# Patient Record
Sex: MALE | Race: BLACK OR AFRICAN AMERICAN | NOT HISPANIC OR LATINO | Employment: OTHER | URBAN - METROPOLITAN AREA
[De-identification: names, ages, dates, MRNs, and addresses within clinical notes are randomized per-mention and may not be internally consistent; named-entity substitution may affect disease eponyms.]

---

## 2017-06-06 DIAGNOSIS — E11.9 TYPE 2 DIABETES MELLITUS WITHOUT COMPLICATION: ICD-10-CM

## 2017-07-03 DIAGNOSIS — I10 ESSENTIAL HYPERTENSION: ICD-10-CM

## 2017-07-03 DIAGNOSIS — E78.2 MIXED HYPERLIPIDEMIA: ICD-10-CM

## 2017-07-03 RX ORDER — LISINOPRIL AND HYDROCHLOROTHIAZIDE 12.5; 2 MG/1; MG/1
TABLET ORAL
Qty: 180 TABLET | Refills: 1 | Status: SHIPPED | OUTPATIENT
Start: 2017-07-03 | End: 2017-07-14 | Stop reason: SDUPTHER

## 2017-07-03 RX ORDER — DAPAGLIFLOZIN 5 MG/1
5 TABLET, FILM COATED ORAL DAILY
Qty: 30 TABLET | Refills: 5 | Status: SHIPPED | OUTPATIENT
Start: 2017-07-03 | End: 2017-07-14 | Stop reason: SDUPTHER

## 2017-07-03 RX ORDER — METFORMIN HYDROCHLORIDE 1000 MG/1
1000 TABLET ORAL 2 TIMES DAILY WITH MEALS
Qty: 180 TABLET | Refills: 1 | Status: SHIPPED | OUTPATIENT
Start: 2017-07-03 | End: 2017-07-14 | Stop reason: SDUPTHER

## 2017-07-03 RX ORDER — ATORVASTATIN CALCIUM 40 MG/1
40 TABLET, FILM COATED ORAL NIGHTLY
Qty: 90 TABLET | Refills: 1 | Status: SHIPPED | OUTPATIENT
Start: 2017-07-03 | End: 2017-10-25 | Stop reason: SDUPTHER

## 2017-07-03 NOTE — TELEPHONE ENCOUNTER
----- Message from Audrey Rodriguez sent at 7/3/2017 11:26 AM CDT -----  Contact: Self  REFILL: metformin (GLUCOPHAGE) 1000 MG tablet  SITagliptan (JANUVIA) 100 MG Tab  lisinopril-hydrochlorothiazide (PRINZIDE,ZESTORETIC) 20-12.5 mg per tablet  dapagliflozin (FARXIGA) 5 mg Tab tablet  atorvastatin (LIPITOR) 40 MG tablet

## 2017-07-13 ENCOUNTER — LAB VISIT (OUTPATIENT)
Dept: LAB | Facility: HOSPITAL | Age: 62
End: 2017-07-13
Attending: INTERNAL MEDICINE
Payer: COMMERCIAL

## 2017-07-13 DIAGNOSIS — E11.9 TYPE 2 DIABETES MELLITUS WITHOUT COMPLICATION: ICD-10-CM

## 2017-07-13 LAB
ESTIMATED AVG GLUCOSE: 209 MG/DL
HBA1C MFR BLD HPLC: 8.9 %

## 2017-07-13 PROCEDURE — 83036 HEMOGLOBIN GLYCOSYLATED A1C: CPT

## 2017-07-13 PROCEDURE — 36415 COLL VENOUS BLD VENIPUNCTURE: CPT

## 2017-07-14 DIAGNOSIS — I10 ESSENTIAL HYPERTENSION: ICD-10-CM

## 2017-07-14 RX ORDER — LISINOPRIL AND HYDROCHLOROTHIAZIDE 12.5; 2 MG/1; MG/1
TABLET ORAL
Qty: 180 TABLET | Refills: 0 | Status: SHIPPED | OUTPATIENT
Start: 2017-07-14 | End: 2017-10-25 | Stop reason: SDUPTHER

## 2017-07-14 RX ORDER — METFORMIN HYDROCHLORIDE 1000 MG/1
1000 TABLET ORAL 2 TIMES DAILY WITH MEALS
Qty: 180 TABLET | Refills: 0 | Status: SHIPPED | OUTPATIENT
Start: 2017-07-14 | End: 2017-10-25 | Stop reason: SDUPTHER

## 2017-07-14 RX ORDER — DAPAGLIFLOZIN 5 MG/1
5 TABLET, FILM COATED ORAL DAILY
Qty: 30 TABLET | Refills: 2 | Status: SHIPPED | OUTPATIENT
Start: 2017-07-14 | End: 2017-10-25 | Stop reason: SDUPTHER

## 2017-09-27 ENCOUNTER — PATIENT OUTREACH (OUTPATIENT)
Dept: ADMINISTRATIVE | Facility: HOSPITAL | Age: 62
End: 2017-09-27

## 2017-09-28 DIAGNOSIS — Z12.11 COLON CANCER SCREENING: ICD-10-CM

## 2017-09-28 DIAGNOSIS — E11.9 TYPE 2 DIABETES MELLITUS WITHOUT COMPLICATION: ICD-10-CM

## 2017-10-25 DIAGNOSIS — E78.2 MIXED HYPERLIPIDEMIA: ICD-10-CM

## 2017-10-25 DIAGNOSIS — I10 ESSENTIAL HYPERTENSION: ICD-10-CM

## 2017-10-25 RX ORDER — DAPAGLIFLOZIN 5 MG/1
5 TABLET, FILM COATED ORAL DAILY
Qty: 30 TABLET | Refills: 0 | Status: SHIPPED | OUTPATIENT
Start: 2017-10-25 | End: 2017-11-13 | Stop reason: SDUPTHER

## 2017-10-25 RX ORDER — METFORMIN HYDROCHLORIDE 1000 MG/1
1000 TABLET ORAL 2 TIMES DAILY WITH MEALS
Qty: 60 TABLET | Refills: 0 | Status: SHIPPED | OUTPATIENT
Start: 2017-10-25 | End: 2017-11-13 | Stop reason: SDUPTHER

## 2017-10-25 RX ORDER — LISINOPRIL AND HYDROCHLOROTHIAZIDE 12.5; 2 MG/1; MG/1
TABLET ORAL
Qty: 60 TABLET | Refills: 0 | Status: SHIPPED | OUTPATIENT
Start: 2017-10-25 | End: 2017-11-13 | Stop reason: SDUPTHER

## 2017-10-25 RX ORDER — ATORVASTATIN CALCIUM 40 MG/1
40 TABLET, FILM COATED ORAL NIGHTLY
Qty: 30 TABLET | Refills: 0 | Status: SHIPPED | OUTPATIENT
Start: 2017-10-25 | End: 2017-11-13 | Stop reason: SDUPTHER

## 2017-10-25 NOTE — TELEPHONE ENCOUNTER
Last office visit 11/23/16 [place] [address]. Lipid panel done on 118/23/17 with cholesterol at 245. Last BMP 11/23/17. 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

## 2017-11-13 ENCOUNTER — OFFICE VISIT (OUTPATIENT)
Dept: FAMILY MEDICINE | Facility: CLINIC | Age: 62
End: 2017-11-13
Payer: COMMERCIAL

## 2017-11-13 VITALS
OXYGEN SATURATION: 98 % | RESPIRATION RATE: 18 BRPM | SYSTOLIC BLOOD PRESSURE: 158 MMHG | BODY MASS INDEX: 40.43 KG/M2 | WEIGHT: 288.81 LBS | HEIGHT: 71 IN | DIASTOLIC BLOOD PRESSURE: 118 MMHG | HEART RATE: 87 BPM | TEMPERATURE: 98 F

## 2017-11-13 DIAGNOSIS — E78.2 MIXED HYPERLIPIDEMIA: ICD-10-CM

## 2017-11-13 DIAGNOSIS — M25.511 RIGHT SHOULDER PAIN, UNSPECIFIED CHRONICITY: Primary | ICD-10-CM

## 2017-11-13 DIAGNOSIS — I10 ESSENTIAL HYPERTENSION: ICD-10-CM

## 2017-11-13 PROCEDURE — 82570 ASSAY OF URINE CREATININE: CPT

## 2017-11-13 PROCEDURE — 99999 PR PBB SHADOW E&M-EST. PATIENT-LVL IV: CPT | Mod: PBBFAC,,, | Performed by: NURSE PRACTITIONER

## 2017-11-13 PROCEDURE — 99396 PREV VISIT EST AGE 40-64: CPT | Mod: S$GLB,,, | Performed by: NURSE PRACTITIONER

## 2017-11-13 RX ORDER — DAPAGLIFLOZIN 5 MG/1
5 TABLET, FILM COATED ORAL DAILY
Qty: 30 TABLET | Refills: 2 | Status: SHIPPED | OUTPATIENT
Start: 2017-11-13 | End: 2018-02-02 | Stop reason: SDUPTHER

## 2017-11-13 RX ORDER — METFORMIN HYDROCHLORIDE 1000 MG/1
1000 TABLET ORAL 2 TIMES DAILY WITH MEALS
Qty: 60 TABLET | Refills: 0 | Status: SHIPPED | OUTPATIENT
Start: 2017-11-13 | End: 2018-01-02 | Stop reason: SDUPTHER

## 2017-11-13 RX ORDER — LISINOPRIL AND HYDROCHLOROTHIAZIDE 12.5; 2 MG/1; MG/1
2 TABLET ORAL DAILY
Qty: 60 TABLET | Refills: 0 | Status: SHIPPED | OUTPATIENT
Start: 2017-11-13 | End: 2018-01-02 | Stop reason: SDUPTHER

## 2017-11-13 RX ORDER — ATORVASTATIN CALCIUM 40 MG/1
40 TABLET, FILM COATED ORAL NIGHTLY
Qty: 30 TABLET | Refills: 2 | Status: SHIPPED | OUTPATIENT
Start: 2017-11-13 | End: 2018-02-06 | Stop reason: SDUPTHER

## 2017-11-13 RX ORDER — LISINOPRIL AND HYDROCHLOROTHIAZIDE 12.5; 2 MG/1; MG/1
TABLET ORAL
Qty: 60 TABLET | Refills: 0 | Status: CANCELLED | OUTPATIENT
Start: 2017-11-13

## 2017-11-13 NOTE — PATIENT INSTRUCTIONS
Follow up in 3-6 months, pending lab results  Go to ER for new, worse or concerning symptoms      Understanding Carbohydrates, Fats, and Protein  Food is a source of fuel and nourishment for your body. Its also a source of pleasure. Having diabetes doesnt mean you have to eat special foods or give up desserts. Instead, your dietitian can show you how to plan meals to suit your body. To start, learn how different foods affect blood sugar.  Carbohydrates  Carbohydrates are the main source of fuel for the body. Carbohydrates raise blood sugar. Many people think carbohydrates are only found in pasta or bread. But carbohydrates are actually in many kinds of foods:  · Sugars occur naturally in foods such as fruit, milk, honey, and molasses. Sugars can also be added to many foods, from cereals and yogurt to candy and desserts. Sugars raise blood sugar.  · Starches are found in bread, cereals, pasta, and dried beans. Theyre also found in corn, peas, potatoes, yam, acorn squash, and butternut squash. Starches also raise blood sugar.   · Fiber is found in foods such as vegetables, fruits, beans, and whole grains. Unlike other carbs, fiber isnt digested or absorbed. So it doesnt raise blood sugar. In fact, fiber can help keep blood sugar from rising too fast. It also helps keep blood cholesterol at a healthy level.  Did you know?  Even though carbohydrates raise blood sugar, its best to have some in every meal. They are an important part of a healthy diet.   Fat  Fat is an energy source that can be stored until needed. Fat does not raise blood sugar. However, it can raise blood cholesterol, increasing the risk of heart disease. Fat is also high in calories, which can cause weight gain. Not all types of fat are the same.  More Healthy:  · Monounsaturated fats are mostly found in vegetable oils, such as olive, canola, and peanut oils. They are also found in avocados and some nuts. Monounsaturated fats are healthy for your  heart. Thats because they lower LDL (unhealthy) cholesterol.  · Polyunsaturated fats are mostly found in vegetable oils, such as corn, safflower, and soybean oils. They are also found in some seeds, nuts, and fish. Polyunsaturated fats lower LDL (unhealthy) cholesterol. So, choosing them instead of saturated fats is healthy for your heart. Certain unsaturated fats can help lower triglycerides.   Less Healthy:  · Saturated fats are found in animal products, such as meat, poultry, whole milk, lard, and butter. Saturated fats raise LDL cholesterol and are not healthy for your heart.  · Hydrogenated oils and trans fats are formed when vegetable oils are processed into solid fats. They are found in many processed foods. Hydrogenated oils and trans fats raise LDL cholesterol and lower HDL (healthy) cholesterol. They are not healthy for your heart.  Protein  Protein helps the body build and repair muscle and other tissue. Protein has little or no effect on blood sugar. However, many foods that contain protein also contain saturated fat. By choosing low-fat protein sources, you can get the benefits of protein without the extra fat:  · Plant protein is found in dry beans and peas, nuts, and soy products, such as tofu and soymilk. These sources tend to be cholesterol-free and low in saturated fat.  · Animal protein is found in fish, poultry, meat, cheese, milk, and eggs. These contain cholesterol and can be high in saturated fat. Aim for lean, lower-fat choices.  Date Last Reviewed: 3/1/2016  © 6302-8059 [employer]. [address] [place] [address], [place] [address]. All rights reserved. This information is not intended as a substitute for professional medical care. Always follow your healthcare professional's instructions.

## 2017-11-13 NOTE — PROGRESS NOTES
"Subjective:       Patient ID: Handy Adams is a 62 y.o. male.    Chief Complaint: Medication Refill (90 day supply )    HPI mr Adams is here for his annual exam.  He frequently gives short answers, and tells me he has to "beg" for his refills.    He feels well today, he reports medication compliance, he does not check his sugars. He denies any polydipsia or polyphagia, he states he always urinates a lot, but this is because he always drinks a lot of water.    He is due for multiple health maintenance exams.  He also reports some chronic R shoulder pain that he has had injections in the past, he would like to be referred to orthopedic MD.  Review of Systems   Constitutional: Negative for fever.   Respiratory: Negative.    Cardiovascular: Negative.    Musculoskeletal: Positive for arthralgias.       Objective:      Physical Exam   Constitutional: He is oriented to person, place, and time. He appears well-developed and well-nourished. He does not appear ill. No distress.   obese   HENT:   Head: Normocephalic and atraumatic.   Cardiovascular: Normal rate and regular rhythm.  Exam reveals no friction rub.    No murmur heard.  Pulses:       Dorsalis pedis pulses are 2+ on the right side, and 2+ on the left side.        Posterior tibial pulses are 2+ on the right side, and 2+ on the left side.   Pulmonary/Chest: Effort normal and breath sounds normal. He has no decreased breath sounds. He has no wheezes. He has no rhonchi. He has no rales.   Abdominal: Soft.   Musculoskeletal: Normal range of motion.   Feet:   Right Foot:   Protective Sensation: 5 sites tested. 5 sites sensed.   Left Foot:   Protective Sensation: 5 sites tested. 5 sites sensed.   Neurological: He is alert and oriented to person, place, and time.   Skin: Skin is warm.   Vitals reviewed.    Protective Sensation (w/ 10 gram monofilament):  Right: Intact  Left: Intact    Visual Inspection:  Normal -  Bilateral    Pedal Pulses:   Right: Present  Left: " Present    Posterior tibialis:   Right:Present  Left: Present      Assessment:       1. Right shoulder pain, unspecified chronicity    2. Uncontrolled type 2 diabetes mellitus without complication, without long-term current use of insulin    3. Mixed hyperlipidemia    4. Essential hypertension        Plan:       Right shoulder pain, unspecified chronicity  -     Ambulatory referral to Orthopedics    Uncontrolled type 2 diabetes mellitus without complication, without long-term current use of insulin  -     SITagliptin (JANUVIA) 100 MG Tab; Take 1 tablet (100 mg total) by mouth once daily.  Dispense: 90 tablet; Refill: 0  -     dapagliflozin (FARXIGA) 5 mg Tab tablet; Take 1 tablet (5 mg total) by mouth once daily.  Dispense: 30 tablet; Refill: 2  -     metFORMIN (GLUCOPHAGE) 1000 MG tablet; Take 1 tablet (1,000 mg total) by mouth 2 (two) times daily with meals.  Dispense: 60 tablet; Refill: 0  -     Basic metabolic panel; Future; Expected date: 11/13/2017  -     Lipid panel; Future; Expected date: 11/13/2017  -     Hemoglobin A1c; Future; Expected date: 11/13/2017  -     Diabetic Eye Screening Photo; Future  -     MICROALBUMIN / CREATININE RATIO URINE    Mixed hyperlipidemia  -     atorvastatin (LIPITOR) 40 MG tablet; Take 1 tablet (40 mg total) by mouth nightly.  Dispense: 30 tablet; Refill: 2    Essential hypertension  -     lisinopril-hydrochlorothiazide (PRINZIDE,ZESTORETIC) 20-12.5 mg per tablet; Take 2 tablets by mouth once daily.  Dispense: 60 tablet; Refill: 0      He declines a flu shot, instructed to take prinzide 2 tablets once daily instead of BID to decrease nocturia.  He will have his labs and eyecam done tomorrow.  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  If his labs are WNL, we can stretch his visits out to 6 months.    Verbalized understanding

## 2017-11-14 ENCOUNTER — TELEPHONE (OUTPATIENT)
Dept: FAMILY MEDICINE | Facility: CLINIC | Age: 62
End: 2017-11-14

## 2017-11-14 LAB
CREAT UR-MCNC: 48 MG/DL
MICROALBUMIN UR DL<=1MG/L-MCNC: 90 UG/ML
MICROALBUMIN/CREATININE RATIO: 187.5 UG/MG

## 2017-11-15 ENCOUNTER — CLINICAL SUPPORT (OUTPATIENT)
Dept: OPHTHALMOLOGY | Facility: CLINIC | Age: 62
End: 2017-11-15
Payer: COMMERCIAL

## 2017-11-15 ENCOUNTER — LAB VISIT (OUTPATIENT)
Dept: LAB | Facility: HOSPITAL | Age: 62
End: 2017-11-15
Attending: NURSE PRACTITIONER
Payer: COMMERCIAL

## 2017-11-15 DIAGNOSIS — E11.3413 SEVERE NONPROLIFERATIVE DIABETIC RETINOPATHY OF BOTH EYES WITH MACULAR EDEMA ASSOCIATED WITH TYPE 2 DIABETES MELLITUS: Primary | ICD-10-CM

## 2017-11-15 LAB
ANION GAP SERPL CALC-SCNC: 11 MMOL/L
BUN SERPL-MCNC: 21 MG/DL
CALCIUM SERPL-MCNC: 9.3 MG/DL
CHLORIDE SERPL-SCNC: 101 MMOL/L
CHOLEST SERPL-MCNC: 270 MG/DL
CHOLEST/HDLC SERPL: 7.5 {RATIO}
CO2 SERPL-SCNC: 24 MMOL/L
CREAT SERPL-MCNC: 1.2 MG/DL
EST. GFR  (AFRICAN AMERICAN): >60 ML/MIN/1.73 M^2
EST. GFR  (NON AFRICAN AMERICAN): >60 ML/MIN/1.73 M^2
ESTIMATED AVG GLUCOSE: 232 MG/DL
GLUCOSE SERPL-MCNC: 185 MG/DL
HBA1C MFR BLD HPLC: 9.7 %
HDLC SERPL-MCNC: 36 MG/DL
HDLC SERPL: 13.3 %
LDLC SERPL CALC-MCNC: 184.4 MG/DL
NONHDLC SERPL-MCNC: 234 MG/DL
POTASSIUM SERPL-SCNC: 4.1 MMOL/L
SODIUM SERPL-SCNC: 136 MMOL/L
TRIGL SERPL-MCNC: 248 MG/DL

## 2017-11-15 PROCEDURE — 92250 FUNDUS PHOTOGRAPHY W/I&R: CPT | Mod: S$GLB,,, | Performed by: OPHTHALMOLOGY

## 2017-11-15 PROCEDURE — 83036 HEMOGLOBIN GLYCOSYLATED A1C: CPT

## 2017-11-15 PROCEDURE — 99999 PR PBB SHADOW E&M-EST. PATIENT-LVL II: CPT | Mod: PBBFAC,,,

## 2017-11-15 PROCEDURE — 36415 COLL VENOUS BLD VENIPUNCTURE: CPT | Mod: PO

## 2017-11-15 PROCEDURE — 80048 BASIC METABOLIC PNL TOTAL CA: CPT

## 2017-11-15 PROCEDURE — 80061 LIPID PANEL: CPT

## 2017-11-15 NOTE — PROGRESS NOTES
HPI     Pt here for diabetic eye exam with non-dilated fundus photos.    Small pupils: yes  Pt cooperative: yes      Last edited by Brian Garcia on 11/15/2017 11:30 AM. (History)            Assessment /Plan     For exam results, see Encounter Report.    Uncontrolled type 2 diabetes mellitus without complication, without long-term current use of insulin  -     Diabetic Eye Screening Photo      Please see Dr. Jarvis's progress notes for interpretation.

## 2017-11-15 NOTE — Clinical Note
HPI    Pt here for diabetic eye exam with non-dilated fundus photos.  Small pupils: yes Pt cooperative: yes    Last edited by Brian Garcia on 11/15/2017 11:30 AM. (History)

## 2017-11-16 ENCOUNTER — TELEPHONE (OUTPATIENT)
Dept: FAMILY MEDICINE | Facility: CLINIC | Age: 62
End: 2017-11-16

## 2017-11-16 NOTE — TELEPHONE ENCOUNTER
LM for pt to call back re:    His A1c is too high, he should take Metformin 1000mg twice a day, every day, farxiga and januvia once a day every day.  The next step is insulin  his cholesterol is also too high, if he is taking his atorvastatin 40mg every day, then we need to increase it to 80mg every day   his microalbumin, which is a protein in his urine is too high, this can be from poorly controlled diabetes as well as not taking his lisinopril like he's supposed to.    He will need these labs repeated in 3 months.    I will mail his results as well as all of the above information to him.

## 2017-11-17 ENCOUNTER — TELEPHONE (OUTPATIENT)
Dept: FAMILY MEDICINE | Facility: CLINIC | Age: 62
End: 2017-11-17

## 2017-11-17 ENCOUNTER — TELEPHONE (OUTPATIENT)
Dept: OPHTHALMOLOGY | Facility: CLINIC | Age: 62
End: 2017-11-17

## 2017-11-17 DIAGNOSIS — E11.319 DIABETIC RETINOPATHY ASSOCIATED WITH TYPE 2 DIABETES MELLITUS, MACULAR EDEMA PRESENCE UNSPECIFIED, UNSPECIFIED LATERALITY, UNSPECIFIED RETINOPATHY SEVERITY: Primary | ICD-10-CM

## 2017-11-17 PROBLEM — E11.3413 SEVERE NONPROLIFERATIVE DIABETIC RETINOPATHY OF BOTH EYES WITH MACULAR EDEMA ASSOCIATED WITH TYPE 2 DIABETES MELLITUS: Status: ACTIVE | Noted: 2017-11-17

## 2017-11-30 ENCOUNTER — TELEPHONE (OUTPATIENT)
Dept: ENDOCRINOLOGY | Facility: CLINIC | Age: 62
End: 2017-11-30

## 2017-11-30 NOTE — TELEPHONE ENCOUNTER
Left message on patient's voicemail to return call to clinic regarding scheduling Diabetes management appointment with Maryse Smith NP for elevated A1c. Waiting to hear back.

## 2017-12-05 ENCOUNTER — INITIAL CONSULT (OUTPATIENT)
Dept: OPHTHALMOLOGY | Facility: CLINIC | Age: 62
End: 2017-12-05
Payer: COMMERCIAL

## 2017-12-05 VITALS — SYSTOLIC BLOOD PRESSURE: 156 MMHG | HEART RATE: 101 BPM | DIASTOLIC BLOOD PRESSURE: 103 MMHG

## 2017-12-05 DIAGNOSIS — H35.033 HYPERTENSIVE RETINOPATHY, BILATERAL: ICD-10-CM

## 2017-12-05 DIAGNOSIS — H25.13 NUCLEAR SCLEROSIS OF BOTH EYES: ICD-10-CM

## 2017-12-05 DIAGNOSIS — E11.3413 SEVERE NONPROLIFERATIVE DIABETIC RETINOPATHY OF BOTH EYES WITH MACULAR EDEMA ASSOCIATED WITH TYPE 2 DIABETES MELLITUS: Primary | ICD-10-CM

## 2017-12-05 PROBLEM — H25.10 NS (NUCLEAR SCLEROSIS): Status: ACTIVE | Noted: 2017-12-05

## 2017-12-05 PROCEDURE — 99999 PR PBB SHADOW E&M-EST. PATIENT-LVL III: CPT | Mod: PBBFAC,,, | Performed by: OPHTHALMOLOGY

## 2017-12-05 PROCEDURE — 92225 PR SPECIAL EYE EXAM, INITIAL: CPT | Mod: 50,S$GLB,, | Performed by: OPHTHALMOLOGY

## 2017-12-05 PROCEDURE — 92134 CPTRZ OPH DX IMG PST SGM RTA: CPT | Mod: S$GLB,,, | Performed by: OPHTHALMOLOGY

## 2017-12-05 PROCEDURE — 92014 COMPRE OPH EXAM EST PT 1/>: CPT | Mod: S$GLB,,, | Performed by: OPHTHALMOLOGY

## 2017-12-05 NOTE — PROGRESS NOTES
HPI     Retinal consult per Dr. Jarvis due to eye cam findings   LAURA 2-3 years ago unsure of Dr.    Since last eye exam has not experienced any decline of vision or problems with eyes in general.   Has rx glasses but does not feel he needs them often. Has noticed OD vision seems to be stronger.  Denies pain.    (-)Flashes (-)Floaters  (-)Photophobia  (-)Glare    Eye gtts :   Visine / clear eyes to get redness out    LBS- 1- 2 yrs ago does not check   Hemoglobin A1C       Date                     Value               Ref Range             Status                11/15/2017               9.7 (H)             4.0 - 5.6 %           Final                 07/13/2017               8.9 (H)             4.0 - 5.6 %           Final                 11/23/2016               8.9 (H)             4.5 - 6.2 %           Final              ----------  Denies ever having any eye sx before. No eye injuries       OCT - non central DME OU    A/P    1. Severe NPDR OU  Uncontrolled T2, NO insulin    2. DME OU  Non central  Monitor    3. HTN Ret OU  BS/BP/chol control    4. NS OU  Monitor - good Va    5. Cyst RLL  ?hidrocystoma - pt would like removed  Consult Rhea      4 months OCT/FA OD

## 2017-12-05 NOTE — LETTER
December 5, 2017      Chavo Jarvis MD  [address]  [facility] [address]           [facility] - Ophthalmology  [address]  [facility] [address]  Phone: [phone]  Fax: [phone]          Patient: Handy Adams   MR Number: [id number]   YOB: 1955   Date of Visit: 12/5/2017       Dear Dr. Chavo Jarvis:    Thank you for referring Handy Adams to me for evaluation. Attached you will find relevant portions of my assessment and plan of care.    If you have questions, please do not hesitate to call me. I look forward to following Handy Adams along with you.    Sincerely,    SHAWNA Mcmanus MD    Enclosure  CC:  No Recipients    If you would like to receive this communication electronically, please contact [email or web address] or [phone] to request more information on [employer] Link access.    For providers and/or their staff who would like to refer a patient to [facility], please contact us through our one-stop-shop provider referral line, [facility], at [phone].    If you feel you have received this communication in error or would no longer like to receive these types of communications, please e-mail [email or web address]

## 2018-01-02 DIAGNOSIS — I10 ESSENTIAL HYPERTENSION: ICD-10-CM

## 2018-01-02 RX ORDER — LISINOPRIL AND HYDROCHLOROTHIAZIDE 12.5; 2 MG/1; MG/1
2 TABLET ORAL DAILY
Qty: 60 TABLET | Refills: 0 | Status: SHIPPED | OUTPATIENT
Start: 2018-01-02 | End: 2018-02-06 | Stop reason: SDUPTHER

## 2018-01-02 RX ORDER — METFORMIN HYDROCHLORIDE 1000 MG/1
1000 TABLET ORAL 2 TIMES DAILY WITH MEALS
Qty: 60 TABLET | Refills: 0 | Status: SHIPPED | OUTPATIENT
Start: 2018-01-02 | End: 2018-02-06 | Stop reason: SDUPTHER

## 2018-01-02 NOTE — TELEPHONE ENCOUNTER
Last office visit 11/13/17 with blood pressure reading of 158/118. Last A1c 11/15/17 with reading of 9.7.

## 2018-02-01 ENCOUNTER — INITIAL CONSULT (OUTPATIENT)
Dept: OPHTHALMOLOGY | Facility: CLINIC | Age: 63
End: 2018-02-01
Payer: COMMERCIAL

## 2018-02-01 DIAGNOSIS — H35.033 HYPERTENSIVE RETINOPATHY, BILATERAL: ICD-10-CM

## 2018-02-01 DIAGNOSIS — D23.9 HYDROCYSTOMA: Primary | ICD-10-CM

## 2018-02-01 DIAGNOSIS — H02.9 LESION OF EYELID: Primary | ICD-10-CM

## 2018-02-01 DIAGNOSIS — H25.13 NUCLEAR SCLEROSIS OF BOTH EYES: ICD-10-CM

## 2018-02-01 PROCEDURE — 99999 PR PBB SHADOW E&M-EST. PATIENT-LVL II: CPT | Mod: PBBFAC,,, | Performed by: OPHTHALMOLOGY

## 2018-02-01 PROCEDURE — 92285 EXTERNAL OCULAR PHOTOGRAPHY: CPT | Mod: S$GLB,,, | Performed by: OPHTHALMOLOGY

## 2018-02-01 PROCEDURE — 92014 COMPRE OPH EXAM EST PT 1/>: CPT | Mod: S$GLB,,, | Performed by: OPHTHALMOLOGY

## 2018-02-01 NOTE — PROGRESS NOTES
HPI     Mr. Murrell is here today for a consult referred by Dr. Mcmanus. He   states he has a cyst in the corner of his right eye that has been there   since 2005. He denies any eye pain or irritation. He is not using any eye   medications.     Last edited by Debi Stubbs, PCT on 2/1/2018  3:23 PM. (History)            Assessment /Plan     For exam results, see Encounter Report.    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     Concern that hidrocystoma will likely involve canalicular system should it continue to grow.     Informed consent obtained after extensive risks/benefits/alternatives were discussed with the patient including but not limited to pain, bleeding, infection, ocular injury, loss of the eye, asymmetry, need for revision in future, scarring.  Alternatives such as waiting were discussed.  All questions were answered.      Return for surgery     NPDR OU- Continue follow-up with Dr. Mcmanus    Strict bs and bp control

## 2018-02-01 NOTE — LETTER
February 1, 2018      SHAWNA Mcmanus MD  [address] [facility] [address]           [facility] - Ophthalmology  [address]  [facility] [address]  Phone: [phone]  Fax: [phone]          Patient: Handy Adams   MR Number: [id number]   YOB: 1955   Date of Visit: 2/1/2018       Dear Dr. SHAWNA Mcmanus:    Thank you for referring Handy Adams to me for evaluation. Attached you will find relevant portions of my assessment and plan of care.    If you have questions, please do not hesitate to call me. I look forward to following Handy Adams along with you.    Sincerely,    Denise Wilkerson MD    Enclosure  CC:  No Recipients    If you would like to receive this communication electronically, please contact [email or web address] or [phone] to request more information on [employer] Link access.    For providers and/or their staff who would like to refer a patient to [facility], please contact us through our one-stop-shop provider referral line, [facility], at [phone].    If you feel you have received this communication in error or would no longer like to receive these types of communications, please e-mail [email or web address]

## 2018-02-02 NOTE — TELEPHONE ENCOUNTER
----- Message from Lubna Hernandez sent at 2/2/2018  3:35 PM CST -----  Contact: self  Refill: metFORMIN (GLUCOPHAGE) 1000 MG tablet  Refill: lisinopril-hydrochlorothiazide (PRINZIDE,ZESTORETIC) 20-12.5 mg per tablet  Refill: SITagliptin (JANUVIA) 100 MG Tab  Refill: dapagliflozin (FARXIGA) 5 mg Tab tablet    [facility] - [place], [place] - [address]    Patient can be reached at [phone]. Thank you!

## 2018-02-05 RX ORDER — DAPAGLIFLOZIN 5 MG/1
5 TABLET, FILM COATED ORAL DAILY
Qty: 30 TABLET | Refills: 1 | Status: SHIPPED | OUTPATIENT
Start: 2018-02-05 | End: 2018-02-06 | Stop reason: SDUPTHER

## 2018-02-06 ENCOUNTER — OFFICE VISIT (OUTPATIENT)
Dept: FAMILY MEDICINE | Facility: CLINIC | Age: 63
End: 2018-02-06
Payer: COMMERCIAL

## 2018-02-06 VITALS
OXYGEN SATURATION: 99 % | SYSTOLIC BLOOD PRESSURE: 158 MMHG | DIASTOLIC BLOOD PRESSURE: 106 MMHG | TEMPERATURE: 98 F | WEIGHT: 280 LBS | BODY MASS INDEX: 39.2 KG/M2 | HEART RATE: 98 BPM | HEIGHT: 71 IN

## 2018-02-06 DIAGNOSIS — E78.2 MIXED HYPERLIPIDEMIA: ICD-10-CM

## 2018-02-06 DIAGNOSIS — I10 ESSENTIAL HYPERTENSION: ICD-10-CM

## 2018-02-06 PROCEDURE — 3008F BODY MASS INDEX DOCD: CPT | Mod: S$GLB,,, | Performed by: FAMILY MEDICINE

## 2018-02-06 PROCEDURE — 99999 PR PBB SHADOW E&M-EST. PATIENT-LVL III: CPT | Mod: PBBFAC,,, | Performed by: FAMILY MEDICINE

## 2018-02-06 PROCEDURE — 99214 OFFICE O/P EST MOD 30 MIN: CPT | Mod: S$GLB,,, | Performed by: FAMILY MEDICINE

## 2018-02-06 RX ORDER — METFORMIN HYDROCHLORIDE 1000 MG/1
1000 TABLET ORAL 2 TIMES DAILY WITH MEALS
Qty: 180 TABLET | Refills: 1 | Status: SHIPPED | OUTPATIENT
Start: 2018-02-06 | End: 2018-09-14 | Stop reason: SDUPTHER

## 2018-02-06 RX ORDER — LISINOPRIL AND HYDROCHLOROTHIAZIDE 12.5; 2 MG/1; MG/1
2 TABLET ORAL DAILY
Qty: 180 TABLET | Refills: 1 | Status: SHIPPED | OUTPATIENT
Start: 2018-02-06 | End: 2018-07-27 | Stop reason: SDUPTHER

## 2018-02-06 RX ORDER — DAPAGLIFLOZIN 5 MG/1
5 TABLET, FILM COATED ORAL DAILY
Qty: 90 TABLET | Refills: 1 | Status: SHIPPED | OUTPATIENT
Start: 2018-02-06 | End: 2018-12-07 | Stop reason: SDUPTHER

## 2018-02-06 RX ORDER — ATORVASTATIN CALCIUM 40 MG/1
40 TABLET, FILM COATED ORAL NIGHTLY
Qty: 90 TABLET | Refills: 1 | Status: SHIPPED | OUTPATIENT
Start: 2018-02-06 | End: 2019-04-24 | Stop reason: SDUPTHER

## 2018-03-26 ENCOUNTER — PROCEDURE VISIT (OUTPATIENT)
Dept: OPHTHALMOLOGY | Facility: CLINIC | Age: 63
End: 2018-03-26
Payer: COMMERCIAL

## 2018-03-26 VITALS — HEART RATE: 79 BPM | DIASTOLIC BLOOD PRESSURE: 119 MMHG | SYSTOLIC BLOOD PRESSURE: 182 MMHG

## 2018-03-26 DIAGNOSIS — H02.9 LESION OF EYELID: Primary | ICD-10-CM

## 2018-03-26 PROCEDURE — 88304 TISSUE EXAM BY PATHOLOGIST: CPT

## 2018-03-26 PROCEDURE — 88304 TISSUE EXAM BY PATHOLOGIST: CPT | Mod: 26,,,

## 2018-03-26 NOTE — PROGRESS NOTES
HPI     Here for excision of lesion RLL    Last edited by Izzy Jacobs on 3/26/2018 10:39 AM. (History)            Assessment /Plan     For exam results, see Encounter Report.    Lesion of eyelid  -     External/Slit Lamp Photography        Presenting for right lower eyelid lesion excision.     Informed consent obtained after extensive risks/benefits/alternatives were discussed with the patient including but not limited to pain, bleeding, infection, ocular injury, loss of the eye, asymmetry, need for revision in future, scarring.  Alternatives such as waiting were discussed.  All questions were answered.      Return to plastics PRN    NPDR OU- Continue follow-up with Dr. Mcmanus           Procedure Note     Attending: Denise Wilkerson  Resident:Celsa Rosario  Pre-op Dx: Right lower eyelid lesion  Post-op Dx: same    Local: 2% lidocaine with epinephrine with 0.5% marcaine and 8.4% NaHCO3 and vitrase  Specimens: Right lower eyelid lesion  Complications: None  Blood Loss: minimal     The patient was prepped and draped in the usual sterile manner for ophthalmic plastic surgery.  A corneal shield was placed in the right palpebral fissure. 1 cc of local anesthesia was given to the right lower eyelid.  A mike scissor was used to excise the lesion from its base. The tissue was sent to pathology. High-temp cautery was used to obtain hemostasis. The corneal shield was removed. Tobrex ointment was applied to the wound. The patient tolerated the procedure well. There were no complications.      Post-operative instructions were given to the patient.

## 2018-04-06 ENCOUNTER — TELEPHONE (OUTPATIENT)
Dept: OPHTHALMOLOGY | Facility: CLINIC | Age: 63
End: 2018-04-06

## 2018-04-10 ENCOUNTER — OFFICE VISIT (OUTPATIENT)
Dept: OPHTHALMOLOGY | Facility: CLINIC | Age: 63
End: 2018-04-10
Payer: COMMERCIAL

## 2018-04-10 VITALS — SYSTOLIC BLOOD PRESSURE: 151 MMHG | DIASTOLIC BLOOD PRESSURE: 111 MMHG | HEART RATE: 104 BPM

## 2018-04-10 DIAGNOSIS — H25.13 NUCLEAR SCLEROSIS OF BOTH EYES: ICD-10-CM

## 2018-04-10 DIAGNOSIS — E11.3413 SEVERE NONPROLIFERATIVE DIABETIC RETINOPATHY OF BOTH EYES WITH MACULAR EDEMA ASSOCIATED WITH TYPE 2 DIABETES MELLITUS: ICD-10-CM

## 2018-04-10 DIAGNOSIS — H35.033 HYPERTENSIVE RETINOPATHY, BILATERAL: ICD-10-CM

## 2018-04-10 PROCEDURE — 92014 COMPRE OPH EXAM EST PT 1/>: CPT | Mod: S$GLB,,, | Performed by: OPHTHALMOLOGY

## 2018-04-10 PROCEDURE — 92226 PR SPECIAL EYE EXAM, SUBSEQUENT: CPT | Mod: RT,S$GLB,, | Performed by: OPHTHALMOLOGY

## 2018-04-10 PROCEDURE — 92134 CPTRZ OPH DX IMG PST SGM RTA: CPT | Mod: S$GLB,,, | Performed by: OPHTHALMOLOGY

## 2018-04-10 PROCEDURE — 92235 FLUORESCEIN ANGRPH MLTIFRAME: CPT | Mod: S$GLB,,, | Performed by: OPHTHALMOLOGY

## 2018-04-10 PROCEDURE — 99999 PR PBB SHADOW E&M-EST. PATIENT-LVL III: CPT | Mod: PBBFAC,,, | Performed by: OPHTHALMOLOGY

## 2018-04-10 NOTE — PROGRESS NOTES
HPI     4 mo / OCT / FA OD  DLS-12/05/2017 Dr. Mcmanus     VA stable    (-)Flashes (-)Floaters  (-)Photophobia  (-)Glare        OCT   OD: Good quality. Normal foveal contour  Superior IRF, exudates in parafovea. - increased  OS: Good quality. Normal foveal contour without IRF, SRF. Some small noncentral exudates    IVFA  OD - normal transit time. Hyperfluorescence early c/w Ma/edema  . NV  OS - Hyperfluorescence early c/w Ma/edema  .NV       A/P    1. PDR OU  Uncontrolled T2, NO insulin  - Last A1C 9.7 12/2017    Plan PRP OU after focal OD      2. DME OU    Plan focal OD    3. HTN Ret OU  BS/BP/chol control    4. NS OU  Monitor - good Va    5. Cyst RLL  Removed by Rhea  Happy with result      1-2 weeks focal OD followed by PRP OU

## 2018-04-10 NOTE — PROGRESS NOTES
HPI     4 mo / OCT / FA OD  DLS-12/05/2017 Dr. Mcmanus     Pt sts noc hange in va denies pain   (-)Flashes (-)Floaters  (-)Photophobia  (-)Glare        1. Severe NPDR OU  Uncontrolled T2, NO insulin     2. DME OU  Non central  Monitor     3. HTN Ret OU  BS/BP/chol control     4. NS OU  Monitor - good Va     5. Cyst RLL  ?hidrocystoma - pt would like removed  Consult Rhea    Last edited by Tanner Jackson MD on 4/10/2018 10:25 AM. (History)        ROS     Positive for: Eyes    Negative for: Constitutional, Gastrointestinal, Neurological, Skin,   Genitourinary, Musculoskeletal, HENT, Endocrine, Cardiovascular,   Respiratory, Psychiatric, Allergic/Imm, Heme/Lymph    Last edited by SHAWNA Mcmanus MD on 4/10/2018 10:36 AM. (History)        Assessment /Plan     For exam results, see Encounter Report.    Uncontrolled type 2 diabetes mellitus with both eyes affected by proliferative retinopathy and macular edema, without long-term current use of insulin    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  -     Posterior Segment OCT Retina-Both eyes  -     Fluorescein Angiography - OU - Both Eyes    Hypertensive retinopathy, bilateral    Nuclear sclerosis of both eyes      0930- Pt has elevated bp. Currently asymptomatic. Pt reported that he took his bp med this morning. Informed pt of health risks associated with uncontrolled high bp. Recommended that pt avoid salt in diet and to follow up with PCP. Expressed understanding---Albert

## 2018-07-27 DIAGNOSIS — I10 ESSENTIAL HYPERTENSION: ICD-10-CM

## 2018-07-27 RX ORDER — LISINOPRIL AND HYDROCHLOROTHIAZIDE 12.5; 2 MG/1; MG/1
2 TABLET ORAL DAILY
Qty: 180 TABLET | Refills: 0 | Status: SHIPPED | OUTPATIENT
Start: 2018-07-27 | End: 2018-11-07 | Stop reason: SDUPTHER

## 2018-07-27 NOTE — TELEPHONE ENCOUNTER
----- Message from Castro Sharif sent at 7/27/2018  1:32 PM CDT -----  Contact: Self/[phone]  Patient would like the prescription filled today.    Refill:lisinopril-hydrochlorothiazide (PRINZIDE,ZESTORETIC) 20-12.5 mg per tablet    [facility] [address] [place] [address] [address]    Thank you

## 2018-09-10 ENCOUNTER — LAB VISIT (OUTPATIENT)
Dept: LAB | Facility: HOSPITAL | Age: 63
End: 2018-09-10
Attending: FAMILY MEDICINE
Payer: COMMERCIAL

## 2018-09-10 ENCOUNTER — TELEPHONE (OUTPATIENT)
Dept: FAMILY MEDICINE | Facility: CLINIC | Age: 63
End: 2018-09-10

## 2018-09-10 PROCEDURE — 36415 COLL VENOUS BLD VENIPUNCTURE: CPT

## 2018-09-10 PROCEDURE — 83036 HEMOGLOBIN GLYCOSYLATED A1C: CPT

## 2018-09-10 NOTE — TELEPHONE ENCOUNTER
----- Message from Cayla Ribeiro sent at 9/10/2018 12:06 PM CDT -----  Contact: Pt  Pt called to speak to the nurse to schedule a diabetes follow up appt and medication refill and would like to be seen on 9/11/2018 due to being out of his medication and would like a call back today asap.    Pt can be reached at [phone].    Thanks

## 2018-09-10 NOTE — TELEPHONE ENCOUNTER
Attempted to reach patient for appointment, mailbox full    Patient can schedule appt with call center if anything is available for 9/11/18 with Dr. Johnson. Nurse does not need to be contacted.

## 2018-09-11 LAB
ESTIMATED AVG GLUCOSE: 174 MG/DL
HBA1C MFR BLD HPLC: 7.7 %

## 2018-09-13 ENCOUNTER — OFFICE VISIT (OUTPATIENT)
Dept: FAMILY MEDICINE | Facility: CLINIC | Age: 63
End: 2018-09-13
Payer: COMMERCIAL

## 2018-09-13 VITALS
WEIGHT: 269.13 LBS | HEIGHT: 71 IN | SYSTOLIC BLOOD PRESSURE: 146 MMHG | DIASTOLIC BLOOD PRESSURE: 98 MMHG | HEART RATE: 77 BPM | OXYGEN SATURATION: 99 % | BODY MASS INDEX: 37.68 KG/M2

## 2018-09-13 DIAGNOSIS — I10 ESSENTIAL HYPERTENSION: Primary | ICD-10-CM

## 2018-09-13 PROCEDURE — 99214 OFFICE O/P EST MOD 30 MIN: CPT | Mod: S$GLB,,, | Performed by: FAMILY MEDICINE

## 2018-09-13 PROCEDURE — 99999 PR PBB SHADOW E&M-EST. PATIENT-LVL III: CPT | Mod: PBBFAC,,, | Performed by: FAMILY MEDICINE

## 2018-09-13 RX ORDER — INSULIN PUMP SYRINGE, 3 ML
EACH MISCELLANEOUS
Qty: 1 EACH | Refills: 0 | Status: SHIPPED | OUTPATIENT
Start: 2018-09-13 | End: 2024-02-09

## 2018-09-13 RX ORDER — LANCETS
1 EACH MISCELLANEOUS 2 TIMES DAILY WITH MEALS
Qty: 100 EACH | Refills: 11 | Status: SHIPPED | OUTPATIENT
Start: 2018-09-13

## 2018-09-13 NOTE — PATIENT INSTRUCTIONS
Discharge Instructions: Eating a Low-Salt Diet  Your health care provider has prescribed a low-salt diet for you. Most people with heart problems need to eat less salt, which is full of sodium. Too much sodium is linked to high blood pressure, which is linked to a greater risk of heart disease, stroke, blindness, and kidney problems.  Home care    Learn ways to cut back on salt (sodium):  · Eat less frozen, canned, dried, packaged, and fast foods. These often contain high amounts of sodium.  · Season foods with herbs instead of salt when you cook.  · Season with flavorings such as pepper, lemon, garlic, and onion.  · Dont add salt to your food at the table.  · Sprinkle salt-free herbal blends on meats and vegetables.  Learn to read food labels carefully:  · Look for the total amount of sodium per serving.  · Look for foods labeled low sodium, reduced sodium, or no added salt.  · Beware. Salt goes by many names. Cut down on foods with these words (all forms of salt) listed as ingredients:  ¨ Salt  ¨ Sodium  ¨ Soy sauce  ¨ Baking soda  ¨ Baking powder  ¨ MSG  ¨ Monosodium  ¨ Na (the chemical symbol for sodium)  Other ideas:  · Use more fresh food. Buy more fruits and vegetables.  · Select lean meats, fish, and poultry.  · Find a cookbook with low-salt recipes. Youll find ideas for tasty meals that are healthy for your heart.  ·   When eating out, ask questions about the menu. Tell the  you're on a low-salt diet.  ¨ If you order fish, chicken, beef, or pork, ask the  to have it broiled, baked, poached, or grilled without salt, butter, or breading.  ¨ Choose plain steamed rice, boiled noodles, and baked or boiled potatoes. Top potatoes with chives and a little sour cream instead of butter.  · Avoid antacids that are high in salt. Check the label before you buy.  Follow-up  Make a follow-up appointment with a nutritionist as directed by our staff.  Date Last Reviewed: 6/20/2015  © 9692-1251 [employer]. [address] [place] [address], [place] [address]. All rights reserved. This information is not intended as a substitute for professional medical care. Always follow your healthcare professional's instructions.

## 2018-09-13 NOTE — PROGRESS NOTES
Chief Complaint   Patient presents with    Follow-up     medication refill     Diabetes       HPI    Handy Adams is 63 y.o. male. The primary encounter diagnosis was Essential hypertension. 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    63 year old male with Diabetes and HTN comes to clinic for follow up and medication refills.  Patient reports the following regarding his medical conditions.    DM - patient reports that he has been taking medications regularly and has improved his diet.  He reports that he does not check his blood glucose levels.  HTN - patient reports that he does not check blood pressures at home. He is compliant with his medications.  He acknowledges increased salt intake with fast food and restaurant meals.  Patient reports that he can reduce his salt intake.    Review of Systems   Constitutional: Negative for activity change.   Respiratory: Negative for shortness of breath.    Cardiovascular: Negative for chest pain.   Musculoskeletal: Negative for gait problem.   Psychiatric/Behavioral: Negative for suicidal ideas.           Current Outpatient Medications:     atorvastatin (LIPITOR) 40 MG tablet, Take 1 tablet (40 mg total) by mouth nightly., Disp: 90 tablet, Rfl: 1    blood sugar diagnostic Strp, To test twice daily, Disp: 100 each, Rfl: 3    dapagliflozin (FARXIGA) 5 mg Tab tablet, Take 1 tablet (5 mg total) by mouth once daily., Disp: 90 tablet, Rfl: 1    lisinopril-hydrochlorothiazide (PRINZIDE,ZESTORETIC) 20-12.5 mg per tablet, Take 2 tablets by mouth once daily., Disp: 180 tablet, Rfl: 0    SITagliptin (JANUVIA) 100 MG Tab, Take 1 tablet (100 mg total) by mouth once daily., Disp: 90 tablet, Rfl: 1    blood-glucose meter kit, Use as instructed, Disp: 1 each, Rfl: 0    lancets Misc, 1 lancet by Misc.(Non-Drug; Combo Route)  "route 2 (two) times daily with meals., Disp: 100 each, Rfl: 11    metFORMIN (GLUCOPHAGE) 1000 MG tablet, TAKE ONE TABLET BY MOUTH TWICE DAILY WITH MEALS, Disp: 180 tablet, Rfl: 1      Blood pressure (!) 146/98, pulse 77, height 5' 11" (1.803 m), weight 122.1 kg (269 lb 2.2 oz), SpO2 99 %.    Physical Exam   Constitutional: Vital signs are normal. He appears well-developed.   Cardiovascular: Normal heart sounds.   No murmur heard.  Pulmonary/Chest: Effort normal and breath sounds normal.   Psychiatric: He has a normal mood and affect. His behavior is normal.       Lab Visit on 09/10/2018   Component Date Value Ref Range Status    Hemoglobin A1C 09/10/2018 7.7* 4.0 - 5.6 % Final    Estimated Avg Glucose 09/10/2018 174* 68 - 131 mg/dL Final   ]    Assessment:    1. Essential hypertension    2. 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    Diagnoses and all orders for this visit:    Essential hypertension   -Unstable. Patient to reduce salt intake by cooking more meals at home.     -No change to medication regimen today. RTC in 2 weeks for blood pressure recheck.    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  -     blood sugar diagnostic Strp; To test twice daily  - Stable.  Obtain labs for disease monitoring. No refills needed at this time.  - Diabetes testing supplies sent to pharmacy.          FOLLOW UP: Follow-up in about " 3 months (around 12/13/2018) for Labs.

## 2018-09-14 RX ORDER — METFORMIN HYDROCHLORIDE 1000 MG/1
TABLET ORAL
Qty: 180 TABLET | Refills: 1 | Status: SHIPPED | OUTPATIENT
Start: 2018-09-14 | End: 2019-04-24 | Stop reason: SDUPTHER

## 2018-10-19 DIAGNOSIS — Z12.11 COLON CANCER SCREENING: ICD-10-CM

## 2018-11-07 DIAGNOSIS — I10 ESSENTIAL HYPERTENSION: ICD-10-CM

## 2018-11-07 RX ORDER — LISINOPRIL AND HYDROCHLOROTHIAZIDE 12.5; 2 MG/1; MG/1
2 TABLET ORAL DAILY
Qty: 180 TABLET | Refills: 1 | Status: SHIPPED | OUTPATIENT
Start: 2018-11-07 | End: 2019-02-26 | Stop reason: SDUPTHER

## 2018-11-26 RX ORDER — SITAGLIPTIN 100 MG/1
TABLET, FILM COATED ORAL
Qty: 90 TABLET | Refills: 0 | OUTPATIENT
Start: 2018-11-26

## 2018-11-26 RX ORDER — DAPAGLIFLOZIN 5 MG/1
TABLET, FILM COATED ORAL
Qty: 30 TABLET | Refills: 2 | OUTPATIENT
Start: 2018-11-26

## 2018-12-07 RX ORDER — DAPAGLIFLOZIN 5 MG/1
TABLET, FILM COATED ORAL
Qty: 30 TABLET | Refills: 2 | OUTPATIENT
Start: 2018-12-07

## 2018-12-07 RX ORDER — DAPAGLIFLOZIN 5 MG/1
5 TABLET, FILM COATED ORAL DAILY
Qty: 90 TABLET | Refills: 1 | Status: SHIPPED | OUTPATIENT
Start: 2018-12-07 | End: 2019-04-24 | Stop reason: SDUPTHER

## 2018-12-07 NOTE — TELEPHONE ENCOUNTER
----- Message from Aisha Askew sent at 12/7/2018 12:30 PM CST -----  Contact: self   Pt is calling for a refill on medication dapagliflozin (FARXIGA) 5 mg Tab tablet. Please call pt at [phone]      [facility] - [place], [place] - [address] [place] [address]  Phone: [phone] Fax: [phone]

## 2019-02-05 RX ORDER — SITAGLIPTIN 100 MG/1
TABLET, FILM COATED ORAL
Qty: 90 TABLET | Refills: 0 | OUTPATIENT
Start: 2019-02-05

## 2019-02-26 DIAGNOSIS — I10 ESSENTIAL HYPERTENSION: ICD-10-CM

## 2019-02-26 RX ORDER — LISINOPRIL AND HYDROCHLOROTHIAZIDE 12.5; 2 MG/1; MG/1
2 TABLET ORAL DAILY
Qty: 180 TABLET | Refills: 0 | Status: SHIPPED | OUTPATIENT
Start: 2019-02-26 | End: 2019-04-24 | Stop reason: SDUPTHER

## 2019-02-26 NOTE — TELEPHONE ENCOUNTER
----- Message from Lambert Duran sent at 2/26/2019  9:11 AM CST -----  Contact: Self: [phone]  Patient stated he is available to be seen today.  Refill request:  lisinopril-hydrochlorothiazide (PRINZIDE,ZESTORETIC) 20-12.5 mg per tablet  SITagliptin (JANUVIA) 100 MG Tab    Patient would like a call regarding if he needs to schedule an appt first before getting a refill or if he can just get the refill sent to pharmacy. Please call to advise, Thank you  ..  [facility] [address] [place] [address] [address] [place] [address]  Phone: [phone] Fax: [phone]

## 2019-04-23 NOTE — TELEPHONE ENCOUNTER
Pt has OV scheduled with you for tomorrow but is completely out of medication and is requesting partial fill

## 2019-04-23 NOTE — TELEPHONE ENCOUNTER
----- Message from Mary Cox sent at 4/23/2019  2:35 PM CDT -----  Contact: Self [phone]  Pt is requesting a call back to see if he can get a refill or partial refill of the following as he is completely out and [facility]    metFORMIN (GLUCOPHAGE) 1000 MG tablet 180 tablet 1 9/14/2018  No     Pharmacy Contact     Telephone Fax  [phone]    Pt had received a six day supply but is out.  He is scheduled for tomorrow, 4.24.19 as a new patient for a medicine check visit.    Pt would like a call back and may be reached at [phone].    Thank you.  LC

## 2019-04-24 ENCOUNTER — OFFICE VISIT (OUTPATIENT)
Dept: FAMILY MEDICINE | Facility: CLINIC | Age: 64
End: 2019-04-24
Payer: COMMERCIAL

## 2019-04-24 ENCOUNTER — HOSPITAL ENCOUNTER (OUTPATIENT)
Dept: RADIOLOGY | Facility: HOSPITAL | Age: 64
Discharge: HOME | End: 2019-04-24
Attending: FAMILY MEDICINE
Payer: COMMERCIAL

## 2019-04-24 VITALS
DIASTOLIC BLOOD PRESSURE: 104 MMHG | HEIGHT: 71 IN | BODY MASS INDEX: 37.97 KG/M2 | HEART RATE: 80 BPM | WEIGHT: 271.19 LBS | SYSTOLIC BLOOD PRESSURE: 166 MMHG | OXYGEN SATURATION: 97 % | RESPIRATION RATE: 20 BRPM | TEMPERATURE: 98 F

## 2019-04-24 DIAGNOSIS — M25.511 CHRONIC RIGHT SHOULDER PAIN: ICD-10-CM

## 2019-04-24 DIAGNOSIS — G89.29 CHRONIC RIGHT SHOULDER PAIN: ICD-10-CM

## 2019-04-24 DIAGNOSIS — E11.65 TYPE 2 DIABETES MELLITUS WITH HYPERGLYCEMIA, WITHOUT LONG-TERM CURRENT USE OF INSULIN: ICD-10-CM

## 2019-04-24 DIAGNOSIS — E78.2 MIXED HYPERLIPIDEMIA: ICD-10-CM

## 2019-04-24 DIAGNOSIS — I10 ESSENTIAL HYPERTENSION: Primary | ICD-10-CM

## 2019-04-24 PROCEDURE — 73030 X-RAY EXAM OF SHOULDER: CPT | Mod: TC,FY,PO,RT

## 2019-04-24 PROCEDURE — 99214 OFFICE O/P EST MOD 30 MIN: CPT | Mod: S$GLB,,, | Performed by: FAMILY MEDICINE

## 2019-04-24 PROCEDURE — 73030 XR SHOULDER TRAUMA 3 VIEW RIGHT: ICD-10-PCS | Mod: 26,RT,, | Performed by: RADIOLOGY

## 2019-04-24 PROCEDURE — 99999 PR PBB SHADOW E&M-EST. PATIENT-LVL III: ICD-10-PCS | Mod: PBBFAC,,, | Performed by: FAMILY MEDICINE

## 2019-04-24 PROCEDURE — 99214 PR OFFICE/OUTPT VISIT, EST, LEVL IV, 30-39 MIN: ICD-10-PCS | Mod: S$GLB,,, | Performed by: FAMILY MEDICINE

## 2019-04-24 PROCEDURE — 99999 PR PBB SHADOW E&M-EST. PATIENT-LVL III: CPT | Mod: PBBFAC,,, | Performed by: FAMILY MEDICINE

## 2019-04-24 PROCEDURE — 73030 X-RAY EXAM OF SHOULDER: CPT | Mod: 26,RT,, | Performed by: RADIOLOGY

## 2019-04-24 RX ORDER — ATORVASTATIN CALCIUM 40 MG/1
40 TABLET, FILM COATED ORAL NIGHTLY
Qty: 90 TABLET | Refills: 1 | Status: SHIPPED | OUTPATIENT
Start: 2019-04-24 | End: 2019-11-13 | Stop reason: SDUPTHER

## 2019-04-24 RX ORDER — METFORMIN HYDROCHLORIDE 1000 MG/1
1000 TABLET ORAL 2 TIMES DAILY WITH MEALS
Qty: 14 TABLET | Refills: 0 | OUTPATIENT
Start: 2019-04-24

## 2019-04-24 RX ORDER — DAPAGLIFLOZIN 5 MG/1
5 TABLET, FILM COATED ORAL DAILY
Qty: 90 TABLET | Refills: 1 | Status: SHIPPED | OUTPATIENT
Start: 2019-04-24 | End: 2019-11-13 | Stop reason: SDUPTHER

## 2019-04-24 RX ORDER — NAPROXEN 500 MG/1
500 TABLET ORAL 2 TIMES DAILY WITH MEALS
Qty: 60 TABLET | Refills: 0 | Status: SHIPPED | OUTPATIENT
Start: 2019-04-24 | End: 2022-04-01

## 2019-04-24 RX ORDER — METFORMIN HYDROCHLORIDE 1000 MG/1
1000 TABLET ORAL 2 TIMES DAILY WITH MEALS
Qty: 180 TABLET | Refills: 1 | Status: SHIPPED | OUTPATIENT
Start: 2019-04-24 | End: 2019-10-30 | Stop reason: SDUPTHER

## 2019-04-24 RX ORDER — LISINOPRIL AND HYDROCHLOROTHIAZIDE 12.5; 2 MG/1; MG/1
2 TABLET ORAL DAILY
Qty: 180 TABLET | Refills: 1 | Status: SHIPPED | OUTPATIENT
Start: 2019-04-24 | End: 2019-11-13 | Stop reason: SDUPTHER

## 2019-04-24 NOTE — TELEPHONE ENCOUNTER
Called pt regarding [facility] refill request for Metformin 1000mg. Patient needs to establish care with new PCP . NA , was unable to leaves a voicemail.

## 2019-04-25 PROBLEM — E11.65 TYPE 2 DIABETES MELLITUS WITH HYPERGLYCEMIA, WITHOUT LONG-TERM CURRENT USE OF INSULIN: Status: ACTIVE | Noted: 2017-12-05

## 2019-04-25 NOTE — PROGRESS NOTES
Subjective:       Patient ID: Handy Adams is a 63 y.o. male.    Chief Complaint: Medication Refill    HPI   62 yo presents for med refill. Pt has a hx of T2DM, HTN, and HLd. Complains of R shoulder pain. States it is chronic and moderate. Has dROM. Ran out of some of his medications. Denies any other issues at this time.    Review of Systems   Constitutional: Negative.    HENT: Negative.    Respiratory: Negative.    Cardiovascular: Negative.    Gastrointestinal: Negative.    Genitourinary: Negative.    Musculoskeletal: Positive for arthralgias (R shoulder pain).   Neurological: Negative.    Psychiatric/Behavioral: Negative.         Past Medical History:   Diagnosis Date    Edema leg     History of rotator cuff tear     History of seasonal allergies     HTN (hypertension)     Hx of colonic polyps     Hyperlipemia     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 11/17/2017    Type 2 diabetes mellitus      Past Surgical History:   Procedure Laterality Date    MOUTH SURGERY       Family History   Problem Relation Age of Onset    No Known Problems Mother     Diabetes Father     Hypertension Father     No Known Problems Sister     No Known Problems Brother     No Known Problems Maternal Aunt     No Known Problems Maternal Uncle     No Known Problems Paternal Aunt     No Known Problems Paternal Uncle     No Known Problems Maternal Grandmother     No Known Problems Maternal Grandfather     No Known Problems Paternal Grandmother     No Known Problems Paternal Grandfather     Amblyopia Neg Hx     Blindness Neg Hx     Cancer Neg Hx     Cataracts Neg Hx     Glaucoma Neg Hx     Macular degeneration Neg Hx     Retinal detachment Neg Hx     Strabismus Neg Hx     Stroke Neg Hx     Thyroid disease Neg Hx      Social History     Socioeconomic History    Marital status:      Spouse name: Not on file    Number of  children: Not on file    Years of education: Not on file    Highest education level: Not on file   Occupational History    Not on file   Social Needs    Financial resource strain: Not on file    Food insecurity:     Worry: Not on file     Inability: Not on file    Transportation needs:     Medical: Not on file     Non-medical: Not on file   Tobacco Use    Smoking status: Current Some Day Smoker   Substance and Sexual Activity    Alcohol use: Yes    Drug use: Not on file    Sexual activity: Not on file   Lifestyle    Physical activity:     Days per week: Not on file     Minutes per session: Not on file    Stress: Not on file   Relationships    Social connections:     Talks on phone: Not on file     Gets together: Not on file     Attends [employer] service: Not on file     Active member of club or organization: Not on file     Attends meetings of clubs or organizations: Not on file     Relationship status: Not on file   Other Topics Concern    Not on file   Social History Narrative    Not on file        Objective:      Vitals:    04/24/19 1452   BP: (!) 166/104   Pulse: 80   Resp: 20   Temp: 98.4 °F (36.9 °C)     Physical Exam   Constitutional: He is oriented to person, place, and time. No distress.   HENT:   Head: Normocephalic and atraumatic.   Eyes: Conjunctivae are normal.   Neck: Neck supple.   Cardiovascular: Normal rate, regular rhythm and normal heart sounds. Exam reveals no gallop and no friction rub.   No murmur heard.  Pulmonary/Chest: Effort normal and breath sounds normal. He has no wheezes. He has no rales.   Neurological: He is alert and oriented to person, place, and time.   Skin: Skin is warm and dry.   Psychiatric: He has a normal mood and affect. His behavior is normal. Judgment and thought content normal.        Assessment:       1. Essential hypertension    2. Mixed hyperlipidemia    3. Type 2 diabetes mellitus with hyperglycemia, without long-term current use of insulin    4.  Chronic right shoulder pain        Plan:       Essential hypertension  -     lisinopril-hydrochlorothiazide (PRINZIDE,ZESTORETIC) 20-12.5 mg per tablet; Take 2 tablets by mouth once daily.  Dispense: 180 tablet; Refill: 1  -     CBC auto differential; Future; Expected date: 04/24/2019  -     Comprehensive metabolic panel; Future; Expected date: 04/24/2019  -     TSH; Future; Expected date: 04/24/2019    Mixed hyperlipidemia  -     atorvastatin (LIPITOR) 40 MG tablet; Take 1 tablet (40 mg total) by mouth nightly.  Dispense: 90 tablet; Refill: 1  -     Lipid panel; Future; Expected date: 04/24/2019    Type 2 diabetes mellitus with hyperglycemia, without long-term current use of insulin  -     dapagliflozin (FARXIGA) 5 mg Tab tablet; Take 1 tablet (5 mg total) by mouth once daily.  Dispense: 90 tablet; Refill: 1  -     metFORMIN (GLUCOPHAGE) 1000 MG tablet; Take 1 tablet (1,000 mg total) by mouth 2 (two) times daily with meals.  Dispense: 180 tablet; Refill: 1  -     SITagliptin (JANUVIA) 100 MG Tab; Take 1 tablet (100 mg total) by mouth once daily.  Dispense: 90 tablet; Refill: 1  -     Hemoglobin A1c; Future; Expected date: 04/24/2019    Chronic right shoulder pain  -     naproxen (NAPROSYN) 500 MG tablet; Take 1 tablet (500 mg total) by mouth 2 (two) times daily with meals.  Dispense: 60 tablet; Refill: 0  -     X-Ray Shoulder Trauma 3 view Right; Future; Expected date: 04/24/2019      Follow up in about 2 weeks (around 5/8/2019).            Toby Sanderson MD  4/25/2019 11:34 AM

## 2019-04-26 ENCOUNTER — TELEPHONE (OUTPATIENT)
Dept: FAMILY MEDICINE | Facility: CLINIC | Age: 64
End: 2019-04-26

## 2019-04-29 ENCOUNTER — TELEPHONE (OUTPATIENT)
Dept: FAMILY MEDICINE | Facility: CLINIC | Age: 64
End: 2019-04-29

## 2019-04-29 NOTE — TELEPHONE ENCOUNTER
Called pt in regards to [facility] send Rx request for Metformin 1000 mg . Dr. Santacruz is no longer here and pt must est care with new PCP immediately in order to get further refills on medications. NA , unable to leave pt a voicemail because one hasn't been set up .

## 2019-04-30 ENCOUNTER — TELEPHONE (OUTPATIENT)
Dept: FAMILY MEDICINE | Facility: CLINIC | Age: 64
End: 2019-04-30

## 2019-04-30 NOTE — TELEPHONE ENCOUNTER
----- Message from Toby Sanderson MD sent at 4/26/2019 12:14 AM CDT -----  Multiple lab abnormalities but pt was not taking his medications. Will discuss in detail when he follows up for BP f/u. Advised pt to hydrate due to his elevated Cr.

## 2019-05-14 ENCOUNTER — OFFICE VISIT (OUTPATIENT)
Dept: FAMILY MEDICINE | Facility: CLINIC | Age: 64
End: 2019-05-14
Payer: COMMERCIAL

## 2019-05-14 VITALS
RESPIRATION RATE: 18 BRPM | SYSTOLIC BLOOD PRESSURE: 148 MMHG | OXYGEN SATURATION: 99 % | WEIGHT: 272.94 LBS | HEIGHT: 71 IN | DIASTOLIC BLOOD PRESSURE: 94 MMHG | HEART RATE: 69 BPM | BODY MASS INDEX: 38.21 KG/M2 | TEMPERATURE: 98 F

## 2019-05-14 DIAGNOSIS — E11.65 TYPE 2 DIABETES MELLITUS WITH HYPERGLYCEMIA, WITHOUT LONG-TERM CURRENT USE OF INSULIN: Primary | ICD-10-CM

## 2019-05-14 PROCEDURE — 99214 PR OFFICE/OUTPT VISIT, EST, LEVL IV, 30-39 MIN: ICD-10-PCS | Mod: S$GLB,,, | Performed by: FAMILY MEDICINE

## 2019-05-14 PROCEDURE — 99999 PR PBB SHADOW E&M-EST. PATIENT-LVL V: CPT | Mod: PBBFAC,,, | Performed by: FAMILY MEDICINE

## 2019-05-14 PROCEDURE — 99999 PR PBB SHADOW E&M-EST. PATIENT-LVL V: ICD-10-PCS | Mod: PBBFAC,,, | Performed by: FAMILY MEDICINE

## 2019-05-14 PROCEDURE — 99214 OFFICE O/P EST MOD 30 MIN: CPT | Mod: S$GLB,,, | Performed by: FAMILY MEDICINE

## 2019-05-17 NOTE — PROGRESS NOTES
Subjective:       Patient ID: Handy Adams is a 63 y.o. male.    Chief Complaint: Follow-up    HPI   84 yo male presents for DM f/u. Pt's A1c worsened to 8.1 from 7.7. Pt states he was not compliant with all of his meds. He states he did not take his dm or htn meds consistently. States he missed his BP meds yesterday.     Review of Systems   Constitutional: Negative.    HENT: Negative.    Respiratory: Negative.    Cardiovascular: Negative.    Gastrointestinal: Negative.    Endocrine: Negative.    Genitourinary: Negative.    Musculoskeletal: Negative.    Neurological: Negative.    Psychiatric/Behavioral: Negative.         Past Medical History:   Diagnosis Date    Edema leg     History of rotator cuff tear     History of seasonal allergies     HTN (hypertension)     Hx of colonic polyps     Hyperlipemia     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 11/17/2017    Type 2 diabetes mellitus      Past Surgical History:   Procedure Laterality Date    MOUTH SURGERY       Family History   Problem Relation Age of Onset    No Known Problems Mother     Diabetes Father     Hypertension Father     No Known Problems Sister     No Known Problems Brother     No Known Problems Maternal Aunt     No Known Problems Maternal Uncle     No Known Problems Paternal Aunt     No Known Problems Paternal Uncle     No Known Problems Maternal Grandmother     No Known Problems Maternal Grandfather     No Known Problems Paternal Grandmother     No Known Problems Paternal Grandfather     Amblyopia Neg Hx     Blindness Neg Hx     Cancer Neg Hx     Cataracts Neg Hx     Glaucoma Neg Hx     Macular degeneration Neg Hx     Retinal detachment Neg Hx     Strabismus Neg Hx     Stroke Neg Hx     Thyroid disease Neg Hx      Social History     Socioeconomic History    Marital status:      Spouse name: Not on file    Number of children: Not on  file    Years of education: Not on file    Highest education level: Not on file   Occupational History    Not on file   Social Needs    Financial resource strain: Not on file    Food insecurity:     Worry: Not on file     Inability: Not on file    Transportation needs:     Medical: Not on file     Non-medical: Not on file   Tobacco Use    Smoking status: Current Some Day Smoker   Substance and Sexual Activity    Alcohol use: Yes    Drug use: Not on file    Sexual activity: Not on file   Lifestyle    Physical activity:     Days per week: Not on file     Minutes per session: Not on file    Stress: Not on file   Relationships    Social connections:     Talks on phone: Not on file     Gets together: Not on file     Attends [employer] service: Not on file     Active member of club or organization: Not on file     Attends meetings of clubs or organizations: Not on file     Relationship status: Not on file   Other Topics Concern    Not on file   Social History Narrative    Not on file        Objective:      Vitals:    05/14/19 0940   BP: (!) 148/94   Pulse: 69   Resp: 18   Temp: 98.3 °F (36.8 °C)     Physical Exam   Constitutional: He is oriented to person, place, and time. No distress.   HENT:   Head: Normocephalic and atraumatic.   Eyes: Conjunctivae are normal.   Neck: Neck supple. No JVD present.   Cardiovascular: Normal rate, regular rhythm and normal heart sounds. Exam reveals no gallop and no friction rub.   No murmur heard.  Pulmonary/Chest: Effort normal and breath sounds normal. He has no wheezes. He has no rales.   Neurological: He is alert and oriented to person, place, and time.   Skin: Skin is warm and dry.   Psychiatric: He has a normal mood and affect. His behavior is normal. Judgment and thought content normal.        Assessment:       1. Type 2 diabetes mellitus with hyperglycemia, without long-term current use of insulin        Plan:       Type 2 diabetes mellitus with hyperglycemia,  without long-term current use of insulin  - Advised pt about importance of compliance.  -     Ambulatory referral to Ophthalmology  -     Ambulatory referral to Podiatry      Follow up in about 3 months (around 8/14/2019).            Toby Sanderson MD  5/16/2019 8:58 PM

## 2019-05-22 ENCOUNTER — TELEPHONE (OUTPATIENT)
Dept: OPTOMETRY | Facility: CLINIC | Age: 64
End: 2019-05-22

## 2019-06-07 ENCOUNTER — TELEPHONE (OUTPATIENT)
Dept: FAMILY MEDICINE | Facility: CLINIC | Age: 64
End: 2019-06-07

## 2019-06-07 NOTE — LETTER
June 7, 2019    Handy Bryan  [address] [facility] [place] [address]             [facility]  [address] [facility] [place] [address]  Phone: [phone]  Fax: [phone] Dear Mr. Adams:    Sorry we were unable to contact you to schedule your Podiatry appointment. Please give the referral department a call at [phone].        If you have any questions or concerns, please don't hesitate to call.    Sincerely,        Williams Petersen MA

## 2019-06-10 ENCOUNTER — OFFICE VISIT (OUTPATIENT)
Dept: FAMILY MEDICINE | Facility: CLINIC | Age: 64
End: 2019-06-10
Payer: COMMERCIAL

## 2019-06-10 VITALS
SYSTOLIC BLOOD PRESSURE: 144 MMHG | WEIGHT: 269.63 LBS | TEMPERATURE: 98 F | HEART RATE: 80 BPM | DIASTOLIC BLOOD PRESSURE: 92 MMHG | OXYGEN SATURATION: 98 % | HEIGHT: 71 IN | BODY MASS INDEX: 37.75 KG/M2 | RESPIRATION RATE: 17 BRPM

## 2019-06-10 DIAGNOSIS — L25.9 CONTACT DERMATITIS, UNSPECIFIED CONTACT DERMATITIS TYPE, UNSPECIFIED TRIGGER: Primary | ICD-10-CM

## 2019-06-10 DIAGNOSIS — E78.5 HYPERLIPIDEMIA, UNSPECIFIED HYPERLIPIDEMIA TYPE: ICD-10-CM

## 2019-06-10 DIAGNOSIS — I10 ESSENTIAL HYPERTENSION: ICD-10-CM

## 2019-06-10 PROCEDURE — 99999 PR PBB SHADOW E&M-EST. PATIENT-LVL IV: ICD-10-PCS | Mod: PBBFAC,,, | Performed by: FAMILY MEDICINE

## 2019-06-10 PROCEDURE — 99999 PR PBB SHADOW E&M-EST. PATIENT-LVL IV: CPT | Mod: PBBFAC,,, | Performed by: FAMILY MEDICINE

## 2019-06-10 PROCEDURE — 99214 PR OFFICE/OUTPT VISIT, EST, LEVL IV, 30-39 MIN: ICD-10-PCS | Mod: S$GLB,,, | Performed by: FAMILY MEDICINE

## 2019-06-10 PROCEDURE — 99214 OFFICE O/P EST MOD 30 MIN: CPT | Mod: S$GLB,,, | Performed by: FAMILY MEDICINE

## 2019-06-10 RX ORDER — TRIAMCINOLONE ACETONIDE 1 MG/G
OINTMENT TOPICAL 2 TIMES DAILY
Qty: 80 G | Refills: 1 | Status: SHIPPED | OUTPATIENT
Start: 2019-06-10 | End: 2024-02-07

## 2019-06-10 RX ORDER — MINERAL OIL
180 ENEMA (ML) RECTAL DAILY
Qty: 30 TABLET | Refills: 0 | Status: SHIPPED | OUTPATIENT
Start: 2019-06-10 | End: 2023-09-08

## 2019-06-10 NOTE — PROGRESS NOTES
Subjective:       Patient ID: Handy Adams is a 63 y.o. male.    Chief Complaint: Rash    HPI   64 yo presents for rash. States it started about 4 weeks. States now it has blistered. Pt endorses pruritis. States he has been working on his roof recently. Denies any previous episodes. bp elevated at visit. Pt states he is compliant w/ his meds. Has not changed his diet. He states he has taken his statin med only once.    Review of Systems   Constitutional: Negative.    HENT: Negative.    Respiratory: Negative.    Cardiovascular: Negative.    Gastrointestinal: Negative.    Endocrine: Negative.    Genitourinary: Negative.    Musculoskeletal: Negative.    Skin: Positive for rash (b/l feet and ankle).   Neurological: Negative.    Psychiatric/Behavioral: Negative.         Past Medical History:   Diagnosis Date    Edema leg     History of rotator cuff tear     History of seasonal allergies     HTN (hypertension)     Hx of colonic polyps     Hyperlipemia     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 11/17/2017    Type 2 diabetes mellitus      Past Surgical History:   Procedure Laterality Date    MOUTH SURGERY       Family History   Problem Relation Age of Onset    No Known Problems Mother     Diabetes Father     Hypertension Father     No Known Problems Sister     No Known Problems Brother     No Known Problems Maternal Aunt     No Known Problems Maternal Uncle     No Known Problems Paternal Aunt     No Known Problems Paternal Uncle     No Known Problems Maternal Grandmother     No Known Problems Maternal Grandfather     No Known Problems Paternal Grandmother     No Known Problems Paternal Grandfather     Amblyopia Neg Hx     Blindness Neg Hx     Cancer Neg Hx     Cataracts Neg Hx     Glaucoma Neg Hx     Macular degeneration Neg Hx     Retinal detachment Neg Hx     Strabismus Neg Hx     Stroke Neg Hx     Thyroid  disease Neg Hx      Social History     Socioeconomic History    Marital status:      Spouse name: Not on file    Number of children: Not on file    Years of education: Not on file    Highest education level: Not on file   Occupational History    Not on file   Social Needs    Financial resource strain: Not on file    Food insecurity:     Worry: Not on file     Inability: Not on file    Transportation needs:     Medical: Not on file     Non-medical: Not on file   Tobacco Use    Smoking status: Current Some Day Smoker   Substance and Sexual Activity    Alcohol use: Yes    Drug use: Not on file    Sexual activity: Not on file   Lifestyle    Physical activity:     Days per week: Not on file     Minutes per session: Not on file    Stress: Not on file   Relationships    Social connections:     Talks on phone: Not on file     Gets together: Not on file     Attends [employer] service: Not on file     Active member of club or organization: Not on file     Attends meetings of clubs or organizations: Not on file     Relationship status: Not on file   Other Topics Concern    Not on file   Social History Narrative    Not on file        Objective:      Vitals:    06/10/19 1158   BP: (!) 144/92   Pulse:    Resp:    Temp:      Physical Exam   Constitutional: He is oriented to person, place, and time. No distress.   HENT:   Head: Normocephalic and atraumatic.   Eyes: Conjunctivae are normal.   Neck: Neck supple.   Cardiovascular: Normal rate, regular rhythm and normal heart sounds. Exam reveals no gallop and no friction rub.   No murmur heard.  Pulmonary/Chest: Effort normal and breath sounds normal. He has no wheezes. He has no rales.   Neurological: He is alert and oriented to person, place, and time.   Skin: Skin is warm and dry. Rash (as seen below) noted.   Psychiatric: He has a normal mood and affect. His behavior is normal. Judgment and thought content normal.                Assessment:       1. Contact  dermatitis, unspecified contact dermatitis type, unspecified trigger    2. Essential hypertension    3. Hyperlipidemia, unspecified hyperlipidemia type        Plan:       Contact dermatitis  - Looks like some type of contact irritant. Pt was advised to use steroid cream and take po allegra. Can use topical benadryl cream as well. Pt was advised to monitor for signs of infection and not to put creams in open wounds. Referred to derm due to severity of the reaction.  -     triamcinolone acetonide 0.1% (KENALOG) 0.1 % ointment; Apply topically 2 (two) times daily.  Dispense: 80 g; Refill: 1  -     Ambulatory referral to Dermatology  -     fexofenadine (ALLEGRA) 180 MG tablet; Take 1 tablet (180 mg total) by mouth once daily.  Dispense: 30 tablet; Refill: 0    HTN  - advised pt about low salt diet. May need additional med since pt still has elevated bp    HLD  - advised pt about compliance w/ meds. Pt stated he will try taking it qd    Follow up in about 2 weeks (around 6/24/2019) for bp check.            Toby Sanderson MD

## 2019-06-25 ENCOUNTER — OFFICE VISIT (OUTPATIENT)
Dept: OPHTHALMOLOGY | Facility: CLINIC | Age: 64
End: 2019-06-25
Payer: COMMERCIAL

## 2019-06-25 VITALS — HEART RATE: 76 BPM | SYSTOLIC BLOOD PRESSURE: 154 MMHG | DIASTOLIC BLOOD PRESSURE: 97 MMHG

## 2019-06-25 DIAGNOSIS — H25.13 NS (NUCLEAR SCLEROSIS), BILATERAL: ICD-10-CM

## 2019-06-25 DIAGNOSIS — H35.033 HYPERTENSIVE RETINOPATHY, BILATERAL: ICD-10-CM

## 2019-06-25 PROCEDURE — 92134 POSTERIOR SEGMENT OCT RETINA (OCULAR COHERENCE TOMOGRAPHY)-BOTH EYES: ICD-10-PCS | Mod: S$GLB,,, | Performed by: OPHTHALMOLOGY

## 2019-06-25 PROCEDURE — 92134 CPTRZ OPH DX IMG PST SGM RTA: CPT | Mod: S$GLB,,, | Performed by: OPHTHALMOLOGY

## 2019-06-25 PROCEDURE — 99999 PR PBB SHADOW E&M-EST. PATIENT-LVL III: CPT | Mod: PBBFAC,,, | Performed by: OPHTHALMOLOGY

## 2019-06-25 PROCEDURE — 99999 PR PBB SHADOW E&M-EST. PATIENT-LVL III: ICD-10-PCS | Mod: PBBFAC,,, | Performed by: OPHTHALMOLOGY

## 2019-06-25 PROCEDURE — 92014 PR EYE EXAM, EST PATIENT,COMPREHESV: ICD-10-PCS | Mod: S$GLB,,, | Performed by: OPHTHALMOLOGY

## 2019-06-25 PROCEDURE — 92014 COMPRE OPH EXAM EST PT 1/>: CPT | Mod: S$GLB,,, | Performed by: OPHTHALMOLOGY

## 2019-06-25 NOTE — PROGRESS NOTES
HPI     2 month follow up  Hx..Uncontrolled type 2 diabetes mellitus ou    Pt presents blurry vision while reading and looking at telephone.    Last edited by Remigio Mcnally on 6/25/2019  4:00 PM. (History)         OCT   OD: Good quality. Normal foveal contour  Superior IRF, exudates in parafovea. - increased  OS: Good quality. Normal foveal contour without IRF, SRF. Some small noncentral exudates    Prior IVFA  OD - normal transit time. Hyperfluorescence early c/w Ma/edema  . NV  OS - Hyperfluorescence early c/w Ma/edema  .NV       A/P    1. PDR OU  Uncontrolled T2, NO insulin  - Last A1C 9.7 12/2017  No FU from 4/18 to 6/19    2. DME OU    No with central edema OU  Will benefit from injections OU, but will start OD due to anxiety    Avastin OD next     3. HTN Ret OU  BS/BP/chol control    4. NS OU  Monitor - good Va    5. Cyst RLL  Removed by Rhea  Happy with result        1 week for Avastin OD only - then 1 month OCT/widefield FA OD

## 2019-07-01 ENCOUNTER — TELEPHONE (OUTPATIENT)
Dept: FAMILY MEDICINE | Facility: CLINIC | Age: 64
End: 2019-07-01

## 2019-07-01 NOTE — LETTER
July 1, 2019    Handy Bryan  [address] [facility] [place] [address]             [facility]  [address]  [place] [address]  Phone: [phone]  Fax: [phone] Dear Devon Bryan:    Sorzoraida we were unable to contact you to schedule your Dermatology appointment. Please give the referral department a call at [phone].        If you have any questions or concerns, please don't hesitate to call.    Sincerely,        Williams Petersen MA

## 2019-07-31 ENCOUNTER — PATIENT OUTREACH (OUTPATIENT)
Dept: ADMINISTRATIVE | Facility: HOSPITAL | Age: 64
End: 2019-07-31

## 2019-07-31 ENCOUNTER — PATIENT MESSAGE (OUTPATIENT)
Dept: ADMINISTRATIVE | Facility: HOSPITAL | Age: 64
End: 2019-07-31

## 2019-07-31 DIAGNOSIS — E11.9 DIABETES MELLITUS WITHOUT COMPLICATION: Primary | ICD-10-CM

## 2019-09-20 ENCOUNTER — TELEPHONE (OUTPATIENT)
Dept: FAMILY MEDICINE | Facility: CLINIC | Age: 64
End: 2019-09-20

## 2019-10-30 DIAGNOSIS — E11.65 TYPE 2 DIABETES MELLITUS WITH HYPERGLYCEMIA, WITHOUT LONG-TERM CURRENT USE OF INSULIN: ICD-10-CM

## 2019-10-30 RX ORDER — METFORMIN HYDROCHLORIDE 1000 MG/1
TABLET ORAL
Qty: 180 TABLET | Refills: 0 | Status: SHIPPED | OUTPATIENT
Start: 2019-10-30 | End: 2019-11-13 | Stop reason: SDUPTHER

## 2019-11-13 ENCOUNTER — OFFICE VISIT (OUTPATIENT)
Dept: FAMILY MEDICINE | Facility: CLINIC | Age: 64
End: 2019-11-13
Payer: COMMERCIAL

## 2019-11-13 ENCOUNTER — LAB VISIT (OUTPATIENT)
Dept: LAB | Facility: HOSPITAL | Age: 64
End: 2019-11-13
Attending: FAMILY MEDICINE
Payer: COMMERCIAL

## 2019-11-13 VITALS
OXYGEN SATURATION: 99 % | RESPIRATION RATE: 18 BRPM | DIASTOLIC BLOOD PRESSURE: 102 MMHG | TEMPERATURE: 98 F | WEIGHT: 269.38 LBS | BODY MASS INDEX: 37.71 KG/M2 | HEART RATE: 72 BPM | HEIGHT: 71 IN | SYSTOLIC BLOOD PRESSURE: 150 MMHG

## 2019-11-13 DIAGNOSIS — D64.9 ANEMIA, UNSPECIFIED TYPE: ICD-10-CM

## 2019-11-13 DIAGNOSIS — N17.9 AKI (ACUTE KIDNEY INJURY): ICD-10-CM

## 2019-11-13 DIAGNOSIS — E78.5 HYPERLIPIDEMIA, UNSPECIFIED HYPERLIPIDEMIA TYPE: ICD-10-CM

## 2019-11-13 DIAGNOSIS — E11.9 DIABETES MELLITUS WITHOUT COMPLICATION: ICD-10-CM

## 2019-11-13 DIAGNOSIS — I10 UNCONTROLLED HYPERTENSION: Primary | ICD-10-CM

## 2019-11-13 DIAGNOSIS — E78.2 MIXED HYPERLIPIDEMIA: ICD-10-CM

## 2019-11-13 DIAGNOSIS — Z12.11 ENCOUNTER FOR FIT (FECAL IMMUNOCHEMICAL TEST) SCREENING: ICD-10-CM

## 2019-11-13 DIAGNOSIS — E11.65 TYPE 2 DIABETES MELLITUS WITH HYPERGLYCEMIA, WITHOUT LONG-TERM CURRENT USE OF INSULIN: ICD-10-CM

## 2019-11-13 LAB
ALBUMIN/CREAT UR: 326.2 UG/MG (ref 0–30)
CREAT UR-MCNC: 61 MG/DL (ref 23–375)
MICROALBUMIN UR DL<=1MG/L-MCNC: 199 UG/ML

## 2019-11-13 PROCEDURE — 99999 PR PBB SHADOW E&M-EST. PATIENT-LVL III: ICD-10-PCS | Mod: PBBFAC,,, | Performed by: FAMILY MEDICINE

## 2019-11-13 PROCEDURE — 82043 UR ALBUMIN QUANTITATIVE: CPT

## 2019-11-13 PROCEDURE — 99999 PR PBB SHADOW E&M-EST. PATIENT-LVL III: CPT | Mod: PBBFAC,,, | Performed by: FAMILY MEDICINE

## 2019-11-13 PROCEDURE — 99215 PR OFFICE/OUTPT VISIT, EST, LEVL V, 40-54 MIN: ICD-10-PCS | Mod: S$GLB,,, | Performed by: FAMILY MEDICINE

## 2019-11-13 PROCEDURE — 99215 OFFICE O/P EST HI 40 MIN: CPT | Mod: S$GLB,,, | Performed by: FAMILY MEDICINE

## 2019-11-13 RX ORDER — METFORMIN HYDROCHLORIDE 1000 MG/1
1000 TABLET ORAL 2 TIMES DAILY WITH MEALS
Qty: 180 TABLET | Refills: 1 | Status: SHIPPED | OUTPATIENT
Start: 2019-11-13 | End: 2020-07-23 | Stop reason: SDUPTHER

## 2019-11-13 RX ORDER — LISINOPRIL AND HYDROCHLOROTHIAZIDE 12.5; 2 MG/1; MG/1
2 TABLET ORAL DAILY
Qty: 180 TABLET | Refills: 1 | Status: SHIPPED | OUTPATIENT
Start: 2019-11-13 | End: 2020-06-29

## 2019-11-13 RX ORDER — DAPAGLIFLOZIN 5 MG/1
5 TABLET, FILM COATED ORAL DAILY
Qty: 90 TABLET | Refills: 1 | Status: SHIPPED | OUTPATIENT
Start: 2019-11-13 | End: 2020-07-20

## 2019-11-13 RX ORDER — ATORVASTATIN CALCIUM 20 MG/1
20 TABLET, FILM COATED ORAL NIGHTLY
Qty: 90 TABLET | Refills: 1 | Status: SHIPPED | OUTPATIENT
Start: 2019-11-13 | End: 2020-06-05

## 2019-11-13 RX ORDER — AMLODIPINE BESYLATE 5 MG/1
5 TABLET ORAL DAILY
Qty: 30 TABLET | Refills: 0 | Status: SHIPPED | OUTPATIENT
Start: 2019-11-13 | End: 2020-01-06

## 2019-11-13 NOTE — PROGRESS NOTES
Subjective:       Patient ID: Handy Adams is a 64 y.o. male.    Chief Complaint: Diabetes; Hypertension; and Hyperlipidemia      HPI  64-year-old male presents for diabetes follow-up.  A1c increased to 8.1 at last visit.  Patient would like refills on his medications.  Patient endorses adherence to medication.    Blood pressure is elevated.  Patient has elevated blood pressure over last year during office visits.  States he takes the medication every day.    Had an Parag at last visit.  Denies any other issues at this time.  Has hyperlipidemia.  Does not take his cholesterol medicine since he states he is afraid of the medication due to side effects that he has heard.      Review of Systems   Constitutional: Negative.    HENT: Negative.    Respiratory: Negative.    Cardiovascular: Negative.    Gastrointestinal: Negative.    Endocrine: Negative.    Genitourinary: Negative.    Musculoskeletal: Negative.    Neurological: Negative.    Psychiatric/Behavioral: Negative.           Past Medical History:   Diagnosis Date    Edema leg     History of rotator cuff tear     History of seasonal allergies     HTN (hypertension)     Hx of colonic polyps     Hyperlipemia     NS (nuclear sclerosis), bilateral 6/25/2019    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 11/17/2017    Type 2 diabetes mellitus      Past Surgical History:   Procedure Laterality Date    MOUTH SURGERY       Family History   Problem Relation Age of Onset    No Known Problems Mother     Diabetes Father     Hypertension Father     No Known Problems Sister     No Known Problems Brother     No Known Problems Maternal Aunt     No Known Problems Maternal Uncle     No Known Problems Paternal Aunt     No Known Problems Paternal Uncle     No Known Problems Maternal Grandmother     No Known Problems Maternal Grandfather     No Known Problems Paternal Grandmother     No Known  Problems Paternal Grandfather     Amblyopia Neg Hx     Blindness Neg Hx     Cancer Neg Hx     Cataracts Neg Hx     Glaucoma Neg Hx     Macular degeneration Neg Hx     Retinal detachment Neg Hx     Strabismus Neg Hx     Stroke Neg Hx     Thyroid disease Neg Hx      Social History     Socioeconomic History    Marital status:      Spouse name: Not on file    Number of children: Not on file    Years of education: Not on file    Highest education level: Not on file   Occupational History    Not on file   Social Needs    Financial resource strain: Not on file    Food insecurity:     Worry: Not on file     Inability: Not on file    Transportation needs:     Medical: Not on file     Non-medical: Not on file   Tobacco Use    Smoking status: Current Some Day Smoker   Substance and Sexual Activity    Alcohol use: Yes    Drug use: Not on file    Sexual activity: Not on file   Lifestyle    Physical activity:     Days per week: Not on file     Minutes per session: Not on file    Stress: Not on file   Relationships    Social connections:     Talks on phone: Not on file     Gets together: Not on file     Attends Rastafari service: Not on file     Active member of club or organization: Not on file     Attends meetings of clubs or organizations: Not on file     Relationship status: Not on file   Other Topics Concern    Not on file   Social History Narrative    Not on file       Current Outpatient Medications:     atorvastatin (LIPITOR) 20 MG tablet, Take 1 tablet (20 mg total) by mouth nightly., Disp: 90 tablet, Rfl: 1    blood sugar diagnostic Strp, To test twice daily, Disp: 100 each, Rfl: 3    dapagliflozin (FARXIGA) 5 mg Tab tablet, Take 1 tablet (5 mg total) by mouth once daily., Disp: 90 tablet, Rfl: 1    lancets Misc, 1 lancet by Misc.(Non-Drug; Combo Route) route 2 (two) times daily with meals., Disp: 100 each, Rfl: 11    lisinopril-hydrochlorothiazide (PRINZIDE,ZESTORETIC) 20-12.5 mg  "per tablet, Take 2 tablets by mouth once daily., Disp: 180 tablet, Rfl: 1    metFORMIN (GLUCOPHAGE) 1000 MG tablet, Take 1 tablet (1,000 mg total) by mouth 2 (two) times daily with meals., Disp: 180 tablet, Rfl: 1    naproxen (NAPROSYN) 500 MG tablet, Take 1 tablet (500 mg total) by mouth 2 (two) times daily with meals., Disp: 60 tablet, Rfl: 0    SITagliptin (JANUVIA) 100 MG Tab, Take 1 tablet (100 mg total) by mouth once daily., Disp: 90 tablet, Rfl: 1    triamcinolone acetonide 0.1% (KENALOG) 0.1 % ointment, Apply topically 2 (two) times daily., Disp: 80 g, Rfl: 1    amLODIPine (NORVASC) 5 MG tablet, Take 1 tablet (5 mg total) by mouth once daily., Disp: 30 tablet, Rfl: 0    blood-glucose meter kit, Use as instructed, Disp: 1 each, Rfl: 0    fexofenadine (ALLEGRA) 180 MG tablet, Take 1 tablet (180 mg total) by mouth once daily., Disp: 30 tablet, Rfl: 0   Objective:      Vitals:    11/13/19 0825   BP: (!) 150/102   BP Location: Left arm   Patient Position: Sitting   BP Method: Large (Manual)   Pulse: 72   Resp: 18   Temp: 98 °F (36.7 °C)   TempSrc: Oral   SpO2: 99%   Weight: 122.2 kg (269 lb 6.4 oz)   Height: 5' 11" (1.803 m)       Physical Exam   Constitutional: He is oriented to person, place, and time. No distress.   HENT:   Head: Normocephalic and atraumatic.   Eyes: Conjunctivae are normal.   Neck: Neck supple.   Cardiovascular: Normal rate, regular rhythm and normal heart sounds. Exam reveals no gallop and no friction rub.   No murmur heard.  Pulmonary/Chest: Effort normal and breath sounds normal. He has no wheezes. He has no rales.   Neurological: He is alert and oriented to person, place, and time.   Skin: Skin is warm and dry.   Psychiatric: He has a normal mood and affect. His behavior is normal. Judgment and thought content normal.          Assessment:       1. Uncontrolled hypertension    2. Encounter for FIT (fecal immunochemical test) screening    3. Type 2 diabetes mellitus with " hyperglycemia, without long-term current use of insulin    4. Hyperlipidemia, unspecified hyperlipidemia type    5. YESENIA (acute kidney injury)    6. Anemia, unspecified type    7. Mixed hyperlipidemia        Plan:       Uncontrolled hypertension  - Keep bp journal. rtc in 2w. Will add amlodipine 5mg for better control  -     Comprehensive metabolic panel; Future; Expected date: 11/13/2019  -     TSH; Future; Expected date: 11/13/2019  -     amLODIPine (NORVASC) 5 MG tablet; Take 1 tablet (5 mg total) by mouth once daily.  Dispense: 30 tablet; Refill: 0  -     lisinopril-hydrochlorothiazide (PRINZIDE,ZESTORETIC) 20-12.5 mg per tablet; Take 2 tablets by mouth once daily.  Dispense: 180 tablet; Refill: 1    Encounter for FIT (fecal immunochemical test) screening  -     Fecal Immunochemical Test (iFOBT); Future; Expected date: 11/13/2019    Type 2 diabetes mellitus with hyperglycemia, without long-term current use of insulin  -     Comprehensive metabolic panel; Future; Expected date: 11/13/2019  -     Hemoglobin A1c; Future; Expected date: 11/13/2019  -     dapagliflozin (FARXIGA) 5 mg Tab tablet; Take 1 tablet (5 mg total) by mouth once daily.  Dispense: 90 tablet; Refill: 1  -     metFORMIN (GLUCOPHAGE) 1000 MG tablet; Take 1 tablet (1,000 mg total) by mouth 2 (two) times daily with meals.  Dispense: 180 tablet; Refill: 1  -     SITagliptin (JANUVIA) 100 MG Tab; Take 1 tablet (100 mg total) by mouth once daily.  Dispense: 90 tablet; Refill: 1    YESENIA (acute kidney injury)  - May be chronic but will recheck. Most likely due to DM and HTN  -     Comprehensive metabolic panel; Future; Expected date: 11/13/2019    Anemia, unspecified type  - F/u lab work. If pt has CKD then it may be 2/2 to that.  -     CBC auto differential; Future; Expected date: 11/13/2019    Mixed hyperlipidemia  - Decreased lipitor to 20mg for better adherence  -     atorvastatin (LIPITOR) 20 MG tablet; Take 1 tablet (20 mg total) by mouth nightly.   Dispense: 90 tablet; Refill: 1    40 minute visit with more than 50% of time spent on counseling.       Follow up in about 2 weeks (around 11/27/2019).            Toby Sanderson MD

## 2019-12-12 ENCOUNTER — PATIENT OUTREACH (OUTPATIENT)
Dept: ADMINISTRATIVE | Facility: HOSPITAL | Age: 64
End: 2019-12-12

## 2019-12-12 ENCOUNTER — TELEPHONE (OUTPATIENT)
Dept: FAMILY MEDICINE | Facility: CLINIC | Age: 64
End: 2019-12-12

## 2019-12-12 ENCOUNTER — CLINICAL SUPPORT (OUTPATIENT)
Dept: FAMILY MEDICINE | Facility: CLINIC | Age: 64
End: 2019-12-12
Payer: COMMERCIAL

## 2019-12-12 VITALS — SYSTOLIC BLOOD PRESSURE: 136 MMHG | OXYGEN SATURATION: 99 % | DIASTOLIC BLOOD PRESSURE: 80 MMHG | HEART RATE: 86 BPM

## 2019-12-12 DIAGNOSIS — I10 UNCONTROLLED HYPERTENSION: Primary | ICD-10-CM

## 2019-12-12 PROCEDURE — 99999 PR PBB SHADOW E&M-EST. PATIENT-LVL II: CPT | Mod: PBBFAC,,,

## 2019-12-12 PROCEDURE — 99999 PR PBB SHADOW E&M-EST. PATIENT-LVL II: ICD-10-PCS | Mod: PBBFAC,,,

## 2019-12-12 PROCEDURE — 99499 UNLISTED E&M SERVICE: CPT | Mod: S$GLB,,, | Performed by: FAMILY MEDICINE

## 2019-12-12 PROCEDURE — 99499 NO LOS: ICD-10-PCS | Mod: S$GLB,,, | Performed by: FAMILY MEDICINE

## 2019-12-12 NOTE — PROGRESS NOTES
.  Handy Adams 64 y.o. male is here today for Blood Pressure check.   History of HTN yes.    Review of patient's allergies indicates:  No Known Allergies  Creatinine   Date Value Ref Range Status   11/13/2019 1.4 0.5 - 1.4 mg/dL Final     Sodium   Date Value Ref Range Status   11/13/2019 136 136 - 145 mmol/L Final     Potassium   Date Value Ref Range Status   11/13/2019 4.0 3.5 - 5.1 mmol/L Final   ]  Patient verifies taking blood pressure medications on a regular basis at the same time of the day.     Current Outpatient Medications:     amLODIPine (NORVASC) 5 MG tablet, Take 1 tablet (5 mg total) by mouth once daily., Disp: 30 tablet, Rfl: 0    atorvastatin (LIPITOR) 20 MG tablet, Take 1 tablet (20 mg total) by mouth nightly., Disp: 90 tablet, Rfl: 1    blood sugar diagnostic Strp, To test twice daily, Disp: 100 each, Rfl: 3    blood-glucose meter kit, Use as instructed, Disp: 1 each, Rfl: 0    dapagliflozin (FARXIGA) 5 mg Tab tablet, Take 1 tablet (5 mg total) by mouth once daily., Disp: 90 tablet, Rfl: 1    fexofenadine (ALLEGRA) 180 MG tablet, Take 1 tablet (180 mg total) by mouth once daily., Disp: 30 tablet, Rfl: 0    lancets Misc, 1 lancet by Misc.(Non-Drug; Combo Route) route 2 (two) times daily with meals., Disp: 100 each, Rfl: 11    lisinopril-hydrochlorothiazide (PRINZIDE,ZESTORETIC) 20-12.5 mg per tablet, Take 2 tablets by mouth once daily., Disp: 180 tablet, Rfl: 1    metFORMIN (GLUCOPHAGE) 1000 MG tablet, Take 1 tablet (1,000 mg total) by mouth 2 (two) times daily with meals., Disp: 180 tablet, Rfl: 1    naproxen (NAPROSYN) 500 MG tablet, Take 1 tablet (500 mg total) by mouth 2 (two) times daily with meals., Disp: 60 tablet, Rfl: 0    SITagliptin (JANUVIA) 100 MG Tab, Take 1 tablet (100 mg total) by mouth once daily., Disp: 90 tablet, Rfl: 1    triamcinolone acetonide 0.1% (KENALOG) 0.1 % ointment, Apply topically 2 (two) times daily., Disp: 80 g, Rfl: 1  Does patient have record of home  blood pressure readings no.   Last dose of blood pressure medication was taken at 9am.  Patient is asymptomatic.   Complains of no complaints.    BP: 136/80 , Pulse: 86 .  .  Dr. Sanderson notified.

## 2019-12-31 DIAGNOSIS — I10 UNCONTROLLED HYPERTENSION: ICD-10-CM

## 2020-01-06 RX ORDER — AMLODIPINE BESYLATE 5 MG/1
TABLET ORAL
Qty: 30 TABLET | Refills: 2 | Status: SHIPPED | OUTPATIENT
Start: 2020-01-06 | End: 2020-04-13

## 2020-04-12 DIAGNOSIS — I10 UNCONTROLLED HYPERTENSION: ICD-10-CM

## 2020-04-13 RX ORDER — AMLODIPINE BESYLATE 5 MG/1
TABLET ORAL
Qty: 30 TABLET | Refills: 0 | Status: SHIPPED | OUTPATIENT
Start: 2020-04-13 | End: 2020-05-20

## 2020-05-20 DIAGNOSIS — I10 UNCONTROLLED HYPERTENSION: ICD-10-CM

## 2020-05-20 RX ORDER — AMLODIPINE BESYLATE 5 MG/1
TABLET ORAL
Qty: 30 TABLET | Refills: 0 | Status: SHIPPED | OUTPATIENT
Start: 2020-05-20 | End: 2020-07-06

## 2020-06-04 DIAGNOSIS — E78.2 MIXED HYPERLIPIDEMIA: ICD-10-CM

## 2020-06-05 RX ORDER — ATORVASTATIN CALCIUM 20 MG/1
TABLET, FILM COATED ORAL
Qty: 90 TABLET | Refills: 1 | Status: SHIPPED | OUTPATIENT
Start: 2020-06-05 | End: 2020-07-23 | Stop reason: SDUPTHER

## 2020-07-23 ENCOUNTER — OFFICE VISIT (OUTPATIENT)
Dept: FAMILY MEDICINE | Facility: CLINIC | Age: 65
End: 2020-07-23
Payer: MEDICARE

## 2020-07-23 ENCOUNTER — LAB VISIT (OUTPATIENT)
Dept: LAB | Facility: HOSPITAL | Age: 65
End: 2020-07-23
Attending: PHYSICIAN ASSISTANT
Payer: MEDICARE

## 2020-07-23 VITALS
HEART RATE: 64 BPM | WEIGHT: 279.13 LBS | DIASTOLIC BLOOD PRESSURE: 97 MMHG | HEIGHT: 71 IN | SYSTOLIC BLOOD PRESSURE: 154 MMHG | BODY MASS INDEX: 39.08 KG/M2 | RESPIRATION RATE: 20 BRPM | TEMPERATURE: 98 F | OXYGEN SATURATION: 99 %

## 2020-07-23 DIAGNOSIS — I10 UNCONTROLLED HYPERTENSION: Primary | ICD-10-CM

## 2020-07-23 DIAGNOSIS — Z12.5 SCREENING PSA (PROSTATE SPECIFIC ANTIGEN): ICD-10-CM

## 2020-07-23 DIAGNOSIS — E11.65 TYPE 2 DIABETES MELLITUS WITH HYPERGLYCEMIA, WITHOUT LONG-TERM CURRENT USE OF INSULIN: ICD-10-CM

## 2020-07-23 DIAGNOSIS — E78.2 MIXED HYPERLIPIDEMIA: ICD-10-CM

## 2020-07-23 DIAGNOSIS — N52.9 ERECTILE DYSFUNCTION, UNSPECIFIED ERECTILE DYSFUNCTION TYPE: ICD-10-CM

## 2020-07-23 DIAGNOSIS — Z11.3 SCREEN FOR STD (SEXUALLY TRANSMITTED DISEASE): ICD-10-CM

## 2020-07-23 LAB
ALBUMIN SERPL BCP-MCNC: 3.9 G/DL (ref 3.5–5.2)
ALP SERPL-CCNC: 74 U/L (ref 55–135)
ALT SERPL W/O P-5'-P-CCNC: 20 U/L (ref 10–44)
ANION GAP SERPL CALC-SCNC: 8 MMOL/L (ref 8–16)
AST SERPL-CCNC: 12 U/L (ref 10–40)
BILIRUB SERPL-MCNC: 0.5 MG/DL (ref 0.1–1)
BUN SERPL-MCNC: 29 MG/DL (ref 8–23)
CALCIUM SERPL-MCNC: 9.8 MG/DL (ref 8.7–10.5)
CHLORIDE SERPL-SCNC: 107 MMOL/L (ref 95–110)
CHOLEST SERPL-MCNC: 183 MG/DL (ref 120–199)
CHOLEST/HDLC SERPL: 5.9 {RATIO} (ref 2–5)
CO2 SERPL-SCNC: 25 MMOL/L (ref 23–29)
CREAT SERPL-MCNC: 1.7 MG/DL (ref 0.5–1.4)
EST. GFR  (AFRICAN AMERICAN): 48.2 ML/MIN/1.73 M^2
EST. GFR  (NON AFRICAN AMERICAN): 41.7 ML/MIN/1.73 M^2
ESTIMATED AVG GLUCOSE: 252 MG/DL (ref 68–131)
GLUCOSE SERPL-MCNC: 282 MG/DL (ref 70–110)
HBA1C MFR BLD HPLC: 10.4 % (ref 4–5.6)
HDLC SERPL-MCNC: 31 MG/DL (ref 40–75)
HDLC SERPL: 16.9 % (ref 20–50)
LDLC SERPL CALC-MCNC: ABNORMAL MG/DL (ref 63–159)
NONHDLC SERPL-MCNC: 152 MG/DL
POTASSIUM SERPL-SCNC: 4.3 MMOL/L (ref 3.5–5.1)
PROT SERPL-MCNC: 8 G/DL (ref 6–8.4)
SODIUM SERPL-SCNC: 140 MMOL/L (ref 136–145)
TRIGL SERPL-MCNC: 443 MG/DL (ref 30–150)

## 2020-07-23 PROCEDURE — 99214 OFFICE O/P EST MOD 30 MIN: CPT | Mod: S$PBB,,, | Performed by: FAMILY MEDICINE

## 2020-07-23 PROCEDURE — 99214 PR OFFICE/OUTPT VISIT, EST, LEVL IV, 30-39 MIN: ICD-10-PCS | Mod: S$PBB,,, | Performed by: FAMILY MEDICINE

## 2020-07-23 PROCEDURE — 80053 COMPREHEN METABOLIC PANEL: CPT

## 2020-07-23 PROCEDURE — 99999 PR PBB SHADOW E&M-EST. PATIENT-LVL III: ICD-10-PCS | Mod: PBBFAC,,, | Performed by: FAMILY MEDICINE

## 2020-07-23 PROCEDURE — 80061 LIPID PANEL: CPT

## 2020-07-23 PROCEDURE — 83036 HEMOGLOBIN GLYCOSYLATED A1C: CPT

## 2020-07-23 PROCEDURE — 36415 COLL VENOUS BLD VENIPUNCTURE: CPT | Mod: PO

## 2020-07-23 PROCEDURE — 99999 PR PBB SHADOW E&M-EST. PATIENT-LVL III: CPT | Mod: PBBFAC,,, | Performed by: FAMILY MEDICINE

## 2020-07-23 PROCEDURE — 99213 OFFICE O/P EST LOW 20 MIN: CPT | Mod: PBBFAC,PO | Performed by: FAMILY MEDICINE

## 2020-07-23 RX ORDER — AMLODIPINE BESYLATE 10 MG/1
10 TABLET ORAL DAILY
Qty: 90 TABLET | Refills: 1 | Status: SHIPPED | OUTPATIENT
Start: 2020-07-23 | End: 2020-12-17 | Stop reason: SDUPTHER

## 2020-07-23 RX ORDER — DAPAGLIFLOZIN 5 MG/1
5 TABLET, FILM COATED ORAL DAILY
Qty: 90 TABLET | Refills: 1 | Status: SHIPPED | OUTPATIENT
Start: 2020-07-23 | End: 2020-12-17 | Stop reason: SDUPTHER

## 2020-07-23 RX ORDER — METFORMIN HYDROCHLORIDE 1000 MG/1
1000 TABLET ORAL 2 TIMES DAILY WITH MEALS
Qty: 180 TABLET | Refills: 1 | Status: SHIPPED | OUTPATIENT
Start: 2020-07-23 | End: 2020-12-17 | Stop reason: SDUPTHER

## 2020-07-23 RX ORDER — ATORVASTATIN CALCIUM 20 MG/1
20 TABLET, FILM COATED ORAL NIGHTLY
Qty: 90 TABLET | Refills: 1 | Status: SHIPPED | OUTPATIENT
Start: 2020-07-23 | End: 2020-12-17 | Stop reason: SDUPTHER

## 2020-07-23 RX ORDER — LISINOPRIL AND HYDROCHLOROTHIAZIDE 12.5; 2 MG/1; MG/1
2 TABLET ORAL DAILY
Qty: 180 TABLET | Refills: 1 | Status: SHIPPED | OUTPATIENT
Start: 2020-07-23 | End: 2020-12-17 | Stop reason: SDUPTHER

## 2020-07-23 RX ORDER — SILDENAFIL 50 MG/1
50 TABLET, FILM COATED ORAL DAILY PRN
Qty: 30 TABLET | Refills: 0 | Status: SHIPPED | OUTPATIENT
Start: 2020-07-23 | End: 2020-12-17 | Stop reason: SDUPTHER

## 2020-07-23 RX ORDER — AMLODIPINE BESYLATE 5 MG/1
5 TABLET ORAL DAILY
Qty: 90 TABLET | Refills: 1 | Status: SHIPPED | OUTPATIENT
Start: 2020-07-23 | End: 2020-07-23 | Stop reason: SDUPTHER

## 2020-07-24 NOTE — PROGRESS NOTES
Subjective:       Patient ID: Handy Adams is a 64 y.o. male.    Chief Complaint: Diabetes, Hyperlipidemia, and Hypertension      HPI  64-year-old male presents for diabetes hypertension follow-up.  Patient feels he has increased urinary frequency.  States his diet has worsened over last few months.  States he has not been taking his medications consistently as well.  BP today is 154/97.  Would like refills on his medications.      Review of Systems   Constitutional: Negative.    HENT: Negative.    Respiratory: Negative.    Cardiovascular: Negative.    Gastrointestinal: Negative.    Endocrine: Negative.    Genitourinary: Negative.    Musculoskeletal: Negative.    Neurological: Negative.    Psychiatric/Behavioral: Negative.           Past Medical History:   Diagnosis Date    Edema leg     History of rotator cuff tear     History of seasonal allergies     HTN (hypertension)     Hx of colonic polyps     Hyperlipemia     NS (nuclear sclerosis), bilateral 6/25/2019    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 11/17/2017    Type 2 diabetes mellitus      Past Surgical History:   Procedure Laterality Date    MOUTH SURGERY       Family History   Problem Relation Age of Onset    No Known Problems Mother     Diabetes Father     Hypertension Father     No Known Problems Sister     No Known Problems Brother     No Known Problems Maternal Aunt     No Known Problems Maternal Uncle     No Known Problems Paternal Aunt     No Known Problems Paternal Uncle     No Known Problems Maternal Grandmother     No Known Problems Maternal Grandfather     No Known Problems Paternal Grandmother     No Known Problems Paternal Grandfather     Amblyopia Neg Hx     Blindness Neg Hx     Cancer Neg Hx     Cataracts Neg Hx     Glaucoma Neg Hx     Macular degeneration Neg Hx     Retinal detachment Neg Hx     Strabismus Neg Hx     Stroke Neg Hx      Thyroid disease Neg Hx      Social History     Socioeconomic History    Marital status:      Spouse name: Not on file    Number of children: Not on file    Years of education: Not on file    Highest education level: Not on file   Occupational History    Not on file   Social Needs    Financial resource strain: Not on file    Food insecurity     Worry: Not on file     Inability: Not on file    Transportation needs     Medical: Not on file     Non-medical: Not on file   Tobacco Use    Smoking status: Current Some Day Smoker   Substance and Sexual Activity    Alcohol use: Yes    Drug use: Not on file    Sexual activity: Not on file   Lifestyle    Physical activity     Days per week: Not on file     Minutes per session: Not on file    Stress: Not on file   Relationships    Social connections     Talks on phone: Not on file     Gets together: Not on file     Attends Druze service: Not on file     Active member of club or organization: Not on file     Attends meetings of clubs or organizations: Not on file     Relationship status: Not on file   Other Topics Concern    Not on file   Social History Narrative    Not on file       Current Outpatient Medications:     amLODIPine (NORVASC) 10 MG tablet, Take 1 tablet (10 mg total) by mouth once daily., Disp: 90 tablet, Rfl: 1    atorvastatin (LIPITOR) 20 MG tablet, Take 1 tablet (20 mg total) by mouth nightly., Disp: 90 tablet, Rfl: 1    blood sugar diagnostic Strp, To test twice daily, Disp: 100 each, Rfl: 3    dapagliflozin (FARXIGA) 5 mg Tab tablet, Take 1 tablet (5 mg total) by mouth once daily., Disp: 90 tablet, Rfl: 1    lancets Misc, 1 lancet by Misc.(Non-Drug; Combo Route) route 2 (two) times daily with meals., Disp: 100 each, Rfl: 11    lisinopriL-hydrochlorothiazide (PRINZIDE,ZESTORETIC) 20-12.5 mg per tablet, Take 2 tablets by mouth once daily., Disp: 180 tablet, Rfl: 1    metFORMIN (GLUCOPHAGE) 1000 MG tablet, Take 1 tablet  "(1,000 mg total) by mouth 2 (two) times daily with meals., Disp: 180 tablet, Rfl: 1    naproxen (NAPROSYN) 500 MG tablet, Take 1 tablet (500 mg total) by mouth 2 (two) times daily with meals., Disp: 60 tablet, Rfl: 0    SITagliptin (JANUVIA) 100 MG Tab, Take 1 tablet (100 mg total) by mouth once daily., Disp: 90 tablet, Rfl: 1    triamcinolone acetonide 0.1% (KENALOG) 0.1 % ointment, Apply topically 2 (two) times daily., Disp: 80 g, Rfl: 1    blood-glucose meter kit, Use as instructed, Disp: 1 each, Rfl: 0    fexofenadine (ALLEGRA) 180 MG tablet, Take 1 tablet (180 mg total) by mouth once daily., Disp: 30 tablet, Rfl: 0    sildenafiL (VIAGRA) 50 MG tablet, Take 1 tablet (50 mg total) by mouth daily as needed for Erectile Dysfunction., Disp: 30 tablet, Rfl: 0   Objective:      Vitals:    07/23/20 1032 07/23/20 1039   BP: (!) 156/101 (!) 154/97   BP Location: Left arm Left arm   Patient Position: Sitting Sitting   BP Method: Large (Automatic) Large (Automatic)   Pulse: 64    Resp: 20    Temp: 98.3 °F (36.8 °C)    TempSrc: Oral    SpO2: 99%    Weight: 126.6 kg (279 lb 1.6 oz)    Height: 5' 11" (1.803 m)        Physical Exam  Constitutional:       General: He is not in acute distress.  HENT:      Head: Normocephalic and atraumatic.   Eyes:      Conjunctiva/sclera: Conjunctivae normal.   Neck:      Musculoskeletal: Neck supple.   Cardiovascular:      Rate and Rhythm: Normal rate and regular rhythm.      Heart sounds: Normal heart sounds. No murmur. No friction rub. No gallop.    Pulmonary:      Effort: Pulmonary effort is normal.      Breath sounds: Normal breath sounds. No wheezing or rales.   Skin:     General: Skin is warm and dry.   Neurological:      Mental Status: He is alert and oriented to person, place, and time.   Psychiatric:         Behavior: Behavior normal.         Thought Content: Thought content normal.         Judgment: Judgment normal.            Assessment:       1. Uncontrolled hypertension  "   2. Mixed hyperlipidemia    3. Type 2 diabetes mellitus with hyperglycemia, without long-term current use of insulin    4. Screening PSA (prostate specific antigen)    5. Erectile dysfunction, unspecified erectile dysfunction type    6. Screen for STD (sexually transmitted disease)        Plan:       Uncontrolled hypertension  -     Refilled meds. Increased amlodipine to 10mg.   -     lisinopriL-hydrochlorothiazide (PRINZIDE,ZESTORETIC) 20-12.5 mg per tablet; Take 2 tablets by mouth once daily.  Dispense: 180 tablet; Refill: 1  -     amLODIPine (NORVASC) 10 MG tablet; Take 1 tablet (10 mg total) by mouth once daily.  Dispense: 90 tablet; Refill: 1    Mixed hyperlipidemia  -     atorvastatin (LIPITOR) 20 MG tablet; Take 1 tablet (20 mg total) by mouth nightly.  Dispense: 90 tablet; Refill: 1    Type 2 diabetes mellitus with hyperglycemia, without long-term current use of insulin  - F/u a1c.  -     dapagliflozin (FARXIGA) 5 mg Tab tablet; Take 1 tablet (5 mg total) by mouth once daily.  Dispense: 90 tablet; Refill: 1  -     metFORMIN (GLUCOPHAGE) 1000 MG tablet; Take 1 tablet (1,000 mg total) by mouth 2 (two) times daily with meals.  Dispense: 180 tablet; Refill: 1  -     SITagliptin (JANUVIA) 100 MG Tab; Take 1 tablet (100 mg total) by mouth once daily.  Dispense: 90 tablet; Refill: 1    Screening PSA (prostate specific antigen)  -     PSA, Screening; Future; Expected date: 07/23/2020    Erectile dysfunction, unspecified erectile dysfunction type  -     sildenafiL (VIAGRA) 50 MG tablet; Take 1 tablet (50 mg total) by mouth daily as needed for Erectile Dysfunction.  Dispense: 30 tablet; Refill: 0    Screen for STD (sexually transmitted disease)  -     HIV 1/2 Ag/Ab (4th Gen); Future; Expected date: 07/23/2020                  Toby Sanderson MD      Patient note was created using [employer].  Any errors in syntax or even information may not have been identified and edited on initial review prior to signing this note.

## 2020-08-03 ENCOUNTER — TELEPHONE (OUTPATIENT)
Dept: FAMILY MEDICINE | Facility: CLINIC | Age: 65
End: 2020-08-03

## 2020-08-03 NOTE — TELEPHONE ENCOUNTER
----- Message from Toby Sanderson MD sent at 8/1/2020 12:20 AM CDT -----  Needs a quicker appt. bring bs journal

## 2020-08-04 ENCOUNTER — PROCEDURE VISIT (OUTPATIENT)
Dept: OPHTHALMOLOGY | Facility: CLINIC | Age: 65
End: 2020-08-04
Payer: MEDICARE

## 2020-08-04 VITALS — HEART RATE: 82 BPM | SYSTOLIC BLOOD PRESSURE: 145 MMHG | DIASTOLIC BLOOD PRESSURE: 91 MMHG

## 2020-08-04 DIAGNOSIS — H35.033 HYPERTENSIVE RETINOPATHY, BILATERAL: ICD-10-CM

## 2020-08-04 PROCEDURE — 67028 INJECTION EYE DRUG: CPT | Mod: S$PBB,RT,, | Performed by: OPHTHALMOLOGY

## 2020-08-04 PROCEDURE — 67028 INJECTION EYE DRUG: CPT | Mod: PBBFAC,RT | Performed by: OPHTHALMOLOGY

## 2020-08-04 PROCEDURE — 92134 POSTERIOR SEGMENT OCT RETINA (OCULAR COHERENCE TOMOGRAPHY)-BOTH EYES: ICD-10-PCS | Mod: 26,S$PBB,, | Performed by: OPHTHALMOLOGY

## 2020-08-04 PROCEDURE — 92134 CPTRZ OPH DX IMG PST SGM RTA: CPT | Mod: PBBFAC | Performed by: OPHTHALMOLOGY

## 2020-08-04 PROCEDURE — 67028 PR INJECT INTRAVITREAL PHARMCOLOGIC: ICD-10-PCS | Mod: S$PBB,RT,, | Performed by: OPHTHALMOLOGY

## 2020-08-04 PROCEDURE — 92014 COMPRE OPH EXAM EST PT 1/>: CPT | Mod: 25,S$PBB,, | Performed by: OPHTHALMOLOGY

## 2020-08-04 PROCEDURE — 92014 PR EYE EXAM, EST PATIENT,COMPREHESV: ICD-10-PCS | Mod: 25,S$PBB,, | Performed by: OPHTHALMOLOGY

## 2020-08-04 PROCEDURE — 92235 FLUORESCEIN ANGRPH MLTIFRAME: CPT | Mod: 50,PBBFAC | Performed by: OPHTHALMOLOGY

## 2020-08-04 PROCEDURE — 92235 FLUORESCEIN ANGIOGRAPHY - OU - BOTH EYES: ICD-10-PCS | Mod: 26,S$PBB,, | Performed by: OPHTHALMOLOGY

## 2020-08-04 RX ORDER — FLUORESCEIN 500 MG/ML
5 INJECTION INTRAVENOUS ONCE
Status: COMPLETED | OUTPATIENT
Start: 2020-08-04 | End: 2020-08-04

## 2020-08-04 RX ADMIN — FLUORESCEIN 500 MG: 500 INJECTION INTRAVENOUS at 10:08

## 2020-08-04 RX ADMIN — BEVACIZUMAB 1.25 MG: 100 INJECTION, SOLUTION INTRAVENOUS at 11:08

## 2020-08-04 NOTE — PATIENT INSTRUCTIONS
Fluorescein Angiography     A fluorescein angiogram of the retina   Fluorescein angiography is an eye test. It is done to look at the back of your eye, including:  · The blood vessels in your eye  · The layer of tissue at the back of your eye (the retina)  · The center of your retina (the macula)  · The optic nerve  This test can diagnose diseases found in these areas. It can also diagnose other conditions that affect these areas. To do this test, a dye called fluorescein is shot (injected) into your arm. The dye goes into your bloodstream and up into the blood vessels in your eyes. A special camera is then used to take images (angiograms) of your eyes.  Getting ready for your test  Tell your healthcare provider if you:  · Are pregnant or think you may be pregnant  · Are breastfeeding  · Have a history of severe allergic reactions, including to X-ray dye or other medicines  · Have kidney problems  Tell your provider about any medicines you are taking. You may need to stop taking all or some of these before the test. This includes:  · All prescription medicines  · Over-the-counter medicines such as aspirin or ibuprofen  · Street drugs  · Herbs, vitamins, and other supplements  You should arrange for an adult family member or friend to drive you home after your test. Your vision will be blurry for up to 12 hours.  Follow any other instructions from your healthcare provider.  During your test  · You are given eye drops to enlarge (dilate) your pupils.  · You then sit in front of a special camera. You place your chin on the chin rest and look into the camera.  · Images are taken of your eyes, one eye at a time.  · Fluorescein dye is then injected into your arm. The lights in the room are turned off. You may have mild nausea. You may have a warm feeling in your arm or upper body. Tell your provider if your skin feels itchy or if you are having trouble breathing. If so, you could be having an allergic reaction to the  dye.  · More pictures of your eyes are taken over 15 to 30 minutes. The camera shines a bright light into your eyes. Try to keep your head still and your eyes open.  · When enough images have been taken, the test is over.  After your test  Your vision will be blurry for up to 4 to 12 hours. This is because of your dilated pupils. Your eye will be more sensitive to light for up to 12 hours. You may want to wear sunglasses during this time. Do not drive if your vision is very blurry. You may also find it uncomfortable to read. Your skin may look yellow for a few hours. This is from the dye. Your urine will be bright yellow or orange for 24 to 48 hours after the test.     Risks and possible complications  All procedures have some risks. Possible risks of fluorescein angiography include:  · Upset stomach (nausea) and vomiting  · Leaking dye around the injection site that causes pain and swelling  · Metallic taste in your mouth  · Infection at injection site  · Allergic reaction to the dye  · Dry mouth or too much saliva  · Faster heart rate  · Sweating  · Lower back pain   Date Last Reviewed: 5/30/2015  © 6814-7840 [employer]. [address] [place] [address]. All rights reserved. This information is not intended as a substitute for professional medical care. Always follow your healthcare professional's instructions.      .POST INTRAVITREAL INJECTION PATIENT INSTRUCTIONS   Below are some guidelines for what to expect after your treatment and additional care instructions.   * you may experience mild discomfort in your eye after the treatment. Your eye usually feels better within 24 to 48 hours after the procedure.   * you have been given drops that numb the surface of your eye.   DO NOT rub or touch your eye, DO NOT wear contacts until numbness goes away.   * you may experience small spots that appear in your field of vision, these are usually caused by an air bubble or from the medication. It  taked a few hours or days for these to go away.   * use of sunglasses will help reduce light sensitivity and glare.   * DO NOT swim   for at least 3 hours after the injection   * If you have a gritty, burning, irritating or stinging feeling in the injected eye. This may be a result of the antiseptic used. Use artifical tears or eye lubricant ( from over- the- counter from your local pharmacy ). If you have some at home already please check the expiration date, so not to use anything contaminated in your eye. A cool pack over your eye brow above the injected eye may also relieve discomfort.   Call us right away or go to the Emergency Department if you have a dramatic decrease in vision or extreme pain in the eye.   [facility] [phone]

## 2020-08-04 NOTE — PROGRESS NOTES
HPI     Overdue f/u / OCT / FA WF OD  DLS-06/25/2019  Dr. Mcmanus     Pt sts vision in OD when reading noticed words are wavy and a lot blurrier   than OS.  He is Terrified about these changes in Right eye.   Denies pain   (-)Flashes (-)Floaters  (-)Photophobia  (+)Glare    Denies gtts       OCT   OD: Good quality. Normal foveal contour  Superior IRF, exudates in parafovea. - increased  OS: Good quality. Normal foveal contour without IRF, SRF. Some small noncentral exudates    WFA  OD - normal transit time. Hyperfluorescence early c/w Ma/edema - sig NP    OS - Hyperfluorescence early c/w Ma/edema  .NV nasal      A/P    1. 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 DME OU   6/19 - pt did not FU until 8/20    Avastin OD today    Get approval for Ozurdex    3. HTN Ret OU  BS/BP/chol control    4. NS OU  Monitor - good Va    5. Cyst RLL  Removed by Rhea  Happy with result        1 month OCT no dilate    Risks, benefits, and alternatives to treatment discussed in detail with the patient.  The patient voiced understanding and wished to proceed with the procedure    Injection Procedure Note:  Diagnosis: DME OD    Patient Identified and Time Out complete  Pt Prefers to be marked with sticker rather than ink marker (OHS.Qual.[email or web address] #5)  Topical Proparacaine and Betadine.  Inject Avastin OD at 6:00 @ 3.5-4mm posterior to limbus  Post Operative Dx: Same  Complications: None  Follow up as above.

## 2020-08-06 ENCOUNTER — CLINICAL SUPPORT (OUTPATIENT)
Dept: FAMILY MEDICINE | Facility: CLINIC | Age: 65
End: 2020-08-06
Payer: COMMERCIAL

## 2020-08-06 VITALS — SYSTOLIC BLOOD PRESSURE: 136 MMHG | DIASTOLIC BLOOD PRESSURE: 94 MMHG

## 2020-08-06 DIAGNOSIS — I10 UNCONTROLLED HYPERTENSION: Primary | ICD-10-CM

## 2020-09-04 ENCOUNTER — PROCEDURE VISIT (OUTPATIENT)
Dept: OPHTHALMOLOGY | Facility: CLINIC | Age: 65
End: 2020-09-04
Payer: MEDICARE

## 2020-09-04 DIAGNOSIS — H35.033 HYPERTENSIVE RETINOPATHY, BILATERAL: ICD-10-CM

## 2020-09-04 PROCEDURE — 92014 PR EYE EXAM, EST PATIENT,COMPREHESV: ICD-10-PCS | Mod: 25,S$PBB,, | Performed by: OPHTHALMOLOGY

## 2020-09-04 PROCEDURE — 92134 CPTRZ OPH DX IMG PST SGM RTA: CPT | Mod: PBBFAC | Performed by: OPHTHALMOLOGY

## 2020-09-04 PROCEDURE — 67028 PR INJECT INTRAVITREAL PHARMCOLOGIC: ICD-10-PCS | Mod: S$PBB,RT,, | Performed by: OPHTHALMOLOGY

## 2020-09-04 PROCEDURE — 67028 INJECTION EYE DRUG: CPT | Mod: PBBFAC,RT | Performed by: OPHTHALMOLOGY

## 2020-09-04 PROCEDURE — 67028 INJECTION EYE DRUG: CPT | Mod: S$PBB,RT,, | Performed by: OPHTHALMOLOGY

## 2020-09-04 PROCEDURE — 92134 POSTERIOR SEGMENT OCT RETINA (OCULAR COHERENCE TOMOGRAPHY)-BOTH EYES: ICD-10-PCS | Mod: 26,S$PBB,, | Performed by: OPHTHALMOLOGY

## 2020-09-04 PROCEDURE — 92014 COMPRE OPH EXAM EST PT 1/>: CPT | Mod: 25,S$PBB,, | Performed by: OPHTHALMOLOGY

## 2020-09-04 RX ADMIN — DEXAMETHASONE 0.7 MG: 0.7 IMPLANT INTRAVITREAL at 11:09

## 2020-09-04 NOTE — PATIENT INSTRUCTIONS
Please rinse the eye/eyes with artificial tears as needed during the first 1-2 for any irritation , burning, itching, or grittiness you may be experiencing. It is normal to see a small dash line or sausage like figure in your upper vision area which you may see for a few days or up to a week. Please call our office immediately before your next scheduled appointment if you are experiencing any pain , pressure or vision decrease . The phone number to call can be found on your appointment slip.

## 2020-09-04 NOTE — PROGRESS NOTES
HPI     Eye Problem      Additional comments: PDR OU              Comments     1 month OCT Avastin/ Appoved for Ozurdex  DLS: 08/04/2020  Dr. Mcmanus     Patient states his vision has been stable since his last visit. Denies   pain   (-)Flashes (-)Floaters  (-)Photophobia  (+)Glare    Denies gtts     HPI     Overdue f/u / OCT / FA WF OD  DLS-06/25/2019  Dr. Mcmanus     Pt sts vision in OD when reading noticed words are wavy and a lot blurrier   than OS.  He is Terrified about these changes in Right eye.   Denies pain   (-)Flashes (-)Floaters  (-)Photophobia  (+)Glare    Denies gtts       OCT   OD: Good quality. Normal foveal contour  Superior IRF, exudates in parafovea. , decreased in central SRF  OS: Good quality. Normal foveal contour without IRF, SRF. Some small noncentral exudates    WFA  OD - normal transit time. Hyperfluorescence early c/w Ma/edema - sig NP    OS - Hyperfluorescence early c/w Ma/edema  .NV nasal      A/P    1. 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 DME OU   6/19 - pt did not FU until 8/20  S/p Avastin OD x 1    ozurdex OD today    3. HTN Ret OU  BS/BP/chol control    4. NS OU  Monitor - good Va    5. Cyst RLL  Removed by Rhea  Happy with result        2 month OCT no dilate    Risks, benefits, and alternatives to treatment discussed in detail with the patient.  The patient voiced understanding and wished to proceed with the procedure    Injection Procedure Note:  Diagnosis: DME OD    Patient Identified and Time Out complete  Pt Prefers to be marked with sticker rather than ink marker ([email or web address] #5)  Topical Proparacaine and Betadine. subconj lido  Inject Ozurdex OD at 6:00 @ 3.5-4mm posterior to limbus  Post Operative Dx: Same  Complications: None  Follow up as above.

## 2020-09-28 ENCOUNTER — TELEPHONE (OUTPATIENT)
Dept: OPHTHALMOLOGY | Facility: CLINIC | Age: 65
End: 2020-09-28

## 2020-09-28 ENCOUNTER — PATIENT OUTREACH (OUTPATIENT)
Dept: ADMINISTRATIVE | Facility: OTHER | Age: 65
End: 2020-09-28

## 2020-09-28 NOTE — TELEPHONE ENCOUNTER
----- Message from Jasmyn Erickson sent at 9/28/2020 10:15 AM CDT -----  Regarding: Eye issue  Contact: Handy Walsh is having some issue with right eye that had injection. Bright light hurts the pt eye.      [phone]

## 2020-09-28 NOTE — PROGRESS NOTES
Care Everywhere:   Immunization: no profile in links   Health Maintenance: updated  Media Review: review for outside colon cancer report   Legacy Review:   Order placed:   Upcoming appts:

## 2020-09-29 ENCOUNTER — OFFICE VISIT (OUTPATIENT)
Dept: OPTOMETRY | Facility: CLINIC | Age: 65
End: 2020-09-29
Payer: MEDICARE

## 2020-09-29 DIAGNOSIS — H10.31 ACUTE CONJUNCTIVITIS OF RIGHT EYE, UNSPECIFIED ACUTE CONJUNCTIVITIS TYPE: Primary | ICD-10-CM

## 2020-09-29 PROCEDURE — 92004 COMPRE OPH EXAM NEW PT 1/>: CPT | Mod: S$PBB,,, | Performed by: OPTOMETRIST

## 2020-09-29 PROCEDURE — 99999 PR PBB SHADOW E&M-EST. PATIENT-LVL III: CPT | Mod: PBBFAC,,, | Performed by: OPTOMETRIST

## 2020-09-29 PROCEDURE — 99999 PR PBB SHADOW E&M-EST. PATIENT-LVL III: ICD-10-PCS | Mod: PBBFAC,,, | Performed by: OPTOMETRIST

## 2020-09-29 PROCEDURE — 92004 PR EYE EXAM, NEW PATIENT,COMPREHESV: ICD-10-PCS | Mod: S$PBB,,, | Performed by: OPTOMETRIST

## 2020-09-29 PROCEDURE — 99213 OFFICE O/P EST LOW 20 MIN: CPT | Mod: PBBFAC,PO | Performed by: OPTOMETRIST

## 2020-09-29 NOTE — PROGRESS NOTES
YVON SANCHEZ 08/20 with Dr. Mcmanus.  Patient had an injection about 3 weeks ago   and has had a floaters since.  Patient had photophobia, pain, tender to   touch and redness Sunday.  Today there is no pain but still redness.     Last edited by Mishel Magallanes on 9/29/2020  9:19 AM. (History)            Assessment /Plan     For exam results, see Encounter Report.    Acute conjunctivitis of right eye, unspecified acute conjunctivitis type      1. Resolving redness with resolved episode of light sensitivity right eye s/p injection with Yonathan, no iritis today, some mild redness, OK for preserv free tears and monitor. RTC or call if symptoms resolve, dfe without rd or vit heme.

## 2020-11-03 ENCOUNTER — PROCEDURE VISIT (OUTPATIENT)
Dept: OPHTHALMOLOGY | Facility: CLINIC | Age: 65
End: 2020-11-03
Payer: MEDICARE

## 2020-11-03 DIAGNOSIS — H35.033 HYPERTENSIVE RETINOPATHY, BILATERAL: ICD-10-CM

## 2020-11-03 PROCEDURE — 92202 OPSCPY EXTND ON/MAC DRAW: CPT | Mod: S$PBB,,, | Performed by: OPHTHALMOLOGY

## 2020-11-03 PROCEDURE — 92014 COMPRE OPH EXAM EST PT 1/>: CPT | Mod: S$PBB,,, | Performed by: OPHTHALMOLOGY

## 2020-11-03 PROCEDURE — 92202 PR OPHTHALMOSCOPY, EXT, W/DRAW OPTIC NERVE/MACULA, I&R, UNI/BI: ICD-10-PCS | Mod: S$PBB,,, | Performed by: OPHTHALMOLOGY

## 2020-11-03 PROCEDURE — 92014 PR EYE EXAM, EST PATIENT,COMPREHESV: ICD-10-PCS | Mod: S$PBB,,, | Performed by: OPHTHALMOLOGY

## 2020-11-03 PROCEDURE — 92134 CPTRZ OPH DX IMG PST SGM RTA: CPT | Mod: PBBFAC | Performed by: OPHTHALMOLOGY

## 2020-11-03 PROCEDURE — 92134 POSTERIOR SEGMENT OCT RETINA (OCULAR COHERENCE TOMOGRAPHY)-BOTH EYES: ICD-10-PCS | Mod: 26,S$PBB,, | Performed by: OPHTHALMOLOGY

## 2020-11-03 PROCEDURE — 92202 OPSCPY EXTND ON/MAC DRAW: CPT | Mod: PBBFAC | Performed by: OPHTHALMOLOGY

## 2020-11-03 NOTE — PROGRESS NOTES
HPI     8 week OCT Ozudex  DLS: 09/04/2020  Dr. Mcmanus     Patient states he is concern about his vision after having his injection. He is having still having a floater in the right eye. He saw Dr. Cardenas   for conjunctivitis OD at the end of September. He was very photophobic and is concerned it was related to the injection.   (-)Flashes (-)Floaters  (-)Photophobia  (+)Glare    Denies gtts         OCT   OD: Good quality. Normal foveal contour  Superior IRF, exudates in parafovea. , decreased in central SRF  OS: Good quality. Normal foveal contour without IRF, SRF. Some small noncentral exudates    Prior WFFA  OD - normal transit time. Hyperfluorescence early c/w Ma/edema - sig NP    OS - Hyperfluorescence early c/w Ma/edema  .NV nasal      A/P    1. 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 DME OU   6/19 - pt did not FU until 8/20  S/p Avastin OD x 1  S/p Ozurdex OD x 1 9/20    ME improving, obs today    3. HTN Ret OU  BS/BP/chol control    4. NS OU  Monitor - good Va    5. Cyst RLL  Removed by Rhea  Happy with result        2 month OCT and dilate OU

## 2020-11-13 DIAGNOSIS — E11.9 TYPE 2 DIABETES MELLITUS WITHOUT COMPLICATION: ICD-10-CM

## 2020-12-17 ENCOUNTER — OFFICE VISIT (OUTPATIENT)
Dept: FAMILY MEDICINE | Facility: CLINIC | Age: 65
End: 2020-12-17
Payer: COMMERCIAL

## 2020-12-17 VITALS
SYSTOLIC BLOOD PRESSURE: 136 MMHG | HEIGHT: 71 IN | BODY MASS INDEX: 38.37 KG/M2 | WEIGHT: 274.06 LBS | HEART RATE: 96 BPM | TEMPERATURE: 98 F | OXYGEN SATURATION: 98 % | RESPIRATION RATE: 20 BRPM | DIASTOLIC BLOOD PRESSURE: 82 MMHG

## 2020-12-17 DIAGNOSIS — N52.9 ERECTILE DYSFUNCTION, UNSPECIFIED ERECTILE DYSFUNCTION TYPE: ICD-10-CM

## 2020-12-17 DIAGNOSIS — E78.2 MIXED HYPERLIPIDEMIA: ICD-10-CM

## 2020-12-17 DIAGNOSIS — E11.65 UNCONTROLLED TYPE 2 DIABETES MELLITUS WITH HYPERGLYCEMIA: Primary | ICD-10-CM

## 2020-12-17 DIAGNOSIS — R35.89 EXCESSIVE URINE PRODUCTION: ICD-10-CM

## 2020-12-17 DIAGNOSIS — I10 ESSENTIAL HYPERTENSION: ICD-10-CM

## 2020-12-17 PROCEDURE — 87086 URINE CULTURE/COLONY COUNT: CPT

## 2020-12-17 PROCEDURE — 99999 PR PBB SHADOW E&M-EST. PATIENT-LVL III: ICD-10-PCS | Mod: PBBFAC,,, | Performed by: FAMILY MEDICINE

## 2020-12-17 PROCEDURE — 99214 PR OFFICE/OUTPT VISIT, EST, LEVL IV, 30-39 MIN: ICD-10-PCS | Mod: S$PBB,,, | Performed by: FAMILY MEDICINE

## 2020-12-17 PROCEDURE — 99999 PR PBB SHADOW E&M-EST. PATIENT-LVL III: CPT | Mod: PBBFAC,,, | Performed by: FAMILY MEDICINE

## 2020-12-17 PROCEDURE — 99214 OFFICE O/P EST MOD 30 MIN: CPT | Mod: S$PBB,,, | Performed by: FAMILY MEDICINE

## 2020-12-17 PROCEDURE — 99213 OFFICE O/P EST LOW 20 MIN: CPT | Mod: PBBFAC,PO | Performed by: FAMILY MEDICINE

## 2020-12-17 RX ORDER — METFORMIN HYDROCHLORIDE 1000 MG/1
1000 TABLET ORAL 2 TIMES DAILY WITH MEALS
Qty: 180 TABLET | Refills: 1 | Status: SHIPPED | OUTPATIENT
Start: 2020-12-17 | End: 2021-07-13

## 2020-12-17 RX ORDER — SILDENAFIL 50 MG/1
50 TABLET, FILM COATED ORAL DAILY PRN
Qty: 30 TABLET | Refills: 0 | Status: SHIPPED | OUTPATIENT
Start: 2020-12-17 | End: 2023-09-08

## 2020-12-17 RX ORDER — AMLODIPINE BESYLATE 10 MG/1
10 TABLET ORAL DAILY
Qty: 90 TABLET | Refills: 1 | Status: SHIPPED | OUTPATIENT
Start: 2020-12-17 | End: 2021-07-13

## 2020-12-17 RX ORDER — ATORVASTATIN CALCIUM 20 MG/1
20 TABLET, FILM COATED ORAL NIGHTLY
Qty: 90 TABLET | Refills: 1 | Status: SHIPPED | OUTPATIENT
Start: 2020-12-17 | End: 2021-09-13

## 2020-12-17 RX ORDER — DAPAGLIFLOZIN 5 MG/1
5 TABLET, FILM COATED ORAL DAILY
Qty: 90 TABLET | Refills: 1 | Status: SHIPPED | OUTPATIENT
Start: 2020-12-17 | End: 2021-07-26

## 2020-12-17 RX ORDER — LISINOPRIL AND HYDROCHLOROTHIAZIDE 12.5; 2 MG/1; MG/1
2 TABLET ORAL DAILY
Qty: 180 TABLET | Refills: 1 | Status: SHIPPED | OUTPATIENT
Start: 2020-12-17 | End: 2021-10-29

## 2020-12-18 LAB — BACTERIA UR CULT: NO GROWTH

## 2020-12-29 ENCOUNTER — PATIENT OUTREACH (OUTPATIENT)
Dept: ADMINISTRATIVE | Facility: OTHER | Age: 65
End: 2020-12-29

## 2021-01-14 DIAGNOSIS — E11.9 TYPE 2 DIABETES MELLITUS WITHOUT COMPLICATION: ICD-10-CM

## 2021-04-05 ENCOUNTER — PATIENT MESSAGE (OUTPATIENT)
Dept: ADMINISTRATIVE | Facility: HOSPITAL | Age: 66
End: 2021-04-05

## 2021-07-06 ENCOUNTER — PATIENT MESSAGE (OUTPATIENT)
Dept: ADMINISTRATIVE | Facility: HOSPITAL | Age: 66
End: 2021-07-06

## 2021-07-28 DIAGNOSIS — E11.9 TYPE 2 DIABETES MELLITUS WITHOUT COMPLICATION: ICD-10-CM

## 2021-08-04 ENCOUNTER — PATIENT MESSAGE (OUTPATIENT)
Dept: ADMINISTRATIVE | Facility: HOSPITAL | Age: 66
End: 2021-08-04

## 2021-09-12 DIAGNOSIS — E11.65 UNCONTROLLED TYPE 2 DIABETES MELLITUS WITH HYPERGLYCEMIA: ICD-10-CM

## 2021-09-13 RX ORDER — DAPAGLIFLOZIN 5 MG/1
TABLET, FILM COATED ORAL
Qty: 30 TABLET | Refills: 0 | Status: SHIPPED | OUTPATIENT
Start: 2021-09-13 | End: 2021-10-25

## 2021-10-04 ENCOUNTER — PATIENT MESSAGE (OUTPATIENT)
Dept: ADMINISTRATIVE | Facility: HOSPITAL | Age: 66
End: 2021-10-04

## 2021-10-25 DIAGNOSIS — E11.65 UNCONTROLLED TYPE 2 DIABETES MELLITUS WITH HYPERGLYCEMIA: ICD-10-CM

## 2021-10-25 DIAGNOSIS — E78.2 MIXED HYPERLIPIDEMIA: ICD-10-CM

## 2021-10-25 RX ORDER — DAPAGLIFLOZIN 5 MG/1
TABLET, FILM COATED ORAL
Qty: 30 TABLET | Refills: 0 | Status: SHIPPED | OUTPATIENT
Start: 2021-10-25 | End: 2021-12-02 | Stop reason: SDUPTHER

## 2021-10-25 RX ORDER — ATORVASTATIN CALCIUM 20 MG/1
TABLET, FILM COATED ORAL
Qty: 30 TABLET | Refills: 0 | Status: SHIPPED | OUTPATIENT
Start: 2021-10-25 | End: 2022-01-25 | Stop reason: SDUPTHER

## 2021-11-03 ENCOUNTER — PATIENT MESSAGE (OUTPATIENT)
Dept: ADMINISTRATIVE | Facility: HOSPITAL | Age: 66
End: 2021-11-03
Payer: MEDICARE

## 2021-12-02 DIAGNOSIS — I10 ESSENTIAL HYPERTENSION: ICD-10-CM

## 2021-12-02 DIAGNOSIS — E11.65 UNCONTROLLED TYPE 2 DIABETES MELLITUS WITH HYPERGLYCEMIA: ICD-10-CM

## 2021-12-02 DIAGNOSIS — E78.2 MIXED HYPERLIPIDEMIA: ICD-10-CM

## 2021-12-03 RX ORDER — DAPAGLIFLOZIN 5 MG/1
5 TABLET, FILM COATED ORAL DAILY
Qty: 30 TABLET | Refills: 0 | Status: SHIPPED | OUTPATIENT
Start: 2021-12-03 | End: 2021-12-30 | Stop reason: SDUPTHER

## 2021-12-03 RX ORDER — METFORMIN HYDROCHLORIDE 1000 MG/1
1000 TABLET ORAL 2 TIMES DAILY WITH MEALS
Qty: 60 TABLET | Refills: 0 | Status: SHIPPED | OUTPATIENT
Start: 2021-12-03 | End: 2022-01-05 | Stop reason: SDUPTHER

## 2021-12-03 RX ORDER — AMLODIPINE BESYLATE 10 MG/1
10 TABLET ORAL DAILY
Qty: 30 TABLET | Refills: 0 | Status: SHIPPED | OUTPATIENT
Start: 2021-12-03 | End: 2022-01-05 | Stop reason: SDUPTHER

## 2021-12-30 DIAGNOSIS — E11.65 UNCONTROLLED TYPE 2 DIABETES MELLITUS WITH HYPERGLYCEMIA: ICD-10-CM

## 2021-12-30 RX ORDER — DAPAGLIFLOZIN 5 MG/1
5 TABLET, FILM COATED ORAL DAILY
Qty: 30 TABLET | Refills: 0 | Status: SHIPPED | OUTPATIENT
Start: 2021-12-30 | End: 2022-01-25 | Stop reason: SDUPTHER

## 2021-12-30 NOTE — TELEPHONE ENCOUNTER
Care Due:                  Date            Visit Type   Department     Provider  --------------------------------------------------------------------------------                                MYCHART                              FOLLOWUP/OF  [facility]  Last Visit: 12-      FICE VISIT   MEDICINE       Toby Sanderson  Next Visit: None Scheduled  None         None Found                                                            Last  Test          Frequency    Reason                     Performed    Due Date  --------------------------------------------------------------------------------    Office Visit  12 months..  LOLITA amLODIPine,       12- 12-                             dapagliflozin,                             lisinopriL-hydrochlorothi                             azide, metFORMIN.........    CMP.........  12 months..  LOLITA dapagliflozin,    Not Found    Overdue                             lisinopriL-hydrochlorothi                             azide, metFORMIN.........    HBA1C.......  6 months...  LOLITA dapagliflozin,    07- 01-                             metFORMIN................    Powered by [email or web address]. Reference number: [id number].   12/30/2021 7:15:38 AM CST

## 2021-12-30 NOTE — TELEPHONE ENCOUNTER
Pt  was schedule for an annual today, pt stated that his insurance gonna change at the end the year and would like to get his meds refill before. Next appointment schedule for 01/21/2022.

## 2022-01-03 ENCOUNTER — LAB VISIT (OUTPATIENT)
Dept: LAB | Facility: HOSPITAL | Age: 67
End: 2022-01-03
Attending: FAMILY MEDICINE
Payer: MEDICARE

## 2022-01-03 DIAGNOSIS — E11.65 UNCONTROLLED TYPE 2 DIABETES MELLITUS WITH HYPERGLYCEMIA: ICD-10-CM

## 2022-01-03 DIAGNOSIS — E11.9 TYPE 2 DIABETES MELLITUS WITHOUT COMPLICATION: ICD-10-CM

## 2022-01-03 LAB
ALBUMIN SERPL BCP-MCNC: 3.7 G/DL (ref 3.5–5.2)
ALP SERPL-CCNC: 59 U/L (ref 55–135)
ALT SERPL W/O P-5'-P-CCNC: 14 U/L (ref 10–44)
ANION GAP SERPL CALC-SCNC: 10 MMOL/L (ref 8–16)
AST SERPL-CCNC: 14 U/L (ref 10–40)
BILIRUB SERPL-MCNC: 0.6 MG/DL (ref 0.1–1)
BUN SERPL-MCNC: 23 MG/DL (ref 8–23)
CALCIUM SERPL-MCNC: 10 MG/DL (ref 8.7–10.5)
CHLORIDE SERPL-SCNC: 103 MMOL/L (ref 95–110)
CHOLEST SERPL-MCNC: 223 MG/DL (ref 120–199)
CHOLEST/HDLC SERPL: 6.6 {RATIO} (ref 2–5)
CO2 SERPL-SCNC: 23 MMOL/L (ref 23–29)
CREAT SERPL-MCNC: 1.6 MG/DL (ref 0.5–1.4)
EST. GFR  (AFRICAN AMERICAN): 51.1 ML/MIN/1.73 M^2
EST. GFR  (NON AFRICAN AMERICAN): 44.2 ML/MIN/1.73 M^2
ESTIMATED AVG GLUCOSE: 223 MG/DL (ref 68–131)
GLUCOSE SERPL-MCNC: 209 MG/DL (ref 70–110)
HBA1C MFR BLD: 9.4 % (ref 4–5.6)
HDLC SERPL-MCNC: 34 MG/DL (ref 40–75)
HDLC SERPL: 15.2 % (ref 20–50)
LDLC SERPL CALC-MCNC: 133.4 MG/DL (ref 63–159)
NONHDLC SERPL-MCNC: 189 MG/DL
POTASSIUM SERPL-SCNC: 3.7 MMOL/L (ref 3.5–5.1)
PROT SERPL-MCNC: 7.9 G/DL (ref 6–8.4)
SODIUM SERPL-SCNC: 136 MMOL/L (ref 136–145)
TRIGL SERPL-MCNC: 278 MG/DL (ref 30–150)

## 2022-01-03 PROCEDURE — 36415 COLL VENOUS BLD VENIPUNCTURE: CPT | Mod: PO | Performed by: FAMILY MEDICINE

## 2022-01-03 PROCEDURE — 80053 COMPREHEN METABOLIC PANEL: CPT | Performed by: FAMILY MEDICINE

## 2022-01-03 PROCEDURE — 83036 HEMOGLOBIN GLYCOSYLATED A1C: CPT | Performed by: FAMILY MEDICINE

## 2022-01-03 PROCEDURE — 80061 LIPID PANEL: CPT | Performed by: FAMILY MEDICINE

## 2022-01-05 ENCOUNTER — CLINICAL SUPPORT (OUTPATIENT)
Dept: FAMILY MEDICINE | Facility: CLINIC | Age: 67
End: 2022-01-05
Attending: INTERNAL MEDICINE
Payer: MEDICARE

## 2022-01-05 ENCOUNTER — OFFICE VISIT (OUTPATIENT)
Dept: FAMILY MEDICINE | Facility: CLINIC | Age: 67
End: 2022-01-05
Payer: MEDICARE

## 2022-01-05 VITALS
RESPIRATION RATE: 18 BRPM | HEART RATE: 87 BPM | HEIGHT: 71 IN | OXYGEN SATURATION: 98 % | BODY MASS INDEX: 38.64 KG/M2 | DIASTOLIC BLOOD PRESSURE: 100 MMHG | WEIGHT: 276 LBS | TEMPERATURE: 98 F | SYSTOLIC BLOOD PRESSURE: 140 MMHG

## 2022-01-05 DIAGNOSIS — E66.01 SEVERE OBESITY (BMI 35.0-39.9) WITH COMORBIDITY: ICD-10-CM

## 2022-01-05 DIAGNOSIS — R41.840 DISTURBED CONCENTRATION: ICD-10-CM

## 2022-01-05 DIAGNOSIS — E11.65 UNCONTROLLED TYPE 2 DIABETES MELLITUS WITH HYPERGLYCEMIA: ICD-10-CM

## 2022-01-05 DIAGNOSIS — I10 ESSENTIAL HYPERTENSION: Primary | ICD-10-CM

## 2022-01-05 DIAGNOSIS — R35.89 EXCESSIVE URINE PRODUCTION: ICD-10-CM

## 2022-01-05 DIAGNOSIS — N18.31 CHRONIC KIDNEY DISEASE, STAGE 3A: ICD-10-CM

## 2022-01-05 DIAGNOSIS — E78.2 MIXED HYPERLIPIDEMIA: ICD-10-CM

## 2022-01-05 DIAGNOSIS — M25.511 CHRONIC RIGHT SHOULDER PAIN: ICD-10-CM

## 2022-01-05 DIAGNOSIS — G89.29 CHRONIC RIGHT SHOULDER PAIN: ICD-10-CM

## 2022-01-05 PROCEDURE — 3080F DIAST BP >= 90 MM HG: CPT | Mod: CPTII,S$GLB,, | Performed by: INTERNAL MEDICINE

## 2022-01-05 PROCEDURE — 99214 OFFICE O/P EST MOD 30 MIN: CPT | Mod: S$GLB,,, | Performed by: INTERNAL MEDICINE

## 2022-01-05 PROCEDURE — 3288F PR FALLS RISK ASSESSMENT DOCUMENTED: ICD-10-PCS | Mod: CPTII,S$GLB,, | Performed by: INTERNAL MEDICINE

## 2022-01-05 PROCEDURE — 3077F PR MOST RECENT SYSTOLIC BLOOD PRESSURE >= 140 MM HG: ICD-10-PCS | Mod: CPTII,S$GLB,, | Performed by: INTERNAL MEDICINE

## 2022-01-05 PROCEDURE — 92228 DIABETIC EYE SCREENING PHOTO: ICD-10-PCS | Mod: 26,S$GLB,, | Performed by: OPTOMETRIST

## 2022-01-05 PROCEDURE — 92228 PR REMOTE IMAGE RETINA, MONITOR/MANAGE ACTIVE DISEASE: ICD-10-PCS | Mod: TC,S$GLB,, | Performed by: INTERNAL MEDICINE

## 2022-01-05 PROCEDURE — 3046F HEMOGLOBIN A1C LEVEL >9.0%: CPT | Mod: CPTII,S$GLB,, | Performed by: INTERNAL MEDICINE

## 2022-01-05 PROCEDURE — 3080F PR MOST RECENT DIASTOLIC BLOOD PRESSURE >= 90 MM HG: ICD-10-PCS | Mod: CPTII,S$GLB,, | Performed by: INTERNAL MEDICINE

## 2022-01-05 PROCEDURE — 1101F PR PT FALLS ASSESS DOC 0-1 FALLS W/OUT INJ PAST YR: ICD-10-PCS | Mod: CPTII,S$GLB,, | Performed by: INTERNAL MEDICINE

## 2022-01-05 PROCEDURE — 3077F SYST BP >= 140 MM HG: CPT | Mod: CPTII,S$GLB,, | Performed by: INTERNAL MEDICINE

## 2022-01-05 PROCEDURE — 99499 RISK ADDL DX/OHS AUDIT: ICD-10-PCS | Mod: S$GLB,,, | Performed by: INTERNAL MEDICINE

## 2022-01-05 PROCEDURE — 92228 IMG RTA DETC/MNTR DS PHY/QHP: CPT | Mod: 26,S$GLB,, | Performed by: OPTOMETRIST

## 2022-01-05 PROCEDURE — 1159F MED LIST DOCD IN RCRD: CPT | Mod: CPTII,S$GLB,, | Performed by: INTERNAL MEDICINE

## 2022-01-05 PROCEDURE — 99999 PR PBB SHADOW E&M-EST. PATIENT-LVL V: ICD-10-PCS | Mod: PBBFAC,,, | Performed by: INTERNAL MEDICINE

## 2022-01-05 PROCEDURE — 1159F PR MEDICATION LIST DOCUMENTED IN MEDICAL RECORD: ICD-10-PCS | Mod: CPTII,S$GLB,, | Performed by: INTERNAL MEDICINE

## 2022-01-05 PROCEDURE — 3046F PR MOST RECENT HEMOGLOBIN A1C LEVEL > 9.0%: ICD-10-PCS | Mod: CPTII,S$GLB,, | Performed by: INTERNAL MEDICINE

## 2022-01-05 PROCEDURE — 3062F POS MACROALBUMINURIA REV: CPT | Mod: CPTII,S$GLB,, | Performed by: INTERNAL MEDICINE

## 2022-01-05 PROCEDURE — 3066F NEPHROPATHY DOC TX: CPT | Mod: CPTII,S$GLB,, | Performed by: INTERNAL MEDICINE

## 2022-01-05 PROCEDURE — 1160F PR REVIEW ALL MEDS BY PRESCRIBER/CLIN PHARMACIST DOCUMENTED: ICD-10-PCS | Mod: CPTII,S$GLB,, | Performed by: INTERNAL MEDICINE

## 2022-01-05 PROCEDURE — 3066F PR DOCUMENTATION OF TREATMENT FOR NEPHROPATHY: ICD-10-PCS | Mod: CPTII,S$GLB,, | Performed by: INTERNAL MEDICINE

## 2022-01-05 PROCEDURE — 92228 IMG RTA DETC/MNTR DS PHY/QHP: CPT | Mod: TC,S$GLB,, | Performed by: INTERNAL MEDICINE

## 2022-01-05 PROCEDURE — 3008F PR BODY MASS INDEX (BMI) DOCUMENTED: ICD-10-PCS | Mod: CPTII,S$GLB,, | Performed by: INTERNAL MEDICINE

## 2022-01-05 PROCEDURE — 99214 PR OFFICE/OUTPT VISIT, EST, LEVL IV, 30-39 MIN: ICD-10-PCS | Mod: S$GLB,,, | Performed by: INTERNAL MEDICINE

## 2022-01-05 PROCEDURE — 3062F PR POS MACROALBUMINURIA RESULT DOCUMENTED/REVIEW: ICD-10-PCS | Mod: CPTII,S$GLB,, | Performed by: INTERNAL MEDICINE

## 2022-01-05 PROCEDURE — 1126F AMNT PAIN NOTED NONE PRSNT: CPT | Mod: CPTII,S$GLB,, | Performed by: INTERNAL MEDICINE

## 2022-01-05 PROCEDURE — 3008F BODY MASS INDEX DOCD: CPT | Mod: CPTII,S$GLB,, | Performed by: INTERNAL MEDICINE

## 2022-01-05 PROCEDURE — 2024F 7 FLD RTA PHOTO EVC RTNOPTHY: CPT | Mod: CPTII,S$GLB,, | Performed by: INTERNAL MEDICINE

## 2022-01-05 PROCEDURE — 1101F PT FALLS ASSESS-DOCD LE1/YR: CPT | Mod: CPTII,S$GLB,, | Performed by: INTERNAL MEDICINE

## 2022-01-05 PROCEDURE — 2024F PR 7 FIELD PHOTOS WITH INTERP/ REVIEW: ICD-10-PCS | Mod: CPTII,S$GLB,, | Performed by: INTERNAL MEDICINE

## 2022-01-05 PROCEDURE — 3288F FALL RISK ASSESSMENT DOCD: CPT | Mod: CPTII,S$GLB,, | Performed by: INTERNAL MEDICINE

## 2022-01-05 PROCEDURE — 1126F PR PAIN SEVERITY QUANTIFIED, NO PAIN PRESENT: ICD-10-PCS | Mod: CPTII,S$GLB,, | Performed by: INTERNAL MEDICINE

## 2022-01-05 PROCEDURE — 99499 UNLISTED E&M SERVICE: CPT | Mod: S$GLB,,, | Performed by: INTERNAL MEDICINE

## 2022-01-05 PROCEDURE — 1160F RVW MEDS BY RX/DR IN RCRD: CPT | Mod: CPTII,S$GLB,, | Performed by: INTERNAL MEDICINE

## 2022-01-05 PROCEDURE — 99999 PR PBB SHADOW E&M-EST. PATIENT-LVL V: CPT | Mod: PBBFAC,,, | Performed by: INTERNAL MEDICINE

## 2022-01-05 RX ORDER — METFORMIN HYDROCHLORIDE 1000 MG/1
1000 TABLET ORAL 2 TIMES DAILY WITH MEALS
Qty: 180 TABLET | Refills: 1 | Status: SHIPPED | OUTPATIENT
Start: 2022-01-05 | End: 2022-07-13

## 2022-01-05 RX ORDER — AMLODIPINE BESYLATE 10 MG/1
10 TABLET ORAL DAILY
Qty: 90 TABLET | Refills: 1 | Status: SHIPPED | OUTPATIENT
Start: 2022-01-05 | End: 2022-07-15

## 2022-01-05 NOTE — PROGRESS NOTES
Handy Adams is a 66 y.o. male here for a diabetic eye screening with non-dilated fundus photos per Dr Odonnell.    Patient cooperative?: Yes  Small pupils?: No  Last eye exam: unknown    For exam results, see Encounter Report.

## 2022-01-05 NOTE — PROGRESS NOTES
Health Maintenance Due   Topic Date Due    COVID-19 Vaccine (1) Pt completed    Colorectal Cancer Screening  Pt will schedule    Shingles Vaccine (1 of 2) Hx chickenpox, notified can get it at the pharmacy      Foot Exam  Pt will schedule    Pneumococcal Vaccines (Age 65+) (2 of 2 - PPSV23) Pt refused    Abdominal Aortic Aneurysm Screening  Consult with pcp    Influenza Vaccine (1) Pt refused     Eye Exam  Pt will schedule

## 2022-01-05 NOTE — PROGRESS NOTES
Subjective:       Patient ID: Handy Adams is a pleasant 66 y.o. Black or  male patient    Chief Complaint: Medication Refill      Patient is a pt new to me but pt from Dr. Sanderson, last visit on 12/17/2020.    HPI     He comes today to obtain refills for his medications. Did blood work showing worsening DM and lipid panel not at goal.  Pt reports poor lifestyle, and is aware that it is the culprit for DM and probably HTN.  He likes Alberto Cheema ice cream and can eat a pint at a time. Also potato chips.  He gained 10 pounds in one year.  He is not very active except moving in his place, has a flight of stairs.  He also reports pain in his R shoulder, lingering, he had injections at some point that helped.  He would like a referral to Orthopedics.  He reports frequent urination, possibly stated when started dapagliflozin.  He also needs to be referred to his usual eye doctor.  Finally, he works presently on a PhD and he may struggle, it is not a new issue, states that he was told once he has ADHD and would like to be assessed for this.   Did received COVID vaccine.       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  Documented    Review of Systems  "  Constitutional: Positive for activity change, appetite change and unexpected weight change.   HENT: Negative.    Respiratory: Negative.    Cardiovascular: Negative.    Gastrointestinal: Negative.    Genitourinary: Positive for frequency.   Musculoskeletal: Positive for arthralgias.   Psychiatric/Behavioral: Positive for decreased concentration.   All other systems reviewed and are negative.      Objective:      Physical Exam  Constitutional:       General: He is not in acute distress.     Appearance: He is obese.   HENT:      Head: Normocephalic and atraumatic.   Eyes:      Conjunctiva/sclera: Conjunctivae normal.   Cardiovascular:      Rate and Rhythm: Normal rate and regular rhythm.      Heart sounds: Normal heart sounds. No murmur heard.  No friction rub. No gallop.    Pulmonary:      Effort: Pulmonary effort is normal.      Breath sounds: Normal breath sounds. No wheezing or rales.   Musculoskeletal:      Cervical back: Neck supple.   Skin:     General: Skin is warm and dry.   Neurological:      Mental Status: He is alert and oriented to person, place, and time.   Psychiatric:         Behavior: Behavior normal.         Thought Content: Thought content normal.         Judgment: Judgment normal.         Vitals:    01/05/22 1409   BP: (!) 140/100   BP Location: Right arm   Patient Position: Sitting   BP Method: Large (Manual)   Pulse: 87   Resp: 18   Temp: 98.4 °F (36.9 °C)   TempSrc: Oral   SpO2: 98%   Weight: 125.2 kg (276 lb 0.3 oz)   Height: 5' 11" (1.803 m)     Body mass index is 38.5 kg/m².    RESULTS: Reviewed labs from last 12 months    Last Lab Results:     Lab Results   Component Value Date    WBC 8.17 11/13/2019    HGB 13.7 (L) 11/13/2019    HCT 42.7 11/13/2019     11/13/2019     01/03/2022    K 3.7 01/03/2022     01/03/2022    CO2 23 01/03/2022    BUN 23 01/03/2022    CREATININE 1.6 (H) 01/03/2022    CALCIUM 10.0 01/03/2022    ALBUMIN 3.7 01/03/2022    AST 14 01/03/2022    ALT 14 " 01/03/2022    CHOL 223 (H) 01/03/2022    TRIG 278 (H) 01/03/2022    HDL 34 (L) 01/03/2022    LDLCALC 133.4 01/03/2022    HGBA1C 9.4 (H) 01/03/2022    TSH 1.866 11/13/2019    PSA 1.2 07/23/2020       Assessment:       1. Essential hypertension    2. Uncontrolled type 2 diabetes mellitus with hyperglycemia    3. 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 Chronic kidney disease, stage 3a    5. Mixed hyperlipidemia    6. Severe obesity (BMI 35.0-39.9) with comorbidity    7. Excessive urine production    8. Chronic right shoulder pain    9. Disturbed concentration        Plan:   Handy was seen today for medication refill.    Diagnoses and all orders for this visit:    Essential hypertension  -     amLODIPine (NORVASC) 10 MG tablet; Take 1 tablet (10 mg total) by mouth once daily.  -     Comprehensive Metabolic Panel; Future    Will do blood work., BP a bit above goal, advised to monitor BP and decrease consumption of salt.    Uncontrolled type 2 diabetes mellitus with hyperglycemia  -     Diabetic Eye Screening Photo; Future  -     metFORMIN (GLUCOPHAGE) 1000 MG tablet; Take 1 tablet (1,000 mg total) by mouth 2 (two) times daily with meals.  -     SITagliptin (JANUVIA) 100 MG Tab; Take 1 tablet (100 mg total) by mouth once daily.  -     Hemoglobin A1C; Future  -     Ambulatory referral/consult to Ophthalmology; Future    Refills provided. Pt is willing to do better re: diet, see HPI. WiIl do blood work before next visit with usual PCP. DM eye screening done today, pt would like anyway to be referred to his usual eye doctor on the [address].    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    Chronic kidney disease, stage 3a    Will monitor, no NSAIDs.    Mixed hyperlipidemia    Same  treatment,    Severe obesity (BMI 35.0-39.9) with comorbidity    Will work on his lifestyle.  We discussed weight issues and safe, effective ways of losing pounds, includin) diet:  low carbohydrate, low fat diet, stay away from fast food, fried and processed food, use whole grain, lot of fruits and vegetables, use healthy fat such as avocado, nuts and olive oil in reasonable quantity, stay away from sodas. Regular meals with lean proteins.  2) physical activity: ideally 150 min a week, with cardiovascular and resistance activity.  Patient was encouraged to set realistic attainable goals for weight loss, and we will follow up periodically.  Pt's WL goal for next visit:5-10 pounds.    Excessive urine production    Possibly due to him being on dapagliflozin? UA negative but glucose ++. Possibly will improve if DM is better controlled.    Chronic right shoulder pain  -     Ambulatory referral/consult to Orthopedics; Future    Referral placed.    Disturbed concentration  -     Ambulatory referral/consult to Psychiatry; Future    See HPI, referral placed.     Follow up in about 3 months (around 2022) for Dr. Sanderson with blood work before.    This note was created by combination of typed  and M-Modal dictation.  Transcription errors may be present.  If there are any questions, please contact me.

## 2022-01-07 ENCOUNTER — TELEPHONE (OUTPATIENT)
Dept: SPORTS MEDICINE | Facility: CLINIC | Age: 67
End: 2022-01-07
Payer: MEDICARE

## 2022-01-07 PROBLEM — E66.01 SEVERE OBESITY (BMI 35.0-39.9) WITH COMORBIDITY: Status: ACTIVE | Noted: 2022-01-07

## 2022-01-07 PROBLEM — N18.31 CHRONIC KIDNEY DISEASE, STAGE 3A: Status: ACTIVE | Noted: 2022-01-07

## 2022-01-07 NOTE — TELEPHONE ENCOUNTER
LVM The patient was called to follow-up on a referral for his shoulder pain. The callback number for sports medicine was provided.

## 2022-01-21 ENCOUNTER — PATIENT OUTREACH (OUTPATIENT)
Dept: ADMINISTRATIVE | Facility: OTHER | Age: 67
End: 2022-01-21
Payer: MEDICARE

## 2022-01-24 DIAGNOSIS — M25.511 RIGHT SHOULDER PAIN, UNSPECIFIED CHRONICITY: Primary | ICD-10-CM

## 2022-01-25 ENCOUNTER — OFFICE VISIT (OUTPATIENT)
Dept: FAMILY MEDICINE | Facility: CLINIC | Age: 67
End: 2022-01-25
Payer: MEDICARE

## 2022-01-25 VITALS
HEART RATE: 88 BPM | TEMPERATURE: 99 F | HEIGHT: 72 IN | WEIGHT: 272.69 LBS | BODY MASS INDEX: 36.93 KG/M2 | DIASTOLIC BLOOD PRESSURE: 92 MMHG | RESPIRATION RATE: 16 BRPM | SYSTOLIC BLOOD PRESSURE: 138 MMHG | OXYGEN SATURATION: 99 %

## 2022-01-25 DIAGNOSIS — E11.65 UNCONTROLLED TYPE 2 DIABETES MELLITUS WITH HYPERGLYCEMIA: Primary | ICD-10-CM

## 2022-01-25 DIAGNOSIS — E78.2 MIXED HYPERLIPIDEMIA: ICD-10-CM

## 2022-01-25 DIAGNOSIS — I10 ESSENTIAL HYPERTENSION: ICD-10-CM

## 2022-01-25 PROCEDURE — 3008F PR BODY MASS INDEX (BMI) DOCUMENTED: ICD-10-PCS | Mod: CPTII,S$GLB,, | Performed by: FAMILY MEDICINE

## 2022-01-25 PROCEDURE — 3075F SYST BP GE 130 - 139MM HG: CPT | Mod: CPTII,S$GLB,, | Performed by: FAMILY MEDICINE

## 2022-01-25 PROCEDURE — 3075F PR MOST RECENT SYSTOLIC BLOOD PRESS GE 130-139MM HG: ICD-10-PCS | Mod: CPTII,S$GLB,, | Performed by: FAMILY MEDICINE

## 2022-01-25 PROCEDURE — 3066F NEPHROPATHY DOC TX: CPT | Mod: CPTII,S$GLB,, | Performed by: FAMILY MEDICINE

## 2022-01-25 PROCEDURE — 3080F DIAST BP >= 90 MM HG: CPT | Mod: CPTII,S$GLB,, | Performed by: FAMILY MEDICINE

## 2022-01-25 PROCEDURE — 3008F BODY MASS INDEX DOCD: CPT | Mod: CPTII,S$GLB,, | Performed by: FAMILY MEDICINE

## 2022-01-25 PROCEDURE — 3046F HEMOGLOBIN A1C LEVEL >9.0%: CPT | Mod: CPTII,S$GLB,, | Performed by: FAMILY MEDICINE

## 2022-01-25 PROCEDURE — 99214 PR OFFICE/OUTPT VISIT, EST, LEVL IV, 30-39 MIN: ICD-10-PCS | Mod: S$GLB,,, | Performed by: FAMILY MEDICINE

## 2022-01-25 PROCEDURE — 99999 PR PBB SHADOW E&M-EST. PATIENT-LVL II: CPT | Mod: PBBFAC,,, | Performed by: FAMILY MEDICINE

## 2022-01-25 PROCEDURE — 3062F POS MACROALBUMINURIA REV: CPT | Mod: CPTII,S$GLB,, | Performed by: FAMILY MEDICINE

## 2022-01-25 PROCEDURE — 3062F PR POS MACROALBUMINURIA RESULT DOCUMENTED/REVIEW: ICD-10-PCS | Mod: CPTII,S$GLB,, | Performed by: FAMILY MEDICINE

## 2022-01-25 PROCEDURE — 3066F PR DOCUMENTATION OF TREATMENT FOR NEPHROPATHY: ICD-10-PCS | Mod: CPTII,S$GLB,, | Performed by: FAMILY MEDICINE

## 2022-01-25 PROCEDURE — 4010F PR ACE/ARB THEARPY RXD/TAKEN: ICD-10-PCS | Mod: CPTII,S$GLB,, | Performed by: FAMILY MEDICINE

## 2022-01-25 PROCEDURE — 3046F PR MOST RECENT HEMOGLOBIN A1C LEVEL > 9.0%: ICD-10-PCS | Mod: CPTII,S$GLB,, | Performed by: FAMILY MEDICINE

## 2022-01-25 PROCEDURE — 4010F ACE/ARB THERAPY RXD/TAKEN: CPT | Mod: CPTII,S$GLB,, | Performed by: FAMILY MEDICINE

## 2022-01-25 PROCEDURE — 99999 PR PBB SHADOW E&M-EST. PATIENT-LVL II: ICD-10-PCS | Mod: PBBFAC,,, | Performed by: FAMILY MEDICINE

## 2022-01-25 PROCEDURE — 99214 OFFICE O/P EST MOD 30 MIN: CPT | Mod: S$GLB,,, | Performed by: FAMILY MEDICINE

## 2022-01-25 PROCEDURE — 3080F PR MOST RECENT DIASTOLIC BLOOD PRESSURE >= 90 MM HG: ICD-10-PCS | Mod: CPTII,S$GLB,, | Performed by: FAMILY MEDICINE

## 2022-01-25 RX ORDER — LISINOPRIL AND HYDROCHLOROTHIAZIDE 12.5; 2 MG/1; MG/1
2 TABLET ORAL DAILY
Qty: 180 TABLET | Refills: 1 | Status: SHIPPED | OUTPATIENT
Start: 2022-01-25 | End: 2022-08-30

## 2022-01-25 RX ORDER — ATORVASTATIN CALCIUM 20 MG/1
20 TABLET, FILM COATED ORAL DAILY
Qty: 90 TABLET | Refills: 1 | Status: SHIPPED | OUTPATIENT
Start: 2022-01-25 | End: 2022-08-30

## 2022-01-25 RX ORDER — DAPAGLIFLOZIN 5 MG/1
5 TABLET, FILM COATED ORAL DAILY
Qty: 90 TABLET | Refills: 1 | Status: SHIPPED | OUTPATIENT
Start: 2022-01-25 | End: 2022-08-31 | Stop reason: SDUPTHER

## 2022-01-25 NOTE — PROGRESS NOTES
Subjective:       Patient ID: Handy Adams is a 66 y.o. male.    Chief Complaint: No chief complaint on file.      HPI  66-year-old male presents for diabetes follow-up.  A1c slightly improved.  States he did not know his kidney function was not normal.  Patient has missed multiple appointments previously and last follow-up was in 2020. Kidney function has slightly improved.      Review of Systems   Constitutional: Negative.    HENT: Negative.    Respiratory: Negative.    Cardiovascular: Negative.    Gastrointestinal: Negative.    Endocrine: Negative.    Genitourinary: Negative.    Musculoskeletal: Negative.    Neurological: Negative.    Psychiatric/Behavioral: Negative.           Past Medical History:   Diagnosis Date    Edema leg     History of rotator cuff tear     History of seasonal allergies     HTN (hypertension)     Hx of colonic polyps     Hyperlipemia     NS (nuclear sclerosis), bilateral 6/25/2019    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 11/17/2017    Type 2 diabetes mellitus      Past Surgical History:   Procedure Laterality Date    MOUTH SURGERY       Family History   Problem Relation Age of Onset    No Known Problems Mother     Diabetes Father     Hypertension Father     No Known Problems Sister     No Known Problems Brother     No Known Problems Maternal Aunt     No Known Problems Maternal Uncle     No Known Problems Paternal Aunt     No Known Problems Paternal Uncle     No Known Problems Maternal Grandmother     No Known Problems Maternal Grandfather     No Known Problems Paternal Grandmother     No Known Problems Paternal Grandfather     Amblyopia Neg Hx     Blindness Neg Hx     Cancer Neg Hx     Cataracts Neg Hx     Glaucoma Neg Hx     Macular degeneration Neg Hx     Retinal detachment Neg Hx     Strabismus Neg Hx     Stroke Neg Hx     Thyroid disease Neg Hx      Social History      Socioeconomic History    Marital status:    Tobacco Use    Smoking status: Former Smoker    Smokeless tobacco: Never Used   Substance and Sexual Activity    Alcohol use: Yes       Current Outpatient Medications:     amLODIPine (NORVASC) 10 MG tablet, Take 1 tablet (10 mg total) by mouth once daily., Disp: 90 tablet, Rfl: 1    atorvastatin (LIPITOR) 20 MG tablet, Take 1 tablet (20 mg total) by mouth once daily., Disp: 90 tablet, Rfl: 1    blood sugar diagnostic Strp, To test twice daily, Disp: 100 each, Rfl: 3    blood-glucose meter kit, Use as instructed, Disp: 1 each, Rfl: 0    dapagliflozin (FARXIGA) 5 mg Tab tablet, Take 1 tablet (5 mg total) by mouth once daily., Disp: 90 tablet, Rfl: 1    fexofenadine (ALLEGRA) 180 MG tablet, Take 1 tablet (180 mg total) by mouth once daily., Disp: 30 tablet, Rfl: 0    lancets Misc, 1 lancet by Misc.(Non-Drug; Combo Route) route 2 (two) times daily with meals., Disp: 100 each, Rfl: 11    lisinopriL-hydrochlorothiazide (PRINZIDE,ZESTORETIC) 20-12.5 mg per tablet, Take 2 tablets by mouth once daily., Disp: 180 tablet, Rfl: 1    metFORMIN (GLUCOPHAGE) 1000 MG tablet, Take 1 tablet (1,000 mg total) by mouth 2 (two) times daily with meals., Disp: 180 tablet, Rfl: 1    naproxen (NAPROSYN) 500 MG tablet, Take 1 tablet (500 mg total) by mouth 2 (two) times daily with meals. (Patient not taking: Reported on 1/5/2022), Disp: 60 tablet, Rfl: 0    sildenafiL (VIAGRA) 50 MG tablet, Take 1 tablet (50 mg total) by mouth daily as needed for Erectile Dysfunction., Disp: 30 tablet, Rfl: 0    SITagliptin (JANUVIA) 100 MG Tab, Take 1 tablet (100 mg total) by mouth once daily., Disp: 90 tablet, Rfl: 1    triamcinolone acetonide 0.1% (KENALOG) 0.1 % ointment, Apply topically 2 (two) times daily. (Patient not taking: Reported on 1/5/2022), Disp: 80 g, Rfl: 1    Current Facility-Administered Medications:     dexamethasone (ozurdex) 0.7 mg io implant, 0.7 mg, Intraocular, 1  time in Clinic/SHAWNA CABEZAS MD   Objective:      Vitals:    01/25/22 0853   BP: (!) 138/92   Pulse: 88   Resp: 16   Temp: 98.8 °F (37.1 °C)   SpO2: 99%   Weight: 123.7 kg (272 lb 11.3 oz)   Height: 6' (1.829 m)       Physical Exam  Constitutional:       General: He is not in acute distress.     Appearance: He is not diaphoretic.   HENT:      Head: Normocephalic and atraumatic.   Eyes:      Conjunctiva/sclera: Conjunctivae normal.   Pulmonary:      Effort: Pulmonary effort is normal.   Musculoskeletal:         General: Normal range of motion.      Cervical back: Neck supple.   Skin:     Findings: No rash.   Neurological:      Mental Status: He is alert and oriented to person, place, and time.   Psychiatric:         Mood and Affect: Mood and affect normal.         Behavior: Behavior normal.         Thought Content: Thought content normal.         Judgment: Judgment normal.            Assessment:       1. Uncontrolled type 2 diabetes mellitus with hyperglycemia    2. Mixed hyperlipidemia    3. Essential hypertension        Plan:       Uncontrolled type 2 diabetes mellitus with hyperglycemia  -     dapagliflozin (FARXIGA) 5 mg Tab tablet; Take 1 tablet (5 mg total) by mouth once daily.  Dispense: 90 tablet; Refill: 1  -     Hemoglobin A1C; Future; Expected date: 01/25/2022  -     Comprehensive Metabolic Panel; Future; Expected date: 01/25/2022    Mixed hyperlipidemia  -     atorvastatin (LIPITOR) 20 MG tablet; Take 1 tablet (20 mg total) by mouth once daily.  Dispense: 90 tablet; Refill: 1    Essential hypertension  -     lisinopriL-hydrochlorothiazide (PRINZIDE,ZESTORETIC) 20-12.5 mg per tablet; Take 2 tablets by mouth once daily.  Dispense: 180 tablet; Refill: 1    cont meds. Work on diet and exercise. Pt was encouraged to start new meds but pt would like to work on diet and exercise. Went over importance of adherence and follows up. Pt understood. F/u in 3 months.            Patient note was created  using MModal.  Any errors in syntax or even information may not have been identified and edited on initial review prior to signing this note.

## 2022-01-26 ENCOUNTER — OFFICE VISIT (OUTPATIENT)
Dept: ORTHOPEDICS | Facility: CLINIC | Age: 67
End: 2022-01-26
Payer: MEDICARE

## 2022-01-26 VITALS
RESPIRATION RATE: 18 BRPM | OXYGEN SATURATION: 99 % | HEART RATE: 92 BPM | DIASTOLIC BLOOD PRESSURE: 96 MMHG | HEIGHT: 72 IN | SYSTOLIC BLOOD PRESSURE: 144 MMHG | WEIGHT: 272.13 LBS | BODY MASS INDEX: 36.86 KG/M2

## 2022-01-26 DIAGNOSIS — G89.29 CHRONIC RIGHT SHOULDER PAIN: ICD-10-CM

## 2022-01-26 DIAGNOSIS — M25.511 CHRONIC RIGHT SHOULDER PAIN: ICD-10-CM

## 2022-01-26 PROCEDURE — 4010F ACE/ARB THERAPY RXD/TAKEN: CPT | Mod: CPTII,S$GLB,, | Performed by: ORTHOPAEDIC SURGERY

## 2022-01-26 PROCEDURE — 1125F PR PAIN SEVERITY QUANTIFIED, PAIN PRESENT: ICD-10-PCS | Mod: CPTII,S$GLB,, | Performed by: ORTHOPAEDIC SURGERY

## 2022-01-26 PROCEDURE — 1160F PR REVIEW ALL MEDS BY PRESCRIBER/CLIN PHARMACIST DOCUMENTED: ICD-10-PCS | Mod: CPTII,S$GLB,, | Performed by: ORTHOPAEDIC SURGERY

## 2022-01-26 PROCEDURE — 1159F MED LIST DOCD IN RCRD: CPT | Mod: CPTII,S$GLB,, | Performed by: ORTHOPAEDIC SURGERY

## 2022-01-26 PROCEDURE — 3008F PR BODY MASS INDEX (BMI) DOCUMENTED: ICD-10-PCS | Mod: CPTII,S$GLB,, | Performed by: ORTHOPAEDIC SURGERY

## 2022-01-26 PROCEDURE — 3077F PR MOST RECENT SYSTOLIC BLOOD PRESSURE >= 140 MM HG: ICD-10-PCS | Mod: CPTII,S$GLB,, | Performed by: ORTHOPAEDIC SURGERY

## 2022-01-26 PROCEDURE — 3046F HEMOGLOBIN A1C LEVEL >9.0%: CPT | Mod: CPTII,S$GLB,, | Performed by: ORTHOPAEDIC SURGERY

## 2022-01-26 PROCEDURE — 3008F BODY MASS INDEX DOCD: CPT | Mod: CPTII,S$GLB,, | Performed by: ORTHOPAEDIC SURGERY

## 2022-01-26 PROCEDURE — 1159F PR MEDICATION LIST DOCUMENTED IN MEDICAL RECORD: ICD-10-PCS | Mod: CPTII,S$GLB,, | Performed by: ORTHOPAEDIC SURGERY

## 2022-01-26 PROCEDURE — 3288F PR FALLS RISK ASSESSMENT DOCUMENTED: ICD-10-PCS | Mod: CPTII,S$GLB,, | Performed by: ORTHOPAEDIC SURGERY

## 2022-01-26 PROCEDURE — 99999 PR PBB SHADOW E&M-EST. PATIENT-LVL IV: ICD-10-PCS | Mod: PBBFAC,,, | Performed by: ORTHOPAEDIC SURGERY

## 2022-01-26 PROCEDURE — 1101F PR PT FALLS ASSESS DOC 0-1 FALLS W/OUT INJ PAST YR: ICD-10-PCS | Mod: CPTII,S$GLB,, | Performed by: ORTHOPAEDIC SURGERY

## 2022-01-26 PROCEDURE — 1125F AMNT PAIN NOTED PAIN PRSNT: CPT | Mod: CPTII,S$GLB,, | Performed by: ORTHOPAEDIC SURGERY

## 2022-01-26 PROCEDURE — 99999 PR PBB SHADOW E&M-EST. PATIENT-LVL IV: CPT | Mod: PBBFAC,,, | Performed by: ORTHOPAEDIC SURGERY

## 2022-01-26 PROCEDURE — 99203 OFFICE O/P NEW LOW 30 MIN: CPT | Mod: S$GLB,,, | Performed by: ORTHOPAEDIC SURGERY

## 2022-01-26 PROCEDURE — 4010F PR ACE/ARB THEARPY RXD/TAKEN: ICD-10-PCS | Mod: CPTII,S$GLB,, | Performed by: ORTHOPAEDIC SURGERY

## 2022-01-26 PROCEDURE — 3288F FALL RISK ASSESSMENT DOCD: CPT | Mod: CPTII,S$GLB,, | Performed by: ORTHOPAEDIC SURGERY

## 2022-01-26 PROCEDURE — 3062F POS MACROALBUMINURIA REV: CPT | Mod: CPTII,S$GLB,, | Performed by: ORTHOPAEDIC SURGERY

## 2022-01-26 PROCEDURE — 3077F SYST BP >= 140 MM HG: CPT | Mod: CPTII,S$GLB,, | Performed by: ORTHOPAEDIC SURGERY

## 2022-01-26 PROCEDURE — 3066F NEPHROPATHY DOC TX: CPT | Mod: CPTII,S$GLB,, | Performed by: ORTHOPAEDIC SURGERY

## 2022-01-26 PROCEDURE — 1101F PT FALLS ASSESS-DOCD LE1/YR: CPT | Mod: CPTII,S$GLB,, | Performed by: ORTHOPAEDIC SURGERY

## 2022-01-26 PROCEDURE — 1160F RVW MEDS BY RX/DR IN RCRD: CPT | Mod: CPTII,S$GLB,, | Performed by: ORTHOPAEDIC SURGERY

## 2022-01-26 PROCEDURE — 3046F PR MOST RECENT HEMOGLOBIN A1C LEVEL > 9.0%: ICD-10-PCS | Mod: CPTII,S$GLB,, | Performed by: ORTHOPAEDIC SURGERY

## 2022-01-26 PROCEDURE — 3066F PR DOCUMENTATION OF TREATMENT FOR NEPHROPATHY: ICD-10-PCS | Mod: CPTII,S$GLB,, | Performed by: ORTHOPAEDIC SURGERY

## 2022-01-26 PROCEDURE — 3080F PR MOST RECENT DIASTOLIC BLOOD PRESSURE >= 90 MM HG: ICD-10-PCS | Mod: CPTII,S$GLB,, | Performed by: ORTHOPAEDIC SURGERY

## 2022-01-26 PROCEDURE — 3062F PR POS MACROALBUMINURIA RESULT DOCUMENTED/REVIEW: ICD-10-PCS | Mod: CPTII,S$GLB,, | Performed by: ORTHOPAEDIC SURGERY

## 2022-01-26 PROCEDURE — 99203 PR OFFICE/OUTPT VISIT, NEW, LEVL III, 30-44 MIN: ICD-10-PCS | Mod: S$GLB,,, | Performed by: ORTHOPAEDIC SURGERY

## 2022-01-26 PROCEDURE — 3080F DIAST BP >= 90 MM HG: CPT | Mod: CPTII,S$GLB,, | Performed by: ORTHOPAEDIC SURGERY

## 2022-01-26 NOTE — PROGRESS NOTES
"Chief Complaint   Patient presents with    Right Shoulder - Pain     This patient was seen in consultation at the request of Dr. Marycarmen Odonnell     [facility] (01/26/2022): Handy Adams is a 66 y.o. male who presents today complaining of right shoulder pain  Duration of symptoms:  10-15 years  Has 2-3 steroid injections - first injection was very helpful, second not as much. Third did not help at all   "its like having a headache all day"   Pain is constant with intermittent worsening  Localizes pain to deep anterior shoulder. Radiates down the arm to the forearm   "feels like an itch I need to scratch"   Not worse with reaching overhead, reaching behind his back   Aggravating factors: no known   Relieving factors: no known   Night pain is absent  Radicular symptoms: no numbness, paresthesias   Associated symptoms: no popping and limited range of motion.    Prior treatment:  PT - has done two courses without improvement. Thinks he may have done [facility]  No relief with naproxen or muscle relaxers   Pain does not interfere with activities of daily living .    This is the extent of the patient's complaints at this time.     Hand dominance: Right        Review of patient's allergies indicates:  No Known Allergies      Physical Exam:   Vitals:    01/26/22 1412   BP: (!) 144/96   Pulse: 92   Resp: 18   SpO2: 99%   Weight: 123.4 kg (272 lb 1.6 oz)   Height: 6' (1.829 m)   PainSc:   5   PainLoc: Shoulder       General: Weight: 123.4 kg (272 lb 1.6 oz) Body mass index is 36.9 kg/m².  Patient is alert, awake and oriented to time, place and person. Mood and affect are appropriate.  Patient does not appear to be in any distress, denies any constitutional symptoms and appears stated age.   HEENT: Pupils are equal and round, sclera are not injected. External examination of ears and nose reveals no abnormalities. Cranial nerves II-X are grossly intact  Neck: examination demonstrates painless active range of motion. " Spurling's sign is negative  Skin: no rashes, abrasions or open wounds on the affected extremity   Resp: No respiratory distress or audible wheezing   CV: 2+  pulses, all extremities warm and well perfused   Right Shoulder    Shoulder Range of Motion    Right     Left   (Active/Passive)       Forward Elevation     165/170            165/170  External rotation (arm at side)  50/50             50/50   Internal rotation behind the back  L3             L3     Range of motion is painful     Scapular winging no  Scapular dyskinesia no    Examination of the back shows no atrophy    Acromioclavicular joint is not tender  Crossbody test: negative    Neer's positive  Hawkin's positive    Michael's positive  Drop arm negative  Belly press negative      Cuff Strength     Right     Left   Supraspinatus        5/5    5/5  Infraspinatus     5/5    5/5  Subscapularis     5/5    5/5    Deltoid testing            5/5    5/5    Speeds negative  Yergasons negative    Elbow examination demonstrates no tenderness to palpation and has normal range of motion.     ltsi C5-T1  + epl, io, fds, fdp   2+ RP      Imaging: 3 views of the right shoulder:  positive for degenerative changes of the AC joint. The humeral head is well centered on the AP and axillary views.  No sclerosis or  calcification at the rotator cuff insertion on the greater tuberosity. There is not significant degenerative change of the glenohumeral joint or posterior subluxation of the humeral head. No acute changes or fracture.      I personally reviewed and interpreted the patient's imaging obtained today in clinic     Assessment: 66 y.o. male with right rotator cuff tendinitis      Plan:   - MRI R shoulder   - Can consider repeat injection - has been 6 years. Will get MRI since he has had pain for so long, minimal relief with past PT   - Return to clinic for MRI results    All questions were answered in detail. The patient is in full agreement with the treatment plan and will  proceed accordingly.    A note notifying Dr. Marycarmen Odonnell of my findings was sent via the electronic medical record     This note was created by combination of typed  and M-Modal dictation. Transcription and phonetic errors may be present.  If there are any questions, please contact me.      Current Outpatient Medications:     amLODIPine (NORVASC) 10 MG tablet, Take 1 tablet (10 mg total) by mouth once daily., Disp: 90 tablet, Rfl: 1    atorvastatin (LIPITOR) 20 MG tablet, Take 1 tablet (20 mg total) by mouth once daily., Disp: 90 tablet, Rfl: 1    blood sugar diagnostic Strp, To test twice daily, Disp: 100 each, Rfl: 3    dapagliflozin (FARXIGA) 5 mg Tab tablet, Take 1 tablet (5 mg total) by mouth once daily., Disp: 90 tablet, Rfl: 1    lancets Misc, 1 lancet by Misc.(Non-Drug; Combo Route) route 2 (two) times daily with meals., Disp: 100 each, Rfl: 11    lisinopriL-hydrochlorothiazide (PRINZIDE,ZESTORETIC) 20-12.5 mg per tablet, Take 2 tablets by mouth once daily., Disp: 180 tablet, Rfl: 1    metFORMIN (GLUCOPHAGE) 1000 MG tablet, Take 1 tablet (1,000 mg total) by mouth 2 (two) times daily with meals., Disp: 180 tablet, Rfl: 1    naproxen (NAPROSYN) 500 MG tablet, Take 1 tablet (500 mg total) by mouth 2 (two) times daily with meals., Disp: 60 tablet, Rfl: 0    SITagliptin (JANUVIA) 100 MG Tab, Take 1 tablet (100 mg total) by mouth once daily., Disp: 90 tablet, Rfl: 1    triamcinolone acetonide 0.1% (KENALOG) 0.1 % ointment, Apply topically 2 (two) times daily., Disp: 80 g, Rfl: 1    blood-glucose meter kit, Use as instructed, Disp: 1 each, Rfl: 0    fexofenadine (ALLEGRA) 180 MG tablet, Take 1 tablet (180 mg total) by mouth once daily., Disp: 30 tablet, Rfl: 0    sildenafiL (VIAGRA) 50 MG tablet, Take 1 tablet (50 mg total) by mouth daily as needed for Erectile Dysfunction., Disp: 30 tablet, Rfl: 0    Current Facility-Administered Medications:     dexamethasone (ozurdex) 0.7 mg io  implant, 0.7 mg, Intraocular, 1 time in Clinic/[facility] MD    Past Medical History:   Diagnosis Date    Edema leg     History of rotator cuff tear     History of seasonal allergies     HTN (hypertension)     Hx of colonic polyps     Hyperlipemia     NS (nuclear sclerosis), bilateral 6/25/2019    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 11/17/2017    Type 2 diabetes mellitus        Active Problem List with Overview Notes    Diagnosis Date Noted    Severe obesity (BMI 35.0-39.9) with comorbidity 01/07/2022    Chronic kidney disease, stage 3a 01/07/2022    Uncontrolled type 2 diabetes mellitus with both eyes affected by proliferative retinopathy and macular edema, with long-term current use of insulin 06/25/2019    NS (nuclear sclerosis), bilateral 06/25/2019    Type 2 diabetes mellitus with hyperglycemia, without long-term current use of insulin 12/05/2017    Hypertensive retinopathy, bilateral 12/05/2017    NS (nuclear sclerosis) 12/05/2017    Type 2 diabetes mellitus, uncontrolled 11/30/2012    Essential hypertension     Hyperlipemia        Past Surgical History:   Procedure Laterality Date    MOUTH SURGERY         Social History     Socioeconomic History    Marital status:    Tobacco Use    Smoking status: Former Smoker    Smokeless tobacco: Never Used   Substance and Sexual Activity    Alcohol use: Yes

## 2022-02-03 ENCOUNTER — HOSPITAL ENCOUNTER (OUTPATIENT)
Dept: RADIOLOGY | Facility: HOSPITAL | Age: 67
Discharge: HOME | End: 2022-02-03
Attending: ORTHOPAEDIC SURGERY
Payer: MEDICARE

## 2022-02-03 DIAGNOSIS — M25.511 CHRONIC RIGHT SHOULDER PAIN: ICD-10-CM

## 2022-02-03 DIAGNOSIS — G89.29 CHRONIC RIGHT SHOULDER PAIN: ICD-10-CM

## 2022-02-03 PROCEDURE — 73221 MRI SHOULDER WITHOUT CONTRAST RIGHT: ICD-10-PCS | Mod: 26,RT,, | Performed by: RADIOLOGY

## 2022-02-03 PROCEDURE — 73221 MRI JOINT UPR EXTREM W/O DYE: CPT | Mod: 26,RT,, | Performed by: RADIOLOGY

## 2022-02-03 PROCEDURE — 73221 MRI JOINT UPR EXTREM W/O DYE: CPT | Mod: TC,RT

## 2022-02-09 ENCOUNTER — OFFICE VISIT (OUTPATIENT)
Dept: ORTHOPEDICS | Facility: CLINIC | Age: 67
End: 2022-02-09
Payer: MEDICARE

## 2022-02-09 VITALS
RESPIRATION RATE: 18 BRPM | SYSTOLIC BLOOD PRESSURE: 132 MMHG | OXYGEN SATURATION: 98 % | BODY MASS INDEX: 37.56 KG/M2 | DIASTOLIC BLOOD PRESSURE: 82 MMHG | HEIGHT: 72 IN | WEIGHT: 277.31 LBS | HEART RATE: 92 BPM

## 2022-02-09 DIAGNOSIS — M25.511 CHRONIC RIGHT SHOULDER PAIN: Primary | ICD-10-CM

## 2022-02-09 DIAGNOSIS — M75.81 ROTATOR CUFF TENDONITIS, RIGHT: ICD-10-CM

## 2022-02-09 DIAGNOSIS — G89.29 CHRONIC RIGHT SHOULDER PAIN: Primary | ICD-10-CM

## 2022-02-09 PROCEDURE — 99214 PR OFFICE/OUTPT VISIT, EST, LEVL IV, 30-39 MIN: ICD-10-PCS | Mod: 25,S$GLB,, | Performed by: PHYSICIAN ASSISTANT

## 2022-02-09 PROCEDURE — 3062F PR POS MACROALBUMINURIA RESULT DOCUMENTED/REVIEW: ICD-10-PCS | Mod: CPTII,S$GLB,, | Performed by: PHYSICIAN ASSISTANT

## 2022-02-09 PROCEDURE — 3008F BODY MASS INDEX DOCD: CPT | Mod: CPTII,S$GLB,, | Performed by: PHYSICIAN ASSISTANT

## 2022-02-09 PROCEDURE — 3046F PR MOST RECENT HEMOGLOBIN A1C LEVEL > 9.0%: ICD-10-PCS | Mod: CPTII,S$GLB,, | Performed by: PHYSICIAN ASSISTANT

## 2022-02-09 PROCEDURE — 1159F MED LIST DOCD IN RCRD: CPT | Mod: CPTII,S$GLB,, | Performed by: PHYSICIAN ASSISTANT

## 2022-02-09 PROCEDURE — 1125F AMNT PAIN NOTED PAIN PRSNT: CPT | Mod: CPTII,S$GLB,, | Performed by: PHYSICIAN ASSISTANT

## 2022-02-09 PROCEDURE — 20610 LARGE JOINT ASPIRATION/INJECTION: R SUBACROMIAL BURSA: ICD-10-PCS | Mod: RT,S$GLB,, | Performed by: PHYSICIAN ASSISTANT

## 2022-02-09 PROCEDURE — 3075F SYST BP GE 130 - 139MM HG: CPT | Mod: CPTII,S$GLB,, | Performed by: PHYSICIAN ASSISTANT

## 2022-02-09 PROCEDURE — 1125F PR PAIN SEVERITY QUANTIFIED, PAIN PRESENT: ICD-10-PCS | Mod: CPTII,S$GLB,, | Performed by: PHYSICIAN ASSISTANT

## 2022-02-09 PROCEDURE — 3075F PR MOST RECENT SYSTOLIC BLOOD PRESS GE 130-139MM HG: ICD-10-PCS | Mod: CPTII,S$GLB,, | Performed by: PHYSICIAN ASSISTANT

## 2022-02-09 PROCEDURE — 3079F PR MOST RECENT DIASTOLIC BLOOD PRESSURE 80-89 MM HG: ICD-10-PCS | Mod: CPTII,S$GLB,, | Performed by: PHYSICIAN ASSISTANT

## 2022-02-09 PROCEDURE — 3062F POS MACROALBUMINURIA REV: CPT | Mod: CPTII,S$GLB,, | Performed by: PHYSICIAN ASSISTANT

## 2022-02-09 PROCEDURE — 1101F PR PT FALLS ASSESS DOC 0-1 FALLS W/OUT INJ PAST YR: ICD-10-PCS | Mod: CPTII,S$GLB,, | Performed by: PHYSICIAN ASSISTANT

## 2022-02-09 PROCEDURE — 3288F FALL RISK ASSESSMENT DOCD: CPT | Mod: CPTII,S$GLB,, | Performed by: PHYSICIAN ASSISTANT

## 2022-02-09 PROCEDURE — 99999 PR PBB SHADOW E&M-EST. PATIENT-LVL IV: ICD-10-PCS | Mod: PBBFAC,,, | Performed by: PHYSICIAN ASSISTANT

## 2022-02-09 PROCEDURE — 1159F PR MEDICATION LIST DOCUMENTED IN MEDICAL RECORD: ICD-10-PCS | Mod: CPTII,S$GLB,, | Performed by: PHYSICIAN ASSISTANT

## 2022-02-09 PROCEDURE — 3046F HEMOGLOBIN A1C LEVEL >9.0%: CPT | Mod: CPTII,S$GLB,, | Performed by: PHYSICIAN ASSISTANT

## 2022-02-09 PROCEDURE — 3066F PR DOCUMENTATION OF TREATMENT FOR NEPHROPATHY: ICD-10-PCS | Mod: CPTII,S$GLB,, | Performed by: PHYSICIAN ASSISTANT

## 2022-02-09 PROCEDURE — 4010F PR ACE/ARB THEARPY RXD/TAKEN: ICD-10-PCS | Mod: CPTII,S$GLB,, | Performed by: PHYSICIAN ASSISTANT

## 2022-02-09 PROCEDURE — 1160F RVW MEDS BY RX/DR IN RCRD: CPT | Mod: CPTII,S$GLB,, | Performed by: PHYSICIAN ASSISTANT

## 2022-02-09 PROCEDURE — 4010F ACE/ARB THERAPY RXD/TAKEN: CPT | Mod: CPTII,S$GLB,, | Performed by: PHYSICIAN ASSISTANT

## 2022-02-09 PROCEDURE — 99999 PR PBB SHADOW E&M-EST. PATIENT-LVL IV: CPT | Mod: PBBFAC,,, | Performed by: PHYSICIAN ASSISTANT

## 2022-02-09 PROCEDURE — 3288F PR FALLS RISK ASSESSMENT DOCUMENTED: ICD-10-PCS | Mod: CPTII,S$GLB,, | Performed by: PHYSICIAN ASSISTANT

## 2022-02-09 PROCEDURE — 3079F DIAST BP 80-89 MM HG: CPT | Mod: CPTII,S$GLB,, | Performed by: PHYSICIAN ASSISTANT

## 2022-02-09 PROCEDURE — 3066F NEPHROPATHY DOC TX: CPT | Mod: CPTII,S$GLB,, | Performed by: PHYSICIAN ASSISTANT

## 2022-02-09 PROCEDURE — 20610 DRAIN/INJ JOINT/BURSA W/O US: CPT | Mod: RT,S$GLB,, | Performed by: PHYSICIAN ASSISTANT

## 2022-02-09 PROCEDURE — 1101F PT FALLS ASSESS-DOCD LE1/YR: CPT | Mod: CPTII,S$GLB,, | Performed by: PHYSICIAN ASSISTANT

## 2022-02-09 PROCEDURE — 1160F PR REVIEW ALL MEDS BY PRESCRIBER/CLIN PHARMACIST DOCUMENTED: ICD-10-PCS | Mod: CPTII,S$GLB,, | Performed by: PHYSICIAN ASSISTANT

## 2022-02-09 PROCEDURE — 99214 OFFICE O/P EST MOD 30 MIN: CPT | Mod: 25,S$GLB,, | Performed by: PHYSICIAN ASSISTANT

## 2022-02-09 PROCEDURE — 3008F PR BODY MASS INDEX (BMI) DOCUMENTED: ICD-10-PCS | Mod: CPTII,S$GLB,, | Performed by: PHYSICIAN ASSISTANT

## 2022-02-09 RX ORDER — TRIAMCINOLONE ACETONIDE 40 MG/ML
40 INJECTION, SUSPENSION INTRA-ARTICULAR; INTRAMUSCULAR
Status: DISCONTINUED | OUTPATIENT
Start: 2022-02-09 | End: 2022-02-09 | Stop reason: HOSPADM

## 2022-02-09 RX ADMIN — TRIAMCINOLONE ACETONIDE 40 MG: 40 INJECTION, SUSPENSION INTRA-ARTICULAR; INTRAMUSCULAR at 01:02

## 2022-02-09 NOTE — PROGRESS NOTES
Patient ID: Handy Adams is a 66 y.o. male.    Chief Complaint: Pain of the Right Shoulder      HISTORY:  Handy Adams is a 66 y.o. male who returns to me today for follow up of the right shoulder and MRI results.  He was last seen by Dr. Winchester on 1/26.  He is doing well but states he continues to have pain in his shoulder.  He feels it is a constant ache.  He has had extensive treatment prior to this- last injection over 6 years ago.        PMH/PSH/FamHx/SocHx:    Unchanged from prior visit.    ROS:  Constitution: Negative for chills, fever and weakness.   Respiratory: Negative for cough and shortness of breath.   Musculoskeletal: Positive for right shoulder  Psychiatric/Behavioral: The patient is not nervous/anxious.       PHYSICAL EXAM:   /82 (BP Location: Left arm, Patient Position: Sitting, BP Method: Large (Manual))   Pulse 92   Resp 18   Ht 6' (1.829 m)   Wt 125.8 kg (277 lb 5.4 oz)   SpO2 98%   BMI 37.61 kg/m²   Right shoulder  Skin intact, no warmth or erythema  Pain localized to anterior shoulder    ER 50  IR T7  + kamara  + Jobes      IMAGING:     MRI right shoulder  IMPRESSION:  Rotator cuff tendinosis, involving the infraspinatus tendon, as above.  No discrete tear/retraction.     Mild subdeltoid fluid/bursitis.     Mild AC joint arthropathy.    ASSESSMENT/PLAN:    Handy was seen today for pain.    Diagnoses and all orders for this visit:    Chronic right shoulder pain    Rotator cuff tendonitis, right    - Right shoulder subacromial injection performed today.  Recommend rest, ice, activity modification  - Discussed MRI results- elected to proceed with injection today.  I will have him follow up with Dr. Winchester to discuss possible surgery when she returns if he does not have improvement  - Also advised he needs to work on lowering A1c if he were to consider surgery

## 2022-02-09 NOTE — PROCEDURES
Large Joint Aspiration/Injection: R subacromial bursa    Date/Time: 2/9/2022 1:30 PM  Performed by: Barbra Henderson PA-C  Authorized by: Barbra Henderson PA-C     Consent Done?:  Yes (Verbal)  Indications:  Pain  Prep: patient was prepped and draped in usual sterile fashion    Local anesthetic:  Topical anesthetic    Details:  Needle Size:  22 G  Approach:  Posterior  Location:  Shoulder  Site:  R subacromial bursa  Medications:  40 mg triamcinolone acetonide 40 mg/mL  Patient tolerance:  Patient tolerated the procedure well with no immediate complications

## 2022-02-23 ENCOUNTER — PATIENT OUTREACH (OUTPATIENT)
Dept: ADMINISTRATIVE | Facility: HOSPITAL | Age: 67
End: 2022-02-23
Payer: MEDICARE

## 2022-03-30 NOTE — PROGRESS NOTES
Patient agrees to being observed by student via secure HIPAA compliant [employer] account.    Outpatient Psychiatry Initial Visit ([facility])    4/1/2022    Handy Adams, a 66 y.o. male, presenting for initial evaluation visit. Met with patient.    Reason for Encounter: Referral from Dr. Odonnell. Patient complains of No chief complaint on file.    66 y.o. male, presenting for initial evaluation visit due to episodes of brain fog and lack of motivation/concentration. Currently not on psychiatric medicines.    Hx of ADHD. Was rx'd medicine for ADHD but stopped taking it a long time ago.     Hx of taking illicit amphetamine (speed) during childhood in [facility] where father was stationed feels like it helped him concentrate.    Pt misplaces keys sometimes but that's normal. Pt doesn't have problems remembering people and locations. Does misplace keys and small things sometimes. Other people have not noted that pt has memory problems.    Pt does think he has depression and anxiety that is reasonably normal considering the pandemic and war. Struggles to complete assignments, but when does complete them gets excellent grades. Plans on finishing PhD in 2 yrs. Sometimes doesn't feel like doing his assignments. Would rather watch tv sometimes. Pt is worried about his age and lack of motivation affecting his work. That his lack of motivation is going to become a habit. Pt states that he has not struggled with motivation in regards to his education in the past. But pt is afraid that he is not as motivated day to day as he used to be.          Review Of Systems:     Pertinent items are noted in HPI.     GENERAL:  No weight gain or loss  SKIN:  No rashes or lacerations  HEAD:  No headaches  CHEST:  No shortness of breath, hyperventilation or cough  CARDIOVASCULAR:  No tachycardia or chest pain  ABDOMEN:  No nausea, vomiting, pain, constipation or diarrhea  MUSCULOSKELETAL:  No pain or stiffness of the joints  NEUROLOGIC:  No weakness,  sensory changes, seizures, confusion, memory loss, tremor or other abnormal movements    PSYCHIATRIC ROS    Prior Medication Trials: Did take some ADHD medication a long time ago, but stopped it    Depression: Denies depressed mood, loss of interest in pleasurable activities, anhedonia, sleep changes, decreased motivation, decreased concentration, feelings of excessive ir irrational guilt, helplessness, hopelessness, decreased energy, increased or decreased appetite, weight changes, increased or decreased motor activity, or suicidal ideations/thoughts of death.    Suicidal Ideations:  Denies present passive thoughts of suicide, active thoughts of suicide, plan, or intent.  Denies past passive thoughts of suicide, active thoughts of suicide, plan, or intent, psychosis during a depressive episode.    Sleep:  Denies increased sleep latency, frequent nighttime awakenings, or early morning awakening with inability to return to sleep.    Anxiety: Denies excessive worry, difficulty controlling worrying, feeling keyed up, easily fatigued, difficulty concentrating or mind going blank, irritability, muscle tension, sleep disturbance, racing thoughts, being unable to relax, specific phobia.    Panic Attacks: Denies palpitations, sweating, trembling, dyspnea, choking sensation, chest pain/discomfort, nausea/abd distress, dizziness, chills or hot flashes, tingling, derealization, fear of losing control, fear of dying.    Ester: Denies episodic elevated or irritable mood, increased goal directed activity, inflated self-esteem, grandiosity, decreased need for sleep, pressured speech or increased talkative, flight of ideas, racing thoughts, distractibility, increased risk-taking behavior, psychosis during a manic/hypo manic episode.    Psychosis: Denies any AVH, paranoia, delusions, ideas of reference, thought insertion or thought broadcasting.    Trauma: Denies history of significant trauma or abuse.    PTSD: Denies  re-experiencing trauma, nightmares, increased awareness of surroundings, hyperexcitability.    Cognition: Denies to problems with cognition or memory.    Substance Use History:Admits to a history of methamphetamine,  Denies cocaine, opioid,  marijuana use.  Denies hx of alcohol abuse or dependence.  Denies tobacco use.     SOCIAL HISTORY    Upbringing: Father was in the  and then a , mom was a stay at home mother. Lived in [place] for a while. Both have passed. Good upbringing.    School Performance: Bachelors to [employer] degrees.  Has certifications and numerous experience in leadership roles. Currently working on PhD in organizational leadership     Career: Retired at 47 y/o from [employer] in 2005. 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 no    Constitutional  Vitals:  Most recent vital signs, dated less than 90 days prior to this appointment, were reviewed.    Vitals:    04/01/22 1102   BP: (!) 140/90   Pulse: 81   SpO2: 99%   Weight: 126.4 kg (278 lb 8.8 oz)   Height: 6' (1.829 m)        General:  unremarkable, age appropriate     Musculoskeletal  Muscle Strength/Tone:  no tremor, no tic   Gait & Station:  non-ataxic     Psychiatric  Speech:  no latency; no press   Mood & Affect:  steady  congruent and appropriate   Thought Process:  normal and logical   Associations:  intact   Thought Content:  normal, no suicidality, no homicidality, delusions, or paranoia   Insight:  intact   Judgement: behavior is adequate to circumstances   Orientation:  grossly intact   Memory: intact for content of interview   Language: grossly intact   Attention Span & Concentration:  able to focus   Fund of Knowledge:  intact and appropriate to age and level of education        Relevant Elements of Neurological Exam: normal gait    Functioning in Relationships:  Spouse/partner: Lives with ex wife  Employers: N/a, retired    Laboratory Data  No visits with results within 1 Month(s) from this visit.   Latest known visit with results is:   Lab Visit on 01/03/2022   Component Date Value Ref Range Status    Specimen UA 01/03/2022 Urine, Clean Catch   Final    Color, UA 01/03/2022 Straw  Yellow, Straw, Luana Final    Appearance, UA 01/03/2022 Clear  Clear Final    pH, UA 01/03/2022 5.0  5.0 - 8.0 Final    Specific Gravity, UA 01/03/2022 1.015  1.005 - 1.030 Final    Protein, UA 01/03/2022 2+ (A) Negative Final    Glucose, UA 01/03/2022 3+ (A) Negative Final    Ketones, UA 01/03/2022 Negative  Negative Final    Bilirubin (UA) 01/03/2022 Negative  Negative Final    Occult Blood UA 01/03/2022 1+ (A) Negative Final    Nitrite, UA 01/03/2022 Negative  Negative Final    Leukocytes, UA 01/03/2022 Negative  Negative Final    Microalbumin, Urine 01/03/2022 1420.0  ug/mL Final    Creatinine, Urine 01/03/2022 43.0  23.0 - 375.0 mg/dL Final    Microalb/Creat Ratio 01/03/2022 3302.3 (A) 0.0 - 30.0 ug/mg Final    RBC, UA 01/03/2022 3  0 - 4 /hpf Final    WBC, UA 01/03/2022 0  0 - 5 /hpf Final    Bacteria 01/03/2022 Occasional  None-Occ /hpf Final    Yeast, UA 01/03/2022 None  None Final    Hyaline Casts, UA 01/03/2022 0  0-1/lpf /lpf Final    Microscopic Comment 01/03/2022 SEE COMMENT   Final         Medications  Outpatient Encounter Medications as of 4/1/2022   Medication Sig Dispense Refill    amLODIPine (NORVASC) 10 MG tablet Take 1 tablet (10 mg total) by mouth once daily. 90 tablet 1    atorvastatin (LIPITOR) 20 MG tablet Take 1 tablet (20 mg total) by mouth once daily. 90 tablet 1    blood sugar diagnostic Strp To test twice daily 100 each 3    blood-glucose meter kit Use as instructed 1 each 0    buPROPion (WELLBUTRIN XL) 150 MG TB24 tablet Take 1 tablet (150 mg total)  by mouth once daily. 90 tablet 0    dapagliflozin (FARXIGA) 5 mg Tab tablet Take 1 tablet (5 mg total) by mouth once daily. 90 tablet 1    fexofenadine (ALLEGRA) 180 MG tablet Take 1 tablet (180 mg total) by mouth once daily. 30 tablet 0    lancets Misc 1 lancet by Misc.(Non-Drug; Combo Route) route 2 (two) times daily with meals. 100 each 11    lisinopriL-hydrochlorothiazide (PRINZIDE,ZESTORETIC) 20-12.5 mg per tablet Take 2 tablets by mouth once daily. 180 tablet 1    metFORMIN (GLUCOPHAGE) 1000 MG tablet Take 1 tablet (1,000 mg total) by mouth 2 (two) times daily with meals. 180 tablet 1    sildenafiL (VIAGRA) 50 MG tablet Take 1 tablet (50 mg total) by mouth daily as needed for Erectile Dysfunction. 30 tablet 0    SITagliptin (JANUVIA) 100 MG Tab Take 1 tablet (100 mg total) by mouth once daily. 90 tablet 1    triamcinolone acetonide 0.1% (KENALOG) 0.1 % ointment Apply topically 2 (two) times daily. 80 g 1    [DISCONTINUED] naproxen (NAPROSYN) 500 MG tablet Take 1 tablet (500 mg total) by mouth 2 (two) times daily with meals. (Patient not taking: Reported on 4/1/2022) 60 tablet 0     Facility-Administered Encounter Medications as of 4/1/2022   Medication Dose Route Frequency Provider Last Rate Last Admin    [DISCONTINUED] dexamethasone (ozurdex) 0.7 mg io implant  0.7 mg Intraocular 1 time in Clinic/HOD D. Mejia Mcmanus MD               Assessment - Diagnosis - Goals:     Impression:     Pt may be undergoing normal age related slowing down and symptoms of uncontrolled ADHD. Pt may have not noticed until now after intermediate because he was always busy and working. Pt has hx of HTN and DM, so will avoid stimulant use. Wellbutrin has multiple benefits that may help DM management.      1. Start Wellbutrin 150 mg PO Daily for a month and revaluate if pt can increase dose to 300 mg in next visit.  Risks/bebefits/side effects discussed.  2. F/u 1 month        ICD-10-CM ICD-9-CM   1. Disturbed  concentration  R41.840 799.51       Strengths and Liabilities: Strength: Patient accepts guidance/feedback      Treatment Plan/Recommendations:   · Medication Management: The risks and benefits of medication were discussed with the patient.      Return to Clinic: 1 month    Counseling time: 40 min  Total time: 47 min  Consulting clinician was informed of the encounter and consult note.

## 2022-04-01 ENCOUNTER — OFFICE VISIT (OUTPATIENT)
Dept: PSYCHIATRY | Facility: CLINIC | Age: 67
End: 2022-04-01
Payer: MEDICARE

## 2022-04-01 VITALS
HEART RATE: 81 BPM | OXYGEN SATURATION: 99 % | BODY MASS INDEX: 37.73 KG/M2 | DIASTOLIC BLOOD PRESSURE: 90 MMHG | WEIGHT: 278.56 LBS | SYSTOLIC BLOOD PRESSURE: 140 MMHG | HEIGHT: 72 IN

## 2022-04-01 DIAGNOSIS — R41.840 DISTURBED CONCENTRATION: ICD-10-CM

## 2022-04-01 PROCEDURE — 3077F PR MOST RECENT SYSTOLIC BLOOD PRESSURE >= 140 MM HG: ICD-10-PCS | Mod: CPTII,S$GLB,, | Performed by: PHYSICIAN ASSISTANT

## 2022-04-01 PROCEDURE — 99999 PR PBB SHADOW E&M-EST. PATIENT-LVL III: CPT | Mod: PBBFAC,,, | Performed by: PHYSICIAN ASSISTANT

## 2022-04-01 PROCEDURE — 3066F PR DOCUMENTATION OF TREATMENT FOR NEPHROPATHY: ICD-10-PCS | Mod: CPTII,S$GLB,, | Performed by: PHYSICIAN ASSISTANT

## 2022-04-01 PROCEDURE — 1101F PR PT FALLS ASSESS DOC 0-1 FALLS W/OUT INJ PAST YR: ICD-10-PCS | Mod: CPTII,S$GLB,, | Performed by: PHYSICIAN ASSISTANT

## 2022-04-01 PROCEDURE — 3080F DIAST BP >= 90 MM HG: CPT | Mod: CPTII,S$GLB,, | Performed by: PHYSICIAN ASSISTANT

## 2022-04-01 PROCEDURE — 3046F PR MOST RECENT HEMOGLOBIN A1C LEVEL > 9.0%: ICD-10-PCS | Mod: CPTII,S$GLB,, | Performed by: PHYSICIAN ASSISTANT

## 2022-04-01 PROCEDURE — 3008F BODY MASS INDEX DOCD: CPT | Mod: CPTII,S$GLB,, | Performed by: PHYSICIAN ASSISTANT

## 2022-04-01 PROCEDURE — 99999 PR PBB SHADOW E&M-EST. PATIENT-LVL III: ICD-10-PCS | Mod: PBBFAC,,, | Performed by: PHYSICIAN ASSISTANT

## 2022-04-01 PROCEDURE — 3080F PR MOST RECENT DIASTOLIC BLOOD PRESSURE >= 90 MM HG: ICD-10-PCS | Mod: CPTII,S$GLB,, | Performed by: PHYSICIAN ASSISTANT

## 2022-04-01 PROCEDURE — 1159F MED LIST DOCD IN RCRD: CPT | Mod: CPTII,S$GLB,, | Performed by: PHYSICIAN ASSISTANT

## 2022-04-01 PROCEDURE — 3062F PR POS MACROALBUMINURIA RESULT DOCUMENTED/REVIEW: ICD-10-PCS | Mod: CPTII,S$GLB,, | Performed by: PHYSICIAN ASSISTANT

## 2022-04-01 PROCEDURE — 3046F HEMOGLOBIN A1C LEVEL >9.0%: CPT | Mod: CPTII,S$GLB,, | Performed by: PHYSICIAN ASSISTANT

## 2022-04-01 PROCEDURE — 1159F PR MEDICATION LIST DOCUMENTED IN MEDICAL RECORD: ICD-10-PCS | Mod: CPTII,S$GLB,, | Performed by: PHYSICIAN ASSISTANT

## 2022-04-01 PROCEDURE — 4010F ACE/ARB THERAPY RXD/TAKEN: CPT | Mod: CPTII,S$GLB,, | Performed by: PHYSICIAN ASSISTANT

## 2022-04-01 PROCEDURE — 1160F PR REVIEW ALL MEDS BY PRESCRIBER/CLIN PHARMACIST DOCUMENTED: ICD-10-PCS | Mod: CPTII,S$GLB,, | Performed by: PHYSICIAN ASSISTANT

## 2022-04-01 PROCEDURE — 3008F PR BODY MASS INDEX (BMI) DOCUMENTED: ICD-10-PCS | Mod: CPTII,S$GLB,, | Performed by: PHYSICIAN ASSISTANT

## 2022-04-01 PROCEDURE — 1101F PT FALLS ASSESS-DOCD LE1/YR: CPT | Mod: CPTII,S$GLB,, | Performed by: PHYSICIAN ASSISTANT

## 2022-04-01 PROCEDURE — 4010F PR ACE/ARB THEARPY RXD/TAKEN: ICD-10-PCS | Mod: CPTII,S$GLB,, | Performed by: PHYSICIAN ASSISTANT

## 2022-04-01 PROCEDURE — 3077F SYST BP >= 140 MM HG: CPT | Mod: CPTII,S$GLB,, | Performed by: PHYSICIAN ASSISTANT

## 2022-04-01 PROCEDURE — 1126F PR PAIN SEVERITY QUANTIFIED, NO PAIN PRESENT: ICD-10-PCS | Mod: CPTII,S$GLB,, | Performed by: PHYSICIAN ASSISTANT

## 2022-04-01 PROCEDURE — 3288F PR FALLS RISK ASSESSMENT DOCUMENTED: ICD-10-PCS | Mod: CPTII,S$GLB,, | Performed by: PHYSICIAN ASSISTANT

## 2022-04-01 PROCEDURE — 1160F RVW MEDS BY RX/DR IN RCRD: CPT | Mod: CPTII,S$GLB,, | Performed by: PHYSICIAN ASSISTANT

## 2022-04-01 PROCEDURE — 3288F FALL RISK ASSESSMENT DOCD: CPT | Mod: CPTII,S$GLB,, | Performed by: PHYSICIAN ASSISTANT

## 2022-04-01 PROCEDURE — 3066F NEPHROPATHY DOC TX: CPT | Mod: CPTII,S$GLB,, | Performed by: PHYSICIAN ASSISTANT

## 2022-04-01 PROCEDURE — 1126F AMNT PAIN NOTED NONE PRSNT: CPT | Mod: CPTII,S$GLB,, | Performed by: PHYSICIAN ASSISTANT

## 2022-04-01 PROCEDURE — 3062F POS MACROALBUMINURIA REV: CPT | Mod: CPTII,S$GLB,, | Performed by: PHYSICIAN ASSISTANT

## 2022-04-01 PROCEDURE — 99205 PR OFFICE/OUTPT VISIT, NEW, LEVL V, 60-74 MIN: ICD-10-PCS | Mod: S$GLB,,, | Performed by: PHYSICIAN ASSISTANT

## 2022-04-01 PROCEDURE — 99205 OFFICE O/P NEW HI 60 MIN: CPT | Mod: S$GLB,,, | Performed by: PHYSICIAN ASSISTANT

## 2022-04-01 RX ORDER — BUPROPION HYDROCHLORIDE 150 MG/1
150 TABLET ORAL DAILY
Qty: 90 TABLET | Refills: 0 | Status: SHIPPED | OUTPATIENT
Start: 2022-04-01 | End: 2022-05-09 | Stop reason: SDUPTHER

## 2022-04-06 ENCOUNTER — TELEPHONE (OUTPATIENT)
Dept: FAMILY MEDICINE | Facility: CLINIC | Age: 67
End: 2022-04-06
Payer: MEDICARE

## 2022-04-06 DIAGNOSIS — I10 ESSENTIAL HYPERTENSION: Primary | ICD-10-CM

## 2022-05-09 ENCOUNTER — OFFICE VISIT (OUTPATIENT)
Dept: PSYCHIATRY | Facility: CLINIC | Age: 67
End: 2022-05-09
Payer: MEDICARE

## 2022-05-09 VITALS
DIASTOLIC BLOOD PRESSURE: 89 MMHG | OXYGEN SATURATION: 98 % | HEIGHT: 72 IN | BODY MASS INDEX: 37.61 KG/M2 | SYSTOLIC BLOOD PRESSURE: 155 MMHG | WEIGHT: 277.69 LBS | HEART RATE: 95 BPM

## 2022-05-09 DIAGNOSIS — R41.840 DISTURBED CONCENTRATION: Primary | ICD-10-CM

## 2022-05-09 PROCEDURE — 3008F PR BODY MASS INDEX (BMI) DOCUMENTED: ICD-10-PCS | Mod: CPTII,S$GLB,, | Performed by: PHYSICIAN ASSISTANT

## 2022-05-09 PROCEDURE — 3077F PR MOST RECENT SYSTOLIC BLOOD PRESSURE >= 140 MM HG: ICD-10-PCS | Mod: CPTII,S$GLB,, | Performed by: PHYSICIAN ASSISTANT

## 2022-05-09 PROCEDURE — 3079F PR MOST RECENT DIASTOLIC BLOOD PRESSURE 80-89 MM HG: ICD-10-PCS | Mod: CPTII,S$GLB,, | Performed by: PHYSICIAN ASSISTANT

## 2022-05-09 PROCEDURE — 99999 PR PBB SHADOW E&M-EST. PATIENT-LVL III: CPT | Mod: PBBFAC,,, | Performed by: PHYSICIAN ASSISTANT

## 2022-05-09 PROCEDURE — 3079F DIAST BP 80-89 MM HG: CPT | Mod: CPTII,S$GLB,, | Performed by: PHYSICIAN ASSISTANT

## 2022-05-09 PROCEDURE — 3046F PR MOST RECENT HEMOGLOBIN A1C LEVEL > 9.0%: ICD-10-PCS | Mod: CPTII,S$GLB,, | Performed by: PHYSICIAN ASSISTANT

## 2022-05-09 PROCEDURE — 99214 OFFICE O/P EST MOD 30 MIN: CPT | Mod: S$GLB,,, | Performed by: PHYSICIAN ASSISTANT

## 2022-05-09 PROCEDURE — 1160F RVW MEDS BY RX/DR IN RCRD: CPT | Mod: CPTII,S$GLB,, | Performed by: PHYSICIAN ASSISTANT

## 2022-05-09 PROCEDURE — 1159F PR MEDICATION LIST DOCUMENTED IN MEDICAL RECORD: ICD-10-PCS | Mod: CPTII,S$GLB,, | Performed by: PHYSICIAN ASSISTANT

## 2022-05-09 PROCEDURE — 1126F AMNT PAIN NOTED NONE PRSNT: CPT | Mod: CPTII,S$GLB,, | Performed by: PHYSICIAN ASSISTANT

## 2022-05-09 PROCEDURE — 3046F HEMOGLOBIN A1C LEVEL >9.0%: CPT | Mod: CPTII,S$GLB,, | Performed by: PHYSICIAN ASSISTANT

## 2022-05-09 PROCEDURE — 1160F PR REVIEW ALL MEDS BY PRESCRIBER/CLIN PHARMACIST DOCUMENTED: ICD-10-PCS | Mod: CPTII,S$GLB,, | Performed by: PHYSICIAN ASSISTANT

## 2022-05-09 PROCEDURE — 3288F PR FALLS RISK ASSESSMENT DOCUMENTED: ICD-10-PCS | Mod: CPTII,S$GLB,, | Performed by: PHYSICIAN ASSISTANT

## 2022-05-09 PROCEDURE — 3066F NEPHROPATHY DOC TX: CPT | Mod: CPTII,S$GLB,, | Performed by: PHYSICIAN ASSISTANT

## 2022-05-09 PROCEDURE — 3066F PR DOCUMENTATION OF TREATMENT FOR NEPHROPATHY: ICD-10-PCS | Mod: CPTII,S$GLB,, | Performed by: PHYSICIAN ASSISTANT

## 2022-05-09 PROCEDURE — 99214 PR OFFICE/OUTPT VISIT, EST, LEVL IV, 30-39 MIN: ICD-10-PCS | Mod: S$GLB,,, | Performed by: PHYSICIAN ASSISTANT

## 2022-05-09 PROCEDURE — 3288F FALL RISK ASSESSMENT DOCD: CPT | Mod: CPTII,S$GLB,, | Performed by: PHYSICIAN ASSISTANT

## 2022-05-09 PROCEDURE — 3062F PR POS MACROALBUMINURIA RESULT DOCUMENTED/REVIEW: ICD-10-PCS | Mod: CPTII,S$GLB,, | Performed by: PHYSICIAN ASSISTANT

## 2022-05-09 PROCEDURE — 1101F PR PT FALLS ASSESS DOC 0-1 FALLS W/OUT INJ PAST YR: ICD-10-PCS | Mod: CPTII,S$GLB,, | Performed by: PHYSICIAN ASSISTANT

## 2022-05-09 PROCEDURE — 1101F PT FALLS ASSESS-DOCD LE1/YR: CPT | Mod: CPTII,S$GLB,, | Performed by: PHYSICIAN ASSISTANT

## 2022-05-09 PROCEDURE — 99999 PR PBB SHADOW E&M-EST. PATIENT-LVL III: ICD-10-PCS | Mod: PBBFAC,,, | Performed by: PHYSICIAN ASSISTANT

## 2022-05-09 PROCEDURE — 1159F MED LIST DOCD IN RCRD: CPT | Mod: CPTII,S$GLB,, | Performed by: PHYSICIAN ASSISTANT

## 2022-05-09 PROCEDURE — 3008F BODY MASS INDEX DOCD: CPT | Mod: CPTII,S$GLB,, | Performed by: PHYSICIAN ASSISTANT

## 2022-05-09 PROCEDURE — 3077F SYST BP >= 140 MM HG: CPT | Mod: CPTII,S$GLB,, | Performed by: PHYSICIAN ASSISTANT

## 2022-05-09 PROCEDURE — 4010F PR ACE/ARB THEARPY RXD/TAKEN: ICD-10-PCS | Mod: CPTII,S$GLB,, | Performed by: PHYSICIAN ASSISTANT

## 2022-05-09 PROCEDURE — 4010F ACE/ARB THERAPY RXD/TAKEN: CPT | Mod: CPTII,S$GLB,, | Performed by: PHYSICIAN ASSISTANT

## 2022-05-09 PROCEDURE — 1126F PR PAIN SEVERITY QUANTIFIED, NO PAIN PRESENT: ICD-10-PCS | Mod: CPTII,S$GLB,, | Performed by: PHYSICIAN ASSISTANT

## 2022-05-09 PROCEDURE — 3062F POS MACROALBUMINURIA REV: CPT | Mod: CPTII,S$GLB,, | Performed by: PHYSICIAN ASSISTANT

## 2022-05-09 RX ORDER — BUPROPION HYDROCHLORIDE 300 MG/1
300 TABLET ORAL DAILY
Qty: 90 TABLET | Refills: 0 | Status: SHIPPED | OUTPATIENT
Start: 2022-05-09 | End: 2022-09-22

## 2022-05-09 NOTE — PROGRESS NOTES
Outpatient Psychiatry Follow-Up Visit ([facility])    5/9/2022    Clinical Status of Patient:  Outpatient (Ambulatory)    Chief Complaint:  Handy Adams is a 66 y.o. male who presents today for follow-up of attention problems.  Met with patient.      Interval History and Content of Current Session:  Interim Events/Subjective Report/Content of Current Session: Pt reports no change since starting Wellbutrin  mg daily.  No side effects and no benefits either.      Review of Systems   · PSYCHIATRIC: Pertinant items are noted in the narrative.  · CONSTITUTIONAL: No weight gain or loss.   · MUSCULOSKELETAL: No pain or stiffness of the joints.  · NEUROLOGIC: No weakness, sensory changes, seizures, confusion, memory loss, tremor or other abnormal movements.    Past Medical, Family and Social History: The patient's past medical, family and social history have been reviewed and updated as appropriate within the electronic medical record - see encounter notes.    Compliance: yes    Side effects: None    Risk Parameters:  Patient reports no suicidal ideation  Patient reports no homicidal ideation  Patient reports no self-injurious behavior  Patient reports no violent behavior    Exam (detailed: at least 9 elements; comprehensive: all 15 elements)   Constitutional  Vitals:  Most recent vital signs, dated less than 90 days prior to this appointment, were reviewed.   Vitals:    05/09/22 0816   BP: (!) 155/89   Pulse: 95   SpO2: 98%   Weight: 126 kg (277 lb 10.7 oz)   Height: 6' (1.829 m)        General:  unremarkable, age appropriate     Musculoskeletal  Muscle Strength/Tone:  no tremor, no tic   Gait & Station:  non-ataxic     Psychiatric  Speech:  no latency; no press   Mood & Affect:  steady, euthymic  congruent and appropriate   Thought Process:  normal and logical   Associations:  intact   Thought Content:  normal, no suicidality, no homicidality, delusions, or paranoia   Insight:  has awareness of illness   Judgement:  behavior is adequate to circumstances   Orientation:  grossly intact   Memory: intact for content of interview   Language: grossly intact   Attention Span & Concentration:  able to focus   Fund of Knowledge:  intact and appropriate to age and level of education     Assessment and Diagnosis   Status/Progress: Based on the examination today, the patient's problem(s) is/are inadequately controlled.  New problems have not been presented today.   Lack of compliance are not complicating management of the primary condition.  There are no active rule-out diagnoses for this patient at this time.     General Impression: Focus and concentration issues persist after trial of Wellbutrin  mg daily.  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  No side effects of Wellbutrin.      ICD-10-CM ICD-9-CM   1. Disturbed concentration  R41.840 799.51     1. Increasing Wellbutrin XL to 300 mg daily, can increase further to 450 mg daily after 1-2 weeks if tolerated and desired.  2. F/u 4-6 weeks    Intervention/Counseling/Treatment Plan   · Medication Management: The risks and benefits of medication were discussed with the patient.      Return to Clinic: 1 month

## 2022-05-30 ENCOUNTER — PATIENT MESSAGE (OUTPATIENT)
Dept: ADMINISTRATIVE | Facility: HOSPITAL | Age: 67
End: 2022-05-30
Payer: MEDICARE

## 2022-06-10 ENCOUNTER — OFFICE VISIT (OUTPATIENT)
Dept: PSYCHIATRY | Facility: CLINIC | Age: 67
End: 2022-06-10
Payer: MEDICARE

## 2022-06-10 VITALS
BODY MASS INDEX: 38.19 KG/M2 | HEART RATE: 91 BPM | WEIGHT: 282 LBS | OXYGEN SATURATION: 98 % | HEIGHT: 72 IN | SYSTOLIC BLOOD PRESSURE: 173 MMHG | DIASTOLIC BLOOD PRESSURE: 91 MMHG

## 2022-06-10 DIAGNOSIS — R41.840 DISTURBED CONCENTRATION: Primary | ICD-10-CM

## 2022-06-10 PROCEDURE — 99999 PR PBB SHADOW E&M-EST. PATIENT-LVL III: ICD-10-PCS | Mod: PBBFAC,,, | Performed by: PHYSICIAN ASSISTANT

## 2022-06-10 PROCEDURE — 3046F HEMOGLOBIN A1C LEVEL >9.0%: CPT | Mod: CPTII,S$GLB,, | Performed by: PHYSICIAN ASSISTANT

## 2022-06-10 PROCEDURE — 90836 PR PSYCHOTHERAPY W/PATIENT W/E&M, 45 MIN (ADD ON): ICD-10-PCS | Mod: S$GLB,,, | Performed by: PHYSICIAN ASSISTANT

## 2022-06-10 PROCEDURE — 3080F DIAST BP >= 90 MM HG: CPT | Mod: CPTII,S$GLB,, | Performed by: PHYSICIAN ASSISTANT

## 2022-06-10 PROCEDURE — 99214 OFFICE O/P EST MOD 30 MIN: CPT | Mod: S$GLB,,, | Performed by: PHYSICIAN ASSISTANT

## 2022-06-10 PROCEDURE — 3077F SYST BP >= 140 MM HG: CPT | Mod: CPTII,S$GLB,, | Performed by: PHYSICIAN ASSISTANT

## 2022-06-10 PROCEDURE — 3046F PR MOST RECENT HEMOGLOBIN A1C LEVEL > 9.0%: ICD-10-PCS | Mod: CPTII,S$GLB,, | Performed by: PHYSICIAN ASSISTANT

## 2022-06-10 PROCEDURE — 1160F RVW MEDS BY RX/DR IN RCRD: CPT | Mod: CPTII,S$GLB,, | Performed by: PHYSICIAN ASSISTANT

## 2022-06-10 PROCEDURE — 3008F PR BODY MASS INDEX (BMI) DOCUMENTED: ICD-10-PCS | Mod: CPTII,S$GLB,, | Performed by: PHYSICIAN ASSISTANT

## 2022-06-10 PROCEDURE — 1159F MED LIST DOCD IN RCRD: CPT | Mod: CPTII,S$GLB,, | Performed by: PHYSICIAN ASSISTANT

## 2022-06-10 PROCEDURE — 3008F BODY MASS INDEX DOCD: CPT | Mod: CPTII,S$GLB,, | Performed by: PHYSICIAN ASSISTANT

## 2022-06-10 PROCEDURE — 3066F PR DOCUMENTATION OF TREATMENT FOR NEPHROPATHY: ICD-10-PCS | Mod: CPTII,S$GLB,, | Performed by: PHYSICIAN ASSISTANT

## 2022-06-10 PROCEDURE — 3062F POS MACROALBUMINURIA REV: CPT | Mod: CPTII,S$GLB,, | Performed by: PHYSICIAN ASSISTANT

## 2022-06-10 PROCEDURE — 1160F PR REVIEW ALL MEDS BY PRESCRIBER/CLIN PHARMACIST DOCUMENTED: ICD-10-PCS | Mod: CPTII,S$GLB,, | Performed by: PHYSICIAN ASSISTANT

## 2022-06-10 PROCEDURE — 1159F PR MEDICATION LIST DOCUMENTED IN MEDICAL RECORD: ICD-10-PCS | Mod: CPTII,S$GLB,, | Performed by: PHYSICIAN ASSISTANT

## 2022-06-10 PROCEDURE — 99999 PR PBB SHADOW E&M-EST. PATIENT-LVL III: CPT | Mod: PBBFAC,,, | Performed by: PHYSICIAN ASSISTANT

## 2022-06-10 PROCEDURE — 3066F NEPHROPATHY DOC TX: CPT | Mod: CPTII,S$GLB,, | Performed by: PHYSICIAN ASSISTANT

## 2022-06-10 PROCEDURE — 3080F PR MOST RECENT DIASTOLIC BLOOD PRESSURE >= 90 MM HG: ICD-10-PCS | Mod: CPTII,S$GLB,, | Performed by: PHYSICIAN ASSISTANT

## 2022-06-10 PROCEDURE — 99214 PR OFFICE/OUTPT VISIT, EST, LEVL IV, 30-39 MIN: ICD-10-PCS | Mod: S$GLB,,, | Performed by: PHYSICIAN ASSISTANT

## 2022-06-10 PROCEDURE — 3077F PR MOST RECENT SYSTOLIC BLOOD PRESSURE >= 140 MM HG: ICD-10-PCS | Mod: CPTII,S$GLB,, | Performed by: PHYSICIAN ASSISTANT

## 2022-06-10 PROCEDURE — 90836 PSYTX W PT W E/M 45 MIN: CPT | Mod: S$GLB,,, | Performed by: PHYSICIAN ASSISTANT

## 2022-06-10 PROCEDURE — 3062F PR POS MACROALBUMINURIA RESULT DOCUMENTED/REVIEW: ICD-10-PCS | Mod: CPTII,S$GLB,, | Performed by: PHYSICIAN ASSISTANT

## 2022-06-10 PROCEDURE — 4010F PR ACE/ARB THEARPY RXD/TAKEN: ICD-10-PCS | Mod: CPTII,S$GLB,, | Performed by: PHYSICIAN ASSISTANT

## 2022-06-10 PROCEDURE — 4010F ACE/ARB THERAPY RXD/TAKEN: CPT | Mod: CPTII,S$GLB,, | Performed by: PHYSICIAN ASSISTANT

## 2022-06-10 NOTE — PROGRESS NOTES
Outpatient Psychiatry Follow-Up Visit (PANFILO)    6/10/2022    Clinical Status of Patient:  Outpatient (Ambulatory)    Chief Complaint:  Handy Adams is a 66 y.o. male who presents today for follow-up of attention problems.  Met with patient.      Interval History and Content of Current Session:  Interim Events/Subjective Report/Content of Current Session: Pt increased Wellbutrin XL to 450 mg without noticing much change.  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  Admits that his expectations may have been too high for this medication.  Believes a lot of his issues are related to psychological factors.  He is very verbose and circumstantial with his thought process.    Psychotherapy:  · Target symptoms: distractability, lack of focus  · Why chosen therapy is appropriate versus another modality: relevant to diagnosis, evidence based practice  · Outcome monitoring methods: self-report, observation  · Therapeutic intervention type: behavior modifying psychotherapy  · Topics discussed/themes: building skills sets for symptom management  · The patient's response to the intervention is accepting. The patient's progress toward treatment goals is limited.   · Duration of intervention: 40 minutes.        Review of Systems   · PSYCHIATRIC: Pertinant items are noted in the narrative.  · CONSTITUTIONAL: No weight gain or loss.   · MUSCULOSKELETAL: No pain or stiffness of the joints.  · NEUROLOGIC: No weakness, sensory changes, seizures, confusion, memory loss, tremor or other abnormal movements.    Past Medical, Family and Social History: The patient's past medical, family and social history have been reviewed and updated as appropriate within the electronic medical record - see encounter notes.    Compliance: yes    Side effects: None    Risk Parameters:  Patient reports no suicidal ideation  Patient reports no homicidal ideation  Patient reports no self-injurious  behavior  Patient reports no violent behavior    Exam (detailed: at least 9 elements; comprehensive: all 15 elements)   Constitutional  Vitals:  Most recent vital signs, dated less than 90 days prior to this appointment, were reviewed.   Vitals:    06/10/22 1039   BP: (!) 173/91   Pulse: 91   SpO2: 98%   Weight: 127.9 kg (282 lb)   Height: 6' (1.829 m)        General:  unremarkable, age appropriate     Musculoskeletal  Muscle Strength/Tone:  no tremor, no tic   Gait & Station:  non-ataxic     Psychiatric  Speech:  no latency; no press   Mood & Affect:  steady, euthymic  congruent and appropriate   Thought Process:  normal and logical   Associations:  circumstantial   Thought Content:  normal, no suicidality, no homicidality, delusions, or paranoia   Insight:  has awareness of illness   Judgement: behavior is adequate to circumstances   Orientation:  grossly intact   Memory: intact for content of interview   Language: grossly intact   Attention Span & Concentration:  able to focus   Fund of Knowledge:  intact and appropriate to age and level of education     Assessment and Diagnosis   Status/Progress: Based on the examination today, the patient's problem(s) is/are inadequately controlled.  New problems have not been presented today.   Lack of compliance are not complicating management of the primary condition.  There are no active rule-out diagnoses for this patient at this time.     General Impression: Focus and concentration issues possibly a little better with Wellbutrin  mg, has been on this dose for 5 days.  Would benefot from psychotherapy to help with behavioral and cognitive aspects of distractibility and motivation.      ICD-10-CM ICD-9-CM   1. Disturbed concentration  R41.840 799.51     1. Continue Wellbutrin  mg daily.  2. Scheduled for psychotherapy  3. F/u 4-6 weeks    Intervention/Counseling/Treatment Plan   · Medication Management: The risks and benefits of medication were discussed with  the patient.      Return to Clinic: 6 weeks

## 2022-06-22 RX ORDER — BLOOD GLUCOSE CONTROL HIGH,LOW
EACH MISCELLANEOUS
Qty: 1 EACH | Refills: 0 | OUTPATIENT
Start: 2022-06-22

## 2022-06-22 RX ORDER — LANCETS
EACH MISCELLANEOUS
Qty: 200 EACH | Refills: 0 | OUTPATIENT
Start: 2022-06-22

## 2022-06-22 RX ORDER — DEXTROSE 4 G
TABLET,CHEWABLE ORAL
Refills: 0 | OUTPATIENT
Start: 2022-06-22

## 2022-06-22 RX ORDER — ISOPROPYL ALCOHOL 70 ML/100ML
SWAB TOPICAL
Refills: 0 | OUTPATIENT
Start: 2022-06-22

## 2022-06-22 NOTE — TELEPHONE ENCOUNTER
No new care gaps identified.  [facility] Embedded Care Gaps. Reference number: [id number]. 6/22/2022   4:12:57 PM CDT

## 2022-06-22 NOTE — TELEPHONE ENCOUNTER
No new care gaps identified.  [facility] Embedded Care Gaps. Reference number: [id number]. 6/22/2022   4:17:14 PM CDT

## 2022-06-22 NOTE — TELEPHONE ENCOUNTER
No new care gaps identified.  [facility] Embedded Care Gaps. Reference number: [id number]. 6/22/2022   4:10:50 PM CDT

## 2022-06-22 NOTE — TELEPHONE ENCOUNTER
No new care gaps identified.  [facility] Embedded Care Gaps. Reference number: [id number]. 6/22/2022   4:09:42 PM CDT

## 2022-06-22 NOTE — TELEPHONE ENCOUNTER
Refill Decision Note   Handy Adams  is requesting a refill authorization.  Brief Assessment and Rationale for Refill:  Quick Discontinue     Medication Therapy Plan:    Pharmacy is requesting new scripts for the following medications without required information, (sig/ frequency/qty/etc)      Medication Reconciliation Completed: No     Comments: Pharmacies have been requesting medications for patients without required information, (sig, frequency, qty, etc.). In addition, requests are sent for medication(s) pt. are currently not taking, and medications patients have never taken.    We have spoken to the pharmacies about these request types and advised their teams previously that we are unable to assess these New Script requests and require all details for these requests. This is a known issue and has been reported.     Note composed:5:55 PM 06/22/2022

## 2022-06-22 NOTE — TELEPHONE ENCOUNTER
No new care gaps identified.  [facility] Embedded Care Gaps. Reference number: [id number]. 6/22/2022   4:08:39 PM CDT

## 2022-06-22 NOTE — TELEPHONE ENCOUNTER
Refill Decision Note   Handy Adams  is requesting a refill authorization.  Brief Assessment and Rationale for Refill:  Quick Discontinue     Medication Therapy Plan:    Pharmacy is requesting new scripts for the following medications without required information, (sig/ frequency/qty/etc)      Medication Reconciliation Completed: No     Comments: Pharmacies have been requesting medications for patients without required information, (sig, frequency, qty, etc.). In addition, requests are sent for medication(s) pt. are currently not taking, and medications patients have never taken.    We have spoken to the pharmacies about these request types and advised their teams previously that we are unable to assess these New Script requests and require all details for these requests. This is a known issue and has been reported.     Note composed:5:54 PM 06/22/2022

## 2022-07-22 ENCOUNTER — OFFICE VISIT (OUTPATIENT)
Dept: PSYCHIATRY | Facility: CLINIC | Age: 67
End: 2022-07-22
Payer: MEDICARE

## 2022-07-22 VITALS
DIASTOLIC BLOOD PRESSURE: 90 MMHG | HEIGHT: 72 IN | WEIGHT: 277.31 LBS | SYSTOLIC BLOOD PRESSURE: 156 MMHG | HEART RATE: 92 BPM | OXYGEN SATURATION: 91 % | BODY MASS INDEX: 37.56 KG/M2

## 2022-07-22 DIAGNOSIS — F03.90 DEMENTIA WITHOUT BEHAVIORAL DISTURBANCE, UNSPECIFIED DEMENTIA TYPE: ICD-10-CM

## 2022-07-22 DIAGNOSIS — R41.840 DISTURBED CONCENTRATION: Primary | ICD-10-CM

## 2022-07-22 PROCEDURE — 3066F PR DOCUMENTATION OF TREATMENT FOR NEPHROPATHY: ICD-10-PCS | Mod: CPTII,S$GLB,, | Performed by: PHYSICIAN ASSISTANT

## 2022-07-22 PROCEDURE — 90833 PR PSYCHOTHERAPY W/PATIENT W/E&M, 30 MIN (ADD ON): ICD-10-PCS | Mod: S$GLB,,, | Performed by: PHYSICIAN ASSISTANT

## 2022-07-22 PROCEDURE — 4010F PR ACE/ARB THEARPY RXD/TAKEN: ICD-10-PCS | Mod: CPTII,S$GLB,, | Performed by: PHYSICIAN ASSISTANT

## 2022-07-22 PROCEDURE — 1160F PR REVIEW ALL MEDS BY PRESCRIBER/CLIN PHARMACIST DOCUMENTED: ICD-10-PCS | Mod: CPTII,S$GLB,, | Performed by: PHYSICIAN ASSISTANT

## 2022-07-22 PROCEDURE — 3062F PR POS MACROALBUMINURIA RESULT DOCUMENTED/REVIEW: ICD-10-PCS | Mod: CPTII,S$GLB,, | Performed by: PHYSICIAN ASSISTANT

## 2022-07-22 PROCEDURE — 1101F PT FALLS ASSESS-DOCD LE1/YR: CPT | Mod: CPTII,S$GLB,, | Performed by: PHYSICIAN ASSISTANT

## 2022-07-22 PROCEDURE — 3062F POS MACROALBUMINURIA REV: CPT | Mod: CPTII,S$GLB,, | Performed by: PHYSICIAN ASSISTANT

## 2022-07-22 PROCEDURE — 99999 PR PBB SHADOW E&M-EST. PATIENT-LVL IV: ICD-10-PCS | Mod: PBBFAC,,, | Performed by: PHYSICIAN ASSISTANT

## 2022-07-22 PROCEDURE — 1159F PR MEDICATION LIST DOCUMENTED IN MEDICAL RECORD: ICD-10-PCS | Mod: CPTII,S$GLB,, | Performed by: PHYSICIAN ASSISTANT

## 2022-07-22 PROCEDURE — 3288F FALL RISK ASSESSMENT DOCD: CPT | Mod: CPTII,S$GLB,, | Performed by: PHYSICIAN ASSISTANT

## 2022-07-22 PROCEDURE — 3008F BODY MASS INDEX DOCD: CPT | Mod: CPTII,S$GLB,, | Performed by: PHYSICIAN ASSISTANT

## 2022-07-22 PROCEDURE — 3066F NEPHROPATHY DOC TX: CPT | Mod: CPTII,S$GLB,, | Performed by: PHYSICIAN ASSISTANT

## 2022-07-22 PROCEDURE — 4010F ACE/ARB THERAPY RXD/TAKEN: CPT | Mod: CPTII,S$GLB,, | Performed by: PHYSICIAN ASSISTANT

## 2022-07-22 PROCEDURE — 3046F PR MOST RECENT HEMOGLOBIN A1C LEVEL > 9.0%: ICD-10-PCS | Mod: CPTII,S$GLB,, | Performed by: PHYSICIAN ASSISTANT

## 2022-07-22 PROCEDURE — 1126F PR PAIN SEVERITY QUANTIFIED, NO PAIN PRESENT: ICD-10-PCS | Mod: CPTII,S$GLB,, | Performed by: PHYSICIAN ASSISTANT

## 2022-07-22 PROCEDURE — 1159F MED LIST DOCD IN RCRD: CPT | Mod: CPTII,S$GLB,, | Performed by: PHYSICIAN ASSISTANT

## 2022-07-22 PROCEDURE — 3008F PR BODY MASS INDEX (BMI) DOCUMENTED: ICD-10-PCS | Mod: CPTII,S$GLB,, | Performed by: PHYSICIAN ASSISTANT

## 2022-07-22 PROCEDURE — 1101F PR PT FALLS ASSESS DOC 0-1 FALLS W/OUT INJ PAST YR: ICD-10-PCS | Mod: CPTII,S$GLB,, | Performed by: PHYSICIAN ASSISTANT

## 2022-07-22 PROCEDURE — 3080F PR MOST RECENT DIASTOLIC BLOOD PRESSURE >= 90 MM HG: ICD-10-PCS | Mod: CPTII,S$GLB,, | Performed by: PHYSICIAN ASSISTANT

## 2022-07-22 PROCEDURE — 3077F PR MOST RECENT SYSTOLIC BLOOD PRESSURE >= 140 MM HG: ICD-10-PCS | Mod: CPTII,S$GLB,, | Performed by: PHYSICIAN ASSISTANT

## 2022-07-22 PROCEDURE — 99214 PR OFFICE/OUTPT VISIT, EST, LEVL IV, 30-39 MIN: ICD-10-PCS | Mod: S$GLB,,, | Performed by: PHYSICIAN ASSISTANT

## 2022-07-22 PROCEDURE — 1126F AMNT PAIN NOTED NONE PRSNT: CPT | Mod: CPTII,S$GLB,, | Performed by: PHYSICIAN ASSISTANT

## 2022-07-22 PROCEDURE — 3046F HEMOGLOBIN A1C LEVEL >9.0%: CPT | Mod: CPTII,S$GLB,, | Performed by: PHYSICIAN ASSISTANT

## 2022-07-22 PROCEDURE — 99999 PR PBB SHADOW E&M-EST. PATIENT-LVL IV: CPT | Mod: PBBFAC,,, | Performed by: PHYSICIAN ASSISTANT

## 2022-07-22 PROCEDURE — 3288F PR FALLS RISK ASSESSMENT DOCUMENTED: ICD-10-PCS | Mod: CPTII,S$GLB,, | Performed by: PHYSICIAN ASSISTANT

## 2022-07-22 PROCEDURE — 99214 OFFICE O/P EST MOD 30 MIN: CPT | Mod: S$GLB,,, | Performed by: PHYSICIAN ASSISTANT

## 2022-07-22 PROCEDURE — 3080F DIAST BP >= 90 MM HG: CPT | Mod: CPTII,S$GLB,, | Performed by: PHYSICIAN ASSISTANT

## 2022-07-22 PROCEDURE — 3077F SYST BP >= 140 MM HG: CPT | Mod: CPTII,S$GLB,, | Performed by: PHYSICIAN ASSISTANT

## 2022-07-22 PROCEDURE — 1160F RVW MEDS BY RX/DR IN RCRD: CPT | Mod: CPTII,S$GLB,, | Performed by: PHYSICIAN ASSISTANT

## 2022-07-22 PROCEDURE — 90833 PSYTX W PT W E/M 30 MIN: CPT | Mod: S$GLB,,, | Performed by: PHYSICIAN ASSISTANT

## 2022-07-22 NOTE — PROGRESS NOTES
Outpatient Psychiatry Follow-Up Visit (PA-JORGITO)  Patient agrees to being observed by student via secure HIPAA compliant [facility] account.    7/22/2022    Clinical Status of Patient:  Outpatient (Ambulatory)    Chief Complaint:  Handy Adams is a 66 y.o. male who presents today for follow-up of attention problems.  Met with patient.      Interval History and Content of Current Session:  Interim Events/Subjective Report/Content of Current Session:   Pt returns for follow up of attention difficulty. He stopped taking his Wellbutrin XL because he was feeling uncomfortable, more apprehensive, leg weakness, uncoordinated and stumbling. He states these symptoms started after he increased it from 350 to 450 mg. He doesn't notice much difference or improvement since stopping this medication. He is relieved about not taking it anymore. He is neither interested or averse to trying a new medication at this point. He would like to know if there is some neurological causes for his symptoms; he is concerned about underlying dementia.     Today he is having some word finding difficulty, delayed responses to questions, and a round about way of speaking.    Psychotherapy:  · Target symptoms: distractability, lack of focus  · Why chosen therapy is appropriate versus another modality: relevant to diagnosis, evidence based practice  · Outcome monitoring methods: self-report, observation  · Therapeutic intervention type: behavior modifying psychotherapy  · Topics discussed/themes: building skills sets for symptom management  · The patient's response to the intervention is accepting. The patient's progress toward treatment goals is limited.   · Duration of intervention: 17 minutes.      Review of Systems   · PSYCHIATRIC: Pertinant items are noted in the narrative.  · CONSTITUTIONAL: No weight gain or loss.   · MUSCULOSKELETAL: No pain or stiffness of the joints.  · NEUROLOGIC: No sensory changes, seizures, confusion, memory loss, tremor  or other abnormal movements. Positive for limb weakness and poor balance.    Past Medical, Family and Social History: The patient's past medical, family and social history have been reviewed and updated as appropriate within the electronic medical record - see encounter notes.    Compliance: no    Side effects: None    Risk Parameters:  Patient reports no suicidal ideation  Patient reports no homicidal ideation  Patient reports no self-injurious behavior  Patient reports no violent behavior    Exam (detailed: at least 9 elements; comprehensive: all 15 elements)   Constitutional  Vitals:  Most recent vital signs, dated less than 90 days prior to this appointment, were reviewed.   Vitals:    07/22/22 1014   BP: (!) 156/90   Pulse: 92   SpO2: (!) 91%   Weight: 125.8 kg (277 lb 5.4 oz)   Height: 6' (1.829 m)        General:  unremarkable, age appropriate     Musculoskeletal  Muscle Strength/Tone:  no tremor, no tic   Gait & Station:  non-ataxic     Psychiatric  Speech:  no latency; no press   Mood & Affect:  steady, euthymic  congruent and appropriate   Thought Process:  normal and logical   Associations:  intact   Thought Content:  normal, no suicidality, no homicidality, delusions, or paranoia   Insight:  has awareness of illness   Judgement: behavior is adequate to circumstances   Orientation:  grossly intact   Memory: intact for content of interview   Language: grossly intact   Attention Span & Concentration:  able to focus   Fund of Knowledge:  intact and appropriate to age and level of education     Assessment and Diagnosis   Status/Progress: Based on the examination today, the patient's problem(s) is/are inadequately controlled.  New problems have been presented today.   Diagnostic uncertainty and Lack of compliance are complicating management of the primary condition.  The working differential for this patient includes lack of compliance vs dementia .     General Impression: Focus and concentration issues  possibly a little better with Wellbutrin  mg, has been on this dose for 5 days.  Will work up for dementia/cognitive issues.  Referring to neuropsychology for testing, as well as brain imaging and some labs.  Will delay scheduling for therapy until after diagnostics are complete.      ICD-10-CM ICD-9-CM   1. Disturbed concentration  R41.840 799.51     1. Discontinue Wellbutrin XL.  2. Ordering labs CBC, RPR, HIV, thyroid studies, Vit B12, vit D, MRI Brain  3.Postpone psychotherapy until after neuro evaluation  3. F/u 2months    Intervention/Counseling/Treatment Plan   · Labs, Diagnostic Studies: order CBC, RPR, HIV, TSH, Vit B12, Vit D, MRI Brain      Return to Clinic: 2 months

## 2022-08-11 ENCOUNTER — HOSPITAL ENCOUNTER (OUTPATIENT)
Dept: RADIOLOGY | Facility: HOSPITAL | Age: 67
Discharge: HOME | End: 2022-08-11
Attending: PHYSICIAN ASSISTANT
Payer: MEDICARE

## 2022-08-11 DIAGNOSIS — F03.90 DEMENTIA WITHOUT BEHAVIORAL DISTURBANCE, UNSPECIFIED DEMENTIA TYPE: ICD-10-CM

## 2022-08-11 PROCEDURE — 70551 MRI BRAIN STEM W/O DYE: CPT | Mod: 26,,, | Performed by: RADIOLOGY

## 2022-08-11 PROCEDURE — 70551 MRI BRAIN WITHOUT CONTRAST: ICD-10-PCS | Mod: 26,,, | Performed by: RADIOLOGY

## 2022-08-11 PROCEDURE — 70551 MRI BRAIN STEM W/O DYE: CPT | Mod: TC

## 2022-08-22 ENCOUNTER — PATIENT OUTREACH (OUTPATIENT)
Dept: ADMINISTRATIVE | Facility: HOSPITAL | Age: 67
End: 2022-08-22
Payer: MEDICARE

## 2022-08-24 ENCOUNTER — PATIENT MESSAGE (OUTPATIENT)
Dept: FAMILY MEDICINE | Facility: CLINIC | Age: 67
End: 2022-08-24
Payer: MEDICARE

## 2022-08-31 DIAGNOSIS — E78.2 MIXED HYPERLIPIDEMIA: ICD-10-CM

## 2022-08-31 DIAGNOSIS — I10 ESSENTIAL HYPERTENSION: ICD-10-CM

## 2022-08-31 DIAGNOSIS — E11.65 UNCONTROLLED TYPE 2 DIABETES MELLITUS WITH HYPERGLYCEMIA: ICD-10-CM

## 2022-08-31 NOTE — TELEPHONE ENCOUNTER
No new care gaps identified.  [facility] Embedded Care Gaps. Reference number: [id number]. 8/31/2022   4:43:39 PM CDT

## 2022-09-01 RX ORDER — LISINOPRIL AND HYDROCHLOROTHIAZIDE 12.5; 2 MG/1; MG/1
2 TABLET ORAL DAILY
Qty: 180 TABLET | Refills: 0 | OUTPATIENT
Start: 2022-09-01

## 2022-09-01 RX ORDER — ATORVASTATIN CALCIUM 20 MG/1
20 TABLET, FILM COATED ORAL DAILY
Qty: 90 TABLET | Refills: 0 | OUTPATIENT
Start: 2022-09-01

## 2022-09-01 NOTE — TELEPHONE ENCOUNTER
Refill Routing Note   Medication(s) are not appropriate for processing by [facility] for the following reason(s):      - Required laboratory values are outdated  - Required laboratory values are abnormal    [facility] action(s):  Defer  Quick Discontinue          --->Care Gap information included in message below if applicable.   Medication reconciliation completed: No   Automatic Epic Generated Protocol Data:        Requested Prescriptions   Pending Prescriptions Disp Refills    dapagliflozin (FARXIGA) 5 mg Tab tablet 90 tablet 0     Sig: Take 1 tablet (5 mg total) by mouth once daily.       Endocrinology:  Diabetes - SGLT2 Inhibitors Failed - 8/31/2022  4:48 PM        Failed - Cr is 1.4 or below and within 360 days     Lab Results   Component Value Date    CREATININE 1.6 (H) 01/03/2022    CREATININE 1.7 (H) 07/23/2020    CREATININE 1.4 11/13/2019              Failed - HBA1C within 180 days     Lab Results   Component Value Date    HGBA1C 9.4 (H) 01/03/2022    HGBA1C 10.4 (H) 07/23/2020    HGBA1C 7.3 (H) 11/13/2019              Failed - eGFR is 45 or above and within 360 days     Lab Results   Component Value Date    EGFRNONAA 44.2 (A) 01/03/2022    EGFRNONAA 41.7 (A) 07/23/2020    EGFRNONAA 52.7 (A) 11/13/2019                  Passed - Patient is at least 18 years old        Passed - BP > 90/50 mmH     BP Readings from Last 3 Encounters:   02/09/22 132/82   01/26/22 (!) 144/96   01/25/22 (!) 138/92               Passed - Valid encounter within last 15 months     Recent Visits  Date Type Provider Dept   01/25/22 Office Visit Toby Sanderson MD [facility]   12/17/20 Office Visit Toby Sanderson MD [facility]   Showing recent visits within past 720 days and meeting all other requirements  Future Appointments  No visits were found meeting these conditions.  Showing future appointments within next 150 days and meeting all other requirements      Future Appointments                In 6 days  MD [place] Youssef [facility] [place]    In 1 week Angeles Patel PA-C [facility] [place]                  Passed - Matches previous order         Previous Authorizing Provider: Toby Sanderson MD (dapagliflozin (FARXIGA) 5 mg Tab tablet)  Previous Authorizing Provider: Toby Sanderson MD (atorvastatin (LIPITOR) 20 MG tablet)  Previous Authorizing Provider: Toby Sanderson MD (lisinopriL-hydrochlorothiazide (PRINZIDE,ZESTORETIC) 20-12.5 mg per tablet)  Previous Pharmacy: [facility] - [place], [place] - [address]  Previous Pharmacy: [facility] [address] [place] [address] [facility]  Previous Pharmacy: [facility] - [place], [place] - [address]            Passed - No ED/Admission Since Last PCP visit                Refused Prescriptions Disp Refills    atorvastatin (LIPITOR) 20 MG tablet 90 tablet 0     Sig: Take 1 tablet (20 mg total) by mouth once daily.       Cardiovascular:  Antilipid - Statins Passed - 8/31/2022  4:48 PM        Passed - Patient is at least 18 years old        Passed - Valid encounter within last 15 months     Recent Visits  Date Type Provider Dept   01/25/22 Office Visit Toby Sanderson MD [facility]   12/17/20 Office Visit Toby Sanderson MD [facility]   Showing recent visits within past 720 days and meeting all other requirements  Future Appointments  No visits were found meeting these conditions.  Showing future appointments within next 150 days and meeting all other requirements      Future Appointments                In 6 days MD [place] Youssef [facility] [place]    In 1 week PAN[facility] [place]                  Passed - Matches previous order         Previous Authorizing Provider: Toby Sanderson MD (dapagliflozin (FARXIGA) 5 mg Tab tablet)  Previous Authorizing Provider: Toby Sanderson MD (atorvastatin (LIPITOR) 20 MG  tablet)  Previous Authorizing Provider: Toby Sanderson MD (lisinopriL-hydrochlorothiazide (PRINZIDE,ZESTORETIC) 20-12.5 mg per tablet)  Previous Pharmacy: [facility] - [place], [place] - [address]  Previous Pharmacy: [facility] [address] [place] [address] [address]  Previous Pharmacy: [facility] - [place], [place] - [address]            Passed - No ED/Admission Since Last PCP visit               Passed - ALT is 131 or below and within 360 days     ALT   Date Value Ref Range Status   01/03/2022 14 10 - 44 U/L Final   07/23/2020 20 10 - 44 U/L Final   11/13/2019 15 10 - 44 U/L Final              Passed - AST is 119 or below and within 360 days     AST   Date Value Ref Range Status   01/03/2022 14 10 - 40 U/L Final   07/23/2020 12 10 - 40 U/L Final   11/13/2019 13 10 - 40 U/L Final              Passed - Total Cholesterol within 360 days     Lab Results   Component Value Date    CHOL 223 (H) 01/03/2022    CHOL 183 07/23/2020    CHOL 252 (H) 11/13/2019              Passed - LDL within 360 days     LDL Cholesterol   Date Value Ref Range Status   01/03/2022 133.4 63.0 - 159.0 mg/dL Final     Comment:     The National Cholesterol Education Program (NCEP) has set the  following guidelines (reference values) for LDL Cholesterol:  Optimal.......................<130 mg/dL  Borderline High...............130-159 mg/dL  High..........................160-189 mg/dL  Very High.....................>190 mg/dL                Passed - HDL within 360 days     HDL   Date Value Ref Range Status   01/03/2022 34 (L) 40 - 75 mg/dL Final     Comment:     The National Cholesterol Education Program (NCEP) has set the  following guidelines (reference values) for HDL Cholesterol:  Low...............<40 mg/dL  Optimal...........>60 mg/dL                Passed - Triglycerides within 360 days     Lab Results   Component Value Date    TRIG 278 (H) 01/03/2022    TRIG 443 (H) 07/23/2020    TRIG 202 (H) 11/13/2019                 lisinopriL-hydrochlorothiazide (PRINZIDE,ZESTORETIC) 20-12.5 mg per tablet 180 tablet 0     Sig: Take 2 tablets by mouth once daily.       Cardiovascular:  ACEI + Diuretic Combos Failed - 8/31/2022  4:48 PM        Failed - Last BP in normal range within 360 days     BP Readings from Last 3 Encounters:   02/09/22 132/82   01/26/22 (!) 144/96   01/25/22 (!) 138/92               Failed - Cr is 1.4 or below and within 360 days     Lab Results   Component Value Date    CREATININE 1.6 (H) 01/03/2022    CREATININE 1.7 (H) 07/23/2020    CREATININE 1.4 11/13/2019              Passed - Patient is at least 18 years old        Passed - Valid encounter within last 15 months     Recent Visits  Date Type Provider Dept   01/25/22 Office Visit Toby Sanderson MD [facility]   12/17/20 Office Visit Toby Sanderson MD [facility]   Showing recent visits within past 720 days and meeting all other requirements  Future Appointments  No visits were found meeting these conditions.  Showing future appointments within next 150 days and meeting all other requirements      Future Appointments                In 6 days Toby Sanderson MD [place] [facility] [place]    In 1 week Angeles Patel PA-C [place] [facility] [place]                  Passed - Matches previous order         Previous Authorizing Provider: Toby Sanderson MD (dapagliflozin (FARXIGA) 5 mg Tab tablet)  Previous Authorizing Provider: Toby Sanderson MD (atorvastatin (LIPITOR) 20 MG tablet)  Previous Authorizing Provider: Toby Sanderson MD (lisinopriL-hydrochlorothiazide (PRINZIDE,ZESTORETIC) 20-12.5 mg per tablet)  Previous Pharmacy: [facility] [address] [place] [address] [address]  Previous Pharmacy: [facility] [address] [place] [address] [address]  Previous Pharmacy: [facility] [address] [place] [address] [address]            Passed - No ED/Admission Since Last PCP visit               Passed - Na is  between 130 and 148 and within 360 days     Sodium   Date Value Ref Range Status   01/03/2022 136 136 - 145 mmol/L Final   07/23/2020 140 136 - 145 mmol/L Final   11/13/2019 136 136 - 145 mmol/L Final              Passed - K in normal range and within 360 days     Potassium   Date Value Ref Range Status   01/03/2022 3.7 3.5 - 5.1 mmol/L Final   07/23/2020 4.3 3.5 - 5.1 mmol/L Final   11/13/2019 4.0 3.5 - 5.1 mmol/L Final              Passed - eGFR within 360 days     Lab Results   Component Value Date    EGFRNONAA 44.2 (A) 01/03/2022    EGFRNONAA 41.7 (A) 07/23/2020    EGFRNONAA 52.7 (A) 11/13/2019                        Appointments  past 12m or future 3m with PCP    Date Provider   Last Visit   1/25/2022 Toby Sanderson MD   Next Visit   9/7/2022 Toby Sanderson MD   ED visits in past 90 days: 0        Note composed:10:23 AM 09/01/2022

## 2022-09-06 ENCOUNTER — TELEPHONE (OUTPATIENT)
Dept: ADMINISTRATIVE | Facility: CLINIC | Age: 67
End: 2022-09-06
Payer: MEDICARE

## 2022-09-06 RX ORDER — DAPAGLIFLOZIN 5 MG/1
5 TABLET, FILM COATED ORAL DAILY
Qty: 30 TABLET | Refills: 0 | Status: SHIPPED | OUTPATIENT
Start: 2022-09-06 | End: 2022-09-07

## 2022-09-06 NOTE — TELEPHONE ENCOUNTER
Called pt, no answer; left message informing pt I was calling to remind pt of his in office EAWV on 9/8/22 and to return my call if he had any questions; left my name and number

## 2022-09-07 ENCOUNTER — LAB VISIT (OUTPATIENT)
Dept: LAB | Facility: HOSPITAL | Age: 67
End: 2022-09-07
Attending: FAMILY MEDICINE
Payer: MEDICARE

## 2022-09-07 ENCOUNTER — OFFICE VISIT (OUTPATIENT)
Dept: FAMILY MEDICINE | Facility: CLINIC | Age: 67
End: 2022-09-07
Payer: MEDICARE

## 2022-09-07 VITALS
OXYGEN SATURATION: 98 % | WEIGHT: 277.31 LBS | DIASTOLIC BLOOD PRESSURE: 82 MMHG | RESPIRATION RATE: 18 BRPM | HEIGHT: 72 IN | TEMPERATURE: 99 F | BODY MASS INDEX: 37.56 KG/M2 | SYSTOLIC BLOOD PRESSURE: 138 MMHG | HEART RATE: 92 BPM

## 2022-09-07 DIAGNOSIS — R35.1 NOCTURIA: ICD-10-CM

## 2022-09-07 DIAGNOSIS — Z12.5 ENCOUNTER FOR SCREENING FOR MALIGNANT NEOPLASM OF PROSTATE: ICD-10-CM

## 2022-09-07 DIAGNOSIS — I10 ESSENTIAL HYPERTENSION: ICD-10-CM

## 2022-09-07 DIAGNOSIS — E11.65 UNCONTROLLED TYPE 2 DIABETES MELLITUS WITH HYPERGLYCEMIA: ICD-10-CM

## 2022-09-07 DIAGNOSIS — N18.31 STAGE 3A CHRONIC KIDNEY DISEASE: ICD-10-CM

## 2022-09-07 DIAGNOSIS — Z12.11 ENCOUNTER FOR COLORECTAL CANCER SCREENING: ICD-10-CM

## 2022-09-07 DIAGNOSIS — Z00.00 ANNUAL PHYSICAL EXAM: Primary | ICD-10-CM

## 2022-09-07 DIAGNOSIS — Z12.12 ENCOUNTER FOR COLORECTAL CANCER SCREENING: ICD-10-CM

## 2022-09-07 LAB
ALBUMIN SERPL BCP-MCNC: 3.3 G/DL (ref 3.5–5.2)
ALP SERPL-CCNC: 58 U/L (ref 55–135)
ALT SERPL W/O P-5'-P-CCNC: 13 U/L (ref 10–44)
ANION GAP SERPL CALC-SCNC: 11 MMOL/L (ref 8–16)
AST SERPL-CCNC: 11 U/L (ref 10–40)
BASOPHILS # BLD AUTO: 0.04 K/UL (ref 0–0.2)
BASOPHILS NFR BLD: 0.4 % (ref 0–1.9)
BILIRUB SERPL-MCNC: 0.7 MG/DL (ref 0.1–1)
BUN SERPL-MCNC: 20 MG/DL (ref 8–23)
CALCIUM SERPL-MCNC: 9.7 MG/DL (ref 8.7–10.5)
CHLORIDE SERPL-SCNC: 104 MMOL/L (ref 95–110)
CHOLEST SERPL-MCNC: 197 MG/DL (ref 120–199)
CHOLEST/HDLC SERPL: 6.6 {RATIO} (ref 2–5)
CO2 SERPL-SCNC: 22 MMOL/L (ref 23–29)
COMPLEXED PSA SERPL-MCNC: 1.7 NG/ML (ref 0–4)
CREAT SERPL-MCNC: 2.5 MG/DL (ref 0.5–1.4)
DIFFERENTIAL METHOD: ABNORMAL
EOSINOPHIL # BLD AUTO: 0.2 K/UL (ref 0–0.5)
EOSINOPHIL NFR BLD: 1.8 % (ref 0–8)
ERYTHROCYTE [DISTWIDTH] IN BLOOD BY AUTOMATED COUNT: 14.8 % (ref 11.5–14.5)
EST. GFR  (NO RACE VARIABLE): 27.5 ML/MIN/1.73 M^2
ESTIMATED AVG GLUCOSE: 252 MG/DL (ref 68–131)
GLUCOSE SERPL-MCNC: 160 MG/DL (ref 70–110)
HBA1C MFR BLD: 10.4 % (ref 4–5.6)
HCT VFR BLD AUTO: 32 % (ref 40–54)
HDLC SERPL-MCNC: 30 MG/DL (ref 40–75)
HDLC SERPL: 15.2 % (ref 20–50)
HGB BLD-MCNC: 10.7 G/DL (ref 14–18)
IMM GRANULOCYTES # BLD AUTO: 0.08 K/UL (ref 0–0.04)
IMM GRANULOCYTES NFR BLD AUTO: 0.9 % (ref 0–0.5)
LDLC SERPL CALC-MCNC: 94.4 MG/DL (ref 63–159)
LYMPHOCYTES # BLD AUTO: 1.6 K/UL (ref 1–4.8)
LYMPHOCYTES NFR BLD: 17.6 % (ref 18–48)
MCH RBC QN AUTO: 30 PG (ref 27–31)
MCHC RBC AUTO-ENTMCNC: 33.4 G/DL (ref 32–36)
MCV RBC AUTO: 90 FL (ref 82–98)
MONOCYTES # BLD AUTO: 0.8 K/UL (ref 0.3–1)
MONOCYTES NFR BLD: 8.5 % (ref 4–15)
NEUTROPHILS # BLD AUTO: 6.5 K/UL (ref 1.8–7.7)
NEUTROPHILS NFR BLD: 70.8 % (ref 38–73)
NONHDLC SERPL-MCNC: 167 MG/DL
NRBC BLD-RTO: 0 /100 WBC
PLATELET # BLD AUTO: 296 K/UL (ref 150–450)
PMV BLD AUTO: 11.2 FL (ref 9.2–12.9)
POTASSIUM SERPL-SCNC: 3.6 MMOL/L (ref 3.5–5.1)
PROT SERPL-MCNC: 7.1 G/DL (ref 6–8.4)
RBC # BLD AUTO: 3.57 M/UL (ref 4.6–6.2)
SODIUM SERPL-SCNC: 137 MMOL/L (ref 136–145)
TRIGL SERPL-MCNC: 363 MG/DL (ref 30–150)
TSH SERPL DL<=0.005 MIU/L-ACNC: 1.86 UIU/ML (ref 0.4–4)
WBC # BLD AUTO: 9.22 K/UL (ref 3.9–12.7)

## 2022-09-07 PROCEDURE — 3075F SYST BP GE 130 - 139MM HG: CPT | Mod: CPTII,S$GLB,, | Performed by: FAMILY MEDICINE

## 2022-09-07 PROCEDURE — 99999 PR PBB SHADOW E&M-EST. PATIENT-LVL V: ICD-10-PCS | Mod: PBBFAC,,, | Performed by: FAMILY MEDICINE

## 2022-09-07 PROCEDURE — 3079F DIAST BP 80-89 MM HG: CPT | Mod: CPTII,S$GLB,, | Performed by: FAMILY MEDICINE

## 2022-09-07 PROCEDURE — 3066F PR DOCUMENTATION OF TREATMENT FOR NEPHROPATHY: ICD-10-PCS | Mod: CPTII,S$GLB,, | Performed by: FAMILY MEDICINE

## 2022-09-07 PROCEDURE — 99397 PER PM REEVAL EST PAT 65+ YR: CPT | Mod: S$GLB,,, | Performed by: FAMILY MEDICINE

## 2022-09-07 PROCEDURE — 3066F NEPHROPATHY DOC TX: CPT | Mod: CPTII,S$GLB,, | Performed by: FAMILY MEDICINE

## 2022-09-07 PROCEDURE — 1101F PT FALLS ASSESS-DOCD LE1/YR: CPT | Mod: CPTII,S$GLB,, | Performed by: FAMILY MEDICINE

## 2022-09-07 PROCEDURE — 4010F ACE/ARB THERAPY RXD/TAKEN: CPT | Mod: CPTII,S$GLB,, | Performed by: FAMILY MEDICINE

## 2022-09-07 PROCEDURE — 3288F PR FALLS RISK ASSESSMENT DOCUMENTED: ICD-10-PCS | Mod: CPTII,S$GLB,, | Performed by: FAMILY MEDICINE

## 2022-09-07 PROCEDURE — 1159F PR MEDICATION LIST DOCUMENTED IN MEDICAL RECORD: ICD-10-PCS | Mod: CPTII,S$GLB,, | Performed by: FAMILY MEDICINE

## 2022-09-07 PROCEDURE — 3008F BODY MASS INDEX DOCD: CPT | Mod: CPTII,S$GLB,, | Performed by: FAMILY MEDICINE

## 2022-09-07 PROCEDURE — 3288F FALL RISK ASSESSMENT DOCD: CPT | Mod: CPTII,S$GLB,, | Performed by: FAMILY MEDICINE

## 2022-09-07 PROCEDURE — 84443 ASSAY THYROID STIM HORMONE: CPT | Performed by: FAMILY MEDICINE

## 2022-09-07 PROCEDURE — 3046F HEMOGLOBIN A1C LEVEL >9.0%: CPT | Mod: CPTII,S$GLB,, | Performed by: FAMILY MEDICINE

## 2022-09-07 PROCEDURE — 3046F PR MOST RECENT HEMOGLOBIN A1C LEVEL > 9.0%: ICD-10-PCS | Mod: CPTII,S$GLB,, | Performed by: FAMILY MEDICINE

## 2022-09-07 PROCEDURE — 99397 PR PREVENTIVE VISIT,EST,65 & OVER: ICD-10-PCS | Mod: S$GLB,,, | Performed by: FAMILY MEDICINE

## 2022-09-07 PROCEDURE — 1159F MED LIST DOCD IN RCRD: CPT | Mod: CPTII,S$GLB,, | Performed by: FAMILY MEDICINE

## 2022-09-07 PROCEDURE — 99999 PR PBB SHADOW E&M-EST. PATIENT-LVL V: CPT | Mod: PBBFAC,,, | Performed by: FAMILY MEDICINE

## 2022-09-07 PROCEDURE — 3062F POS MACROALBUMINURIA REV: CPT | Mod: CPTII,S$GLB,, | Performed by: FAMILY MEDICINE

## 2022-09-07 PROCEDURE — 1101F PR PT FALLS ASSESS DOC 0-1 FALLS W/OUT INJ PAST YR: ICD-10-PCS | Mod: CPTII,S$GLB,, | Performed by: FAMILY MEDICINE

## 2022-09-07 PROCEDURE — 1126F AMNT PAIN NOTED NONE PRSNT: CPT | Mod: CPTII,S$GLB,, | Performed by: FAMILY MEDICINE

## 2022-09-07 PROCEDURE — 3075F PR MOST RECENT SYSTOLIC BLOOD PRESS GE 130-139MM HG: ICD-10-PCS | Mod: CPTII,S$GLB,, | Performed by: FAMILY MEDICINE

## 2022-09-07 PROCEDURE — 80053 COMPREHEN METABOLIC PANEL: CPT | Performed by: FAMILY MEDICINE

## 2022-09-07 PROCEDURE — 3062F PR POS MACROALBUMINURIA RESULT DOCUMENTED/REVIEW: ICD-10-PCS | Mod: CPTII,S$GLB,, | Performed by: FAMILY MEDICINE

## 2022-09-07 PROCEDURE — 84153 ASSAY OF PSA TOTAL: CPT | Performed by: FAMILY MEDICINE

## 2022-09-07 PROCEDURE — 4010F PR ACE/ARB THEARPY RXD/TAKEN: ICD-10-PCS | Mod: CPTII,S$GLB,, | Performed by: FAMILY MEDICINE

## 2022-09-07 PROCEDURE — 3008F PR BODY MASS INDEX (BMI) DOCUMENTED: ICD-10-PCS | Mod: CPTII,S$GLB,, | Performed by: FAMILY MEDICINE

## 2022-09-07 PROCEDURE — 85025 COMPLETE CBC W/AUTO DIFF WBC: CPT | Performed by: FAMILY MEDICINE

## 2022-09-07 PROCEDURE — 80061 LIPID PANEL: CPT | Performed by: FAMILY MEDICINE

## 2022-09-07 PROCEDURE — 36415 COLL VENOUS BLD VENIPUNCTURE: CPT | Mod: PO | Performed by: FAMILY MEDICINE

## 2022-09-07 PROCEDURE — 1126F PR PAIN SEVERITY QUANTIFIED, NO PAIN PRESENT: ICD-10-PCS | Mod: CPTII,S$GLB,, | Performed by: FAMILY MEDICINE

## 2022-09-07 PROCEDURE — 83036 HEMOGLOBIN GLYCOSYLATED A1C: CPT | Performed by: FAMILY MEDICINE

## 2022-09-07 PROCEDURE — 3079F PR MOST RECENT DIASTOLIC BLOOD PRESSURE 80-89 MM HG: ICD-10-PCS | Mod: CPTII,S$GLB,, | Performed by: FAMILY MEDICINE

## 2022-09-07 RX ORDER — PIOGLITAZONEHYDROCHLORIDE 15 MG/1
15 TABLET ORAL DAILY
Qty: 90 TABLET | Refills: 1 | Status: SHIPPED | OUTPATIENT
Start: 2022-09-07 | End: 2022-09-08

## 2022-09-07 RX ORDER — AMLODIPINE BESYLATE 10 MG/1
10 TABLET ORAL DAILY
Qty: 90 TABLET | Refills: 1 | Status: SHIPPED | OUTPATIENT
Start: 2022-09-07 | End: 2023-04-19

## 2022-09-07 RX ORDER — LISINOPRIL AND HYDROCHLOROTHIAZIDE 12.5; 2 MG/1; MG/1
2 TABLET ORAL DAILY
Qty: 180 TABLET | Refills: 1 | Status: SHIPPED | OUTPATIENT
Start: 2022-09-07 | End: 2022-10-24

## 2022-09-07 RX ORDER — METFORMIN HYDROCHLORIDE 1000 MG/1
1000 TABLET ORAL 2 TIMES DAILY WITH MEALS
Qty: 180 TABLET | Refills: 1 | Status: SHIPPED | OUTPATIENT
Start: 2022-09-07 | End: 2022-09-08

## 2022-09-07 NOTE — Clinical Note
All of his numbers have worsened. Pt should f/u w/ endo and placed a referral to nephro due to worsening kidney function. It seems like it stems from dm. Please have pt f/u in 2 weeks w/ bg journal.

## 2022-09-07 NOTE — PROGRESS NOTES
Health Maintenance Due   Topic     TETANUS VACCINE      Colorectal Cancer Screening  Pending order      Shingles Vaccine (1 of 2)  hx chicken pox. Notified pt can get vaccine at pharmacy      Pneumococcal Vaccines (Age 65+) (2 - PCV) Pt decline      Foot Exam  Consult pcp      Abdominal Aortic Aneurysm Screening  Consult pcp      Hemoglobin A1c  Consult pcp      COVID-19 Vaccine (4 - Booster for Moderna series)     Influenza Vaccine (1)

## 2022-09-08 ENCOUNTER — OFFICE VISIT (OUTPATIENT)
Dept: FAMILY MEDICINE | Facility: CLINIC | Age: 67
End: 2022-09-08
Payer: MEDICARE

## 2022-09-08 ENCOUNTER — TELEPHONE (OUTPATIENT)
Dept: FAMILY MEDICINE | Facility: CLINIC | Age: 67
End: 2022-09-08
Payer: MEDICARE

## 2022-09-08 VITALS
HEIGHT: 72 IN | DIASTOLIC BLOOD PRESSURE: 88 MMHG | SYSTOLIC BLOOD PRESSURE: 126 MMHG | HEART RATE: 90 BPM | OXYGEN SATURATION: 98 % | TEMPERATURE: 98 F | RESPIRATION RATE: 17 BRPM | BODY MASS INDEX: 37.65 KG/M2 | WEIGHT: 278 LBS

## 2022-09-08 DIAGNOSIS — N18.4 CKD (CHRONIC KIDNEY DISEASE) STAGE 4, GFR 15-29 ML/MIN: ICD-10-CM

## 2022-09-08 DIAGNOSIS — N18.4 TYPE 2 DIABETES MELLITUS WITH STAGE 4 CHRONIC KIDNEY DISEASE, WITHOUT LONG-TERM CURRENT USE OF INSULIN: ICD-10-CM

## 2022-09-08 DIAGNOSIS — E66.01 SEVERE OBESITY (BMI 35.0-39.9) WITH COMORBIDITY: ICD-10-CM

## 2022-09-08 DIAGNOSIS — Z00.00 ENCOUNTER FOR PREVENTIVE HEALTH EXAMINATION: Primary | ICD-10-CM

## 2022-09-08 DIAGNOSIS — E11.22 TYPE 2 DIABETES MELLITUS WITH STAGE 4 CHRONIC KIDNEY DISEASE, WITHOUT LONG-TERM CURRENT USE OF INSULIN: ICD-10-CM

## 2022-09-08 DIAGNOSIS — H91.90 HEARING LOSS, UNSPECIFIED HEARING LOSS TYPE, UNSPECIFIED LATERALITY: ICD-10-CM

## 2022-09-08 DIAGNOSIS — E11.65 UNCONTROLLED TYPE 2 DIABETES MELLITUS WITH HYPERGLYCEMIA: ICD-10-CM

## 2022-09-08 PROCEDURE — 3046F PR MOST RECENT HEMOGLOBIN A1C LEVEL > 9.0%: ICD-10-PCS | Mod: CPTII,S$GLB,, | Performed by: PHYSICIAN ASSISTANT

## 2022-09-08 PROCEDURE — 3008F BODY MASS INDEX DOCD: CPT | Mod: CPTII,S$GLB,, | Performed by: PHYSICIAN ASSISTANT

## 2022-09-08 PROCEDURE — 3074F PR MOST RECENT SYSTOLIC BLOOD PRESSURE < 130 MM HG: ICD-10-PCS | Mod: CPTII,S$GLB,, | Performed by: PHYSICIAN ASSISTANT

## 2022-09-08 PROCEDURE — 1101F PR PT FALLS ASSESS DOC 0-1 FALLS W/OUT INJ PAST YR: ICD-10-PCS | Mod: CPTII,S$GLB,, | Performed by: PHYSICIAN ASSISTANT

## 2022-09-08 PROCEDURE — 1159F PR MEDICATION LIST DOCUMENTED IN MEDICAL RECORD: ICD-10-PCS | Mod: CPTII,S$GLB,, | Performed by: PHYSICIAN ASSISTANT

## 2022-09-08 PROCEDURE — 1101F PT FALLS ASSESS-DOCD LE1/YR: CPT | Mod: CPTII,S$GLB,, | Performed by: PHYSICIAN ASSISTANT

## 2022-09-08 PROCEDURE — 1159F MED LIST DOCD IN RCRD: CPT | Mod: CPTII,S$GLB,, | Performed by: PHYSICIAN ASSISTANT

## 2022-09-08 PROCEDURE — 1160F PR REVIEW ALL MEDS BY PRESCRIBER/CLIN PHARMACIST DOCUMENTED: ICD-10-PCS | Mod: CPTII,S$GLB,, | Performed by: PHYSICIAN ASSISTANT

## 2022-09-08 PROCEDURE — 3288F PR FALLS RISK ASSESSMENT DOCUMENTED: ICD-10-PCS | Mod: CPTII,S$GLB,, | Performed by: PHYSICIAN ASSISTANT

## 2022-09-08 PROCEDURE — G0439 PPPS, SUBSEQ VISIT: HCPCS | Mod: S$GLB,,, | Performed by: PHYSICIAN ASSISTANT

## 2022-09-08 PROCEDURE — 1170F PR FUNCTIONAL STATUS ASSESSED: ICD-10-PCS | Mod: CPTII,S$GLB,, | Performed by: PHYSICIAN ASSISTANT

## 2022-09-08 PROCEDURE — 3074F SYST BP LT 130 MM HG: CPT | Mod: CPTII,S$GLB,, | Performed by: PHYSICIAN ASSISTANT

## 2022-09-08 PROCEDURE — 3062F POS MACROALBUMINURIA REV: CPT | Mod: CPTII,S$GLB,, | Performed by: PHYSICIAN ASSISTANT

## 2022-09-08 PROCEDURE — 1160F RVW MEDS BY RX/DR IN RCRD: CPT | Mod: CPTII,S$GLB,, | Performed by: PHYSICIAN ASSISTANT

## 2022-09-08 PROCEDURE — 4010F PR ACE/ARB THEARPY RXD/TAKEN: ICD-10-PCS | Mod: CPTII,S$GLB,, | Performed by: PHYSICIAN ASSISTANT

## 2022-09-08 PROCEDURE — 3066F NEPHROPATHY DOC TX: CPT | Mod: CPTII,S$GLB,, | Performed by: PHYSICIAN ASSISTANT

## 2022-09-08 PROCEDURE — 3046F HEMOGLOBIN A1C LEVEL >9.0%: CPT | Mod: CPTII,S$GLB,, | Performed by: PHYSICIAN ASSISTANT

## 2022-09-08 PROCEDURE — 3079F PR MOST RECENT DIASTOLIC BLOOD PRESSURE 80-89 MM HG: ICD-10-PCS | Mod: CPTII,S$GLB,, | Performed by: PHYSICIAN ASSISTANT

## 2022-09-08 PROCEDURE — 3066F PR DOCUMENTATION OF TREATMENT FOR NEPHROPATHY: ICD-10-PCS | Mod: CPTII,S$GLB,, | Performed by: PHYSICIAN ASSISTANT

## 2022-09-08 PROCEDURE — 3008F PR BODY MASS INDEX (BMI) DOCUMENTED: ICD-10-PCS | Mod: CPTII,S$GLB,, | Performed by: PHYSICIAN ASSISTANT

## 2022-09-08 PROCEDURE — 99999 PR PBB SHADOW E&M-EST. PATIENT-LVL V: CPT | Mod: PBBFAC,,, | Performed by: PHYSICIAN ASSISTANT

## 2022-09-08 PROCEDURE — 3288F FALL RISK ASSESSMENT DOCD: CPT | Mod: CPTII,S$GLB,, | Performed by: PHYSICIAN ASSISTANT

## 2022-09-08 PROCEDURE — 3079F DIAST BP 80-89 MM HG: CPT | Mod: CPTII,S$GLB,, | Performed by: PHYSICIAN ASSISTANT

## 2022-09-08 PROCEDURE — 4010F ACE/ARB THERAPY RXD/TAKEN: CPT | Mod: CPTII,S$GLB,, | Performed by: PHYSICIAN ASSISTANT

## 2022-09-08 PROCEDURE — G0439 PR MEDICARE ANNUAL WELLNESS SUBSEQUENT VISIT: ICD-10-PCS | Mod: S$GLB,,, | Performed by: PHYSICIAN ASSISTANT

## 2022-09-08 PROCEDURE — 1170F FXNL STATUS ASSESSED: CPT | Mod: CPTII,S$GLB,, | Performed by: PHYSICIAN ASSISTANT

## 2022-09-08 PROCEDURE — 99999 PR PBB SHADOW E&M-EST. PATIENT-LVL V: ICD-10-PCS | Mod: PBBFAC,,, | Performed by: PHYSICIAN ASSISTANT

## 2022-09-08 PROCEDURE — 99499 UNLISTED E&M SERVICE: CPT | Mod: S$GLB,,, | Performed by: PHYSICIAN ASSISTANT

## 2022-09-08 PROCEDURE — 3062F PR POS MACROALBUMINURIA RESULT DOCUMENTED/REVIEW: ICD-10-PCS | Mod: CPTII,S$GLB,, | Performed by: PHYSICIAN ASSISTANT

## 2022-09-08 RX ORDER — PIOGLITAZONEHYDROCHLORIDE 45 MG/1
45 TABLET ORAL DAILY
Qty: 90 TABLET | Refills: 0 | Status: SHIPPED | OUTPATIENT
Start: 2022-09-08 | End: 2022-11-10 | Stop reason: SDUPTHER

## 2022-09-08 NOTE — PROGRESS NOTES
Subjective:       Patient ID: Handy Adams is a 67 y.o. male.    Chief Complaint: Follow-up      Follow-up    67-year-old male presents for annual exam.  States he needs refills on his medications.  States he can not afford farxiga due to increased price.  Has not been taking over last few months.  Patient endorses nocturia    Review of Systems   Constitutional: Negative.    HENT: Negative.     Respiratory: Negative.     Cardiovascular: Negative.    Gastrointestinal: Negative.    Endocrine: Negative.    Genitourinary: Negative.    Musculoskeletal: Negative.    Neurological: Negative.    Psychiatric/Behavioral: Negative.          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 11/17/2017    Type 2 diabetes mellitus      Past Surgical History:   Procedure Laterality Date    MOUTH SURGERY       Family History   Problem Relation Age of Onset    No Known Problems Mother     Diabetes Father     Hypertension Father     No Known Problems Sister     No Known Problems Brother     No Known Problems Maternal Aunt     No Known Problems Maternal Uncle     No Known Problems Paternal Aunt     No Known Problems Paternal Uncle     No Known Problems Maternal Grandmother     No Known Problems Maternal Grandfather     No Known Problems Paternal Grandmother     No Known Problems Paternal Grandfather     Amblyopia Neg Hx     Blindness Neg Hx     Cancer Neg Hx     Cataracts Neg Hx     Glaucoma Neg Hx     Macular degeneration Neg Hx     Retinal detachment Neg Hx     Strabismus Neg Hx     Stroke Neg Hx     Thyroid disease Neg Hx      Social History     Socioeconomic History    Marital status:    Tobacco Use    Smoking status: Former     Smokeless tobacco: Never   Substance and Sexual Activity    Alcohol use: Yes       Current Outpatient Medications:     atorvastatin (LIPITOR) 20 MG tablet, Take 1 tablet by mouth once daily, Disp: 90 tablet, Rfl: 1    blood sugar diagnostic Strp, To test twice daily, Disp: 100 each, Rfl: 3    blood-glucose meter kit, Use as instructed, Disp: 1 each, Rfl: 0    lancets Misc, 1 lancet by Misc.(Non-Drug; Combo Route) route 2 (two) times daily with meals., Disp: 100 each, Rfl: 11    sildenafiL (VIAGRA) 50 MG tablet, Take 1 tablet (50 mg total) by mouth daily as needed for Erectile Dysfunction., Disp: 30 tablet, Rfl: 0    amLODIPine (NORVASC) 10 MG tablet, Take 1 tablet (10 mg total) by mouth once daily., Disp: 90 tablet, Rfl: 1    buPROPion (WELLBUTRIN XL) 300 MG 24 hr tablet, Take 1 tablet (300 mg total) by mouth once daily., Disp: 90 tablet, Rfl: 0    fexofenadine (ALLEGRA) 180 MG tablet, Take 1 tablet (180 mg total) by mouth once daily., Disp: 30 tablet, Rfl: 0    lisinopriL-hydrochlorothiazide (PRINZIDE,ZESTORETIC) 20-12.5 mg per tablet, Take 2 tablets by mouth once daily., Disp: 180 tablet, Rfl: 1    metFORMIN (GLUCOPHAGE) 1000 MG tablet, Take 1 tablet (1,000 mg total) by mouth 2 (two) times daily with meals., Disp: 180 tablet, Rfl: 1    pioglitazone (ACTOS) 15 MG tablet, Take 1 tablet (15 mg total) by mouth once daily., Disp: 90 tablet, Rfl: 1    SITagliptin (JANUVIA) 100 MG Tab, Take 1 tablet (100 mg total) by mouth once daily., Disp: 90 tablet, Rfl: 1    triamcinolone acetonide 0.1% (KENALOG) 0.1 % ointment, Apply topically 2 (two) times daily., Disp: 80 g, Rfl: 1   Objective:      Vitals:    09/07/22 0957   BP: 138/82   BP Location: Left arm   Patient Position: Sitting   BP Method: Small (Manual)   Pulse: 92   Resp: 18   Temp: 98.7 °F (37.1 °C)   TempSrc: Oral   SpO2: 98%   Weight: 125.8 kg (277 lb 5.4 oz)   Height: 6' (1.829 m)       Physical Exam  Constitutional:       General: He is not in acute  distress.  HENT:      Head: Normocephalic and atraumatic.   Eyes:      Conjunctiva/sclera: Conjunctivae normal.   Cardiovascular:      Rate and Rhythm: Normal rate and regular rhythm.      Heart sounds: Normal heart sounds. No murmur heard.    No friction rub. No gallop.   Pulmonary:      Effort: Pulmonary effort is normal.      Breath sounds: Normal breath sounds. No wheezing or rales.   Musculoskeletal:      Cervical back: Neck supple.   Skin:     General: Skin is warm and dry.   Neurological:      Mental Status: He is alert and oriented to person, place, and time.   Psychiatric:         Behavior: Behavior normal.         Thought Content: Thought content normal.         Judgment: Judgment normal.          Assessment:       1. Annual physical exam    2. Encounter for colorectal cancer screening    3. Essential hypertension    4. Uncontrolled type 2 diabetes mellitus with hyperglycemia    5. Nocturia    6. Encounter for screening for malignant neoplasm of prostate          Plan:       Annual physical exam    Encounter for colorectal cancer screening  -     Ambulatory referral/consult to Endo Procedure ; Future; Expected date: 09/08/2022    Essential hypertension  -     amLODIPine (NORVASC) 10 MG tablet; Take 1 tablet (10 mg total) by mouth once daily.  Dispense: 90 tablet; Refill: 1  -     lisinopriL-hydrochlorothiazide (PRINZIDE,ZESTORETIC) 20-12.5 mg per tablet; Take 2 tablets by mouth once daily.  Dispense: 180 tablet; Refill: 1  -     Hemoglobin A1C; Future; Expected date: 09/07/2022  -     Lipid Panel; Future; Expected date: 09/07/2022  -     TSH; Future; Expected date: 09/07/2022  -     CBC Auto Differential; Future; Expected date: 09/07/2022    Uncontrolled type 2 diabetes mellitus with hyperglycemia  -     metFORMIN (GLUCOPHAGE) 1000 MG tablet; Take 1 tablet (1,000 mg total) by mouth 2 (two) times daily with meals.  Dispense: 180 tablet; Refill: 1  -     SITagliptin (JANUVIA) 100 MG Tab; Take 1  tablet (100 mg total) by mouth once daily.  Dispense: 90 tablet; Refill: 1  -     pioglitazone (ACTOS) 15 MG tablet; Take 1 tablet (15 mg total) by mouth once daily.  Dispense: 90 tablet; Refill: 1  -     Hemoglobin A1C; Future; Expected date: 09/07/2022  -     Lipid Panel; Future; Expected date: 09/07/2022  -     Comprehensive Metabolic Panel; Future; Expected date: 09/07/2022  -     TSH; Future; Expected date: 09/07/2022  -     CBC Auto Differential; Future; Expected date: 09/07/2022  -     Ambulatory referral/consult to Endocrinology; Future; Expected date: 09/14/2022    Nocturia  -     PSA, SCREENING; Future; Expected date: 09/07/2022    Encounter for screening for malignant neoplasm of prostate  -     PSA, SCREENING; Future; Expected date: 09/07/2022      Changed farxiga to actos. F/u labs.  Advise patient better compliance with followups.  For patient to Endo.          Future Appointments   Date Time Provider Department Center   9/8/2022 10:00 AM Angeles Patel PA-C [facility] [place]   12/7/2022 10:00 AM Toby Sanderson MD [facility] [place]       Patient note was created using [employer].  Any errors in syntax or even information may not have been identified and edited on initial review prior to signing this note.

## 2022-09-08 NOTE — PROGRESS NOTES
Health Maintenance Due   Topic Date Due    TETANUS VACCINE  Discuss with pcp      Colorectal Cancer Screening  Never done    Shingles Vaccine (1 of 2)     Pneumococcal Vaccines (Age 65+) (2 - PCV) 07/20/2008    Foot Exam  11/13/2018    Abdominal Aortic Aneurysm Screening  Never done    COVID-19 Vaccine (4 - Booster for Moderna series) 04/27/2022    Influenza Vaccine (1) Not available at these present time

## 2022-09-08 NOTE — TELEPHONE ENCOUNTER
----- Message from Toby Sanderson MD sent at 9/8/2022  6:24 AM CDT -----  All of his numbers have worsened. Pt should f/u w/ endo and placed a referral to nephro due to worsening kidney function. It seems like it stems from dm. Please have pt f/u in 2 weeks w/ bg journal.

## 2022-09-08 NOTE — PATIENT INSTRUCTIONS
Counseling and Referral of Other Preventative  (Italic type indicates deductible and co-insurance are waived)    Patient Name: Handy Adams  Today's Date: 9/8/2022    Health Maintenance       Date Due Completion Date    TETANUS VACCINE Never done ---    Colorectal Cancer Screening Never done ---    Shingles Vaccine (1 of 2) Never done ---    Pneumococcal Vaccines (Age 65+) (2 - PCV) 07/20/2008 7/20/2007    Foot Exam 11/13/2018 11/13/2017    Abdominal Aortic Aneurysm Screening Never done ---    COVID-19 Vaccine (4 - Booster for Moderna series) 04/27/2022 12/27/2021    Influenza Vaccine (1) Never done ---    Hemoglobin A1c 12/07/2022 9/7/2022    Diabetes Urine Screening 01/03/2023 1/3/2022    Eye Exam 07/07/2023 7/7/2022    Override on 11/15/2017: Done    Lipid Panel 09/07/2023 9/7/2022    Low Dose Statin 09/08/2023 9/8/2022        Orders Placed This Encounter   Procedures    Ambulatory referral/consult to Audiology       The following information is provided to all patients.  This information is to help you find resources for any of the problems found today that may be affecting your health:                Living healthy guide: www.[facility].[place].gov      Understanding Diabetes: www.diabetes.org      Eating healthy: www.cdc.gov/healthyweight      [facility] [place][note 2] home safety checklist: www.cdc.gov/steadi/patient.html      Agency on Aging: www.goea.[place].[facility]      Alcoholics anonymous (AA): www.aa.org      Physical Activity: www.crispin.nih.gov/sc5hhgh      Tobacco use: www.quitwithusla.org

## 2022-09-08 NOTE — PROGRESS NOTES
Handy Adams presented for a  Medicare AWV and comprehensive Health Risk Assessment today. The following components were reviewed and updated:    Medical history  Family History  Social history  Allergies and Current Medications  Health Risk Assessment  Health Maintenance  Care Team         ** See Completed Assessments for Annual Wellness Visit within the encounter summary.**         The following assessments were completed:  Living Situation  CAGE  Depression Screening  Timed Get Up and Go  Whisper Test  Cognitive Function Screening  Nutrition Screening  ADL Screening  PAQ Screening        Vitals:    09/08/22 1005   BP: 126/88   BP Location: Right arm   Patient Position: Sitting   BP Method: Large (Manual)   Pulse: 90   Resp: 17   Temp: 98.4 °F (36.9 °C)   TempSrc: Oral   SpO2: 98%   Weight: 126.1 kg (278 lb)   Height: 6' (1.829 m)     Body mass index is 37.7 kg/m².  Physical Exam          Diagnoses and health risks identified today and associated recommendations/orders:    1. Encounter for preventive health examination  Provided Handy with a 5-10 year written screening schedule and personal prevention plan. Recommendations were developed using the USPSTF age appropriate recommendations. Education, counseling, and referrals were provided as needed. After Visit Summary printed and given to patient which includes a list of additional screenings\tests needed.    2. Hearing loss, unspecified hearing loss type, unspecified laterality  - Ambulatory referral/consult to Audiology; Future    3. CKD (chronic kidney disease) stage 4, GFR 15-29 ml/min  Referred to nephrology, no nsaids, stop metformin. hydrate    4. Severe obesity (BMI 35.0-39.9) with comorbidity  Diet and exercise    5. Uncontrolled type 2 diabetes mellitus with hyperglycemia  Stop metformin, max actos. Diabetic diet  - pioglitazone (ACTOS) 45 MG tablet; Take 1 tablet (45 mg total) by mouth once daily.  Dispense: 90 tablet; Refill: 0    6. 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 Type 2 diabetes mellitus with stage 4 chronic kidney disease, without long-term current use of insulin  Refer to neph      Review for Opioid Screening: Patient does not have rx for Opioids.    Review for Substance Use Disorders: Patient does not use substance.      No follow-ups on file.    Angeles Jean PA-C  I offered to discuss advanced care planning, including how to pick a person who would make decisions for you if you were unable to make them for yourself, called a health care power of , and what kind of decisions you might make such as use of life sustaining treatments such as ventilators and tube feeding when faced with a life limiting illness recorded on a living will that they will need to know. (How you want to be cared for as you near the end of your natural life)     X Patient is interested in learning more about how to make advanced directives.  I provided them paperwork and offered to discuss this with them.

## 2022-09-09 ENCOUNTER — TELEPHONE (OUTPATIENT)
Dept: FAMILY MEDICINE | Facility: CLINIC | Age: 67
End: 2022-09-09
Payer: MEDICARE

## 2022-09-14 ENCOUNTER — CLINICAL SUPPORT (OUTPATIENT)
Dept: AUDIOLOGY | Facility: CLINIC | Age: 67
End: 2022-09-14
Payer: MEDICARE

## 2022-09-14 DIAGNOSIS — H90.A22 SENSORINEURAL HEARING LOSS (SNHL) OF LEFT EAR WITH RESTRICTED HEARING OF RIGHT EAR: Primary | ICD-10-CM

## 2022-09-14 PROCEDURE — 92550 PR TYMPANOMETRY AND REFLEX THRESHOLD MEASUREMENTS: ICD-10-PCS | Mod: S$GLB,,, | Performed by: AUDIOLOGIST

## 2022-09-14 PROCEDURE — 92550 TYMPANOMETRY & REFLEX THRESH: CPT | Mod: S$GLB,,, | Performed by: AUDIOLOGIST

## 2022-09-14 PROCEDURE — 92557 COMPREHENSIVE HEARING TEST: CPT | Mod: S$GLB,,, | Performed by: AUDIOLOGIST

## 2022-09-14 PROCEDURE — 92557 PR COMPREHENSIVE HEARING TEST: ICD-10-PCS | Mod: S$GLB,,, | Performed by: AUDIOLOGIST

## 2022-09-14 NOTE — Clinical Note
Hi Ms. Patel, Please see the encounter note for results and recommendations from today's audiological evaluation.  Recommend ENT evaluation due to asymmetric hearing loss with unilateral tinnitus.  Please let me know if I can provide any additional information.  Thank you for the referral, Dorie Don, CCC-A

## 2022-09-14 NOTE — PROGRESS NOTES
Mr. Handy Adams was seen in the clinic today for an audiological evaluation.  Mr. Adams reported bilateral hearing loss (right worse than left) and tinnitus of the right ear.    Audiological testing revealed a moderate to severe mixed hearing loss for the right ear and normal hearing sloping to a mild to severe sensorineural hearing loss for the left ear.  A speech reception threshold was obtained at 45 dBHL for the right ear and at 25 dBHL for the left ear.  Speech discrimination was 96% for the right ear and 92% for the left ear.      Tympanometry testing revealed a Type Ad tympanogram for the right ear and a Type Ad tympanogram for the left ear.  Ipsilateral acoustic reflexes were present at 500 Hz and 1000 Hz for the right ear and were present from 500-4000 Hz for the left ear.    Recommendations:  1. Otologic evaluation due to asymmetrical hearing loss with unilateral tinnitus  2. Annual audiological evaluation  3. Hearing protection when in noise   4. Hearing aid consultation following medical clearance    Please click on link to view Audiogram:  Document on 9/14/2022  2:38 PM by AGUILA DonD: Audiogram

## 2022-09-15 ENCOUNTER — TELEPHONE (OUTPATIENT)
Dept: FAMILY MEDICINE | Facility: CLINIC | Age: 67
End: 2022-09-15
Payer: MEDICARE

## 2022-09-15 ENCOUNTER — TELEPHONE (OUTPATIENT)
Dept: OTOLARYNGOLOGY | Facility: CLINIC | Age: 67
End: 2022-09-15
Payer: MEDICARE

## 2022-09-15 ENCOUNTER — TELEPHONE (OUTPATIENT)
Dept: NEPHROLOGY | Facility: CLINIC | Age: 67
End: 2022-09-15
Payer: MEDICARE

## 2022-09-15 ENCOUNTER — PATIENT MESSAGE (OUTPATIENT)
Dept: NEPHROLOGY | Facility: CLINIC | Age: 67
End: 2022-09-15
Payer: MEDICARE

## 2022-09-15 DIAGNOSIS — H91.8X3 ASYMMETRICAL HEARING LOSS: Primary | ICD-10-CM

## 2022-09-15 NOTE — TELEPHONE ENCOUNTER
----- Message from Armen Sahni sent at 9/15/2022  3:42 PM CDT -----  Type: Patient Call Back    Who called:Self    What is the request in detail: Pt is requesting an appt for hearing loss. Pt had hearing exam performed. No appts available. Please call    Can the clinic reply by [facility]? no    Would the patient rather a call back or a response via [facility]? Call back    Best call back number: [phone]

## 2022-09-15 NOTE — TELEPHONE ENCOUNTER
----- Message from Bette Cole sent at 9/15/2022  4:10 PM CDT -----  Type: Patient Call Back    Who called: self     What is the request in detail: pt is returning call to get scheduled     Can the clinic reply by [facility]?    Would the patient rather a call back or a response via [facility]?     Best call back number: [phone] (home)

## 2022-09-15 NOTE — TELEPHONE ENCOUNTER
----- Message from Bette Cole sent at 9/15/2022  3:23 PM CDT -----  Type: Patient Call Back    Who called: self     What is the request in detail: returning call     Can the clinic reply by [facility]?    Would the patient rather a call back or a response via [facility]?     Best call back number: [phone] (home)

## 2022-09-15 NOTE — TELEPHONE ENCOUNTER
Audiology recommends referral to ENT due to hearing loss. I will place referral.  Please have pt call [phone]

## 2022-09-15 NOTE — TELEPHONE ENCOUNTER
----- Message from LIZETT Don sent at 9/14/2022  2:51 PM CDT -----  Hi Ms. Patel,  Please see the encounter note for results and recommendations from today's audiological evaluation.  Recommend ENT evaluation due to asymmetric hearing loss with unilateral tinnitus.  Please let me know if I can provide any additional information.    Thank you for the referral,  Lizett Don., CCC-A

## 2022-09-19 ENCOUNTER — LAB VISIT (OUTPATIENT)
Dept: LAB | Facility: HOSPITAL | Age: 67
End: 2022-09-19
Attending: NURSE PRACTITIONER
Payer: MEDICARE

## 2022-09-19 ENCOUNTER — OFFICE VISIT (OUTPATIENT)
Dept: NEPHROLOGY | Facility: CLINIC | Age: 67
End: 2022-09-19
Payer: MEDICARE

## 2022-09-19 VITALS
BODY MASS INDEX: 37.65 KG/M2 | HEART RATE: 98 BPM | HEIGHT: 72 IN | DIASTOLIC BLOOD PRESSURE: 88 MMHG | OXYGEN SATURATION: 99 % | WEIGHT: 278 LBS | SYSTOLIC BLOOD PRESSURE: 142 MMHG

## 2022-09-19 DIAGNOSIS — N17.9 AKI (ACUTE KIDNEY INJURY): Primary | ICD-10-CM

## 2022-09-19 DIAGNOSIS — E11.22 TYPE 2 DIABETES MELLITUS WITH STAGE 3 CHRONIC KIDNEY DISEASE, UNSPECIFIED WHETHER LONG TERM INSULIN USE, UNSPECIFIED WHETHER STAGE 3A OR 3B CKD: ICD-10-CM

## 2022-09-19 DIAGNOSIS — R80.9 PROTEINURIA, UNSPECIFIED TYPE: ICD-10-CM

## 2022-09-19 DIAGNOSIS — N17.9 AKI (ACUTE KIDNEY INJURY): ICD-10-CM

## 2022-09-19 DIAGNOSIS — I10 HYPERTENSION, UNSPECIFIED TYPE: ICD-10-CM

## 2022-09-19 DIAGNOSIS — N18.31 STAGE 3A CHRONIC KIDNEY DISEASE: ICD-10-CM

## 2022-09-19 DIAGNOSIS — N18.30 TYPE 2 DIABETES MELLITUS WITH STAGE 3 CHRONIC KIDNEY DISEASE, UNSPECIFIED WHETHER LONG TERM INSULIN USE, UNSPECIFIED WHETHER STAGE 3A OR 3B CKD: ICD-10-CM

## 2022-09-19 DIAGNOSIS — R39.15 URINARY URGENCY: ICD-10-CM

## 2022-09-19 DIAGNOSIS — R35.0 URINARY FREQUENCY: ICD-10-CM

## 2022-09-19 DIAGNOSIS — D64.9 ANEMIA, UNSPECIFIED TYPE: ICD-10-CM

## 2022-09-19 DIAGNOSIS — E66.01 SEVERE OBESITY (BMI 35.0-39.9) WITH COMORBIDITY: ICD-10-CM

## 2022-09-19 LAB
ALBUMIN SERPL BCP-MCNC: 3.4 G/DL (ref 3.5–5.2)
ANION GAP SERPL CALC-SCNC: 9 MMOL/L (ref 8–16)
BASOPHILS # BLD AUTO: 0.04 K/UL (ref 0–0.2)
BASOPHILS NFR BLD: 0.4 % (ref 0–1.9)
BUN SERPL-MCNC: 31 MG/DL (ref 8–23)
CALCIUM SERPL-MCNC: 9.8 MG/DL (ref 8.7–10.5)
CHLORIDE SERPL-SCNC: 105 MMOL/L (ref 95–110)
CO2 SERPL-SCNC: 24 MMOL/L (ref 23–29)
CREAT SERPL-MCNC: 2.8 MG/DL (ref 0.5–1.4)
DIFFERENTIAL METHOD: ABNORMAL
EOSINOPHIL # BLD AUTO: 0.2 K/UL (ref 0–0.5)
EOSINOPHIL NFR BLD: 1.7 % (ref 0–8)
ERYTHROCYTE [DISTWIDTH] IN BLOOD BY AUTOMATED COUNT: 14.7 % (ref 11.5–14.5)
EST. GFR  (NO RACE VARIABLE): 24 ML/MIN/1.73 M^2
GLUCOSE SERPL-MCNC: 200 MG/DL (ref 70–110)
HCT VFR BLD AUTO: 33.7 % (ref 40–54)
HGB BLD-MCNC: 10.7 G/DL (ref 14–18)
IMM GRANULOCYTES # BLD AUTO: 0.09 K/UL (ref 0–0.04)
IMM GRANULOCYTES NFR BLD AUTO: 1 % (ref 0–0.5)
LYMPHOCYTES # BLD AUTO: 1.7 K/UL (ref 1–4.8)
LYMPHOCYTES NFR BLD: 19.1 % (ref 18–48)
MCH RBC QN AUTO: 29.3 PG (ref 27–31)
MCHC RBC AUTO-ENTMCNC: 31.8 G/DL (ref 32–36)
MCV RBC AUTO: 92 FL (ref 82–98)
MONOCYTES # BLD AUTO: 0.7 K/UL (ref 0.3–1)
MONOCYTES NFR BLD: 7.6 % (ref 4–15)
NEUTROPHILS # BLD AUTO: 6.3 K/UL (ref 1.8–7.7)
NEUTROPHILS NFR BLD: 70.2 % (ref 38–73)
NRBC BLD-RTO: 0 /100 WBC
PHOSPHATE SERPL-MCNC: 2.8 MG/DL (ref 2.7–4.5)
PLATELET # BLD AUTO: 323 K/UL (ref 150–450)
PMV BLD AUTO: 11.6 FL (ref 9.2–12.9)
POTASSIUM SERPL-SCNC: 3.6 MMOL/L (ref 3.5–5.1)
PTH-INTACT SERPL-MCNC: 160.1 PG/ML (ref 9–77)
RBC # BLD AUTO: 3.65 M/UL (ref 4.6–6.2)
SODIUM SERPL-SCNC: 138 MMOL/L (ref 136–145)
WBC # BLD AUTO: 9.04 K/UL (ref 3.9–12.7)

## 2022-09-19 PROCEDURE — 1126F AMNT PAIN NOTED NONE PRSNT: CPT | Mod: CPTII,S$GLB,, | Performed by: NURSE PRACTITIONER

## 2022-09-19 PROCEDURE — 3079F DIAST BP 80-89 MM HG: CPT | Mod: CPTII,S$GLB,, | Performed by: NURSE PRACTITIONER

## 2022-09-19 PROCEDURE — 3008F PR BODY MASS INDEX (BMI) DOCUMENTED: ICD-10-PCS | Mod: CPTII,S$GLB,, | Performed by: NURSE PRACTITIONER

## 2022-09-19 PROCEDURE — 85025 COMPLETE CBC W/AUTO DIFF WBC: CPT | Performed by: NURSE PRACTITIONER

## 2022-09-19 PROCEDURE — 3046F PR MOST RECENT HEMOGLOBIN A1C LEVEL > 9.0%: ICD-10-PCS | Mod: CPTII,S$GLB,, | Performed by: NURSE PRACTITIONER

## 2022-09-19 PROCEDURE — 3062F POS MACROALBUMINURIA REV: CPT | Mod: CPTII,S$GLB,, | Performed by: NURSE PRACTITIONER

## 2022-09-19 PROCEDURE — 4010F ACE/ARB THERAPY RXD/TAKEN: CPT | Mod: CPTII,S$GLB,, | Performed by: NURSE PRACTITIONER

## 2022-09-19 PROCEDURE — 1101F PR PT FALLS ASSESS DOC 0-1 FALLS W/OUT INJ PAST YR: ICD-10-PCS | Mod: CPTII,S$GLB,, | Performed by: NURSE PRACTITIONER

## 2022-09-19 PROCEDURE — 99204 OFFICE O/P NEW MOD 45 MIN: CPT | Mod: S$GLB,,, | Performed by: NURSE PRACTITIONER

## 2022-09-19 PROCEDURE — 1101F PT FALLS ASSESS-DOCD LE1/YR: CPT | Mod: CPTII,S$GLB,, | Performed by: NURSE PRACTITIONER

## 2022-09-19 PROCEDURE — 3066F NEPHROPATHY DOC TX: CPT | Mod: CPTII,S$GLB,, | Performed by: NURSE PRACTITIONER

## 2022-09-19 PROCEDURE — 3077F PR MOST RECENT SYSTOLIC BLOOD PRESSURE >= 140 MM HG: ICD-10-PCS | Mod: CPTII,S$GLB,, | Performed by: NURSE PRACTITIONER

## 2022-09-19 PROCEDURE — 3066F PR DOCUMENTATION OF TREATMENT FOR NEPHROPATHY: ICD-10-PCS | Mod: CPTII,S$GLB,, | Performed by: NURSE PRACTITIONER

## 2022-09-19 PROCEDURE — 3077F SYST BP >= 140 MM HG: CPT | Mod: CPTII,S$GLB,, | Performed by: NURSE PRACTITIONER

## 2022-09-19 PROCEDURE — 1126F PR PAIN SEVERITY QUANTIFIED, NO PAIN PRESENT: ICD-10-PCS | Mod: CPTII,S$GLB,, | Performed by: NURSE PRACTITIONER

## 2022-09-19 PROCEDURE — 1160F PR REVIEW ALL MEDS BY PRESCRIBER/CLIN PHARMACIST DOCUMENTED: ICD-10-PCS | Mod: CPTII,S$GLB,, | Performed by: NURSE PRACTITIONER

## 2022-09-19 PROCEDURE — 1159F PR MEDICATION LIST DOCUMENTED IN MEDICAL RECORD: ICD-10-PCS | Mod: CPTII,S$GLB,, | Performed by: NURSE PRACTITIONER

## 2022-09-19 PROCEDURE — 3062F PR POS MACROALBUMINURIA RESULT DOCUMENTED/REVIEW: ICD-10-PCS | Mod: CPTII,S$GLB,, | Performed by: NURSE PRACTITIONER

## 2022-09-19 PROCEDURE — 3288F FALL RISK ASSESSMENT DOCD: CPT | Mod: CPTII,S$GLB,, | Performed by: NURSE PRACTITIONER

## 2022-09-19 PROCEDURE — 1160F RVW MEDS BY RX/DR IN RCRD: CPT | Mod: CPTII,S$GLB,, | Performed by: NURSE PRACTITIONER

## 2022-09-19 PROCEDURE — 99999 PR PBB SHADOW E&M-EST. PATIENT-LVL V: CPT | Mod: PBBFAC,,, | Performed by: NURSE PRACTITIONER

## 2022-09-19 PROCEDURE — 3288F PR FALLS RISK ASSESSMENT DOCUMENTED: ICD-10-PCS | Mod: CPTII,S$GLB,, | Performed by: NURSE PRACTITIONER

## 2022-09-19 PROCEDURE — 99204 PR OFFICE/OUTPT VISIT, NEW, LEVL IV, 45-59 MIN: ICD-10-PCS | Mod: S$GLB,,, | Performed by: NURSE PRACTITIONER

## 2022-09-19 PROCEDURE — 99999 PR PBB SHADOW E&M-EST. PATIENT-LVL V: ICD-10-PCS | Mod: PBBFAC,,, | Performed by: NURSE PRACTITIONER

## 2022-09-19 PROCEDURE — 83970 ASSAY OF PARATHORMONE: CPT | Performed by: NURSE PRACTITIONER

## 2022-09-19 PROCEDURE — 36415 COLL VENOUS BLD VENIPUNCTURE: CPT | Mod: PO | Performed by: NURSE PRACTITIONER

## 2022-09-19 PROCEDURE — 3079F PR MOST RECENT DIASTOLIC BLOOD PRESSURE 80-89 MM HG: ICD-10-PCS | Mod: CPTII,S$GLB,, | Performed by: NURSE PRACTITIONER

## 2022-09-19 PROCEDURE — 3046F HEMOGLOBIN A1C LEVEL >9.0%: CPT | Mod: CPTII,S$GLB,, | Performed by: NURSE PRACTITIONER

## 2022-09-19 PROCEDURE — 3008F BODY MASS INDEX DOCD: CPT | Mod: CPTII,S$GLB,, | Performed by: NURSE PRACTITIONER

## 2022-09-19 PROCEDURE — 4010F PR ACE/ARB THEARPY RXD/TAKEN: ICD-10-PCS | Mod: CPTII,S$GLB,, | Performed by: NURSE PRACTITIONER

## 2022-09-19 PROCEDURE — 1159F MED LIST DOCD IN RCRD: CPT | Mod: CPTII,S$GLB,, | Performed by: NURSE PRACTITIONER

## 2022-09-19 PROCEDURE — 80069 RENAL FUNCTION PANEL: CPT | Performed by: NURSE PRACTITIONER

## 2022-09-19 NOTE — Clinical Note
Pls call pt. Kidney function looks a little worse than before. He should stop the lisinopril-hctz. I am sending labetolol 200 mg to be taken twice a day instead. He should continue to take his blood pressure for us twice a day. I put in a bunch of bloodwork today to be done soon (it would be great if it can be done while he is getting US today). Please warn him it's a lot of blood and he should make sure to eat first so he does not get woozy. I also would like to repeat a CBC, BMP early next week.  Thanks, Raven

## 2022-09-19 NOTE — PROGRESS NOTES
"Subjective:       Patient ID: Handy Adams is a 67 y.o. A male who presents for new evaluation of of renal dysfunction.      HPI     Patient is new to me. New to clinic.  Prior pertinent chart reviewed since this is patient's first appointment with me.    Patient presents for new evaluation of renal dysfunction.  Baseline creatinine of 1.6-1.7 in 2020-early 2022. Recently had sCr of 2.5. Patient said he has "not been taking care of himself" and "eating more sugar" since January.    Per recent note from PCP: He cannot afford farxiga due to increased price and had stopped taking it for a few months prior to appt in September.    Home BPs: does not take    Rare Excedrin use now, though he used to use it frequently.    Significant other medical problems include HTN since at least 2007, T2DM since at least 2007.      The patient denies taking herbal supplements, or new antibiotics, recreational drugs, recent episode of dehydration, diarrhea, nausea or vomiting, acute illness, hospitalization or exposure to IV radiocontrast.     Significant family hx includes: No known kidney issues    Last renal US: none in EMR    Review of Systems   HENT:  Negative for facial swelling and mouth sores.    Respiratory:  Negative for shortness of breath.    Cardiovascular:  Negative for leg swelling.   Gastrointestinal:  Negative for diarrhea, nausea and vomiting.   Genitourinary:  Positive for frequency (nocturia 3-4x) and urgency. Negative for difficulty urinating, dysuria, flank pain and hematuria.        Endorses foamy urine   Musculoskeletal:  Negative for back pain.   Skin:  Negative for rash.   Neurological:  Negative for dizziness and headaches.     Objective:       Blood pressure (!) 142/88, pulse 98, height 6' (1.829 m), weight 126.1 kg (278 lb), SpO2 99 %.  Physical Exam  Vitals reviewed.   Constitutional:       General: He is not in acute distress.     Appearance: He is well-developed. He is obese.   Cardiovascular:      Rate " and Rhythm: Normal rate and regular rhythm.      Heart sounds: Normal heart sounds. No murmur heard.    No friction rub. No gallop.   Pulmonary:      Effort: Pulmonary effort is normal. No respiratory distress.      Breath sounds: Normal breath sounds.   Abdominal:      Tenderness: There is no right CVA tenderness or left CVA tenderness.   Musculoskeletal:      Cervical back: Neck supple.      Right lower leg: No edema.      Left lower leg: No edema.   Skin:     General: Skin is warm and dry.      Findings: No lesion or rash.   Neurological:      Mental Status: He is alert and oriented to person, place, and time.   Psychiatric:         Mood and Affect: Mood normal.         Behavior: Behavior normal.         Thought Content: Thought content normal.         Judgment: Judgment normal.         Lab Results   Component Value Date    CREATININE 2.5 (H) 09/07/2022     No results found for: UTPCR  Lab Results   Component Value Date     09/07/2022    K 3.6 09/07/2022    CO2 22 (L) 09/07/2022     09/07/2022     Lab Results   Component Value Date    CALCIUM 9.7 09/07/2022     Lab Results   Component Value Date    HGB 10.7 (L) 09/07/2022    WBC 9.22 09/07/2022    HCT 32.0 (L) 09/07/2022      Lab Results   Component Value Date    HGBA1C 10.4 (H) 09/07/2022     09/07/2022    BUN 20 09/07/2022     Lab Results   Component Value Date    LDLCALC 94.4 09/07/2022         Assessment:       1. YESENIA (acute kidney injury)    2. Stage 3a chronic kidney disease    3. Urinary urgency    4. Urinary frequency    5. Type 2 diabetes mellitus with stage 3 chronic kidney disease, unspecified whether long term insulin use, unspecified whether stage 3a or 3b CKD    6. Anemia, unspecified type    7. Proteinuria, unspecified type    8. Hypertension, unspecified type    9. Severe obesity (BMI 35.0-39.9) with comorbidity          Plan:   CKD stage 3A c eGFR 48-51 mL/min c superimposed YESENIA vs. progression- Underlying CKD clinically 2/2  diabetes. Recently with YESENIA vs. Progression. Repeat labs today. Educated patient to control BP, BG, remain well-hydrated, and avoid NSAIDs to prevent progression of CKD.    Will obtain renal US.  Also counseled on benefits of weight loss through diet and exercise.    UPCR Significant albuminuria since 2011 with increase to almost nephrotic range proteinuria in January. Needs repeat. Was on farxiga but could not afford it.    Clinically 2/2 T2DM. If still significantly elevated, will perform nephrotic serologies.   Acid-base Mild acidosis in setting of YESENIA.   Renal osteodystrophy Ca okay.   Anemia Hgb at goal for CKD, but had dropped in setting of YESENIA.   DM Poorly-controlled. Has had DM since at least 2007, when he had an A1c of 15+.   Lipid Management On statin.   ESRD planning Anticipatory guidance provided about timing of dialysis. Start discussions and planning when eGFR is about 20 mL/min; most patients start dialysis between 5-10 mL/min.       HTN - High today on lisinopril-Hctz 20-12.5 mg, amlodipine 10 mg  - Plan to take home BPs BID x 2 week and send in results.  - If repeat bloodwork still reveals YESENIA, will hold lisinopril-hctz and replace with labetolol  - Goal is SBP less than 130/80    Urinary urgency and frequency - Refer to urology    All questions patient had were answered.  Asked if further questions. None. F/u in clinic in 1 mos  with labs and urine prior to next visit or sooner if needed.  ER for emergency concerns.    Summary of Plan:  Renal US  Hold lisinopril-hctz; start labetolol depending on today's results  3. UA, UPCR, CBC, RFP, PTH  4. RTC in 1 mos    Addendum 9/20/22: Received labs from after visit. Plan to complete proteinuria serologies as well as hold lisinopril and start labetolol 200 mg BID.

## 2022-09-20 ENCOUNTER — HOSPITAL ENCOUNTER (OUTPATIENT)
Dept: RADIOLOGY | Facility: HOSPITAL | Age: 67
Discharge: HOME | End: 2022-09-20
Attending: NURSE PRACTITIONER
Payer: MEDICARE

## 2022-09-20 ENCOUNTER — TELEPHONE (OUTPATIENT)
Dept: NEPHROLOGY | Facility: CLINIC | Age: 67
End: 2022-09-20
Payer: MEDICARE

## 2022-09-20 DIAGNOSIS — N17.9 AKI (ACUTE KIDNEY INJURY): ICD-10-CM

## 2022-09-20 DIAGNOSIS — N18.31 STAGE 3A CHRONIC KIDNEY DISEASE: ICD-10-CM

## 2022-09-20 PROCEDURE — 76770 US EXAM ABDO BACK WALL COMP: CPT | Mod: 26,,, | Performed by: RADIOLOGY

## 2022-09-20 PROCEDURE — 76770 US RETROPERITONEAL COMPLETE: ICD-10-PCS | Mod: 26,,, | Performed by: RADIOLOGY

## 2022-09-20 PROCEDURE — 76770 US EXAM ABDO BACK WALL COMP: CPT | Mod: TC

## 2022-09-20 RX ORDER — LABETALOL 200 MG/1
200 TABLET, FILM COATED ORAL 2 TIMES DAILY
Qty: 180 TABLET | Refills: 3 | Status: SHIPPED | OUTPATIENT
Start: 2022-09-20 | End: 2023-09-28

## 2022-09-20 NOTE — TELEPHONE ENCOUNTER
Message  Received: Today  Raven Naylor, LISA Espinoza MA  Pls call pt. Kidney function looks a little worse than before. He should stop the lisinopril-hctz. I am sending labetolol 200 mg to be taken twice a day instead. He should continue to take his blood pressure for us twice a day. I put in a bunch of bloodwork today to be done soon (it would be great if it can be done while he is getting US today). Please warn him it's a lot of blood and he should make sure to eat first so he does not get woozy. I also would like to repeat a CBC, BMP early next week.     Thanks

## 2022-09-21 ENCOUNTER — LAB VISIT (OUTPATIENT)
Dept: LAB | Facility: HOSPITAL | Age: 67
End: 2022-09-21
Attending: NURSE PRACTITIONER
Payer: MEDICARE

## 2022-09-21 DIAGNOSIS — R80.9 PROTEINURIA, UNSPECIFIED TYPE: ICD-10-CM

## 2022-09-21 LAB
C3 SERPL-MCNC: 118 MG/DL (ref 50–180)
C4 SERPL-MCNC: 24 MG/DL (ref 11–44)
HBV SURFACE AB SER-ACNC: <3 MIU/ML
HBV SURFACE AB SER-ACNC: NORMAL M[IU]/ML
HBV SURFACE AG SERPL QL IA: NORMAL
HCV AB SERPL QL IA: NORMAL
HIV 1+2 AB+HIV1 P24 AG SERPL QL IA: NORMAL

## 2022-09-21 PROCEDURE — 86334 IMMUNOFIX E-PHORESIS SERUM: CPT | Mod: 26,,, | Performed by: PATHOLOGY

## 2022-09-21 PROCEDURE — 87340 HEPATITIS B SURFACE AG IA: CPT | Performed by: NURSE PRACTITIONER

## 2022-09-21 PROCEDURE — 84165 PROTEIN E-PHORESIS SERUM: CPT | Performed by: NURSE PRACTITIONER

## 2022-09-21 PROCEDURE — 86160 COMPLEMENT ANTIGEN: CPT | Performed by: NURSE PRACTITIONER

## 2022-09-21 PROCEDURE — 30000890 HC MISC. SEND OUT TEST

## 2022-09-21 PROCEDURE — 83520 IMMUNOASSAY QUANT NOS NONAB: CPT | Mod: 59 | Performed by: NURSE PRACTITIONER

## 2022-09-21 PROCEDURE — 87389 HIV-1 AG W/HIV-1&-2 AB AG IA: CPT | Performed by: NURSE PRACTITIONER

## 2022-09-21 PROCEDURE — 86334 PATHOLOGIST INTERPRETATION IFE: ICD-10-PCS | Mod: 26,,, | Performed by: PATHOLOGY

## 2022-09-21 PROCEDURE — 86038 ANTINUCLEAR ANTIBODIES: CPT | Performed by: NURSE PRACTITIONER

## 2022-09-21 PROCEDURE — 84165 PROTEIN E-PHORESIS SERUM: CPT | Mod: 26,,, | Performed by: PATHOLOGY

## 2022-09-21 PROCEDURE — 36415 COLL VENOUS BLD VENIPUNCTURE: CPT | Mod: PO | Performed by: NURSE PRACTITIONER

## 2022-09-21 PROCEDURE — 86160 COMPLEMENT ANTIGEN: CPT | Mod: 59 | Performed by: NURSE PRACTITIONER

## 2022-09-21 PROCEDURE — 86334 IMMUNOFIX E-PHORESIS SERUM: CPT | Performed by: NURSE PRACTITIONER

## 2022-09-21 PROCEDURE — 86255 FLUORESCENT ANTIBODY SCREEN: CPT

## 2022-09-21 PROCEDURE — 83520 IMMUNOASSAY QUANT NOS NONAB: CPT | Mod: 91 | Performed by: NURSE PRACTITIONER

## 2022-09-21 PROCEDURE — 86803 HEPATITIS C AB TEST: CPT | Performed by: NURSE PRACTITIONER

## 2022-09-21 PROCEDURE — 84165 PATHOLOGIST INTERPRETATION SPE: ICD-10-PCS | Mod: 26,,, | Performed by: PATHOLOGY

## 2022-09-21 PROCEDURE — 86706 HEP B SURFACE ANTIBODY: CPT | Performed by: NURSE PRACTITIONER

## 2022-09-22 ENCOUNTER — OFFICE VISIT (OUTPATIENT)
Dept: PSYCHIATRY | Facility: CLINIC | Age: 67
End: 2022-09-22
Payer: MEDICARE

## 2022-09-22 DIAGNOSIS — R41.89 IMPAIRED COGNITION: Primary | ICD-10-CM

## 2022-09-22 LAB
ALBUMIN SERPL ELPH-MCNC: 3.39 G/DL (ref 3.35–5.55)
ALPHA1 GLOB SERPL ELPH-MCNC: 0.25 G/DL (ref 0.17–0.41)
ALPHA2 GLOB SERPL ELPH-MCNC: 0.87 G/DL (ref 0.43–0.99)
ANA SER QL IF: NORMAL
B-GLOBULIN SERPL ELPH-MCNC: 1.36 G/DL (ref 0.5–1.1)
GAMMA GLOB SERPL ELPH-MCNC: 0.52 G/DL (ref 0.67–1.58)
INTERPRETATION SERPL IFE-IMP: NORMAL
KAPPA LC SER QL IA: 6.21 MG/DL (ref 0.33–1.94)
KAPPA LC/LAMBDA SER IA: 1.57 (ref 0.26–1.65)
LAMBDA LC SER QL IA: 3.95 MG/DL (ref 0.57–2.63)
PATHOLOGIST INTERPRETATION IFE: NORMAL
PROT SERPL-MCNC: 6.4 G/DL (ref 6–8.4)

## 2022-09-22 PROCEDURE — 4010F ACE/ARB THERAPY RXD/TAKEN: CPT | Mod: CPTII,95,, | Performed by: PHYSICIAN ASSISTANT

## 2022-09-22 PROCEDURE — 3046F PR MOST RECENT HEMOGLOBIN A1C LEVEL > 9.0%: ICD-10-PCS | Mod: CPTII,95,, | Performed by: PHYSICIAN ASSISTANT

## 2022-09-22 PROCEDURE — 3066F PR DOCUMENTATION OF TREATMENT FOR NEPHROPATHY: ICD-10-PCS | Mod: CPTII,95,, | Performed by: PHYSICIAN ASSISTANT

## 2022-09-22 PROCEDURE — 1159F MED LIST DOCD IN RCRD: CPT | Mod: CPTII,95,, | Performed by: PHYSICIAN ASSISTANT

## 2022-09-22 PROCEDURE — 3046F HEMOGLOBIN A1C LEVEL >9.0%: CPT | Mod: CPTII,95,, | Performed by: PHYSICIAN ASSISTANT

## 2022-09-22 PROCEDURE — 3062F POS MACROALBUMINURIA REV: CPT | Mod: CPTII,95,, | Performed by: PHYSICIAN ASSISTANT

## 2022-09-22 PROCEDURE — 99214 PR OFFICE/OUTPT VISIT, EST, LEVL IV, 30-39 MIN: ICD-10-PCS | Mod: 95,,, | Performed by: PHYSICIAN ASSISTANT

## 2022-09-22 PROCEDURE — 3062F PR POS MACROALBUMINURIA RESULT DOCUMENTED/REVIEW: ICD-10-PCS | Mod: CPTII,95,, | Performed by: PHYSICIAN ASSISTANT

## 2022-09-22 PROCEDURE — 99214 OFFICE O/P EST MOD 30 MIN: CPT | Mod: 95,,, | Performed by: PHYSICIAN ASSISTANT

## 2022-09-22 PROCEDURE — 3066F NEPHROPATHY DOC TX: CPT | Mod: CPTII,95,, | Performed by: PHYSICIAN ASSISTANT

## 2022-09-22 PROCEDURE — 1160F PR REVIEW ALL MEDS BY PRESCRIBER/CLIN PHARMACIST DOCUMENTED: ICD-10-PCS | Mod: CPTII,95,, | Performed by: PHYSICIAN ASSISTANT

## 2022-09-22 PROCEDURE — 1160F RVW MEDS BY RX/DR IN RCRD: CPT | Mod: CPTII,95,, | Performed by: PHYSICIAN ASSISTANT

## 2022-09-22 PROCEDURE — 1159F PR MEDICATION LIST DOCUMENTED IN MEDICAL RECORD: ICD-10-PCS | Mod: CPTII,95,, | Performed by: PHYSICIAN ASSISTANT

## 2022-09-22 PROCEDURE — 4010F PR ACE/ARB THEARPY RXD/TAKEN: ICD-10-PCS | Mod: CPTII,95,, | Performed by: PHYSICIAN ASSISTANT

## 2022-09-22 NOTE — PROGRESS NOTES
"Outpatient Psychiatry Follow-Up Visit ([facility])      9/22/2022    Clinical Status of Patient:  Outpatient (Ambulatory)    Chief Complaint:  Handy Adams is a 67 y.o. male who presents today for follow-up of attention problems.  Met with patient.      The patient location is: Home in [place]  The chief complaint leading to consultation is: F/u memory    Visit type: audiovisual    Face to Face time with patient: 12 min  20 minutes of total time spent on the encounter, which includes face to face time and non-face to face time preparing to see the patient (eg, review of tests), Obtaining and/or reviewing separately obtained history, Documenting clinical information in the electronic or other health record, Independently interpreting results (not separately reported) and communicating results to the patient/family/caregiver, or Care coordination (not separately reported).         Each patient to whom he or she provides medical services by telemedicine is:  (1) informed of the relationship between the physician and patient and the respective role of any other health care provider with respect to management of the patient; and (2) notified that he or she may decline to receive medical services by telemedicine and may withdraw from such care at any time.    Notes:       Interval History and Content of Current Session:  Interim Events/Subjective Report/Content of Current Session:   Pt returns for follow up of attention difficulty. Last visit we discussed possible cognitive impairment.  He is currently not on any medication.  Did not get labs done ordered last minute due to confusion, and never received a call from the neuropsychology department after the referral was placed.  MRI non-concerning.  Pt reports mood is good, when asked how he is today he says "frustrated because I'm having some problems with communication with my contacts."  He is able to express that he is having some sort of trouble with his car and his cell " service is making the phone calls difficult.  He again is speaking in a round about way, but does keep to the subject he is trying to talk about, like he is having trouble coming up with the accurate words, although there si no latency of speech.  He reports his memory and cognition have not changed.        Review of Systems   PSYCHIATRIC: Pertinant items are noted in the narrative.  CONSTITUTIONAL: No weight gain or loss.   MUSCULOSKELETAL: No pain or stiffness of the joints.  NEUROLOGIC: No sensory changes, seizures, confusion, memory loss, tremor or other abnormal movements. Positive for limb weakness and poor balance.    Past Medical, Family and Social History: The patient's past medical, family and social history have been reviewed and updated as appropriate within the electronic medical record - see encounter notes.    Compliance: no    Side effects: None    Risk Parameters:  Patient reports no suicidal ideation  Patient reports no homicidal ideation  Patient reports no self-injurious behavior  Patient reports no violent behavior    Exam (detailed: at least 9 elements; comprehensive: all 15 elements)   Constitutional  Vitals:  Most recent vital signs, dated less than 90 days prior to this appointment, were reviewed.   There were no vitals filed for this visit.       General:  unremarkable, age appropriate     Musculoskeletal  Muscle Strength/Tone:  no tremor, no tic   Gait & Station:  unobserved     Psychiatric  Speech:  no latency; no press   Mood & Affect:  steady, euthymic  congruent and appropriate   Thought Process:  normal and logical   Associations:  intact   Thought Content:  normal, no suicidality, no homicidality, delusions, or paranoia   Insight:  has awareness of illness   Judgement: behavior is adequate to circumstances   Orientation:  grossly intact   Memory: intact for content of interview   Language: grossly intact   Attention Span & Concentration:  able to focus   Fund of Knowledge:  intact  and appropriate to age and level of education     Assessment and Diagnosis   Status/Progress: Based on the examination today, the patient's problem(s) is/are inadequately controlled.  New problems have been presented today.   Diagnostic uncertainty and Lack of compliance are complicating management of the primary condition.  The working differential for this patient includes lack of compliance vs dementia .     General Impression: Focus and concentration issues.  MRI brain without contributing process identified.  Still needs to get labs and neuropsych eval.        ICD-10-CM ICD-9-CM   1. Impaired cognition  R41.89 294.9       Pt to get labs done and gave him the phone number to the neuropsych department  2.   F/u 3 months    Intervention/Counseling/Treatment Plan   Labs, Diagnostic Studies: order CBC, RPR, HIV, TSH, Vit B12, Vit D, MRI Brain      Return to Clinic: 3 months

## 2022-09-23 LAB — PATHOLOGIST INTERPRETATION SPE: NORMAL

## 2022-09-26 ENCOUNTER — OFFICE VISIT (OUTPATIENT)
Dept: ENDOCRINOLOGY | Facility: CLINIC | Age: 67
End: 2022-09-26
Payer: MEDICARE

## 2022-09-26 VITALS
TEMPERATURE: 98 F | HEART RATE: 76 BPM | BODY MASS INDEX: 37.3 KG/M2 | DIASTOLIC BLOOD PRESSURE: 93 MMHG | WEIGHT: 275 LBS | SYSTOLIC BLOOD PRESSURE: 150 MMHG

## 2022-09-26 DIAGNOSIS — N18.31 STAGE 3A CHRONIC KIDNEY DISEASE: ICD-10-CM

## 2022-09-26 DIAGNOSIS — I10 ESSENTIAL HYPERTENSION: ICD-10-CM

## 2022-09-26 DIAGNOSIS — N17.9 AKI (ACUTE KIDNEY INJURY): ICD-10-CM

## 2022-09-26 LAB — PHOSPHOLIPASE A2 RECEPTOR, ELISA: <2 RU/ML

## 2022-09-26 PROCEDURE — 1160F PR REVIEW ALL MEDS BY PRESCRIBER/CLIN PHARMACIST DOCUMENTED: ICD-10-PCS | Mod: CPTII,S$GLB,, | Performed by: NURSE PRACTITIONER

## 2022-09-26 PROCEDURE — 3062F PR POS MACROALBUMINURIA RESULT DOCUMENTED/REVIEW: ICD-10-PCS | Mod: CPTII,S$GLB,, | Performed by: NURSE PRACTITIONER

## 2022-09-26 PROCEDURE — 4010F PR ACE/ARB THEARPY RXD/TAKEN: ICD-10-PCS | Mod: CPTII,S$GLB,, | Performed by: NURSE PRACTITIONER

## 2022-09-26 PROCEDURE — 99499 RISK ADDL DX/OHS AUDIT: ICD-10-PCS | Mod: S$GLB,,, | Performed by: NURSE PRACTITIONER

## 2022-09-26 PROCEDURE — 3008F PR BODY MASS INDEX (BMI) DOCUMENTED: ICD-10-PCS | Mod: CPTII,S$GLB,, | Performed by: NURSE PRACTITIONER

## 2022-09-26 PROCEDURE — 1159F PR MEDICATION LIST DOCUMENTED IN MEDICAL RECORD: ICD-10-PCS | Mod: CPTII,S$GLB,, | Performed by: NURSE PRACTITIONER

## 2022-09-26 PROCEDURE — 1160F RVW MEDS BY RX/DR IN RCRD: CPT | Mod: CPTII,S$GLB,, | Performed by: NURSE PRACTITIONER

## 2022-09-26 PROCEDURE — 3046F PR MOST RECENT HEMOGLOBIN A1C LEVEL > 9.0%: ICD-10-PCS | Mod: CPTII,S$GLB,, | Performed by: NURSE PRACTITIONER

## 2022-09-26 PROCEDURE — 99999 PR PBB SHADOW E&M-EST. PATIENT-LVL III: ICD-10-PCS | Mod: PBBFAC,,, | Performed by: NURSE PRACTITIONER

## 2022-09-26 PROCEDURE — 1126F AMNT PAIN NOTED NONE PRSNT: CPT | Mod: CPTII,S$GLB,, | Performed by: NURSE PRACTITIONER

## 2022-09-26 PROCEDURE — 1159F MED LIST DOCD IN RCRD: CPT | Mod: CPTII,S$GLB,, | Performed by: NURSE PRACTITIONER

## 2022-09-26 PROCEDURE — 3062F POS MACROALBUMINURIA REV: CPT | Mod: CPTII,S$GLB,, | Performed by: NURSE PRACTITIONER

## 2022-09-26 PROCEDURE — 3080F DIAST BP >= 90 MM HG: CPT | Mod: CPTII,S$GLB,, | Performed by: NURSE PRACTITIONER

## 2022-09-26 PROCEDURE — 99204 OFFICE O/P NEW MOD 45 MIN: CPT | Mod: S$GLB,,, | Performed by: NURSE PRACTITIONER

## 2022-09-26 PROCEDURE — 99999 PR PBB SHADOW E&M-EST. PATIENT-LVL III: CPT | Mod: PBBFAC,,, | Performed by: NURSE PRACTITIONER

## 2022-09-26 PROCEDURE — 3066F NEPHROPATHY DOC TX: CPT | Mod: CPTII,S$GLB,, | Performed by: NURSE PRACTITIONER

## 2022-09-26 PROCEDURE — 3066F PR DOCUMENTATION OF TREATMENT FOR NEPHROPATHY: ICD-10-PCS | Mod: CPTII,S$GLB,, | Performed by: NURSE PRACTITIONER

## 2022-09-26 PROCEDURE — 3080F PR MOST RECENT DIASTOLIC BLOOD PRESSURE >= 90 MM HG: ICD-10-PCS | Mod: CPTII,S$GLB,, | Performed by: NURSE PRACTITIONER

## 2022-09-26 PROCEDURE — 1126F PR PAIN SEVERITY QUANTIFIED, NO PAIN PRESENT: ICD-10-PCS | Mod: CPTII,S$GLB,, | Performed by: NURSE PRACTITIONER

## 2022-09-26 PROCEDURE — 3077F PR MOST RECENT SYSTOLIC BLOOD PRESSURE >= 140 MM HG: ICD-10-PCS | Mod: CPTII,S$GLB,, | Performed by: NURSE PRACTITIONER

## 2022-09-26 PROCEDURE — 3077F SYST BP >= 140 MM HG: CPT | Mod: CPTII,S$GLB,, | Performed by: NURSE PRACTITIONER

## 2022-09-26 PROCEDURE — 3046F HEMOGLOBIN A1C LEVEL >9.0%: CPT | Mod: CPTII,S$GLB,, | Performed by: NURSE PRACTITIONER

## 2022-09-26 PROCEDURE — 99204 PR OFFICE/OUTPT VISIT, NEW, LEVL IV, 45-59 MIN: ICD-10-PCS | Mod: S$GLB,,, | Performed by: NURSE PRACTITIONER

## 2022-09-26 PROCEDURE — 99499 UNLISTED E&M SERVICE: CPT | Mod: S$GLB,,, | Performed by: NURSE PRACTITIONER

## 2022-09-26 PROCEDURE — 3008F BODY MASS INDEX DOCD: CPT | Mod: CPTII,S$GLB,, | Performed by: NURSE PRACTITIONER

## 2022-09-26 PROCEDURE — 4010F ACE/ARB THERAPY RXD/TAKEN: CPT | Mod: CPTII,S$GLB,, | Performed by: NURSE PRACTITIONER

## 2022-09-26 RX ORDER — LINAGLIPTIN 5 MG/1
5 TABLET, FILM COATED ORAL DAILY
Qty: 30 TABLET | Refills: 3 | Status: SHIPPED | OUTPATIENT
Start: 2022-09-26 | End: 2022-11-10 | Stop reason: SDUPTHER

## 2022-09-26 NOTE — PROGRESS NOTES
"CC: This 67 y.o. Black or  male  is here for evaluation of  T2DM along with comorbidities indicated in the Visit Diagnosis section of this encounter.    HPI: Handy Adams was diagnosed with T2DM in his early 60s.     DM COMPLICATIONS: nephropathy and retinopathy    New to Endocrine. Referred by Dr. Sanderson.   Pt was taking metformin 1000 mg bid up until a few weeks ago when it was dc'd by Primary d/t concerns re: YESENIA. eGFR decreased from 51 in Jan to 24.   Reports some memory issues lately but he remains adherent with meds.   He feels determined to improve diet, maintain exercise, lose weight, and improve kidney function.       LAST DIABETES EDUCATION: years ago     HOSPITALIZED FOR DIABETES  -  No.    SIGNIFICANT DIABETES MED HISTORY: denies intolerance to prior DM meds      PRESCRIBED DIABETES MEDICATIONS:   Januvia 100 mg once daily   Pioglitazone 45 mg once daily     Misses medication doses - no     SELF MONITORING BLOOD GLUCOSE: Checks blood glucose at home - not much.   Poct glucose fasting - 103.     HYPOGLYCEMIC EPISODES: none      CURRENT DIET: drinks water and diet coke. Recently stopped diet products.   Admits that his diet up until recently was not healthy - included sugary drinks, fast food, sweets.  "I didn't care what I ate."   Breakfast - protein shake.   Eats lunch and light dinner.     Diet recall: turkey sandwich for lunch with whole wheat. No breakfast or dinner yesterday. Pt today fasting x 24 hours.     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 + memory loss.   Other: denies polydipsia     PHYSICAL EXAM:  GENERAL: Well developed, well nourished. No acute distress.   PSYCH: AAOx3, appropriate mood and affect, conversant, well-groomed. Judgement and insight good.   NEURO: Cranial nerves grossly intact. Speech clear, no tremor.   NECK: Trachea midline, no thyromegaly or lymphadenopathy.   CHEST: Respirations even and unlabored. CTA bilaterally.  CARDIOVASCULAR: Regular rate and rhythm. No bruits. No murmur. 1-2 + pitting pretibial edema ble, right > left.   ABDOMEN: Soft, non-tender, non-distended. Bowel sounds present.   MS: Gait steady. No clubbing.   SKIN: Normal skin turgor. Skin warm and dry. No areas of breakdown. No acanthosis nigricans.  FEET:     Protective Sensation (w/ 10 gram monofilament):  Right: Intact  Left: Intact    Visual Inspection:  Skin Breakdown -  Neither    Pedal Pulses:   Right: Present  Left: Present    Posterior tibialis:   Right:Diminished  Left: Diminished    Hemoglobin A1C   Date Value Ref Range Status   09/07/2022 10.4 (H) 4.0 - 5.6 % Final     Comment:     ADA Screening Guidelines:  5.7-6.4%  Consistent with prediabetes  >or=6.5%  Consistent with diabetes    High levels of fetal hemoglobin interfere with the HbA1C  assay. Heterozygous hemoglobin variants (HbS, HgC, etc)do  not significantly interfere with this assay.   However, presence of multiple variants may affect accuracy.     01/03/2022 9.4 (H) 4.0 - 5.6 % Final     Comment:     ADA Screening Guidelines:  5.7-6.4%  Consistent with prediabetes  >or=6.5%  Consistent with diabetes    High levels of fetal hemoglobin interfere with the HbA1C  assay. Heterozygous hemoglobin variants (HbS, HgC, etc)do  not significantly interfere with this assay.   However, presence of multiple variants may affect accuracy.     07/23/2020 10.4 (H) 4.0 - 5.6 % Final     Comment:     ADA Screening  Guidelines:  5.7-6.4%  Consistent with prediabetes  >or=6.5%  Consistent with diabetes  High levels of fetal hemoglobin interfere with the HbA1C  assay. Heterozygous hemoglobin variants (HbS, HgC, etc)do  not significantly interfere with this assay.   However, presence of multiple variants may affect accuracy.         No results found for: CPEPTIDE, GLUTAMICACID, ISLETCELLANT, FRUCTOSAMINE     Lab Results   Component Value Date    CHOL 197 09/07/2022    CHOL 223 (H) 01/03/2022    CHOL 183 07/23/2020     Lab Results   Component Value Date    HDL 30 (L) 09/07/2022    HDL 34 (L) 01/03/2022    HDL 31 (L) 07/23/2020     Lab Results   Component Value Date    LDLCALC 94.4 09/07/2022    LDLCALC 133.4 01/03/2022    LDLCALC Invalid, Trig>400.0 07/23/2020     Lab Results   Component Value Date    TRIG 363 (H) 09/07/2022    TRIG 278 (H) 01/03/2022    TRIG 443 (H) 07/23/2020     Lab Results   Component Value Date    CHOLHDL 15.2 (L) 09/07/2022    CHOLHDL 15.2 (L) 01/03/2022    CHOLHDL 16.9 (L) 07/23/2020         Chemistry        Component Value Date/Time     09/19/2022 1103    K 3.6 09/19/2022 1103     09/19/2022 1103    CO2 24 09/19/2022 1103    BUN 31 (H) 09/19/2022 1103    CREATININE 2.8 (H) 09/19/2022 1103     (H) 09/19/2022 1103        Component Value Date/Time    CALCIUM 9.8 09/19/2022 1103    ALKPHOS 58 09/07/2022 1045    AST 11 09/07/2022 1045    ALT 13 09/07/2022 1045    BILITOT 0.7 09/07/2022 1045    ESTGFRAFRICA 51.1 (A) 01/03/2022 0922    EGFRNONAA 44.2 (A) 01/03/2022 0922           Latest Reference Range & Units 09/19/22 11:03   Creatinine 0.5 - 1.4 mg/dL 2.8 (H)   eGFR >60 mL/min/1.73 m^2 24.0 !       Lab Results   Component Value Date    LABMICR 1420.0 01/03/2022    CREATRANDUR 67.0 09/19/2022    MICALBCREAT 3302.3 (H) 01/03/2022             ASSESSMENT and PLAN:    A1C GOAL: < 7 %     1. Diabetes mellitus type 2, uncontrolled, with complications  Discussed progression of DM disease, long term  complications, and tx options. Reviewed A1c and BG goals.      Recommend following up with Ophthalmology for diabetes retinopathy.   Maintain exerciser regimen.   Pt declines diabetes educator at this time, will reconsider if DM not controlled in the future.   Recommend eating at least 2 times per day, ideally balanced meals.     Hold metformin for now.   Switch from Januvia to Tradjenta 5 mg once daily.   Ok to continue pioglitazone 45 mg once daily for now.     Test glucose 2x/day - fasting and again 2 hours after eating. Keep a log. Declines personal CGM d/t cost.     Bring log to every visit. Rtc in 4 wks.     POCT Glucose, Hand-Held Device    linaGLIPtin (TRADJENTA) 5 mg Tab tablet      2. Stage 3a chronic kidney disease  Followed by Nephrology.       3. YESENIA (acute kidney injury)  Hold metformin.       4. Essential hypertension  Per Nephrology.           Orders Placed This Encounter   Procedures    POCT Glucose, Hand-Held Device        Follow up in about 4 weeks (around 10/24/2022).     Thank you very much for allowing me to participate in Handy Adams's care.

## 2022-09-26 NOTE — PATIENT INSTRUCTIONS
A1C goal: <7%  Fasting/premeal blood glucose goal:   2 hour post-meal blood glucose goal: less than 180      Recommend following up with Ophthalmology for diabetes retinopathy.   Maintain exerciser regimen.   Pt declines diabetes educator at this time, will reconsider if DM not controlled in the future.   Recommend eating at least 2 times per day, ideally balanced meals.     Hold metformin for now.   Switch from Januvia to Tradjenta 5 mg once daily.   Ok to continue pioglitazone 45 mg once daily for now.     Test glucose 2x/day - fasting and again 2 hours after eating. Keep a log. Declines personal CGM d/t cost.     Bring log to every visit.     Return to clinic in 4 weeks.

## 2022-09-28 ENCOUNTER — PATIENT MESSAGE (OUTPATIENT)
Dept: NEPHROLOGY | Facility: CLINIC | Age: 67
End: 2022-09-28
Payer: MEDICARE

## 2022-10-05 ENCOUNTER — PATIENT MESSAGE (OUTPATIENT)
Dept: NEPHROLOGY | Facility: CLINIC | Age: 67
End: 2022-10-05
Payer: MEDICARE

## 2022-10-11 ENCOUNTER — PATIENT OUTREACH (OUTPATIENT)
Dept: ADMINISTRATIVE | Facility: HOSPITAL | Age: 67
End: 2022-10-11
Payer: MEDICARE

## 2022-10-12 ENCOUNTER — CLINICAL SUPPORT (OUTPATIENT)
Dept: FAMILY MEDICINE | Facility: CLINIC | Age: 67
End: 2022-10-12
Payer: MEDICARE

## 2022-10-12 VITALS — SYSTOLIC BLOOD PRESSURE: 148 MMHG | DIASTOLIC BLOOD PRESSURE: 92 MMHG

## 2022-10-12 DIAGNOSIS — I10 ESSENTIAL HYPERTENSION: Primary | ICD-10-CM

## 2022-10-12 PROCEDURE — 99999 PR PBB SHADOW E&M-EST. PATIENT-LVL I: ICD-10-PCS | Mod: PBBFAC,,,

## 2022-10-12 PROCEDURE — 99999 PR PBB SHADOW E&M-EST. PATIENT-LVL I: CPT | Mod: PBBFAC,,,

## 2022-10-12 NOTE — PROGRESS NOTES
Chief Complaint   Patient presents with    Medication Refill       HPI    Handy Adams is 62 y.o. male. The primary encounter diagnosis was Uncontrolled type 2 diabetes mellitus without complication, without long-term current use of insulin. Diagnoses of Essential hypertension and Mixed hyperlipidemia were also pertinent to this visit.     62 year old male with HTN, Diabetes, and HLD [facility].  Patient reports that he has improved his compliance with medications over the last 2 months.  He attributes his lack of follow up to job hunting outside of the state.    HTN - patient notes that he does not check his blood pressure at home. Patient admits that he has recently begun to take his medication but did not take it today.  Diabetes - patient admits to infrequent testing.  He admits that it has been months since he checked. He reports medication compliance.  Obesity- patient notes difficulty with exercise.  He reports a plan to start an exercise regimen soon.  HLD - patient admits difficulty with compliance with Atorvastatin.  Patient reports fear of liver complications and not understanding CVD risk and necessity of this medication.         Review of Systems   Constitutional: Negative for activity change.   Respiratory: Negative for shortness of breath.    Cardiovascular: Negative for chest pain.   Musculoskeletal: Negative for gait problem.   Psychiatric/Behavioral: Negative for suicidal ideas.           Current Outpatient Prescriptions:     atorvastatin (LIPITOR) 40 MG tablet, Take 1 tablet (40 mg total) by mouth nightly., Disp: 90 tablet, Rfl: 1    dapagliflozin (FARXIGA) 5 mg Tab tablet, Take 1 tablet (5 mg total) by mouth once daily., Disp: 90 tablet, Rfl: 1    lisinopril-hydrochlorothiazide (PRINZIDE,ZESTORETIC) 20-12.5 mg per tablet, Take 2 tablets by mouth once daily., Disp: 180 tablet, Rfl: 1    metFORMIN (GLUCOPHAGE) 1000 MG tablet, Take 1 tablet (1,000 mg total) by mouth 2  "(two) times daily with meals., Disp: 180 tablet, Rfl: 1    SITagliptin (JANUVIA) 100 MG Tab, Take 1 tablet (100 mg total) by mouth once daily., Disp: 90 tablet, Rfl: 1    blood sugar diagnostic Strp, To test twice daily, Disp: 100 each, Rfl: 3      Blood pressure (!) 158/106, pulse 98, temperature 97.8 °F (36.6 °C), temperature source Oral, height 5' 11" (1.803 m), weight 127 kg (279 lb 15.8 oz), SpO2 99 %.    Physical Exam   Constitutional: Vital signs are normal. He appears well-developed.   HENT:   Mouth/Throat: Normal dentition.   Neck: Trachea normal. No thyromegaly present.   Cardiovascular: Normal rate, regular rhythm and intact distal pulses.    No murmur heard.  Pulmonary/Chest: Effort normal. He has no decreased breath sounds. He has no wheezes. He exhibits no deformity.   Musculoskeletal:   Normal gait. No decreased range of motion of major joints.   Neurological: He is not disoriented.   Skin: Skin is intact. Capillary refill takes less than 2 seconds.   Psychiatric: His speech is normal and behavior is normal. His mood appears not anxious. He does not exhibit a depressed mood.       Lab Visit on 11/15/2017   Component Date Value Ref Range Status    Sodium 11/15/2017 136  136 - 145 mmol/L Final    Potassium 11/15/2017 4.1  3.5 - 5.1 mmol/L Final    Chloride 11/15/2017 101  95 - 110 mmol/L Final    CO2 11/15/2017 24  23 - 29 mmol/L Final    Glucose 11/15/2017 185* 70 - 110 mg/dL Final    BUN, Bld 11/15/2017 21  8 - 23 mg/dL Final    Creatinine 11/15/2017 1.2  0.5 - 1.4 mg/dL Final    Calcium 11/15/2017 9.3  8.7 - 10.5 mg/dL Final    Anion Gap 11/15/2017 11  8 - 16 mmol/L Final    eGFR if African American 11/15/2017 >60.0  >60 mL/min/1.73 m^2 Final    eGFR if non African American 11/15/2017 >60.0  >60 mL/min/1.73 m^2 Final    Cholesterol 11/15/2017 270* 120 - 199 mg/dL Final    Triglycerides 11/15/2017 248* 30 - 150 mg/dL Final    HDL 11/15/2017 36* 40 - 75 mg/dL Final    LDL Cholesterol " 11/15/2017 184.4* 63.0 - 159.0 mg/dL Final    HDL/Chol Ratio 11/15/2017 13.3* 20.0 - 50.0 % Final    Total Cholesterol/HDL Ratio 11/15/2017 7.5* 2.0 - 5.0 Final    Non-HDL Cholesterol 11/15/2017 234  mg/dL Final    Hemoglobin A1C 11/15/2017 9.7* 4.0 - 5.6 % Final    Estimated Avg Glucose 11/15/2017 232* 68 - 131 mg/dL Final   Office Visit on 11/13/2017   Component Date Value Ref Range Status    Microalbum.,U,Random 11/13/2017 90.0  ug/mL Final    Creatinine, Random Ur 11/13/2017 48.0  23.0 - 375.0 mg/dL Final    Microalb Creat Ratio 11/13/2017 187.5* 0.0 - 30.0 ug/mg Final   ]    Assessment:    1. Uncontrolled type 2 diabetes mellitus without complication, without long-term current use of insulin    2. Essential hypertension    3. Mixed hyperlipidemia          Handy was seen today for medication refill.    Diagnoses and all orders for this visit:    Uncontrolled type 2 diabetes mellitus without complication, without long-term current use of insulin  -     SITagliptin (JANUVIA) 100 MG Tab; Take 1 tablet (100 mg total) by mouth once daily.  -     metFORMIN (GLUCOPHAGE) 1000 MG tablet; Take 1 tablet (1,000 mg total) by mouth 2 (two) times daily with meals.  -     dapagliflozin (FARXIGA) 5 mg Tab tablet; Take 1 tablet (5 mg total) by mouth once daily.  -     Hemoglobin A1c; Future  - Stable. Medications refilled. Obtain repeat A1c and adjust therapy as needed.    Essential hypertension  -     lisinopril-hydrochlorothiazide (PRINZIDE,ZESTORETIC) 20-12.5 mg per tablet; Take 2 tablets by mouth once daily.  - Unstable.  Patient counseled on DASH diet and medication compliance.  Encouraged physical activity to decrease BP.    Mixed hyperlipidemia  -     atorvastatin (LIPITOR) 40 MG tablet; Take 1 tablet (40 mg total) by mouth nightly.  - Unstable. Counseled on CVD risk and statin properties.  Reassured of normal LFTs. Medication refilled.          FOLLOW UP: Follow-up in about 4 weeks (around 3/6/2018) for A1c.     Home

## 2022-10-12 NOTE — PROGRESS NOTES
Handy Adams 67 y.o. male is here today for Blood Pressure check.   History of HTN yes.    Review of patient's allergies indicates:  No Known Allergies  Creatinine   Date Value Ref Range Status   09/19/2022 2.8 (H) 0.5 - 1.4 mg/dL Final     Sodium   Date Value Ref Range Status   09/19/2022 138 136 - 145 mmol/L Final     Potassium   Date Value Ref Range Status   09/19/2022 3.6 3.5 - 5.1 mmol/L Final   ]  Patient verifies taking blood pressure medications on a regular basis at the same time of the day.     Current Outpatient Medications:     amLODIPine (NORVASC) 10 MG tablet, Take 1 tablet (10 mg total) by mouth once daily., Disp: 90 tablet, Rfl: 1    labetaloL (NORMODYNE) 200 MG tablet, Take 1 tablet (200 mg total) by mouth 2 (two) times daily., Disp: 180 tablet, Rfl: 3    atorvastatin (LIPITOR) 20 MG tablet, Take 1 tablet by mouth once daily, Disp: 90 tablet, Rfl: 1    blood sugar diagnostic Strp, To test twice daily, Disp: 100 each, Rfl: 3    blood-glucose meter kit, Use as instructed, Disp: 1 each, Rfl: 0    fexofenadine (ALLEGRA) 180 MG tablet, Take 1 tablet (180 mg total) by mouth once daily., Disp: 30 tablet, Rfl: 0    lancets Misc, 1 lancet by Misc.(Non-Drug; Combo Route) route 2 (two) times daily with meals., Disp: 100 each, Rfl: 11    linaGLIPtin (TRADJENTA) 5 mg Tab tablet, Take 1 tablet (5 mg total) by mouth once daily., Disp: 30 tablet, Rfl: 3    lisinopriL-hydrochlorothiazide (PRINZIDE,ZESTORETIC) 20-12.5 mg per tablet, Take 2 tablets by mouth once daily. (Patient not taking: Reported on 10/12/2022), Disp: 180 tablet, Rfl: 1    pioglitazone (ACTOS) 45 MG tablet, Take 1 tablet (45 mg total) by mouth once daily., Disp: 90 tablet, Rfl: 0    sildenafiL (VIAGRA) 50 MG tablet, Take 1 tablet (50 mg total) by mouth daily as needed for Erectile Dysfunction., Disp: 30 tablet, Rfl: 0    triamcinolone acetonide 0.1% (KENALOG) 0.1 % ointment, Apply topically 2 (two) times daily. (Patient not taking: No sig  reported), Disp: 80 g, Rfl: 1  Does patient have record of home blood pressure readings yes. Readings have been averaging states it has been fluctuating and giving some elevated readings, pts machine was off by 15 points when compared to manual bp today.   Last dose of blood pressure medication was taken at this morning.  Patient is asymptomatic.   Complains of more swelling to lower extremities lately.    Vitals:    10/12/22 0926 10/12/22 0948   BP: (!) 150/94 (!) 148/92   BP Location: Left arm Left arm   Patient Position: Sitting Sitting   BP Method: Medium (Manual) Medium (Manual)         Dr. Sanderson informed of nurse visit. Pts medication was changed by nephrologist, lisinopril-hctz was stopped and replaced with labetalol.  Pt does have appt with nephrologist on 10/24

## 2022-10-13 ENCOUNTER — CLINICAL SUPPORT (OUTPATIENT)
Dept: ENDOSCOPY | Facility: HOSPITAL | Age: 67
End: 2022-10-13
Attending: FAMILY MEDICINE
Payer: MEDICARE

## 2022-10-13 ENCOUNTER — PATIENT MESSAGE (OUTPATIENT)
Dept: ENDOSCOPY | Facility: HOSPITAL | Age: 67
End: 2022-10-13

## 2022-10-13 VITALS — BODY MASS INDEX: 37.93 KG/M2 | HEIGHT: 72 IN | WEIGHT: 280 LBS

## 2022-10-13 DIAGNOSIS — Z12.12 ENCOUNTER FOR COLORECTAL CANCER SCREENING: ICD-10-CM

## 2022-10-13 DIAGNOSIS — Z12.11 ENCOUNTER FOR COLORECTAL CANCER SCREENING: ICD-10-CM

## 2022-10-13 RX ORDER — POLYETHYLENE GLYCOL 3350, SODIUM SULFATE ANHYDROUS, SODIUM BICARBONATE, SODIUM CHLORIDE, POTASSIUM CHLORIDE 236; 22.74; 6.74; 5.86; 2.97 G/4L; G/4L; G/4L; G/4L; G/4L
4 POWDER, FOR SOLUTION ORAL ONCE
Qty: 4000 ML | Refills: 0 | Status: SHIPPED | OUTPATIENT
Start: 2022-10-13 | End: 2022-10-13

## 2022-10-19 ENCOUNTER — LAB VISIT (OUTPATIENT)
Dept: LAB | Facility: HOSPITAL | Age: 67
End: 2022-10-19
Attending: NURSE PRACTITIONER
Payer: MEDICARE

## 2022-10-19 DIAGNOSIS — N18.31 STAGE 3A CHRONIC KIDNEY DISEASE: ICD-10-CM

## 2022-10-19 LAB
ALBUMIN SERPL BCP-MCNC: 3.1 G/DL (ref 3.5–5.2)
ANION GAP SERPL CALC-SCNC: 12 MMOL/L (ref 8–16)
BASOPHILS # BLD AUTO: 0.04 K/UL (ref 0–0.2)
BASOPHILS NFR BLD: 0.5 % (ref 0–1.9)
BUN SERPL-MCNC: 25 MG/DL (ref 8–23)
CALCIUM SERPL-MCNC: 8.8 MG/DL (ref 8.7–10.5)
CHLORIDE SERPL-SCNC: 107 MMOL/L (ref 95–110)
CO2 SERPL-SCNC: 20 MMOL/L (ref 23–29)
CREAT SERPL-MCNC: 2.9 MG/DL (ref 0.5–1.4)
DIFFERENTIAL METHOD: ABNORMAL
EOSINOPHIL # BLD AUTO: 0.2 K/UL (ref 0–0.5)
EOSINOPHIL NFR BLD: 2.6 % (ref 0–8)
ERYTHROCYTE [DISTWIDTH] IN BLOOD BY AUTOMATED COUNT: 15.6 % (ref 11.5–14.5)
EST. GFR  (NO RACE VARIABLE): 23 ML/MIN/1.73 M^2
GLUCOSE SERPL-MCNC: 113 MG/DL (ref 70–110)
HCT VFR BLD AUTO: 30.3 % (ref 40–54)
HGB BLD-MCNC: 9.3 G/DL (ref 14–18)
IMM GRANULOCYTES # BLD AUTO: 0.05 K/UL (ref 0–0.04)
IMM GRANULOCYTES NFR BLD AUTO: 0.6 % (ref 0–0.5)
LYMPHOCYTES # BLD AUTO: 1.6 K/UL (ref 1–4.8)
LYMPHOCYTES NFR BLD: 20.2 % (ref 18–48)
MCH RBC QN AUTO: 29.2 PG (ref 27–31)
MCHC RBC AUTO-ENTMCNC: 30.7 G/DL (ref 32–36)
MCV RBC AUTO: 95 FL (ref 82–98)
MONOCYTES # BLD AUTO: 0.7 K/UL (ref 0.3–1)
MONOCYTES NFR BLD: 8.6 % (ref 4–15)
NEUTROPHILS # BLD AUTO: 5.4 K/UL (ref 1.8–7.7)
NEUTROPHILS NFR BLD: 67.5 % (ref 38–73)
NRBC BLD-RTO: 0 /100 WBC
PHOSPHATE SERPL-MCNC: 3.4 MG/DL (ref 2.7–4.5)
PLATELET # BLD AUTO: 293 K/UL (ref 150–450)
PMV BLD AUTO: 11.1 FL (ref 9.2–12.9)
POTASSIUM SERPL-SCNC: 3.6 MMOL/L (ref 3.5–5.1)
RBC # BLD AUTO: 3.19 M/UL (ref 4.6–6.2)
SODIUM SERPL-SCNC: 139 MMOL/L (ref 136–145)
WBC # BLD AUTO: 7.94 K/UL (ref 3.9–12.7)

## 2022-10-19 PROCEDURE — 85025 COMPLETE CBC W/AUTO DIFF WBC: CPT | Performed by: NURSE PRACTITIONER

## 2022-10-19 PROCEDURE — 36415 COLL VENOUS BLD VENIPUNCTURE: CPT | Mod: PO | Performed by: NURSE PRACTITIONER

## 2022-10-19 PROCEDURE — 80069 RENAL FUNCTION PANEL: CPT | Performed by: NURSE PRACTITIONER

## 2022-10-24 ENCOUNTER — LAB VISIT (OUTPATIENT)
Dept: LAB | Facility: HOSPITAL | Age: 67
End: 2022-10-24
Payer: MEDICARE

## 2022-10-24 ENCOUNTER — PATIENT MESSAGE (OUTPATIENT)
Dept: NEPHROLOGY | Facility: CLINIC | Age: 67
End: 2022-10-24

## 2022-10-24 ENCOUNTER — OFFICE VISIT (OUTPATIENT)
Dept: NEPHROLOGY | Facility: CLINIC | Age: 67
End: 2022-10-24
Payer: MEDICARE

## 2022-10-24 VITALS
WEIGHT: 280.19 LBS | DIASTOLIC BLOOD PRESSURE: 91 MMHG | BODY MASS INDEX: 37.13 KG/M2 | OXYGEN SATURATION: 99 % | HEIGHT: 73 IN | HEART RATE: 68 BPM | SYSTOLIC BLOOD PRESSURE: 170 MMHG

## 2022-10-24 DIAGNOSIS — N18.9 ANEMIA IN CHRONIC KIDNEY DISEASE, UNSPECIFIED CKD STAGE: ICD-10-CM

## 2022-10-24 DIAGNOSIS — R77.8 ABNORMAL SPEP: ICD-10-CM

## 2022-10-24 DIAGNOSIS — I10 HYPERTENSION, UNSPECIFIED TYPE: ICD-10-CM

## 2022-10-24 DIAGNOSIS — N18.4 CHRONIC KIDNEY DISEASE, STAGE 4 (SEVERE): Primary | ICD-10-CM

## 2022-10-24 DIAGNOSIS — E11.22 TYPE 2 DIABETES MELLITUS WITH STAGE 4 CHRONIC KIDNEY DISEASE, UNSPECIFIED WHETHER LONG TERM INSULIN USE: ICD-10-CM

## 2022-10-24 DIAGNOSIS — R80.9 PROTEINURIA, UNSPECIFIED TYPE: ICD-10-CM

## 2022-10-24 DIAGNOSIS — N18.4 TYPE 2 DIABETES MELLITUS WITH STAGE 4 CHRONIC KIDNEY DISEASE, UNSPECIFIED WHETHER LONG TERM INSULIN USE: ICD-10-CM

## 2022-10-24 DIAGNOSIS — D63.1 ANEMIA IN CHRONIC KIDNEY DISEASE, UNSPECIFIED CKD STAGE: ICD-10-CM

## 2022-10-24 LAB
BASOPHILS # BLD AUTO: 0.03 K/UL (ref 0–0.2)
BASOPHILS NFR BLD: 0.4 % (ref 0–1.9)
DIFFERENTIAL METHOD: ABNORMAL
EOSINOPHIL # BLD AUTO: 0.2 K/UL (ref 0–0.5)
EOSINOPHIL NFR BLD: 3.4 % (ref 0–8)
ERYTHROCYTE [DISTWIDTH] IN BLOOD BY AUTOMATED COUNT: 15.4 % (ref 11.5–14.5)
FERRITIN SERPL-MCNC: 334 NG/ML (ref 20–300)
HCT VFR BLD AUTO: 29.1 % (ref 40–54)
HGB BLD-MCNC: 9.1 G/DL (ref 14–18)
IMM GRANULOCYTES # BLD AUTO: 0.05 K/UL (ref 0–0.04)
IMM GRANULOCYTES NFR BLD AUTO: 0.7 % (ref 0–0.5)
IRON SERPL-MCNC: 59 UG/DL (ref 45–160)
LYMPHOCYTES # BLD AUTO: 1.1 K/UL (ref 1–4.8)
LYMPHOCYTES NFR BLD: 16.6 % (ref 18–48)
MCH RBC QN AUTO: 28.6 PG (ref 27–31)
MCHC RBC AUTO-ENTMCNC: 31.3 G/DL (ref 32–36)
MCV RBC AUTO: 92 FL (ref 82–98)
MONOCYTES # BLD AUTO: 0.5 K/UL (ref 0.3–1)
MONOCYTES NFR BLD: 7.1 % (ref 4–15)
NEUTROPHILS # BLD AUTO: 4.9 K/UL (ref 1.8–7.7)
NEUTROPHILS NFR BLD: 71.8 % (ref 38–73)
NRBC BLD-RTO: 0 /100 WBC
PLATELET # BLD AUTO: 307 K/UL (ref 150–450)
PMV BLD AUTO: 11.1 FL (ref 9.2–12.9)
RBC # BLD AUTO: 3.18 M/UL (ref 4.6–6.2)
SATURATED IRON: 17 % (ref 20–50)
TOTAL IRON BINDING CAPACITY: 345 UG/DL (ref 250–450)
TRANSFERRIN SERPL-MCNC: 233 MG/DL (ref 200–375)
WBC # BLD AUTO: 6.8 K/UL (ref 3.9–12.7)

## 2022-10-24 PROCEDURE — 1160F PR REVIEW ALL MEDS BY PRESCRIBER/CLIN PHARMACIST DOCUMENTED: ICD-10-PCS | Mod: CPTII,S$GLB,, | Performed by: NURSE PRACTITIONER

## 2022-10-24 PROCEDURE — 99499 UNLISTED E&M SERVICE: CPT | Mod: S$GLB,,, | Performed by: NURSE PRACTITIONER

## 2022-10-24 PROCEDURE — 4010F ACE/ARB THERAPY RXD/TAKEN: CPT | Mod: CPTII,S$GLB,, | Performed by: NURSE PRACTITIONER

## 2022-10-24 PROCEDURE — 3066F PR DOCUMENTATION OF TREATMENT FOR NEPHROPATHY: ICD-10-PCS | Mod: CPTII,S$GLB,, | Performed by: NURSE PRACTITIONER

## 2022-10-24 PROCEDURE — 99999 PR PBB SHADOW E&M-EST. PATIENT-LVL V: CPT | Mod: PBBFAC,,, | Performed by: NURSE PRACTITIONER

## 2022-10-24 PROCEDURE — 1126F PR PAIN SEVERITY QUANTIFIED, NO PAIN PRESENT: ICD-10-PCS | Mod: CPTII,S$GLB,, | Performed by: NURSE PRACTITIONER

## 2022-10-24 PROCEDURE — 99215 PR OFFICE/OUTPT VISIT, EST, LEVL V, 40-54 MIN: ICD-10-PCS | Mod: S$GLB,,, | Performed by: NURSE PRACTITIONER

## 2022-10-24 PROCEDURE — 99417 PROLNG OP E/M EACH 15 MIN: CPT | Mod: S$GLB,,, | Performed by: NURSE PRACTITIONER

## 2022-10-24 PROCEDURE — 3062F POS MACROALBUMINURIA REV: CPT | Mod: CPTII,S$GLB,, | Performed by: NURSE PRACTITIONER

## 2022-10-24 PROCEDURE — 1126F AMNT PAIN NOTED NONE PRSNT: CPT | Mod: CPTII,S$GLB,, | Performed by: NURSE PRACTITIONER

## 2022-10-24 PROCEDURE — 3288F PR FALLS RISK ASSESSMENT DOCUMENTED: ICD-10-PCS | Mod: CPTII,S$GLB,, | Performed by: NURSE PRACTITIONER

## 2022-10-24 PROCEDURE — 1101F PR PT FALLS ASSESS DOC 0-1 FALLS W/OUT INJ PAST YR: ICD-10-PCS | Mod: CPTII,S$GLB,, | Performed by: NURSE PRACTITIONER

## 2022-10-24 PROCEDURE — 3288F FALL RISK ASSESSMENT DOCD: CPT | Mod: CPTII,S$GLB,, | Performed by: NURSE PRACTITIONER

## 2022-10-24 PROCEDURE — 3046F HEMOGLOBIN A1C LEVEL >9.0%: CPT | Mod: CPTII,S$GLB,, | Performed by: NURSE PRACTITIONER

## 2022-10-24 PROCEDURE — 1160F RVW MEDS BY RX/DR IN RCRD: CPT | Mod: CPTII,S$GLB,, | Performed by: NURSE PRACTITIONER

## 2022-10-24 PROCEDURE — 99215 OFFICE O/P EST HI 40 MIN: CPT | Mod: S$GLB,,, | Performed by: NURSE PRACTITIONER

## 2022-10-24 PROCEDURE — 1101F PT FALLS ASSESS-DOCD LE1/YR: CPT | Mod: CPTII,S$GLB,, | Performed by: NURSE PRACTITIONER

## 2022-10-24 PROCEDURE — 3066F NEPHROPATHY DOC TX: CPT | Mod: CPTII,S$GLB,, | Performed by: NURSE PRACTITIONER

## 2022-10-24 PROCEDURE — 4010F PR ACE/ARB THEARPY RXD/TAKEN: ICD-10-PCS | Mod: CPTII,S$GLB,, | Performed by: NURSE PRACTITIONER

## 2022-10-24 PROCEDURE — 82728 ASSAY OF FERRITIN: CPT | Performed by: NURSE PRACTITIONER

## 2022-10-24 PROCEDURE — 1159F MED LIST DOCD IN RCRD: CPT | Mod: CPTII,S$GLB,, | Performed by: NURSE PRACTITIONER

## 2022-10-24 PROCEDURE — 3077F SYST BP >= 140 MM HG: CPT | Mod: CPTII,S$GLB,, | Performed by: NURSE PRACTITIONER

## 2022-10-24 PROCEDURE — 3077F PR MOST RECENT SYSTOLIC BLOOD PRESSURE >= 140 MM HG: ICD-10-PCS | Mod: CPTII,S$GLB,, | Performed by: NURSE PRACTITIONER

## 2022-10-24 PROCEDURE — 3080F DIAST BP >= 90 MM HG: CPT | Mod: CPTII,S$GLB,, | Performed by: NURSE PRACTITIONER

## 2022-10-24 PROCEDURE — 3046F PR MOST RECENT HEMOGLOBIN A1C LEVEL > 9.0%: ICD-10-PCS | Mod: CPTII,S$GLB,, | Performed by: NURSE PRACTITIONER

## 2022-10-24 PROCEDURE — 3080F PR MOST RECENT DIASTOLIC BLOOD PRESSURE >= 90 MM HG: ICD-10-PCS | Mod: CPTII,S$GLB,, | Performed by: NURSE PRACTITIONER

## 2022-10-24 PROCEDURE — 84466 ASSAY OF TRANSFERRIN: CPT | Performed by: NURSE PRACTITIONER

## 2022-10-24 PROCEDURE — 1159F PR MEDICATION LIST DOCUMENTED IN MEDICAL RECORD: ICD-10-PCS | Mod: CPTII,S$GLB,, | Performed by: NURSE PRACTITIONER

## 2022-10-24 PROCEDURE — 3062F PR POS MACROALBUMINURIA RESULT DOCUMENTED/REVIEW: ICD-10-PCS | Mod: CPTII,S$GLB,, | Performed by: NURSE PRACTITIONER

## 2022-10-24 PROCEDURE — 85025 COMPLETE CBC W/AUTO DIFF WBC: CPT | Performed by: NURSE PRACTITIONER

## 2022-10-24 PROCEDURE — 99999 PR PBB SHADOW E&M-EST. PATIENT-LVL V: ICD-10-PCS | Mod: PBBFAC,,, | Performed by: NURSE PRACTITIONER

## 2022-10-24 PROCEDURE — 36415 COLL VENOUS BLD VENIPUNCTURE: CPT | Performed by: NURSE PRACTITIONER

## 2022-10-24 PROCEDURE — 99417 PR PROLONGED SVC, OUTPT, W/WO DIRECT PT CONTACT,  EA ADDTL 15 MIN: ICD-10-PCS | Mod: S$GLB,,, | Performed by: NURSE PRACTITIONER

## 2022-10-24 RX ORDER — CHLORTHALIDONE 25 MG/1
25 TABLET ORAL DAILY
Qty: 90 TABLET | Refills: 3 | Status: SHIPPED | OUTPATIENT
Start: 2022-10-24 | End: 2023-09-28

## 2022-10-24 RX ORDER — VALSARTAN 80 MG/1
80 TABLET ORAL DAILY
Qty: 90 TABLET | Refills: 3 | Status: SHIPPED | OUTPATIENT
Start: 2022-10-24 | End: 2022-12-28

## 2022-10-24 NOTE — Clinical Note
Emma Soriano, I discussed this pt with Dr. Wolfe. This pt has h/o longstanding DM, but he has had significant progression over the past year. 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 I am working on improving his BP control.  I believe he has diabetic nephropathy, but it is unclear if he also has an MGRS.  His ultrasound had a few cysts. Can you look and see if you think you would be able to biopsy?  Thanks, Raven

## 2022-10-24 NOTE — PROGRESS NOTES
"Subjective:       Patient ID: Handy Adams is a 67 y.o. A male who presents for new evaluation of of renal dysfunction.      HPI     Patient is new to me. New to clinic.  Prior pertinent chart reviewed since this is patient's first appointment with me.    Patient presents for new evaluation of renal dysfunction.  Baseline creatinine of 1.6-1.7 in 2020-early 2022. Recently had sCr of 2.5. Patient said he has "not been taking care of himself" and "eating more sugar" since January.    Per recent note from PCP: He cannot afford farxiga due to increased price and had stopped taking it for a few months prior to appt in September.    Home BPs: does not take    Rare Excedrin use now, though he used to use it frequently.    Significant other medical problems include HTN since at least 2007, T2DM since at least 2007.      The patient denies taking herbal supplements, or new antibiotics, recreational drugs, recent episode of dehydration, diarrhea, nausea or vomiting, acute illness, hospitalization or exposure to IV radiocontrast.     Significant family hx includes: No known kidney issues    Last renal US: none in EMR    Update 10/24/22:  Presents for f/u of CKD, YESENIA.  Last seen a month ago.  Feet are swelling again, especially when drinking ensure. Has improved since stopping Ensure.  Holding lisinopril-Hctz. Taking labetolol 200 mg instead.  Found to have an IgG lambda specific monoclonal band during proteinuria workup. Referred to hematology.    Recent sCr 2.5--> 2.8-2.9.     Home BPs: does not believe cuff is accurate.      Review of Systems   HENT:  Negative for facial swelling.    Eyes:  Negative for visual disturbance.   Respiratory:  Positive for shortness of breath (since "a little before I saw you last time"; not often).    Cardiovascular:  Positive for leg swelling (intermittent to feet).   Gastrointestinal:  Negative for blood in stool, diarrhea, nausea and vomiting.   Genitourinary:  Positive for frequency " "(nocturia 3-4x) and urgency. Negative for difficulty urinating, dysuria, flank pain and hematuria.   Neurological:  Negative for dizziness and headaches.     Objective:       Blood pressure (!) 170/91, pulse 68, height 6' 1" (1.854 m), weight 127.1 kg (280 lb 3.3 oz), SpO2 99 %.  Physical Exam  Vitals reviewed.   Constitutional:       General: He is not in acute distress.     Appearance: He is well-developed. He is obese.   Cardiovascular:      Rate and Rhythm: Normal rate and regular rhythm.      Heart sounds: Normal heart sounds. No murmur heard.    No friction rub. No gallop.   Pulmonary:      Effort: Pulmonary effort is normal. No respiratory distress.      Breath sounds: No wheezing or rales.      Comments: Bases diminished    Abdominal:      Tenderness: There is no right CVA tenderness or left CVA tenderness.   Musculoskeletal:      Cervical back: Neck supple.      Right lower leg: Edema (trace) present.      Left lower leg: Edema (trace) present.   Skin:     General: Skin is warm and dry.      Findings: No lesion or rash.   Neurological:      Mental Status: He is alert and oriented to person, place, and time.   Psychiatric:         Mood and Affect: Mood normal.         Behavior: Behavior normal.         Thought Content: Thought content normal.         Judgment: Judgment normal.         Lab Results   Component Value Date    CREATININE 2.9 (H) 10/19/2022     Prot/Creat Ratio, Urine   Date Value Ref Range Status   09/19/2022 5.55 (H) 0.00 - 0.20 Final     Lab Results   Component Value Date     10/19/2022    K 3.6 10/19/2022    CO2 20 (L) 10/19/2022     10/19/2022     Lab Results   Component Value Date    .1 (H) 09/19/2022    CALCIUM 8.8 10/19/2022    PHOS 3.4 10/19/2022     Lab Results   Component Value Date    HGB 9.3 (L) 10/19/2022    WBC 7.94 10/19/2022    HCT 30.3 (L) 10/19/2022      Lab Results   Component Value Date    HGBA1C 10.4 (H) 09/07/2022     10/19/2022    BUN 25 (H) " 10/19/2022     Lab Results   Component Value Date    LDLCALC 94.4 09/07/2022         Assessment:       1. Abnormal SPEP            Plan:   CKD stage 3A c eGFR 48-51 mL/min c superimposed YESENIA vs. progression- Underlying CKD clinically 2/2 diabetes. Recently with YESENIA vs. Progression. Repeat labs today. Educated patient to control BP, BG, remain well-hydrated, and avoid NSAIDs to prevent progression of CKD. High risk of progression to ESRD.    Also counseled on benefits of weight loss through diet and exercise.    UPCR Significant albuminuria since 2011 with increase to almost nephrotic range proteinuria in January. Needs repeat. Was on farxiga but could not afford it. Holding ACEi in setting of YESENIA.    IgG lambda specific monoclonal band noted on proteinuria workup.   Acid-base Mild acidosis in setting of YESENIA.   Renal osteodystrophy Ca okay.   Anemia Hgb low.    DM Poorly-controlled. Has had DM since at least 2007, when he had an A1c of 15+.   Lipid Management On statin.   ESRD planning Anticipatory guidance provided about timing of dialysis. Start discussions and planning when eGFR is about 20 mL/min; most patients start dialysis between 5-10 mL/min.    Refer to intro and adv. CKD classes.       HTN - high on labetalol 200 mg, amlodipine 10 mg  - Plan to take home BPs BID x 2 week and send in results.  - Goal is SBP less than 130/80  - Add chlorthalidone 25 mg    Urinary urgency and frequency - Referred to urology previously    All questions patient had were answered.  Asked if further questions. None. F/u in clinic in 2 mos  with labs and urine prior to next visit or sooner if needed.  ER for emergency concerns.    Summary of Plan:  Refer to hematologist due to IgG lambda specific monoclonal band  avoid NSAID/ bactrim/ IV contrast/ gadolinium/ aminoglycoside where possible  Refer to intro and adv. CKD education  Repeat CBC, TSAT, ferritin  Add chlorthalidone 25 mg daily  Add low dose ARB (valsartan 80 mg)  BMP in 2  weeks  RTC in 2 mos    70 minutes of total time spent on the encounter, which includes face to face time and non-face to face time preparing to see the patient (eg, review of tests), Obtaining and/or reviewing separately obtained history, documenting clinical information in the electronic or other health record, independently interpreting results (not separately reported) and communicating results to the patient/family/caregiver, or Care coordination (not separately reported).

## 2022-10-25 ENCOUNTER — OFFICE VISIT (OUTPATIENT)
Dept: NEUROLOGY | Facility: CLINIC | Age: 67
End: 2022-10-25
Payer: MEDICARE

## 2022-10-25 DIAGNOSIS — N18.4 CHRONIC KIDNEY DISEASE, STAGE 4 (SEVERE): Primary | ICD-10-CM

## 2022-10-25 DIAGNOSIS — F43.21 ADJUSTMENT DISORDER WITH DEPRESSED MOOD: Primary | ICD-10-CM

## 2022-10-25 DIAGNOSIS — F03.90 DEMENTIA, UNSPECIFIED DEMENTIA SEVERITY, UNSPECIFIED DEMENTIA TYPE, UNSPECIFIED WHETHER BEHAVIORAL, PSYCHOTIC, OR MOOD DISTURBANCE OR ANXIETY: ICD-10-CM

## 2022-10-25 PROCEDURE — 90791 PR PSYCHIATRIC DIAGNOSTIC EVALUATION: ICD-10-PCS | Mod: 95,FQ,, | Performed by: CLINICAL NEUROPSYCHOLOGIST

## 2022-10-25 PROCEDURE — 3062F PR POS MACROALBUMINURIA RESULT DOCUMENTED/REVIEW: ICD-10-PCS | Mod: CPTII,95,, | Performed by: CLINICAL NEUROPSYCHOLOGIST

## 2022-10-25 PROCEDURE — 4010F PR ACE/ARB THEARPY RXD/TAKEN: ICD-10-PCS | Mod: CPTII,95,, | Performed by: CLINICAL NEUROPSYCHOLOGIST

## 2022-10-25 PROCEDURE — 3046F HEMOGLOBIN A1C LEVEL >9.0%: CPT | Mod: CPTII,95,, | Performed by: CLINICAL NEUROPSYCHOLOGIST

## 2022-10-25 PROCEDURE — 99499 UNLISTED E&M SERVICE: CPT | Mod: 95,,, | Performed by: CLINICAL NEUROPSYCHOLOGIST

## 2022-10-25 PROCEDURE — 3062F POS MACROALBUMINURIA REV: CPT | Mod: CPTII,95,, | Performed by: CLINICAL NEUROPSYCHOLOGIST

## 2022-10-25 PROCEDURE — 3066F PR DOCUMENTATION OF TREATMENT FOR NEPHROPATHY: ICD-10-PCS | Mod: CPTII,95,, | Performed by: CLINICAL NEUROPSYCHOLOGIST

## 2022-10-25 PROCEDURE — 3046F PR MOST RECENT HEMOGLOBIN A1C LEVEL > 9.0%: ICD-10-PCS | Mod: CPTII,95,, | Performed by: CLINICAL NEUROPSYCHOLOGIST

## 2022-10-25 PROCEDURE — 3066F NEPHROPATHY DOC TX: CPT | Mod: CPTII,95,, | Performed by: CLINICAL NEUROPSYCHOLOGIST

## 2022-10-25 PROCEDURE — 99499 NO LOS: ICD-10-PCS | Mod: 95,,, | Performed by: CLINICAL NEUROPSYCHOLOGIST

## 2022-10-25 PROCEDURE — 4010F ACE/ARB THERAPY RXD/TAKEN: CPT | Mod: CPTII,95,, | Performed by: CLINICAL NEUROPSYCHOLOGIST

## 2022-10-25 PROCEDURE — 90791 PSYCH DIAGNOSTIC EVALUATION: CPT | Mod: 95,FQ,, | Performed by: CLINICAL NEUROPSYCHOLOGIST

## 2022-10-25 NOTE — PROGRESS NOTES
NEUROPSYCHOLOGICAL EVALUATION - CONFIDENTIAL    Referring Provider: [facility], [facility]  Medical Necessity: Evaluate cognitive and emotional functioning, participate in treatment planning/management, and provide supportive therapy in the setting of cognitive changes  Date Conducted: 10/25/2022  Present At Visit: the patient  Billin = 55 minutes  Referral Diagnoses: F03.90 (ICD-10-CM) - Dementia without behavioral disturbance, unspecified dementia type  Consent: The patient expressed an understanding of the purpose of the evaluation and consented to all procedures. We discussed the limits of confidentiality and discussed an emergency plan.    Telemedicine Details:   Established Patient - Audio Only Telehealth Visit   The patient location is: home  The chief complaint leading to consultation is: cognitive changes  Visit type: Virtual visit with audio only (telephone)  Total time spent with patient: 55 minutes   The reason for the audio only service rather than synchronous audio and video virtual visit was related to technical difficulties or patient preference/necessity.   Each patient to whom I provide medical services by telemedicine is: (1) informed of the relationship between the physician and patient and the respective role of any other health care provider with respect to management of the patient; and (2) notified that they may decline to receive medical services by telemedicine and may withdraw from such care at any time. Patient verbally consented to receive this service via voice-only telephone call.     ASSESSMENT & PLAN:   Mr. Handy Adams is an 67 y.o., male with 18 years of education and pertinent medical history including chronic kidney disease stage 4, diabetes type 2, hyperlipidemia, hypertension, and bilateral hypertensive retinopathy who was referred for a neuropsychological evaluation in the setting of cognitive changes.       Full report to follow completion of testing.    Problem List Items Addressed This Visit          Psychiatric    Adjustment disorder with depressed mood - Primary     Other Visit Diagnoses       Dementia, unspecified dementia severity, unspecified dementia type, unspecified whether behavioral, psychotic, or mood disturbance or anxiety              Thank you for allowing me to assist in Mr. Handy Adams's care. If you have any questions, please contact me at [phone].      Teresita Tompkins, PhD  Licensed Clinical Neuropsychologist  [facility]     CLINICAL INTERVIEW & RECORD REVIEW:     Cognitive Functioning   Cognitive screener: none  Previous evaluation(s): none  Onset & course of difficulty: 2 years of progressive worsening/more concerning. 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 Can't spend a lot of time doing frivolous stuff. Making bad decisions because he is choosing to sit there and watch tv instead. 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 Always involved in some educational attainment throughout his lifetime. Consistent through all of that is also his relationship with his wife.  in 1989. Since that time it's been problematic. So the overlap with both of those things, it's hard to discern what is causing his issues.    Fluctuations: none  Examples:   Attention/Working Memory/Executive Functioning: Saw a doctor years ago and was diagnosed with ADHD. Late 40s diagnosed. Took a stimulant for a short period of time but didn't help so stopped taking. This is why he initially initiated this eval. Worried that his attentional difficulty was getting in the way of him completing his coursework. Doing well with organization. Does okay with planning. 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 Getting older now but still feels like there is part of him  that wants to excel in helping businesses with leadership.   Felt like he needed to be more focused. Easily distracted. Very difficult for him to finish tasks. Educationally, he has always excelled for him. Took a substantial amount of time for him to get assignments completed but did well in school. All his life struggled to get tasks done on time but always completed them. 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 Has procrastinated about getting taxes done.  Processing Speed: thinking is not slower, but feels he is not as focused as he wants to be and spends time working on things that are unnecessary. Tends to, sometimes goes toward B when should have gone to A and that frustrates him.   Language: word finding difficulty, delayed responses to questions, and a round about way of speaking per psychiatrist notes. Patient says no problems.   Visuospatial: no problems identified  Learning & Memory: pt misplaces keys sometimes but believes this is normal for his age.   Exacerbating factors: Thinks part of the problem is living arrangements. Doesn't totally get in the way but doesn't help him.   Ameliorating factors: none  Medication for cognition: none     Daily Functioning   ADLs:    Bathing: Independent and without difficulty  Dressing: Independent and without difficulty  Grooming: Independent and without difficulty   Toileting: Independent and without difficulty  Transferring: Independent and without difficulty.  Eating: Independent and without difficulty.   IADLs:    Finances: Independent and without difficulty  Medication Mgmt: Independent and without difficulty  Driving: Independent and without difficulty  Household Mgmt: Independent and without difficulty Cooking/Meal Preparation: Independent and without difficulty.  Shopping: Independent and without difficulty.  Appointment Mgmt: Independent and without  "difficulty  Employment/School: 6 month leave of absence from school. It takes a while for him to complete a task. Does complete the tasks but procrastinates a lot more than he would like to. Thinks his ability to do the assignment is not questionable but the time it takes to do it is. One certification exam he had to take several times.        Psychiatric/Neuropsychiatric Symptoms   Mood: "it's a challenge to describe myself"  Depression: would never call it the blues, but would call it a part of the procrastination thing. Will choose to watch tv rather than the tasks he needs to. TV is more like an escape thing.  Ester/Hypomania: no  Anxiety: some due to his procrastination   Stress: "normal" - stress due to his procrastination  Neurovegetative Sxs:  Appetite: it has to change due to his recent diagnosis and diabetes. The last 6 months, the beginning of this year he didn't take care of himself. Since then, he has re-acquired the discipline to not eat sweets.   Sleep: "all over the place" > always has gotten up in the middle of the night to do assignments. Generally sleeping a total of 4 to 5 hours a night. No sleep apnea. No acting out dreams.   Energy: feels rested in the mornings. Unsure about his level of energy because he doesn't measure it. Sometimes sleeps in the middle of the day.    Hallucinations: no  Delusional/Paranoid Thinking: no  Impulsivity: no  Obsessive/Compulsive Behaviors: no  Disinhibition: no  Irritability/Agitation: no. Says later he gets very angry sometimes.   Aggression: no  Apathy/Indifference: yes and feels like this is a change. Thinks it's tied to his relationship with his ex. Actual divorce occurred in 2005 but they have continued to live together since that time. Patient has consistently taken care of the house when his ex-wife has come and gone from the home. He questions whether or not getting out of completely is a pathway. Struggles with that.   Other changes in personality: no "     Physical Functioning   Tremor: no  Difficulty walking: no  Imbalance: doesn't fall but is concerned about his balance. Day to day walking is okay but notices that he is not as stable as he used to be.   Falls: no  Weakness: no  Trouble with fine motor movements: no  Other: Just learned about his kidney disease. That is very concerning to him. Has never had real physical limitations before.  Lightheadedness: no  Urinary Urgency: some incontinence, yes, more leakage though  Sensory Sxs: no changes other than with eyes  Pain: no  Physical Exercise Routine: not really         RELEVANT HISTORY  This patient has a past medical history of Disorder of kidney and ureter, Edema leg, History of rotator cuff tear, History of seasonal allergies, HTN (hypertension), colonic polyps, Hyperlipemia, NS (nuclear sclerosis), bilateral (06/25/2019), 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 (11/17/2017), and Type 2 diabetes mellitus.    Past Surgical History:   Procedure Laterality Date    MOUTH SURGERY       Neurological History    Headaches/Migraines: no  TBI: no  Seizures: no  Stroke: no  Tumor: no Previous Episodes of Delirium: no  Movement Disorder: no  CNS Infection: no  Other: no     Neurodiagnostics     Results for orders placed or performed during the hospital encounter of 08/11/22   MRI Brain Without Contrast    Narrative    EXAMINATION:  MRI BRAIN WITHOUT CONTRAST    CLINICAL HISTORY:  Dementia, nonvascular etiology suspected; Unspecified dementia without behavioral disturbance    TECHNIQUE:  Multiplanar multisequence MR imaging of the brain was performed without contrast.    COMPARISON:  MRI of the brain dated 12/07/2004.    FINDINGS:  The craniocervical junction is intact.  The sellar and parasellar structures are within normal limits.  There is stable mild thinning involving the anterior aspect of the body of the corpus callosum.  The  intracranial flow voids are within normal limits.    No diffusion-weighted signal abnormality is present.  The ventricles and sulci are prominent, consistent with cerebral volume loss.  There are T2/FLAIR signal hyperintensity in the periventricular and subcortical white matter.  There is an interim old lacunar type infarction in the left thalamus.  No significant asymmetric volume loss identified within the parietal or temporal lobes.  There is minimal encephalomalacia in the left anterior temporal pole.  There is no evidence of mass effect.    The orbits and intraorbital contents are unremarkable.  The paranasal sinuses unremarkable.  The mastoids are clear.  The calvarium is intact.      Impression    Changes of chronic small vessel ischemic disease and cerebral volume loss.    No significant asymmetric volume loss within the temporal and parietal lobes.      Electronically signed by: Raoul Abel MD  Date:    08/11/2022  Time:    13:22     Pertinent Lab Work   No results found for: [id number]  No results found for: RPR  No results found for: FOLATE  Lab Results   Component Value Date    TSH 1.863 09/07/2022     Lab Results   Component Value Date    HGBA1C 10.4 (H) 09/07/2022     Lab Results   Component Value Date    [id number] Non-reactive 09/21/2022     Medications     Current Outpatient Medications   Medication Instructions    amLODIPine (NORVASC) 10 mg, Oral, Daily    atorvastatin (LIPITOR) 20 MG tablet Take 1 tablet by mouth once daily    blood sugar diagnostic Strp To test twice daily    blood-glucose meter kit Use as instructed    chlorthalidone (HYGROTEN) 25 mg, Oral, Daily    fexofenadine (ALLEGRA) 180 mg, Oral, Daily    labetaloL (NORMODYNE) 200 mg, Oral, 2 times daily    lancets Misc 1 lancet, Misc.(Non-Drug; Combo Route), 2 times daily with meals    pioglitazone (ACTOS) 45 mg, Oral, Daily    sildenafiL (VIAGRA) 50 mg, Oral, Daily PRN    TRADJENTA 5 mg, Oral, Daily    triamcinolone acetonide 0.1%  (KENALOG) 0.1 % ointment Topical (Top), 2 times daily    valsartan (DIOVAN) 80 mg, Oral, Daily     Psychiatric History   Prior Diagnoses: none  History of Trauma: no  History of Abuse: no  History of Suicide Attempts: no  Current Ideation, Intention, or Plan: no  Homicidal Ideation: no   Medication(s): Wellbutrin was recently prescribed but he stopped taking it.   Hospitalization(s): no  Psychotherapy/Counseling: no  Other: no         Substance Use History     Social History     Tobacco Use    Smoking status: Never    Smokeless tobacco: Never   Substance and Sexual Activity    Alcohol use: Yes    Drug use: Not on file    Sexual activity: Yes     Partners: Female     History of abuse/overuse: no   Hx of taking illicit amphetamine (speed) during childhood in [facility] where father was stationed feels like it helped him concentrate    Family Neurological & Psychiatric History       Family History   Problem Relation Age of Onset    No Known Problems Mother     Diabetes Father     Hypertension Father     No Known Problems Sister     No Known Problems Brother     No Known Problems Maternal Aunt     No Known Problems Maternal Uncle     No Known Problems Paternal Aunt     No Known Problems Paternal Uncle     No Known Problems Maternal Grandmother     No Known Problems Maternal Grandfather     No Known Problems Paternal Grandmother     No Known Problems Paternal Grandfather     Amblyopia Neg Hx     Blindness Neg Hx     Cancer Neg Hx     Cataracts Neg Hx     Glaucoma Neg Hx     Macular degeneration Neg Hx     Retinal detachment Neg Hx     Strabismus Neg Hx     Stroke Neg Hx     Thyroid disease Neg Hx      Neurologic: Mother had some dementia (passed away at 83/84 years of age). 4th stage lung cancer as well. Father had some dementia.   Psychiatric: Negative for heritable risk factors.    Development  Education   Born & raised: born in [place] and  family so moved all around   Prenatal and  development:  "wnl  Developmental milestones: wnl  Language Acquisition: English first language  Level Attained: Bachelors + [employer]. 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 Has taken a leave of absence a few times this year. Taking his 4th or 5th class right now (approximately a 3 or 4 year program). Online program and the task assignments are due every week. Takes him a while to complete the assignments.   Learning/Attention/Behavior Difficulties: no  Repeated Grade(s): no  Typical Grades: B or C student        Occupation  Social    Service: no  Occupational Status: Retired at 47 y/o from [employer] in 2005. 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 26 y/o and 31 y/o sons in [facility]  Support System: "I guess its average" - generally doesn't like to rely on other people to take care of things for him  Hobbies/Activities: Doesn't really have any hobbies. Preoccupied with achieving certifications. Thinks he watches too much TV and would like to not watch as much.   Current Living Situation: lives at home with his ex-wife and his son, Pee, just moved back      Legal History   Current: none    OBJECTIVE:     MENTAL STATUS AND OBSERVATIONS:   Appearance: Unable to assess   Alertness: Attentive and alert.   Orientation:   O x 4    Gait:  Unable to assess   Psychomotor:  Unable to assess   Handedness:  Right   Vision & Hearing:  Adequate for session   Speech/language: Normal in rate, rhythm, tone, and volume. No significant word finding difficulty observed. Comprehension was normal.   Mood/Affect:  The patients mood and affect were congruent and slightly dysthymic.    Interpersonal Behavior:  Rapport was quickly and easily established    Suicidality/Homicidality: Denied "   Hallucinations/Delusions:  None evidenced or endorsed   Thought Content: Logical   Though Processes: Goal-directed   Insight & Judgment:  Appropriate   Participation in Interview:  Full     PROCEDURES/TESTS ADMINISTERED: Performed a review of pertinent medical records, reviewed limits to confidentiality, conducted a clinical interview, and explained procedures.                    This service was not originating from a related E/M service provided within the previous 7 days nor will  to an E/M service or procedure within the next 24 hours or my soonest available appointment.  Prevailing standard of care was able to be met in this audio-only visit.

## 2022-10-27 ENCOUNTER — ANESTHESIA EVENT (OUTPATIENT)
Dept: ENDOSCOPY | Facility: HOSPITAL | Age: 67
End: 2022-10-27
Payer: MEDICARE

## 2022-10-27 ENCOUNTER — TELEPHONE (OUTPATIENT)
Dept: ENDOSCOPY | Facility: HOSPITAL | Age: 67
End: 2022-10-27
Payer: MEDICARE

## 2022-10-27 RX ORDER — LIDOCAINE HYDROCHLORIDE 10 MG/ML
1 INJECTION, SOLUTION EPIDURAL; INFILTRATION; INTRACAUDAL; PERINEURAL ONCE
Status: CANCELLED | OUTPATIENT
Start: 2022-10-27 | End: 2022-10-27

## 2022-10-27 NOTE — TELEPHONE ENCOUNTER
Ma spoke with pt   Pt arrival time of 12 noon confirm for 1pm procedure with Dr. Sanchez at the  location

## 2022-10-28 ENCOUNTER — HOSPITAL ENCOUNTER (OUTPATIENT)
Facility: HOSPITAL | Age: 67
Discharge: HOME | End: 2022-10-28
Attending: STUDENT IN AN ORGANIZED HEALTH CARE EDUCATION/TRAINING PROGRAM | Admitting: STUDENT IN AN ORGANIZED HEALTH CARE EDUCATION/TRAINING PROGRAM
Payer: MEDICARE

## 2022-10-28 ENCOUNTER — ANESTHESIA (OUTPATIENT)
Dept: ENDOSCOPY | Facility: HOSPITAL | Age: 67
End: 2022-10-28
Payer: MEDICARE

## 2022-10-28 VITALS
OXYGEN SATURATION: 100 % | RESPIRATION RATE: 15 BRPM | DIASTOLIC BLOOD PRESSURE: 89 MMHG | HEART RATE: 64 BPM | SYSTOLIC BLOOD PRESSURE: 155 MMHG | TEMPERATURE: 98 F

## 2022-10-28 DIAGNOSIS — Z12.11 ENCOUNTER FOR COLORECTAL CANCER SCREENING: ICD-10-CM

## 2022-10-28 DIAGNOSIS — Z12.12 ENCOUNTER FOR COLORECTAL CANCER SCREENING: ICD-10-CM

## 2022-10-28 PROBLEM — F43.21 ADJUSTMENT DISORDER WITH DEPRESSED MOOD: Status: ACTIVE | Noted: 2022-10-28

## 2022-10-28 LAB — POCT GLUCOSE: 124 MG/DL (ref 70–110)

## 2022-10-28 PROCEDURE — 88305 TISSUE EXAM BY PATHOLOGIST: ICD-10-PCS | Mod: 26,,, | Performed by: PATHOLOGY

## 2022-10-28 PROCEDURE — 45385 COLONOSCOPY W/LESION REMOVAL: CPT | Mod: PT,,, | Performed by: STUDENT IN AN ORGANIZED HEALTH CARE EDUCATION/TRAINING PROGRAM

## 2022-10-28 PROCEDURE — 25000003 PHARM REV CODE 250: Performed by: ANESTHESIOLOGY

## 2022-10-28 PROCEDURE — 88305 TISSUE EXAM BY PATHOLOGIST: CPT | Performed by: PATHOLOGY

## 2022-10-28 PROCEDURE — D9220A PRA ANESTHESIA: ICD-10-PCS | Mod: PT,CRNA,, | Performed by: NURSE ANESTHETIST, CERTIFIED REGISTERED

## 2022-10-28 PROCEDURE — 37000008 HC ANESTHESIA 1ST 15 MINUTES: Performed by: STUDENT IN AN ORGANIZED HEALTH CARE EDUCATION/TRAINING PROGRAM

## 2022-10-28 PROCEDURE — D9220A PRA ANESTHESIA: Mod: PT,CRNA,, | Performed by: NURSE ANESTHETIST, CERTIFIED REGISTERED

## 2022-10-28 PROCEDURE — 37000009 HC ANESTHESIA EA ADD 15 MINS: Performed by: STUDENT IN AN ORGANIZED HEALTH CARE EDUCATION/TRAINING PROGRAM

## 2022-10-28 PROCEDURE — 45385 COLONOSCOPY W/LESION REMOVAL: CPT | Mod: PT | Performed by: STUDENT IN AN ORGANIZED HEALTH CARE EDUCATION/TRAINING PROGRAM

## 2022-10-28 PROCEDURE — D9220A PRA ANESTHESIA: Mod: PT,ANES,, | Performed by: ANESTHESIOLOGY

## 2022-10-28 PROCEDURE — 27201089 HC SNARE, DISP (ANY): Performed by: STUDENT IN AN ORGANIZED HEALTH CARE EDUCATION/TRAINING PROGRAM

## 2022-10-28 PROCEDURE — 25000003 PHARM REV CODE 250: Performed by: NURSE ANESTHETIST, CERTIFIED REGISTERED

## 2022-10-28 PROCEDURE — D9220A PRA ANESTHESIA: ICD-10-PCS | Mod: PT,ANES,, | Performed by: ANESTHESIOLOGY

## 2022-10-28 PROCEDURE — 63600175 PHARM REV CODE 636 W HCPCS: Performed by: NURSE ANESTHETIST, CERTIFIED REGISTERED

## 2022-10-28 PROCEDURE — 88305 TISSUE EXAM BY PATHOLOGIST: CPT | Mod: 26,,, | Performed by: PATHOLOGY

## 2022-10-28 PROCEDURE — 45385 PR COLONOSCOPY,REMV LESN,SNARE: ICD-10-PCS | Mod: PT,,, | Performed by: STUDENT IN AN ORGANIZED HEALTH CARE EDUCATION/TRAINING PROGRAM

## 2022-10-28 RX ORDER — SODIUM CHLORIDE 9 MG/ML
INJECTION, SOLUTION INTRAVENOUS CONTINUOUS
Status: DISCONTINUED | OUTPATIENT
Start: 2022-10-28 | End: 2022-10-28 | Stop reason: HOSPADM

## 2022-10-28 RX ORDER — PROPOFOL 10 MG/ML
VIAL (ML) INTRAVENOUS
Status: DISCONTINUED | OUTPATIENT
Start: 2022-10-28 | End: 2022-10-28

## 2022-10-28 RX ORDER — SODIUM CHLORIDE 0.9 % (FLUSH) 0.9 %
3 SYRINGE (ML) INJECTION
Status: CANCELLED | OUTPATIENT
Start: 2022-10-28

## 2022-10-28 RX ORDER — PROPOFOL 10 MG/ML
INJECTION, EMULSION INTRAVENOUS
Status: DISCONTINUED
Start: 2022-10-28 | End: 2022-10-28 | Stop reason: HOSPADM

## 2022-10-28 RX ORDER — LIDOCAINE HYDROCHLORIDE 20 MG/ML
INJECTION, SOLUTION EPIDURAL; INFILTRATION; INTRACAUDAL; PERINEURAL
Status: DISCONTINUED
Start: 2022-10-28 | End: 2022-10-28 | Stop reason: HOSPADM

## 2022-10-28 RX ORDER — LIDOCAINE HYDROCHLORIDE 20 MG/ML
INJECTION INTRAVENOUS
Status: DISCONTINUED | OUTPATIENT
Start: 2022-10-28 | End: 2022-10-28

## 2022-10-28 RX ADMIN — SODIUM CHLORIDE: 0.9 INJECTION, SOLUTION INTRAVENOUS at 12:10

## 2022-10-28 RX ADMIN — PROPOFOL 50 MG: 10 INJECTION, EMULSION INTRAVENOUS at 12:10

## 2022-10-28 RX ADMIN — PROPOFOL 100 MG: 10 INJECTION, EMULSION INTRAVENOUS at 12:10

## 2022-10-28 RX ADMIN — LIDOCAINE HYDROCHLORIDE 100 MG: 20 INJECTION, SOLUTION INTRAVENOUS at 12:10

## 2022-10-28 NOTE — PLAN OF CARE
RECOVERY COMPLETE. DISCHARGE INSTRUCTIONS GIVEN. PATIENT VERBALIZED UNDERSTANDING. GAIT STEADY. PATIENT ABLE TO AMBULATE WITHOUT ASSISTANCE. PATIENT WALKED TO [facility]'S VEHICLE AT SECOND FLOOR GARAGE ENTRANCE, ACCOMPANIED BY MYSELF.

## 2022-10-28 NOTE — PROVATION PATIENT INSTRUCTIONS
Discharge Summary/Instructions after an Endoscopic Procedure  Patient Name: Handy Adams  Patient MRN: [id number]  Patient YOB: 1955 Friday, October 28, 2022  Guanako Sanhcez MD  Dear patient,  As a result of recent federal legislation (The Federal Cures Act), you may   receive lab or pathology results from your procedure in your [facility]   account before your physician is able to contact you. Your physician or   their representative will relay the results to you with their   recommendations at their soonest availability.  Thank you,  RESTRICTIONS:  During your procedure today, you received medications for sedation.  These   medications may affect your judgment, balance and coordination.  Therefore,   for 24 hours, you have the following restrictions:   - DO NOT drive a car, operate machinery, make legal/financial decisions,   sign important papers or drink alcohol.    ACTIVITY:  Today: no heavy lifting, straining or running due to procedural   sedation/anesthesia.  The following day: return to full activity including work.  DIET:  Eat and drink normally unless instructed otherwise.     TREATMENT FOR COMMON SIDE EFFECTS:  - Mild abdominal pain, nausea, belching, bloating or excessive gas:  rest,   eat lightly and use a heating pad.  - Sore Throat: treat with throat lozenges and/or gargle with warm salt   water.  - Because air was used during the procedure, expelling large amounts of air   from your rectum or belching is normal.  - If a bowel prep was taken, you may not have a bowel movement for 1-3 days.    This is normal.  SYMPTOMS TO WATCH FOR AND REPORT TO YOUR PHYSICIAN:  1. Abdominal pain or bloating, other than gas cramps.  2. Chest pain.  3. Back pain.  4. Signs of infection such as: chills or fever occurring within 24 hours   after the procedure.  5. Rectal bleeding, which would show as bright red, maroon, or black stools.   (A tablespoon of blood from the rectum is not serious, especially  if   hemorrhoids are present.)  6. Vomiting.  7. Weakness or dizziness.  GO DIRECTLY TO THE NEAREST EMERGENCY ROOM IF YOU HAVE ANY OF THE FOLLOWING:      Difficulty breathing              Chills and/or fever over 101 F   Persistent vomiting and/or vomiting blood   Severe abdominal pain   Severe chest pain   Black, tarry stools   Bleeding- more than one tablespoon   Any other symptom or condition that you feel may need urgent attention  Your doctor recommends these additional instructions:  If any biopsies were taken, your doctors clinic will contact you in 1 to 2   weeks with any results.  - Discharge patient to home.   - Await pathology results.   - Repeat colonoscopy in 3 years for surveillance.   - Patient has a contact number available for emergencies.  The signs and   symptoms of potential delayed complications were discussed with the   patient.  Return to normal activities tomorrow.  Written discharge   instructions were provided to the patient.   - The findings and recommendations were discussed with the patient.  For questions, problems or results please call your physician - Guanako Sanchez MD at Work:  [phone].  [facility] can be reached at [phone]     IF A COMPLICATION OR EMERGENCY SITUATION ARISES AND YOU ARE UNABLE TO REACH   YOUR PHYSICIAN - GO DIRECTLY TO THE EMERGENCY ROOM.  Guanako Sanchez MD  10/28/2022 12:45:23 PM  This report has been verified and signed electronically.  Dear patient,  As a result of recent federal legislation (The Federal Cures Act), you may   receive lab or pathology results from your procedure in your [facility]   account before your physician is able to contact you. Your physician or   their representative will relay the results to you with their   recommendations at their soonest availability.  Thank you,  PROVATION

## 2022-10-28 NOTE — ANESTHESIA PREPROCEDURE EVALUATION
10/28/2022  Handy Adams is a 67 y.o., male.  To undergo Procedure(s) (LRB):  COLONOSCOPY (N/A)     Denies CP/SOB/GERD/MI/CVA/URI symptoms.  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 11/17/2017    Type 2 diabetes mellitus        Past Surgical History:  Past Surgical History:   Procedure Laterality Date    MOUTH SURGERY         Social History:  Social History     Socioeconomic History    Marital status:     Number of children: 2    Highest education level: Master's degree (e.g., MA, MS, Sandra, MEd, MSW, CORTEZ)   Tobacco Use    Smoking status: Never    Smokeless tobacco: Never   Substance and Sexual Activity    Alcohol use: Yes    Sexual activity: Yes     Partners: Female     Social Determinants of Health     Financial Resource Strain: Low Risk     Difficulty of Paying Living Expenses: Not hard at all   Food Insecurity: No Food Insecurity    Worried About Running Out of Food in the Last Year: Never true    Ran Out of Food in the Last Year: Never true   Transportation Needs: No Transportation Needs    Lack of Transportation (Medical): No    Lack of Transportation (Non-Medical): No   Physical Activity: Inactive    Days of Exercise per Week: 0 days    Minutes of Exercise per Session: 0 min   Stress: No Stress Concern Present    Feeling of Stress : Not at all   Social Connections: Moderately Isolated    Frequency of Communication with Friends and Family: More than three times a week    Frequency of  Social Gatherings with Friends and Family: Twice a week    Attends Jainism Services: More than 4 times per year    Active Member of Clubs or Organizations: No    Marital Status:    Housing Stability: Unknown    Unable to Pay for Housing in the Last Year: No    Unstable Housing in the Last Year: No       Medications:  No current facility-administered medications on file prior to encounter.     Current Outpatient Medications on File Prior to Encounter   Medication Sig Dispense Refill    amLODIPine (NORVASC) 10 MG tablet Take 1 tablet (10 mg total) by mouth once daily. 90 tablet 1    atorvastatin (LIPITOR) 20 MG tablet Take 1 tablet by mouth once daily 90 tablet 1    blood sugar diagnostic Strp To test twice daily 100 each 3    blood-glucose meter kit Use as instructed 1 each 0    fexofenadine (ALLEGRA) 180 MG tablet Take 1 tablet (180 mg total) by mouth once daily. 30 tablet 0    labetaloL (NORMODYNE) 200 MG tablet Take 1 tablet (200 mg total) by mouth 2 (two) times daily. 180 tablet 3    lancets Misc 1 lancet by Misc.(Non-Drug; Combo Route) route 2 (two) times daily with meals. 100 each 11    linaGLIPtin (TRADJENTA) 5 mg Tab tablet Take 1 tablet (5 mg total) by mouth once daily. 30 tablet 3    pioglitazone (ACTOS) 45 MG tablet Take 1 tablet (45 mg total) by mouth once daily. 90 tablet 0    sildenafiL (VIAGRA) 50 MG tablet Take 1 tablet (50 mg total) by mouth daily as needed for Erectile Dysfunction. 30 tablet 0    triamcinolone acetonide 0.1% (KENALOG) 0.1 % ointment Apply topically 2 (two) times daily. (Patient not taking: No sig reported) 80 g 1       Allergies:  Review of patient's allergies indicates:  No Known Allergies    Active Problems:  Patient Active Problem List   Diagnosis    Essential hypertension    Hyperlipemia    Type 2 diabetes mellitus, uncontrolled    Type 2 diabetes mellitus with stage 4 chronic kidney disease, without long-term current use of insulin    Hypertensive  retinopathy, bilateral    NS (nuclear sclerosis)    Uncontrolled type 2 diabetes mellitus with both eyes affected by proliferative retinopathy and macular edema, with long-term current use of insulin    NS (nuclear sclerosis), bilateral    Severe obesity (BMI 35.0-39.9) with comorbidity    CKD (chronic kidney disease) stage 4, GFR 15-29 ml/min    Adjustment disorder with depressed mood       Diagnostic Studies:   Latest Reference Range & Units 10/24/22 10:00   WBC 3.90 - 12.70 K/uL 6.80   RBC 4.60 - 6.20 M/uL 3.18 (L)   Hemoglobin 14.0 - 18.0 g/dL 9.1 (L)   Hematocrit 40.0 - 54.0 % 29.1 (L)   MCV 82 - 98 fL 92   MCH 27.0 - 31.0 pg 28.6   MCHC 32.0 - 36.0 g/dL 31.3 (L)   RDW 11.5 - 14.5 % 15.4 (H)   Platelets 150 - 450 K/uL 307   MPV 9.2 - 12.9 fL 11.1   Gran % 38.0 - 73.0 % 71.8   Lymph % 18.0 - 48.0 % 16.6 (L)   Mono % 4.0 - 15.0 % 7.1   Eosinophil % 0.0 - 8.0 % 3.4   Basophil % 0.0 - 1.9 % 0.4   Immature Granulocytes 0.0 - 0.5 % 0.7 (H)   Gran # (ANC) 1.8 - 7.7 K/uL 4.9   Lymph # 1.0 - 4.8 K/uL 1.1   Mono # 0.3 - 1.0 K/uL 0.5   Eos # 0.0 - 0.5 K/uL 0.2   Baso # 0.00 - 0.20 K/uL 0.03   Immature Grans (Abs) 0.00 - 0.04 K/uL 0.05 (H)   nRBC 0 /100 WBC 0   Differential Method  Automated      Latest Reference Range & Units 10/19/22 09:17   Sodium 136 - 145 mmol/L 139   Potassium 3.5 - 5.1 mmol/L 3.6   Chloride 95 - 110 mmol/L 107   CO2 23 - 29 mmol/L 20 (L)   Anion Gap 8 - 16 mmol/L 12   BUN 8 - 23 mg/dL 25 (H)   Creatinine 0.5 - 1.4 mg/dL 2.9 (H)   eGFR >60 mL/min/1.73 m^2 23.0 !     24 Hour Vitals:  Temp:  [36.7 °C (98.1 °F)] 36.7 °C (98.1 °F)  Pulse:  [74] 74  Resp:  [18] 18  SpO2:  [100 %] 100 %  BP: (157)/(91) 157/91   See Nursing Charting For Additional Vitals    Pre-op Assessment    I have reviewed the Patient Summary Reports.       I have reviewed the Medications.     Review of Systems  Anesthesia Hx:  No problems with previous Anesthesia  History of prior surgery of interest to airway management or  planning: Previous anesthesia: General  Denies Personal Hx of Anesthesia complications.   Social:  Non-Smoker, Social Alcohol Use    Hematology/Oncology:  Hematology Normal   Oncology Normal     EENT/Dental:EENT/Dental Normal   Cardiovascular:   Exercise tolerance: good Hypertension, well controlled    Pulmonary:  Pulmonary Normal    Renal/:   Chronic Renal Disease    Hepatic/GI:  Hepatic/GI Normal    Musculoskeletal:  Musculoskeletal Normal    Neurological:  Neurology Normal    Endocrine:   Diabetes, well controlled, type 2    Dermatological:  Skin Normal    Psych:  Psychiatric Normal           Physical Exam  General: Well nourished, Cooperative, Alert and Oriented    Airway:  Mallampati: II   Mouth Opening: Normal  TM Distance: Normal  Tongue: Normal  Neck ROM: Normal ROM    Dental:  Intact        Anesthesia Plan  Type of Anesthesia, risks & benefits discussed:    Anesthesia Type: Gen Natural Airway, MAC, Gen ETT  Intra-op Monitoring Plan: Standard ASA Monitors  Post Op Pain Control Plan: multimodal analgesia and IV/PO Opioids PRN  Induction:  IV  Informed Consent: Informed consent signed with the Patient and all parties understand the risks and agree with anesthesia plan.  All questions answered.   ASA Score: 3    Ready For Surgery From Anesthesia Perspective.     .

## 2022-10-28 NOTE — TRANSFER OF CARE
Anesthesia Transfer of Care Note    Patient: Handy Adams    Procedure(s) Performed: Procedure(s) (LRB):  COLONOSCOPY (N/A)    Patient location: GI    Anesthesia Type: general    Transport from OR: Transported from OR on room air with adequate spontaneous ventilation    Post pain: adequate analgesia    Post assessment: no apparent anesthetic complications and tolerated procedure well    Post vital signs: stable    Level of consciousness: responds to stimulation and sedated    Nausea/Vomiting: no nausea/vomiting    Complications: none    Transfer of care protocol was followed      Last vitals:   Visit Vitals  BP (!) 143/81 (BP Location: Right arm, Patient Position: Lying)   Pulse 73   Temp 36.4 °C (97.6 °F) (Axillary)   Resp 18   SpO2 97%

## 2022-10-28 NOTE — H&P
Short Stay Endoscopy History and Physical    PCP - Toby Sanderson MD  Referring Physician - Toby Sanderson MD  [address] [place] [address]    Procedure - Colonoscopy  ASA - per anesthesia  Mallampati - per anesthesia  History of Anesthesia problems - no  Family history Anesthesia problems -  no   Plan of anesthesia - General    HPI  67 y.o. 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    Surgical History:  has a past surgical history that includes Mouth surgery.    Family History: family history includes Diabetes in his father; Hypertension in his father; No Known Problems in his brother, maternal aunt, maternal grandfather, maternal grandmother, maternal uncle, mother, paternal aunt, paternal grandfather, paternal grandmother, paternal uncle, and sister..    Social History:  reports that he has never smoked. He has never used smokeless tobacco. He reports current alcohol use.    Review of patient's allergies indicates:  No Known Allergies    Medications:   Medications Prior to Admission   Medication Sig Dispense Refill Last Dose    amLODIPine (NORVASC) 10 MG tablet Take 1 tablet (10 mg total) by mouth once daily. 90 tablet 1     atorvastatin (LIPITOR) 20 MG tablet Take 1 tablet by mouth once daily 90 tablet 1     blood sugar diagnostic Strp To test twice daily 100 each 3     blood-glucose meter kit Use as  instructed 1 each 0     chlorthalidone (HYGROTEN) 25 MG Tab Take 1 tablet (25 mg total) by mouth once daily. 90 tablet 3     fexofenadine (ALLEGRA) 180 MG tablet Take 1 tablet (180 mg total) by mouth once daily. 30 tablet 0     labetaloL (NORMODYNE) 200 MG tablet Take 1 tablet (200 mg total) by mouth 2 (two) times daily. 180 tablet 3     lancets Misc 1 lancet by Misc.(Non-Drug; Combo Route) route 2 (two) times daily with meals. 100 each 11     linaGLIPtin (TRADJENTA) 5 mg Tab tablet Take 1 tablet (5 mg total) by mouth once daily. 30 tablet 3     pioglitazone (ACTOS) 45 MG tablet Take 1 tablet (45 mg total) by mouth once daily. 90 tablet 0     sildenafiL (VIAGRA) 50 MG tablet Take 1 tablet (50 mg total) by mouth daily as needed for Erectile Dysfunction. 30 tablet 0     triamcinolone acetonide 0.1% (KENALOG) 0.1 % ointment Apply topically 2 (two) times daily. (Patient not taking: No sig reported) 80 g 1     valsartan (DIOVAN) 80 MG tablet Take 1 tablet (80 mg total) by mouth once daily. 90 tablet 3        Physical Exam:    Vital Signs:   Vitals:    10/28/22 1149   BP: (!) 157/91   Pulse:    Resp:    Temp:        General Appearance: Well appearing in no acute distress  Abdomen: Soft, non tender, non distended with normal bowel sounds, no masses    Labs:  Lab Results   Component Value Date    WBC 6.80 10/24/2022    HGB 9.1 (L) 10/24/2022    HCT 29.1 (L) 10/24/2022     10/24/2022    CHOL 197 09/07/2022    TRIG 363 (H) 09/07/2022    HDL 30 (L) 09/07/2022    ALT 13 09/07/2022    AST 11 09/07/2022     10/19/2022    K 3.6 10/19/2022     10/19/2022    CREATININE 2.9 (H) 10/19/2022    BUN 25 (H) 10/19/2022    CO2 20 (L) 10/19/2022    TSH 1.863 09/07/2022    PSA 1.7 09/07/2022    INR 0.9 11/15/2005    HGBA1C 10.4 (H) 09/07/2022       I have explained the risks and benefits of this endoscopic procedure to the patient including but not limited to bleeding, inflammation, infection, perforation, and  death.      Guanako Sanchez MD

## 2022-10-31 ENCOUNTER — OFFICE VISIT (OUTPATIENT)
Dept: NEUROLOGY | Facility: CLINIC | Age: 67
End: 2022-10-31
Payer: MEDICARE

## 2022-10-31 DIAGNOSIS — G31.84 MILD COGNITIVE IMPAIRMENT: Primary | ICD-10-CM

## 2022-10-31 DIAGNOSIS — F43.21 ADJUSTMENT DISORDER WITH DEPRESSED MOOD: ICD-10-CM

## 2022-10-31 DIAGNOSIS — F03.90 DEMENTIA WITHOUT BEHAVIORAL DISTURBANCE: ICD-10-CM

## 2022-10-31 PROCEDURE — 96132 PR NEUROPSYCHOLOGIC TEST EVAL SVCS, 1ST HR: ICD-10-PCS | Mod: S$GLB,,, | Performed by: CLINICAL NEUROPSYCHOLOGIST

## 2022-10-31 PROCEDURE — 96139 PSYCL/NRPSYC TST TECH EA: CPT | Mod: S$GLB,,, | Performed by: CLINICAL NEUROPSYCHOLOGIST

## 2022-10-31 PROCEDURE — 4010F PR ACE/ARB THEARPY RXD/TAKEN: ICD-10-PCS | Mod: CPTII,S$GLB,, | Performed by: CLINICAL NEUROPSYCHOLOGIST

## 2022-10-31 PROCEDURE — 3066F NEPHROPATHY DOC TX: CPT | Mod: CPTII,S$GLB,, | Performed by: CLINICAL NEUROPSYCHOLOGIST

## 2022-10-31 PROCEDURE — 96133 NRPSYC TST EVAL PHYS/QHP EA: CPT | Mod: S$GLB,,, | Performed by: CLINICAL NEUROPSYCHOLOGIST

## 2022-10-31 PROCEDURE — 3046F HEMOGLOBIN A1C LEVEL >9.0%: CPT | Mod: CPTII,S$GLB,, | Performed by: CLINICAL NEUROPSYCHOLOGIST

## 2022-10-31 PROCEDURE — 4010F ACE/ARB THERAPY RXD/TAKEN: CPT | Mod: CPTII,S$GLB,, | Performed by: CLINICAL NEUROPSYCHOLOGIST

## 2022-10-31 PROCEDURE — 99499 UNLISTED E&M SERVICE: CPT | Mod: S$GLB,,, | Performed by: CLINICAL NEUROPSYCHOLOGIST

## 2022-10-31 PROCEDURE — 3062F PR POS MACROALBUMINURIA RESULT DOCUMENTED/REVIEW: ICD-10-PCS | Mod: CPTII,S$GLB,, | Performed by: CLINICAL NEUROPSYCHOLOGIST

## 2022-10-31 PROCEDURE — 3062F POS MACROALBUMINURIA REV: CPT | Mod: CPTII,S$GLB,, | Performed by: CLINICAL NEUROPSYCHOLOGIST

## 2022-10-31 PROCEDURE — 99499 NO LOS: ICD-10-PCS | Mod: S$GLB,,, | Performed by: CLINICAL NEUROPSYCHOLOGIST

## 2022-10-31 PROCEDURE — 96132 NRPSYC TST EVAL PHYS/QHP 1ST: CPT | Mod: S$GLB,,, | Performed by: CLINICAL NEUROPSYCHOLOGIST

## 2022-10-31 PROCEDURE — 96133 PR NEUROPSYCHOLOGIC TEST EVAL SVCS, EA ADDTL HR: ICD-10-PCS | Mod: S$GLB,,, | Performed by: CLINICAL NEUROPSYCHOLOGIST

## 2022-10-31 PROCEDURE — 99999 PR PBB SHADOW E&M-EST. PATIENT-LVL I: ICD-10-PCS | Mod: PBBFAC,,, | Performed by: CLINICAL NEUROPSYCHOLOGIST

## 2022-10-31 PROCEDURE — 96138 PR PSYCH/NEUROPSYCH TEST ADMIN/SCORING, BY TECH, 2+ TESTS, 1ST 30 MIN: ICD-10-PCS | Mod: S$GLB,,, | Performed by: CLINICAL NEUROPSYCHOLOGIST

## 2022-10-31 PROCEDURE — 96138 PSYCL/NRPSYC TECH 1ST: CPT | Mod: S$GLB,,, | Performed by: CLINICAL NEUROPSYCHOLOGIST

## 2022-10-31 PROCEDURE — 3046F PR MOST RECENT HEMOGLOBIN A1C LEVEL > 9.0%: ICD-10-PCS | Mod: CPTII,S$GLB,, | Performed by: CLINICAL NEUROPSYCHOLOGIST

## 2022-10-31 PROCEDURE — 96139 PR PSYCH/NEUROPSYCH TEST ADMIN/SCORING, BY TECH, 2+ TESTS, EA ADDTL 30 MIN: ICD-10-PCS | Mod: S$GLB,,, | Performed by: CLINICAL NEUROPSYCHOLOGIST

## 2022-10-31 PROCEDURE — 99999 PR PBB SHADOW E&M-EST. PATIENT-LVL I: CPT | Mod: PBBFAC,,, | Performed by: CLINICAL NEUROPSYCHOLOGIST

## 2022-10-31 PROCEDURE — 3066F PR DOCUMENTATION OF TREATMENT FOR NEPHROPATHY: ICD-10-PCS | Mod: CPTII,S$GLB,, | Performed by: CLINICAL NEUROPSYCHOLOGIST

## 2022-10-31 NOTE — ANESTHESIA POSTPROCEDURE EVALUATION
Anesthesia Post Evaluation    Patient: Handy Adams    Procedure(s) Performed: Procedure(s) (LRB):  COLONOSCOPY (N/A)    Final Anesthesia Type: general      Patient location during evaluation: PACU  Patient participation: Yes- Able to Participate  Level of consciousness: awake and alert  Post-procedure vital signs: reviewed and stable  Pain management: adequate  Airway patency: patent    PONV status at discharge: No PONV  Anesthetic complications: no      Cardiovascular status: blood pressure returned to baseline  Respiratory status: unassisted  Hydration status: euvolemic  Follow-up not needed.          Vitals Value Taken Time   /89 10/28/22 1317   Temp 36.4 °C (97.6 °F) 10/28/22 1247   Pulse 64 10/28/22 1317   Resp 15 10/28/22 1317   SpO2 100 % 10/28/22 1317         Event Time   Out of Recovery 13:31:10         Pain/Alok Score: No data recorded

## 2022-10-31 NOTE — LETTER
November 10, 2022        Etelvina Valencia, [place], PALeviC  [address]  [address]  [place] [address]             [facility] [address]  [address]  [place] [address]  Phone: [phone]  Fax: [phone]   Patient: Handy Adams   MR Number: [id number]   YOB: 1955   Date of Visit: 10/31/2022       Dear Dr. Valencia:    Thank you for referring Handy Adams to me for evaluation. Below are the relevant portions of my assessment and plan of care.            If you have questions, please do not hesitate to call me. I look forward to following Handy along with you.    Sincerely,      Teresita Tompkins, PhD           CC    No Recipients

## 2022-10-31 NOTE — PROGRESS NOTES
NEUROPSYCHOLOGICAL EVALUATION - CONFIDENTIAL    Referring Provider: Etelvina Valencia, [facility], [facility]  Medical Necessity: Evaluate cognitive and emotional functioning, participate in treatment planning/management, and provide supportive therapy in the setting of cognitive changes  Date Conducted: 10/25/2022 & 10/31/2022  Present At Visit: the patient  Referral Diagnoses: F03.90 (ICD-10-CM) - Dementia without behavioral disturbance, unspecified dementia type  Consent: The patient expressed an understanding of the purpose of the evaluation and consented to all procedures. We discussed the limits of confidentiality and discussed an emergency plan.    ASSESSMENT & PLAN:   Mr. Handy Adams is an 67 y.o., male with 18 years of education and pertinent medical history including chronic kidney disease stage 4, diabetes type 2, hyperlipidemia, hypertension, and bilateral hypertensive retinopathy who was referred for a neuropsychological evaluation in the setting of cognitive changes.       Compared to average range premorbid estimates (based on both demographic information and a word reading test), results of the current evaluation reveal intact/at expectation visuospatial constructional skills, aspects of language functioning (exception of impaired speeded verbal fluencies), attention, working memory, and free recall/memory retrieval. While processing speed was intact on basic measures, his speed significantly slowed with more complex demands, such as on measures of sustained attention, speeded verbal fluencies, and divided attention. His encoding/learning followed a similar pattern; he performed normally when information was meaningfully organized for him to learn, but he significantly struggled when he was required to superimpose an organizational structure in order to learn information (thus, when a higher executive burden was present). Importantly, he retained almost all of the information he learned. A novel, nonverbal  problem solving task was also impaired.     Overall, a diagnosis of Mild Cognitive Impairment/MCI is warranted at this time. It's likely that his CKD stage 4 is impacting his cognition. He also has vascular risk factors (DM2, HTN, HLD) and white matter changes seen on neuroimaging in addition to an incidental finding of an old left thalamic infarct that could be contributory. His presentation is not consistent with a neurodegenerative process, however, it is concerning that he reports progressive worsening in thinking. This may be secondary to his kidney functioning. Nevertheless, his cognition deserves monitoring over time. Psychologically, he is reporting a mild degree of clinically significant depressive symptoms which are likely exacerbating cognitive difficulties. With adequate treatment of the above factors, I am hopeful that his cognition could slightly improve or stabilize. That said, a full return to baseline is not thought likely. The following recommendations are offered:      I will place a referral to neurology for workup.   Encouraged to attend all appointments in nephrology and endocrinology.   Close management of vascular risk factors is recommended.   Encouraged to continue working with his psychiatry team for management of depressive symptoms.   I am concerned about Mr. Adams's ability to complete doctoral-level work with his difficulty in executive functioning. We can discuss his options during our feedback appointment (including applying for accommodations).  While he is not reporting any difficulty driving, his score on a divided attention task indicates that he is at increased risk of making errors while driving. As such, it is recommended that he consider limiting his driving to ideal weather conditions and low traffic times during the day. I can refer him for a driving evaluation as well if he wishes to pursue this option.   Encouraged to focus on behaviors that promote brain health (listed  at the end of this report).   Cognitive tips and strategies are included at the end of this report. A list of brain training applications is also included at the end of this report.   Re-evaluation in one year to reassess cognition. This evaluation can serve as a baseline for comparison.     Problem List Items Addressed This Visit          Neuro    Mild cognitive impairment - Primary       Psychiatric    Adjustment disorder with depressed mood     Other Visit Diagnoses       Dementia without behavioral disturbance            Thank you for allowing me to assist in Mr. Handy Adams's care. If you have any questions, please contact me at [phone].      Teresita Tompkins, PhD  Licensed Clinical Neuropsychologist  [facility]     CLINICAL INTERVIEW & RECORD REVIEW:     Cognitive Functioning   Cognitive screener: none  Previous evaluation(s): none  Onset & course of difficulty: 2 years of progressive worsening/more concerning. 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 Can't spend a lot of time doing frivolous stuff. Making bad decisions because he is choosing to sit there and watch tv instead. 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 Always involved in some educational attainment throughout his lifetime. Consistent through all of that is also his relationship with his wife.  in 1989. Since that time it's been problematic. So the overlap with both of those things, it's hard to discern what is causing his issues.    Fluctuations: none  Examples:   Attention/Working Memory/Executive Functioning: Saw a doctor years ago and was diagnosed with ADHD. Late 40s diagnosed. Took a stimulant for a short period of time but didn't help so stopped taking. This is why he initially initiated this eval. Worried that his attentional difficulty was getting in the way of him completing his coursework. Doing well with organization. Does okay with planning. 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 Getting older now but still feels like there is part of him that wants to excel in helping businesses with leadership.   Felt like he needed to be more focused. Easily distracted. Very difficult for him to finish tasks. Educationally, he has always excelled for him. Took a substantial amount of time for him to get assignments completed but did well in school. All his life struggled to get tasks done on time but always completed them. 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 Has procrastinated about getting taxes done.  Processing Speed: thinking is not slower, but feels he is not as focused as he wants to be and spends time working on things that are unnecessary. Tends to, sometimes goes toward B when should have gone to A and that frustrates him.   Language: word finding difficulty, delayed responses to questions, and a round about way of speaking per psychiatrist notes. Patient says no problems.   Visuospatial: no problems identified  Learning & Memory: pt misplaces keys sometimes but believes this is normal for his age.   Exacerbating factors: Thinks part of the problem is living arrangements. Doesn't totally get in the way but doesn't help him.   Ameliorating factors: none  Medication for cognition: none     Daily Functioning   ADLs:    Bathing: Independent and without difficulty  Dressing: Independent and without difficulty  Grooming: Independent and without difficulty   Toileting: Independent and without difficulty  Transferring: Independent and without difficulty.  Eating: Independent and without difficulty.   IADLs:    Finances: Independent and without difficulty  Medication Mgmt: Independent and without difficulty  Driving: Independent and without  "difficulty  Household Mgmt: Independent and without difficulty Cooking/Meal Preparation: Independent and without difficulty.  Shopping: Independent and without difficulty.  Appointment Mgmt: Independent and without difficulty  Employment/School: 6 month leave of absence from school. It takes a while for him to complete a task. Does complete the tasks but procrastinates a lot more than he would like to. Thinks his ability to do the assignment is not questionable but the time it takes to do it is. One certification exam he had to take several times.        Psychiatric/Neuropsychiatric Symptoms   Mood: "it's a challenge to describe myself"  Depression: would never call it the blues, but would call it a part of the procrastination thing. Will choose to watch tv rather than the tasks he needs to. TV is more like an escape thing.  Ester/Hypomania: no  Anxiety: some due to his procrastination   Stress: "normal" - stress due to his procrastination  Neurovegetative Sxs:  Appetite: it has to change due to his recent diagnosis and diabetes. The last 6 months, the beginning of this year he didn't take care of himself. Since then, he has re-acquired the discipline to not eat sweets.   Sleep: "all over the place" > always has gotten up in the middle of the night to do assignments. Generally sleeping a total of 4 to 5 hours a night. No sleep apnea. No acting out dreams.   Energy: feels rested in the mornings. Unsure about his level of energy because he doesn't measure it. Sometimes sleeps in the middle of the day.    Hallucinations: no  Delusional/Paranoid Thinking: no  Impulsivity: no  Obsessive/Compulsive Behaviors: no  Disinhibition: no  Irritability/Agitation: no. Says later he gets very angry sometimes.   Aggression: no  Apathy/Indifference: yes and feels like this is a change. Thinks it's tied to his relationship with his ex. Actual divorce occurred in 2005 but they have continued to live together since that time. Patient " has consistently taken care of the house when his ex-wife has come and gone from the home. He questions whether or not getting out of completely is a pathway. Struggles with that.   Other changes in personality: no     Physical Functioning   Tremor: no  Difficulty walking: no  Imbalance: doesn't fall but is concerned about his balance. Day to day walking is okay but notices that he is not as stable as he used to be.   Falls: no  Weakness: no  Trouble with fine motor movements: no  Other: Just learned about his kidney disease. That is very concerning to him. Has never had real physical limitations before.  Lightheadedness: no  Urinary Urgency: some incontinence, yes, more leakage though  Sensory Sxs: no changes other than with eyes  Pain: no  Physical Exercise Routine: not really         RELEVANT HISTORY  This patient has a past medical history of Disorder of kidney and ureter, Edema leg, History of rotator cuff tear, History of seasonal allergies, HTN (hypertension), colonic polyps, Hyperlipemia, NS (nuclear sclerosis), bilateral (06/25/2019), 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 (11/17/2017), and Type 2 diabetes mellitus.    Past Surgical History:   Procedure Laterality Date    COLONOSCOPY N/A 10/28/2022    Procedure: COLONOSCOPY;  Surgeon: Guanako Sanhcez MD;  Location: [facility];  Service: Endoscopy;  Laterality: N/A;  REFENDO placed per Dr Sanderson. case request entered     vaccinated-  instr portal    MOUTH SURGERY       Neurological History    Headaches/Migraines: no  TBI: no  Seizures: no  Stroke: no  Tumor: no Previous Episodes of Delirium: no  Movement Disorder: no  CNS Infection: no  Other: no     Neurodiagnostics     Results for orders placed or performed during the hospital encounter of 08/11/22   MRI Brain Without Contrast    Narrative    EXAMINATION:  MRI BRAIN WITHOUT CONTRAST    CLINICAL HISTORY:  Dementia,  nonvascular etiology suspected; Unspecified dementia without behavioral disturbance    TECHNIQUE:  Multiplanar multisequence MR imaging of the brain was performed without contrast.    COMPARISON:  MRI of the brain dated 12/07/2004.    FINDINGS:  The craniocervical junction is intact.  The sellar and parasellar structures are within normal limits.  There is stable mild thinning involving the anterior aspect of the body of the corpus callosum.  The intracranial flow voids are within normal limits.    No diffusion-weighted signal abnormality is present.  The ventricles and sulci are prominent, consistent with cerebral volume loss.  There are T2/FLAIR signal hyperintensity in the periventricular and subcortical white matter.  There is an interim old lacunar type infarction in the left thalamus.  No significant asymmetric volume loss identified within the parietal or temporal lobes.  There is minimal encephalomalacia in the left anterior temporal pole.  There is no evidence of mass effect.    The orbits and intraorbital contents are unremarkable.  The paranasal sinuses unremarkable.  The mastoids are clear.  The calvarium is intact.      Impression    Changes of chronic small vessel ischemic disease and cerebral volume loss.    No significant asymmetric volume loss within the temporal and parietal lobes.      Electronically signed by: Raoul Abel MD  Date:    08/11/2022  Time:    13:22     Pertinent Lab Work   No results found for: [id number]  No results found for: RPR  No results found for: FOLATE  Lab Results   Component Value Date    TSH 1.863 09/07/2022     Lab Results   Component Value Date    HGBA1C 10.4 (H) 09/07/2022     Lab Results   Component Value Date    EEZ28VSSH Non-reactive 09/21/2022     Medications     Current Outpatient Medications   Medication Instructions    amLODIPine (NORVASC) 10 mg, Oral, Daily    atorvastatin (LIPITOR) 20 MG tablet Take 1 tablet by mouth once daily    blood sugar diagnostic Strp  To test twice daily    blood-glucose meter kit Use as instructed    chlorthalidone (HYGROTEN) 25 mg, Oral, Daily    fexofenadine (ALLEGRA) 180 mg, Oral, Daily    labetaloL (NORMODYNE) 200 mg, Oral, 2 times daily    lancets Misc 1 lancet, Misc.(Non-Drug; Combo Route), 2 times daily with meals    pioglitazone (ACTOS) 45 mg, Oral, Daily    sildenafiL (VIAGRA) 50 mg, Oral, Daily PRN    TRADJENTA 5 mg, Oral, Daily    triamcinolone acetonide 0.1% (KENALOG) 0.1 % ointment Topical (Top), 2 times daily    valsartan (DIOVAN) 80 mg, Oral, Daily     Psychiatric History   Prior Diagnoses: none  History of Trauma: no  History of Abuse: no  History of Suicide Attempts: no  Current Ideation, Intention, or Plan: no  Homicidal Ideation: no   Medication(s): Wellbutrin was recently prescribed but he stopped taking it.   Hospitalization(s): no  Psychotherapy/Counseling: no  Other: no         Substance Use History     Social History     Tobacco Use    Smoking status: Never    Smokeless tobacco: Never   Substance and Sexual Activity    Alcohol use: Yes    Drug use: Not on file    Sexual activity: Yes     Partners: Female     History of abuse/overuse: no   Hx of taking illicit amphetamine (speed) during childhood in [facility] where father was stationed feels like it helped him concentrate    Family Neurological & Psychiatric History       Family History   Problem Relation Age of Onset    No Known Problems Mother     Diabetes Father     Hypertension Father     No Known Problems Sister     No Known Problems Brother     No Known Problems Maternal Aunt     No Known Problems Maternal Uncle     No Known Problems Paternal Aunt     No Known Problems Paternal Uncle     No Known Problems Maternal Grandmother     No Known Problems Maternal Grandfather     No Known Problems Paternal Grandmother     No Known Problems Paternal Grandfather     Amblyopia Neg Hx     Blindness Neg Hx     Cancer Neg Hx     Cataracts Neg Hx     Glaucoma Neg Hx     Macular  "degeneration Neg Hx     Retinal detachment Neg Hx     Strabismus Neg Hx     Stroke Neg Hx     Thyroid disease Neg Hx      Neurologic: Mother had some dementia (passed away at 83/84 years of age). 4th stage lung cancer as well. Father had some dementia.   Psychiatric: Negative for heritable risk factors.    Development  Education   Born & raised: born in [place] and  family so moved all around   Prenatal and  development: wnl  Developmental milestones: wnl  Language Acquisition: English first language  Level Attained: Bachelors + [employer]. 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 Has taken a leave of absence a few times this year. Taking his 4th or 5th class right now (approximately a 3 or 4 year program). Online program and the task assignments are due every week. Takes him a while to complete the assignments.   Learning/Attention/Behavior Difficulties: no  Repeated Grade(s): no  Typical Grades: B or C student        Occupation  Social   Anzu Service: no  Occupational Status: Retired at 49 y/o from [employer] in . 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 26 y/o and 29 y/o sons in [facility]  Support System: "I guess its average" - generally doesn't like to rely on other people to take care of things for him  Hobbies/Activities: Doesn't really have any hobbies. Preoccupied with achieving certifications. Thinks he watches too much TV and would like to not watch as much.   Current Living Situation: lives at home with his ex-wife and his son, Pee, just moved back      Legal History   Current: none    OBJECTIVE:     MENTAL STATUS AND OBSERVATIONS:   Appearance: Appropriate to setting   Alertness: Attentive and alert.   Orientation:   O x 4 across both evaluation " days   Gait:  Independent   Psychomotor:  Unremarkable   Handedness:  Right   Vision & Hearing:  Hearing problems were evident during testing. The examiner needed to speak in a loud voice and repeat some information in order to ensure he heard the information accurately. He wore glasses on the day of testing.    Speech/language: Normal in rate, rhythm, tone, and volume. No significant word finding difficulty observed. Comprehension was normal during the clinical interview but he required clarification and repetition of complex task instructions in order to ensure his comprehension during testing.    Mood/Affect:  The patients mood and affect were congruent and slightly dysthymic during the clinical interview. He appeared anxious on the day of testing and required reassurance from the examiner.    Interpersonal Behavior:  Rapport was quickly and easily established    Suicidality/Homicidality: Denied   Hallucinations/Delusions:  None evidenced or endorsed   Thought Content: Logical   Though Processes: Goal-directed   Insight & Judgment:  Appropriate   Participation in Interview:  Full     PROCEDURES/TESTS ADMINISTERED: In addition to performing a review of pertinent medical records, reviewing limits to confidentiality, conducting a clinical interview, and explaining procedures, the following measures were administered: MSVT; Test of Premorbid Functioning (TOPF); Wechsler Adult Intelligence Scale, Fourth Edition (WAIS-IV) [Digit Span, Arithmetic, Symbol Search, and Coding subtests]; Ruff 2 & 7 Selective Attention Test; Wechsler Memory Scale, Fourth Edition (WMS-IV) [Logical Memory subtest]; Ch Verbal Learning Test-Revised (HVLT-R; Form 1); Brief Visuospatial Memory Test-Revised (BVMT-R, form 1); Neuropsychological Assessment Battery (NAB) [Naming subtest, form 1]; Verbal fluency tests (FAS & animal naming; Kady et al., 2004 norms); Justice Complex Figure Test (RCFT) [copy only]; Trail Making Test, parts A and B  "(BIJU, 2005 norms); [place] Card Sorting Test -64 card version (WCST-64); Geriatric Depression Scale (GDS-30); Generalized Anxiety Disorder - 7 Item Scale (JAKE-7); and Apathy Scale. Manual norms were used unless otherwise indicated.      TEST TAKING BEHAVIOR AND VALIDITY: During testing, the patient worked at a slow pace. His phone rang while he was completing an auditory attention measure and on an arithmetic measure he commented, "I'm used to writing this down."  He repeated some responses on a speeded letter verbal fluency measure had difficulty finding the responses on written speeded attention tasks. He lost his place easily and had difficulty remembering to switch between two rule sets on a divided attention task. Twice he said, "I'm lost."  Time  before he was able to complete the task. While working on a novel, nonverbal problem solving task he had a confused look on his face and commented, "I don't know what I'm doing." He seemed to be aware of his errors and he attempted to self-correct whenever possible. In general, he seemed frustrated by the testing process but was able to persevere. Scores on stand-alone and embedded performance validity measures were within normal limits. The current results, therefore, are likely an accurate reflection of the patient's current functioning.    TEST RESULTS    Raw Score Type of Standardized Score Standardized Score Percentile/CP Descriptor   MSVT  - - - -   MSVT  - - - -   MSVT Cons 100 - - - -   MSVT PA 90 - - - -   MSVT FR 45 - - - -   ACS LM II Rec 22 - - - -   ACS RDS 7 - - - -   HVLT-R Recognition Discrimination 11 - - - -   PREMORBID FUNCTIONING Raw Score Type of Standardized Score Standardized Score Percentile/CP Descriptor   TOPF simple dem. eFSIQ -  53 Average   TOPF pred. eFSIQ - SS 98 45 Average   TOPF simple + pred. eFSIQ - SS 98 45 Average   LANGUAGE FUNCTIONING Raw Score Type of Standardized Score Standardized Score " Percentile/CP Descriptor   TOPF Word Reading 35 SS 95 37 Average   NAB Naming 29 Tscore 41 18 Low Average   FAS 20 Tscore 29 2 Below Average   Animal Naming 12 Tscore 36 8 Below Average   VISUOSPATIAL FUNCTIONING Raw Score Type of Standardized Score Standardized Score Percentile/CP Descriptor   RCFT Copy 29 - - 6-10 Below Average   RCFT Time to Copy 392 - - 11-16 Low Average   BVMT-R Copy 11 - - - -   LEARNING & MEMORY Raw Score Type of Standardized Score Standardized Score Percentile/CP Descriptor   HVLT-R         Total Immediate (4, 5, 6) 15 Tscore 24 0.5 Exceptionally Low    Delayed Recall 6 Tscore 32 4 Below Average   Retention % 100 Tscore 57 76 High Average   Hits 12 - - - -   False Positives 1 - - - -   Discrimination  11 Tscore 52 58 Average   WMS-IV Subtests         LM I 19 ss 7 16 Low Average   LM II 14 ss 7 16 Low Average   LM Recognition 22 - - 17-25 Low Average   BVMT-R         IR (4, 3, 4) 11 Tscore 31 3 Below Average   DR 3 Tscore 28 1 Exceptionally Low    Discrimination Index 6 - - >16 WNL   ATTENTION/WORKING MEMORY Raw Score Type of Standardized Score Standardized Score Percentile/CP Descriptor   WAIS-IV WMI - SS 80 9 Low Average   WAIS-IV Digit Span 18 ss 6 9 Low Average         DS Forward 7 ss 7 16 Low Average         DS Backward 6 ss 8 25 Average         DS Sequence 5 ss 7 16 Low Average         Longest Digit Forward 5 - - - -         Longest Digit Backward 3 - - - -         Longest Digit Sequence 4 - - - -   WAIS-IV Arithmetic 10 ss 7 16 Low Average   Ruff 2&7 Total Speed 33 Tscore 5 <0.1 Exceptionally Low    Ruff 2&7 Total Accuracy 48 Tscore 42 21 Low Average   Ruff 2&7 Total Difference  -15  ns - -   MENTAL PROCESSING SPEED Raw Score Type of Standardized Score Standardized Score Percentile/CP Descriptor   WAIS-IV PSI - SS 89 23 Low Average   WAIS-IV Symbol Search 21 ss 8 25 Average   WAIS-IV Coding 46 ss 8 25 Average   TMT A  79 ss 7 16 Low Average   TMT A errors 0 - - - -   EXECUTIVE  FUNCTIONING Raw Score Type of Standardized Score Standardized Score Percentile/CP Descriptor   TMT B DC ss - - -   TMT B errors - - - - -   WCST-64         Total Correct 15 - - - -   Total Errors 49 SS <55 <1 Exceptionally Low    Perseverative Resp. 58 SS <55 <1 Exceptionally Low    Perseverative Err. 45 SS <55 <1 Exceptionally Low    Nonperseverative Err. 4  86 High Average   Concept. Level Response 3 SS 55 0.1 Exceptionally Low    Categories Completed 0 - - 2-5 Below Average   FMS 0 - - - -   MOOD & PERSONALITY Raw Score Type of Standardized Score Standardized Score Percentile/CP Descriptor   GDS-30 10 - - - Mild   JAKE-7 3 - - - WNL   Apathy Scale 11 - - - WNL   ss = scaled score (mean = 10, SD = 3); SS = standard score (mean = 100, SD = 15); Tscore mean = 50, SD = 10; zscore (mean = 0.00, SD = 1)  It is important to note that scores/percentiles should only be interpreted by a neuropsychologist. It is common for healthy individuals to have 1-3 isolated low/unusual scores that are not indicative of any significant cognitive dysfunction.       BILLING  Code Description Minutes Units   85701 Psychiatric Interview 0    42995 Nubhvl xm phys/qhp 1st hr 0    88058 Nubhvl xm phy/qhp ea addl hr 0    30842 Psycl tst eval phys/qhp 1st 0    54911 Psycl tst eval phys/qhp ea 0    05735 Nrpsyc tst eval phys/qhp 1st 60 1   95781 Nrpsyc tst eval phys/qhp ea 97 2     Referral review/test selection 30      Tech consult/test review/modifications 10      Patient limitation management 0      Patient behavior management 0      Patient symptom monitoring 0      Record Review/Integration/Report Generation 86      Face-to-Face interpretive Feedback 31    47348 Psycl/nrpsyc tst phy/qhp 1st 0    95090 Psycl/nrpsyc tst phy/qhp ea 0    40307 Psycl/nrpsyc tech 1st 30 1   69156 Psycl/nrpsyc tst tech ea 161 5

## 2022-11-01 LAB
FINAL PATHOLOGIC DIAGNOSIS: NORMAL
GROSS: NORMAL
Lab: NORMAL

## 2022-11-04 ENCOUNTER — TELEPHONE (OUTPATIENT)
Dept: NEPHROLOGY | Facility: CLINIC | Age: 67
End: 2022-11-04
Payer: MEDICARE

## 2022-11-08 ENCOUNTER — LAB VISIT (OUTPATIENT)
Dept: LAB | Facility: HOSPITAL | Age: 67
End: 2022-11-08
Attending: NURSE PRACTITIONER
Payer: MEDICARE

## 2022-11-08 DIAGNOSIS — N18.4 CHRONIC KIDNEY DISEASE, STAGE 4 (SEVERE): ICD-10-CM

## 2022-11-08 LAB
ANION GAP SERPL CALC-SCNC: 9 MMOL/L (ref 8–16)
BUN SERPL-MCNC: 41 MG/DL (ref 8–23)
CALCIUM SERPL-MCNC: 9.3 MG/DL (ref 8.7–10.5)
CHLORIDE SERPL-SCNC: 106 MMOL/L (ref 95–110)
CO2 SERPL-SCNC: 23 MMOL/L (ref 23–29)
CREAT SERPL-MCNC: 3 MG/DL (ref 0.5–1.4)
EST. GFR  (NO RACE VARIABLE): 22.1 ML/MIN/1.73 M^2
GLUCOSE SERPL-MCNC: 137 MG/DL (ref 70–110)
POTASSIUM SERPL-SCNC: 3.8 MMOL/L (ref 3.5–5.1)
SODIUM SERPL-SCNC: 138 MMOL/L (ref 136–145)

## 2022-11-08 PROCEDURE — 36415 COLL VENOUS BLD VENIPUNCTURE: CPT | Performed by: NURSE PRACTITIONER

## 2022-11-08 PROCEDURE — 80048 BASIC METABOLIC PNL TOTAL CA: CPT | Performed by: NURSE PRACTITIONER

## 2022-11-09 ENCOUNTER — TELEPHONE (OUTPATIENT)
Dept: NEPHROLOGY | Facility: CLINIC | Age: 67
End: 2022-11-09
Payer: MEDICARE

## 2022-11-10 ENCOUNTER — TELEPHONE (OUTPATIENT)
Dept: NEPHROLOGY | Facility: CLINIC | Age: 67
End: 2022-11-10
Payer: MEDICARE

## 2022-11-10 ENCOUNTER — CLINICAL SUPPORT (OUTPATIENT)
Dept: NEPHROLOGY | Facility: CLINIC | Age: 67
End: 2022-11-10
Payer: MEDICARE

## 2022-11-10 ENCOUNTER — OFFICE VISIT (OUTPATIENT)
Dept: OTOLARYNGOLOGY | Facility: CLINIC | Age: 67
End: 2022-11-10
Payer: MEDICARE

## 2022-11-10 ENCOUNTER — PATIENT MESSAGE (OUTPATIENT)
Dept: NEUROLOGY | Facility: CLINIC | Age: 67
End: 2022-11-10
Payer: MEDICARE

## 2022-11-10 ENCOUNTER — OFFICE VISIT (OUTPATIENT)
Dept: ENDOCRINOLOGY | Facility: CLINIC | Age: 67
End: 2022-11-10
Payer: MEDICARE

## 2022-11-10 VITALS — BODY MASS INDEX: 35.2 KG/M2 | HEIGHT: 73 IN | WEIGHT: 265.56 LBS

## 2022-11-10 VITALS
WEIGHT: 264 LBS | TEMPERATURE: 98 F | BODY MASS INDEX: 34.83 KG/M2 | DIASTOLIC BLOOD PRESSURE: 83 MMHG | HEART RATE: 78 BPM | SYSTOLIC BLOOD PRESSURE: 148 MMHG

## 2022-11-10 DIAGNOSIS — E11.65 TYPE 2 DIABETES MELLITUS WITH HYPERGLYCEMIA, WITHOUT LONG-TERM CURRENT USE OF INSULIN: Primary | ICD-10-CM

## 2022-11-10 DIAGNOSIS — N18.4 CKD (CHRONIC KIDNEY DISEASE) STAGE 4, GFR 15-29 ML/MIN: ICD-10-CM

## 2022-11-10 DIAGNOSIS — H90.A31 MIXED CONDUCTIVE AND SENSORINEURAL HEARING LOSS OF RIGHT EAR WITH RESTRICTED HEARING OF LEFT EAR: ICD-10-CM

## 2022-11-10 DIAGNOSIS — N18.4 CHRONIC KIDNEY DISEASE, STAGE 4 (SEVERE): ICD-10-CM

## 2022-11-10 DIAGNOSIS — H93.11 TINNITUS, RIGHT: ICD-10-CM

## 2022-11-10 DIAGNOSIS — E78.1 HYPERTRIGLYCERIDEMIA: ICD-10-CM

## 2022-11-10 DIAGNOSIS — H90.A11 CONDUCTIVE HEARING LOSS OF RIGHT EAR WITH RESTRICTED HEARING OF LEFT EAR: Primary | ICD-10-CM

## 2022-11-10 DIAGNOSIS — H80.91 OTOSCLEROSIS, RIGHT: ICD-10-CM

## 2022-11-10 DIAGNOSIS — H91.8X3 ASYMMETRICAL HEARING LOSS: ICD-10-CM

## 2022-11-10 PROBLEM — G31.84 MILD COGNITIVE IMPAIRMENT: Status: ACTIVE | Noted: 2022-11-10

## 2022-11-10 PROCEDURE — 1101F PT FALLS ASSESS-DOCD LE1/YR: CPT | Mod: CPTII,S$GLB,, | Performed by: OTOLARYNGOLOGY

## 2022-11-10 PROCEDURE — 3008F BODY MASS INDEX DOCD: CPT | Mod: CPTII,S$GLB,, | Performed by: NURSE PRACTITIONER

## 2022-11-10 PROCEDURE — 99999 PR PBB SHADOW E&M-EST. PATIENT-LVL I: ICD-10-PCS | Mod: PBBFAC,,,

## 2022-11-10 PROCEDURE — 1160F PR REVIEW ALL MEDS BY PRESCRIBER/CLIN PHARMACIST DOCUMENTED: ICD-10-PCS | Mod: CPTII,S$GLB,, | Performed by: OTOLARYNGOLOGY

## 2022-11-10 PROCEDURE — 3288F FALL RISK ASSESSMENT DOCD: CPT | Mod: CPTII,S$GLB,, | Performed by: OTOLARYNGOLOGY

## 2022-11-10 PROCEDURE — 1126F AMNT PAIN NOTED NONE PRSNT: CPT | Mod: CPTII,S$GLB,, | Performed by: OTOLARYNGOLOGY

## 2022-11-10 PROCEDURE — 3046F HEMOGLOBIN A1C LEVEL >9.0%: CPT | Mod: CPTII,S$GLB,, | Performed by: NURSE PRACTITIONER

## 2022-11-10 PROCEDURE — 3046F HEMOGLOBIN A1C LEVEL >9.0%: CPT | Mod: CPTII,S$GLB,, | Performed by: OTOLARYNGOLOGY

## 2022-11-10 PROCEDURE — 1160F RVW MEDS BY RX/DR IN RCRD: CPT | Mod: CPTII,S$GLB,, | Performed by: OTOLARYNGOLOGY

## 2022-11-10 PROCEDURE — 3046F PR MOST RECENT HEMOGLOBIN A1C LEVEL > 9.0%: ICD-10-PCS | Mod: CPTII,S$GLB,, | Performed by: NURSE PRACTITIONER

## 2022-11-10 PROCEDURE — 3066F NEPHROPATHY DOC TX: CPT | Mod: CPTII,S$GLB,, | Performed by: NURSE PRACTITIONER

## 2022-11-10 PROCEDURE — 1160F PR REVIEW ALL MEDS BY PRESCRIBER/CLIN PHARMACIST DOCUMENTED: ICD-10-PCS | Mod: CPTII,S$GLB,, | Performed by: NURSE PRACTITIONER

## 2022-11-10 PROCEDURE — 3008F BODY MASS INDEX DOCD: CPT | Mod: CPTII,S$GLB,, | Performed by: OTOLARYNGOLOGY

## 2022-11-10 PROCEDURE — 4010F PR ACE/ARB THEARPY RXD/TAKEN: ICD-10-PCS | Mod: CPTII,S$GLB,, | Performed by: OTOLARYNGOLOGY

## 2022-11-10 PROCEDURE — 3008F PR BODY MASS INDEX (BMI) DOCUMENTED: ICD-10-PCS | Mod: CPTII,S$GLB,, | Performed by: OTOLARYNGOLOGY

## 2022-11-10 PROCEDURE — 99499 NO LOS: ICD-10-PCS | Mod: S$GLB,,, | Performed by: INTERNAL MEDICINE

## 2022-11-10 PROCEDURE — 99204 PR OFFICE/OUTPT VISIT, NEW, LEVL IV, 45-59 MIN: ICD-10-PCS | Mod: 57,S$GLB,, | Performed by: OTOLARYNGOLOGY

## 2022-11-10 PROCEDURE — 99204 OFFICE O/P NEW MOD 45 MIN: CPT | Mod: 57,S$GLB,, | Performed by: OTOLARYNGOLOGY

## 2022-11-10 PROCEDURE — 1101F PR PT FALLS ASSESS DOC 0-1 FALLS W/OUT INJ PAST YR: ICD-10-PCS | Mod: CPTII,S$GLB,, | Performed by: OTOLARYNGOLOGY

## 2022-11-10 PROCEDURE — 3062F PR POS MACROALBUMINURIA RESULT DOCUMENTED/REVIEW: ICD-10-PCS | Mod: CPTII,S$GLB,, | Performed by: NURSE PRACTITIONER

## 2022-11-10 PROCEDURE — 3062F POS MACROALBUMINURIA REV: CPT | Mod: CPTII,S$GLB,, | Performed by: OTOLARYNGOLOGY

## 2022-11-10 PROCEDURE — 99999 PR PBB SHADOW E&M-EST. PATIENT-LVL III: CPT | Mod: PBBFAC,,, | Performed by: NURSE PRACTITIONER

## 2022-11-10 PROCEDURE — 3062F POS MACROALBUMINURIA REV: CPT | Mod: CPTII,S$GLB,, | Performed by: NURSE PRACTITIONER

## 2022-11-10 PROCEDURE — 4010F ACE/ARB THERAPY RXD/TAKEN: CPT | Mod: CPTII,S$GLB,, | Performed by: OTOLARYNGOLOGY

## 2022-11-10 PROCEDURE — 3066F PR DOCUMENTATION OF TREATMENT FOR NEPHROPATHY: ICD-10-PCS | Mod: CPTII,S$GLB,, | Performed by: NURSE PRACTITIONER

## 2022-11-10 PROCEDURE — 99999 PR PBB SHADOW E&M-EST. PATIENT-LVL I: CPT | Mod: PBBFAC,,,

## 2022-11-10 PROCEDURE — 3046F PR MOST RECENT HEMOGLOBIN A1C LEVEL > 9.0%: ICD-10-PCS | Mod: CPTII,S$GLB,, | Performed by: OTOLARYNGOLOGY

## 2022-11-10 PROCEDURE — 3008F PR BODY MASS INDEX (BMI) DOCUMENTED: ICD-10-PCS | Mod: CPTII,S$GLB,, | Performed by: NURSE PRACTITIONER

## 2022-11-10 PROCEDURE — 99499 UNLISTED E&M SERVICE: CPT | Mod: S$GLB,,, | Performed by: INTERNAL MEDICINE

## 2022-11-10 PROCEDURE — 3077F SYST BP >= 140 MM HG: CPT | Mod: CPTII,S$GLB,, | Performed by: NURSE PRACTITIONER

## 2022-11-10 PROCEDURE — 3077F PR MOST RECENT SYSTOLIC BLOOD PRESSURE >= 140 MM HG: ICD-10-PCS | Mod: CPTII,S$GLB,, | Performed by: NURSE PRACTITIONER

## 2022-11-10 PROCEDURE — 1159F MED LIST DOCD IN RCRD: CPT | Mod: CPTII,S$GLB,, | Performed by: OTOLARYNGOLOGY

## 2022-11-10 PROCEDURE — 3066F NEPHROPATHY DOC TX: CPT | Mod: CPTII,S$GLB,, | Performed by: OTOLARYNGOLOGY

## 2022-11-10 PROCEDURE — 3079F PR MOST RECENT DIASTOLIC BLOOD PRESSURE 80-89 MM HG: ICD-10-PCS | Mod: CPTII,S$GLB,, | Performed by: NURSE PRACTITIONER

## 2022-11-10 PROCEDURE — 1159F MED LIST DOCD IN RCRD: CPT | Mod: CPTII,S$GLB,, | Performed by: NURSE PRACTITIONER

## 2022-11-10 PROCEDURE — 3079F DIAST BP 80-89 MM HG: CPT | Mod: CPTII,S$GLB,, | Performed by: NURSE PRACTITIONER

## 2022-11-10 PROCEDURE — 99499 UNLISTED E&M SERVICE: CPT | Mod: S$GLB,,, | Performed by: NURSE PRACTITIONER

## 2022-11-10 PROCEDURE — 4010F PR ACE/ARB THEARPY RXD/TAKEN: ICD-10-PCS | Mod: CPTII,S$GLB,, | Performed by: NURSE PRACTITIONER

## 2022-11-10 PROCEDURE — 3062F PR POS MACROALBUMINURIA RESULT DOCUMENTED/REVIEW: ICD-10-PCS | Mod: CPTII,S$GLB,, | Performed by: OTOLARYNGOLOGY

## 2022-11-10 PROCEDURE — 4010F ACE/ARB THERAPY RXD/TAKEN: CPT | Mod: CPTII,S$GLB,, | Performed by: NURSE PRACTITIONER

## 2022-11-10 PROCEDURE — 99999 PR PBB SHADOW E&M-EST. PATIENT-LVL III: ICD-10-PCS | Mod: PBBFAC,,, | Performed by: NURSE PRACTITIONER

## 2022-11-10 PROCEDURE — 1126F PR PAIN SEVERITY QUANTIFIED, NO PAIN PRESENT: ICD-10-PCS | Mod: CPTII,S$GLB,, | Performed by: OTOLARYNGOLOGY

## 2022-11-10 PROCEDURE — 3288F PR FALLS RISK ASSESSMENT DOCUMENTED: ICD-10-PCS | Mod: CPTII,S$GLB,, | Performed by: OTOLARYNGOLOGY

## 2022-11-10 PROCEDURE — 3066F PR DOCUMENTATION OF TREATMENT FOR NEPHROPATHY: ICD-10-PCS | Mod: CPTII,S$GLB,, | Performed by: OTOLARYNGOLOGY

## 2022-11-10 PROCEDURE — 1159F PR MEDICATION LIST DOCUMENTED IN MEDICAL RECORD: ICD-10-PCS | Mod: CPTII,S$GLB,, | Performed by: OTOLARYNGOLOGY

## 2022-11-10 PROCEDURE — G0421 ED SVC CKD GRP PER SESSION: HCPCS | Mod: S$GLB,,, | Performed by: NURSE PRACTITIONER

## 2022-11-10 PROCEDURE — 99214 OFFICE O/P EST MOD 30 MIN: CPT | Mod: S$GLB,,, | Performed by: NURSE PRACTITIONER

## 2022-11-10 PROCEDURE — 1160F RVW MEDS BY RX/DR IN RCRD: CPT | Mod: CPTII,S$GLB,, | Performed by: NURSE PRACTITIONER

## 2022-11-10 PROCEDURE — G0421 PR ED SVC CKD GRP PER SESSION: ICD-10-PCS | Mod: S$GLB,,, | Performed by: NURSE PRACTITIONER

## 2022-11-10 PROCEDURE — 99214 PR OFFICE/OUTPT VISIT, EST, LEVL IV, 30-39 MIN: ICD-10-PCS | Mod: S$GLB,,, | Performed by: NURSE PRACTITIONER

## 2022-11-10 PROCEDURE — 1159F PR MEDICATION LIST DOCUMENTED IN MEDICAL RECORD: ICD-10-PCS | Mod: CPTII,S$GLB,, | Performed by: NURSE PRACTITIONER

## 2022-11-10 RX ORDER — LINAGLIPTIN 5 MG/1
5 TABLET, FILM COATED ORAL DAILY
Qty: 30 TABLET | Refills: 3 | Status: SHIPPED | OUTPATIENT
Start: 2022-11-10 | End: 2023-02-14 | Stop reason: SDUPTHER

## 2022-11-10 RX ORDER — PIOGLITAZONEHYDROCHLORIDE 45 MG/1
45 TABLET ORAL DAILY
Qty: 90 TABLET | Refills: 0 | Status: SHIPPED | OUTPATIENT
Start: 2022-11-10 | End: 2023-02-14

## 2022-11-10 NOTE — PROGRESS NOTES
Subjective:       Patient ID: Handy Adams is a 67 y.o. male.    Chief Complaint: Hearing Loss and Tinnitus    Hearing Loss:    Associated symptoms: Tinnitus.    Ringing in Ears:    Associated symptoms: Tinnitus.       Handy Adams is a 67 y.o. male presents with main complaint of right ear tinntus.  Has been present for > 10yrs.  Gradually worsening.  Does not perceive significant hearing loss. No history of ear infections or ear surgery.     Review of Systems   Constitutional:  Positive for chills.   HENT:  Positive for hearing loss and tinnitus.    Eyes:  Positive for itching.   Respiratory: Negative.     Cardiovascular: Negative.    Gastrointestinal: Negative.    Endocrine: Negative.    Musculoskeletal: Negative.    Integumentary:  Negative.   Allergic/Immunologic: Negative.    Neurological: Negative.    Hematological: Negative.    Psychiatric/Behavioral:  Positive for decreased concentration.        Objective:      Physical Exam  Vitals and nursing note reviewed.   Constitutional:       Appearance: Normal appearance.   HENT:      Head: Normocephalic and atraumatic.      Right Ear: Tympanic membrane, ear canal and external ear normal. There is no impacted cerumen.      Left Ear: Tympanic membrane, ear canal and external ear normal. There is no impacted cerumen.      Ears:      Flanagan exam findings: Lateralizes right.     Right Rinne: BC > AC.     Left Rinne: AC > BC.  Neurological:      Mental Status: He is alert.         Data Reviewed:      Audiogram tracings independently reviewed and discussed with patient  shows right sided mixed loss ABG 25-30db with absent stapedial reflexes    Assessment:       Problem List Items Addressed This Visit    None  Visit Diagnoses       Conductive hearing loss of right ear with restricted hearing of left ear    -  Primary    Asymmetrical hearing loss        Mixed conductive and sensorineural hearing loss of right ear with restricted hearing of left ear        Otosclerosis,  right        Tinnitus, right                  Plan:        Discussed Right small fenestra stapedotomy with patient. Also reviewed all other options including observation and amplification. Risks, complications, benefits and goals reviewed. This includes: failure to improve, total loss of hearing, tinnitus, dizziness, chorda symptoms, infection, bleeding, need for revision and other potential complications. Will proceed at the patients convinience a general outpatient procedure after appropriate clearances.

## 2022-11-10 NOTE — Clinical Note
Saw pt yesterday in the group setting for Basic CKD education. Pt would like to know more about BUN lab results and specifics regarding fluid intake recommendations at his next follow up appointment. Thank you.

## 2022-11-10 NOTE — PROGRESS NOTES
CC: This 67 y.o. Black or  male  is here for evaluation of  T2DM along with comorbidities indicated in the Visit Diagnosis section of this encounter.    HPI: Handy Adams was diagnosed with T2DM in his early 60s.     DM COMPLICATIONS: nephropathy and retinopathy    Initial visit 9/2022  New to Endocrine. Referred by Dr. Sanderson.   Pt was taking metformin 1000 mg bid up until a few weeks ago when it was dc'd by Primary d/t concerns re: YESENIA. eGFR decreased from 51 in Jan to 24.   Reports some memory issues lately but he remains adherent with meds.   He feels determined to improve diet, maintain exercise, lose weight, and improve kidney function.   Plan Recommend following up with Ophthalmology for diabetes retinopathy.   Maintain exercise regimen.   Pt declines diabetes educator at this time, will reconsider if DM not controlled in the future.   Recommend eating at least 2 times per day, ideally balanced meals.   Hold metformin for now.   Switch from Januvia to Tradjenta 5 mg once daily.   Ok to continue pioglitazone 45 mg once daily for now.   Test glucose 2x/day - fasting and again 2 hours after eating. Keep a log. Declines personal CGM d/t cost.   Bring log to every visit. Rtc in 4 wks.     Interval history  Reports he's been doing well.   11 lb weight loss since last visit a month ago.   Avoiding sweets, BGs have improved.     LAST DIABETES EDUCATION: years ago     HOSPITALIZED FOR DIABETES  -  No.    SIGNIFICANT DIABETES MED HISTORY: denies intolerance to prior DM meds      PRESCRIBED DIABETES MEDICATIONS:   Tradjenta 5 mg once daily   Pioglitazone 45 mg once daily     Misses medication doses - no     SELF MONITORING BLOOD GLUCOSE: Checks blood glucose at home - 2x/day. Forgot meter.   FBGs 95-130s  Bedtime 130s    HYPOGLYCEMIC EPISODES: none      CURRENT DIET: drinks water and diet coke. Recently stopped diet products.     Breakfast - none   Eats lunch and light dinner.     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 No acute distress.   PSYCH: AAOx3, appropriate mood and affect, conversant, well-groomed. Judgement and insight good.   NEURO: Cranial nerves grossly intact. Speech clear, no tremor.   CHEST: Respirations even and unlabored.         Hemoglobin A1C   Date Value Ref Range Status   09/07/2022 10.4 (H) 4.0 - 5.6 % Final     Comment:     ADA Screening Guidelines:  5.7-6.4%  Consistent with prediabetes  >or=6.5%  Consistent with diabetes    High levels of fetal hemoglobin interfere with the HbA1C  assay. Heterozygous hemoglobin variants (HbS, HgC, etc)do  not significantly interfere with this assay.   However, presence of multiple variants may affect accuracy.     01/03/2022 9.4 (H) 4.0 - 5.6 % Final     Comment:     ADA Screening Guidelines:  5.7-6.4%  Consistent with prediabetes  >or=6.5%  Consistent with diabetes    High levels of fetal hemoglobin interfere with the HbA1C  assay. Heterozygous hemoglobin variants (HbS, HgC, etc)do  not significantly interfere with this assay.   However, presence of multiple variants may affect accuracy.     07/23/2020 10.4 (H) 4.0 - 5.6 % Final     Comment:     ADA Screening Guidelines:  5.7-6.4%  Consistent with prediabetes  >or=6.5%  Consistent with diabetes  High levels of fetal hemoglobin interfere with the HbA1C  assay. Heterozygous hemoglobin variants (HbS, HgC, etc)do  not significantly interfere with this assay.   However, presence of multiple variants may affect accuracy.         No results found for: CPEPTIDE, GLUTAMICACID, ISLETCELLANT, FRUCTOSAMINE     Lab Results   Component Value Date    CHOL 197 09/07/2022    CHOL 223 (H) 01/03/2022    CHOL 183 07/23/2020     Lab  Results   Component Value Date    HDL 30 (L) 09/07/2022    HDL 34 (L) 01/03/2022    HDL 31 (L) 07/23/2020     Lab Results   Component Value Date    LDLCALC 94.4 09/07/2022    LDLCALC 133.4 01/03/2022    LDLCALC Invalid, Trig>400.0 07/23/2020     Lab Results   Component Value Date    TRIG 363 (H) 09/07/2022    TRIG 278 (H) 01/03/2022    TRIG 443 (H) 07/23/2020     Lab Results   Component Value Date    CHOLHDL 15.2 (L) 09/07/2022    CHOLHDL 15.2 (L) 01/03/2022    CHOLHDL 16.9 (L) 07/23/2020         Chemistry        Component Value Date/Time     11/08/2022 0919    K 3.8 11/08/2022 0919     11/08/2022 0919    CO2 23 11/08/2022 0919    BUN 41 (H) 11/08/2022 0919    CREATININE 3.0 (H) 11/08/2022 0919     (H) 11/08/2022 0919        Component Value Date/Time    CALCIUM 9.3 11/08/2022 0919    ALKPHOS 58 09/07/2022 1045    AST 11 09/07/2022 1045    ALT 13 09/07/2022 1045    BILITOT 0.7 09/07/2022 1045    ESTGFRAFRICA 51.1 (A) 01/03/2022 0922    EGFRNONAA 44.2 (A) 01/03/2022 0922           Latest Reference Range & Units 09/19/22 11:03   Creatinine 0.5 - 1.4 mg/dL 2.8 (H)   eGFR >60 mL/min/1.73 m^2 24.0 !       Lab Results   Component Value Date    LABMICR 1420.0 01/03/2022    CREATRANDUR 67.0 09/19/2022    MICALBCREAT 3302.3 (H) 01/03/2022             ASSESSMENT and PLAN:    A1C GOAL: < 7 %     1. Type 2 diabetes mellitus with hyperglycemia, without long-term current use of insulin  Increase exercise - try at least 1/2 hour per day of walking.   Continue meds.   Test blood sugar 2x/day.   Return to clinic in 2-3 months with labs prior.      Hemoglobin A1C      2. CKD (chronic kidney disease) stage 4, GFR 15-29 ml/min  Avoid hypoglycemia.   Improve glycemic control.   Followed by Nephrology.       3. Hypertriglyceridemia  Lipid Panel        Orders Placed This Encounter   Procedures    Hemoglobin A1C     Standing Status:   Future     Standing Expiration Date:   1/9/2024    Lipid Panel     Standing Status:    Future     Standing Expiration Date:   11/10/2023          Follow up in about 3 months (around 2/10/2023).

## 2022-11-10 NOTE — TELEPHONE ENCOUNTER
Called and spoke to pt about need for biopsy. He said he believes he already has pre-biopsy appt. With Dr. Sellers. Encouraged him to keep hematology appt as well.

## 2022-11-11 NOTE — PROGRESS NOTES
Handy Adams was referred to Introduction to CKD education by JESUS Arita (collaborating MD: Dr. Ingram)    The patient attended class in a group setting. Pt was actively engaged during the group class, asking questions and participating in all activities.    The following material was covered. Outcomes were assessed via the outcome assessment questions and documented at the end of each lesson.    Chronic Kidney Disease Education (Lesson 1, 2, 3)    Lesson 1 Understanding Kidney Disease  Lesson Objectives  By the end of each session, participants will be able to:  Recognize that fear and grief are usual responses to kidney disease  State causes/risk factors for kidney disease  Explain how GFR reflects kidney function  Explain how urine albumin/protein reflects kidney damage  State two ways the kidneys help maintain health  Describe how kidney disease is progressive but can be slowed down  Topics & Points Covered  Emotional impact of diagnosis  You're not alone  Emotional aspects  Depression, grief, and fear are typical  Physical activity may help  Benefits of education: Why are you here?  There are many causes of CKD. Diabetes and hypertension are the leading causes. Family history, cardiovascular disease, recurrent UTIs, immunological disease and genetics also play a role in CKD  CKD is complicated  The more you understand, the better you'll do. (Stated directly)  Basic anatomy  Location in the body  The nephrons have filters (working units)  Normal kidney function  Maintain chemical balance  Produce hormones  Regulate blood pressure  Kidney damage  Major causes of kidney damage  High blood pressure, diabetes  Fewer functioning nephrons  Monitoring kidney function and damage  eGFR (function)  Urine albumin/UACR or Urine protein/creatinine ratio (damage)  eGFR goal: a stable level  Decline means progression  Urine albumin/UACR goal: a stable or lower level  Increase in urine albumin/protein may mean  progression  Kidney disease is often irreversible and progressive  You'll likely need the help of a kidney specialist  Overview of treatment modalities  There are things you can do that may slow progression (more on this in next session)  Take your medications  Control blood pressure  Manage diabetes  Eat less salt  Miscellaneous  Kidney disease runs in families (Encouraged testing)  Early kidney disease has no symptoms  Outcome Assessment Questions  See scanned document below for outcome assessment.     Lesson 2 Managing Your Kidney Disease  Lesson Objectives  By the end of each session, participants will be able to:  Recognize that managing blood pressure is a key part of managing kidney disease  Recognize that managing diabetes is a key part of managing kidney disease  State at least one step to eating right for kidney health  Recognize the importance of being cautious about over-the-counter medicines  Recognize that smoking can worsen kidney disease  Identify which lab values are used to keep track of diabetes, high blood pressure, and other conditions  Topics & Points Covered  There are steps you can take to keep your kidneys working  Weight management  Blood pressure management  Blood pressure goal: < 140/90 mm Hg  Medications - ACEs/ARBs, diuretics  ACEs/ARBS and risk of hyperkalemia (too much potassium in the blood, but help lower urine albumin)  Sodium reduction (<2,300 mg)  Physical activity  Diabetes management  A1C target   Diabetes medications may change because of kidney disease (often takes less medication to control glucose in the later stages of CKD)  How to treat hypoglycemia appropriately (risk of high potassium with ACEi and ARB use) glucose tablets are preferred [May need to avoid juices with high potassium levels if hyperkalemic]  Hyperglycemia  Cardiovascular disease (CVD)  Physical activity  CVD is major cause of mortality  LDL goal  Medications  Nutritional health  Choose and prepare foods  with less salt and sodium  Eat the right amount and kind of protein  Choose foods that are healthy for your heart  Choose foods based on phosphorus and potassium content (if restricted)  Make choices that help with diabetes management  Dietitian referral/nutrition therapy is covered benefit  Medication safety  Prescription  Over-the-counter (pain relief)  Tobacco cessation  Outcome Assessment Questions  See scanned document below for outcome assessment.     Lesson 3 What Happens When Kidney Disease Gets Worse  Lesson Objectives  By the end of each session, participants will be able to:  Recognize that managing blood pressure is a key part of managing kidney disease  Recognize that additional medications may be needed to treat complications  Topics & Points Covered  Kidneys have other jobs besides making urine. At this stage, they can't function as well.  Gradual/adaptation into symptoms; you may feel fine  Different for everyone - important to know your lab test results, including eGFR and urine albumin/protein values  Possible symptoms of decreasing kidney function and why they occur later  There is no change in urine volume or kidney pain (lower back)  Fatigue or weakness  Swelling  Bad taste in the mouth/food doesn't taste good (especially red meat)  Feel cold  Poor concentration  Shortness of breath  Itching skin  Cramping in hands and legs  Nausea and vomiting/Loss of appetite   Complications (*May require additional medications) - basic explanation and treatment  Anemia*  May feel tired or weak  Erythropoietin deficiency  May need iron  May need erythropoiesis stimulating agent (injection)  LABS: hemoglobin  Mineral and bone disorder*  May get itchy skin  Inadequate active Vitamin D  May take special supplement  Too much phosphorus in the blood  May need phosphorus binding med with meals  LABS: Phosphorus, calcium, VitaminD, iPTH  Malnutrition  May have bad taste in mouth  Poor appetite  May add to  swelling  Need adequate calories  LABS: albumin  Changes in functional status  Difficulty walking  Harder to care for self  Fluid overload  May be seen as weight gain, swelling, or shortness of breath  Kidneys may not remove fluid well  Rarely need fluid restriction  Use less salt, take water pills  Metabolic acidosis  Kidneys not removing acid in the blood  May need medication with bicarbonate  LABS: bicarbonate  Depression  Diabetes can contribute  Fear, grieving with loss of body function  It is OK to talk about it  Sexual complications  Discuss concerns with your doctor  Know how your medications work. Know which medications you can continue taking (including OTC medications). Talk with your pharmacist and provider about:  The more medications you take, the more you need to know about: medication interactions, medication-disease interactions, medication-food interactions, medication-herbal supplement interactions.  Timing of medication use (ie., qAC, etc.)  Cultural consideration: Be sensitive to use of traditional remedies and healers.  Symptoms and complications may increase as kidney function declines to kidney failure (stated directly)  Outcome Assessment Questions  See scanned document below for outcome assessment.     Scanned outcome assessment document:         All incorrect or incomplete answers were reviewed. Reeducated patient on maximum amount of salt as 2,000mg/day. Provided pt with more complete answers to questions number 11, 12, and 13 by reviewing answer key.       Areas of information that primary nephrology provider should reinforce during follow-up visit includes: Pt would like to know more about BUN lab results and specifics regarding fluid intake recommendations    The content of these lessons are adapted from the Kidney Disease Education (KDE) Services benefit as defined by the Centers for Medicare & Medicaid Services.    120 minutes spent educating patient on topics detailed above.     I  was present for the duration of the class which included lessons adapted from the Kidney Disease Education (KDE) Services benefit as defined by the Centers for Medicare & Medicaid Services. I attended the entire duration of the class including the break out sessions to discuss questions or concerns with individual patients. I delivered 45 minutes of the education outlined as above.

## 2022-11-15 ENCOUNTER — OFFICE VISIT (OUTPATIENT)
Dept: NEPHROLOGY | Facility: CLINIC | Age: 67
End: 2022-11-15
Payer: MEDICARE

## 2022-11-15 ENCOUNTER — OFFICE VISIT (OUTPATIENT)
Dept: OPHTHALMOLOGY | Facility: CLINIC | Age: 67
End: 2022-11-15
Payer: MEDICARE

## 2022-11-15 ENCOUNTER — CLINICAL SUPPORT (OUTPATIENT)
Dept: NEPHROLOGY | Facility: CLINIC | Age: 67
End: 2022-11-15
Payer: MEDICARE

## 2022-11-15 ENCOUNTER — OFFICE VISIT (OUTPATIENT)
Dept: HEMATOLOGY/ONCOLOGY | Facility: CLINIC | Age: 67
End: 2022-11-15
Payer: MEDICARE

## 2022-11-15 VITALS
HEART RATE: 83 BPM | BODY MASS INDEX: 35.58 KG/M2 | WEIGHT: 262.38 LBS | OXYGEN SATURATION: 100 % | SYSTOLIC BLOOD PRESSURE: 136 MMHG | DIASTOLIC BLOOD PRESSURE: 85 MMHG

## 2022-11-15 VITALS
SYSTOLIC BLOOD PRESSURE: 144 MMHG | RESPIRATION RATE: 16 BRPM | BODY MASS INDEX: 36.13 KG/M2 | WEIGHT: 266.75 LBS | HEART RATE: 77 BPM | HEIGHT: 72 IN | OXYGEN SATURATION: 100 % | DIASTOLIC BLOOD PRESSURE: 74 MMHG

## 2022-11-15 DIAGNOSIS — N40.0 PROSTATE ENLARGEMENT: ICD-10-CM

## 2022-11-15 DIAGNOSIS — N18.4 CKD (CHRONIC KIDNEY DISEASE) STAGE 4, GFR 15-29 ML/MIN: ICD-10-CM

## 2022-11-15 DIAGNOSIS — R77.8 ABNORMAL SPEP: Primary | ICD-10-CM

## 2022-11-15 DIAGNOSIS — E11.3513 TYPE 2 DIABETES MELLITUS WITH BOTH EYES AFFECTED BY PROLIFERATIVE RETINOPATHY AND MACULAR EDEMA, WITH LONG-TERM CURRENT USE OF INSULIN: Primary | ICD-10-CM

## 2022-11-15 DIAGNOSIS — R80.9 PROTEINURIA, UNSPECIFIED TYPE: Primary | ICD-10-CM

## 2022-11-15 DIAGNOSIS — N18.4 CHRONIC KIDNEY DISEASE, STAGE 4 (SEVERE): ICD-10-CM

## 2022-11-15 DIAGNOSIS — H35.033 HYPERTENSIVE RETINOPATHY, BILATERAL: ICD-10-CM

## 2022-11-15 DIAGNOSIS — N18.4 TYPE 2 DIABETES MELLITUS WITH STAGE 4 CHRONIC KIDNEY DISEASE, WITHOUT LONG-TERM CURRENT USE OF INSULIN: ICD-10-CM

## 2022-11-15 DIAGNOSIS — Z79.4 TYPE 2 DIABETES MELLITUS WITH BOTH EYES AFFECTED BY PROLIFERATIVE RETINOPATHY AND MACULAR EDEMA, WITH LONG-TERM CURRENT USE OF INSULIN: Primary | ICD-10-CM

## 2022-11-15 DIAGNOSIS — Z12.5 ENCOUNTER FOR SCREENING FOR MALIGNANT NEOPLASM OF PROSTATE: ICD-10-CM

## 2022-11-15 DIAGNOSIS — D64.9 ANEMIA, UNSPECIFIED TYPE: ICD-10-CM

## 2022-11-15 DIAGNOSIS — I10 ESSENTIAL HYPERTENSION: ICD-10-CM

## 2022-11-15 DIAGNOSIS — E11.22 TYPE 2 DIABETES MELLITUS WITH STAGE 4 CHRONIC KIDNEY DISEASE, WITHOUT LONG-TERM CURRENT USE OF INSULIN: ICD-10-CM

## 2022-11-15 PROCEDURE — 4010F ACE/ARB THERAPY RXD/TAKEN: CPT | Mod: CPTII,S$GLB,, | Performed by: PHYSICIAN ASSISTANT

## 2022-11-15 PROCEDURE — 99999 PR PBB SHADOW E&M-EST. PATIENT-LVL II: ICD-10-PCS | Mod: PBBFAC,,, | Performed by: INTERNAL MEDICINE

## 2022-11-15 PROCEDURE — 3075F PR MOST RECENT SYSTOLIC BLOOD PRESS GE 130-139MM HG: ICD-10-PCS | Mod: CPTII,S$GLB,, | Performed by: INTERNAL MEDICINE

## 2022-11-15 PROCEDURE — 99499 RISK ADDL DX/OHS AUDIT: ICD-10-PCS | Mod: S$GLB,,, | Performed by: INTERNAL MEDICINE

## 2022-11-15 PROCEDURE — 92014 PR EYE EXAM, EST PATIENT,COMPREHESV: ICD-10-PCS | Mod: 25,S$GLB,, | Performed by: OPHTHALMOLOGY

## 2022-11-15 PROCEDURE — 3066F PR DOCUMENTATION OF TREATMENT FOR NEPHROPATHY: ICD-10-PCS | Mod: CPTII,S$GLB,, | Performed by: PHYSICIAN ASSISTANT

## 2022-11-15 PROCEDURE — 3046F PR MOST RECENT HEMOGLOBIN A1C LEVEL > 9.0%: ICD-10-PCS | Mod: CPTII,S$GLB,, | Performed by: INTERNAL MEDICINE

## 2022-11-15 PROCEDURE — 1159F PR MEDICATION LIST DOCUMENTED IN MEDICAL RECORD: ICD-10-PCS | Mod: CPTII,S$GLB,, | Performed by: OPHTHALMOLOGY

## 2022-11-15 PROCEDURE — 4010F ACE/ARB THERAPY RXD/TAKEN: CPT | Mod: CPTII,S$GLB,, | Performed by: OPHTHALMOLOGY

## 2022-11-15 PROCEDURE — 3288F PR FALLS RISK ASSESSMENT DOCUMENTED: ICD-10-PCS | Mod: CPTII,S$GLB,, | Performed by: PHYSICIAN ASSISTANT

## 2022-11-15 PROCEDURE — 92014 COMPRE OPH EXAM EST PT 1/>: CPT | Mod: 25,S$GLB,, | Performed by: OPHTHALMOLOGY

## 2022-11-15 PROCEDURE — 99999 PR PBB SHADOW E&M-EST. PATIENT-LVL IV: ICD-10-PCS | Mod: PBBFAC,,, | Performed by: PHYSICIAN ASSISTANT

## 2022-11-15 PROCEDURE — 1126F AMNT PAIN NOTED NONE PRSNT: CPT | Mod: CPTII,S$GLB,, | Performed by: PHYSICIAN ASSISTANT

## 2022-11-15 PROCEDURE — 1159F MED LIST DOCD IN RCRD: CPT | Mod: CPTII,S$GLB,, | Performed by: OPHTHALMOLOGY

## 2022-11-15 PROCEDURE — 1160F PR REVIEW ALL MEDS BY PRESCRIBER/CLIN PHARMACIST DOCUMENTED: ICD-10-PCS | Mod: CPTII,S$GLB,, | Performed by: OPHTHALMOLOGY

## 2022-11-15 PROCEDURE — 99999 PR PBB SHADOW E&M-EST. PATIENT-LVL II: CPT | Mod: PBBFAC,,, | Performed by: INTERNAL MEDICINE

## 2022-11-15 PROCEDURE — 3062F PR POS MACROALBUMINURIA RESULT DOCUMENTED/REVIEW: ICD-10-PCS | Mod: CPTII,S$GLB,, | Performed by: OPHTHALMOLOGY

## 2022-11-15 PROCEDURE — 3078F PR MOST RECENT DIASTOLIC BLOOD PRESSURE < 80 MM HG: ICD-10-PCS | Mod: CPTII,S$GLB,, | Performed by: PHYSICIAN ASSISTANT

## 2022-11-15 PROCEDURE — 3046F HEMOGLOBIN A1C LEVEL >9.0%: CPT | Mod: CPTII,S$GLB,, | Performed by: INTERNAL MEDICINE

## 2022-11-15 PROCEDURE — 1126F PR PAIN SEVERITY QUANTIFIED, NO PAIN PRESENT: ICD-10-PCS | Mod: CPTII,S$GLB,, | Performed by: OPHTHALMOLOGY

## 2022-11-15 PROCEDURE — 3288F FALL RISK ASSESSMENT DOCD: CPT | Mod: CPTII,S$GLB,, | Performed by: PHYSICIAN ASSISTANT

## 2022-11-15 PROCEDURE — 3008F PR BODY MASS INDEX (BMI) DOCUMENTED: ICD-10-PCS | Mod: CPTII,S$GLB,, | Performed by: INTERNAL MEDICINE

## 2022-11-15 PROCEDURE — 99205 PR OFFICE/OUTPT VISIT, NEW, LEVL V, 60-74 MIN: ICD-10-PCS | Mod: S$GLB,,, | Performed by: PHYSICIAN ASSISTANT

## 2022-11-15 PROCEDURE — 3066F PR DOCUMENTATION OF TREATMENT FOR NEPHROPATHY: ICD-10-PCS | Mod: CPTII,S$GLB,, | Performed by: INTERNAL MEDICINE

## 2022-11-15 PROCEDURE — 3079F PR MOST RECENT DIASTOLIC BLOOD PRESSURE 80-89 MM HG: ICD-10-PCS | Mod: CPTII,S$GLB,, | Performed by: INTERNAL MEDICINE

## 2022-11-15 PROCEDURE — 3046F HEMOGLOBIN A1C LEVEL >9.0%: CPT | Mod: CPTII,S$GLB,, | Performed by: OPHTHALMOLOGY

## 2022-11-15 PROCEDURE — 3062F POS MACROALBUMINURIA REV: CPT | Mod: CPTII,S$GLB,, | Performed by: OPHTHALMOLOGY

## 2022-11-15 PROCEDURE — 3066F NEPHROPATHY DOC TX: CPT | Mod: CPTII,S$GLB,, | Performed by: OPHTHALMOLOGY

## 2022-11-15 PROCEDURE — 1101F PR PT FALLS ASSESS DOC 0-1 FALLS W/OUT INJ PAST YR: ICD-10-PCS | Mod: CPTII,S$GLB,, | Performed by: PHYSICIAN ASSISTANT

## 2022-11-15 PROCEDURE — 3046F PR MOST RECENT HEMOGLOBIN A1C LEVEL > 9.0%: ICD-10-PCS | Mod: CPTII,S$GLB,, | Performed by: OPHTHALMOLOGY

## 2022-11-15 PROCEDURE — 99499 UNLISTED E&M SERVICE: CPT | Mod: S$GLB,,, | Performed by: INTERNAL MEDICINE

## 2022-11-15 PROCEDURE — 1126F AMNT PAIN NOTED NONE PRSNT: CPT | Mod: CPTII,S$GLB,, | Performed by: OPHTHALMOLOGY

## 2022-11-15 PROCEDURE — 99999 PR PBB SHADOW E&M-EST. PATIENT-LVL IV: CPT | Mod: PBBFAC,,, | Performed by: PHYSICIAN ASSISTANT

## 2022-11-15 PROCEDURE — 3008F BODY MASS INDEX DOCD: CPT | Mod: CPTII,S$GLB,, | Performed by: PHYSICIAN ASSISTANT

## 2022-11-15 PROCEDURE — 3066F PR DOCUMENTATION OF TREATMENT FOR NEPHROPATHY: ICD-10-PCS | Mod: CPTII,S$GLB,, | Performed by: OPHTHALMOLOGY

## 2022-11-15 PROCEDURE — 1159F MED LIST DOCD IN RCRD: CPT | Mod: CPTII,S$GLB,, | Performed by: PHYSICIAN ASSISTANT

## 2022-11-15 PROCEDURE — 1160F RVW MEDS BY RX/DR IN RCRD: CPT | Mod: CPTII,S$GLB,, | Performed by: PHYSICIAN ASSISTANT

## 2022-11-15 PROCEDURE — 92134 CPTRZ OPH DX IMG PST SGM RTA: CPT | Mod: S$GLB,,, | Performed by: OPHTHALMOLOGY

## 2022-11-15 PROCEDURE — 3046F HEMOGLOBIN A1C LEVEL >9.0%: CPT | Mod: CPTII,S$GLB,, | Performed by: PHYSICIAN ASSISTANT

## 2022-11-15 PROCEDURE — 92134 POSTERIOR SEGMENT OCT RETINA (OCULAR COHERENCE TOMOGRAPHY)-BOTH EYES: ICD-10-PCS | Mod: S$GLB,,, | Performed by: OPHTHALMOLOGY

## 2022-11-15 PROCEDURE — 3077F PR MOST RECENT SYSTOLIC BLOOD PRESSURE >= 140 MM HG: ICD-10-PCS | Mod: CPTII,S$GLB,, | Performed by: PHYSICIAN ASSISTANT

## 2022-11-15 PROCEDURE — 1101F PT FALLS ASSESS-DOCD LE1/YR: CPT | Mod: CPTII,S$GLB,, | Performed by: PHYSICIAN ASSISTANT

## 2022-11-15 PROCEDURE — 3077F SYST BP >= 140 MM HG: CPT | Mod: CPTII,S$GLB,, | Performed by: PHYSICIAN ASSISTANT

## 2022-11-15 PROCEDURE — 99205 OFFICE O/P NEW HI 60 MIN: CPT | Mod: S$GLB,,, | Performed by: PHYSICIAN ASSISTANT

## 2022-11-15 PROCEDURE — 4010F PR ACE/ARB THEARPY RXD/TAKEN: ICD-10-PCS | Mod: CPTII,S$GLB,, | Performed by: PHYSICIAN ASSISTANT

## 2022-11-15 PROCEDURE — 3008F BODY MASS INDEX DOCD: CPT | Mod: CPTII,S$GLB,, | Performed by: INTERNAL MEDICINE

## 2022-11-15 PROCEDURE — 1101F PR PT FALLS ASSESS DOC 0-1 FALLS W/OUT INJ PAST YR: ICD-10-PCS | Mod: CPTII,S$GLB,, | Performed by: OPHTHALMOLOGY

## 2022-11-15 PROCEDURE — 99999 PR PBB SHADOW E&M-EST. PATIENT-LVL I: CPT | Mod: PBBFAC,,,

## 2022-11-15 PROCEDURE — 3079F DIAST BP 80-89 MM HG: CPT | Mod: CPTII,S$GLB,, | Performed by: INTERNAL MEDICINE

## 2022-11-15 PROCEDURE — 3062F POS MACROALBUMINURIA REV: CPT | Mod: CPTII,S$GLB,, | Performed by: INTERNAL MEDICINE

## 2022-11-15 PROCEDURE — 99999 PR PBB SHADOW E&M-EST. PATIENT-LVL III: ICD-10-PCS | Mod: PBBFAC,,, | Performed by: OPHTHALMOLOGY

## 2022-11-15 PROCEDURE — 3066F NEPHROPATHY DOC TX: CPT | Mod: CPTII,S$GLB,, | Performed by: INTERNAL MEDICINE

## 2022-11-15 PROCEDURE — 3062F POS MACROALBUMINURIA REV: CPT | Mod: CPTII,S$GLB,, | Performed by: PHYSICIAN ASSISTANT

## 2022-11-15 PROCEDURE — 1160F RVW MEDS BY RX/DR IN RCRD: CPT | Mod: CPTII,S$GLB,, | Performed by: OPHTHALMOLOGY

## 2022-11-15 PROCEDURE — 99213 PR OFFICE/OUTPT VISIT, EST, LEVL III, 20-29 MIN: ICD-10-PCS | Mod: S$GLB,,, | Performed by: INTERNAL MEDICINE

## 2022-11-15 PROCEDURE — 3008F PR BODY MASS INDEX (BMI) DOCUMENTED: ICD-10-PCS | Mod: CPTII,S$GLB,, | Performed by: PHYSICIAN ASSISTANT

## 2022-11-15 PROCEDURE — 99999 PR PBB SHADOW E&M-EST. PATIENT-LVL I: ICD-10-PCS | Mod: PBBFAC,,,

## 2022-11-15 PROCEDURE — 1101F PT FALLS ASSESS-DOCD LE1/YR: CPT | Mod: CPTII,S$GLB,, | Performed by: OPHTHALMOLOGY

## 2022-11-15 PROCEDURE — 3288F PR FALLS RISK ASSESSMENT DOCUMENTED: ICD-10-PCS | Mod: CPTII,S$GLB,, | Performed by: OPHTHALMOLOGY

## 2022-11-15 PROCEDURE — 99999 PR PBB SHADOW E&M-EST. PATIENT-LVL III: CPT | Mod: PBBFAC,,, | Performed by: OPHTHALMOLOGY

## 2022-11-15 PROCEDURE — 3078F DIAST BP <80 MM HG: CPT | Mod: CPTII,S$GLB,, | Performed by: PHYSICIAN ASSISTANT

## 2022-11-15 PROCEDURE — 3066F NEPHROPATHY DOC TX: CPT | Mod: CPTII,S$GLB,, | Performed by: PHYSICIAN ASSISTANT

## 2022-11-15 PROCEDURE — 1126F PR PAIN SEVERITY QUANTIFIED, NO PAIN PRESENT: ICD-10-PCS | Mod: CPTII,S$GLB,, | Performed by: PHYSICIAN ASSISTANT

## 2022-11-15 PROCEDURE — 4010F PR ACE/ARB THEARPY RXD/TAKEN: ICD-10-PCS | Mod: CPTII,S$GLB,, | Performed by: INTERNAL MEDICINE

## 2022-11-15 PROCEDURE — 1126F AMNT PAIN NOTED NONE PRSNT: CPT | Mod: CPTII,S$GLB,, | Performed by: INTERNAL MEDICINE

## 2022-11-15 PROCEDURE — 3062F PR POS MACROALBUMINURIA RESULT DOCUMENTED/REVIEW: ICD-10-PCS | Mod: CPTII,S$GLB,, | Performed by: INTERNAL MEDICINE

## 2022-11-15 PROCEDURE — 4010F ACE/ARB THERAPY RXD/TAKEN: CPT | Mod: CPTII,S$GLB,, | Performed by: INTERNAL MEDICINE

## 2022-11-15 PROCEDURE — 3046F PR MOST RECENT HEMOGLOBIN A1C LEVEL > 9.0%: ICD-10-PCS | Mod: CPTII,S$GLB,, | Performed by: PHYSICIAN ASSISTANT

## 2022-11-15 PROCEDURE — 99213 OFFICE O/P EST LOW 20 MIN: CPT | Mod: S$GLB,,, | Performed by: INTERNAL MEDICINE

## 2022-11-15 PROCEDURE — 1159F PR MEDICATION LIST DOCUMENTED IN MEDICAL RECORD: ICD-10-PCS | Mod: CPTII,S$GLB,, | Performed by: PHYSICIAN ASSISTANT

## 2022-11-15 PROCEDURE — 1160F PR REVIEW ALL MEDS BY PRESCRIBER/CLIN PHARMACIST DOCUMENTED: ICD-10-PCS | Mod: CPTII,S$GLB,, | Performed by: PHYSICIAN ASSISTANT

## 2022-11-15 PROCEDURE — 3062F PR POS MACROALBUMINURIA RESULT DOCUMENTED/REVIEW: ICD-10-PCS | Mod: CPTII,S$GLB,, | Performed by: PHYSICIAN ASSISTANT

## 2022-11-15 PROCEDURE — 4010F PR ACE/ARB THEARPY RXD/TAKEN: ICD-10-PCS | Mod: CPTII,S$GLB,, | Performed by: OPHTHALMOLOGY

## 2022-11-15 PROCEDURE — 1126F PR PAIN SEVERITY QUANTIFIED, NO PAIN PRESENT: ICD-10-PCS | Mod: CPTII,S$GLB,, | Performed by: INTERNAL MEDICINE

## 2022-11-15 PROCEDURE — 3288F FALL RISK ASSESSMENT DOCD: CPT | Mod: CPTII,S$GLB,, | Performed by: OPHTHALMOLOGY

## 2022-11-15 PROCEDURE — 3075F SYST BP GE 130 - 139MM HG: CPT | Mod: CPTII,S$GLB,, | Performed by: INTERNAL MEDICINE

## 2022-11-15 RX ORDER — POLYETHYLENE GLYCOL-3350 AND ELECTROLYTES 236; 6.74; 5.86; 2.97; 22.74 G/274.31G; G/274.31G; G/274.31G; G/274.31G; G/274.31G
POWDER, FOR SOLUTION ORAL
COMMUNITY
Start: 2022-10-13 | End: 2023-09-08

## 2022-11-15 RX ORDER — METFORMIN HYDROCHLORIDE 1000 MG/1
TABLET ORAL
COMMUNITY
Start: 2022-10-15 | End: 2022-12-28

## 2022-11-15 RX ADMIN — Medication 1.25 MG: at 02:11

## 2022-11-15 NOTE — PROGRESS NOTES
Handy Adams was referred to Advanced CKD education by JESUS Arita (collaborating MD: Nataly.)    The patient attended class in an individual setting unaccompanied.    Lifestyle/Hobbies/Personal Values: Saw pt. Today for ESRD tx. choices class.Patient reported that he is not working, but that he is pursuing a PhD in organizational leadership. Pt. further stated that he has several appts. scheduled for today and just had a pre-appt for a kidney biopsy and that he is concerned about that. Pt. Stated that he is overwhelmed with all that is going on at this time in his life. Patient stated that he is in denial about his kidney disease.  Psychosocial support: Patient reported that he lives at home with his wife, Daniella and their son.   Preferred learning style: Patient stated that he learns best by visual and written information.   Readiness to learn: Pt. Denied pain and was motivated to learn. Pt was very interested in treatment options, and reported that he had not heard of PD before today.     Advanced Education (Lesson 4, 5, 6)    Lesson 4 Choices for Treating Advanced Kidney Disease (The Basics)  Lesson Objectives  By the end of each session, participants will be able to:  Identify the four treatment choices for kidney failure  State how hemodialysis and peritoneal dialysis differ in frequency of treatment   Topics & Points Covered  You have treatment choices, and you can change your mind.  No renal replacement therapy also known as conservative management  Active medical care without RRT  Palliative care when necessary  Complications can be managed, but can't compensate for uremia  End of life choices - living will - telling family  Can change your mind  Hemodialysis (HD)  Two types  In-center (most common)  Home hemo: treatments are done at home, requires assistance, you have more control, can be harder on the helpers.  How it works  PROs and CONs  The diet (in-center is most restrictive, only 3  times/week, build up between treatments)  Lifestyle: social aspects  Safety/infection  Peritoneal dialysis (PD)  Two types  Continuous ambulatory PD  Continuous cycling PD  How it works  PROs and CONs  Storage space for supplies (delivered monthly)  Diet (blood is cleaned every day, diet is less restrictive)  Safety/infection control  Kidney transplant  Types   or living donor  Eligibility  Transplant evaluation  Waiting list  Still MUST take medications including anti-rejection medications  PROs and CONs  The diet (least restrictive)  Cultural aspects of different choices  Be sensitive to cultural beliefs in regards to all of the available options  Outcome Assessment Questions  See scanned document below for outcome assessment.     Lesson 5 Getting Ready for Treatment (Beyond the Basics)  Lesson Objectives  By the end of each session, participants will be able to:  Express a benefit of having access surgery months before starting dialysis  Report two ways that the non-dominant arm should be protected for dialysis access  Topics & Points Covered  When does dialysis start?  Benefits of early access placement, including choice in type of dialysis (informed decision)  Hemodialysis access  Fistula, graft, catheter  PROS and CONS of access type  How to prepare for surgery and care for the access  Peritoneal dialysis access  Catheter  How to prepare for surgery and care for the exit site  Transplant evaluation  Referral/paperwork for transplant center  Surgical referral  Immunizations, other lab tests  Emotional preparation/acceptance (depression)  Cultural considerations  Be sensitive to your audience's cultural beliefs and norms  Transportation considerations  Outcome Assessment Questions  See scanned document below for outcome assessment.     Lesson 6 Living with Advanced Kidney Disease  Lesson Objectives  By the end of each session, participants will be able to:  Identify that diet recommendations and  "medications may change when dialysis treatment begins  Identify that Medicare has a special program that may pay for the cost of treatment  Distinguish between palliative care and hospice care  Explain active medical management without dialysis  Describe the importance of creating an Advance Directive and Living Will  Topics & Points Covered  What to expect once you start dialysis  Will take time to feel better (won't happen after first treatment)  Medications may change  Basic diet changes for dialysis  More protein is needed due to protein losses during dialysis  Maintain low sodium and phosphorus  May need potassium and fluid restrictions with hemodialysis  Dietitian is part of dialysis team  Financial concerns  Employment and rehabilitation  Cost of treatments  Cost of medications  Transportation   is part of dialysis team  Quality of life  Sexual function  Transplantation  Waiting for transplant  If transplant fails  Advance Care Planning  Active medical management without dialysis  Hospice vs Palliative Care  Advance Directives and Living Will  Outcome Assessment Questions  See scanned document below for outcome assessment.     Outcome Assessment Scanned Document:             At the end of the class, the pt was undecided on preferred ESRD tx choice. Pt reported feeling "in shock" and "overwhelmed" and needed more time to think over his options. Reassurance provided and pt verbalized appreciation for the class.    Areas of information that primary nephrology provider should reinforce during follow-up visit includes: Pt. is interested in seeing if he qualifies for transplant. Pt. wants more information about his prognosis and potential need for dialysis.     The content of these lessons are adapted from the Kidney Disease Education (KDE) Services benefit as defined by the Centers for Medicare & Medicaid Services.    90 minutes spent educating patient on the above content.     "

## 2022-11-15 NOTE — PROGRESS NOTES
CHIEF COMPLAINT/HPI: Handy is a 67 y.o. male for evaluation of Chronic Kidney Disease (Here for kidney bx consult)    HPI:  The patient is known to have HTN and DM II. He was noticed to have proteinuria with increase monoclonal Igg. The Pr/Cr ratio was 5.5. He was referred for a kidney biopsy. I have seen him today. I have explained the procedure with all the complication. I have answered the questions. He stated that in the current time he had multiple things going on with his health. He will think about the kidney biopsy and let us know when he decides. We recommended home BP and blood sugar monitoring before each meal for a week and send us the readings.           Past Medical History:   Diagnosis Date    Disorder of kidney and ureter     Edema leg     History of rotator cuff tear     History of seasonal allergies     HTN (hypertension)     Hx of colonic polyps     Hyperlipemia     NS (nuclear sclerosis), bilateral 06/25/2019    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 11/17/2017    Type 2 diabetes mellitus        Handy reports that he has never smoked. He has never used smokeless tobacco. He reports current alcohol use.    Family History   Problem Relation Age of Onset    Cancer Mother         Lung Cancer    Cancer Father         Colon cancer    Diabetes Father     Hypertension Father     No Known Problems Sister     No Known Problems Brother     No Known Problems Maternal Grandmother     No Known Problems Maternal Grandfather     No Known Problems Paternal Grandmother     No Known Problems Paternal Grandfather     No Known Problems Maternal Aunt     No Known Problems Maternal Uncle     No Known Problems Paternal Aunt     No Known Problems Paternal Uncle     Amblyopia Neg Hx     Blindness Neg Hx     Cataracts Neg Hx     Glaucoma Neg Hx     Macular degeneration Neg Hx     Retinal detachment Neg Hx     Strabismus Neg Hx     Stroke Neg  Hx     Thyroid disease Neg Hx        Vitals:    11/15/22 0952   BP: 136/85   Pulse: 83   SpO2: 100%   Weight: 119 kg (262 lb 5.6 oz)           Impression and plan:  Primary HTN will need monitoring at home.  DM II will need premeal blood sugar monitoring.   IgG monoclonal gammopathy awaiting bone marrow and kidney biopsy.  Obesity. With BMI 35.6. kg/m2.     JACKY WELLS.Harshil. MD. VIPUL. FACP.  , [employer] / [employer]  Nephrology Consultant. [facility].   [facility] [facility]. 5th [place] [address].    email: [email or web address]  Tel: Office: [phone]

## 2022-11-15 NOTE — PROGRESS NOTES
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 He had not been seen by medical providers in many years, until earlier this year when presenting for evaluation and was noted to have uncontrolled DM with A1C >10 and HTN.  Over the year, he has gradually had worsening anemia and kidney function, which prompted SPEP evaluation. He has normal light chain ratio and normal calcium levels, but we did discuss the spectrum of MGUS/SMM/MM and that his anemia and kidney decline in setting of paraprotein warrants bone marrow biopsy. Patient is hesitant to proceed with bone marrow biopsy and would like to repeat labs and discuss this again in coming weeks. He is seeing nephrology after our visit to discuss kidney biopsy.    He has had no fevers, chills, CP/SOB, abdominal pain, bone pain, recurrent infections, unintended weight loss, LAD.     He is UTD on colonoscopy, had one earlier this year with 8 benign polyps, plan to repeat in 3 years.  He has ~3 pack year history, smoking 1/2 ppd over the last 3 years, does not warrant lung eval and has no pulmonary complaints.  Denies urinary complaints, has not had prostate exam.     PAST MEDICAL HISTORY:   Past Medical History:   Diagnosis Date    Disorder of kidney and ureter     Edema leg     History of rotator cuff tear     History of seasonal allergies     HTN (hypertension)     Hx of colonic polyps     Hyperlipemia     NS (nuclear sclerosis), bilateral 06/25/2019    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 11/17/2017     Type 2 diabetes mellitus        PAST SURGICAL HISTORY:   Past Surgical History:   Procedure Laterality Date    COLONOSCOPY N/A 10/28/2022    Procedure: COLONOSCOPY;  Surgeon: Guanako Sanchez MD;  Location: [facility];  Service: Endoscopy;  Laterality: N/A;  REFENDO placed per Dr Sanderson. case request entered     vaccinated-GT  instr portal    MOUTH SURGERY         PAST SOCIAL HISTORY:  Social History     Tobacco Use    Smoking status: Never    Smokeless tobacco: Never   Substance Use Topics    Alcohol use: Yes       FAMILY HISTORY:  Family History   Problem Relation Age of Onset    Cancer Mother         Lung Cancer    Cancer Father         Colon cancer    Diabetes Father     Hypertension Father     No Known Problems Sister     No Known Problems Brother     No Known Problems Maternal Grandmother     No Known Problems Maternal Grandfather     No Known Problems Paternal Grandmother     No Known Problems Paternal Grandfather     No Known Problems Maternal Aunt     No Known Problems Maternal Uncle     No Known Problems Paternal Aunt     No Known Problems Paternal Uncle     Amblyopia Neg Hx     Blindness Neg Hx     Cataracts Neg Hx     Glaucoma Neg Hx     Macular degeneration Neg Hx     Retinal detachment Neg Hx     Strabismus Neg Hx     Stroke Neg Hx     Thyroid disease Neg Hx        CURRENT MEDICATIONS:   Current Outpatient Medications   Medication Sig    amLODIPine (NORVASC) 10 MG tablet Take 1 tablet (10 mg total) by mouth once daily.    atorvastatin (LIPITOR) 20 MG tablet Take 1 tablet by mouth once daily    blood sugar diagnostic Strp To test twice daily    chlorthalidone (HYGROTEN) 25 MG Tab Take 1 tablet (25 mg total) by mouth once daily.    labetaloL (NORMODYNE) 200 MG tablet Take 1 tablet (200 mg total) by mouth 2 (two) times daily.    lancets Misc 1 lancet by Misc.(Non-Drug; Combo Route) route 2 (two) times daily with meals.    linaGLIPtin (TRADJENTA) 5 mg Tab tablet Take 1 tablet (5 mg total) by mouth once  daily.    pioglitazone (ACTOS) 45 MG tablet Take 1 tablet (45 mg total) by mouth once daily.    triamcinolone acetonide 0.1% (KENALOG) 0.1 % ointment Apply topically 2 (two) times daily.    valsartan (DIOVAN) 80 MG tablet Take 1 tablet (80 mg total) by mouth once daily.    blood-glucose meter kit Use as instructed    fexofenadine (ALLEGRA) 180 MG tablet Take 1 tablet (180 mg total) by mouth once daily.    sildenafiL (VIAGRA) 50 MG tablet Take 1 tablet (50 mg total) by mouth daily as needed for Erectile Dysfunction.     No current facility-administered medications for this visit.       ALLERGIES:   Review of patient's allergies indicates:  No Known Allergies    Review of Systems:     Review of Systems   Constitutional:  Negative for chills, fever, malaise/fatigue and weight loss.   HENT: Negative.     Eyes: Negative.    Respiratory: Negative.     Cardiovascular: Negative.    Gastrointestinal: Negative.    Genitourinary: Negative.    Musculoskeletal: Negative.    Skin: Negative.    Neurological: Negative.    Endo/Heme/Allergies: Negative.    Psychiatric/Behavioral:  The patient is nervous/anxious.        Physical Exam:     Vitals:    11/15/22 0845   BP: (!) 144/74   Pulse: 77   Resp: 16       Physical Exam  Vitals reviewed.   Constitutional:       General: He is not in acute distress.     Appearance: He is obese.   HENT:      Head: Normocephalic.      Right Ear: External ear normal.      Left Ear: External ear normal.      Nose: Nose normal.      Mouth/Throat:      Mouth: Mucous membranes are moist.      Pharynx: Oropharynx is clear.   Eyes:      Extraocular Movements: Extraocular movements intact.      Pupils: Pupils are equal, round, and reactive to light.   Cardiovascular:      Rate and Rhythm: Normal rate and regular rhythm.      Pulses: Normal pulses.   Pulmonary:      Effort: Pulmonary effort is normal.      Breath sounds: Normal breath sounds.   Abdominal:      General: Abdomen is flat.      Palpations: Abdomen  is soft.   Musculoskeletal:         General: No swelling. Normal range of motion.      Cervical back: Neck supple.   Lymphadenopathy:      Cervical: No cervical adenopathy.   Skin:     General: Skin is warm.      Findings: No bruising.   Neurological:      General: No focal deficit present.      Mental Status: He is alert.      Motor: No weakness.   Psychiatric:         Mood and Affect: Mood normal.        Labs:   Lab Results   Component Value Date    WBC 6.80 10/24/2022    HGB 9.1 (L) 10/24/2022    HCT 29.1 (L) 10/24/2022    MCV 92 10/24/2022     10/24/2022       Lab Results   Component Value Date     11/08/2022    K 3.8 11/08/2022     11/08/2022    CO2 23 11/08/2022    BUN 41 (H) 11/08/2022    CREATININE 3.0 (H) 11/08/2022    ALBUMIN 3.1 (L) 10/19/2022    BILITOT 0.7 09/07/2022    ALKPHOS 58 09/07/2022    AST 11 09/07/2022    ALT 13 09/07/2022       Lab Results   Component Value Date    IRON 59 10/24/2022    TIBC 345 10/24/2022    FERRITIN 334 (H) 10/24/2022       No components found for: RETICULOCYTES    No results found for: HAPTOGLOBIN, LDH    Imaging:          Assessment and Plan:   Handy Cook) is a pleasant 67 y.o.male referred for evaluation of paraprotein    Paraproteinemia  - Pt with elevated IgG lambda paraprotein on recent SPEP, in setting of declining Hgb and kidney function. He denies B symptoms. Calcium level is normal. Serum free light chains mildly elevated, with normal ratio.  - Given paraprotein with normocytic anemia, kidney dysfunction, he warrants bone marrow biopsy for evaluation  - Will repeat plasma cell biochemical studies and obtain urine studies as patient is hesitant for BMBx and would like to think about this, we will schedule in approximately 3 weeks with provider follow up before   - Has been established with nephrology to consider kidney biopsy    2. Kidney Disease  - Worsening kidney disease over last year in setting of HTN and uncontrolled T2DM, after  previously not being seen by medical provider over past few years.  - Ultrasound sound shows medical renal disease  - Established with nephrology who has recommended kidney biopsy, d/w nephrology. Patient is considering renal biopsy at this time   - Plasma cell evaluation as above        Orders/Follow Up:           BMT Chart Routing  Urgent    Follow up with physician    Follow up with HERVE 2 weeks. f/u with cuong in ~2 weeks (at least 1 wk after labs)   Provider visit type Routine   Infusion scheduling note    Injection scheduling note    Labs CMP, CBC, beta 2 microglobulin, SPEP, reticulocytes, free light chains, immunoglobulins, immunofixation and UPEP   Lab interval:  schedule labs w/in 1 week, call pt w/ appt   Imaging   Schedule BMBx in clinic in 3-4 weeks   Pharmacy appointment    Other referrals         Thank you for allowing me to partake in the care of this patient. If there are any questions, please do not hesitate to reach out.      Cuong Javier PA-C  Hematology, Oncology, and Stem Cell Transplantation  [facility]

## 2022-11-15 NOTE — PROGRESS NOTES
HPI    Well overdue fu OCT/DFE LE: 11/3/2020    Pt states his va worsening OD since last visit. Stable OS.     No flashes  No floaters  No pain  No gtts        OCT   OD: Good quality. Normal foveal contour  Superior IRF, exudates in parafovea. , decreased in central SRF  OS: Good quality. Normal foveal contour without IRF, SRF. Some small noncentral exudates    Prior WFFA  OD - normal transit time. Hyperfluorescence early c/w Ma/edema - sig NP    OS - Hyperfluorescence early c/w Ma/edema  .NV nasal      A/P    1. 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 DME OU   6/19 - pt did not FU until 8/20  S/p Avastin OD x 1  S/p Ozurdex OD x 1 9/20 11/22 - not seen x 2 years    Avastin OD today    Re approve Ozurdex    3. HTN Ret OU  BS/BP/chol control    4. NS OU  Monitor - good Va    5. Cyst RLL  Removed by Rhea  Happy with result        1 month OCT no dilate    Risks, benefits, and alternatives to treatment discussed in detail with the patient.  The patient voiced understanding and wished to proceed with the procedure    Injection Procedure Note:  Diagnosis: DME OD    Patient Identified and Time Out complete  Pt marked  Topical Proparacaine and Betadine.  Inject Avastin OD at 6:00 @ 3.5-4mm posterior to limbus  Post Operative Dx: Same  Complications: None  Follow up as above.

## 2022-11-15 NOTE — Clinical Note
Saw pt. today for ESRD Tx choices class. Pt. Is interested in seeing if he qualifies for transplant. Pt is uncertain about his prognosis, and potential need for dialysis and would like more information.

## 2022-11-16 ENCOUNTER — CLINICAL SUPPORT (OUTPATIENT)
Dept: NEPHROLOGY | Facility: CLINIC | Age: 67
End: 2022-11-16
Payer: MEDICARE

## 2022-11-16 DIAGNOSIS — N18.4 CHRONIC KIDNEY DISEASE, STAGE 4 (SEVERE): ICD-10-CM

## 2022-11-16 PROCEDURE — 97802 MEDICAL NUTRITION INDIV IN: CPT | Mod: 95,,,

## 2022-11-16 PROCEDURE — 97802 PR MED NUTR THER, 1ST, INDIV, EA 15 MIN: ICD-10-PCS | Mod: 95,,,

## 2022-11-21 ENCOUNTER — OFFICE VISIT (OUTPATIENT)
Dept: NEUROLOGY | Facility: CLINIC | Age: 67
End: 2022-11-21
Payer: MEDICARE

## 2022-11-21 DIAGNOSIS — G31.84 MILD COGNITIVE IMPAIRMENT: Primary | ICD-10-CM

## 2022-11-21 DIAGNOSIS — F43.21 ADJUSTMENT DISORDER WITH DEPRESSED MOOD: ICD-10-CM

## 2022-11-21 PROCEDURE — 3062F POS MACROALBUMINURIA REV: CPT | Mod: CPTII,95,, | Performed by: CLINICAL NEUROPSYCHOLOGIST

## 2022-11-21 PROCEDURE — 3062F PR POS MACROALBUMINURIA RESULT DOCUMENTED/REVIEW: ICD-10-PCS | Mod: CPTII,95,, | Performed by: CLINICAL NEUROPSYCHOLOGIST

## 2022-11-21 PROCEDURE — 3046F PR MOST RECENT HEMOGLOBIN A1C LEVEL > 9.0%: ICD-10-PCS | Mod: CPTII,95,, | Performed by: CLINICAL NEUROPSYCHOLOGIST

## 2022-11-21 PROCEDURE — 3046F HEMOGLOBIN A1C LEVEL >9.0%: CPT | Mod: CPTII,95,, | Performed by: CLINICAL NEUROPSYCHOLOGIST

## 2022-11-21 PROCEDURE — 3066F PR DOCUMENTATION OF TREATMENT FOR NEPHROPATHY: ICD-10-PCS | Mod: CPTII,95,, | Performed by: CLINICAL NEUROPSYCHOLOGIST

## 2022-11-21 PROCEDURE — 4010F PR ACE/ARB THEARPY RXD/TAKEN: ICD-10-PCS | Mod: CPTII,95,, | Performed by: CLINICAL NEUROPSYCHOLOGIST

## 2022-11-21 PROCEDURE — 99499 NO LOS: ICD-10-PCS | Mod: 95,,, | Performed by: CLINICAL NEUROPSYCHOLOGIST

## 2022-11-21 PROCEDURE — 99499 UNLISTED E&M SERVICE: CPT | Mod: 95,,, | Performed by: CLINICAL NEUROPSYCHOLOGIST

## 2022-11-21 PROCEDURE — 4010F ACE/ARB THERAPY RXD/TAKEN: CPT | Mod: CPTII,95,, | Performed by: CLINICAL NEUROPSYCHOLOGIST

## 2022-11-21 PROCEDURE — 3066F NEPHROPATHY DOC TX: CPT | Mod: CPTII,95,, | Performed by: CLINICAL NEUROPSYCHOLOGIST

## 2022-11-21 NOTE — PROGRESS NOTES
NEUROPSYCHOLOGICAL EVALUATION FEEDBACK    TELEMEDICINE DETAILS:   Established Patient - Audio Only Telehealth Visit   The patient location is: home  The chief complaint leading to consultation is: feedback regarding neuropsychological test results  Visit type: Virtual visit with audio only (telephone)  Total time spent with patient: 65 minutes   The reason for the audio only service rather than synchronous audio and video virtual visit was related to technical difficulties or patient preference/necessity.   Each patient to whom I provide medical services by telemedicine is: (1) informed of the relationship between the physician and patient and the respective role of any other health care provider with respect to management of the patient; and (2) notified that they may decline to receive medical services by telemedicine and may withdraw from such care at any time. Patient verbally consented to receive this service via voice-only telephone call.     Handy Adams attended a feedback session today.  We discussed the results of the neuropsychological evaluation and I gave time to discuss questions and concerns. For full evaluation details, please see the note from this provider dated 10/31/2022. A copy of the report was provided via [employer].     Problem List Items Addressed This Visit          Neuro    Mild cognitive impairment - Primary       Psychiatric    Adjustment disorder with depressed mood     Teresita Tompkins, PhD  Licensed Clinical Neuropsychologist  [facility] - Department of Neurology    This service was not originating from a related E/M service provided within the previous 7 days nor will  to an E/M service or procedure within the next 24 hours or my soonest available appointment.  Prevailing standard of care was able to be met in this audio-only visit.

## 2022-11-22 ENCOUNTER — LAB VISIT (OUTPATIENT)
Dept: LAB | Facility: HOSPITAL | Age: 67
End: 2022-11-22
Attending: FAMILY MEDICINE
Payer: MEDICARE

## 2022-11-22 DIAGNOSIS — R77.8 ABNORMAL SPEP: ICD-10-CM

## 2022-11-22 PROCEDURE — 80053 COMPREHEN METABOLIC PANEL: CPT | Performed by: PHYSICIAN ASSISTANT

## 2022-11-22 PROCEDURE — 86334 PATHOLOGIST INTERPRETATION IFE: ICD-10-PCS | Mod: 26,,, | Performed by: PATHOLOGY

## 2022-11-22 PROCEDURE — 84165 PATHOLOGIST INTERPRETATION SPE: ICD-10-PCS | Mod: 26,,, | Performed by: PATHOLOGY

## 2022-11-22 PROCEDURE — 83521 IG LIGHT CHAINS FREE EACH: CPT | Performed by: PHYSICIAN ASSISTANT

## 2022-11-22 PROCEDURE — 85025 COMPLETE CBC W/AUTO DIFF WBC: CPT | Performed by: PHYSICIAN ASSISTANT

## 2022-11-22 PROCEDURE — 85045 AUTOMATED RETICULOCYTE COUNT: CPT | Performed by: PHYSICIAN ASSISTANT

## 2022-11-22 PROCEDURE — 86334 IMMUNOFIX E-PHORESIS SERUM: CPT | Performed by: PHYSICIAN ASSISTANT

## 2022-11-22 PROCEDURE — 82784 ASSAY IGA/IGD/IGG/IGM EACH: CPT | Mod: 59 | Performed by: PHYSICIAN ASSISTANT

## 2022-11-22 PROCEDURE — 86334 IMMUNOFIX E-PHORESIS SERUM: CPT | Mod: 26,,, | Performed by: PATHOLOGY

## 2022-11-22 PROCEDURE — 84165 PROTEIN E-PHORESIS SERUM: CPT | Mod: 26,,, | Performed by: PATHOLOGY

## 2022-11-22 PROCEDURE — 84165 PROTEIN E-PHORESIS SERUM: CPT | Performed by: PHYSICIAN ASSISTANT

## 2022-11-22 PROCEDURE — 36415 COLL VENOUS BLD VENIPUNCTURE: CPT | Mod: PO | Performed by: PHYSICIAN ASSISTANT

## 2022-11-22 PROCEDURE — 82232 ASSAY OF BETA-2 PROTEIN: CPT | Performed by: PHYSICIAN ASSISTANT

## 2022-11-23 LAB
ALBUMIN SERPL BCP-MCNC: 3.6 G/DL (ref 3.5–5.2)
ALBUMIN SERPL ELPH-MCNC: 3.76 G/DL (ref 3.35–5.55)
ALP SERPL-CCNC: 50 U/L (ref 55–135)
ALPHA1 GLOB SERPL ELPH-MCNC: 0.26 G/DL (ref 0.17–0.41)
ALPHA2 GLOB SERPL ELPH-MCNC: 0.82 G/DL (ref 0.43–0.99)
ALT SERPL W/O P-5'-P-CCNC: 11 U/L (ref 10–44)
ANION GAP SERPL CALC-SCNC: 8 MMOL/L (ref 8–16)
AST SERPL-CCNC: 14 U/L (ref 10–40)
B-GLOBULIN SERPL ELPH-MCNC: 0.63 G/DL (ref 0.5–1.1)
B2 MICROGLOB SERPL-MCNC: 5.8 UG/ML (ref 0–2.5)
BASOPHILS # BLD AUTO: 0.03 K/UL (ref 0–0.2)
BASOPHILS NFR BLD: 0.5 % (ref 0–1.9)
BILIRUB SERPL-MCNC: 0.5 MG/DL (ref 0.1–1)
BUN SERPL-MCNC: 40 MG/DL (ref 8–23)
CALCIUM SERPL-MCNC: 9.9 MG/DL (ref 8.7–10.5)
CHLORIDE SERPL-SCNC: 107 MMOL/L (ref 95–110)
CO2 SERPL-SCNC: 25 MMOL/L (ref 23–29)
CREAT SERPL-MCNC: 3.2 MG/DL (ref 0.5–1.4)
DIFFERENTIAL METHOD: ABNORMAL
EOSINOPHIL # BLD AUTO: 0.3 K/UL (ref 0–0.5)
EOSINOPHIL NFR BLD: 4.1 % (ref 0–8)
ERYTHROCYTE [DISTWIDTH] IN BLOOD BY AUTOMATED COUNT: 16.5 % (ref 11.5–14.5)
EST. GFR  (NO RACE VARIABLE): 20.4 ML/MIN/1.73 M^2
GAMMA GLOB SERPL ELPH-MCNC: 1.64 G/DL (ref 0.67–1.58)
GLUCOSE SERPL-MCNC: 124 MG/DL (ref 70–110)
HCT VFR BLD AUTO: 32.9 % (ref 40–54)
HGB BLD-MCNC: 9.9 G/DL (ref 14–18)
IGA SERPL-MCNC: 94 MG/DL (ref 40–350)
IGG SERPL-MCNC: 2015 MG/DL (ref 650–1600)
IGM SERPL-MCNC: 39 MG/DL (ref 50–300)
IMM GRANULOCYTES # BLD AUTO: 0.03 K/UL (ref 0–0.04)
IMM GRANULOCYTES NFR BLD AUTO: 0.5 % (ref 0–0.5)
INTERPRETATION SERPL IFE-IMP: NORMAL
KAPPA LC SER QL IA: 7.57 MG/DL (ref 0.33–1.94)
KAPPA LC/LAMBDA SER IA: 1.87 (ref 0.26–1.65)
LAMBDA LC SER QL IA: 4.04 MG/DL (ref 0.57–2.63)
LYMPHOCYTES # BLD AUTO: 1.5 K/UL (ref 1–4.8)
LYMPHOCYTES NFR BLD: 23.4 % (ref 18–48)
MCH RBC QN AUTO: 28.9 PG (ref 27–31)
MCHC RBC AUTO-ENTMCNC: 30.1 G/DL (ref 32–36)
MCV RBC AUTO: 96 FL (ref 82–98)
MONOCYTES # BLD AUTO: 0.5 K/UL (ref 0.3–1)
MONOCYTES NFR BLD: 8.3 % (ref 4–15)
NEUTROPHILS # BLD AUTO: 4.1 K/UL (ref 1.8–7.7)
NEUTROPHILS NFR BLD: 63.2 % (ref 38–73)
NRBC BLD-RTO: 0 /100 WBC
PLATELET # BLD AUTO: 270 K/UL (ref 150–450)
PMV BLD AUTO: 12 FL (ref 9.2–12.9)
POTASSIUM SERPL-SCNC: 4.2 MMOL/L (ref 3.5–5.1)
PROT SERPL-MCNC: 7.1 G/DL (ref 6–8.4)
PROT SERPL-MCNC: 7.6 G/DL (ref 6–8.4)
RBC # BLD AUTO: 3.42 M/UL (ref 4.6–6.2)
RETICS/RBC NFR AUTO: 1.5 % (ref 0.4–2)
SODIUM SERPL-SCNC: 140 MMOL/L (ref 136–145)
WBC # BLD AUTO: 6.41 K/UL (ref 3.9–12.7)

## 2022-11-25 LAB
PATHOLOGIST INTERPRETATION IFE: NORMAL
PATHOLOGIST INTERPRETATION SPE: NORMAL

## 2022-11-29 ENCOUNTER — OFFICE VISIT (OUTPATIENT)
Dept: HEMATOLOGY/ONCOLOGY | Facility: CLINIC | Age: 67
End: 2022-11-29
Payer: MEDICARE

## 2022-11-29 VITALS
OXYGEN SATURATION: 97 % | TEMPERATURE: 99 F | HEART RATE: 74 BPM | SYSTOLIC BLOOD PRESSURE: 138 MMHG | BODY MASS INDEX: 36.62 KG/M2 | HEIGHT: 72 IN | WEIGHT: 270.38 LBS | DIASTOLIC BLOOD PRESSURE: 71 MMHG | RESPIRATION RATE: 17 BRPM

## 2022-11-29 DIAGNOSIS — G31.84 MILD COGNITIVE IMPAIRMENT: ICD-10-CM

## 2022-11-29 DIAGNOSIS — E66.01 SEVERE OBESITY (BMI 35.0-39.9) WITH COMORBIDITY: ICD-10-CM

## 2022-11-29 DIAGNOSIS — N18.4 TYPE 2 DIABETES MELLITUS WITH STAGE 4 CHRONIC KIDNEY DISEASE, WITHOUT LONG-TERM CURRENT USE OF INSULIN: ICD-10-CM

## 2022-11-29 DIAGNOSIS — D89.2 HYPERGAMMAGLOBULINEMIA, UNSPECIFIED: ICD-10-CM

## 2022-11-29 DIAGNOSIS — Z13.79 ENCOUNTER FOR OTHER SCREENING FOR GENETIC AND CHROMOSOMAL ANOMALIES: ICD-10-CM

## 2022-11-29 DIAGNOSIS — N18.4 CKD (CHRONIC KIDNEY DISEASE) STAGE 4, GFR 15-29 ML/MIN: ICD-10-CM

## 2022-11-29 DIAGNOSIS — R77.8 ABNORMAL SPEP: Primary | ICD-10-CM

## 2022-11-29 DIAGNOSIS — I10 ESSENTIAL HYPERTENSION: ICD-10-CM

## 2022-11-29 DIAGNOSIS — E11.22 TYPE 2 DIABETES MELLITUS WITH STAGE 4 CHRONIC KIDNEY DISEASE, WITHOUT LONG-TERM CURRENT USE OF INSULIN: ICD-10-CM

## 2022-11-29 PROCEDURE — 1159F PR MEDICATION LIST DOCUMENTED IN MEDICAL RECORD: ICD-10-PCS | Mod: CPTII,S$GLB,, | Performed by: PHYSICIAN ASSISTANT

## 2022-11-29 PROCEDURE — 3288F PR FALLS RISK ASSESSMENT DOCUMENTED: ICD-10-PCS | Mod: CPTII,S$GLB,, | Performed by: PHYSICIAN ASSISTANT

## 2022-11-29 PROCEDURE — 3066F PR DOCUMENTATION OF TREATMENT FOR NEPHROPATHY: ICD-10-PCS | Mod: CPTII,S$GLB,, | Performed by: PHYSICIAN ASSISTANT

## 2022-11-29 PROCEDURE — 99999 PR PBB SHADOW E&M-EST. PATIENT-LVL IV: CPT | Mod: PBBFAC,,, | Performed by: PHYSICIAN ASSISTANT

## 2022-11-29 PROCEDURE — 3066F NEPHROPATHY DOC TX: CPT | Mod: CPTII,S$GLB,, | Performed by: PHYSICIAN ASSISTANT

## 2022-11-29 PROCEDURE — 3062F PR POS MACROALBUMINURIA RESULT DOCUMENTED/REVIEW: ICD-10-PCS | Mod: CPTII,S$GLB,, | Performed by: PHYSICIAN ASSISTANT

## 2022-11-29 PROCEDURE — 3078F PR MOST RECENT DIASTOLIC BLOOD PRESSURE < 80 MM HG: ICD-10-PCS | Mod: CPTII,S$GLB,, | Performed by: PHYSICIAN ASSISTANT

## 2022-11-29 PROCEDURE — 99499 UNLISTED E&M SERVICE: CPT | Mod: S$GLB,,, | Performed by: PHYSICIAN ASSISTANT

## 2022-11-29 PROCEDURE — 1101F PR PT FALLS ASSESS DOC 0-1 FALLS W/OUT INJ PAST YR: ICD-10-PCS | Mod: CPTII,S$GLB,, | Performed by: PHYSICIAN ASSISTANT

## 2022-11-29 PROCEDURE — 1126F PR PAIN SEVERITY QUANTIFIED, NO PAIN PRESENT: ICD-10-PCS | Mod: CPTII,S$GLB,, | Performed by: PHYSICIAN ASSISTANT

## 2022-11-29 PROCEDURE — 99999 PR PBB SHADOW E&M-EST. PATIENT-LVL IV: ICD-10-PCS | Mod: PBBFAC,,, | Performed by: PHYSICIAN ASSISTANT

## 2022-11-29 PROCEDURE — 99499 RISK ADDL DX/OHS AUDIT: ICD-10-PCS | Mod: S$GLB,,, | Performed by: PHYSICIAN ASSISTANT

## 2022-11-29 PROCEDURE — 1160F RVW MEDS BY RX/DR IN RCRD: CPT | Mod: CPTII,S$GLB,, | Performed by: PHYSICIAN ASSISTANT

## 2022-11-29 PROCEDURE — 3078F DIAST BP <80 MM HG: CPT | Mod: CPTII,S$GLB,, | Performed by: PHYSICIAN ASSISTANT

## 2022-11-29 PROCEDURE — 3046F PR MOST RECENT HEMOGLOBIN A1C LEVEL > 9.0%: ICD-10-PCS | Mod: CPTII,S$GLB,, | Performed by: PHYSICIAN ASSISTANT

## 2022-11-29 PROCEDURE — 3288F FALL RISK ASSESSMENT DOCD: CPT | Mod: CPTII,S$GLB,, | Performed by: PHYSICIAN ASSISTANT

## 2022-11-29 PROCEDURE — 4010F PR ACE/ARB THEARPY RXD/TAKEN: ICD-10-PCS | Mod: CPTII,S$GLB,, | Performed by: PHYSICIAN ASSISTANT

## 2022-11-29 PROCEDURE — 3062F POS MACROALBUMINURIA REV: CPT | Mod: CPTII,S$GLB,, | Performed by: PHYSICIAN ASSISTANT

## 2022-11-29 PROCEDURE — 99214 PR OFFICE/OUTPT VISIT, EST, LEVL IV, 30-39 MIN: ICD-10-PCS | Mod: S$GLB,,, | Performed by: PHYSICIAN ASSISTANT

## 2022-11-29 PROCEDURE — 1101F PT FALLS ASSESS-DOCD LE1/YR: CPT | Mod: CPTII,S$GLB,, | Performed by: PHYSICIAN ASSISTANT

## 2022-11-29 PROCEDURE — 1159F MED LIST DOCD IN RCRD: CPT | Mod: CPTII,S$GLB,, | Performed by: PHYSICIAN ASSISTANT

## 2022-11-29 PROCEDURE — 4010F ACE/ARB THERAPY RXD/TAKEN: CPT | Mod: CPTII,S$GLB,, | Performed by: PHYSICIAN ASSISTANT

## 2022-11-29 PROCEDURE — 3046F HEMOGLOBIN A1C LEVEL >9.0%: CPT | Mod: CPTII,S$GLB,, | Performed by: PHYSICIAN ASSISTANT

## 2022-11-29 PROCEDURE — 1126F AMNT PAIN NOTED NONE PRSNT: CPT | Mod: CPTII,S$GLB,, | Performed by: PHYSICIAN ASSISTANT

## 2022-11-29 PROCEDURE — 3075F PR MOST RECENT SYSTOLIC BLOOD PRESS GE 130-139MM HG: ICD-10-PCS | Mod: CPTII,S$GLB,, | Performed by: PHYSICIAN ASSISTANT

## 2022-11-29 PROCEDURE — 3008F BODY MASS INDEX DOCD: CPT | Mod: CPTII,S$GLB,, | Performed by: PHYSICIAN ASSISTANT

## 2022-11-29 PROCEDURE — 3075F SYST BP GE 130 - 139MM HG: CPT | Mod: CPTII,S$GLB,, | Performed by: PHYSICIAN ASSISTANT

## 2022-11-29 PROCEDURE — 1160F PR REVIEW ALL MEDS BY PRESCRIBER/CLIN PHARMACIST DOCUMENTED: ICD-10-PCS | Mod: CPTII,S$GLB,, | Performed by: PHYSICIAN ASSISTANT

## 2022-11-29 PROCEDURE — 99214 OFFICE O/P EST MOD 30 MIN: CPT | Mod: S$GLB,,, | Performed by: PHYSICIAN ASSISTANT

## 2022-11-29 PROCEDURE — 3008F PR BODY MASS INDEX (BMI) DOCUMENTED: ICD-10-PCS | Mod: CPTII,S$GLB,, | Performed by: PHYSICIAN ASSISTANT

## 2022-11-29 NOTE — PROGRESS NOTES
Referring Physician: Raven Naylor NP     Reason for visit:  Chief Complaint   Patient presents with    Nutrition Counseling    Chronic Kidney Disease       :1955     Allergies Reviewed  Meds Reviewed    Anthropometrics  Weight: 119kg   Height: 6'   BMI:There is no height or weight on file to calculate BMI.   IBW: 171lbs (77.7kg)    Meds: lipitor, amlodipine, valsartan    Labs: HgA1C: 10.4, Na: WNL, K: WNL, B, BUN: 41, creat: 3.0, eGFR: 22.1     Estimated Nutrition Needs: 2331 Kcals/day (30kcal/kg),  62g protein (0.8g/kg)   Wt used for Calories: IBW    Breakfast: tangerine + grapes + toast w/ butter & syrup   Lunch: skipped   Dinner: Pasta w/ beef steak + tomato sauce (made at home by wife)  Snacks: n/a  Drinks: Low fat milk, water, diet coke/coke zero     Assessment: Virtual Visit. Pt admits he is a little bit unsure of how he should be eating for CKD. He notes he has had many appointments lately. Reviewed handouts on low sodium, salt substitutes, moderate protein, and diabetic diet. Spent a lot of time reviewing the plate method for better BG control. Encouraged pt to focus on BG and BP mgmt. Also reviewed reading food labels. Pt notes eating out 2-3x/wk. Reviewed diabetic diet recommendations at length. Answered all questions.     Nutrition Diagnosis: Food and nutrition related knowledge deficit r/t no prior nutrition education as evidenced by intake of some high sodium foods.     Recommendations: 1. Start reading food labels and select foods lower in sodium.   2. Limit sodium to 2000mg/day or less. Do not add salt to meals either when cooking or at the table.   3. Start using the plate method to build healthy/balanced meals consisting of plenty of vegetables, moderate protein, and moderate carbohydrates.   4. Do not skip meals.   5. Stay hydrated with water.     Consultation Time:30 minutes.    Follow Up:6 months/PRN.

## 2022-11-29 NOTE — PROGRESS NOTES
Section of Hematology and Stem Cell Transplantation  Follow Up Note     Visit Date: 11/29/2022    Primary Oncologic Diagnosis: Abnormal SPEP [R77.8]    History of Present Ilness: (from initial consult 11/15/2022)    Handy Cook) is a pleasant 67 y.o.male with a past medical history of T2DM, hyperlipidemia, HTN, and recently diagnosed kidney disease referred by PCP for evaluation of IgG Lambda paraprotein noted on SPEP (0.74g/dl). He had not been seen by medical providers in many years, until earlier this year when presenting for evaluation and was noted to have uncontrolled DM with A1C >10 and HTN.  Over the year, he has gradually had worsening anemia and kidney function, which prompted SPEP evaluation. He has normal light chain ratio and normal calcium levels, but we did discuss the spectrum of MGUS/SMM/MM and that his anemia and kidney decline in setting of paraprotein warrants bone marrow biopsy. Patient is hesitant to proceed with bone marrow biopsy and would like to repeat labs and discuss this again in coming weeks. He is seeing nephrology after our visit to discuss kidney biopsy.     He has had no fevers, chills, CP/SOB, abdominal pain, bone pain, recurrent infections, unintended weight loss, LAD.      He is UTD on colonoscopy, had one earlier this year with 8 benign polyps, plan to repeat in 3 years.  He has ~3 pack year history, smoking 1/2 ppd over the last 3 years, does not warrant lung eval and has no pulmonary complaints.  Denies urinary complaints, has not had prostate exam.     Interval History:   Presents for follow up of recent evaluation. He denies new complaints since initial visit, thorough ROS below. We reviewed his lab work at length, with increasing paraprotein, worsening anemia and kidney function. We reviewed the next step of work up to evaluate for plasma cell process is proceeding with bone marrow biopsy. He remains hesitant of invasive evaluation with biopsies, as recommended  by hematology and nephrology teams. After explaining bone marrow biopsy procedure that would take place in clinic, patient is agreeable to proceeding. Have scheduled for next week, discussed he would follow up a week after with MD and plasma cell team.     Past Medical History, Social History, and Past Family History are unchanged since last evaluation except for HPI.     CURRENT MEDICATIONS:   Current Outpatient Medications   Medication Sig    amLODIPine (NORVASC) 10 MG tablet Take 1 tablet (10 mg total) by mouth once daily.    atorvastatin (LIPITOR) 20 MG tablet Take 1 tablet by mouth once daily    blood sugar diagnostic Strp To test twice daily    blood-glucose meter kit Use as instructed    chlorthalidone (HYGROTEN) 25 MG Tab Take 1 tablet (25 mg total) by mouth once daily.    fexofenadine (ALLEGRA) 180 MG tablet Take 1 tablet (180 mg total) by mouth once daily.    GAVILYTE-G 236-22.74-6.74 -5.86 gram suspension DRINK 4L LIQUID BY MOUTH ONCE FOR 1 DOSE    labetaloL (NORMODYNE) 200 MG tablet Take 1 tablet (200 mg total) by mouth 2 (two) times daily.    lancets Misc 1 lancet by Misc.(Non-Drug; Combo Route) route 2 (two) times daily with meals.    linaGLIPtin (TRADJENTA) 5 mg Tab tablet Take 1 tablet (5 mg total) by mouth once daily.    metFORMIN (GLUCOPHAGE) 1000 MG tablet     pioglitazone (ACTOS) 45 MG tablet Take 1 tablet (45 mg total) by mouth once daily.    sildenafiL (VIAGRA) 50 MG tablet Take 1 tablet (50 mg total) by mouth daily as needed for Erectile Dysfunction.    triamcinolone acetonide 0.1% (KENALOG) 0.1 % ointment Apply topically 2 (two) times daily.    valsartan (DIOVAN) 80 MG tablet Take 1 tablet (80 mg total) by mouth once daily.     No current facility-administered medications for this visit.       ALLERGIES:   Review of patient's allergies indicates:  No Known Allergies      Review of Systems:     Review of Systems   Constitutional:  Negative for chills, fever, malaise/fatigue and weight loss.    HENT: Negative.     Eyes: Negative.    Respiratory: Negative.     Cardiovascular: Negative.    Gastrointestinal: Negative.    Genitourinary: Negative.    Musculoskeletal: Negative.    Skin: Negative.    Neurological: Negative.    Endo/Heme/Allergies: Negative.    Psychiatric/Behavioral:  The patient is nervous/anxious.      Physical Exam:     Vitals:    11/29/22 0908   BP: 138/71   Pulse: 74   Resp: 17   Temp: 98.6 °F (37 °C)       Physical Exam  Vitals reviewed.   Constitutional:       General: He is not in acute distress.     Appearance: He is obese.   HENT:      Head: Normocephalic.      Right Ear: External ear normal.      Left Ear: External ear normal.      Nose: Nose normal.      Mouth/Throat:      Mouth: Mucous membranes are moist.      Pharynx: Oropharynx is clear.   Eyes:      Extraocular Movements: Extraocular movements intact.      Pupils: Pupils are equal, round, and reactive to light.   Cardiovascular:      Rate and Rhythm: Normal rate and regular rhythm.      Pulses: Normal pulses.   Pulmonary:      Effort: Pulmonary effort is normal.      Breath sounds: Normal breath sounds.   Abdominal:      General: Abdomen is flat.      Palpations: Abdomen is soft.   Musculoskeletal:         General: No swelling. Normal range of motion.      Cervical back: Neck supple.   Lymphadenopathy:      Cervical: No cervical adenopathy.   Skin:     General: Skin is warm.      Findings: No bruising.   Neurological:      General: No focal deficit present.      Mental Status: He is alert.      Motor: No weakness.   Psychiatric:         Mood and Affect: Mood normal.         Labs:   Lab Results   Component Value Date    WBC 6.41 11/22/2022    HGB 9.9 (L) 11/22/2022    HCT 32.9 (L) 11/22/2022    MCV 96 11/22/2022     11/22/2022       Lab Results   Component Value Date     11/22/2022    K 4.2 11/22/2022     11/22/2022    CO2 25 11/22/2022    BUN 40 (H) 11/22/2022    CREATININE 3.2 (H) 11/22/2022    ALBUMIN 3.6  11/22/2022    BILITOT 0.5 11/22/2022    ALKPHOS 50 (L) 11/22/2022    AST 14 11/22/2022    ALT 11 11/22/2022            Assessment and Plan:   Handy Adams (Handy) is a pleasant 67 y.o.male referred for evaluation of paraprotein     Paraproteinemia  - Pt with elevated IgG lambda paraprotein on recent SPEP, in setting of declining Hgb and kidney function. We reviewed at length the spectrum of MGUS/SMM/MM.   - Repeat plasma cell evaluation shows increasing m-spike with Paraprotein peak in near-gamma = 0.84 g/dL(previously, 0.74 g/dL). K/L ratio 1.87. Elevated B2MG 5.8.  - Given paraprotein with normocytic anemia and kidney dysfunction, he warrants bone marrow biopsy for evaluation  - Plan for BMBx, tentative plan for imaging - pt would like to proceed with bone marrow biopsy first   - Has been established with nephrology to consider kidney biopsy, he has not agreed to kidney biopsy at this time. Will consider after results of bmbx.   - Pt to follow up with Dr. Remigio Grubbs and plasma cell section 1 week after bmbx to review pathology results      2. Kidney Disease  - Worsening kidney disease over last year in setting of HTN and uncontrolled T2DM, after previously not being seen by medical provider over past few years.  - Ultrasound sound shows medical renal disease  - Established with nephrology who has recommended kidney biopsy, d/w nephrology. Patient is considering renal biopsy at this time   - Plasma cell evaluation as above       Orders Placed:          Orders Placed This Encounter   Procedures    Bone marrow     Standing Status:   Future     Standing Expiration Date:   11/29/2023    Plasma Cell Proliferative Disorder (PCPD), FISH     Standing Status:   Future     Standing Expiration Date:   1/28/2024     Order Specific Question:   Reason for Referral     Answer:   paraproteinemia    Leukemia/Lymphoma Screen - Bone Marrow Left Posterior Iliac Crest     Standing Status:   Future     Standing Expiration Date:    1/28/2024     Order Specific Question:   Specimen Source     Answer:   Left Posterior Iliac Crest     Order Specific Question:   Indication:     Answer:   E88.09 Plasma cell dyscrasia    Chromosome Analysis, Bone Marrow Left Posterior Iliac Crest     Standing Status:   Future     Standing Expiration Date:   1/28/2024     Order Specific Question:   Reason For Referral     Answer:   paraproteinemia     Order Specific Question:   Specimen Source     Answer:   Left Posterior Iliac Crest     Order Specific Question:   Release to patient     Answer:   Immediate    Heme Disorders DNA/RNA Hold, Bone Marrow     Standing Status:   Future     Standing Expiration Date:   1/28/2024         Follow Up:          BMT Chart Routing  Urgent    Follow up with physician 2 weeks. f/u with Remigio Grubbs on 12/12   Follow up with [facility]    Provider visit type    Infusion scheduling note    Injection scheduling note    Labs    Imaging    Pharmacy appointment    Other referrals           Thank you for allowing me to partake in the care of this patient.       Marie Javier PA-C  Hematology, Oncology, and Stem Cell Transplantation  [facility]

## 2022-11-29 NOTE — PATIENT INSTRUCTIONS
1. Start reading food labels and select foods lower in sodium.   2. Limit sodium to 2000mg/day or less. Do not add salt to meals either when cooking or at the table.   3. Start using the plate method to build healthy/balanced meals consisting of plenty of vegetables, moderate protein, and moderate carbohydrates.   4. Do not skip meals.   5. Stay hydrated with water.

## 2022-12-05 ENCOUNTER — PROCEDURE VISIT (OUTPATIENT)
Dept: HEMATOLOGY/ONCOLOGY | Facility: CLINIC | Age: 67
End: 2022-12-05
Payer: MEDICARE

## 2022-12-05 VITALS
OXYGEN SATURATION: 99 % | HEART RATE: 67 BPM | SYSTOLIC BLOOD PRESSURE: 188 MMHG | RESPIRATION RATE: 17 BRPM | DIASTOLIC BLOOD PRESSURE: 87 MMHG

## 2022-12-05 DIAGNOSIS — D89.2 HYPERGAMMAGLOBULINEMIA, UNSPECIFIED: ICD-10-CM

## 2022-12-05 DIAGNOSIS — Z13.79 ENCOUNTER FOR OTHER SCREENING FOR GENETIC AND CHROMOSOMAL ANOMALIES: ICD-10-CM

## 2022-12-05 DIAGNOSIS — R77.8 ABNORMAL SPEP: ICD-10-CM

## 2022-12-05 PROCEDURE — 88313 SPECIAL STAINS GROUP 2: CPT | Mod: 59 | Performed by: PATHOLOGY

## 2022-12-05 PROCEDURE — 88184 FLOWCYTOMETRY/ TC 1 MARKER: CPT | Performed by: PHYSICIAN ASSISTANT

## 2022-12-05 PROCEDURE — 85097 PR  BONE MARROW,SMEAR INTERPRETATION: ICD-10-PCS | Mod: ,,, | Performed by: PATHOLOGY

## 2022-12-05 PROCEDURE — 88237 TISSUE CULTURE BONE MARROW: CPT | Performed by: PHYSICIAN ASSISTANT

## 2022-12-05 PROCEDURE — 88364 INSITU HYBRIDIZATION (FISH): CPT | Performed by: PATHOLOGY

## 2022-12-05 PROCEDURE — 88185 FLOWCYTOMETRY/TC ADD-ON: CPT | Mod: 59 | Performed by: PATHOLOGY

## 2022-12-05 PROCEDURE — 88341 IMHCHEM/IMCYTCHM EA ADD ANTB: CPT | Mod: 59 | Performed by: PATHOLOGY

## 2022-12-05 PROCEDURE — 88313 PR  SPECIAL STAINS,GROUP II: ICD-10-PCS | Mod: 26,,, | Performed by: PATHOLOGY

## 2022-12-05 PROCEDURE — 88342 CHG IMMUNOCYTOCHEMISTRY: ICD-10-PCS | Mod: 26,59,, | Performed by: PATHOLOGY

## 2022-12-05 PROCEDURE — 85097 BONE MARROW INTERPRETATION: CPT | Mod: ,,, | Performed by: PATHOLOGY

## 2022-12-05 PROCEDURE — 88311 DECALCIFY TISSUE: CPT | Mod: 26,,, | Performed by: PATHOLOGY

## 2022-12-05 PROCEDURE — 88264 CHROMOSOME ANALYSIS 20-25: CPT | Performed by: PHYSICIAN ASSISTANT

## 2022-12-05 PROCEDURE — 88311 PR  DECALCIFY TISSUE: ICD-10-PCS | Mod: 26,,, | Performed by: PATHOLOGY

## 2022-12-05 PROCEDURE — 88185 FLOWCYTOMETRY/TC ADD-ON: CPT | Performed by: PHYSICIAN ASSISTANT

## 2022-12-05 PROCEDURE — 88364 INSITU HYBRIDIZATION (FISH): CPT | Mod: 26,,, | Performed by: PATHOLOGY

## 2022-12-05 PROCEDURE — 88342 IMHCHEM/IMCYTCHM 1ST ANTB: CPT | Performed by: PATHOLOGY

## 2022-12-05 PROCEDURE — 88305 TISSUE EXAM BY PATHOLOGIST: ICD-10-PCS | Mod: 26,,, | Performed by: PATHOLOGY

## 2022-12-05 PROCEDURE — 88299 UNLISTED CYTOGENETIC STUDY: CPT | Performed by: PHYSICIAN ASSISTANT

## 2022-12-05 PROCEDURE — 88271 CYTOGENETICS DNA PROBE: CPT | Mod: 59 | Performed by: PHYSICIAN ASSISTANT

## 2022-12-05 PROCEDURE — 38222 DX BONE MARROW BX & ASPIR: CPT | Mod: LT,S$GLB,, | Performed by: NURSE PRACTITIONER

## 2022-12-05 PROCEDURE — 88305 TISSUE EXAM BY PATHOLOGIST: CPT | Mod: 26,,, | Performed by: PATHOLOGY

## 2022-12-05 PROCEDURE — 88311 DECALCIFY TISSUE: CPT | Performed by: PATHOLOGY

## 2022-12-05 PROCEDURE — 88237 TISSUE CULTURE BONE MARROW: CPT | Mod: 59 | Performed by: PHYSICIAN ASSISTANT

## 2022-12-05 PROCEDURE — 88364 CHG INSITU HYBRIDIZATION (FISH: ICD-10-PCS | Mod: 26,,, | Performed by: PATHOLOGY

## 2022-12-05 PROCEDURE — 88274 CYTOGENETICS 25-99: CPT | Mod: 59 | Performed by: PHYSICIAN ASSISTANT

## 2022-12-05 PROCEDURE — 88189 PR  FLOWCYTOMETRY/READ, 16 & > MARKERS: ICD-10-PCS | Mod: ,,, | Performed by: PATHOLOGY

## 2022-12-05 PROCEDURE — 88189 FLOWCYTOMETRY/READ 16 & >: CPT | Mod: ,,, | Performed by: PATHOLOGY

## 2022-12-05 PROCEDURE — 38222 BONE MARROW: ICD-10-PCS | Mod: LT,S$GLB,, | Performed by: NURSE PRACTITIONER

## 2022-12-05 PROCEDURE — 88365 PR  TISSUE HYBRIDIZATION: ICD-10-PCS | Mod: 26,,, | Performed by: PATHOLOGY

## 2022-12-05 PROCEDURE — 36415 COLL VENOUS BLD VENIPUNCTURE: CPT | Performed by: PHYSICIAN ASSISTANT

## 2022-12-05 PROCEDURE — 88341 IMHCHEM/IMCYTCHM EA ADD ANTB: CPT | Mod: 26,,, | Performed by: PATHOLOGY

## 2022-12-05 PROCEDURE — 88305 TISSUE EXAM BY PATHOLOGIST: CPT | Performed by: PATHOLOGY

## 2022-12-05 PROCEDURE — 88365 INSITU HYBRIDIZATION (FISH): CPT | Performed by: PATHOLOGY

## 2022-12-05 PROCEDURE — 88342 IMHCHEM/IMCYTCHM 1ST ANTB: CPT | Mod: 26,59,, | Performed by: PATHOLOGY

## 2022-12-05 PROCEDURE — 88365 INSITU HYBRIDIZATION (FISH): CPT | Mod: 26,,, | Performed by: PATHOLOGY

## 2022-12-05 PROCEDURE — 88341 PR IHC OR ICC EACH ADD'L SINGLE ANTIBODY  STAINPR: ICD-10-PCS | Mod: 26,,, | Performed by: PATHOLOGY

## 2022-12-05 PROCEDURE — 88313 SPECIAL STAINS GROUP 2: CPT | Mod: 26,,, | Performed by: PATHOLOGY

## 2022-12-05 PROCEDURE — 88184 FLOWCYTOMETRY/ TC 1 MARKER: CPT | Mod: 59 | Performed by: PATHOLOGY

## 2022-12-05 RX ORDER — LIDOCAINE HYDROCHLORIDE 20 MG/ML
10 INJECTION, SOLUTION EPIDURAL; INFILTRATION; INTRACAUDAL; PERINEURAL ONCE
Status: COMPLETED | OUTPATIENT
Start: 2022-12-05 | End: 2022-12-05

## 2022-12-05 RX ADMIN — LIDOCAINE HYDROCHLORIDE 7 ML: 20 INJECTION, SOLUTION EPIDURAL; INFILTRATION; INTRACAUDAL; PERINEURAL at 04:12

## 2022-12-05 NOTE — PROCEDURES
Patient with Paraproteinemia presented at BMT clinic for restaging bone marrow biopsy. Tolerated procedure well. Not in any distress.  See Marie's  note for full history. Reviewed medications, allergies and labs.     January Manriquez NP  Hematology/Oncology/BMT

## 2022-12-05 NOTE — PROCEDURES
Bone marrow    Date/Time: 12/5/2022 3:30 PM  Performed by: January Manriquez NP  Authorized by: Marie Javier PA-C     Aspiration?: Yes   Biopsy?: Yes    PROCEDURE NOTE:  Date of Procedure:12/05/2022   Bone Marrow Biopsy and Aspiration  Indication: Paraproteinemia  Consent: Informed consent was obtained from patient.  Timeout: Done and documented.  Position: Prone  Site: Left posterior illiac crest.  Prep: Betadine.  Needle used: 11 gauge Jamshidi needle.  Anesthetic: 2% lidocaine 7 cc.  Biopsy: The biopsy needle was introduced into the marrow cavity and an aspirate was obtained without complications and sent for flow cytometry,PCPD FISH,DNA hold and cytogenetics. Core biopsy obtained without difficulty and sent for routine histologic examination.  Complications: None.  Disposition: Left patient with primary nurse,instructed to lay on back for 20 minutes. Advised not to take shower and keep dressing clean, dry & intact for 24 hours.  Blood loss: Minimal.     January Manriquez NP  Hematology/Oncology/BMT

## 2022-12-06 LAB
BODY SITE - BONE MARROW: NORMAL
CHROM BANDING METHOD: NORMAL
CHROMOSOME ANALYSIS BM ADDITIONAL INFORMATION: NORMAL
CHROMOSOME ANALYSIS BM RELEASED BY: NORMAL
CHROMOSOME ANALYSIS BM RESULT SUMMARY: NORMAL
CLINICAL CYTOGENETICIST REVIEW: NORMAL
CLINICAL DIAGNOSIS - BONE MARROW: NORMAL
FLOW CYTOMETRY ANTIBODIES ANALYZED - BONE MARROW: NORMAL
FLOW CYTOMETRY COMMENT - BONE MARROW: NORMAL
FLOW CYTOMETRY INTERPRETATION - BONE MARROW: NORMAL
KARYOTYP MAR: NORMAL
PCPDS FINAL DIAGNOSIS: NORMAL
PCPDS PRE-ANALYSIS PRE-SORT: NORMAL
REASON FOR REFERRAL (NARRATIVE): NORMAL
REF LAB TEST METHOD: NORMAL
SPECIMEN SOURCE: NORMAL
SPECIMEN: NORMAL

## 2022-12-07 ENCOUNTER — OFFICE VISIT (OUTPATIENT)
Dept: FAMILY MEDICINE | Facility: CLINIC | Age: 67
End: 2022-12-07
Payer: MEDICARE

## 2022-12-07 VITALS
HEIGHT: 72 IN | WEIGHT: 269.19 LBS | SYSTOLIC BLOOD PRESSURE: 130 MMHG | RESPIRATION RATE: 18 BRPM | OXYGEN SATURATION: 98 % | BODY MASS INDEX: 36.46 KG/M2 | DIASTOLIC BLOOD PRESSURE: 84 MMHG | TEMPERATURE: 99 F | HEART RATE: 73 BPM

## 2022-12-07 DIAGNOSIS — Z13.6 ENCOUNTER FOR ABDOMINAL AORTIC ANEURYSM (AAA) SCREENING: ICD-10-CM

## 2022-12-07 DIAGNOSIS — E11.22 TYPE 2 DIABETES MELLITUS WITH STAGE 4 CHRONIC KIDNEY DISEASE, WITHOUT LONG-TERM CURRENT USE OF INSULIN: Primary | ICD-10-CM

## 2022-12-07 DIAGNOSIS — R77.8 ABNORMAL SPEP: ICD-10-CM

## 2022-12-07 DIAGNOSIS — N18.4 TYPE 2 DIABETES MELLITUS WITH STAGE 4 CHRONIC KIDNEY DISEASE, WITHOUT LONG-TERM CURRENT USE OF INSULIN: Primary | ICD-10-CM

## 2022-12-07 DIAGNOSIS — Z23 NEED FOR PNEUMOCOCCAL VACCINATION: ICD-10-CM

## 2022-12-07 DIAGNOSIS — N18.4 CKD (CHRONIC KIDNEY DISEASE) STAGE 4, GFR 15-29 ML/MIN: ICD-10-CM

## 2022-12-07 PROCEDURE — 3008F PR BODY MASS INDEX (BMI) DOCUMENTED: ICD-10-PCS | Mod: CPTII,S$GLB,, | Performed by: FAMILY MEDICINE

## 2022-12-07 PROCEDURE — 99214 OFFICE O/P EST MOD 30 MIN: CPT | Mod: S$GLB,,, | Performed by: FAMILY MEDICINE

## 2022-12-07 PROCEDURE — 99214 PR OFFICE/OUTPT VISIT, EST, LEVL IV, 30-39 MIN: ICD-10-PCS | Mod: S$GLB,,, | Performed by: FAMILY MEDICINE

## 2022-12-07 PROCEDURE — 3079F PR MOST RECENT DIASTOLIC BLOOD PRESSURE 80-89 MM HG: ICD-10-PCS | Mod: CPTII,S$GLB,, | Performed by: FAMILY MEDICINE

## 2022-12-07 PROCEDURE — 3075F SYST BP GE 130 - 139MM HG: CPT | Mod: CPTII,S$GLB,, | Performed by: FAMILY MEDICINE

## 2022-12-07 PROCEDURE — 99999 PR PBB SHADOW E&M-EST. PATIENT-LVL II: ICD-10-PCS | Mod: PBBFAC,,, | Performed by: FAMILY MEDICINE

## 2022-12-07 PROCEDURE — 3075F PR MOST RECENT SYSTOLIC BLOOD PRESS GE 130-139MM HG: ICD-10-PCS | Mod: CPTII,S$GLB,, | Performed by: FAMILY MEDICINE

## 2022-12-07 PROCEDURE — 99999 PR PBB SHADOW E&M-EST. PATIENT-LVL II: CPT | Mod: PBBFAC,,, | Performed by: FAMILY MEDICINE

## 2022-12-07 PROCEDURE — 3062F PR POS MACROALBUMINURIA RESULT DOCUMENTED/REVIEW: ICD-10-PCS | Mod: CPTII,S$GLB,, | Performed by: FAMILY MEDICINE

## 2022-12-07 PROCEDURE — 3008F BODY MASS INDEX DOCD: CPT | Mod: CPTII,S$GLB,, | Performed by: FAMILY MEDICINE

## 2022-12-07 PROCEDURE — 3066F NEPHROPATHY DOC TX: CPT | Mod: CPTII,S$GLB,, | Performed by: FAMILY MEDICINE

## 2022-12-07 PROCEDURE — 3062F POS MACROALBUMINURIA REV: CPT | Mod: CPTII,S$GLB,, | Performed by: FAMILY MEDICINE

## 2022-12-07 PROCEDURE — 3046F PR MOST RECENT HEMOGLOBIN A1C LEVEL > 9.0%: ICD-10-PCS | Mod: CPTII,S$GLB,, | Performed by: FAMILY MEDICINE

## 2022-12-07 PROCEDURE — 4010F ACE/ARB THERAPY RXD/TAKEN: CPT | Mod: CPTII,S$GLB,, | Performed by: FAMILY MEDICINE

## 2022-12-07 PROCEDURE — 3066F PR DOCUMENTATION OF TREATMENT FOR NEPHROPATHY: ICD-10-PCS | Mod: CPTII,S$GLB,, | Performed by: FAMILY MEDICINE

## 2022-12-07 PROCEDURE — 3079F DIAST BP 80-89 MM HG: CPT | Mod: CPTII,S$GLB,, | Performed by: FAMILY MEDICINE

## 2022-12-07 PROCEDURE — 4010F PR ACE/ARB THEARPY RXD/TAKEN: ICD-10-PCS | Mod: CPTII,S$GLB,, | Performed by: FAMILY MEDICINE

## 2022-12-07 PROCEDURE — 3046F HEMOGLOBIN A1C LEVEL >9.0%: CPT | Mod: CPTII,S$GLB,, | Performed by: FAMILY MEDICINE

## 2022-12-07 NOTE — PROGRESS NOTES
Health Maintenance Due   Topic     TETANUS VACCINE  H Notified pt can get vaccine at pharmacy.      Shingles Vaccine (1 of 2) Hx of chickenpox. Notified pt can get vaccine at pharmacy.      Pneumococcal Vaccines (Age 65+) (2 - PCV) Pt decline      Abdominal Aortic Aneurysm Screening  Pt decline        COVID-19 Vaccine (4 - Booster for Moderna series) Not offered at this Facility      Influenza Vaccine (1) Pt decline      Hemoglobin A1c  Consult pcp      Diabetes Urine Screening  Consult pcp

## 2022-12-07 NOTE — PROGRESS NOTES
Subjective:       Patient ID: Handy Adams is a 67 y.o. male.    Chief Complaint: No chief complaint on file.      HPI  67-year-old male presents for diabetes follow-up.  States his numbers have improved he believes.  Had a follow-up with Nephrology was found to have abnormal labs.  Underwent a bone marrow biopsy two days ago.  States he has a follow-up with Hematology next week.  States he is taking his medications.  States he is improved his diet as well.        Review of Systems   Constitutional: Negative.    HENT: Negative.     Respiratory: Negative.     Cardiovascular: Negative.    Gastrointestinal: Negative.    Endocrine: Negative.    Genitourinary: Negative.    Musculoskeletal: Negative.    Neurological: Negative.    Psychiatric/Behavioral: Negative.          Past Medical History:   Diagnosis Date    Disorder of kidney and ureter     Edema leg     History of rotator cuff tear     History of seasonal allergies     HTN (hypertension)     Hx of colonic polyps     Hyperlipemia     NS (nuclear sclerosis), bilateral 06/25/2019    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 11/17/2017    Type 2 diabetes mellitus      Past Surgical History:   Procedure Laterality Date    COLONOSCOPY N/A 10/28/2022    Procedure: COLONOSCOPY;  Surgeon: Guanako Sanchez MD;  Location: [facility];  Service: Endoscopy;  Laterality: N/A;  REFENDO placed per Dr Sanderson. case request entered     vaccinated-GT  instr portal    MOUTH SURGERY       Family History   Problem Relation Age of Onset    Cancer Mother         Lung Cancer    Cancer Father         Colon cancer    Diabetes Father     Hypertension Father     No Known Problems Sister     No Known Problems Brother     No Known Problems Maternal Grandmother     No Known Problems Maternal Grandfather     No Known Problems Paternal Grandmother     No Known Problems Paternal Grandfather     No Known Problems Maternal  Aunt     No Known Problems Maternal Uncle     No Known Problems Paternal Aunt     No Known Problems Paternal Uncle     Amblyopia Neg Hx     Blindness Neg Hx     Cataracts Neg Hx     Glaucoma Neg Hx     Macular degeneration Neg Hx     Retinal detachment Neg Hx     Strabismus Neg Hx     Stroke Neg Hx     Thyroid disease Neg Hx      Social History     Socioeconomic History    Marital status:     Number of children: 2    Highest education level: Master's degree (e.g., MA, MS, Sandra, MEd, MSW, CORTEZ)   Tobacco Use    Smoking status: Never    Smokeless tobacco: Never   Substance and Sexual Activity    Alcohol use: Yes    Sexual activity: Yes     Partners: Female     Social Determinants of Health     Financial Resource Strain: Low Risk     Difficulty of Paying Living Expenses: Not hard at all   Food Insecurity: No Food Insecurity    Worried About Running Out of Food in the Last Year: Never true    Ran Out of Food in the Last Year: Never true   Transportation Needs: No Transportation Needs    Lack of Transportation (Medical): No    Lack of Transportation (Non-Medical): No   Physical Activity: Inactive    Days of Exercise per Week: 0 days    Minutes of Exercise per Session: 0 min   Stress: No Stress Concern Present    Feeling of Stress : Not at all   Social Connections: Moderately Isolated    Frequency of Communication with Friends and Family: More than three times a week    Frequency of Social Gatherings with Friends and Family: Twice a week    Attends Sikhism Services: More than 4 times per year    Active Member of Clubs or Organizations: No    Marital Status:    Housing Stability: Unknown    Unable to Pay for Housing in the Last Year: No    Unstable Housing in the Last Year: No       Current Outpatient Medications:     amLODIPine (NORVASC) 10 MG tablet, Take 1 tablet (10 mg total) by mouth once daily., Disp: 90 tablet, Rfl: 1    atorvastatin (LIPITOR) 20 MG tablet, Take 1 tablet by mouth once daily, Disp:  90 tablet, Rfl: 1    blood sugar diagnostic Strp, To test twice daily, Disp: 100 each, Rfl: 3    blood-glucose meter kit, Use as instructed, Disp: 1 each, Rfl: 0    chlorthalidone (HYGROTEN) 25 MG Tab, Take 1 tablet (25 mg total) by mouth once daily., Disp: 90 tablet, Rfl: 3    fexofenadine (ALLEGRA) 180 MG tablet, Take 1 tablet (180 mg total) by mouth once daily., Disp: 30 tablet, Rfl: 0    GAVILYTE-G 236-22.74-6.74 -5.86 gram suspension, DRINK 4L LIQUID BY MOUTH ONCE FOR 1 DOSE, Disp: , Rfl:     labetaloL (NORMODYNE) 200 MG tablet, Take 1 tablet (200 mg total) by mouth 2 (two) times daily., Disp: 180 tablet, Rfl: 3    lancets Misc, 1 lancet by Misc.(Non-Drug; Combo Route) route 2 (two) times daily with meals., Disp: 100 each, Rfl: 11    linaGLIPtin (TRADJENTA) 5 mg Tab tablet, Take 1 tablet (5 mg total) by mouth once daily., Disp: 30 tablet, Rfl: 3    metFORMIN (GLUCOPHAGE) 1000 MG tablet, , Disp: , Rfl:     pioglitazone (ACTOS) 45 MG tablet, Take 1 tablet (45 mg total) by mouth once daily., Disp: 90 tablet, Rfl: 0    sildenafiL (VIAGRA) 50 MG tablet, Take 1 tablet (50 mg total) by mouth daily as needed for Erectile Dysfunction., Disp: 30 tablet, Rfl: 0    triamcinolone acetonide 0.1% (KENALOG) 0.1 % ointment, Apply topically 2 (two) times daily., Disp: 80 g, Rfl: 1    valsartan (DIOVAN) 80 MG tablet, Take 1 tablet (80 mg total) by mouth once daily., Disp: 90 tablet, Rfl: 3   Objective:      Vitals:    12/07/22 1017   BP: 130/84   BP Location: Right arm   Patient Position: Sitting   BP Method: Large (Manual)   Pulse: 73   Resp: 18   Temp: 98.7 °F (37.1 °C)   TempSrc: Oral   SpO2: 98%   Weight: 122.1 kg (269 lb 2.9 oz)   Height: 6' (1.829 m)       Physical Exam  Constitutional:       General: He is not in acute distress.     Appearance: He is not diaphoretic.   HENT:      Head: Normocephalic and atraumatic.   Eyes:      Conjunctiva/sclera: Conjunctivae normal.   Pulmonary:      Effort: Pulmonary effort is normal.    Musculoskeletal:      Cervical back: Neck supple.   Skin:     Findings: No rash.   Neurological:      Mental Status: He is alert and oriented to person, place, and time.   Psychiatric:         Behavior: Behavior normal.         Thought Content: Thought content normal.         Judgment: Judgment normal.          Assessment:       1. Type 2 diabetes mellitus with stage 4 chronic kidney disease, without long-term current use of insulin    2. Encounter for abdominal aortic aneurysm (AAA) screening    3. Need for pneumococcal vaccination    4. CKD (chronic kidney disease) stage 4, GFR 15-29 ml/min    5. Abnormal SPEP          Plan:       Type 2 diabetes mellitus with stage 4 chronic kidney disease, without long-term current use of insulin    Encounter for abdominal aortic aneurysm (AAA) screening  -     US Abdominal Aorta; Future; Expected date: 12/07/2022    Need for pneumococcal vaccination    CKD (chronic kidney disease) stage 4, GFR 15-29 ml/min    Abnormal SPEP    F/u labs. Has a f/u w/ his specialists next week. F/u in 3 months.          Future Appointments   Date Time Provider Department Center   12/12/2022  2:00 PM Remigio Grubbs DO [facility] HC BMT [place] [facility]   12/14/2022  8:30 AM [facility] NEPHROLOGY, NURSE EDUCATOR [facility]EL [facility]   12/16/2022  9:15 AM LAB, [facility] [place]   12/16/2022  9:30 AM SPECIMEN, [facility] SPECL[facility] [place]   12/27/2022  3:00 PM SHAWNA Mcmanus MD [facility]   12/28/2022 10:00 AM Raven Naylor NP [facility]   2/7/2023  8:00 AM LAB, [facility] [place]   2/14/2023  9:00 AM Maryse Smith NP [facility]       Patient note was created using [employer].  Any errors in syntax or even information may not have been identified and edited on initial review prior to signing this note.

## 2022-12-08 LAB
DNA/RNA EXTRACT AND HOLD RESULT: NORMAL
DNA/RNA EXTRACTION: NORMAL
EXHR SPECIMEN TYPE: NORMAL

## 2022-12-12 ENCOUNTER — OFFICE VISIT (OUTPATIENT)
Dept: HEMATOLOGY/ONCOLOGY | Facility: CLINIC | Age: 67
End: 2022-12-12
Payer: MEDICARE

## 2022-12-12 VITALS
OXYGEN SATURATION: 98 % | BODY MASS INDEX: 36.13 KG/M2 | DIASTOLIC BLOOD PRESSURE: 73 MMHG | SYSTOLIC BLOOD PRESSURE: 145 MMHG | RESPIRATION RATE: 16 BRPM | WEIGHT: 266.75 LBS | HEART RATE: 75 BPM | HEIGHT: 72 IN

## 2022-12-12 DIAGNOSIS — D47.2 MGUS (MONOCLONAL GAMMOPATHY OF UNKNOWN SIGNIFICANCE): Primary | ICD-10-CM

## 2022-12-12 DIAGNOSIS — N18.4 CKD (CHRONIC KIDNEY DISEASE) STAGE 4, GFR 15-29 ML/MIN: ICD-10-CM

## 2022-12-12 PROCEDURE — 3078F PR MOST RECENT DIASTOLIC BLOOD PRESSURE < 80 MM HG: ICD-10-PCS | Mod: CPTII,GC,S$GLB, | Performed by: STUDENT IN AN ORGANIZED HEALTH CARE EDUCATION/TRAINING PROGRAM

## 2022-12-12 PROCEDURE — 99999 PR PBB SHADOW E&M-EST. PATIENT-LVL III: CPT | Mod: PBBFAC,GC,, | Performed by: STUDENT IN AN ORGANIZED HEALTH CARE EDUCATION/TRAINING PROGRAM

## 2022-12-12 PROCEDURE — 1159F MED LIST DOCD IN RCRD: CPT | Mod: CPTII,GC,S$GLB, | Performed by: STUDENT IN AN ORGANIZED HEALTH CARE EDUCATION/TRAINING PROGRAM

## 2022-12-12 PROCEDURE — 3078F DIAST BP <80 MM HG: CPT | Mod: CPTII,GC,S$GLB, | Performed by: STUDENT IN AN ORGANIZED HEALTH CARE EDUCATION/TRAINING PROGRAM

## 2022-12-12 PROCEDURE — 3066F NEPHROPATHY DOC TX: CPT | Mod: CPTII,GC,S$GLB, | Performed by: STUDENT IN AN ORGANIZED HEALTH CARE EDUCATION/TRAINING PROGRAM

## 2022-12-12 PROCEDURE — 1101F PR PT FALLS ASSESS DOC 0-1 FALLS W/OUT INJ PAST YR: ICD-10-PCS | Mod: CPTII,GC,S$GLB, | Performed by: STUDENT IN AN ORGANIZED HEALTH CARE EDUCATION/TRAINING PROGRAM

## 2022-12-12 PROCEDURE — 3288F PR FALLS RISK ASSESSMENT DOCUMENTED: ICD-10-PCS | Mod: CPTII,GC,S$GLB, | Performed by: STUDENT IN AN ORGANIZED HEALTH CARE EDUCATION/TRAINING PROGRAM

## 2022-12-12 PROCEDURE — 1126F PR PAIN SEVERITY QUANTIFIED, NO PAIN PRESENT: ICD-10-PCS | Mod: CPTII,GC,S$GLB, | Performed by: STUDENT IN AN ORGANIZED HEALTH CARE EDUCATION/TRAINING PROGRAM

## 2022-12-12 PROCEDURE — 3288F FALL RISK ASSESSMENT DOCD: CPT | Mod: CPTII,GC,S$GLB, | Performed by: STUDENT IN AN ORGANIZED HEALTH CARE EDUCATION/TRAINING PROGRAM

## 2022-12-12 PROCEDURE — 3046F PR MOST RECENT HEMOGLOBIN A1C LEVEL > 9.0%: ICD-10-PCS | Mod: CPTII,GC,S$GLB, | Performed by: STUDENT IN AN ORGANIZED HEALTH CARE EDUCATION/TRAINING PROGRAM

## 2022-12-12 PROCEDURE — 3008F BODY MASS INDEX DOCD: CPT | Mod: CPTII,GC,S$GLB, | Performed by: STUDENT IN AN ORGANIZED HEALTH CARE EDUCATION/TRAINING PROGRAM

## 2022-12-12 PROCEDURE — 99214 OFFICE O/P EST MOD 30 MIN: CPT | Mod: GC,S$GLB,, | Performed by: STUDENT IN AN ORGANIZED HEALTH CARE EDUCATION/TRAINING PROGRAM

## 2022-12-12 PROCEDURE — 1101F PT FALLS ASSESS-DOCD LE1/YR: CPT | Mod: CPTII,GC,S$GLB, | Performed by: STUDENT IN AN ORGANIZED HEALTH CARE EDUCATION/TRAINING PROGRAM

## 2022-12-12 PROCEDURE — 4010F ACE/ARB THERAPY RXD/TAKEN: CPT | Mod: CPTII,GC,S$GLB, | Performed by: STUDENT IN AN ORGANIZED HEALTH CARE EDUCATION/TRAINING PROGRAM

## 2022-12-12 PROCEDURE — 99999 PR PBB SHADOW E&M-EST. PATIENT-LVL III: ICD-10-PCS | Mod: PBBFAC,GC,, | Performed by: STUDENT IN AN ORGANIZED HEALTH CARE EDUCATION/TRAINING PROGRAM

## 2022-12-12 PROCEDURE — 3008F PR BODY MASS INDEX (BMI) DOCUMENTED: ICD-10-PCS | Mod: CPTII,GC,S$GLB, | Performed by: STUDENT IN AN ORGANIZED HEALTH CARE EDUCATION/TRAINING PROGRAM

## 2022-12-12 PROCEDURE — 3062F POS MACROALBUMINURIA REV: CPT | Mod: CPTII,GC,S$GLB, | Performed by: STUDENT IN AN ORGANIZED HEALTH CARE EDUCATION/TRAINING PROGRAM

## 2022-12-12 PROCEDURE — 99214 PR OFFICE/OUTPT VISIT, EST, LEVL IV, 30-39 MIN: ICD-10-PCS | Mod: GC,S$GLB,, | Performed by: STUDENT IN AN ORGANIZED HEALTH CARE EDUCATION/TRAINING PROGRAM

## 2022-12-12 PROCEDURE — 3077F SYST BP >= 140 MM HG: CPT | Mod: CPTII,GC,S$GLB, | Performed by: STUDENT IN AN ORGANIZED HEALTH CARE EDUCATION/TRAINING PROGRAM

## 2022-12-12 PROCEDURE — 3046F HEMOGLOBIN A1C LEVEL >9.0%: CPT | Mod: CPTII,GC,S$GLB, | Performed by: STUDENT IN AN ORGANIZED HEALTH CARE EDUCATION/TRAINING PROGRAM

## 2022-12-12 PROCEDURE — 1126F AMNT PAIN NOTED NONE PRSNT: CPT | Mod: CPTII,GC,S$GLB, | Performed by: STUDENT IN AN ORGANIZED HEALTH CARE EDUCATION/TRAINING PROGRAM

## 2022-12-12 PROCEDURE — 4010F PR ACE/ARB THEARPY RXD/TAKEN: ICD-10-PCS | Mod: CPTII,GC,S$GLB, | Performed by: STUDENT IN AN ORGANIZED HEALTH CARE EDUCATION/TRAINING PROGRAM

## 2022-12-12 PROCEDURE — 3066F PR DOCUMENTATION OF TREATMENT FOR NEPHROPATHY: ICD-10-PCS | Mod: CPTII,GC,S$GLB, | Performed by: STUDENT IN AN ORGANIZED HEALTH CARE EDUCATION/TRAINING PROGRAM

## 2022-12-12 PROCEDURE — 3077F PR MOST RECENT SYSTOLIC BLOOD PRESSURE >= 140 MM HG: ICD-10-PCS | Mod: CPTII,GC,S$GLB, | Performed by: STUDENT IN AN ORGANIZED HEALTH CARE EDUCATION/TRAINING PROGRAM

## 2022-12-12 PROCEDURE — 1160F RVW MEDS BY RX/DR IN RCRD: CPT | Mod: CPTII,GC,S$GLB, | Performed by: STUDENT IN AN ORGANIZED HEALTH CARE EDUCATION/TRAINING PROGRAM

## 2022-12-12 PROCEDURE — 1159F PR MEDICATION LIST DOCUMENTED IN MEDICAL RECORD: ICD-10-PCS | Mod: CPTII,GC,S$GLB, | Performed by: STUDENT IN AN ORGANIZED HEALTH CARE EDUCATION/TRAINING PROGRAM

## 2022-12-12 PROCEDURE — 1160F PR REVIEW ALL MEDS BY PRESCRIBER/CLIN PHARMACIST DOCUMENTED: ICD-10-PCS | Mod: CPTII,GC,S$GLB, | Performed by: STUDENT IN AN ORGANIZED HEALTH CARE EDUCATION/TRAINING PROGRAM

## 2022-12-12 PROCEDURE — 3062F PR POS MACROALBUMINURIA RESULT DOCUMENTED/REVIEW: ICD-10-PCS | Mod: CPTII,GC,S$GLB, | Performed by: STUDENT IN AN ORGANIZED HEALTH CARE EDUCATION/TRAINING PROGRAM

## 2022-12-13 LAB
GENETICIST REVIEW: NORMAL
PLASMA CELL PROLIF RELEASED BY: NORMAL
PLASMA CELL PROLIF RESULT SUMMARY: NORMAL
PLASMA CELL PROLIF RESULT TABLE: NORMAL
REASON FOR REFERRAL, PLASMA CELL PROLIF (PCPD), FISH: NORMAL
REF LAB TEST METHOD: NORMAL
RESULTS, PLASMA CELL PROLIF (PCPD), FISH: NORMAL
SERVICE CMNT-IMP: NORMAL
SERVICE CMNT-IMP: NORMAL
SPECIMEN SOURCE: NORMAL
SPECIMEN, PLASMA CELL PROLIF (PCPD), FISH: NORMAL

## 2022-12-14 LAB
CHROM BANDING METHOD: NORMAL
CHROMOSOME ANALYSIS BM ADDITIONAL INFORMATION: NORMAL
CHROMOSOME ANALYSIS BM RELEASED BY: NORMAL
CHROMOSOME ANALYSIS BM RESULT SUMMARY: NORMAL
CLINICAL CYTOGENETICIST REVIEW: NORMAL
KARYOTYP MAR: NORMAL
REASON FOR REFERRAL (NARRATIVE): NORMAL
REF LAB TEST METHOD: NORMAL
SPECIMEN SOURCE: NORMAL
SPECIMEN: NORMAL

## 2022-12-15 ENCOUNTER — PATIENT OUTREACH (OUTPATIENT)
Dept: ADMINISTRATIVE | Facility: HOSPITAL | Age: 67
End: 2022-12-15
Payer: MEDICARE

## 2022-12-15 PROBLEM — D47.2 MGUS (MONOCLONAL GAMMOPATHY OF UNKNOWN SIGNIFICANCE): Status: ACTIVE | Noted: 2022-12-15

## 2022-12-15 LAB
COMMENT: NORMAL
FINAL PATHOLOGIC DIAGNOSIS: NORMAL
GROSS: NORMAL
Lab: NORMAL
MICROSCOPIC EXAM: NORMAL
SUPPLEMENTAL DIAGNOSIS: NORMAL

## 2022-12-15 NOTE — PROGRESS NOTES
Section of Hematology and Stem Cell Transplantation  Follow Up Note     Visit Date: 12/15/2022    Primary Oncologic Diagnosis: CKD (chronic kidney disease) stage 4, GFR 15-29 ml/min [N18.4]    History of Present Ilness: (from initial consult 11/15/2022)    Handy Cook) is a pleasant 67 y.o.male with a past medical history of T2DM, hyperlipidemia, HTN, and recently diagnosed kidney disease referred by PCP for evaluation of IgG Lambda paraprotein noted on SPEP (0.74g/dl). He had not been seen by medical providers in many years, until earlier this year when presenting for evaluation and was noted to have uncontrolled DM with A1C >10 and HTN.  Over the year, he has gradually had worsening anemia and kidney function, which prompted SPEP evaluation. He has normal light chain ratio and normal calcium levels, but we did discuss the spectrum of MGUS/SMM/MM and that his anemia and kidney decline in setting of paraprotein warrants bone marrow biopsy. Patient is hesitant to proceed with bone marrow biopsy and would like to repeat labs and discuss this again in coming weeks. He is seeing nephrology after our visit to discuss kidney biopsy.     He has had no fevers, chills, CP/SOB, abdominal pain, bone pain, recurrent infections, unintended weight loss, LAD.      He is UTD on colonoscopy, had one earlier this year with 8 benign polyps, plan to repeat in 3 years.  He has ~3 pack year history, smoking 1/2 ppd over the last 3 years, does not warrant lung eval and has no pulmonary complaints.  Denies urinary complaints, has not had prostate exam.     Interval History:     Patient presents for follow-up for his recent bone marrow biopsy performed 12/5/22 which showed 5% plasma cell neoplasm consistent with MGUS. He was updated regarding these findings and the overall picture not consistent with a Heme malignancy or disorder contributing to his CKD. We discussed the overall plan from an Oncology perspective for follow-up of  his bone marrow genetic studies and routine follow-up in 6 months. The majority of our time was spent discussing the potential benefit of a kidney biopsy for further work-up and expressed hesitance with proceeding with this given the associated risks. Ultimately, he will discuss this further with Nephrology prior to making any definitve decisions.       Past Medical History, Social History, and Past Family History are unchanged since last evaluation except for HPI.     CURRENT MEDICATIONS:   Current Outpatient Medications   Medication Sig    amLODIPine (NORVASC) 10 MG tablet Take 1 tablet (10 mg total) by mouth once daily.    atorvastatin (LIPITOR) 20 MG tablet Take 1 tablet by mouth once daily    blood sugar diagnostic Strp To test twice daily    chlorthalidone (HYGROTEN) 25 MG Tab Take 1 tablet (25 mg total) by mouth once daily.    GAVILYTE-G 236-22.74-6.74 -5.86 gram suspension DRINK 4L LIQUID BY MOUTH ONCE FOR 1 DOSE    labetaloL (NORMODYNE) 200 MG tablet Take 1 tablet (200 mg total) by mouth 2 (two) times daily.    lancets Misc 1 lancet by Misc.(Non-Drug; Combo Route) route 2 (two) times daily with meals.    linaGLIPtin (TRADJENTA) 5 mg Tab tablet Take 1 tablet (5 mg total) by mouth once daily.    metFORMIN (GLUCOPHAGE) 1000 MG tablet     pioglitazone (ACTOS) 45 MG tablet Take 1 tablet (45 mg total) by mouth once daily.    triamcinolone acetonide 0.1% (KENALOG) 0.1 % ointment Apply topically 2 (two) times daily.    valsartan (DIOVAN) 80 MG tablet Take 1 tablet (80 mg total) by mouth once daily.    blood-glucose meter kit Use as instructed    fexofenadine (ALLEGRA) 180 MG tablet Take 1 tablet (180 mg total) by mouth once daily.    sildenafiL (VIAGRA) 50 MG tablet Take 1 tablet (50 mg total) by mouth daily as needed for Erectile Dysfunction.     No current facility-administered medications for this visit.       ALLERGIES:   Review of patient's allergies indicates:  No Known Allergies      Review of  Systems:     Review of Systems   Constitutional:  Negative for chills, fever, malaise/fatigue and weight loss.   HENT: Negative.     Eyes: Negative.    Respiratory: Negative.     Cardiovascular: Negative.    Gastrointestinal: Negative.    Genitourinary: Negative.    Musculoskeletal: Negative.    Skin: Negative.    Neurological: Negative.    Endo/Heme/Allergies: Negative.    Psychiatric/Behavioral:  The patient is nervous/anxious.      Physical Exam:     Vitals:    12/12/22 1432   BP: (!) 145/73   Pulse: 75   Resp: 16       Physical Exam  Vitals reviewed.   Constitutional:       General: He is not in acute distress.     Appearance: He is obese.   HENT:      Head: Normocephalic.      Right Ear: External ear normal.      Left Ear: External ear normal.      Nose: Nose normal.      Mouth/Throat:      Mouth: Mucous membranes are moist.      Pharynx: Oropharynx is clear.   Eyes:      Extraocular Movements: Extraocular movements intact.      Pupils: Pupils are equal, round, and reactive to light.   Cardiovascular:      Rate and Rhythm: Normal rate and regular rhythm.      Pulses: Normal pulses.   Pulmonary:      Effort: Pulmonary effort is normal.      Breath sounds: Normal breath sounds.   Abdominal:      General: Abdomen is flat.      Palpations: Abdomen is soft.   Musculoskeletal:         General: No swelling. Normal range of motion.      Cervical back: Neck supple.   Lymphadenopathy:      Cervical: No cervical adenopathy.   Skin:     General: Skin is warm.      Findings: No bruising.   Neurological:      General: No focal deficit present.      Mental Status: He is alert.      Motor: No weakness.   Psychiatric:         Mood and Affect: Mood normal.         Labs:   Lab Results   Component Value Date    WBC 6.41 11/22/2022    HGB 9.9 (L) 11/22/2022    HCT 32.9 (L) 11/22/2022    MCV 96 11/22/2022     11/22/2022       Lab Results   Component Value Date     11/22/2022    K 4.2 11/22/2022     11/22/2022     CO2 25 11/22/2022    BUN 40 (H) 11/22/2022    CREATININE 3.2 (H) 11/22/2022    ALBUMIN 3.6 11/22/2022    BILITOT 0.5 11/22/2022    ALKPHOS 50 (L) 11/22/2022    AST 14 11/22/2022    ALT 11 11/22/2022            Assessment and Plan:   Handy Cook) is a pleasant 67 y.o.male referred for evaluation of paraprotein     Paraproteinemia  - Pt with elevated IgG lambda paraprotein on recent SPEP, in setting of declining Hgb and kidney function. We reviewed at length the spectrum of MGUS/SMM/MM.   - Repeat plasma cell evaluation shows increasing m-spike with Paraprotein peak in near-gamma = 0.84 g/dL(previously, 0.74 g/dL). K/L ratio 1.87. Elevated B2MG 5.8.  - Given paraprotein with normocytic anemia and kidney dysfunction, he warranted bone marrow biopsy for evaluation  - Bone marrow biopsy significant for 5% plasma cell neoplasm consistent with MDS  - Clinical presentation unlikely to be contributing to his progressively worsening CKD, explained in detail with patient to discuss kidney biopsy further with Nephrology  - 24 hour urine studies added to Nephrology labs for 12/16/22          2. Kidney Disease  - Worsening kidney disease over last year in setting of HTN and uncontrolled T2DM, after previously not being seen by medical provider over past few years.  - Ultrasound sound shows medical renal disease  - Established with nephrology who has recommended kidney biopsy, d/w nephrology.   - Patient will follow-up with Nephrology regarding kidney biopsy indications given bone marrow biopsy results    Orders Placed:      Orders Placed This Encounter    Immunofixation, 24 hour Urine    Protein, urine, timed    Protein electrophoresis, timed urine      Orders Placed This Encounter   Procedures    Immunofixation, 24 hour Urine     Standing Status:   Future     Standing Expiration Date:   12/12/2023     Order Specific Question:   Specimen Source     Answer:   Urine    Protein, urine, timed     Standing Status:    Future     Standing Expiration Date:   12/12/2023     Order Specific Question:   Specimen Source     Answer:   Urine     Order Specific Question:   Collection Duration (Number of Hours)     Answer:   24 Hours [24]    Protein electrophoresis, timed urine     Standing Status:   Future     Standing Expiration Date:   12/12/2023     Order Specific Question:   Specimen Source     Answer:   Urine         Follow Up:          BMT Chart Routing      Follow up with physician 6 months. Dr. Grubbs in 6 months   Follow up with [facility]    Provider visit type    Infusion scheduling note    Injection scheduling note    Labs CBC, CMP, free light chains, immunofixation, beta 2 microglobulin, immunoglobulins and SPEP   Lab interval:     Imaging    Pharmacy appointment    Other referrals             Remigio Grubbs D.O.  Hematology/Oncology Fellow, PGY-IV

## 2022-12-16 ENCOUNTER — LAB VISIT (OUTPATIENT)
Dept: LAB | Facility: HOSPITAL | Age: 67
End: 2022-12-16
Attending: NURSE PRACTITIONER
Payer: MEDICARE

## 2022-12-16 DIAGNOSIS — E11.65 UNCONTROLLED TYPE 2 DIABETES MELLITUS WITH HYPERGLYCEMIA: ICD-10-CM

## 2022-12-16 DIAGNOSIS — N18.4 CHRONIC KIDNEY DISEASE, STAGE 4 (SEVERE): ICD-10-CM

## 2022-12-16 LAB
25(OH)D3+25(OH)D2 SERPL-MCNC: 16 NG/ML (ref 30–96)
ALBUMIN SERPL BCP-MCNC: 3.5 G/DL (ref 3.5–5.2)
ALBUMIN SERPL BCP-MCNC: 3.5 G/DL (ref 3.5–5.2)
ALP SERPL-CCNC: 55 U/L (ref 55–135)
ALT SERPL W/O P-5'-P-CCNC: 13 U/L (ref 10–44)
ANION GAP SERPL CALC-SCNC: 11 MMOL/L (ref 8–16)
ANION GAP SERPL CALC-SCNC: 11 MMOL/L (ref 8–16)
AST SERPL-CCNC: 17 U/L (ref 10–40)
BASOPHILS # BLD AUTO: 0.02 K/UL (ref 0–0.2)
BASOPHILS NFR BLD: 0.3 % (ref 0–1.9)
BILIRUB SERPL-MCNC: 0.5 MG/DL (ref 0.1–1)
BUN SERPL-MCNC: 51 MG/DL (ref 8–23)
BUN SERPL-MCNC: 51 MG/DL (ref 8–23)
CALCIUM SERPL-MCNC: 9.6 MG/DL (ref 8.7–10.5)
CALCIUM SERPL-MCNC: 9.6 MG/DL (ref 8.7–10.5)
CHLORIDE SERPL-SCNC: 106 MMOL/L (ref 95–110)
CHLORIDE SERPL-SCNC: 106 MMOL/L (ref 95–110)
CO2 SERPL-SCNC: 23 MMOL/L (ref 23–29)
CO2 SERPL-SCNC: 23 MMOL/L (ref 23–29)
CREAT SERPL-MCNC: 3 MG/DL (ref 0.5–1.4)
CREAT SERPL-MCNC: 3 MG/DL (ref 0.5–1.4)
DIFFERENTIAL METHOD: ABNORMAL
EOSINOPHIL # BLD AUTO: 0.3 K/UL (ref 0–0.5)
EOSINOPHIL NFR BLD: 4 % (ref 0–8)
ERYTHROCYTE [DISTWIDTH] IN BLOOD BY AUTOMATED COUNT: 16.2 % (ref 11.5–14.5)
EST. GFR  (NO RACE VARIABLE): 22.1 ML/MIN/1.73 M^2
EST. GFR  (NO RACE VARIABLE): 22.1 ML/MIN/1.73 M^2
ESTIMATED AVG GLUCOSE: 140 MG/DL (ref 68–131)
GLUCOSE SERPL-MCNC: 127 MG/DL (ref 70–110)
GLUCOSE SERPL-MCNC: 127 MG/DL (ref 70–110)
HBA1C MFR BLD: 6.5 % (ref 4–5.6)
HCT VFR BLD AUTO: 31 % (ref 40–54)
HGB BLD-MCNC: 9.5 G/DL (ref 14–18)
IMM GRANULOCYTES # BLD AUTO: 0.05 K/UL (ref 0–0.04)
IMM GRANULOCYTES NFR BLD AUTO: 0.7 % (ref 0–0.5)
LYMPHOCYTES # BLD AUTO: 1.4 K/UL (ref 1–4.8)
LYMPHOCYTES NFR BLD: 20.1 % (ref 18–48)
MCH RBC QN AUTO: 28.6 PG (ref 27–31)
MCHC RBC AUTO-ENTMCNC: 30.6 G/DL (ref 32–36)
MCV RBC AUTO: 93 FL (ref 82–98)
MONOCYTES # BLD AUTO: 0.7 K/UL (ref 0.3–1)
MONOCYTES NFR BLD: 9.8 % (ref 4–15)
NEUTROPHILS # BLD AUTO: 4.6 K/UL (ref 1.8–7.7)
NEUTROPHILS NFR BLD: 65.1 % (ref 38–73)
NRBC BLD-RTO: 0 /100 WBC
PHOSPHATE SERPL-MCNC: 3.9 MG/DL (ref 2.7–4.5)
PLATELET # BLD AUTO: 274 K/UL (ref 150–450)
PMV BLD AUTO: 12.1 FL (ref 9.2–12.9)
POTASSIUM SERPL-SCNC: 4.5 MMOL/L (ref 3.5–5.1)
POTASSIUM SERPL-SCNC: 4.5 MMOL/L (ref 3.5–5.1)
PROT SERPL-MCNC: 7.5 G/DL (ref 6–8.4)
PTH-INTACT SERPL-MCNC: 190.5 PG/ML (ref 9–77)
RBC # BLD AUTO: 3.32 M/UL (ref 4.6–6.2)
SODIUM SERPL-SCNC: 140 MMOL/L (ref 136–145)
SODIUM SERPL-SCNC: 140 MMOL/L (ref 136–145)
WBC # BLD AUTO: 7.07 K/UL (ref 3.9–12.7)

## 2022-12-16 PROCEDURE — 85025 COMPLETE CBC W/AUTO DIFF WBC: CPT | Performed by: NURSE PRACTITIONER

## 2022-12-16 PROCEDURE — 80053 COMPREHEN METABOLIC PANEL: CPT | Performed by: FAMILY MEDICINE

## 2022-12-16 PROCEDURE — 36415 COLL VENOUS BLD VENIPUNCTURE: CPT | Mod: PO | Performed by: NURSE PRACTITIONER

## 2022-12-16 PROCEDURE — 83970 ASSAY OF PARATHORMONE: CPT | Performed by: NURSE PRACTITIONER

## 2022-12-16 PROCEDURE — 82306 VITAMIN D 25 HYDROXY: CPT | Performed by: NURSE PRACTITIONER

## 2022-12-16 PROCEDURE — 83036 HEMOGLOBIN GLYCOSYLATED A1C: CPT | Performed by: FAMILY MEDICINE

## 2022-12-16 PROCEDURE — 84100 ASSAY OF PHOSPHORUS: CPT | Performed by: NURSE PRACTITIONER

## 2022-12-20 ENCOUNTER — TELEPHONE (OUTPATIENT)
Dept: NEPHROLOGY | Facility: CLINIC | Age: 67
End: 2022-12-20
Payer: MEDICARE

## 2022-12-20 NOTE — TELEPHONE ENCOUNTER
Called pt to cancel/reschedule follow up call scheduled for tomorrow 12/21/22 @ 9am. RN is out sick. Left voicemail with call back number.

## 2022-12-28 ENCOUNTER — OFFICE VISIT (OUTPATIENT)
Dept: NEPHROLOGY | Facility: CLINIC | Age: 67
End: 2022-12-28
Payer: MEDICARE

## 2022-12-28 VITALS
OXYGEN SATURATION: 99 % | DIASTOLIC BLOOD PRESSURE: 80 MMHG | BODY MASS INDEX: 36.13 KG/M2 | WEIGHT: 266.75 LBS | HEART RATE: 75 BPM | HEIGHT: 72 IN | SYSTOLIC BLOOD PRESSURE: 138 MMHG

## 2022-12-28 DIAGNOSIS — D64.9 ANEMIA, UNSPECIFIED TYPE: ICD-10-CM

## 2022-12-28 DIAGNOSIS — D47.2 MGUS (MONOCLONAL GAMMOPATHY OF UNKNOWN SIGNIFICANCE): ICD-10-CM

## 2022-12-28 DIAGNOSIS — E11.22 TYPE 2 DIABETES MELLITUS WITH STAGE 4 CHRONIC KIDNEY DISEASE, UNSPECIFIED WHETHER LONG TERM INSULIN USE: Primary | ICD-10-CM

## 2022-12-28 DIAGNOSIS — R80.9 PROTEINURIA, UNSPECIFIED TYPE: ICD-10-CM

## 2022-12-28 DIAGNOSIS — N18.4 CHRONIC KIDNEY DISEASE, STAGE 4 (SEVERE): ICD-10-CM

## 2022-12-28 DIAGNOSIS — N18.4 TYPE 2 DIABETES MELLITUS WITH STAGE 4 CHRONIC KIDNEY DISEASE, UNSPECIFIED WHETHER LONG TERM INSULIN USE: Primary | ICD-10-CM

## 2022-12-28 DIAGNOSIS — I10 HYPERTENSION, UNSPECIFIED TYPE: ICD-10-CM

## 2022-12-28 PROCEDURE — 4010F PR ACE/ARB THEARPY RXD/TAKEN: ICD-10-PCS | Mod: CPTII,S$GLB,, | Performed by: NURSE PRACTITIONER

## 2022-12-28 PROCEDURE — 1126F AMNT PAIN NOTED NONE PRSNT: CPT | Mod: CPTII,S$GLB,, | Performed by: NURSE PRACTITIONER

## 2022-12-28 PROCEDURE — 3079F PR MOST RECENT DIASTOLIC BLOOD PRESSURE 80-89 MM HG: ICD-10-PCS | Mod: CPTII,S$GLB,, | Performed by: NURSE PRACTITIONER

## 2022-12-28 PROCEDURE — 1101F PR PT FALLS ASSESS DOC 0-1 FALLS W/OUT INJ PAST YR: ICD-10-PCS | Mod: CPTII,S$GLB,, | Performed by: NURSE PRACTITIONER

## 2022-12-28 PROCEDURE — 1159F PR MEDICATION LIST DOCUMENTED IN MEDICAL RECORD: ICD-10-PCS | Mod: CPTII,S$GLB,, | Performed by: NURSE PRACTITIONER

## 2022-12-28 PROCEDURE — 3066F NEPHROPATHY DOC TX: CPT | Mod: CPTII,S$GLB,, | Performed by: NURSE PRACTITIONER

## 2022-12-28 PROCEDURE — 99215 PR OFFICE/OUTPT VISIT, EST, LEVL V, 40-54 MIN: ICD-10-PCS | Mod: S$GLB,,, | Performed by: NURSE PRACTITIONER

## 2022-12-28 PROCEDURE — 99499 RISK ADDL DX/OHS AUDIT: ICD-10-PCS | Mod: S$GLB,,, | Performed by: NURSE PRACTITIONER

## 2022-12-28 PROCEDURE — 3062F PR POS MACROALBUMINURIA RESULT DOCUMENTED/REVIEW: ICD-10-PCS | Mod: CPTII,S$GLB,, | Performed by: NURSE PRACTITIONER

## 2022-12-28 PROCEDURE — 3075F SYST BP GE 130 - 139MM HG: CPT | Mod: CPTII,S$GLB,, | Performed by: NURSE PRACTITIONER

## 2022-12-28 PROCEDURE — 3288F PR FALLS RISK ASSESSMENT DOCUMENTED: ICD-10-PCS | Mod: CPTII,S$GLB,, | Performed by: NURSE PRACTITIONER

## 2022-12-28 PROCEDURE — 3044F HG A1C LEVEL LT 7.0%: CPT | Mod: CPTII,S$GLB,, | Performed by: NURSE PRACTITIONER

## 2022-12-28 PROCEDURE — 3079F DIAST BP 80-89 MM HG: CPT | Mod: CPTII,S$GLB,, | Performed by: NURSE PRACTITIONER

## 2022-12-28 PROCEDURE — 1126F PR PAIN SEVERITY QUANTIFIED, NO PAIN PRESENT: ICD-10-PCS | Mod: CPTII,S$GLB,, | Performed by: NURSE PRACTITIONER

## 2022-12-28 PROCEDURE — 3044F PR MOST RECENT HEMOGLOBIN A1C LEVEL <7.0%: ICD-10-PCS | Mod: CPTII,S$GLB,, | Performed by: NURSE PRACTITIONER

## 2022-12-28 PROCEDURE — 3008F PR BODY MASS INDEX (BMI) DOCUMENTED: ICD-10-PCS | Mod: CPTII,S$GLB,, | Performed by: NURSE PRACTITIONER

## 2022-12-28 PROCEDURE — 1160F PR REVIEW ALL MEDS BY PRESCRIBER/CLIN PHARMACIST DOCUMENTED: ICD-10-PCS | Mod: CPTII,S$GLB,, | Performed by: NURSE PRACTITIONER

## 2022-12-28 PROCEDURE — 99499 UNLISTED E&M SERVICE: CPT | Mod: S$GLB,,, | Performed by: NURSE PRACTITIONER

## 2022-12-28 PROCEDURE — 3066F PR DOCUMENTATION OF TREATMENT FOR NEPHROPATHY: ICD-10-PCS | Mod: CPTII,S$GLB,, | Performed by: NURSE PRACTITIONER

## 2022-12-28 PROCEDURE — 1159F MED LIST DOCD IN RCRD: CPT | Mod: CPTII,S$GLB,, | Performed by: NURSE PRACTITIONER

## 2022-12-28 PROCEDURE — 3075F PR MOST RECENT SYSTOLIC BLOOD PRESS GE 130-139MM HG: ICD-10-PCS | Mod: CPTII,S$GLB,, | Performed by: NURSE PRACTITIONER

## 2022-12-28 PROCEDURE — 99215 OFFICE O/P EST HI 40 MIN: CPT | Mod: S$GLB,,, | Performed by: NURSE PRACTITIONER

## 2022-12-28 PROCEDURE — 3288F FALL RISK ASSESSMENT DOCD: CPT | Mod: CPTII,S$GLB,, | Performed by: NURSE PRACTITIONER

## 2022-12-28 PROCEDURE — 3062F POS MACROALBUMINURIA REV: CPT | Mod: CPTII,S$GLB,, | Performed by: NURSE PRACTITIONER

## 2022-12-28 PROCEDURE — 99999 PR PBB SHADOW E&M-EST. PATIENT-LVL IV: ICD-10-PCS | Mod: PBBFAC,,, | Performed by: NURSE PRACTITIONER

## 2022-12-28 PROCEDURE — 4010F ACE/ARB THERAPY RXD/TAKEN: CPT | Mod: CPTII,S$GLB,, | Performed by: NURSE PRACTITIONER

## 2022-12-28 PROCEDURE — 1101F PT FALLS ASSESS-DOCD LE1/YR: CPT | Mod: CPTII,S$GLB,, | Performed by: NURSE PRACTITIONER

## 2022-12-28 PROCEDURE — 1160F RVW MEDS BY RX/DR IN RCRD: CPT | Mod: CPTII,S$GLB,, | Performed by: NURSE PRACTITIONER

## 2022-12-28 PROCEDURE — 3008F BODY MASS INDEX DOCD: CPT | Mod: CPTII,S$GLB,, | Performed by: NURSE PRACTITIONER

## 2022-12-28 PROCEDURE — 99999 PR PBB SHADOW E&M-EST. PATIENT-LVL IV: CPT | Mod: PBBFAC,,, | Performed by: NURSE PRACTITIONER

## 2022-12-28 RX ORDER — VALSARTAN 160 MG/1
160 TABLET ORAL DAILY
Qty: 90 TABLET | Refills: 3 | Status: SHIPPED | OUTPATIENT
Start: 2022-12-28 | End: 2023-02-16

## 2022-12-28 RX ORDER — FERROUS SULFATE 325(65) MG
325 TABLET, DELAYED RELEASE (ENTERIC COATED) ORAL 2 TIMES DAILY
Qty: 180 TABLET | Refills: 3 | Status: SHIPPED | OUTPATIENT
Start: 2022-12-28 | End: 2023-12-23

## 2022-12-28 NOTE — PROGRESS NOTES
"Subjective:       Patient ID: Handy Adams is a 67 y.o. A male who presents for new evaluation of of renal dysfunction.      HPI     Patient is new to me. New to clinic.  Prior pertinent chart reviewed since this is patient's first appointment with me.    Patient presents for new evaluation of renal dysfunction.  Baseline creatinine of 1.6-1.7 in 2020-early 2022. Recently had sCr of 2.5. Patient said he has "not been taking care of himself" and "eating more sugar" since January.    Per recent note from PCP: He cannot afford farxiga due to increased price and had stopped taking it for a few months prior to appt in September.    Home BPs: does not take    Rare Excedrin use now, though he used to use it frequently.    Significant other medical problems include HTN since at least 2007, T2DM since at least 2007.      The patient denies taking herbal supplements, or new antibiotics, recreational drugs, recent episode of dehydration, diarrhea, nausea or vomiting, acute illness, hospitalization or exposure to IV radiocontrast.     Significant family hx includes: No known kidney issues    Last renal US: none in EMR    Update 10/24/22:  Presents for f/u of CKD, YESENIA.  Last seen a month ago.  Feet are swelling again, especially when drinking ensure. Has improved since stopping Ensure.  Holding lisinopril-Hctz. Taking labetolol 200 mg instead.  Found to have an IgG lambda specific monoclonal band during proteinuria workup. Referred to hematology.    Recent sCr 2.5--> 2.8-2.9.     Home BPs: does not believe cuff is accurate.    Update 12/28/22:  Returns for f/u of CKD.  sCr has been 2.8-3.0 mg/dL.  Home BPs: not taking because he thinks cuff is inaccurate    Patient had bone marrow biopsy and was diagnosed with IgG-lambda MGUS.  An IgG lambda specific monoclonal protein was identified on 24 hour urine on 12/16/22.  He has not gotten kidney biopsy.    Says he feels great.    Review of Systems    Objective:       Blood pressure " 138/80, pulse 75, height 6' (1.829 m), weight 121 kg (266 lb 12.1 oz), SpO2 99 %.  Physical Exam  Constitutional:       Appearance: Normal appearance. He is obese.   Cardiovascular:      Rate and Rhythm: Normal rate.   Pulmonary:      Effort: No respiratory distress.   Neurological:      Mental Status: He is alert.   Psychiatric:         Mood and Affect: Mood normal.         Behavior: Behavior normal.         Thought Content: Thought content normal.         Judgment: Judgment normal.         Lab Results   Component Value Date    CREATININE 3.0 (H) 12/16/2022    CREATININE 3.0 (H) 12/16/2022     Prot/Creat Ratio, Urine   Date Value Ref Range Status   12/16/2022 3.30 (H) 0.00 - 0.20 Final   09/19/2022 5.55 (H) 0.00 - 0.20 Final     Lab Results   Component Value Date     12/16/2022     12/16/2022    K 4.5 12/16/2022    K 4.5 12/16/2022    CO2 23 12/16/2022    CO2 23 12/16/2022     12/16/2022     12/16/2022     Lab Results   Component Value Date    .5 (H) 12/16/2022    CALCIUM 9.6 12/16/2022    CALCIUM 9.6 12/16/2022    PHOS 3.9 12/16/2022     Lab Results   Component Value Date    HGB 9.5 (L) 12/16/2022    WBC 7.07 12/16/2022    HCT 31.0 (L) 12/16/2022      Lab Results   Component Value Date    HGBA1C 6.5 (H) 12/16/2022     12/16/2022    BUN 51 (H) 12/16/2022    BUN 51 (H) 12/16/2022     Lab Results   Component Value Date    LDLCALC 94.4 09/07/2022         Assessment:       1. Type 2 diabetes mellitus with stage 4 chronic kidney disease, unspecified whether long term insulin use    2. Chronic kidney disease, stage 4 (severe)    3. Proteinuria, unspecified type    4. MGUS (monoclonal gammopathy of unknown significance)    5. Anemia, unspecified type    6. Hypertension, unspecified type              Plan:   CKD stage 4 c eGFR 22-24 mL/min - Underlying CKD clinically 2/2 diabetes; however, it is unclear if there is an MGRS component. Kidney biopsy recommended. He is still deciding if  he wants to have a kidney biopsy or not. Discussed risks and benefits of biopsy, including risk of continued progression if MGRS is untreated.. Had significant progression this year, but he has remained stable since October.  He wants to recheck labs in a month to help decide if he wants biopsy.     Educated patient to control BP, BG, remain well-hydrated, and avoid NSAIDs to prevent progression of CKD. High risk of progression to ESRD.        UPCR Significant albuminuria since 2011 with increase to almost nephrotic range proteinuria in January. Needs repeat. Was on farxiga but could not afford it. Holding ACEi in setting of YESENIA.    IgG lambda specific monoclonal band noted on proteinuria workup and UPEP.   Acid-base Mild acidosis in setting of YESENIA.   Renal osteodystrophy Ca, phos okay.   Anemia Hgb low. TSAT low in Oct. Start PO iron.   DM Poorly-controlled. Has had DM since at least 2007, when he had an A1c of 15+.   Lipid Management On statin.   ESRD planning Anticipatory guidance provided about timing of dialysis. Start discussions and planning when eGFR is about 20 mL/min; most patients start dialysis between 5-10 mL/min.    Refer to intro and adv. CKD classes.       HTN - high on labetalol 200 mg, amlodipine 10 mg, chlorthalidone 25 mg, valsartan 80 mg  - Goal is SBP less than 130/80  - increase valsartan to 160 mg    MGUS - per hem/onc    All questions patient had were answered.  Asked if further questions. None. F/u in clinic in 1 mos  with labs and urine prior to next visit or sooner if needed.  ER for emergency concerns.    Summary of Plan:  Increase valsartan to 160 mg  BMP in 2-4 weeks  Start PO iron  avoid NSAID/ bactrim/ IV contrast/ gadolinium/ aminoglycoside where possible  RTC in 1 mos    58 minutes of total time spent on the encounter, which includes face to face time and non-face to face time preparing to see the patient (eg, review of tests), Obtaining and/or reviewing separately obtained  history, documenting clinical information in the electronic or other health record, independently interpreting results (not separately reported) and communicating results to the patient/family/caregiver, or Care coordination (not separately reported).

## 2022-12-28 NOTE — PATIENT INSTRUCTIONS
[email or web address]    Increase valsartan to 160 mg. Take two pills until you finish your current supply. Then take one 160 mg when you  the new prescription.    Start iron pills. Start with one a day. If you can tolerate it, increase to two per day. Let me know if it causes too much constipation.

## 2023-01-06 ENCOUNTER — PROCEDURE VISIT (OUTPATIENT)
Dept: OPHTHALMOLOGY | Facility: CLINIC | Age: 68
End: 2023-01-06
Attending: OPHTHALMOLOGY
Payer: MEDICARE

## 2023-01-06 DIAGNOSIS — E11.3513 TYPE 2 DIABETES MELLITUS WITH BOTH EYES AFFECTED BY PROLIFERATIVE RETINOPATHY AND MACULAR EDEMA, WITH LONG-TERM CURRENT USE OF INSULIN: Primary | ICD-10-CM

## 2023-01-06 DIAGNOSIS — Z79.4 TYPE 2 DIABETES MELLITUS WITH BOTH EYES AFFECTED BY PROLIFERATIVE RETINOPATHY AND MACULAR EDEMA, WITH LONG-TERM CURRENT USE OF INSULIN: Primary | ICD-10-CM

## 2023-01-06 DIAGNOSIS — H35.033 HYPERTENSIVE RETINOPATHY, BILATERAL: ICD-10-CM

## 2023-01-06 PROCEDURE — 92134 CPTRZ OPH DX IMG PST SGM RTA: CPT | Mod: S$GLB,,, | Performed by: OPHTHALMOLOGY

## 2023-01-06 PROCEDURE — 92012 PR EYE EXAM, EST PATIENT,INTERMED: ICD-10-PCS | Mod: 25,S$GLB,, | Performed by: OPHTHALMOLOGY

## 2023-01-06 PROCEDURE — 99499 UNLISTED E&M SERVICE: CPT | Mod: S$GLB,,, | Performed by: OPHTHALMOLOGY

## 2023-01-06 PROCEDURE — 92012 INTRM OPH EXAM EST PATIENT: CPT | Mod: 25,S$GLB,, | Performed by: OPHTHALMOLOGY

## 2023-01-06 PROCEDURE — 67028 INJECTION EYE DRUG: CPT | Mod: RT,S$GLB,, | Performed by: OPHTHALMOLOGY

## 2023-01-06 PROCEDURE — 67028 PR INJECT INTRAVITREAL PHARMCOLOGIC: ICD-10-PCS | Mod: RT,S$GLB,, | Performed by: OPHTHALMOLOGY

## 2023-01-06 PROCEDURE — 99499 RISK ADDL DX/OHS AUDIT: ICD-10-PCS | Mod: S$GLB,,, | Performed by: OPHTHALMOLOGY

## 2023-01-06 PROCEDURE — 92134 POSTERIOR SEGMENT OCT RETINA (OCULAR COHERENCE TOMOGRAPHY)-BOTH EYES: ICD-10-PCS | Mod: S$GLB,,, | Performed by: OPHTHALMOLOGY

## 2023-01-06 RX ADMIN — Medication 1.25 MG: at 10:01

## 2023-01-06 NOTE — PROGRESS NOTES
HPI    Patient here today for 1 mo Avastin f/u OD. Patient here today with concerns about the Ozurdex injection. Patient reports pain and light sensitivity post Ozurdex. Patient did not have any reaction to Avastin and would like to discuss concerns with treatment options.    Last edited by Lorraine Warren on 1/6/2023  9:12 AM.          OCT   OD: Good quality. Normal foveal contour  Superior IRF, exudates in parafovea. , decreased in central SRF  OS: Good quality. Normal foveal contour without IRF, SRF. Some small noncentral exudates    Prior WFFA  OD - normal transit time. Hyperfluorescence early c/w Ma/edema - sig NP    OS - Hyperfluorescence early c/w Ma/edema  .NV nasal      A/P    1. 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 DME OU   6/19 - pt did not FU until 8/20  S/p Avastin OD x 2  S/p Ozurdex OD x 1 9/20 11/22 - not seen x 2 years    Avastin OD today    Approved for Ozurdex - pt has reservations about Ozurdex injections.  Have had several lengthy discussions about treatment paradigm.  Advised second opinion with Dr. Suh to ease concerns.    3. HTN Ret OU  BS/BP/chol control    4. NS OU  Monitor - good Va    5. Cyst RLL  Removed by Rhea  Happy with result        1 month OCT no dilate    Risks, benefits, and alternatives to treatment discussed in detail with the patient.  The patient voiced understanding and wished to proceed with the procedure    Injection Procedure Note:  Diagnosis: DME OD    Patient Identified and Time Out complete  Pt marked  Topical Proparacaine and Betadine.  Inject Avastin OD at 6:00 @ 3.5-4mm posterior to limbus  Post Operative Dx: Same  Complications: None  Follow up as above.

## 2023-01-27 ENCOUNTER — LAB VISIT (OUTPATIENT)
Dept: LAB | Facility: HOSPITAL | Age: 68
End: 2023-01-27
Attending: NURSE PRACTITIONER
Payer: MEDICARE

## 2023-01-27 DIAGNOSIS — R80.9 PROTEINURIA, UNSPECIFIED TYPE: ICD-10-CM

## 2023-01-27 DIAGNOSIS — N18.4 CHRONIC KIDNEY DISEASE, STAGE 4 (SEVERE): ICD-10-CM

## 2023-01-27 LAB
ANION GAP SERPL CALC-SCNC: 11 MMOL/L (ref 8–16)
BUN SERPL-MCNC: 52 MG/DL (ref 8–23)
CALCIUM SERPL-MCNC: 9.3 MG/DL (ref 8.7–10.5)
CHLORIDE SERPL-SCNC: 107 MMOL/L (ref 95–110)
CO2 SERPL-SCNC: 22 MMOL/L (ref 23–29)
CREAT SERPL-MCNC: 3.3 MG/DL (ref 0.5–1.4)
EST. GFR  (NO RACE VARIABLE): 19.7 ML/MIN/1.73 M^2
GLUCOSE SERPL-MCNC: 117 MG/DL (ref 70–110)
POTASSIUM SERPL-SCNC: 4 MMOL/L (ref 3.5–5.1)
SODIUM SERPL-SCNC: 140 MMOL/L (ref 136–145)

## 2023-01-27 PROCEDURE — 36415 COLL VENOUS BLD VENIPUNCTURE: CPT | Mod: PO | Performed by: NURSE PRACTITIONER

## 2023-01-27 PROCEDURE — 80048 BASIC METABOLIC PNL TOTAL CA: CPT | Performed by: NURSE PRACTITIONER

## 2023-02-03 ENCOUNTER — PATIENT MESSAGE (OUTPATIENT)
Dept: NEPHROLOGY | Facility: CLINIC | Age: 68
End: 2023-02-03

## 2023-02-03 ENCOUNTER — OFFICE VISIT (OUTPATIENT)
Dept: NEPHROLOGY | Facility: CLINIC | Age: 68
End: 2023-02-03
Payer: MEDICARE

## 2023-02-03 ENCOUNTER — TELEPHONE (OUTPATIENT)
Dept: NEPHROLOGY | Facility: CLINIC | Age: 68
End: 2023-02-03

## 2023-02-03 VITALS
OXYGEN SATURATION: 99 % | DIASTOLIC BLOOD PRESSURE: 80 MMHG | SYSTOLIC BLOOD PRESSURE: 122 MMHG | BODY MASS INDEX: 36.08 KG/M2 | WEIGHT: 266 LBS | HEART RATE: 69 BPM

## 2023-02-03 DIAGNOSIS — D64.9 ANEMIA, UNSPECIFIED TYPE: ICD-10-CM

## 2023-02-03 DIAGNOSIS — N25.81 SECONDARY HYPERPARATHYROIDISM: ICD-10-CM

## 2023-02-03 DIAGNOSIS — R79.89 LOW VITAMIN D LEVEL: ICD-10-CM

## 2023-02-03 DIAGNOSIS — R80.9 PROTEINURIA, UNSPECIFIED TYPE: ICD-10-CM

## 2023-02-03 DIAGNOSIS — D47.2 MGUS (MONOCLONAL GAMMOPATHY OF UNKNOWN SIGNIFICANCE): ICD-10-CM

## 2023-02-03 DIAGNOSIS — N18.4 CHRONIC KIDNEY DISEASE, STAGE 4 (SEVERE): Primary | ICD-10-CM

## 2023-02-03 DIAGNOSIS — N18.4 TYPE 2 DIABETES MELLITUS WITH STAGE 4 CHRONIC KIDNEY DISEASE, UNSPECIFIED WHETHER LONG TERM INSULIN USE: ICD-10-CM

## 2023-02-03 DIAGNOSIS — E11.22 TYPE 2 DIABETES MELLITUS WITH STAGE 4 CHRONIC KIDNEY DISEASE, UNSPECIFIED WHETHER LONG TERM INSULIN USE: ICD-10-CM

## 2023-02-03 PROCEDURE — 1159F PR MEDICATION LIST DOCUMENTED IN MEDICAL RECORD: ICD-10-PCS | Mod: CPTII,S$GLB,, | Performed by: NURSE PRACTITIONER

## 2023-02-03 PROCEDURE — 3066F PR DOCUMENTATION OF TREATMENT FOR NEPHROPATHY: ICD-10-PCS | Mod: CPTII,S$GLB,, | Performed by: NURSE PRACTITIONER

## 2023-02-03 PROCEDURE — 3288F FALL RISK ASSESSMENT DOCD: CPT | Mod: CPTII,S$GLB,, | Performed by: NURSE PRACTITIONER

## 2023-02-03 PROCEDURE — 1126F PR PAIN SEVERITY QUANTIFIED, NO PAIN PRESENT: ICD-10-PCS | Mod: CPTII,S$GLB,, | Performed by: NURSE PRACTITIONER

## 2023-02-03 PROCEDURE — 3008F BODY MASS INDEX DOCD: CPT | Mod: CPTII,S$GLB,, | Performed by: NURSE PRACTITIONER

## 2023-02-03 PROCEDURE — 3079F PR MOST RECENT DIASTOLIC BLOOD PRESSURE 80-89 MM HG: ICD-10-PCS | Mod: CPTII,S$GLB,, | Performed by: NURSE PRACTITIONER

## 2023-02-03 PROCEDURE — 1101F PT FALLS ASSESS-DOCD LE1/YR: CPT | Mod: CPTII,S$GLB,, | Performed by: NURSE PRACTITIONER

## 2023-02-03 PROCEDURE — 99999 PR PBB SHADOW E&M-EST. PATIENT-LVL IV: ICD-10-PCS | Mod: PBBFAC,,, | Performed by: NURSE PRACTITIONER

## 2023-02-03 PROCEDURE — 3074F SYST BP LT 130 MM HG: CPT | Mod: CPTII,S$GLB,, | Performed by: NURSE PRACTITIONER

## 2023-02-03 PROCEDURE — 1160F RVW MEDS BY RX/DR IN RCRD: CPT | Mod: CPTII,S$GLB,, | Performed by: NURSE PRACTITIONER

## 2023-02-03 PROCEDURE — 3079F DIAST BP 80-89 MM HG: CPT | Mod: CPTII,S$GLB,, | Performed by: NURSE PRACTITIONER

## 2023-02-03 PROCEDURE — 1159F MED LIST DOCD IN RCRD: CPT | Mod: CPTII,S$GLB,, | Performed by: NURSE PRACTITIONER

## 2023-02-03 PROCEDURE — 99499 RISK ADDL DX/OHS AUDIT: ICD-10-PCS | Mod: S$GLB,,, | Performed by: NURSE PRACTITIONER

## 2023-02-03 PROCEDURE — 1160F PR REVIEW ALL MEDS BY PRESCRIBER/CLIN PHARMACIST DOCUMENTED: ICD-10-PCS | Mod: CPTII,S$GLB,, | Performed by: NURSE PRACTITIONER

## 2023-02-03 PROCEDURE — 99215 OFFICE O/P EST HI 40 MIN: CPT | Mod: S$GLB,,, | Performed by: NURSE PRACTITIONER

## 2023-02-03 PROCEDURE — 99499 UNLISTED E&M SERVICE: CPT | Mod: S$GLB,,, | Performed by: NURSE PRACTITIONER

## 2023-02-03 PROCEDURE — 4010F ACE/ARB THERAPY RXD/TAKEN: CPT | Mod: CPTII,S$GLB,, | Performed by: NURSE PRACTITIONER

## 2023-02-03 PROCEDURE — 3074F PR MOST RECENT SYSTOLIC BLOOD PRESSURE < 130 MM HG: ICD-10-PCS | Mod: CPTII,S$GLB,, | Performed by: NURSE PRACTITIONER

## 2023-02-03 PROCEDURE — 99215 PR OFFICE/OUTPT VISIT, EST, LEVL V, 40-54 MIN: ICD-10-PCS | Mod: S$GLB,,, | Performed by: NURSE PRACTITIONER

## 2023-02-03 PROCEDURE — 4010F PR ACE/ARB THEARPY RXD/TAKEN: ICD-10-PCS | Mod: CPTII,S$GLB,, | Performed by: NURSE PRACTITIONER

## 2023-02-03 PROCEDURE — 3288F PR FALLS RISK ASSESSMENT DOCUMENTED: ICD-10-PCS | Mod: CPTII,S$GLB,, | Performed by: NURSE PRACTITIONER

## 2023-02-03 PROCEDURE — 3008F PR BODY MASS INDEX (BMI) DOCUMENTED: ICD-10-PCS | Mod: CPTII,S$GLB,, | Performed by: NURSE PRACTITIONER

## 2023-02-03 PROCEDURE — 3066F NEPHROPATHY DOC TX: CPT | Mod: CPTII,S$GLB,, | Performed by: NURSE PRACTITIONER

## 2023-02-03 PROCEDURE — 1126F AMNT PAIN NOTED NONE PRSNT: CPT | Mod: CPTII,S$GLB,, | Performed by: NURSE PRACTITIONER

## 2023-02-03 PROCEDURE — 1101F PR PT FALLS ASSESS DOC 0-1 FALLS W/OUT INJ PAST YR: ICD-10-PCS | Mod: CPTII,S$GLB,, | Performed by: NURSE PRACTITIONER

## 2023-02-03 PROCEDURE — 99999 PR PBB SHADOW E&M-EST. PATIENT-LVL IV: CPT | Mod: PBBFAC,,, | Performed by: NURSE PRACTITIONER

## 2023-02-03 RX ORDER — ERGOCALCIFEROL 1.25 MG/1
50000 CAPSULE ORAL
Qty: 12 CAPSULE | Refills: 3 | Status: SHIPPED | OUTPATIENT
Start: 2023-02-03 | End: 2024-01-29

## 2023-02-03 NOTE — TELEPHONE ENCOUNTER
colton biopsy  Received: Today  MD Raven Alegre, LISA; Cait Olivera, RN  Dear Jim Carbajal let the patient send use his BP readings three times daily for 3-4 days.     Will follow on the labs and home BP and will determine to proceed.     Regards,     Nikky Soriano.             Previous Messages

## 2023-02-03 NOTE — PROGRESS NOTES
"Subjective:       Patient ID: Handy Adams is a 67 y.o. A male who presents for new evaluation of of renal dysfunction.      HPI     Patient is new to me. New to clinic.  Prior pertinent chart reviewed since this is patient's first appointment with me.    Patient presents for new evaluation of renal dysfunction.  Baseline creatinine of 1.6-1.7 in 2020-early 2022. Recently had sCr of 2.5. Patient said he has "not been taking care of himself" and "eating more sugar" since January.    Per recent note from PCP: He cannot afford farxiga due to increased price and had stopped taking it for a few months prior to appt in September.    Home BPs: does not take    Rare Excedrin use now, though he used to use it frequently.    Significant other medical problems include HTN since at least 2007, T2DM since at least 2007.      The patient denies taking herbal supplements, or new antibiotics, recreational drugs, recent episode of dehydration, diarrhea, nausea or vomiting, acute illness, hospitalization or exposure to IV radiocontrast.     Significant family hx includes: No known kidney issues    Last renal US: none in EMR    Update 10/24/22:  Presents for f/u of CKD, YESENIA.  Last seen a month ago.  Feet are swelling again, especially when drinking ensure. Has improved since stopping Ensure.  Holding lisinopril-Hctz. Taking labetolol 200 mg instead.  Found to have an IgG lambda specific monoclonal band during proteinuria workup. Referred to hematology.    Recent sCr 2.5--> 2.8-2.9.     Home BPs: does not believe cuff is accurate.    Update 12/28/22:  Returns for f/u of CKD.  sCr has been 2.8-3.0 mg/dL.  Home BPs: not taking because he thinks cuff is inaccurate    Patient had bone marrow biopsy and was diagnosed with IgG-lambda MGUS.  An IgG lambda specific monoclonal protein was identified on 24 hour urine on 12/16/22.  He has not gotten kidney biopsy.    Says he feels great.    Update 2/3/23:  Returns for f/u of CKD and discussion " about biopsy.  sCr now 3.0-3.2 mg/dL. Most recent sCr 3.3.  Increased valsartan to 160 mg at last visit.    Review of Systems   Constitutional:         Says he has been feeling good.   Gastrointestinal:  Negative for constipation.     Objective:       Blood pressure 122/80, pulse 69, weight 120.7 kg (266 lb), SpO2 99 %.  Physical Exam  Constitutional:       Appearance: Normal appearance. He is obese.   Cardiovascular:      Rate and Rhythm: Normal rate.   Pulmonary:      Effort: No respiratory distress.   Neurological:      Mental Status: He is alert.   Psychiatric:         Mood and Affect: Mood normal.         Behavior: Behavior normal.         Thought Content: Thought content normal.         Judgment: Judgment normal.         Lab Results   Component Value Date    CREATININE 3.3 (H) 01/27/2023     Prot/Creat Ratio, Urine   Date Value Ref Range Status   01/27/2023 2.27 (H) 0.00 - 0.20 Final   12/16/2022 3.30 (H) 0.00 - 0.20 Final   09/19/2022 5.55 (H) 0.00 - 0.20 Final     Lab Results   Component Value Date     01/27/2023    K 4.0 01/27/2023    CO2 22 (L) 01/27/2023     01/27/2023     Lab Results   Component Value Date    .5 (H) 12/16/2022    CALCIUM 9.3 01/27/2023    PHOS 3.9 12/16/2022     Lab Results   Component Value Date    HGB 9.5 (L) 12/16/2022    WBC 7.07 12/16/2022    HCT 31.0 (L) 12/16/2022      Lab Results   Component Value Date    HGBA1C 6.5 (H) 12/16/2022     12/16/2022    BUN 52 (H) 01/27/2023     Lab Results   Component Value Date    LDLCALC 94.4 09/07/2022         Assessment:       1. Chronic kidney disease, stage 4 (severe)    2. Proteinuria, unspecified type    3. Type 2 diabetes mellitus with stage 4 chronic kidney disease, unspecified whether long term insulin use    4. MGUS (monoclonal gammopathy of unknown significance)    5. Anemia, unspecified type    6. Secondary hyperparathyroidism    7. Low vitamin D level                Plan:   CKD stage 4 c eGFR 22-24 mL/min -  Underlying CKD clinically 2/2 diabetes; however, it is unclear if there is an MGRS component. Kidney biopsy recommended again. He is still deciding if he wants to have a kidney biopsy or not. Discussed risks and benefits of biopsy, including risk of continued progression if MGRS is untreated. Had significant progression in the last year. He agreed to schedule biopsy.  He is reluctant but aware that finding MGRS may allow additional treatment to be given.  He would like to check BMP next week to ensure there is not significant improvement.      Educated patient to control BP, BG, remain well-hydrated, and avoid NSAIDs to prevent progression of CKD. High risk of progression to ESRD.      UPCR Significant albuminuria since 2011 with increase to almost nephrotic range proteinuria in January. Needs repeat. Was on farxiga but could not afford it; renal function is too low to start on recent labs. On ARB    IgG lambda specific monoclonal band noted on proteinuria workup and UPEP.   Acid-base Mild acidosis x 1.   Renal osteodystrophy Ca, phos okay. Low vit D. Elevated PTH.   Anemia Hgb low last check. TSAT low in Oct. On PO iron.   DM Well-controlled recently. Has had DM since at least 2007, when he had an A1c of 15+.   Lipid Management On statin.   ESRD planning Anticipatory guidance provided about timing of dialysis. Start discussions and planning when eGFR is about 20 mL/min; most patients start dialysis between 5-10 mL/min.    Attended ESRD treatment choices, but is still unsure about what treatment he would want.  Referred to transplant today.       HTN - WNL on labetalol 200 mg, amlodipine 10 mg, chlorthalidone 25 mg, valsartan 160 mg  - Goal is SBP less than 130/80    MGUS - per hem/onc    All questions patient had were answered.  Asked if further questions. None. F/u in clinic in 6-8 weeks with labs and urine prior to next visit or sooner if needed.  ER for emergency concerns.    Summary of Plan:  Start  ergocalciferol 50,000 IU  Schedule kidney biopsy  BMP in 1 week  Refer to transplant  RTC in 6-8 weeks (May need MD depending on biopsy result)    47 minutes of total time spent on the encounter, which includes face to face time and non-face to face time preparing to see the patient (eg, review of tests), Obtaining and/or reviewing separately obtained history, documenting clinical information in the electronic or other health record, independently interpreting results (not separately reported) and communicating results to the patient/family/caregiver, or Care coordination (not separately reported).

## 2023-02-03 NOTE — Clinical Note
Hi Dr. Soriano, You saw this pt for pre-biopsy evaluation in November. He was uncertain at that time if he wanted to proceed. I've since increased his ARB and gotten BP under tight control.  I explained rationale for biopsy again today (concern about MGRS). He agreed. He wants to get BMP one more time next week to make sure there is not drastic improvement in sCr (unlikely). I told him I'd reach out to you to see if you can still biopsy him.  Thanks, Raven

## 2023-02-07 ENCOUNTER — LAB VISIT (OUTPATIENT)
Dept: LAB | Facility: HOSPITAL | Age: 68
End: 2023-02-07
Attending: NURSE PRACTITIONER
Payer: MEDICARE

## 2023-02-07 ENCOUNTER — PATIENT MESSAGE (OUTPATIENT)
Dept: ADMINISTRATIVE | Facility: OTHER | Age: 68
End: 2023-02-07
Payer: MEDICARE

## 2023-02-07 ENCOUNTER — CLINICAL SUPPORT (OUTPATIENT)
Dept: FAMILY MEDICINE | Facility: CLINIC | Age: 68
End: 2023-02-07
Payer: MEDICARE

## 2023-02-07 VITALS — SYSTOLIC BLOOD PRESSURE: 136 MMHG | DIASTOLIC BLOOD PRESSURE: 84 MMHG

## 2023-02-07 DIAGNOSIS — Z00.00 ENCOUNTER FOR MEDICARE ANNUAL WELLNESS EXAM: ICD-10-CM

## 2023-02-07 DIAGNOSIS — E78.1 HYPERTRIGLYCERIDEMIA: ICD-10-CM

## 2023-02-07 DIAGNOSIS — E11.65 TYPE 2 DIABETES MELLITUS WITH HYPERGLYCEMIA, WITHOUT LONG-TERM CURRENT USE OF INSULIN: ICD-10-CM

## 2023-02-07 DIAGNOSIS — I10 ESSENTIAL HYPERTENSION: Primary | ICD-10-CM

## 2023-02-07 LAB
CHOLEST SERPL-MCNC: 136 MG/DL (ref 120–199)
CHOLEST/HDLC SERPL: 4.3 {RATIO} (ref 2–5)
ESTIMATED AVG GLUCOSE: 137 MG/DL (ref 68–131)
HBA1C MFR BLD: 6.4 % (ref 4–5.6)
HDLC SERPL-MCNC: 32 MG/DL (ref 40–75)
HDLC SERPL: 23.5 % (ref 20–50)
LDLC SERPL CALC-MCNC: 81.6 MG/DL (ref 63–159)
NONHDLC SERPL-MCNC: 104 MG/DL
TRIGL SERPL-MCNC: 112 MG/DL (ref 30–150)

## 2023-02-07 PROCEDURE — 80061 LIPID PANEL: CPT | Performed by: NURSE PRACTITIONER

## 2023-02-07 PROCEDURE — 36415 COLL VENOUS BLD VENIPUNCTURE: CPT | Mod: PO | Performed by: NURSE PRACTITIONER

## 2023-02-07 PROCEDURE — 83036 HEMOGLOBIN GLYCOSYLATED A1C: CPT | Performed by: NURSE PRACTITIONER

## 2023-02-07 NOTE — PROGRESS NOTES
Handy Adams 67 y.o. male is here today for Blood Pressure check.   History of HTN yes.    Review of patient's allergies indicates:  No Known Allergies  Creatinine   Date Value Ref Range Status   01/27/2023 3.3 (H) 0.5 - 1.4 mg/dL Final     Sodium   Date Value Ref Range Status   01/27/2023 140 136 - 145 mmol/L Final     Potassium   Date Value Ref Range Status   01/27/2023 4.0 3.5 - 5.1 mmol/L Final   ]  Patient verifies taking blood pressure medications on a regular basis at the same time of the day.     Current Outpatient Medications:     amLODIPine (NORVASC) 10 MG tablet, Take 1 tablet (10 mg total) by mouth once daily., Disp: 90 tablet, Rfl: 1    atorvastatin (LIPITOR) 20 MG tablet, Take 1 tablet by mouth once daily, Disp: 90 tablet, Rfl: 1    blood sugar diagnostic Strp, To test twice daily, Disp: 100 each, Rfl: 3    blood-glucose meter kit, Use as instructed, Disp: 1 each, Rfl: 0    chlorthalidone (HYGROTEN) 25 MG Tab, Take 1 tablet (25 mg total) by mouth once daily., Disp: 90 tablet, Rfl: 3    ergocalciferol (ERGOCALCIFEROL) 50,000 unit Cap, Take 1 capsule (50,000 Units total) by mouth every 7 days., Disp: 12 capsule, Rfl: 3    ferrous sulfate 325 (65 FE) MG EC tablet, Take 1 tablet (325 mg total) by mouth 2 (two) times daily., Disp: 180 tablet, Rfl: 3    fexofenadine (ALLEGRA) 180 MG tablet, Take 1 tablet (180 mg total) by mouth once daily., Disp: 30 tablet, Rfl: 0    GAVILYTE-G 236-22.74-6.74 -5.86 gram suspension, DRINK 4L LIQUID BY MOUTH ONCE FOR 1 DOSE, Disp: , Rfl:     labetaloL (NORMODYNE) 200 MG tablet, Take 1 tablet (200 mg total) by mouth 2 (two) times daily., Disp: 180 tablet, Rfl: 3    lancets Misc, 1 lancet by Misc.(Non-Drug; Combo Route) route 2 (two) times daily with meals., Disp: 100 each, Rfl: 11    linaGLIPtin (TRADJENTA) 5 mg Tab tablet, Take 1 tablet (5 mg total) by mouth once daily., Disp: 30 tablet, Rfl: 3    pioglitazone (ACTOS) 45 MG tablet, Take 1 tablet (45 mg total) by mouth once  daily., Disp: 90 tablet, Rfl: 0    sildenafiL (VIAGRA) 50 MG tablet, Take 1 tablet (50 mg total) by mouth daily as needed for Erectile Dysfunction., Disp: 30 tablet, Rfl: 0    triamcinolone acetonide 0.1% (KENALOG) 0.1 % ointment, Apply topically 2 (two) times daily., Disp: 80 g, Rfl: 1    valsartan (DIOVAN) 160 MG tablet, Take 1 tablet (160 mg total) by mouth once daily., Disp: 90 tablet, Rfl: 3  Does patient have record of home blood pressure readings no. Readings have been averaging home machine not working.   Last dose of blood pressure medication was taken at this morning.  Patient is asymptomatic.   Complains of no complaints.    Vitals:    02/07/23 0843   BP: 136/84   BP Location: Right arm   Patient Position: Sitting   BP Method: Large (Manual)         Dr. Sanderson informed of nurse visit.

## 2023-02-08 ENCOUNTER — TELEPHONE (OUTPATIENT)
Dept: TRANSPLANT | Facility: CLINIC | Age: 68
End: 2023-02-08
Payer: MEDICARE

## 2023-02-08 ENCOUNTER — TELEPHONE (OUTPATIENT)
Dept: NEPHROLOGY | Facility: CLINIC | Age: 68
End: 2023-02-08
Payer: MEDICARE

## 2023-02-08 NOTE — TELEPHONE ENCOUNTER
----- Message from Raven Naylor NP sent at 2/7/2023  2:57 PM CST -----  Pls inform pt that kidney function is a bit better from 1/27, but still in the same range as before. Creatinine is 2.9 and eGFR is 23 mL/min.

## 2023-02-09 DIAGNOSIS — Z00.00 ENCOUNTER FOR MEDICARE ANNUAL WELLNESS EXAM: ICD-10-CM

## 2023-02-10 ENCOUNTER — OFFICE VISIT (OUTPATIENT)
Dept: OPHTHALMOLOGY | Facility: CLINIC | Age: 68
End: 2023-02-10
Payer: MEDICARE

## 2023-02-10 DIAGNOSIS — E11.3513 TYPE 2 DIABETES MELLITUS WITH BOTH EYES AFFECTED BY PROLIFERATIVE RETINOPATHY AND MACULAR EDEMA, WITH LONG-TERM CURRENT USE OF INSULIN: Primary | ICD-10-CM

## 2023-02-10 DIAGNOSIS — Z79.4 TYPE 2 DIABETES MELLITUS WITH BOTH EYES AFFECTED BY PROLIFERATIVE RETINOPATHY AND MACULAR EDEMA, WITH LONG-TERM CURRENT USE OF INSULIN: Primary | ICD-10-CM

## 2023-02-10 DIAGNOSIS — H43.813 VITREOUS DEGENERATION OF BOTH EYES: ICD-10-CM

## 2023-02-10 DIAGNOSIS — H25.13 AGE-RELATED NUCLEAR CATARACT OF BOTH EYES: ICD-10-CM

## 2023-02-10 PROCEDURE — 3066F PR DOCUMENTATION OF TREATMENT FOR NEPHROPATHY: ICD-10-PCS | Mod: CPTII,S$GLB,, | Performed by: OPHTHALMOLOGY

## 2023-02-10 PROCEDURE — 1160F PR REVIEW ALL MEDS BY PRESCRIBER/CLIN PHARMACIST DOCUMENTED: ICD-10-PCS | Mod: CPTII,S$GLB,, | Performed by: OPHTHALMOLOGY

## 2023-02-10 PROCEDURE — 99999 PR PBB SHADOW E&M-EST. PATIENT-LVL III: ICD-10-PCS | Mod: PBBFAC,,, | Performed by: OPHTHALMOLOGY

## 2023-02-10 PROCEDURE — 3044F HG A1C LEVEL LT 7.0%: CPT | Mod: CPTII,S$GLB,, | Performed by: OPHTHALMOLOGY

## 2023-02-10 PROCEDURE — 4010F PR ACE/ARB THEARPY RXD/TAKEN: ICD-10-PCS | Mod: CPTII,S$GLB,, | Performed by: OPHTHALMOLOGY

## 2023-02-10 PROCEDURE — 92134 CPTRZ OPH DX IMG PST SGM RTA: CPT | Mod: S$GLB,,, | Performed by: OPHTHALMOLOGY

## 2023-02-10 PROCEDURE — 99999 PR PBB SHADOW E&M-EST. PATIENT-LVL III: CPT | Mod: PBBFAC,,, | Performed by: OPHTHALMOLOGY

## 2023-02-10 PROCEDURE — 3066F NEPHROPATHY DOC TX: CPT | Mod: CPTII,S$GLB,, | Performed by: OPHTHALMOLOGY

## 2023-02-10 PROCEDURE — 1159F MED LIST DOCD IN RCRD: CPT | Mod: CPTII,S$GLB,, | Performed by: OPHTHALMOLOGY

## 2023-02-10 PROCEDURE — 92134 OCT, RETINA - OU - BOTH EYES: ICD-10-PCS | Mod: S$GLB,,, | Performed by: OPHTHALMOLOGY

## 2023-02-10 PROCEDURE — 4010F ACE/ARB THERAPY RXD/TAKEN: CPT | Mod: CPTII,S$GLB,, | Performed by: OPHTHALMOLOGY

## 2023-02-10 PROCEDURE — 99214 PR OFFICE/OUTPT VISIT, EST, LEVL IV, 30-39 MIN: ICD-10-PCS | Mod: S$GLB,,, | Performed by: OPHTHALMOLOGY

## 2023-02-10 PROCEDURE — 1160F RVW MEDS BY RX/DR IN RCRD: CPT | Mod: CPTII,S$GLB,, | Performed by: OPHTHALMOLOGY

## 2023-02-10 PROCEDURE — 1159F PR MEDICATION LIST DOCUMENTED IN MEDICAL RECORD: ICD-10-PCS | Mod: CPTII,S$GLB,, | Performed by: OPHTHALMOLOGY

## 2023-02-10 PROCEDURE — 99214 OFFICE O/P EST MOD 30 MIN: CPT | Mod: S$GLB,,, | Performed by: OPHTHALMOLOGY

## 2023-02-10 PROCEDURE — 3044F PR MOST RECENT HEMOGLOBIN A1C LEVEL <7.0%: ICD-10-PCS | Mod: CPTII,S$GLB,, | Performed by: OPHTHALMOLOGY

## 2023-02-10 NOTE — PROGRESS NOTES
"HPI    DLS 01/06/2023    Pt states his va is doing about the same, no changes.        1. Severe NPDR OU   Uncontrolled T2, NO insulin   S/p Ozurdex x 1 OD (09/04/2020)       2. DME OU   Non central   Monitor       3. HTN Ret OU   BS/BP/chol control       4. NS OU   Monitor - good Va       5. Cyst RLL     Last edited by Dana Rg MA on 2/10/2023  8:07 AM.         A/P    ICD-10-CM ICD-9-CM   1. Type 2 diabetes mellitus with both eyes affected by proliferative retinopathy and macular edema, with long-term current use of insulin  E11.3513 250.50    Z79.4 362.02     V58.67     362.07   2. Age-related nuclear cataract of both eyes  H25.13 366.16   3. Vitreous degeneration of both eyes  H43.813 379.21       1. Type 2 diabetes mellitus with both eyes affected by proliferative retinopathy and macular edema, with long-term current use of insulin  Referral from Dr. Mcmanus for DME 2nd opinion  PCP Toby Sanderson MD  02/07/2023  6.4  A1C     OD: s/p few JESSE (last 1/6/23)  S/p ODX in 2020  VA 20/70 (stable), significant IRF, pt had issues with prev ODX injection 2 years ago (pain and light sensitivity afterward)- prefers to have "liquid injections", discussed at length about how he had good response anatomically to steroids but pt states he had numerous issues with steroid injection, would prefer liquid injection. Discussed at length that he appears to have limited response to Avastin but we can try to get auth for Eylea to see if he responds. Pt prefers to see Dr. Mcmanus going forward, will get auth for next visit     OS: no injections  VA 20/20 (stable), JF IRF, low risk PDR  Plan: Observation for now    Recommend good blood pressure control, tight blood glucose control, and good cholesterol control     2. Age-related nuclear cataract of both eyes  Mod NS, NVS  Plan: Observation    3. Vitreous degeneration of both eyes  No RT/RD  Plan: Observation    RTC Yonathan 1 month DFE/OCTm OU, get auth for Eylea OD       I saw " and examined the patient and reviewed in detail the findings documented. The final examination findings, image interpretations, and plan as documented in the record represent my personal judgment and conclusions.    Otilio Suh MD  Vitreoretinal Surgery   [facility]

## 2023-02-13 ENCOUNTER — PATIENT MESSAGE (OUTPATIENT)
Dept: NEPHROLOGY | Facility: CLINIC | Age: 68
End: 2023-02-13
Payer: MEDICARE

## 2023-02-14 ENCOUNTER — OFFICE VISIT (OUTPATIENT)
Dept: ENDOCRINOLOGY | Facility: CLINIC | Age: 68
End: 2023-02-14
Payer: MEDICARE

## 2023-02-14 VITALS
WEIGHT: 279 LBS | SYSTOLIC BLOOD PRESSURE: 152 MMHG | DIASTOLIC BLOOD PRESSURE: 82 MMHG | BODY MASS INDEX: 37.84 KG/M2 | HEART RATE: 69 BPM | TEMPERATURE: 98 F

## 2023-02-14 DIAGNOSIS — E78.1 HYPERTRIGLYCERIDEMIA: ICD-10-CM

## 2023-02-14 DIAGNOSIS — N18.4 CKD (CHRONIC KIDNEY DISEASE) STAGE 4, GFR 15-29 ML/MIN: ICD-10-CM

## 2023-02-14 DIAGNOSIS — E11.65 TYPE 2 DIABETES MELLITUS WITH HYPERGLYCEMIA, WITHOUT LONG-TERM CURRENT USE OF INSULIN: ICD-10-CM

## 2023-02-14 DIAGNOSIS — E11.8 CONTROLLED TYPE 2 DIABETES MELLITUS WITH COMPLICATION, WITHOUT LONG-TERM CURRENT USE OF INSULIN: Primary | ICD-10-CM

## 2023-02-14 PROCEDURE — 1160F PR REVIEW ALL MEDS BY PRESCRIBER/CLIN PHARMACIST DOCUMENTED: ICD-10-PCS | Mod: CPTII,NTX,S$GLB, | Performed by: NURSE PRACTITIONER

## 2023-02-14 PROCEDURE — 3077F SYST BP >= 140 MM HG: CPT | Mod: CPTII,NTX,S$GLB, | Performed by: NURSE PRACTITIONER

## 2023-02-14 PROCEDURE — 4010F PR ACE/ARB THEARPY RXD/TAKEN: ICD-10-PCS | Mod: CPTII,NTX,S$GLB, | Performed by: NURSE PRACTITIONER

## 2023-02-14 PROCEDURE — 3079F PR MOST RECENT DIASTOLIC BLOOD PRESSURE 80-89 MM HG: ICD-10-PCS | Mod: CPTII,NTX,S$GLB, | Performed by: NURSE PRACTITIONER

## 2023-02-14 PROCEDURE — 99999 PR PBB SHADOW E&M-EST. PATIENT-LVL III: ICD-10-PCS | Mod: PBBFAC,TXP,, | Performed by: NURSE PRACTITIONER

## 2023-02-14 PROCEDURE — 3079F DIAST BP 80-89 MM HG: CPT | Mod: CPTII,NTX,S$GLB, | Performed by: NURSE PRACTITIONER

## 2023-02-14 PROCEDURE — 99214 PR OFFICE/OUTPT VISIT, EST, LEVL IV, 30-39 MIN: ICD-10-PCS | Mod: NTX,S$GLB,, | Performed by: NURSE PRACTITIONER

## 2023-02-14 PROCEDURE — 3008F BODY MASS INDEX DOCD: CPT | Mod: CPTII,NTX,S$GLB, | Performed by: NURSE PRACTITIONER

## 2023-02-14 PROCEDURE — 3044F HG A1C LEVEL LT 7.0%: CPT | Mod: CPTII,NTX,S$GLB, | Performed by: NURSE PRACTITIONER

## 2023-02-14 PROCEDURE — 3044F PR MOST RECENT HEMOGLOBIN A1C LEVEL <7.0%: ICD-10-PCS | Mod: CPTII,NTX,S$GLB, | Performed by: NURSE PRACTITIONER

## 2023-02-14 PROCEDURE — 4010F ACE/ARB THERAPY RXD/TAKEN: CPT | Mod: CPTII,NTX,S$GLB, | Performed by: NURSE PRACTITIONER

## 2023-02-14 PROCEDURE — 1159F MED LIST DOCD IN RCRD: CPT | Mod: CPTII,NTX,S$GLB, | Performed by: NURSE PRACTITIONER

## 2023-02-14 PROCEDURE — 3066F PR DOCUMENTATION OF TREATMENT FOR NEPHROPATHY: ICD-10-PCS | Mod: CPTII,NTX,S$GLB, | Performed by: NURSE PRACTITIONER

## 2023-02-14 PROCEDURE — 1159F PR MEDICATION LIST DOCUMENTED IN MEDICAL RECORD: ICD-10-PCS | Mod: CPTII,NTX,S$GLB, | Performed by: NURSE PRACTITIONER

## 2023-02-14 PROCEDURE — 3077F PR MOST RECENT SYSTOLIC BLOOD PRESSURE >= 140 MM HG: ICD-10-PCS | Mod: CPTII,NTX,S$GLB, | Performed by: NURSE PRACTITIONER

## 2023-02-14 PROCEDURE — 3066F NEPHROPATHY DOC TX: CPT | Mod: CPTII,NTX,S$GLB, | Performed by: NURSE PRACTITIONER

## 2023-02-14 PROCEDURE — 99214 OFFICE O/P EST MOD 30 MIN: CPT | Mod: NTX,S$GLB,, | Performed by: NURSE PRACTITIONER

## 2023-02-14 PROCEDURE — 3008F PR BODY MASS INDEX (BMI) DOCUMENTED: ICD-10-PCS | Mod: CPTII,NTX,S$GLB, | Performed by: NURSE PRACTITIONER

## 2023-02-14 PROCEDURE — 99999 PR PBB SHADOW E&M-EST. PATIENT-LVL III: CPT | Mod: PBBFAC,TXP,, | Performed by: NURSE PRACTITIONER

## 2023-02-14 PROCEDURE — 1160F RVW MEDS BY RX/DR IN RCRD: CPT | Mod: CPTII,NTX,S$GLB, | Performed by: NURSE PRACTITIONER

## 2023-02-14 RX ORDER — PIOGLITAZONEHYDROCHLORIDE 30 MG/1
30 TABLET ORAL DAILY
Qty: 90 TABLET | Refills: 1 | Status: SHIPPED | OUTPATIENT
Start: 2023-02-14 | End: 2023-06-20 | Stop reason: SDUPTHER

## 2023-02-14 RX ORDER — LINAGLIPTIN 5 MG/1
5 TABLET, FILM COATED ORAL DAILY
Qty: 90 TABLET | Refills: 2 | Status: SHIPPED | OUTPATIENT
Start: 2023-02-14 | End: 2023-09-08 | Stop reason: SDUPTHER

## 2023-02-14 NOTE — PATIENT INSTRUCTIONS
A1C goal: <7%  Fasting/premeal blood glucose goal:   2 hour post-meal blood glucose goal: less than 180      Reduce pioglitazone to 30 mg once daily, in order to avoid potential side effects like edema at 45 mg. Pt should still be able to attain diabetes control at 30 mg.   Start exercise regimen. - at least 1/2 hour 3 days a week, ideally 5 days a week.   Test glucose 1x/day before or 2 hours after meals.   Return to clinic in 4 months with labs prior.

## 2023-02-14 NOTE — PROGRESS NOTES
Patient, Handy Adams (MRN #[id number]), presented with a recorded BMI of 37.84 kg/m^2 and a documented comorbidity(s):  - Diabetes Mellitus Type 2  - Hyperlipidemia  to which the severe obesity is a contributing factor. This is consistent with the definition of severe obesity (BMI 35.0-39.9) with comorbidity (ICD-10 E66.01, Z68.35). The patient's severe obesity was monitored, evaluated, addressed and/or treated. This addendum to the medical record is made on 02/14/2023.

## 2023-02-14 NOTE — PROGRESS NOTES
CC: This 67 y.o. Black or  male  is here for evaluation of  T2DM along with comorbidities indicated in the Visit Diagnosis section of this encounter.    HPI: Handy Adams was diagnosed with T2DM in his early 60s.     DM COMPLICATIONS: nephropathy and retinopathy    Prior visit 11/2022  Reports he's been doing well.   11 lb weight loss since last visit a month ago.   Avoiding sweets, BGs have improved.   Plan Increase exercise - try at least 1/2 hour per day of walking.   Continue meds.   Test blood sugar 2x/day.   Return to clinic in 2-3 months with labs prior.      Interval hx  A1c remains low from 6.5 to 6.4%.         LAST DIABETES EDUCATION: years ago     HOSPITALIZED FOR DIABETES  -  No.    SIGNIFICANT DIABETES MED HISTORY: denies intolerance to prior DM meds      PRESCRIBED DIABETES MEDICATIONS:   Tradjenta 5 mg once daily   Pioglitazone 45 mg once daily     Misses medication doses - no     SELF MONITORING BLOOD GLUCOSE: Checks blood glucose at home - infrequent   Just enrolled [employer].     HYPOGLYCEMIC EPISODES: none      CURRENT DIET: drinks water and diet coke. Recently stopped diet products.     Breakfast - none   Eats lunch and light dinner.     Tries to avoid sugar.     CURRENT EXERCISE: not often, maybe 1x/week.     SOCIAL: retired IT support       BP (!) 152/82   Pulse 69   Temp 98.3 °F (36.8 °C)   Wt 126.6 kg (279 lb)   BMI 37.84 kg/m²       ROS:   CONSTITUTIONAL: Appetite good, denies fatigue  Other; denies dyspnea or edema.     PHYSICAL EXAM:  GENERAL: Well developed, well nourished. No acute distress.   PSYCH: AAOx3, appropriate mood and affect, conversant, well-groomed. Judgement and insight good.   NEURO: Cranial nerves grossly intact. Speech clear, no tremor.   CHEST: Respirations even and unlabored.   Other: no edema to BLE         Hemoglobin A1C   Date Value Ref Range Status   02/07/2023 6.4 (H) 4.0 - 5.6 % Final     Comment:     ADA Screening  Guidelines:  5.7-6.4%  Consistent with prediabetes  >or=6.5%  Consistent with diabetes    High levels of fetal hemoglobin interfere with the HbA1C  assay. Heterozygous hemoglobin variants (HbS, HgC, etc)do  not significantly interfere with this assay.   However, presence of multiple variants may affect accuracy.     12/16/2022 6.5 (H) 4.0 - 5.6 % Final     Comment:     ADA Screening Guidelines:  5.7-6.4%  Consistent with prediabetes  >or=6.5%  Consistent with diabetes    High levels of fetal hemoglobin interfere with the HbA1C  assay. Heterozygous hemoglobin variants (HbS, HgC, etc)do  not significantly interfere with this assay.   However, presence of multiple variants may affect accuracy.     09/07/2022 10.4 (H) 4.0 - 5.6 % Final     Comment:     ADA Screening Guidelines:  5.7-6.4%  Consistent with prediabetes  >or=6.5%  Consistent with diabetes    High levels of fetal hemoglobin interfere with the HbA1C  assay. Heterozygous hemoglobin variants (HbS, HgC, etc)do  not significantly interfere with this assay.   However, presence of multiple variants may affect accuracy.         No results found for: CPEPTIDE, GLUTAMICACID, ISLETCELLANT, FRUCTOSAMINE     Lab Results   Component Value Date    CHOL 136 02/07/2023    CHOL 197 09/07/2022    CHOL 223 (H) 01/03/2022     Lab Results   Component Value Date    HDL 32 (L) 02/07/2023    HDL 30 (L) 09/07/2022    HDL 34 (L) 01/03/2022     Lab Results   Component Value Date    LDLCALC 81.6 02/07/2023    LDLCALC 94.4 09/07/2022    LDLCALC 133.4 01/03/2022     Lab Results   Component Value Date    TRIG 112 02/07/2023    TRIG 363 (H) 09/07/2022    TRIG 278 (H) 01/03/2022     Lab Results   Component Value Date    CHOLHDL 23.5 02/07/2023    CHOLHDL 15.2 (L) 09/07/2022    CHOLHDL 15.2 (L) 01/03/2022         Chemistry        Component Value Date/Time     02/07/2023 0810    K 4.0 02/07/2023 0810     02/07/2023 0810    CO2 20 (L) 02/07/2023 0810    BUN 39 (H) 02/07/2023 0810     CREATININE 2.9 (H) 02/07/2023 0810     (H) 02/07/2023 0810        Component Value Date/Time    CALCIUM 9.5 02/07/2023 0810    ALKPHOS 55 12/16/2022 0910    AST 17 12/16/2022 0910    ALT 13 12/16/2022 0910    BILITOT 0.5 12/16/2022 0910    ESTGFRAFRICA 51.1 (A) 01/03/2022 0922    EGFRNONAA 44.2 (A) 01/03/2022 0922         Latest Reference Range & Units 02/07/23 08:10   BUN 8 - 23 mg/dL 39 (H)   Creatinine 0.5 - 1.4 mg/dL 2.9 (H)   eGFR >60 mL/min/1.73 m^2 23.0 !   (H): Data is abnormally high  !: Data is abnormal      Lab Results   Component Value Date    LABMICR 1420.0 01/03/2022    CREATRANDUR 60.0 01/27/2023    MICALBCREAT 3302.3 (H) 01/03/2022             ASSESSMENT and PLAN:    A1C GOAL: < 7 %     1. Controlled type 2 diabetes mellitus with complication, without long-term current use of insulin  Reduce pioglitazone to 30 mg once daily, in order to avoid potential side effects like edema at 45 mg. Pt should still be able to attain diabetes control at 30 mg.   Start exercise regimen. - at least 1/2 hour 3 days a week, ideally 5 days a week.   Test glucose 1x/day before or 2 hours after meals.   Return to clinic in 4 months with labs prior. - virtual visit     Hemoglobin A1C      2. CKD (chronic kidney disease) stage 4, GFR 15-29 ml/min  Avoid hypoglycemia.   Maintain DM control.       3. Hypertriglyceridemia  Improved, now at goal at 112            Orders Placed This Encounter   Procedures    Hemoglobin A1C     Standing Status:   Future     Standing Expiration Date:   4/14/2024          Follow up in about 4 months (around 6/14/2023).

## 2023-02-16 ENCOUNTER — TELEPHONE (OUTPATIENT)
Dept: NEPHROLOGY | Facility: CLINIC | Age: 68
End: 2023-02-16
Payer: MEDICARE

## 2023-02-16 DIAGNOSIS — I10 HYPERTENSION, UNSPECIFIED TYPE: Primary | ICD-10-CM

## 2023-02-16 DIAGNOSIS — R80.9 PROTEINURIA, UNSPECIFIED TYPE: ICD-10-CM

## 2023-02-16 DIAGNOSIS — N18.4 CHRONIC KIDNEY DISEASE, STAGE 4 (SEVERE): ICD-10-CM

## 2023-02-16 RX ORDER — VALSARTAN 320 MG/1
320 TABLET ORAL DAILY
Qty: 90 TABLET | Refills: 3 | Status: SHIPPED | OUTPATIENT
Start: 2023-02-16 | End: 2024-02-28 | Stop reason: SDUPTHER

## 2023-02-16 NOTE — TELEPHONE ENCOUNTER
Increase valsartan to 320 mg.  BMP in 2 weeks.    Discussed the importance of kidney biopsy to r/o MGRS for possible kidney saving and lifesaving treatments.

## 2023-02-20 ENCOUNTER — TELEPHONE (OUTPATIENT)
Dept: TRANSPLANT | Facility: CLINIC | Age: 68
End: 2023-02-20
Payer: MEDICARE

## 2023-03-01 ENCOUNTER — PES CALL (OUTPATIENT)
Dept: ADMINISTRATIVE | Facility: CLINIC | Age: 68
End: 2023-03-01
Payer: MEDICARE

## 2023-03-07 ENCOUNTER — OFFICE VISIT (OUTPATIENT)
Dept: OPHTHALMOLOGY | Facility: CLINIC | Age: 68
End: 2023-03-07
Payer: MEDICARE

## 2023-03-07 ENCOUNTER — LAB VISIT (OUTPATIENT)
Dept: LAB | Facility: HOSPITAL | Age: 68
End: 2023-03-07
Payer: MEDICARE

## 2023-03-07 DIAGNOSIS — I10 HYPERTENSION, UNSPECIFIED TYPE: ICD-10-CM

## 2023-03-07 DIAGNOSIS — Z79.4 TYPE 2 DIABETES MELLITUS WITH BOTH EYES AFFECTED BY PROLIFERATIVE RETINOPATHY AND MACULAR EDEMA, WITH LONG-TERM CURRENT USE OF INSULIN: Primary | ICD-10-CM

## 2023-03-07 DIAGNOSIS — N18.4 CHRONIC KIDNEY DISEASE, STAGE 4 (SEVERE): ICD-10-CM

## 2023-03-07 DIAGNOSIS — E11.3513 TYPE 2 DIABETES MELLITUS WITH BOTH EYES AFFECTED BY PROLIFERATIVE RETINOPATHY AND MACULAR EDEMA, WITH LONG-TERM CURRENT USE OF INSULIN: Primary | ICD-10-CM

## 2023-03-07 DIAGNOSIS — H43.813 VITREOUS DEGENERATION OF BOTH EYES: ICD-10-CM

## 2023-03-07 DIAGNOSIS — R80.9 PROTEINURIA, UNSPECIFIED TYPE: ICD-10-CM

## 2023-03-07 LAB
ANION GAP SERPL CALC-SCNC: 7 MMOL/L (ref 8–16)
BUN SERPL-MCNC: 45 MG/DL (ref 8–23)
CALCIUM SERPL-MCNC: 9.2 MG/DL (ref 8.7–10.5)
CHLORIDE SERPL-SCNC: 109 MMOL/L (ref 95–110)
CO2 SERPL-SCNC: 25 MMOL/L (ref 23–29)
CREAT SERPL-MCNC: 3 MG/DL (ref 0.5–1.4)
EST. GFR  (NO RACE VARIABLE): 22.1 ML/MIN/1.73 M^2
GLUCOSE SERPL-MCNC: 85 MG/DL (ref 70–110)
POTASSIUM SERPL-SCNC: 4.5 MMOL/L (ref 3.5–5.1)
SODIUM SERPL-SCNC: 141 MMOL/L (ref 136–145)

## 2023-03-07 PROCEDURE — 3044F PR MOST RECENT HEMOGLOBIN A1C LEVEL <7.0%: ICD-10-PCS | Mod: CPTII,S$GLB,, | Performed by: OPHTHALMOLOGY

## 2023-03-07 PROCEDURE — 1101F PR PT FALLS ASSESS DOC 0-1 FALLS W/OUT INJ PAST YR: ICD-10-PCS | Mod: CPTII,S$GLB,, | Performed by: OPHTHALMOLOGY

## 2023-03-07 PROCEDURE — 1126F AMNT PAIN NOTED NONE PRSNT: CPT | Mod: CPTII,S$GLB,, | Performed by: OPHTHALMOLOGY

## 2023-03-07 PROCEDURE — 80048 BASIC METABOLIC PNL TOTAL CA: CPT | Mod: TXP | Performed by: NURSE PRACTITIONER

## 2023-03-07 PROCEDURE — 3066F NEPHROPATHY DOC TX: CPT | Mod: CPTII,S$GLB,, | Performed by: OPHTHALMOLOGY

## 2023-03-07 PROCEDURE — 4010F PR ACE/ARB THEARPY RXD/TAKEN: ICD-10-PCS | Mod: CPTII,S$GLB,, | Performed by: OPHTHALMOLOGY

## 2023-03-07 PROCEDURE — 67028 PR INJECT INTRAVITREAL PHARMCOLOGIC: ICD-10-PCS | Mod: RT,S$GLB,, | Performed by: OPHTHALMOLOGY

## 2023-03-07 PROCEDURE — 1159F PR MEDICATION LIST DOCUMENTED IN MEDICAL RECORD: ICD-10-PCS | Mod: CPTII,S$GLB,, | Performed by: OPHTHALMOLOGY

## 2023-03-07 PROCEDURE — 92134 POSTERIOR SEGMENT OCT RETINA (OCULAR COHERENCE TOMOGRAPHY)-BOTH EYES: ICD-10-PCS | Mod: S$GLB,,, | Performed by: OPHTHALMOLOGY

## 2023-03-07 PROCEDURE — 3044F HG A1C LEVEL LT 7.0%: CPT | Mod: CPTII,S$GLB,, | Performed by: OPHTHALMOLOGY

## 2023-03-07 PROCEDURE — 1159F MED LIST DOCD IN RCRD: CPT | Mod: CPTII,S$GLB,, | Performed by: OPHTHALMOLOGY

## 2023-03-07 PROCEDURE — 36415 COLL VENOUS BLD VENIPUNCTURE: CPT | Mod: NTX | Performed by: NURSE PRACTITIONER

## 2023-03-07 PROCEDURE — 92014 COMPRE OPH EXAM EST PT 1/>: CPT | Mod: 25,S$GLB,, | Performed by: OPHTHALMOLOGY

## 2023-03-07 PROCEDURE — 4010F ACE/ARB THERAPY RXD/TAKEN: CPT | Mod: CPTII,S$GLB,, | Performed by: OPHTHALMOLOGY

## 2023-03-07 PROCEDURE — 1101F PT FALLS ASSESS-DOCD LE1/YR: CPT | Mod: CPTII,S$GLB,, | Performed by: OPHTHALMOLOGY

## 2023-03-07 PROCEDURE — 1126F PR PAIN SEVERITY QUANTIFIED, NO PAIN PRESENT: ICD-10-PCS | Mod: CPTII,S$GLB,, | Performed by: OPHTHALMOLOGY

## 2023-03-07 PROCEDURE — 92014 PR EYE EXAM, EST PATIENT,COMPREHESV: ICD-10-PCS | Mod: 25,S$GLB,, | Performed by: OPHTHALMOLOGY

## 2023-03-07 PROCEDURE — 92134 CPTRZ OPH DX IMG PST SGM RTA: CPT | Mod: S$GLB,,, | Performed by: OPHTHALMOLOGY

## 2023-03-07 PROCEDURE — 1160F PR REVIEW ALL MEDS BY PRESCRIBER/CLIN PHARMACIST DOCUMENTED: ICD-10-PCS | Mod: CPTII,S$GLB,, | Performed by: OPHTHALMOLOGY

## 2023-03-07 PROCEDURE — 99999 PR PBB SHADOW E&M-EST. PATIENT-LVL III: CPT | Mod: PBBFAC,,, | Performed by: OPHTHALMOLOGY

## 2023-03-07 PROCEDURE — 99999 PR PBB SHADOW E&M-EST. PATIENT-LVL III: ICD-10-PCS | Mod: PBBFAC,,, | Performed by: OPHTHALMOLOGY

## 2023-03-07 PROCEDURE — 3066F PR DOCUMENTATION OF TREATMENT FOR NEPHROPATHY: ICD-10-PCS | Mod: CPTII,S$GLB,, | Performed by: OPHTHALMOLOGY

## 2023-03-07 PROCEDURE — 67028 INJECTION EYE DRUG: CPT | Mod: RT,S$GLB,, | Performed by: OPHTHALMOLOGY

## 2023-03-07 PROCEDURE — 3288F FALL RISK ASSESSMENT DOCD: CPT | Mod: CPTII,S$GLB,, | Performed by: OPHTHALMOLOGY

## 2023-03-07 PROCEDURE — 3288F PR FALLS RISK ASSESSMENT DOCUMENTED: ICD-10-PCS | Mod: CPTII,S$GLB,, | Performed by: OPHTHALMOLOGY

## 2023-03-07 PROCEDURE — 1160F RVW MEDS BY RX/DR IN RCRD: CPT | Mod: CPTII,S$GLB,, | Performed by: OPHTHALMOLOGY

## 2023-03-08 ENCOUNTER — TELEPHONE (OUTPATIENT)
Dept: NEPHROLOGY | Facility: CLINIC | Age: 68
End: 2023-03-08
Payer: MEDICARE

## 2023-03-08 DIAGNOSIS — I10 HYPERTENSION, UNSPECIFIED TYPE: Primary | ICD-10-CM

## 2023-03-08 RX ORDER — SPIRONOLACTONE 25 MG/1
25 TABLET ORAL DAILY
Qty: 90 TABLET | Refills: 3 | Status: SHIPPED | OUTPATIENT
Start: 2023-03-08 | End: 2023-11-06

## 2023-03-08 NOTE — TELEPHONE ENCOUNTER
Called pt with lab results.  Starting aldactone for better BP control.  Answered questions he had about why he was referred to transplant now, when waiting for biopsy.  BMP in 2 weeks.   Continue to take BPs on digital monitoring.

## 2023-03-09 ENCOUNTER — TELEPHONE (OUTPATIENT)
Dept: TRANSPLANT | Facility: CLINIC | Age: 68
End: 2023-03-09
Payer: MEDICARE

## 2023-03-17 ENCOUNTER — PATIENT MESSAGE (OUTPATIENT)
Dept: ADMINISTRATIVE | Facility: CLINIC | Age: 68
End: 2023-03-17
Payer: MEDICARE

## 2023-03-20 ENCOUNTER — TELEPHONE (OUTPATIENT)
Dept: ADMINISTRATIVE | Facility: CLINIC | Age: 68
End: 2023-03-20
Payer: MEDICARE

## 2023-03-22 ENCOUNTER — OFFICE VISIT (OUTPATIENT)
Dept: HOME HEALTH SERVICES | Facility: CLINIC | Age: 68
End: 2023-03-22
Payer: MEDICARE

## 2023-03-22 VITALS — WEIGHT: 260 LBS | HEIGHT: 72 IN | BODY MASS INDEX: 35.21 KG/M2

## 2023-03-22 DIAGNOSIS — E78.5 HYPERLIPIDEMIA, UNSPECIFIED HYPERLIPIDEMIA TYPE: ICD-10-CM

## 2023-03-22 DIAGNOSIS — D47.2 MGUS (MONOCLONAL GAMMOPATHY OF UNKNOWN SIGNIFICANCE): ICD-10-CM

## 2023-03-22 DIAGNOSIS — E11.22 TYPE 2 DIABETES MELLITUS WITH STAGE 4 CHRONIC KIDNEY DISEASE, WITHOUT LONG-TERM CURRENT USE OF INSULIN: ICD-10-CM

## 2023-03-22 DIAGNOSIS — E66.01 SEVERE OBESITY (BMI 35.0-39.9) WITH COMORBIDITY: ICD-10-CM

## 2023-03-22 DIAGNOSIS — I10 ESSENTIAL HYPERTENSION: ICD-10-CM

## 2023-03-22 DIAGNOSIS — E11.3513 TYPE 2 DIABETES MELLITUS WITH BOTH EYES AFFECTED BY PROLIFERATIVE RETINOPATHY AND MACULAR EDEMA, WITHOUT LONG-TERM CURRENT USE OF INSULIN: ICD-10-CM

## 2023-03-22 DIAGNOSIS — Z00.00 ENCOUNTER FOR PREVENTIVE HEALTH EXAMINATION: ICD-10-CM

## 2023-03-22 DIAGNOSIS — G31.84 MILD COGNITIVE IMPAIRMENT: ICD-10-CM

## 2023-03-22 DIAGNOSIS — N18.4 CKD (CHRONIC KIDNEY DISEASE) STAGE 4, GFR 15-29 ML/MIN: ICD-10-CM

## 2023-03-22 DIAGNOSIS — Z00.00 ENCOUNTER FOR MEDICARE ANNUAL WELLNESS EXAM: Primary | ICD-10-CM

## 2023-03-22 DIAGNOSIS — N18.4 TYPE 2 DIABETES MELLITUS WITH STAGE 4 CHRONIC KIDNEY DISEASE, WITHOUT LONG-TERM CURRENT USE OF INSULIN: ICD-10-CM

## 2023-03-22 PROCEDURE — 1126F PR PAIN SEVERITY QUANTIFIED, NO PAIN PRESENT: ICD-10-PCS | Mod: CPTII,95,NTX, | Performed by: NURSE PRACTITIONER

## 2023-03-22 PROCEDURE — 1160F RVW MEDS BY RX/DR IN RCRD: CPT | Mod: CPTII,95,NTX, | Performed by: NURSE PRACTITIONER

## 2023-03-22 PROCEDURE — 3288F FALL RISK ASSESSMENT DOCD: CPT | Mod: CPTII,95,NTX, | Performed by: NURSE PRACTITIONER

## 2023-03-22 PROCEDURE — G0439 PPPS, SUBSEQ VISIT: HCPCS | Mod: 95,NTX,, | Performed by: NURSE PRACTITIONER

## 2023-03-22 PROCEDURE — 1159F MED LIST DOCD IN RCRD: CPT | Mod: CPTII,95,NTX, | Performed by: NURSE PRACTITIONER

## 2023-03-22 PROCEDURE — 1160F PR REVIEW ALL MEDS BY PRESCRIBER/CLIN PHARMACIST DOCUMENTED: ICD-10-PCS | Mod: CPTII,95,NTX, | Performed by: NURSE PRACTITIONER

## 2023-03-22 PROCEDURE — 3288F PR FALLS RISK ASSESSMENT DOCUMENTED: ICD-10-PCS | Mod: CPTII,95,NTX, | Performed by: NURSE PRACTITIONER

## 2023-03-22 PROCEDURE — 3044F HG A1C LEVEL LT 7.0%: CPT | Mod: CPTII,95,NTX, | Performed by: NURSE PRACTITIONER

## 2023-03-22 PROCEDURE — 3008F PR BODY MASS INDEX (BMI) DOCUMENTED: ICD-10-PCS | Mod: CPTII,95,NTX, | Performed by: NURSE PRACTITIONER

## 2023-03-22 PROCEDURE — 4010F PR ACE/ARB THEARPY RXD/TAKEN: ICD-10-PCS | Mod: CPTII,95,NTX, | Performed by: NURSE PRACTITIONER

## 2023-03-22 PROCEDURE — 3008F BODY MASS INDEX DOCD: CPT | Mod: CPTII,95,NTX, | Performed by: NURSE PRACTITIONER

## 2023-03-22 PROCEDURE — 1170F PR FUNCTIONAL STATUS ASSESSED: ICD-10-PCS | Mod: CPTII,95,NTX, | Performed by: NURSE PRACTITIONER

## 2023-03-22 PROCEDURE — G0439 PR MEDICARE ANNUAL WELLNESS SUBSEQUENT VISIT: ICD-10-PCS | Mod: 95,NTX,, | Performed by: NURSE PRACTITIONER

## 2023-03-22 PROCEDURE — 1126F AMNT PAIN NOTED NONE PRSNT: CPT | Mod: CPTII,95,NTX, | Performed by: NURSE PRACTITIONER

## 2023-03-22 PROCEDURE — 1159F PR MEDICATION LIST DOCUMENTED IN MEDICAL RECORD: ICD-10-PCS | Mod: CPTII,95,NTX, | Performed by: NURSE PRACTITIONER

## 2023-03-22 PROCEDURE — 3044F PR MOST RECENT HEMOGLOBIN A1C LEVEL <7.0%: ICD-10-PCS | Mod: CPTII,95,NTX, | Performed by: NURSE PRACTITIONER

## 2023-03-22 PROCEDURE — 4010F ACE/ARB THERAPY RXD/TAKEN: CPT | Mod: CPTII,95,NTX, | Performed by: NURSE PRACTITIONER

## 2023-03-22 PROCEDURE — 3066F PR DOCUMENTATION OF TREATMENT FOR NEPHROPATHY: ICD-10-PCS | Mod: CPTII,95,NTX, | Performed by: NURSE PRACTITIONER

## 2023-03-22 PROCEDURE — 1170F FXNL STATUS ASSESSED: CPT | Mod: CPTII,95,NTX, | Performed by: NURSE PRACTITIONER

## 2023-03-22 PROCEDURE — 3066F NEPHROPATHY DOC TX: CPT | Mod: CPTII,95,NTX, | Performed by: NURSE PRACTITIONER

## 2023-03-22 PROCEDURE — 1101F PT FALLS ASSESS-DOCD LE1/YR: CPT | Mod: CPTII,95,NTX, | Performed by: NURSE PRACTITIONER

## 2023-03-22 PROCEDURE — 1101F PR PT FALLS ASSESS DOC 0-1 FALLS W/OUT INJ PAST YR: ICD-10-PCS | Mod: CPTII,95,NTX, | Performed by: NURSE PRACTITIONER

## 2023-03-23 ENCOUNTER — PATIENT MESSAGE (OUTPATIENT)
Dept: ADMINISTRATIVE | Facility: HOSPITAL | Age: 68
End: 2023-03-23
Payer: MEDICARE

## 2023-03-23 NOTE — PATIENT INSTRUCTIONS
Counseling and Referral of Other Preventative  (Italic type indicates deductible and co-insurance are waived)    Patient Name: Handy Adams  Today's Date: 3/22/2023    Health Maintenance       Date Due Completion Date    TETANUS VACCINE Never done ---    Shingles Vaccine (1 of 2) Never done ---    Pneumococcal Vaccines (Age 65+) (2 - PCV) 07/20/2008 7/20/2007    Abdominal Aortic Aneurysm Screening Never done ---    COVID-19 Vaccine (4 - Booster for Moderna series) 02/21/2022 12/27/2021    Influenza Vaccine (1) Never done ---    Diabetes Urine Screening 01/03/2023 1/3/2022    Hemoglobin A1c 08/07/2023 2/7/2023    Foot Exam 09/26/2023 9/26/2022    Lipid Panel 02/07/2024 2/7/2023    Eye Exam 03/07/2024 3/7/2023    Override on 11/15/2017: Done    Low Dose Statin 03/22/2024 3/22/2023    Colorectal Cancer Screening 10/28/2025 10/28/2022        No orders of the defined types were placed in this encounter.      The following information is provided to all patients.  This information is to help you find resources for any of the problems found today that may be affecting your health:                Living healthy guide: www.[facility].[place].gov      Understanding Diabetes: www.diabetes.org      Eating healthy: www.cdc.gov/healthyweight      CDC home safety checklist: www.cdc.gov/steadi/patient.html      Agency on Aging: www.goea.[place].[facility]      Alcoholics anonymous (AA): www.aa.org      Physical Activity: www.crispin.nih.gov/jh2uymm      Tobacco use: www.quitwithusla.org

## 2023-03-23 NOTE — PROGRESS NOTES
The patient location is: [place]  The chief complaint leading to consultation is: Health Risk Assessment    Visit type: audiovisual    Face to Face time with patient: 20  35 minutes of total time spent on the encounter, which includes face to face time and non-face to face time preparing to see the patient (eg, review of tests), Obtaining and/or reviewing separately obtained history, Documenting clinical information in the electronic or other health record, Independently interpreting results (not separately reported) and communicating results to the patient/family/caregiver, or Care coordination (not separately reported).         Each patient to whom he or she provides medical services by telemedicine is:  (1) informed of the relationship between the physician and patient and the respective role of any other health care provider with respect to management of the patient; and (2) notified that he or she may decline to receive medical services by telemedicine and may withdraw from such care at any time.    Notes:       Handy Adams presented for a  Medicare AWV and comprehensive Health Risk Assessment today. The following components were reviewed and updated:    Medical history  Family History  Social history  Allergies and Current Medications  Health Risk Assessment  Health Maintenance  Care Team         ** See Completed Assessments for Annual Wellness Visit within the encounter summary.**         The following assessments were completed:  Living Situation  CAGE  Depression Screening  Fall Risk Assessment (MACH 10)  Hearing Assessment(HHI)  Cognitive Function Screening  Nutrition Screening  ADL Screening  PAQ Screening      Vitals:    03/22/23 1254   Weight: 117.9 kg (260 lb)   Height: 6' (1.829 m)     Body mass index is 35.26 kg/m².  Physical Exam  Constitutional:       Appearance: He is obese.   Neurological:      General: No focal deficit present.      Mental Status: He is alert and oriented to person, place,  and time.             Diagnoses and health risks identified today and associated recommendations/orders:    1. Encounter for Medicare annual wellness exam  - Ambulatory Referral/Consult to Enhanced Annual Wellness Visit (eAWV)    2. Encounter for preventive health examination  Assessments completed. Preventive measures and health maintenance reviewed with patient.  Review for opioid screening: Patient does not have any prescriptions for opioids.  Review for substance use disorder: Patient does not use any substances.    3. Severe obesity (BMI 35.0-39.9) with comorbidity  Stable, followed by PCP. Healthy diet and tolerable exercise encouraged.    4. Mild cognitive impairment  Stable, followed by Psychiatry.    5. Essential hypertension  Stable, patient on Amlodipine, Labetolol, Valsartan, Hygroten, and Aldactone. Followed by PCP.    6. Hyperlipidemia, unspecified hyperlipidemia type  Stable, patient on Lipitor. Followed by PCP.    7. CKD (chronic kidney disease) stage 4, GFR 15-29 ml/min  Stable, followed by Nephrology.    8. MGUS (monoclonal gammopathy of unknown significance)  Stable, followed by PCP and Oncology.    9. Type 2 diabetes mellitus with both eyes affected by proliferative retinopathy and macular edema, without long-term current use of insulin  Stable, patient on Tradjenta and Actos for T2DM. Followed by PCP and Nephrology.    10. Type 2 diabetes mellitus with stage 4 chronic kidney disease, without long-term current use of insulin  Stable, patient on Tradjenta and Actos for T2DM. Followed by PCP and Nephrology.      Provided Handy with a 5-10 year written screening schedule and personal prevention plan. Recommendations were developed using the USPSTF age appropriate recommendations. Education, counseling, and referrals were provided as needed. After Visit Summary printed and given to patient which includes a list of additional screenings\tests needed.    Follow up in about 1 year (around 3/22/2024)  for your next annual wellness visit.    Cynthia Ortiz, LISA  I offered to discuss advanced care planning, including how to pick a person who would make decisions for you if you were unable to make them for yourself, called a health care power of , and what kind of decisions you might make such as use of life sustaining treatments such as ventilators and tube feeding when faced with a life limiting illness recorded on a living will that they will need to know. (How you want to be cared for as you near the end of your natural life)     X Patient is interested in learning more about how to make advanced directives.  I provided them paperwork and offered to discuss this with them.

## 2023-04-05 ENCOUNTER — TELEPHONE (OUTPATIENT)
Dept: OPHTHALMOLOGY | Facility: CLINIC | Age: 68
End: 2023-04-05
Payer: MEDICARE

## 2023-04-14 ENCOUNTER — PATIENT MESSAGE (OUTPATIENT)
Dept: NEPHROLOGY | Facility: CLINIC | Age: 68
End: 2023-04-14
Payer: MEDICARE

## 2023-04-14 ENCOUNTER — PATIENT MESSAGE (OUTPATIENT)
Dept: HEMATOLOGY/ONCOLOGY | Facility: CLINIC | Age: 68
End: 2023-04-14
Payer: MEDICARE

## 2023-04-17 ENCOUNTER — LAB VISIT (OUTPATIENT)
Dept: LAB | Facility: HOSPITAL | Age: 68
End: 2023-04-17
Attending: PHYSICIAN ASSISTANT
Payer: MEDICARE

## 2023-04-17 DIAGNOSIS — I10 HYPERTENSION, UNSPECIFIED TYPE: ICD-10-CM

## 2023-04-17 LAB
ANION GAP SERPL CALC-SCNC: 9 MMOL/L (ref 8–16)
BUN SERPL-MCNC: 45 MG/DL (ref 8–23)
CALCIUM SERPL-MCNC: 8.7 MG/DL (ref 8.7–10.5)
CHLORIDE SERPL-SCNC: 111 MMOL/L (ref 95–110)
CO2 SERPL-SCNC: 20 MMOL/L (ref 23–29)
CREAT SERPL-MCNC: 3.1 MG/DL (ref 0.5–1.4)
EST. GFR  (NO RACE VARIABLE): 21.2 ML/MIN/1.73 M^2
GLUCOSE SERPL-MCNC: 95 MG/DL (ref 70–110)
POTASSIUM SERPL-SCNC: 5.1 MMOL/L (ref 3.5–5.1)
SODIUM SERPL-SCNC: 140 MMOL/L (ref 136–145)

## 2023-04-17 PROCEDURE — 36415 COLL VENOUS BLD VENIPUNCTURE: CPT | Mod: PO,TXP | Performed by: NURSE PRACTITIONER

## 2023-04-17 PROCEDURE — 80048 BASIC METABOLIC PNL TOTAL CA: CPT | Mod: TXP | Performed by: NURSE PRACTITIONER

## 2023-04-19 DIAGNOSIS — I10 HYPERTENSION, UNSPECIFIED TYPE: ICD-10-CM

## 2023-04-19 RX ORDER — NIFEDIPINE 90 MG/1
90 TABLET, EXTENDED RELEASE ORAL DAILY
Qty: 90 TABLET | Refills: 3 | Status: SHIPPED | OUTPATIENT
Start: 2023-04-19 | End: 2024-04-18

## 2023-04-25 ENCOUNTER — PROCEDURE VISIT (OUTPATIENT)
Dept: OPHTHALMOLOGY | Facility: CLINIC | Age: 68
End: 2023-04-25
Attending: OPHTHALMOLOGY
Payer: MEDICARE

## 2023-04-25 DIAGNOSIS — E11.3513 TYPE 2 DIABETES MELLITUS WITH BOTH EYES AFFECTED BY PROLIFERATIVE RETINOPATHY AND MACULAR EDEMA, WITH LONG-TERM CURRENT USE OF INSULIN: ICD-10-CM

## 2023-04-25 DIAGNOSIS — H43.813 VITREOUS DEGENERATION OF BOTH EYES: ICD-10-CM

## 2023-04-25 DIAGNOSIS — Z79.4 TYPE 2 DIABETES MELLITUS WITH BOTH EYES AFFECTED BY PROLIFERATIVE RETINOPATHY AND MACULAR EDEMA, WITH LONG-TERM CURRENT USE OF INSULIN: ICD-10-CM

## 2023-04-25 DIAGNOSIS — E11.3513 TYPE 2 DIABETES MELLITUS WITH BOTH EYES AFFECTED BY PROLIFERATIVE RETINOPATHY AND MACULAR EDEMA, WITHOUT LONG-TERM CURRENT USE OF INSULIN: Primary | ICD-10-CM

## 2023-04-25 PROCEDURE — 92134 CPTRZ OPH DX IMG PST SGM RTA: CPT | Mod: S$GLB,,, | Performed by: OPHTHALMOLOGY

## 2023-04-25 PROCEDURE — 67028 INJECTION EYE DRUG: CPT | Mod: RT,S$GLB,, | Performed by: OPHTHALMOLOGY

## 2023-04-25 PROCEDURE — 92012 PR EYE EXAM, EST PATIENT,INTERMED: ICD-10-PCS | Mod: 25,S$GLB,, | Performed by: OPHTHALMOLOGY

## 2023-04-25 PROCEDURE — 67028 PR INJECT INTRAVITREAL PHARMCOLOGIC: ICD-10-PCS | Mod: RT,S$GLB,, | Performed by: OPHTHALMOLOGY

## 2023-04-25 PROCEDURE — 92134 POSTERIOR SEGMENT OCT RETINA (OCULAR COHERENCE TOMOGRAPHY)-BOTH EYES: ICD-10-PCS | Mod: S$GLB,,, | Performed by: OPHTHALMOLOGY

## 2023-04-25 PROCEDURE — 92012 INTRM OPH EXAM EST PATIENT: CPT | Mod: 25,S$GLB,, | Performed by: OPHTHALMOLOGY

## 2023-04-26 ENCOUNTER — PATIENT MESSAGE (OUTPATIENT)
Dept: NEPHROLOGY | Facility: CLINIC | Age: 68
End: 2023-04-26
Payer: MEDICARE

## 2023-04-26 NOTE — PROGRESS NOTES
HPI    Pt here today for Eylea  injection OD.  Pt states no visual changes since last exam.     EYE MEDS:         OCT   OD: Good quality. Normal foveal contour  Superior IRF, exudates in parafovea. , decreased in central SRF  OS: Good quality. Normal foveal contour without IRF, SRF. Some small noncentral exudates    Prior WFFA  OD - normal transit time. Hyperfluorescence early c/w Ma/edema - sig NP    OS - Hyperfluorescence early c/w Ma/edema  .NV nasal      A/P    1. 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 DME OU   6/19 - pt did not FU until 8/20  S/p Avastin OD x 2  S/p Eylea OD x 1  S/p Ozurdex OD x 1 9/20 11/22 - not seen x 2 years  3/23 - had second opinion with Dr. Suh.  Pt would like to try a few Eylea prior to steroids if needed    Eylea OD today    Approved for Ozurdex if minimal response to Eylea - consider next visit    3. HTN Ret OU  BS/BP/chol control    4. NS OU  Monitor - good Va    5. Cyst RLL  Removed by Rhea  Happy with result        1 month OCT no dilate    Risks, benefits, and alternatives to treatment discussed in detail with the patient.  The patient voiced understanding and wished to proceed with the procedure    Injection Procedure Note:  Diagnosis: DME OD    Patient Identified and Time Out complete  Pt marked  Topical Proparacaine and Betadine.  Inject Eylea OD at 6:00 @ 3.5-4mm posterior to limbus  Post Operative Dx: Same  Complications: None  Follow up as above.

## 2023-05-01 ENCOUNTER — TELEPHONE (OUTPATIENT)
Dept: TRANSPLANT | Facility: CLINIC | Age: 68
End: 2023-05-01
Payer: MEDICARE

## 2023-05-01 NOTE — TELEPHONE ENCOUNTER
Returned phone call and spoke with Mr. Adams. Patient states that he has spoken with his Nephrologist and is aware of what to do next to be re referred.           ----- Message from Michelle Abadie, RN sent at 5/1/2023 12:57 PM CDT -----  Regarding: FW: declined letter    ----- Message -----  From: Garret Krishna Jr., RN  Sent: 4/28/2023   2:20 PM CDT  To: Michelle Abadie, RN  Subject: FW: declined letter                              Is he yours?  Thanks  Garret    ----- Message -----  From: Amber Bah  Sent: 4/28/2023   2:03 PM CDT  To: [facility] Pre-Kidney Transplant Clinical  Subject: declined letter                                  The patient called requesting to speak to coordinator  Patient states he just recieved a letter stating that he has been denied for transplant due to no response to a 30 day letter, patient states he did not receive a 30 day letter  Please minoo to advise    No further information provided      Patient can be contacted @# [phone] (home)

## 2023-05-11 ENCOUNTER — HOSPITAL ENCOUNTER (EMERGENCY)
Facility: HOSPITAL | Age: 68
Discharge: HOME | End: 2023-05-11
Attending: STUDENT IN AN ORGANIZED HEALTH CARE EDUCATION/TRAINING PROGRAM
Payer: MEDICARE

## 2023-05-11 VITALS
TEMPERATURE: 98 F | OXYGEN SATURATION: 99 % | SYSTOLIC BLOOD PRESSURE: 152 MMHG | WEIGHT: 270 LBS | DIASTOLIC BLOOD PRESSURE: 82 MMHG | HEART RATE: 88 BPM | BODY MASS INDEX: 36.57 KG/M2 | RESPIRATION RATE: 20 BRPM | HEIGHT: 72 IN

## 2023-05-11 DIAGNOSIS — K56.41 FECAL IMPACTION IN RECTUM: Primary | ICD-10-CM

## 2023-05-11 PROCEDURE — 99284 EMERGENCY DEPT VISIT MOD MDM: CPT

## 2023-05-11 RX ORDER — POLYETHYLENE GLYCOL 3350 17 G/17G
17 POWDER, FOR SOLUTION ORAL DAILY
Qty: 510 G | Refills: 0 | Status: SHIPPED | OUTPATIENT
Start: 2023-05-11 | End: 2023-06-10

## 2023-05-11 RX ORDER — SODIUM PHOSPHATE,MONO-DIBASIC 19G-7G/197
1 ENEMA (ML) RECTAL DAILY PRN
Qty: 3 ENEMA | Refills: 0 | Status: SHIPPED | OUTPATIENT
Start: 2023-05-11 | End: 2023-05-21

## 2023-05-11 RX ORDER — DEXTROMETHORPHAN POLISTIREX 30 MG/5 ML
1 SUSPENSION, EXTENDED RELEASE 12 HR ORAL ONCE
Status: DISCONTINUED | OUTPATIENT
Start: 2023-05-11 | End: 2023-05-11 | Stop reason: HOSPADM

## 2023-05-11 NOTE — DISCHARGE INSTRUCTIONS
Thank you for coming to our Emergency Department today. It is important to remember that some problems are difficult to diagnose and may not be found during your first visit. Be sure to follow up with your primary care doctor and review any labs/imaging that was performed with them. If you do not have a primary care doctor, you may contact the one listed on your discharge paperwork or you may also call the [facility] Appointment Desk at [phone] to schedule an appointment with one.     All medications may potentially have side effects and it is impossible to predict which medications may give you side effects. If you feel that you are having a negative effect of any medication you should immediately stop taking them and seek medical attention.    Return to the ER with any questions/concerns, new/concerning symptoms, worsening or failure to improve. Do not drive or make any important decisions for 24 hours if you have received any pain medications, sedatives or mood altering drugs during your ER visit.

## 2023-05-11 NOTE — ED PROVIDER NOTES
"Encounter Date: 5/11/2023       History     Chief Complaint   Patient presents with    Fecal Impaction     Pt presents to the ED with c/o inabilty to have a BM. States "I have a blockage in my rectum and I need it out." States he has not been able to have a BM since approx 2100 despite having the urge to. Denies any other symptoms     67-year-old male with past medical history of diabetes, hypertension, hyperlipidemia, and CKD, presents with inability to have a bowel movement since approximately 9:00 p.m. last night.  Patient reports feeling like a object stuck in his rectum.  He is tried bearing down multiple times without any movement of stool.  Given his inability to have a bowel movement came to the emergency department for evaluation.  He reports having a similar problem years ago that self-resolved.  Denies any opioid use, abdominal surgeries, nausea vomiting or abdominal pain.      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 case request entered     vaccinated-GT  instr portal    MOUTH SURGERY       Family History   Problem Relation Age of Onset    Cancer Mother         Lung Cancer    Cancer Father         Colon cancer    Diabetes Father     Hypertension Father     No Known Problems Sister     No Known Problems Brother     No Known " Problems Maternal Aunt     No Known Problems Maternal Uncle     No Known Problems Paternal Aunt     No Known Problems Paternal Uncle     No Known Problems Maternal Grandmother     No Known Problems Maternal Grandfather     No Known Problems Paternal Grandmother     No Known Problems Paternal Grandfather     Amblyopia Neg Hx     Blindness Neg Hx     Cataracts Neg Hx     Glaucoma Neg Hx     Macular degeneration Neg Hx     Retinal detachment Neg Hx     Strabismus Neg Hx     Stroke Neg Hx     Thyroid disease Neg Hx      Social History     Tobacco Use    Smoking status: Never    Smokeless tobacco: Never   Substance Use Topics    Alcohol use: Yes     Comment: occasionally    Drug use: Not Currently     Review of Systems   Constitutional:  Negative for chills and fever.   HENT:  Negative for congestion and rhinorrhea.    Eyes:  Negative for pain.   Respiratory:  Negative for cough and shortness of breath.    Cardiovascular:  Negative for chest pain and leg swelling.   Gastrointestinal:  Positive for constipation. Negative for abdominal pain, nausea and vomiting.   Endocrine: Negative for polyuria.   Genitourinary:  Negative for dysuria and hematuria.   Musculoskeletal:  Negative for gait problem and neck pain.   Skin:  Negative for rash.   Allergic/Immunologic: Negative for immunocompromised state.   Neurological:  Negative for weakness and headaches.     Physical Exam     Initial Vitals [05/11/23 0051]   BP Pulse Resp Temp SpO2   (!) 168/95 94 20 98.3 °F (36.8 °C) 99 %      MAP       --         Physical Exam    Constitutional: He appears well-developed and well-nourished. He is not diaphoretic. No distress.   HENT:   Head: Normocephalic and atraumatic.   Eyes: Conjunctivae and EOM are normal. Pupils are equal, round, and reactive to light.   Neck:   Normal range of motion.  Cardiovascular:  Regular rhythm.           Pulses:       Radial pulses are 2+ on the right side and 2+ on the left side.        Posterior tibial  pulses are 2+ on the right side and 2+ on the left side.   Pulmonary/Chest: Breath sounds normal. No respiratory distress.   Abdominal: Abdomen is soft. Bowel sounds are normal. He exhibits no distension. There is no abdominal tenderness.   Abdomen soft nontender, rectal exam with multiple hardened stools in the rectal vault that were expressed. There is no rebound and no guarding.   Musculoskeletal:         General: No tenderness. Normal range of motion.      Cervical back: Normal range of motion.     Lymphadenopathy:     He has no cervical adenopathy.   Neurological: He is alert and oriented to person, place, and time.   Skin: Skin is warm. Capillary refill takes less than 2 seconds.   Psychiatric: His behavior is normal.       ED Course   Procedures  Labs Reviewed - No data to display       Imaging Results    None          Medications   mineral oil enema 1 enema (has no administration in time range)     Medical Decision Making:   History:   Old Medical Records: I decided to obtain old medical records.  Initial Assessment:   67-year-old male presents with inability to have a bowel movement since 9:00 p.m. last night.  Patient in no acute distress.  Exam with soft abdomen that is nontender.  Rectal exam with multiple hardened stool in the rectal vault that wears expressed.  Patient with incident relief of symptoms after disimpaction.  Prescription for MiraLax and enemas to take p.r.n. given.  Return precautions and anticipatory guidance discussed all questions answered.           ED Course as of 05/11/23 0152   Thu May 11, 2023   0143 The patient was reassessed and on subsequent re-evaluation, they were subjectively feeling better. They were resting comfortably and in no acute distress. I discussed the laboratory and diagnostic findings with the patient. Pt is currently stable for discharge. I see no indication of an emergent process beyond that addressed during our encounter but have duly counseled the  patient/family regarding the need for prompt follow-up as well as the indications that should prompt immediate return to the emergency room should new or worrisome developments occur. The patient/family has been provided with verbal and printed direction regarding our final diagnosis(es) as well as instructions regarding use of OTC and/or Rx medications intended to manage the patient's aforementioned conditions. The patient/family verbalized an understanding. The patient/family is asked if there are any questions or concerns. We discuss the case, until all issues are addressed to the patient/family's satisfaction. Patient/family understands and is agreeable to the plan.   [AS]      ED Course User Index  [AS] Gricel Reyna MD          DISCLAIMER: This note was prepared with Re-[employer]pose voice recognition transcription software. Garbled syntax, mangled pronouns, and other bizarre constructions may be attributed to that software system.        Clinical Impression:   Final diagnoses:  [K56.41] Fecal impaction in rectum (Primary)        ED Disposition Condition    Discharge Stable          ED Prescriptions       Medication Sig Dispense Start Date End Date Auth. Provider    polyethylene glycol (MIRALAX) 17 gram/dose powder Take 17 g by mouth once daily. 510 g 5/11/2023 6/10/2023 Gricel Reyna MD    sodium phosphates (FLEET ENEMA EXTRA) 19-7 gram/197 mL Enem Place 1 enema rectally daily as needed. 3 enema 5/11/2023 5/21/2023 Gricel Reyna MD          Follow-up Information       Follow up With Specialties Details Why Contact Info    Toby Sanderson MD Family Medicine Schedule an appointment as soon as possible for a visit  If symptoms worsen [address] [place] [address]  [phone]      [facility] Emergency Medicine  If symptoms worsen [address]  [facility] [address] [phone]             Gricel Reyna MD  05/11/23 0150

## 2023-05-15 ENCOUNTER — PATIENT MESSAGE (OUTPATIENT)
Dept: NEPHROLOGY | Facility: CLINIC | Age: 68
End: 2023-05-15
Payer: MEDICARE

## 2023-05-18 ENCOUNTER — PATIENT OUTREACH (OUTPATIENT)
Dept: ADMINISTRATIVE | Facility: HOSPITAL | Age: 68
End: 2023-05-18
Payer: MEDICARE

## 2023-05-18 ENCOUNTER — TELEPHONE (OUTPATIENT)
Dept: ADMINISTRATIVE | Facility: HOSPITAL | Age: 68
End: 2023-05-18
Payer: MEDICARE

## 2023-05-18 VITALS — DIASTOLIC BLOOD PRESSURE: 83 MMHG | SYSTOLIC BLOOD PRESSURE: 140 MMHG

## 2023-05-18 NOTE — PROGRESS NOTES
HM and immunization's reviewed and updated. Pt on blood pressure care gap, need recent blood pressure reading. Remote bp reading 140/83 - patient on digital bp program. Saw on flowsheet per patient. He will check again tonight/tomorrow morning.   Schedule urine same time as next lab appointment.

## 2023-05-18 NOTE — TELEPHONE ENCOUNTER
Remote bp reading 140/83 - patient on digital bp program. Saw on flowsheet per patient. He will check again tonight/tomorrow morning.

## 2023-05-24 DIAGNOSIS — R80.9 PROTEINURIA: ICD-10-CM

## 2023-05-24 DIAGNOSIS — R80.9 PROTEINURIA, UNSPECIFIED TYPE: Primary | ICD-10-CM

## 2023-05-24 RX ORDER — SODIUM CHLORIDE 0.9 % (FLUSH) 0.9 %
10 SYRINGE (ML) INJECTION
Status: DISCONTINUED | OUTPATIENT
Start: 2023-05-24 | End: 2023-05-24 | Stop reason: HOSPADM

## 2023-05-24 RX ORDER — SODIUM CHLORIDE 9 MG/ML
INJECTION, SOLUTION INTRAVENOUS CONTINUOUS
Status: CANCELLED | OUTPATIENT
Start: 2023-05-24 | End: 2023-05-24

## 2023-05-25 ENCOUNTER — TELEPHONE (OUTPATIENT)
Dept: NEPHROLOGY | Facility: CLINIC | Age: 68
End: 2023-05-25
Payer: MEDICARE

## 2023-05-25 DIAGNOSIS — R80.9 PROTEINURIA, UNSPECIFIED TYPE: Primary | ICD-10-CM

## 2023-05-30 ENCOUNTER — PROCEDURE VISIT (OUTPATIENT)
Dept: OPHTHALMOLOGY | Facility: CLINIC | Age: 68
End: 2023-05-30
Payer: MEDICARE

## 2023-05-30 DIAGNOSIS — H43.813 VITREOUS DEGENERATION OF BOTH EYES: ICD-10-CM

## 2023-05-30 DIAGNOSIS — E11.3513 TYPE 2 DIABETES MELLITUS WITH BOTH EYES AFFECTED BY PROLIFERATIVE RETINOPATHY AND MACULAR EDEMA, WITHOUT LONG-TERM CURRENT USE OF INSULIN: ICD-10-CM

## 2023-05-30 DIAGNOSIS — Z79.4 TYPE 2 DIABETES MELLITUS WITH BOTH EYES AFFECTED BY PROLIFERATIVE RETINOPATHY AND MACULAR EDEMA, WITH LONG-TERM CURRENT USE OF INSULIN: Primary | ICD-10-CM

## 2023-05-30 DIAGNOSIS — E11.3513 TYPE 2 DIABETES MELLITUS WITH BOTH EYES AFFECTED BY PROLIFERATIVE RETINOPATHY AND MACULAR EDEMA, WITH LONG-TERM CURRENT USE OF INSULIN: Primary | ICD-10-CM

## 2023-05-30 PROCEDURE — 92012 PR EYE EXAM, EST PATIENT,INTERMED: ICD-10-PCS | Mod: 25,S$GLB,, | Performed by: OPHTHALMOLOGY

## 2023-05-30 PROCEDURE — 92134 POSTERIOR SEGMENT OCT RETINA (OCULAR COHERENCE TOMOGRAPHY)-BOTH EYES: ICD-10-PCS | Mod: S$GLB,,, | Performed by: OPHTHALMOLOGY

## 2023-05-30 PROCEDURE — 92134 CPTRZ OPH DX IMG PST SGM RTA: CPT | Mod: S$GLB,,, | Performed by: OPHTHALMOLOGY

## 2023-05-30 PROCEDURE — 67028 PR INJECT INTRAVITREAL PHARMCOLOGIC: ICD-10-PCS | Mod: RT,S$GLB,, | Performed by: OPHTHALMOLOGY

## 2023-05-30 PROCEDURE — 67028 INJECTION EYE DRUG: CPT | Mod: RT,S$GLB,, | Performed by: OPHTHALMOLOGY

## 2023-05-30 PROCEDURE — 92012 INTRM OPH EXAM EST PATIENT: CPT | Mod: 25,S$GLB,, | Performed by: OPHTHALMOLOGY

## 2023-05-30 NOTE — PROGRESS NOTES
HPI    DLS: 2023 Dr. Mcmanus    Eye Med's: Lumify prn OU     67 y.o. male is here for Eylea OD. OCT/no dilate. Denies eye pain and   flashes /floaters. Vision has become stable or may have improve. No   problems with glare.   Last edited by RADHA Guo on 2023  3:19 PM.          OCT   OD: Good quality. Normal foveal contour  Superior IRF, exudates in parafovea. , decreased in central SRF  OS: Good quality. Normal foveal contour without IRF, SRF. Some small noncentral exudates    Prior WFFA  OD - normal transit time. Hyperfluorescence early c/w Ma/edema - sig NP    OS - Hyperfluorescence early c/w Ma/edema  .NV nasal      A/P    1. 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 DME OU    - pt did not FU until   S/p Avastin OD x 2  S/p Eylea OD x 1  S/p Ozurdex OD x 1  - not seen x 2 years  3/23 - had second opinion with Dr. Suh.  Pt would like to try a few Eylea prior to steroids if needed    Eylea OD today    Need to reauthorize Ozurdex - window     3. HTN Ret OU  BS/BP/chol control    4. NS OU  Monitor - good Va    5. Cyst RLL  Removed by Rhea  Happy with result        1 month OCT no dilate    Risks, benefits, and alternatives to treatment discussed in detail with the patient.  The patient voiced understanding and wished to proceed with the procedure    Injection Procedure Note:  Diagnosis: DME OD    Patient Identified and Time Out complete  Pt marked  Topical Proparacaine and Betadine.  Inject Eylea OD at 6:00 @ 3.5-4mm posterior to limbus  Post Operative Dx: Same  Complications: None  Follow up as above.

## 2023-06-09 ENCOUNTER — TELEPHONE (OUTPATIENT)
Dept: NEPHROLOGY | Facility: CLINIC | Age: 68
End: 2023-06-09
Payer: MEDICARE

## 2023-06-09 DIAGNOSIS — R94.4 ABNORMAL RESULTS OF KIDNEY FUNCTION STUDIES: ICD-10-CM

## 2023-06-09 DIAGNOSIS — Z13.9 SCREENING FOR CONDITION: Primary | ICD-10-CM

## 2023-06-13 ENCOUNTER — LAB VISIT (OUTPATIENT)
Dept: LAB | Facility: HOSPITAL | Age: 68
End: 2023-06-13
Attending: FAMILY MEDICINE
Payer: MEDICARE

## 2023-06-13 DIAGNOSIS — R80.9 PROTEINURIA, UNSPECIFIED TYPE: ICD-10-CM

## 2023-06-13 DIAGNOSIS — E11.8 CONTROLLED TYPE 2 DIABETES MELLITUS WITH COMPLICATION, WITHOUT LONG-TERM CURRENT USE OF INSULIN: ICD-10-CM

## 2023-06-13 LAB
ANION GAP SERPL CALC-SCNC: 11 MMOL/L (ref 8–16)
BUN SERPL-MCNC: 46 MG/DL (ref 8–23)
CALCIUM SERPL-MCNC: 9.5 MG/DL (ref 8.7–10.5)
CHLORIDE SERPL-SCNC: 109 MMOL/L (ref 95–110)
CO2 SERPL-SCNC: 18 MMOL/L (ref 23–29)
CREAT SERPL-MCNC: 3.5 MG/DL (ref 0.5–1.4)
EST. GFR  (NO RACE VARIABLE): 18.3 ML/MIN/1.73 M^2
ESTIMATED AVG GLUCOSE: 131 MG/DL (ref 68–131)
GLUCOSE SERPL-MCNC: 125 MG/DL (ref 70–110)
HBA1C MFR BLD: 6.2 % (ref 4–5.6)
POTASSIUM SERPL-SCNC: 4.3 MMOL/L (ref 3.5–5.1)
SODIUM SERPL-SCNC: 138 MMOL/L (ref 136–145)

## 2023-06-13 PROCEDURE — 36415 COLL VENOUS BLD VENIPUNCTURE: CPT | Mod: PO | Performed by: NURSE PRACTITIONER

## 2023-06-13 PROCEDURE — 83036 HEMOGLOBIN GLYCOSYLATED A1C: CPT | Performed by: NURSE PRACTITIONER

## 2023-06-13 PROCEDURE — 80048 BASIC METABOLIC PNL TOTAL CA: CPT | Performed by: NURSE PRACTITIONER

## 2023-06-15 ENCOUNTER — PATIENT MESSAGE (OUTPATIENT)
Dept: NEPHROLOGY | Facility: CLINIC | Age: 68
End: 2023-06-15
Payer: MEDICARE

## 2023-06-15 RX ORDER — SODIUM BICARBONATE 650 MG/1
650 TABLET ORAL 2 TIMES DAILY
Qty: 60 TABLET | Refills: 0 | Status: SHIPPED | OUTPATIENT
Start: 2023-06-15 | End: 2023-11-06 | Stop reason: SDUPTHER

## 2023-06-16 DIAGNOSIS — N18.4 CHRONIC KIDNEY DISEASE, STAGE 4 (SEVERE): Primary | ICD-10-CM

## 2023-06-20 ENCOUNTER — OFFICE VISIT (OUTPATIENT)
Dept: ENDOCRINOLOGY | Facility: CLINIC | Age: 68
End: 2023-06-20
Payer: MEDICARE

## 2023-06-20 VITALS
BODY MASS INDEX: 37.97 KG/M2 | TEMPERATURE: 98 F | DIASTOLIC BLOOD PRESSURE: 85 MMHG | WEIGHT: 280 LBS | SYSTOLIC BLOOD PRESSURE: 137 MMHG | HEART RATE: 75 BPM

## 2023-06-20 DIAGNOSIS — N18.4 CKD (CHRONIC KIDNEY DISEASE) STAGE 4, GFR 15-29 ML/MIN: ICD-10-CM

## 2023-06-20 DIAGNOSIS — D64.9 ANEMIA, UNSPECIFIED TYPE: ICD-10-CM

## 2023-06-20 DIAGNOSIS — E11.65 TYPE 2 DIABETES MELLITUS WITH HYPERGLYCEMIA, WITHOUT LONG-TERM CURRENT USE OF INSULIN: Primary | ICD-10-CM

## 2023-06-20 PROCEDURE — 3044F PR MOST RECENT HEMOGLOBIN A1C LEVEL <7.0%: ICD-10-PCS | Mod: CPTII,S$GLB,, | Performed by: NURSE PRACTITIONER

## 2023-06-20 PROCEDURE — 99999 PR PBB SHADOW E&M-EST. PATIENT-LVL IV: ICD-10-PCS | Mod: PBBFAC,,, | Performed by: NURSE PRACTITIONER

## 2023-06-20 PROCEDURE — 99214 PR OFFICE/OUTPT VISIT, EST, LEVL IV, 30-39 MIN: ICD-10-PCS | Mod: S$GLB,,, | Performed by: NURSE PRACTITIONER

## 2023-06-20 PROCEDURE — 3066F PR DOCUMENTATION OF TREATMENT FOR NEPHROPATHY: ICD-10-PCS | Mod: CPTII,S$GLB,, | Performed by: NURSE PRACTITIONER

## 2023-06-20 PROCEDURE — 3062F PR POS MACROALBUMINURIA RESULT DOCUMENTED/REVIEW: ICD-10-PCS | Mod: CPTII,S$GLB,, | Performed by: NURSE PRACTITIONER

## 2023-06-20 PROCEDURE — 3008F PR BODY MASS INDEX (BMI) DOCUMENTED: ICD-10-PCS | Mod: CPTII,S$GLB,, | Performed by: NURSE PRACTITIONER

## 2023-06-20 PROCEDURE — 1159F PR MEDICATION LIST DOCUMENTED IN MEDICAL RECORD: ICD-10-PCS | Mod: CPTII,S$GLB,, | Performed by: NURSE PRACTITIONER

## 2023-06-20 PROCEDURE — 3079F DIAST BP 80-89 MM HG: CPT | Mod: CPTII,S$GLB,, | Performed by: NURSE PRACTITIONER

## 2023-06-20 PROCEDURE — 4010F ACE/ARB THERAPY RXD/TAKEN: CPT | Mod: CPTII,S$GLB,, | Performed by: NURSE PRACTITIONER

## 2023-06-20 PROCEDURE — 3079F PR MOST RECENT DIASTOLIC BLOOD PRESSURE 80-89 MM HG: ICD-10-PCS | Mod: CPTII,S$GLB,, | Performed by: NURSE PRACTITIONER

## 2023-06-20 PROCEDURE — 3075F SYST BP GE 130 - 139MM HG: CPT | Mod: CPTII,S$GLB,, | Performed by: NURSE PRACTITIONER

## 2023-06-20 PROCEDURE — 3075F PR MOST RECENT SYSTOLIC BLOOD PRESS GE 130-139MM HG: ICD-10-PCS | Mod: CPTII,S$GLB,, | Performed by: NURSE PRACTITIONER

## 2023-06-20 PROCEDURE — 1160F PR REVIEW ALL MEDS BY PRESCRIBER/CLIN PHARMACIST DOCUMENTED: ICD-10-PCS | Mod: CPTII,S$GLB,, | Performed by: NURSE PRACTITIONER

## 2023-06-20 PROCEDURE — 1160F RVW MEDS BY RX/DR IN RCRD: CPT | Mod: CPTII,S$GLB,, | Performed by: NURSE PRACTITIONER

## 2023-06-20 PROCEDURE — 99999 PR PBB SHADOW E&M-EST. PATIENT-LVL IV: CPT | Mod: PBBFAC,,, | Performed by: NURSE PRACTITIONER

## 2023-06-20 PROCEDURE — 3044F HG A1C LEVEL LT 7.0%: CPT | Mod: CPTII,S$GLB,, | Performed by: NURSE PRACTITIONER

## 2023-06-20 PROCEDURE — 99214 OFFICE O/P EST MOD 30 MIN: CPT | Mod: S$GLB,,, | Performed by: NURSE PRACTITIONER

## 2023-06-20 PROCEDURE — 3066F NEPHROPATHY DOC TX: CPT | Mod: CPTII,S$GLB,, | Performed by: NURSE PRACTITIONER

## 2023-06-20 PROCEDURE — 3062F POS MACROALBUMINURIA REV: CPT | Mod: CPTII,S$GLB,, | Performed by: NURSE PRACTITIONER

## 2023-06-20 PROCEDURE — 3008F BODY MASS INDEX DOCD: CPT | Mod: CPTII,S$GLB,, | Performed by: NURSE PRACTITIONER

## 2023-06-20 PROCEDURE — 1159F MED LIST DOCD IN RCRD: CPT | Mod: CPTII,S$GLB,, | Performed by: NURSE PRACTITIONER

## 2023-06-20 PROCEDURE — 4010F PR ACE/ARB THEARPY RXD/TAKEN: ICD-10-PCS | Mod: CPTII,S$GLB,, | Performed by: NURSE PRACTITIONER

## 2023-06-20 RX ORDER — PIOGLITAZONEHYDROCHLORIDE 30 MG/1
30 TABLET ORAL DAILY
Qty: 90 TABLET | Refills: 1 | Status: SHIPPED | OUTPATIENT
Start: 2023-06-20 | End: 2023-09-08 | Stop reason: SDUPTHER

## 2023-06-20 NOTE — PATIENT INSTRUCTIONS
Suspect  that a1c for patient is falsely low due to anemia (baseline hemoglobin ~ 9). Recent glucoses have been high in the 200-300s.   Need to resume pioglitazone 30 mg once daily if it has been stopped.   Recommend starting weekly injection to help with dietary improvement and glucose control. Patient declines and will work on lifestyle changes.   Pt will see renal dietician.   Start exercise regimen.     Recommend bringing rx bottles to all visits.     Test glucose 2x/day. - fasting before breakfast and again at bedtime.   Undergo  Continuous Glucose Monitor (CGM) study.   Return to clinic in 3 months for CGM study review and a1c prior.

## 2023-06-20 NOTE — PROGRESS NOTES
CC: This 67 y.o. Black or  male  is here for evaluation of  T2DM along with comorbidities indicated in the Visit Diagnosis section of this encounter.    HPI: Handy Adams was diagnosed with T2DM in his early 60s.     DM COMPLICATIONS: nephropathy and retinopathy        Prior visit 2/2023  A1c remains low from 6.5 to 6.4%.   Plan Reduce pioglitazone to 30 mg once daily, in order to avoid potential side effects like edema at 45 mg. Pt should still be able to attain diabetes control at 30 mg.   Start exercise regimen. - at least 1/2 hour 3 days a week, ideally 5 days a week.   Test glucose 1x/day before or 2 hours after meals.   Return to clinic in 4 months with labs prior. - virtual visit     Interval hx   A1c is about the same, from 6.4 to now 6.2%.   Pt believes his glucoses are doing well but he does intend to improve diet and start exercise.   Pt not sure if he is taking pioglitazone or not. He was concerned about decline  in kidney function. He stopped a medication a month ago but does not recall its name.     He has questions about the recent drop in his kidney function as well as anemia. He stopped taking iron supplement after fecal impaction last month. Requests getting blood count retested.         LAST DIABETES EDUCATION: years ago     HOSPITALIZED FOR DIABETES  -  No.    SIGNIFICANT DIABETES MED HISTORY: denies intolerance to prior DM meds      PRESCRIBED DIABETES MEDICATIONS:   Tradjenta 5 mg once daily   Pioglitazone 30 mg once daily     Misses medication doses - no     SELF MONITORING BLOOD GLUCOSE: Checks blood glucose at home - infrequent   enrolled Digital DM Medicine         HYPOGLYCEMIC EPISODES: none      CURRENT DIET: drinks water and diet coke. Recently stopped diet products.     Breakfast - none   Eats lunch and light dinner.       CURRENT EXERCISE: not often, maybe 1x/week.     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     PHYSICAL EXAM:  GENERAL: Well developed, well nourished. No acute distress.   PSYCH: AAOx3, appropriate mood and affect, conversant, well-groomed. Judgement and insight good.   NEURO: Cranial nerves grossly intact. Speech clear, no tremor.   CHEST: Respirations even and unlabored.           Hemoglobin A1C   Date Value Ref Range Status   06/13/2023 6.2 (H) 4.0 - 5.6 % Final     Comment:     ADA Screening Guidelines:  5.7-6.4%  Consistent with prediabetes  >or=6.5%  Consistent with diabetes    High levels of fetal hemoglobin interfere with the HbA1C  assay. Heterozygous hemoglobin variants (HbS, HgC, etc)do  not significantly interfere with this assay.   However, presence of multiple variants may affect accuracy.     02/07/2023 6.4 (H) 4.0 - 5.6 % Final     Comment:     ADA Screening Guidelines:  5.7-6.4%  Consistent with prediabetes  >or=6.5%  Consistent with diabetes    High levels of fetal hemoglobin interfere with the HbA1C  assay. Heterozygous hemoglobin variants (HbS, HgC, etc)do  not significantly interfere with this assay.   However, presence of multiple variants may affect accuracy.     12/16/2022 6.5 (H) 4.0 - 5.6 % Final     Comment:     ADA Screening Guidelines:  5.7-6.4%  Consistent with prediabetes  >or=6.5%  Consistent with diabetes    High levels of fetal hemoglobin interfere with the HbA1C  assay. Heterozygous hemoglobin variants (HbS, HgC, etc)do  not significantly interfere with this assay.   However, presence of multiple variants may affect accuracy.         No results found for: CPEPTIDE, GLUTAMICACID, ISLETCELLANT, FRUCTOSAMINE     Lab Results   Component Value Date    CHOL 136 02/07/2023    CHOL 197 09/07/2022    CHOL 223 (H) 01/03/2022     Lab Results   Component Value Date    HDL 32 (L) 02/07/2023    HDL 30 (L) 09/07/2022    HDL 34 (L) 01/03/2022     Lab Results   Component  Value Date    LDLCALC 81.6 02/07/2023    LDLCALC 94.4 09/07/2022    LDLCALC 133.4 01/03/2022     Lab Results   Component Value Date    TRIG 112 02/07/2023    TRIG 363 (H) 09/07/2022    TRIG 278 (H) 01/03/2022     Lab Results   Component Value Date    CHOLHDL 23.5 02/07/2023    CHOLHDL 15.2 (L) 09/07/2022    CHOLHDL 15.2 (L) 01/03/2022         Chemistry        Component Value Date/Time     06/13/2023 0844    K 4.3 06/13/2023 0844     06/13/2023 0844    CO2 18 (L) 06/13/2023 0844    BUN 46 (H) 06/13/2023 0844    CREATININE 3.5 (H) 06/13/2023 0844     (H) 06/13/2023 0844        Component Value Date/Time    CALCIUM 9.5 06/13/2023 0844    ALKPHOS 55 12/16/2022 0910    AST 17 12/16/2022 0910    ALT 13 12/16/2022 0910    BILITOT 0.5 12/16/2022 0910    ESTGFRAFRICA 51.1 (A) 01/03/2022 0922    EGFRNONAA 44.2 (A) 01/03/2022 0922         Latest Reference Range & Units 06/13/23 08:44   BUN 8 - 23 mg/dL 46 (H)   Creatinine 0.5 - 1.4 mg/dL 3.5 (H)   eGFR >60 mL/min/1.73 m^2 18.3 !   (H): Data is abnormally high  !: Data is abnormal    Lab Results   Component Value Date    LABMICR 1316.0 06/13/2023    CREATRANDUR 83.0 06/13/2023    MICALBCREAT 1585.5 (H) 06/13/2023             ASSESSMENT and PLAN:    A1C GOAL: < 7 %     1. Type 2 diabetes mellitus with hyperglycemia, without long-term current use of insulin  Suspect  that a1c for patient is falsely low due to anemia (baseline hemoglobin ~ 9). Recent glucoses have been high.   Need to resume pioglitazone 30 mg once daily if it has been stopped.   Recommend starting weekly injection to help with dietary improvement and glucose control. Patient declines and will work on lifestyle changes.   Pt will see renal dietician.   Start exercise regimen. - he will go to gym regularly     Recommend bringing rx bottles to all visits.     Test glucose 2x/day. - fasting before breakfast and again at bedtime.   Undergo  Continuous Glucose Monitor (CGM) study.   Return to clinic  in 3 months for CGM study review and a1c prior.     GLUCOSE MONITORING CONTINUOUS MIN 72 HOURS    Hemoglobin A1C      2. Anemia, unspecified type  F/u with Primary/Nephrology       3. CKD (chronic kidney disease) stage 4, GFR 15-29 ml/min  Improve glycemic control.   Reviewed impact of hyperglycemia on kidney function.         Orders Placed This Encounter   Procedures    Hemoglobin A1C     Standing Status:   Future     Standing Expiration Date:   8/18/2024    GLUCOSE MONITORING CONTINUOUS MIN 72 HOURS     Standing Status:   Future     Standing Expiration Date:   6/20/2024          Follow up in about 3 months (around 9/20/2023).

## 2023-06-26 ENCOUNTER — OFFICE VISIT (OUTPATIENT)
Dept: HEMATOLOGY/ONCOLOGY | Facility: CLINIC | Age: 68
End: 2023-06-26
Payer: MEDICARE

## 2023-06-26 ENCOUNTER — LAB VISIT (OUTPATIENT)
Dept: LAB | Facility: HOSPITAL | Age: 68
End: 2023-06-26
Payer: MEDICARE

## 2023-06-26 VITALS
TEMPERATURE: 98 F | HEART RATE: 70 BPM | HEIGHT: 72 IN | DIASTOLIC BLOOD PRESSURE: 67 MMHG | SYSTOLIC BLOOD PRESSURE: 132 MMHG | BODY MASS INDEX: 37.47 KG/M2 | OXYGEN SATURATION: 100 % | RESPIRATION RATE: 18 BRPM | WEIGHT: 276.69 LBS

## 2023-06-26 DIAGNOSIS — D47.2 MGUS (MONOCLONAL GAMMOPATHY OF UNKNOWN SIGNIFICANCE): Primary | ICD-10-CM

## 2023-06-26 DIAGNOSIS — N18.4 CKD (CHRONIC KIDNEY DISEASE) STAGE 4, GFR 15-29 ML/MIN: ICD-10-CM

## 2023-06-26 LAB
ALBUMIN SERPL BCP-MCNC: 3.7 G/DL (ref 3.5–5.2)
ALP SERPL-CCNC: 52 U/L (ref 55–135)
ALT SERPL W/O P-5'-P-CCNC: 10 U/L (ref 10–44)
ANION GAP SERPL CALC-SCNC: 10 MMOL/L (ref 8–16)
AST SERPL-CCNC: 12 U/L (ref 10–40)
B2 MICROGLOB SERPL-MCNC: 7.5 UG/ML (ref 0–2.5)
BASOPHILS # BLD AUTO: 0.03 K/UL (ref 0–0.2)
BASOPHILS NFR BLD: 0.5 % (ref 0–1.9)
BILIRUB SERPL-MCNC: 0.5 MG/DL (ref 0.1–1)
BUN SERPL-MCNC: 58 MG/DL (ref 8–23)
CALCIUM SERPL-MCNC: 9 MG/DL (ref 8.7–10.5)
CHLORIDE SERPL-SCNC: 105 MMOL/L (ref 95–110)
CO2 SERPL-SCNC: 21 MMOL/L (ref 23–29)
CREAT SERPL-MCNC: 3.8 MG/DL (ref 0.5–1.4)
DIFFERENTIAL METHOD: ABNORMAL
EOSINOPHIL # BLD AUTO: 0.2 K/UL (ref 0–0.5)
EOSINOPHIL NFR BLD: 3.4 % (ref 0–8)
ERYTHROCYTE [DISTWIDTH] IN BLOOD BY AUTOMATED COUNT: 15.6 % (ref 11.5–14.5)
EST. GFR  (NO RACE VARIABLE): 16.6 ML/MIN/1.73 M^2
GLUCOSE SERPL-MCNC: 115 MG/DL (ref 70–110)
HCT VFR BLD AUTO: 29.4 % (ref 40–54)
HGB BLD-MCNC: 9.5 G/DL (ref 14–18)
IGA SERPL-MCNC: 92 MG/DL (ref 40–350)
IGG SERPL-MCNC: 2193 MG/DL (ref 650–1600)
IGM SERPL-MCNC: 32 MG/DL (ref 50–300)
IMM GRANULOCYTES # BLD AUTO: 0.03 K/UL (ref 0–0.04)
IMM GRANULOCYTES NFR BLD AUTO: 0.5 % (ref 0–0.5)
LYMPHOCYTES # BLD AUTO: 1.5 K/UL (ref 1–4.8)
LYMPHOCYTES NFR BLD: 23.2 % (ref 18–48)
MCH RBC QN AUTO: 28.9 PG (ref 27–31)
MCHC RBC AUTO-ENTMCNC: 32.3 G/DL (ref 32–36)
MCV RBC AUTO: 89 FL (ref 82–98)
MONOCYTES # BLD AUTO: 0.6 K/UL (ref 0.3–1)
MONOCYTES NFR BLD: 8.9 % (ref 4–15)
NEUTROPHILS # BLD AUTO: 4.2 K/UL (ref 1.8–7.7)
NEUTROPHILS NFR BLD: 63.5 % (ref 38–73)
NRBC BLD-RTO: 0 /100 WBC
PLATELET # BLD AUTO: 256 K/UL (ref 150–450)
PMV BLD AUTO: 11.4 FL (ref 9.2–12.9)
POTASSIUM SERPL-SCNC: 4.4 MMOL/L (ref 3.5–5.1)
PROT SERPL-MCNC: 7.6 G/DL (ref 6–8.4)
RBC # BLD AUTO: 3.29 M/UL (ref 4.6–6.2)
SODIUM SERPL-SCNC: 136 MMOL/L (ref 136–145)
WBC # BLD AUTO: 6.52 K/UL (ref 3.9–12.7)

## 2023-06-26 PROCEDURE — 86334 IMMUNOFIX E-PHORESIS SERUM: CPT | Performed by: STUDENT IN AN ORGANIZED HEALTH CARE EDUCATION/TRAINING PROGRAM

## 2023-06-26 PROCEDURE — 4010F ACE/ARB THERAPY RXD/TAKEN: CPT | Mod: CPTII,GC,S$GLB, | Performed by: STUDENT IN AN ORGANIZED HEALTH CARE EDUCATION/TRAINING PROGRAM

## 2023-06-26 PROCEDURE — 3008F PR BODY MASS INDEX (BMI) DOCUMENTED: ICD-10-PCS | Mod: CPTII,GC,S$GLB, | Performed by: STUDENT IN AN ORGANIZED HEALTH CARE EDUCATION/TRAINING PROGRAM

## 2023-06-26 PROCEDURE — 3288F PR FALLS RISK ASSESSMENT DOCUMENTED: ICD-10-PCS | Mod: CPTII,GC,S$GLB, | Performed by: STUDENT IN AN ORGANIZED HEALTH CARE EDUCATION/TRAINING PROGRAM

## 2023-06-26 PROCEDURE — 99214 PR OFFICE/OUTPT VISIT, EST, LEVL IV, 30-39 MIN: ICD-10-PCS | Mod: GC,S$GLB,, | Performed by: STUDENT IN AN ORGANIZED HEALTH CARE EDUCATION/TRAINING PROGRAM

## 2023-06-26 PROCEDURE — 99999 PR PBB SHADOW E&M-EST. PATIENT-LVL IV: CPT | Mod: PBBFAC,GC,, | Performed by: STUDENT IN AN ORGANIZED HEALTH CARE EDUCATION/TRAINING PROGRAM

## 2023-06-26 PROCEDURE — 3062F PR POS MACROALBUMINURIA RESULT DOCUMENTED/REVIEW: ICD-10-PCS | Mod: CPTII,GC,S$GLB, | Performed by: STUDENT IN AN ORGANIZED HEALTH CARE EDUCATION/TRAINING PROGRAM

## 2023-06-26 PROCEDURE — 3075F PR MOST RECENT SYSTOLIC BLOOD PRESS GE 130-139MM HG: ICD-10-PCS | Mod: CPTII,GC,S$GLB, | Performed by: STUDENT IN AN ORGANIZED HEALTH CARE EDUCATION/TRAINING PROGRAM

## 2023-06-26 PROCEDURE — 3008F BODY MASS INDEX DOCD: CPT | Mod: CPTII,GC,S$GLB, | Performed by: STUDENT IN AN ORGANIZED HEALTH CARE EDUCATION/TRAINING PROGRAM

## 2023-06-26 PROCEDURE — 84165 PROTEIN E-PHORESIS SERUM: CPT | Performed by: STUDENT IN AN ORGANIZED HEALTH CARE EDUCATION/TRAINING PROGRAM

## 2023-06-26 PROCEDURE — 86334 IMMUNOFIX E-PHORESIS SERUM: CPT | Mod: 26,,, | Performed by: PATHOLOGY

## 2023-06-26 PROCEDURE — 84165 PATHOLOGIST INTERPRETATION SPE: ICD-10-PCS | Mod: 26,,, | Performed by: PATHOLOGY

## 2023-06-26 PROCEDURE — 1159F PR MEDICATION LIST DOCUMENTED IN MEDICAL RECORD: ICD-10-PCS | Mod: CPTII,GC,S$GLB, | Performed by: STUDENT IN AN ORGANIZED HEALTH CARE EDUCATION/TRAINING PROGRAM

## 2023-06-26 PROCEDURE — 4010F PR ACE/ARB THEARPY RXD/TAKEN: ICD-10-PCS | Mod: CPTII,GC,S$GLB, | Performed by: STUDENT IN AN ORGANIZED HEALTH CARE EDUCATION/TRAINING PROGRAM

## 2023-06-26 PROCEDURE — 86334 PATHOLOGIST INTERPRETATION IFE: ICD-10-PCS | Mod: 26,,, | Performed by: PATHOLOGY

## 2023-06-26 PROCEDURE — 3066F NEPHROPATHY DOC TX: CPT | Mod: CPTII,GC,S$GLB, | Performed by: STUDENT IN AN ORGANIZED HEALTH CARE EDUCATION/TRAINING PROGRAM

## 2023-06-26 PROCEDURE — 84165 PROTEIN E-PHORESIS SERUM: CPT | Mod: 26,,, | Performed by: PATHOLOGY

## 2023-06-26 PROCEDURE — 99999 PR PBB SHADOW E&M-EST. PATIENT-LVL IV: ICD-10-PCS | Mod: PBBFAC,GC,, | Performed by: STUDENT IN AN ORGANIZED HEALTH CARE EDUCATION/TRAINING PROGRAM

## 2023-06-26 PROCEDURE — 3078F DIAST BP <80 MM HG: CPT | Mod: CPTII,GC,S$GLB, | Performed by: STUDENT IN AN ORGANIZED HEALTH CARE EDUCATION/TRAINING PROGRAM

## 2023-06-26 PROCEDURE — 1101F PT FALLS ASSESS-DOCD LE1/YR: CPT | Mod: CPTII,GC,S$GLB, | Performed by: STUDENT IN AN ORGANIZED HEALTH CARE EDUCATION/TRAINING PROGRAM

## 2023-06-26 PROCEDURE — 99214 OFFICE O/P EST MOD 30 MIN: CPT | Mod: GC,S$GLB,, | Performed by: STUDENT IN AN ORGANIZED HEALTH CARE EDUCATION/TRAINING PROGRAM

## 2023-06-26 PROCEDURE — 1101F PR PT FALLS ASSESS DOC 0-1 FALLS W/OUT INJ PAST YR: ICD-10-PCS | Mod: CPTII,GC,S$GLB, | Performed by: STUDENT IN AN ORGANIZED HEALTH CARE EDUCATION/TRAINING PROGRAM

## 2023-06-26 PROCEDURE — 83521 IG LIGHT CHAINS FREE EACH: CPT | Mod: 59 | Performed by: STUDENT IN AN ORGANIZED HEALTH CARE EDUCATION/TRAINING PROGRAM

## 2023-06-26 PROCEDURE — 1126F AMNT PAIN NOTED NONE PRSNT: CPT | Mod: CPTII,GC,S$GLB, | Performed by: STUDENT IN AN ORGANIZED HEALTH CARE EDUCATION/TRAINING PROGRAM

## 2023-06-26 PROCEDURE — 1126F PR PAIN SEVERITY QUANTIFIED, NO PAIN PRESENT: ICD-10-PCS | Mod: CPTII,GC,S$GLB, | Performed by: STUDENT IN AN ORGANIZED HEALTH CARE EDUCATION/TRAINING PROGRAM

## 2023-06-26 PROCEDURE — 3288F FALL RISK ASSESSMENT DOCD: CPT | Mod: CPTII,GC,S$GLB, | Performed by: STUDENT IN AN ORGANIZED HEALTH CARE EDUCATION/TRAINING PROGRAM

## 2023-06-26 PROCEDURE — 82784 ASSAY IGA/IGD/IGG/IGM EACH: CPT | Performed by: STUDENT IN AN ORGANIZED HEALTH CARE EDUCATION/TRAINING PROGRAM

## 2023-06-26 PROCEDURE — 3044F HG A1C LEVEL LT 7.0%: CPT | Mod: CPTII,GC,S$GLB, | Performed by: STUDENT IN AN ORGANIZED HEALTH CARE EDUCATION/TRAINING PROGRAM

## 2023-06-26 PROCEDURE — 3044F PR MOST RECENT HEMOGLOBIN A1C LEVEL <7.0%: ICD-10-PCS | Mod: CPTII,GC,S$GLB, | Performed by: STUDENT IN AN ORGANIZED HEALTH CARE EDUCATION/TRAINING PROGRAM

## 2023-06-26 PROCEDURE — 3066F PR DOCUMENTATION OF TREATMENT FOR NEPHROPATHY: ICD-10-PCS | Mod: CPTII,GC,S$GLB, | Performed by: STUDENT IN AN ORGANIZED HEALTH CARE EDUCATION/TRAINING PROGRAM

## 2023-06-26 PROCEDURE — 80053 COMPREHEN METABOLIC PANEL: CPT | Performed by: STUDENT IN AN ORGANIZED HEALTH CARE EDUCATION/TRAINING PROGRAM

## 2023-06-26 PROCEDURE — 82232 ASSAY OF BETA-2 PROTEIN: CPT | Performed by: STUDENT IN AN ORGANIZED HEALTH CARE EDUCATION/TRAINING PROGRAM

## 2023-06-26 PROCEDURE — 3078F PR MOST RECENT DIASTOLIC BLOOD PRESSURE < 80 MM HG: ICD-10-PCS | Mod: CPTII,GC,S$GLB, | Performed by: STUDENT IN AN ORGANIZED HEALTH CARE EDUCATION/TRAINING PROGRAM

## 2023-06-26 PROCEDURE — 36415 COLL VENOUS BLD VENIPUNCTURE: CPT | Performed by: STUDENT IN AN ORGANIZED HEALTH CARE EDUCATION/TRAINING PROGRAM

## 2023-06-26 PROCEDURE — 3075F SYST BP GE 130 - 139MM HG: CPT | Mod: CPTII,GC,S$GLB, | Performed by: STUDENT IN AN ORGANIZED HEALTH CARE EDUCATION/TRAINING PROGRAM

## 2023-06-26 PROCEDURE — 85025 COMPLETE CBC W/AUTO DIFF WBC: CPT | Performed by: STUDENT IN AN ORGANIZED HEALTH CARE EDUCATION/TRAINING PROGRAM

## 2023-06-26 PROCEDURE — 1159F MED LIST DOCD IN RCRD: CPT | Mod: CPTII,GC,S$GLB, | Performed by: STUDENT IN AN ORGANIZED HEALTH CARE EDUCATION/TRAINING PROGRAM

## 2023-06-26 PROCEDURE — 3062F POS MACROALBUMINURIA REV: CPT | Mod: CPTII,GC,S$GLB, | Performed by: STUDENT IN AN ORGANIZED HEALTH CARE EDUCATION/TRAINING PROGRAM

## 2023-06-26 NOTE — PROGRESS NOTES
Section of Hematology and Stem Cell Transplantation  Follow Up Note     Visit Date: 06/26/2023    Primary Oncologic Diagnosis: No primary diagnosis found.    History of Present Ilness: (from initial consult 11/15/2022)    Handy Adams (Handy) is a pleasant 67 y.o.male with a past medical history of T2DM, hyperlipidemia, HTN, and recently diagnosed kidney disease referred by PCP for evaluation of IgG Lambda paraprotein noted on SPEP (0.74g/dl). He had not been seen by medical providers in many years, until earlier this year when presenting for evaluation and was noted to have uncontrolled DM with A1C >10 and HTN.  Over the year, he has gradually had worsening anemia and kidney function, which prompted SPEP evaluation. He has normal light chain ratio and normal calcium levels, but we did discuss the spectrum of MGUS/SMM/MM and that his anemia and kidney decline in setting of paraprotein warrants bone marrow biopsy. Patient is hesitant to proceed with bone marrow biopsy and would like to repeat labs and discuss this again in coming weeks. He is seeing nephrology after our visit to discuss kidney biopsy.     He has had no fevers, chills, CP/SOB, abdominal pain, bone pain, recurrent infections, unintended weight loss, LAD.      He is UTD on colonoscopy, had one earlier this year with 8 benign polyps, plan to repeat in 3 years.  He has ~3 pack year history, smoking 1/2 ppd over the last 3 years, does not warrant lung eval and has no pulmonary complaints.  Denies urinary complaints, has not had prostate exam.     Interval History:     Patient presents for follow-up for his MGUS. He reports doing well since our last visit in 2/2023 with his only concern and complaint at this visit being his worsening kidney function. His Cr at this clinic visit continues to worsen at 3.8. He is scheduled to undergo a kidney biopsy for that reason in 7/2023 but is anxious regarding having this done. He was updated regarding the importance  of this to identify any targetable reasons for his worsening CKD and was agreeable and understanding.       Past Medical History, Social History, and Past Family History are unchanged since last evaluation except for HPI.     CURRENT MEDICATIONS:   Current Outpatient Medications   Medication Sig    atorvastatin (LIPITOR) 20 MG tablet Take 1 tablet by mouth once daily    blood sugar diagnostic Strp To test twice daily    chlorthalidone (HYGROTEN) 25 MG Tab Take 1 tablet (25 mg total) by mouth once daily.    ergocalciferol (ERGOCALCIFEROL) 50,000 unit Cap Take 1 capsule (50,000 Units total) by mouth every 7 days.    ferrous sulfate 325 (65 FE) MG EC tablet Take 1 tablet (325 mg total) by mouth 2 (two) times daily.    labetaloL (NORMODYNE) 200 MG tablet Take 1 tablet (200 mg total) by mouth 2 (two) times daily.    lancets Misc 1 lancet by Misc.(Non-Drug; Combo Route) route 2 (two) times daily with meals.    linaGLIPtin (TRADJENTA) 5 mg Tab tablet Take 1 tablet (5 mg total) by mouth once daily.    NIFEdipine (PROCARDIA-XL) 90 MG (OSM) 24 hr tablet Take 1 tablet (90 mg total) by mouth once daily.    pioglitazone (ACTOS) 30 MG tablet Take 1 tablet (30 mg total) by mouth once daily.    sodium bicarbonate 650 MG tablet Take 1 tablet (650 mg total) by mouth 2 (two) times daily.    spironolactone (ALDACTONE) 25 MG tablet Take 1 tablet (25 mg total) by mouth once daily.    triamcinolone acetonide 0.1% (KENALOG) 0.1 % ointment Apply topically 2 (two) times daily.    valsartan (DIOVAN) 320 MG tablet Take 1 tablet (320 mg total) by mouth once daily.    blood-glucose meter kit Use as instructed    fexofenadine (ALLEGRA) 180 MG tablet Take 1 tablet (180 mg total) by mouth once daily. (Patient not taking: Reported on 3/22/2023)    GAVILYTE-G 236-22.74-6.74 -5.86 gram suspension DRINK 4L LIQUID BY MOUTH ONCE FOR 1 DOSE    sildenafiL (VIAGRA) 50 MG tablet Take 1 tablet (50 mg total) by mouth daily as needed for  Erectile Dysfunction. (Patient not taking: Reported on 3/22/2023)     No current facility-administered medications for this visit.       ALLERGIES:   Review of patient's allergies indicates:  No Known Allergies      Review of Systems:     Review of Systems   Constitutional:  Negative for chills, fever, malaise/fatigue and weight loss.   HENT: Negative.     Eyes: Negative.    Respiratory: Negative.     Cardiovascular: Negative.    Gastrointestinal: Negative.    Genitourinary: Negative.    Musculoskeletal: Negative.    Skin: Negative.    Neurological: Negative.    Endo/Heme/Allergies: Negative.    Psychiatric/Behavioral:  The patient is nervous/anxious.      Physical Exam:     Vitals:    06/26/23 1239   BP: 132/67   Pulse: 70   Resp: 18   Temp: 97.8 °F (36.6 °C)       Physical Exam  Vitals reviewed.   Constitutional:       General: He is not in acute distress.     Appearance: He is obese.   HENT:      Head: Normocephalic.      Right Ear: External ear normal.      Left Ear: External ear normal.      Nose: Nose normal.      Mouth/Throat:      Mouth: Mucous membranes are moist.      Pharynx: Oropharynx is clear.   Eyes:      Extraocular Movements: Extraocular movements intact.      Pupils: Pupils are equal, round, and reactive to light.   Cardiovascular:      Rate and Rhythm: Normal rate and regular rhythm.      Pulses: Normal pulses.   Pulmonary:      Effort: Pulmonary effort is normal.      Breath sounds: Normal breath sounds.   Abdominal:      General: Abdomen is flat.      Palpations: Abdomen is soft.   Musculoskeletal:         General: No swelling. Normal range of motion.      Cervical back: Neck supple.   Lymphadenopathy:      Cervical: No cervical adenopathy.   Skin:     General: Skin is warm.      Findings: No bruising.   Neurological:      General: No focal deficit present.      Mental Status: He is alert.      Motor: No weakness.   Psychiatric:         Mood and Affect: Mood normal.         Labs:   Lab Results    Component Value Date    WBC 6.52 06/26/2023    HGB 9.5 (L) 06/26/2023    HCT 29.4 (L) 06/26/2023    MCV 89 06/26/2023     06/26/2023       Lab Results   Component Value Date     06/26/2023    K 4.4 06/26/2023     06/26/2023    CO2 21 (L) 06/26/2023    BUN 58 (H) 06/26/2023    CREATININE 3.8 (H) 06/26/2023    ALBUMIN 3.7 06/26/2023    BILITOT 0.5 06/26/2023    ALKPHOS 52 (L) 06/26/2023    AST 12 06/26/2023    ALT 10 06/26/2023            Assessment and Plan:   Handy Adams (Handy) is a pleasant 67 y.o.male referred for evaluation of paraprotein     IgG Lambda MGUS  - Pt with elevated IgG lambda paraprotein on recent SPEP, in setting of declining Hgb and kidney function. We reviewed at length the spectrum of MGUS/SMM/MM.   - Repeat plasma cell evaluation shows increasing m-spike with Paraprotein peak in near-gamma = 0.84 g/dL(previously, 0.74 g/dL). K/L ratio 1.87. Elevated B2MG 5.8.  - Given paraprotein with normocytic anemia and kidney dysfunction, he warranted bone marrow biopsy for evaluation  - Bone marrow biopsy significant for 5% plasma cell neoplasm consistent with MDS  Plan:  -Repeat MGUS panel ordered at this clinic visit   -Follow-up kidney biopsy results  -Return to clinic following to assess need for MGUS treatment         2. Kidney Disease  - Worsening kidney disease over last year in setting of HTN and uncontrolled T2DM, after previously not being seen by medical provider over past few years.  - Ultrasound sound shows medical renal disease  Cr worsening at this clinic visit to 3.8  Plan:  -Encouraged follow-up with Nephrology  -Follow-up kidney biopsy results  -Return to clinic following to assess need for MGUS treatment in late August      Orders Placed:            No orders of the defined types were placed in this encounter.        Follow Up:          BMT Chart Routing      Follow up with physician . Dr. Grubbs on 8/28/23   Follow up with [facility]    Provider visit type    Infusion  scheduling note    Injection scheduling note    Labs CMP and CBC   Scheduling:  Preferred lab:  Lab interval:  Labs prior to visit   Imaging    Pharmacy appointment    Other referrals                 Remigio Grubbs D.O.  Hematology/Oncology Fellow, PGY-IV

## 2023-06-27 LAB
ALBUMIN SERPL ELPH-MCNC: 3.89 G/DL (ref 3.35–5.55)
ALPHA1 GLOB SERPL ELPH-MCNC: 0.24 G/DL (ref 0.17–0.41)
ALPHA2 GLOB SERPL ELPH-MCNC: 0.71 G/DL (ref 0.43–0.99)
B-GLOBULIN SERPL ELPH-MCNC: 0.62 G/DL (ref 0.5–1.1)
GAMMA GLOB SERPL ELPH-MCNC: 1.74 G/DL (ref 0.67–1.58)
INTERPRETATION SERPL IFE-IMP: NORMAL
KAPPA LC SER QL IA: 8.53 MG/DL (ref 0.33–1.94)
KAPPA LC/LAMBDA SER IA: 1.79 (ref 0.26–1.65)
LAMBDA LC SER QL IA: 4.77 MG/DL (ref 0.57–2.63)
PROT SERPL-MCNC: 7.2 G/DL (ref 6–8.4)

## 2023-06-28 LAB
PATHOLOGIST INTERPRETATION IFE: NORMAL
PATHOLOGIST INTERPRETATION SPE: NORMAL

## 2023-07-10 ENCOUNTER — TELEPHONE (OUTPATIENT)
Dept: NEPHROLOGY | Facility: CLINIC | Age: 68
End: 2023-07-10
Payer: MEDICARE

## 2023-07-17 ENCOUNTER — LAB VISIT (OUTPATIENT)
Dept: LAB | Facility: HOSPITAL | Age: 68
End: 2023-07-17
Attending: NURSE PRACTITIONER
Payer: MEDICARE

## 2023-07-17 DIAGNOSIS — R94.4 ABNORMAL RESULTS OF KIDNEY FUNCTION STUDIES: ICD-10-CM

## 2023-07-17 DIAGNOSIS — Z13.9 SCREENING FOR CONDITION: ICD-10-CM

## 2023-07-17 LAB
APTT PPP: 25.1 SEC (ref 21–32)
BASOPHILS # BLD AUTO: 0.03 K/UL (ref 0–0.2)
BASOPHILS NFR BLD: 0.4 % (ref 0–1.9)
DIFFERENTIAL METHOD: ABNORMAL
EOSINOPHIL # BLD AUTO: 0.3 K/UL (ref 0–0.5)
EOSINOPHIL NFR BLD: 3.5 % (ref 0–8)
ERYTHROCYTE [DISTWIDTH] IN BLOOD BY AUTOMATED COUNT: 15.9 % (ref 11.5–14.5)
HCT VFR BLD AUTO: 29.1 % (ref 40–54)
HGB BLD-MCNC: 9.1 G/DL (ref 14–18)
IMM GRANULOCYTES # BLD AUTO: 0.04 K/UL (ref 0–0.04)
IMM GRANULOCYTES NFR BLD AUTO: 0.5 % (ref 0–0.5)
INR PPP: 1 (ref 0.8–1.2)
LYMPHOCYTES # BLD AUTO: 1.2 K/UL (ref 1–4.8)
LYMPHOCYTES NFR BLD: 16 % (ref 18–48)
MCH RBC QN AUTO: 28.6 PG (ref 27–31)
MCHC RBC AUTO-ENTMCNC: 31.3 G/DL (ref 32–36)
MCV RBC AUTO: 92 FL (ref 82–98)
MONOCYTES # BLD AUTO: 0.7 K/UL (ref 0.3–1)
MONOCYTES NFR BLD: 8.7 % (ref 4–15)
NEUTROPHILS # BLD AUTO: 5.3 K/UL (ref 1.8–7.7)
NEUTROPHILS NFR BLD: 70.9 % (ref 38–73)
NRBC BLD-RTO: 0 /100 WBC
PLATELET # BLD AUTO: 261 K/UL (ref 150–450)
PMV BLD AUTO: 11.7 FL (ref 9.2–12.9)
PROTHROMBIN TIME: 10.5 SEC (ref 9–12.5)
RBC # BLD AUTO: 3.18 M/UL (ref 4.6–6.2)
WBC # BLD AUTO: 7.44 K/UL (ref 3.9–12.7)

## 2023-07-17 PROCEDURE — 85610 PROTHROMBIN TIME: CPT | Performed by: INTERNAL MEDICINE

## 2023-07-17 PROCEDURE — 85730 THROMBOPLASTIN TIME PARTIAL: CPT | Performed by: INTERNAL MEDICINE

## 2023-07-17 PROCEDURE — 36415 COLL VENOUS BLD VENIPUNCTURE: CPT | Mod: PO | Performed by: INTERNAL MEDICINE

## 2023-07-17 PROCEDURE — 85025 COMPLETE CBC W/AUTO DIFF WBC: CPT | Performed by: INTERNAL MEDICINE

## 2023-07-19 ENCOUNTER — HOSPITAL ENCOUNTER (OUTPATIENT)
Facility: HOSPITAL | Age: 68
Discharge: HOME | End: 2023-07-19
Attending: INTERNAL MEDICINE | Admitting: INTERNAL MEDICINE
Payer: MEDICARE

## 2023-07-19 VITALS
OXYGEN SATURATION: 100 % | RESPIRATION RATE: 18 BRPM | TEMPERATURE: 97 F | HEIGHT: 72 IN | SYSTOLIC BLOOD PRESSURE: 159 MMHG | BODY MASS INDEX: 36.3 KG/M2 | HEART RATE: 63 BPM | DIASTOLIC BLOOD PRESSURE: 84 MMHG | WEIGHT: 268 LBS

## 2023-07-19 DIAGNOSIS — R80.9 PROTEINURIA: ICD-10-CM

## 2023-07-19 LAB
BASOPHILS # BLD AUTO: 0.02 K/UL (ref 0–0.2)
BASOPHILS # BLD AUTO: 0.04 K/UL (ref 0–0.2)
BASOPHILS NFR BLD: 0.3 % (ref 0–1.9)
BASOPHILS NFR BLD: 0.6 % (ref 0–1.9)
DIFFERENTIAL METHOD: ABNORMAL
DIFFERENTIAL METHOD: ABNORMAL
EOSINOPHIL # BLD AUTO: 0.2 K/UL (ref 0–0.5)
EOSINOPHIL # BLD AUTO: 0.2 K/UL (ref 0–0.5)
EOSINOPHIL NFR BLD: 2.6 % (ref 0–8)
EOSINOPHIL NFR BLD: 2.8 % (ref 0–8)
ERYTHROCYTE [DISTWIDTH] IN BLOOD BY AUTOMATED COUNT: 15.7 % (ref 11.5–14.5)
ERYTHROCYTE [DISTWIDTH] IN BLOOD BY AUTOMATED COUNT: 15.8 % (ref 11.5–14.5)
HCT VFR BLD AUTO: 28.5 % (ref 40–54)
HCT VFR BLD AUTO: 28.7 % (ref 40–54)
HGB BLD-MCNC: 8.9 G/DL (ref 14–18)
HGB BLD-MCNC: 8.9 G/DL (ref 14–18)
IMM GRANULOCYTES # BLD AUTO: 0.03 K/UL (ref 0–0.04)
IMM GRANULOCYTES # BLD AUTO: 0.04 K/UL (ref 0–0.04)
IMM GRANULOCYTES NFR BLD AUTO: 0.5 % (ref 0–0.5)
IMM GRANULOCYTES NFR BLD AUTO: 0.7 % (ref 0–0.5)
LYMPHOCYTES # BLD AUTO: 1.1 K/UL (ref 1–4.8)
LYMPHOCYTES # BLD AUTO: 1.2 K/UL (ref 1–4.8)
LYMPHOCYTES NFR BLD: 18.2 % (ref 18–48)
LYMPHOCYTES NFR BLD: 18.8 % (ref 18–48)
MCH RBC QN AUTO: 28.3 PG (ref 27–31)
MCH RBC QN AUTO: 28.4 PG (ref 27–31)
MCHC RBC AUTO-ENTMCNC: 31 G/DL (ref 32–36)
MCHC RBC AUTO-ENTMCNC: 31.2 G/DL (ref 32–36)
MCV RBC AUTO: 91 FL (ref 82–98)
MCV RBC AUTO: 91 FL (ref 82–98)
MONOCYTES # BLD AUTO: 0.6 K/UL (ref 0.3–1)
MONOCYTES # BLD AUTO: 0.6 K/UL (ref 0.3–1)
MONOCYTES NFR BLD: 9.3 % (ref 4–15)
MONOCYTES NFR BLD: 9.6 % (ref 4–15)
NEUTROPHILS # BLD AUTO: 4.2 K/UL (ref 1.8–7.7)
NEUTROPHILS # BLD AUTO: 4.3 K/UL (ref 1.8–7.7)
NEUTROPHILS NFR BLD: 68 % (ref 38–73)
NEUTROPHILS NFR BLD: 68.6 % (ref 38–73)
NRBC BLD-RTO: 0 /100 WBC
NRBC BLD-RTO: 0 /100 WBC
PLATELET # BLD AUTO: 236 K/UL (ref 150–450)
PLATELET # BLD AUTO: 237 K/UL (ref 150–450)
PMV BLD AUTO: 11.2 FL (ref 9.2–12.9)
PMV BLD AUTO: 11.8 FL (ref 9.2–12.9)
POCT GLUCOSE: 102 MG/DL (ref 70–110)
POCT GLUCOSE: 109 MG/DL (ref 70–110)
RBC # BLD AUTO: 3.13 M/UL (ref 4.6–6.2)
RBC # BLD AUTO: 3.15 M/UL (ref 4.6–6.2)
WBC # BLD AUTO: 6.14 K/UL (ref 3.9–12.7)
WBC # BLD AUTO: 6.33 K/UL (ref 3.9–12.7)

## 2023-07-19 PROCEDURE — 63600175 PHARM REV CODE 636 W HCPCS: Performed by: INTERNAL MEDICINE

## 2023-07-19 PROCEDURE — 50200 RENAL BIOPSY PERQ: CPT | Mod: LT | Performed by: INTERNAL MEDICINE

## 2023-07-19 PROCEDURE — 25000003 PHARM REV CODE 250: Performed by: INTERNAL MEDICINE

## 2023-07-19 PROCEDURE — 85025 COMPLETE CBC W/AUTO DIFF WBC: CPT | Mod: 91 | Performed by: INTERNAL MEDICINE

## 2023-07-19 PROCEDURE — 50200 PR BIOPSY OF KIDNEY,PERCUTANEOUS: ICD-10-PCS | Mod: LT,,, | Performed by: INTERNAL MEDICINE

## 2023-07-19 PROCEDURE — 82962 GLUCOSE BLOOD TEST: CPT | Performed by: INTERNAL MEDICINE

## 2023-07-19 PROCEDURE — 50200 RENAL BIOPSY PERQ: CPT | Mod: LT,,, | Performed by: INTERNAL MEDICINE

## 2023-07-19 RX ORDER — LABETALOL 100 MG/1
100 TABLET, FILM COATED ORAL ONCE
Status: COMPLETED | OUTPATIENT
Start: 2023-07-19 | End: 2023-07-19

## 2023-07-19 RX ORDER — ACETAMINOPHEN 10 MG/ML
INJECTION, SOLUTION INTRAVENOUS
Status: DISCONTINUED | OUTPATIENT
Start: 2023-07-19 | End: 2023-07-19 | Stop reason: HOSPADM

## 2023-07-19 RX ORDER — SODIUM CHLORIDE 9 MG/ML
INJECTION, SOLUTION INTRAVENOUS CONTINUOUS
Status: ACTIVE | OUTPATIENT
Start: 2023-07-19 | End: 2023-07-19

## 2023-07-19 RX ORDER — LIDOCAINE HYDROCHLORIDE AND EPINEPHRINE 10; 10 MG/ML; UG/ML
INJECTION, SOLUTION INFILTRATION; PERINEURAL
Status: DISCONTINUED | OUTPATIENT
Start: 2023-07-19 | End: 2023-07-19 | Stop reason: HOSPADM

## 2023-07-19 RX ORDER — NITROGLYCERIN 400 UG/1
SPRAY ORAL
Status: DISCONTINUED | OUTPATIENT
Start: 2023-07-19 | End: 2023-07-19 | Stop reason: HOSPADM

## 2023-07-19 RX ORDER — SODIUM CHLORIDE 9 MG/ML
INJECTION, SOLUTION INTRAVENOUS
Status: DISCONTINUED | OUTPATIENT
Start: 2023-07-19 | End: 2023-07-19 | Stop reason: HOSPADM

## 2023-07-19 RX ADMIN — LABETALOL HYDROCHLORIDE 100 MG: 100 TABLET, FILM COATED ORAL at 05:07

## 2023-07-19 RX ADMIN — LABETALOL HYDROCHLORIDE 100 MG: 100 TABLET, FILM COATED ORAL at 02:07

## 2023-07-19 NOTE — DISCHARGE INSTRUCTIONS
Post kidney biopsy Patient's Care Instructions     After your kidney biopsy today, you might feel heaviness or discomfort at the site. This sensation might take 1-2 days. If the pain gets moderate, please take Tylenol 650mg tablet.   If there is blood in the urine or sever pain, please return to the emergency department.   Try to avoid any unnecessary activity and rest for the day.  Regular daily activities can be resumed tomorrow.   It is advisable to increase water intake for the coming 24 hours.  Strenuous muscular and physician activity need to be avoided for 2 weeks.   The dressing can be removed after 48 hours.   You can take a shower after 48 hours.   If you have any question, please do not hesitate to call.     Discharge Instructions for Kidney Biopsy    Home Care:  Go home and rest for 48 hours. Get up only to use the bathroom.   Don't drive for 24hr following your procedure.  Don't shower for 48 hours following your procedure. If you wish, you may wash yourself with a sponge or washcloth. When you are able to shower, don't scrub the site. Gently wash area and pat it dry.   You may remove the bandage covering the biopsy  48 hours after the procedure.   Don't lift anything heavier than 10 pounds for 3 days to 4 days after the procedure.  Ask your healthcare provider when you when you can return to work. Be sure to tell your healthcare provider if your job involves heavy lifting.   If you normally take blood thinners (anticoagulants or antiplatelet medications) and you stopped taking them a few days before your procedure, ask your healthcare provider when to start taking them again.  You may take Tylenol (acetaminophen) 650mg every 8hr as needed for intense pain.  Ice may be applied intermittently for pain relief.    When to seek medical care:  Call your healthcare provider right away if you have any of the following:  Blood in your urine.  Exhaustion or extreme weakness  Dizziness or  lightheadedness  Sudden or increased shortness of breath  Sudden chest pain  Fever of 100.4 F (38 C) or higher, or chills  Increased redness, tenderness, or swelling at the biopsy site  Opening of or drainage or bleeding from the biopsy site  Increasing pain, with or without activity  Severe pain and difficulty urinating

## 2023-07-19 NOTE — PLAN OF CARE
Received report from RAVEN Mclean. Patient s/p kidney biopsy, AAOx3. VSS, no c/o pain or discomfort at this time, resp even and unlabored. Bandaid and tegaderm to L flank is CDI. No active bleeding. No hematoma noted. Post procedure protocol reviewed with patient and patient's family. Understanding verbalized. Family members at bedside. Nurse call bell within reach. Will continue to monitor per post procedure protocol.

## 2023-07-19 NOTE — H&P
CHIEF COMPLAINT/HPI: Handy is a 67 y.o. male for evaluation of Chronic Kidney Disease (Here for kidney bx consult)     HPI:  The patient is known to have HTN and DM II. He was noticed to have proteinuria with increase monoclonal Igg. The Pr/Cr ratio was 5.5. He was referred for a kidney biopsy. I have seen him today. I have explained the procedure with all the complication. I have answered the questions. He stated that in the current time he had multiple things going on with his health. He will think about the kidney biopsy and let us know when he decides. We recommended home BP and blood sugar monitoring before each meal for a week and send us the readings.                  Past Medical History:   Diagnosis Date    Disorder of kidney and ureter      Edema leg      History of rotator cuff tear      History of seasonal allergies      HTN (hypertension)      Hx of colonic polyps      Hyperlipemia      NS (nuclear sclerosis), bilateral 06/25/2019    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 11/17/2017    Type 2 diabetes mellitus           Handy reports that he has never smoked. He has never used smokeless tobacco. He reports current alcohol use.           Family History   Problem Relation Age of Onset    Cancer Mother           Lung Cancer    Cancer Father           Colon cancer    Diabetes Father      Hypertension Father      No Known Problems Sister      No Known Problems Brother      No Known Problems Maternal Grandmother      No Known Problems Maternal Grandfather      No Known Problems Paternal Grandmother      No Known Problems Paternal Grandfather      No Known Problems Maternal Aunt      No Known Problems Maternal Uncle      No Known Problems Paternal Aunt      No Known Problems Paternal Uncle      Amblyopia Neg Hx      Blindness Neg Hx      Cataracts Neg Hx      Glaucoma Neg Hx      Macular degeneration Neg Hx      Retinal  detachment Neg Hx      Strabismus Neg Hx      Stroke Neg Hx      Thyroid disease Neg Hx               Vitals:     11/15/22 0952   BP: 136/85   Pulse: 83   SpO2: 100%   Weight: 119 kg (262 lb 5.6 oz)       P/E:  He is stable fully alert.  H&N free.  Chest clear no added sounds.  Heart S1, S2 no murmur.   Abdomen soft no tenderness.   Ext free no edema.         Impression and plan:  Primary HTN will need monitoring at home.  DM II will need premeal blood sugar monitoring.   IgG monoclonal gammopathy awaiting bone marrow and kidney biopsy.  Obesity. With BMI 35.6. kg/m2.      JACKY WELLS.Harshil. MD. VIPUL. FACP.  , [employer] [facility] [employer] ([place]  Nephrology Consultant. [facility].   [address] 5th [place] [address].     email: [email or web address].  Tel: Office: [phone]

## 2023-07-19 NOTE — Clinical Note
The site was marked. The left foot and left flank was prepped. The site was prepped with ChloraPrep. The patient was draped. The patient was positioned prone.

## 2023-07-19 NOTE — Clinical Note
NDL MONOPTY BIOPSY [id number] - biopsy device was inserted into into the left kidney and collected a biopsy of the left kidney. The sample count is 2.

## 2023-07-19 NOTE — OP NOTE
[facility] - Short Stay Cardiac Unit  Operative Note    Date of Procedure: 7/19/2023     Procedure: Procedure(s) (LRB):  BIOPSY, KIDNEY (Left)     Kidney biopsy note: 7/19/2023     Handy Adams  1955  [id number]    Pre-op Diagnosis: Proteinuria, unspecified type [R80.9]   Post-op Diagnosis: Proteinuria, unspecified type [R80.9]    Procedure note: Lt. Kidney Biopsy.   The patient consent was obtained earlier after explaining all the possible complications.  The patient was placed prone with support below the abdomen.   Vitals were stable.   The Lt. lower renal pole was localized with ultrasound.   The patient was given 2 % Xylocaine to anesthetize the skin and subcutaneous tissues.  A spinal needle was introduced with intermittent xylocaine injections form the skin down to the kidneys at 11cm depth.  We used he biopsy gun and 5 pieces was obtained via 2 passes.   The pieces were inspected under the Microscope.    The patient was stable.   Acetaminophen 1000mg IV was given.   There was no complication.  The procedure was smooth.   The patient was shifted to the observation one day surgery area for 8 hours of monitoring.  CBC will be done after 4 hours.  The urine color and output will be monitored.   The BP will be monitored closely.   He will be in complete bed rest for 6 hours post biopsy.   Complications: No  Condition: Good  FOLLOWUP: In clinic      Post Kidney Biopsy Orders:    The patient was shifted to the observation one day surgery area for 8 hours of monitoring.  Complete bed rest in supine for 6 hours with a pad below the Lt flank.  Head can be elevated at 40 degrees if she wants to eat.   Bed pan for urine is recommended. So that she won't get out of the bed for the first 6 hours.   CBC will be done after 4 hours. At 18:00.  The urine color and output will be monitored.   For the first three times she passes urine, a sample will be held in a cup to observe the color change   The BP will be monitored  closely. Every 15 min for one hour, then hourly.   No bleeding was noticed during the procedure    JACKY WELLS.Harshil. MD. VIPUL. LEONELP.  , [employer] [facility] [employer] ([place]  Nephrology Consultant. [facility].   [address] [place] [address]. 5th [place] [address].    email: [email or web address].  Tel: Office: [phone]

## 2023-07-20 NOTE — NURSING
Patient discharged per MD orders. Instructions given on medications, wound care, activity, signs of infection, when to call MD, and follow up appointments. Pt verbalized understanding.  PIV removed. Patient transferred off of unit via wheelchair by PCT.

## 2023-07-20 NOTE — NURSING
Pt ambulated around unit. Tolerated walk well. No c/o pain or discomfort at this time, resp even and unlabored. Gauze/tegaderm dressing to L flank is CDI. No active bleeding. No hematoma noted.

## 2023-07-21 ENCOUNTER — TELEPHONE (OUTPATIENT)
Dept: NEPHROLOGY | Facility: CLINIC | Age: 68
End: 2023-07-21
Payer: MEDICARE

## 2023-07-24 ENCOUNTER — PROCEDURE VISIT (OUTPATIENT)
Dept: OPHTHALMOLOGY | Facility: CLINIC | Age: 68
End: 2023-07-24
Payer: MEDICARE

## 2023-07-24 DIAGNOSIS — E11.3513 TYPE 2 DIABETES MELLITUS WITH BOTH EYES AFFECTED BY PROLIFERATIVE RETINOPATHY AND MACULAR EDEMA, WITHOUT LONG-TERM CURRENT USE OF INSULIN: Primary | ICD-10-CM

## 2023-07-24 PROCEDURE — 92012 PR EYE EXAM, EST PATIENT,INTERMED: ICD-10-PCS | Mod: 25,S$GLB,, | Performed by: OPHTHALMOLOGY

## 2023-07-24 PROCEDURE — 92012 INTRM OPH EXAM EST PATIENT: CPT | Mod: 25,S$GLB,, | Performed by: OPHTHALMOLOGY

## 2023-07-24 PROCEDURE — 67028 PR INJECT INTRAVITREAL PHARMCOLOGIC: ICD-10-PCS | Mod: RT,S$GLB,, | Performed by: OPHTHALMOLOGY

## 2023-07-24 PROCEDURE — 67028 INJECTION EYE DRUG: CPT | Mod: RT,S$GLB,, | Performed by: OPHTHALMOLOGY

## 2023-07-25 LAB
FINAL PATHOLOGIC DIAGNOSIS: NORMAL
GROSS: NORMAL
Lab: NORMAL

## 2023-07-27 ENCOUNTER — TELEPHONE (OUTPATIENT)
Dept: NEPHROLOGY | Facility: CLINIC | Age: 68
End: 2023-07-27
Payer: MEDICARE

## 2023-07-31 ENCOUNTER — PATIENT MESSAGE (OUTPATIENT)
Dept: ADMINISTRATIVE | Facility: OTHER | Age: 68
End: 2023-07-31
Payer: MEDICARE

## 2023-07-31 ENCOUNTER — PATIENT MESSAGE (OUTPATIENT)
Dept: NEPHROLOGY | Facility: CLINIC | Age: 68
End: 2023-07-31
Payer: MEDICARE

## 2023-08-01 ENCOUNTER — TELEPHONE (OUTPATIENT)
Dept: NEPHROLOGY | Facility: CLINIC | Age: 68
End: 2023-08-01
Payer: MEDICARE

## 2023-08-01 DIAGNOSIS — R80.9 PROTEINURIA, UNSPECIFIED TYPE: Primary | ICD-10-CM

## 2023-08-01 NOTE — TELEPHONE ENCOUNTER
----- Message from Betty Worthy NP sent at 8/1/2023 11:02 AM CDT -----  Karolina,    Please have him scheduled for repeat BMP. Orders have been placed. Thank you.

## 2023-08-01 NOTE — PROGRESS NOTES
Biopsy results reviewed with patient. Shows moderate-to-severe arterionephrosclerosis with features of accelerated hypertension - related vascular injury; diffuse acute tubular injury; moderate diabetic nephropathy.    There are no immune complex deposits by EM. A [place] red stain is negative for amyloid. There is no evidence of light chain cast nephropathy, light chain deposition disease, or light chain proximal tubulopathy.     Noted rise in sCr to 3.8 on labs in June. Will repeat BMP. Etiology unclear at this time. Discussed importance of monitoring BP for goal <130/80 (he follows with digital medicine program for HTN), continued blood sugar control, avoiding NSAIDs, maintain hydration.

## 2023-08-02 NOTE — PROGRESS NOTES
HPI     2 MONTH F/U   EYELA  OD     Additional comments: DM  TYPE 2  OCT TODAY    LAST BS   110 AVER    LAST A1C     6.2           Comments    DLS    05/30/23      WFFA  IRF SUPERIOR     SRF  OS     SEVERE NPDR   NS OU    HTN  CYST REMOVAL BY Dr MANUEL             OCT   OD: Good quality. Normal foveal contour  Superior IRF, exudates in parafovea. , decreased in central SRF  OS: Good quality. Normal foveal contour without IRF, SRF. Some small noncentral exudates    Prior WFFA  OD - normal transit time. Hyperfluorescence early c/w Ma/edema - sig NP    OS - Hyperfluorescence early c/w Ma/edema  .NV nasal      A/P    1. 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 DME OU   6/19 - pt did not FU until 8/20  S/p Avastin OD x 2  S/p Eylea OD x 2  S/p Ozurdex OD x 1 9/20 11/22 - not seen x 2 years  3/23 - had second opinion with Dr. Suh.  Pt would like to try a few Eylea prior to steroids if needed    Ozurdex OD today      3. HTN Ret OU  BS/BP/chol control    4. NS OU  Monitor - good Va    5. Cyst RLL  Removed by Rhea  Happy with result        2 month OCT no dilate - see in room    Risks, benefits, and alternatives to treatment discussed in detail with the patient.  The patient voiced understanding and wished to proceed with the procedure    Injection Procedure Note:  Diagnosis: DME OD    Patient Identified and Time Out complete  Pt marked  Topical Proparacaine and Betadine. Subconj lido  Inject Ozurdex OD at 6:00 @ 3.5-4mm posterior to limbus  Post Operative Dx: Same  Complications: None  Follow up as above.

## 2023-08-18 ENCOUNTER — LAB VISIT (OUTPATIENT)
Dept: LAB | Facility: HOSPITAL | Age: 68
End: 2023-08-18
Payer: MEDICARE

## 2023-08-18 ENCOUNTER — NUTRITION (OUTPATIENT)
Dept: NEPHROLOGY | Facility: CLINIC | Age: 68
End: 2023-08-18
Payer: MEDICARE

## 2023-08-18 DIAGNOSIS — N18.4 TYPE 2 DIABETES MELLITUS WITH STAGE 4 CHRONIC KIDNEY DISEASE, UNSPECIFIED WHETHER LONG TERM INSULIN USE: ICD-10-CM

## 2023-08-18 DIAGNOSIS — N18.4 TYPE 2 DIABETES MELLITUS WITH STAGE 4 CHRONIC KIDNEY DISEASE, UNSPECIFIED WHETHER LONG TERM INSULIN USE: Primary | ICD-10-CM

## 2023-08-18 DIAGNOSIS — E11.22 TYPE 2 DIABETES MELLITUS WITH STAGE 4 CHRONIC KIDNEY DISEASE, UNSPECIFIED WHETHER LONG TERM INSULIN USE: Primary | ICD-10-CM

## 2023-08-18 DIAGNOSIS — N18.4 CHRONIC KIDNEY DISEASE, STAGE 4 (SEVERE): ICD-10-CM

## 2023-08-18 DIAGNOSIS — E11.22 TYPE 2 DIABETES MELLITUS WITH STAGE 4 CHRONIC KIDNEY DISEASE, UNSPECIFIED WHETHER LONG TERM INSULIN USE: ICD-10-CM

## 2023-08-18 LAB
ALBUMIN SERPL BCP-MCNC: 3.7 G/DL (ref 3.5–5.2)
ANION GAP SERPL CALC-SCNC: 6 MMOL/L (ref 8–16)
BASOPHILS # BLD AUTO: 0.04 K/UL (ref 0–0.2)
BASOPHILS NFR BLD: 0.6 % (ref 0–1.9)
BUN SERPL-MCNC: 47 MG/DL (ref 8–23)
CALCIUM SERPL-MCNC: 9 MG/DL (ref 8.7–10.5)
CHLORIDE SERPL-SCNC: 109 MMOL/L (ref 95–110)
CO2 SERPL-SCNC: 25 MMOL/L (ref 23–29)
CREAT SERPL-MCNC: 3.4 MG/DL (ref 0.5–1.4)
DIFFERENTIAL METHOD: ABNORMAL
EOSINOPHIL # BLD AUTO: 0.2 K/UL (ref 0–0.5)
EOSINOPHIL NFR BLD: 2.3 % (ref 0–8)
ERYTHROCYTE [DISTWIDTH] IN BLOOD BY AUTOMATED COUNT: 15.9 % (ref 11.5–14.5)
EST. GFR  (NO RACE VARIABLE): 18.9 ML/MIN/1.73 M^2
GLUCOSE SERPL-MCNC: 87 MG/DL (ref 70–110)
HCT VFR BLD AUTO: 29.9 % (ref 40–54)
HGB BLD-MCNC: 9.2 G/DL (ref 14–18)
IMM GRANULOCYTES # BLD AUTO: 0.02 K/UL (ref 0–0.04)
IMM GRANULOCYTES NFR BLD AUTO: 0.3 % (ref 0–0.5)
LYMPHOCYTES # BLD AUTO: 1.2 K/UL (ref 1–4.8)
LYMPHOCYTES NFR BLD: 18.2 % (ref 18–48)
MCH RBC QN AUTO: 28.4 PG (ref 27–31)
MCHC RBC AUTO-ENTMCNC: 30.8 G/DL (ref 32–36)
MCV RBC AUTO: 92 FL (ref 82–98)
MONOCYTES # BLD AUTO: 0.7 K/UL (ref 0.3–1)
MONOCYTES NFR BLD: 10.2 % (ref 4–15)
NEUTROPHILS # BLD AUTO: 4.5 K/UL (ref 1.8–7.7)
NEUTROPHILS NFR BLD: 68.4 % (ref 38–73)
NRBC BLD-RTO: 0 /100 WBC
PHOSPHATE SERPL-MCNC: 3.4 MG/DL (ref 2.7–4.5)
PLATELET # BLD AUTO: 235 K/UL (ref 150–450)
PMV BLD AUTO: 11.9 FL (ref 9.2–12.9)
POTASSIUM SERPL-SCNC: 4.3 MMOL/L (ref 3.5–5.1)
RBC # BLD AUTO: 3.24 M/UL (ref 4.6–6.2)
SODIUM SERPL-SCNC: 140 MMOL/L (ref 136–145)
WBC # BLD AUTO: 6.5 K/UL (ref 3.9–12.7)

## 2023-08-18 PROCEDURE — 97802 MEDICAL NUTRITION INDIV IN: CPT | Mod: S$GLB,,,

## 2023-08-18 PROCEDURE — 36415 COLL VENOUS BLD VENIPUNCTURE: CPT | Performed by: NURSE PRACTITIONER

## 2023-08-18 PROCEDURE — 80069 RENAL FUNCTION PANEL: CPT | Performed by: NURSE PRACTITIONER

## 2023-08-18 PROCEDURE — 99999 PR PBB SHADOW E&M-EST. PATIENT-LVL I: CPT | Mod: PBBFAC,,,

## 2023-08-18 PROCEDURE — 97802 PR MED NUTR THER, 1ST, INDIV, EA 15 MIN: ICD-10-PCS | Mod: S$GLB,,,

## 2023-08-18 PROCEDURE — 99999 PR PBB SHADOW E&M-EST. PATIENT-LVL I: ICD-10-PCS | Mod: PBBFAC,,,

## 2023-08-18 PROCEDURE — 85025 COMPLETE CBC W/AUTO DIFF WBC: CPT | Performed by: NURSE PRACTITIONER

## 2023-08-18 NOTE — Clinical Note
Completed nutrition education for general CKD with a focus on sodium and wt loss/portion control. He reports ~10lbs wt loss and with the changes he has already made to his diet. Planning to increase exercise. Had some questions about his lab work- encouraged him to speak with you about this at next appt. Thanks for the referral!

## 2023-08-24 VITALS — HEIGHT: 72 IN | WEIGHT: 268.06 LBS | BODY MASS INDEX: 36.31 KG/M2

## 2023-08-24 NOTE — PATIENT INSTRUCTIONS
Recommendations: 1. Start reading food labels and select foods lower in sodium. Avoid high sodium foods such as sausage, hot dogs, hernandez, and pickles, etc.   2. Limit sodium intake to 2000 mg/day or less. Do not add salt to meals either when cooking or at the table.   3. Start using the plate method to build healthy/balanced meals consisting of plenty of vegetables, moderate animal protein, and moderate carbohydrates.   4. Stay hydrated with water. Avoid sugar sweetened beverages and dark sodas/pre-made teas.   5. Reduce intake of animal protein especially processed meats. Consider following a plant based diet which consists of plenty of vegetables, whole grains and plant based protein sources such as beans, nuts, tofu, edamame, and quinoa.      Goals Set with Pt:   Start a more regular exercise routine as appropriate and tolerated.   Keep eating a low sodium diet.

## 2023-08-24 NOTE — PROGRESS NOTES
"Referring Physician: Handy Adams was referred to nephrology nutrition education by JESUS Benavides (collaborating MD: Dr. Ingram)     Reason for visit:  Chief Complaint   Patient presents with    Nutrition Counseling    Chronic Kidney Disease    Diabetes    Hypertension        :1955     Allergies Reviewed  Meds Reviewed    Past Medical Hx: CKD, HLD, HTN, T2DM     Anthropometrics  Weight:121.6 kg (268 lb 1.3 oz)  Height:6' (1.829 m)  BMI:Body mass index is 36.36 kg/m².   IBW: 77.6kg    Labs:   Lab Results   Component Value Date    CREATININE 3.4 (H) 2023     Prot/Creat Ratio, Urine   Date Value Ref Range Status   2023 1.19 (H) 0.00 - 0.20 Final   2023 2.34 (H) 0.00 - 0.20 Final   2023 2.27 (H) 0.00 - 0.20 Final     Lab Results   Component Value Date     2023    K 4.3 2023    CO2 25 2023     2023     Lab Results   Component Value Date    .5 (H) 2022    CALCIUM 9.0 2023    PHOS 3.4 2023       eGFR: 16.6  HgA1C: 6.2    Estimated Energy Needs: 2200Kcals/day (30kcal/kg),  62g protein (0.8g/kg)   Wt used for Calories: IBW    24hr Diet Recall / Usual Intake Hx:   Breakfast: "[employer] Bar   Lunch: "[employer] Bar or sometimes will have LS turkey sandwich   Dinner: Vegetarian dish prepared by wife- something with lentils   Snacks: none   Drinks: water, lactose free milk with cereal sometimes     Eating Out: 1x/month     Supplements: none     Assessment: Pt reports his wife has started preparing more vegetarian meal (for her own reasoning) and so he has adjusted to eating this wt. Pt reports approx. 10lbs wt loss d/t these diet changes over the last few months. He notes eating a lot of "meat && potatoes" in his younger years. No f/u appointment scheduled with Nephrology- spoke with Marycarmen about getting a new appointment. Pt had a lot of great questions about sodium, specific food items, and drinks. " Reviewed handouts on low sodium diet, salt substitutes, moderate animal protein, and diabetic diet. Discussed the importance of reading food labels to reduce sodium intake. Also reviewed how to use the plate method to build healthy/balanced meals and snacks. Pt also interested increasing his exercise as tolerated. K is WNL- not a concern at this time. Answered all questions. Provided handouts and contact information.     Nutrition Diagnosis: Altered nutrition related labs related to organ/system dysfunction as evidenced by eGFR: 16    Recommendations: 1. Start reading food labels and select foods lower in sodium. Avoid high sodium foods such as sausage, hot dogs, hernandez, and pickles, etc.   2. Limit sodium intake to 2000 mg/day or less. Do not add salt to meals either when cooking or at the table.   3. Start using the plate method to build healthy/balanced meals consisting of plenty of vegetables, moderate animal protein, and moderate carbohydrates.   4. Stay hydrated with water. Avoid sugar sweetened beverages and dark sodas/pre-made teas.   5. Reduce intake of animal protein especially processed meats. Consider following a plant based diet which consists of plenty of vegetables, whole grains and plant based protein sources such as beans, nuts, tofu, edamame, and quinoa.      Goals Set with Pt:   Start a more regular exercise routine as appropriate and tolerated.   Keep eating a low sodium diet.     Written Materials Provided:   NKDEP sodium handout   NKDEP protein handout  Salt Free Seasonings handout   Sodium label reading handout   Build MyPlate Handout on   Goal Setting Sheet      Consultation Time: 30 minutes.    Follow Up: 6 months/PRN.

## 2023-08-25 ENCOUNTER — TELEPHONE (OUTPATIENT)
Dept: HEMATOLOGY/ONCOLOGY | Facility: CLINIC | Age: 68
End: 2023-08-25
Payer: MEDICARE

## 2023-08-27 DIAGNOSIS — D47.2 MGUS (MONOCLONAL GAMMOPATHY OF UNKNOWN SIGNIFICANCE): Primary | ICD-10-CM

## 2023-08-28 ENCOUNTER — OFFICE VISIT (OUTPATIENT)
Dept: HEMATOLOGY/ONCOLOGY | Facility: CLINIC | Age: 68
End: 2023-08-28
Payer: MEDICARE

## 2023-08-28 ENCOUNTER — LAB VISIT (OUTPATIENT)
Dept: LAB | Facility: HOSPITAL | Age: 68
End: 2023-08-28
Payer: MEDICARE

## 2023-08-28 VITALS
DIASTOLIC BLOOD PRESSURE: 90 MMHG | BODY MASS INDEX: 37.25 KG/M2 | RESPIRATION RATE: 16 BRPM | HEART RATE: 69 BPM | WEIGHT: 275 LBS | TEMPERATURE: 98 F | OXYGEN SATURATION: 100 % | SYSTOLIC BLOOD PRESSURE: 155 MMHG | HEIGHT: 72 IN

## 2023-08-28 DIAGNOSIS — D47.2 MGUS (MONOCLONAL GAMMOPATHY OF UNKNOWN SIGNIFICANCE): Primary | ICD-10-CM

## 2023-08-28 DIAGNOSIS — D47.2 MGUS (MONOCLONAL GAMMOPATHY OF UNKNOWN SIGNIFICANCE): ICD-10-CM

## 2023-08-28 LAB
ALBUMIN SERPL BCP-MCNC: 3.7 G/DL (ref 3.5–5.2)
ALP SERPL-CCNC: 46 U/L (ref 55–135)
ALT SERPL W/O P-5'-P-CCNC: 13 U/L (ref 10–44)
ANION GAP SERPL CALC-SCNC: 8 MMOL/L (ref 8–16)
AST SERPL-CCNC: 11 U/L (ref 10–40)
BASOPHILS # BLD AUTO: 0.03 K/UL (ref 0–0.2)
BASOPHILS NFR BLD: 0.5 % (ref 0–1.9)
BILIRUB SERPL-MCNC: 0.5 MG/DL (ref 0.1–1)
BUN SERPL-MCNC: 40 MG/DL (ref 8–23)
CALCIUM SERPL-MCNC: 9.3 MG/DL (ref 8.7–10.5)
CHLORIDE SERPL-SCNC: 110 MMOL/L (ref 95–110)
CO2 SERPL-SCNC: 21 MMOL/L (ref 23–29)
CREAT SERPL-MCNC: 3.1 MG/DL (ref 0.5–1.4)
DIFFERENTIAL METHOD: ABNORMAL
EOSINOPHIL # BLD AUTO: 0.2 K/UL (ref 0–0.5)
EOSINOPHIL NFR BLD: 3.5 % (ref 0–8)
ERYTHROCYTE [DISTWIDTH] IN BLOOD BY AUTOMATED COUNT: 15.6 % (ref 11.5–14.5)
EST. GFR  (NO RACE VARIABLE): 21.1 ML/MIN/1.73 M^2
GLUCOSE SERPL-MCNC: 100 MG/DL (ref 70–110)
HCT VFR BLD AUTO: 30.7 % (ref 40–54)
HGB BLD-MCNC: 9.5 G/DL (ref 14–18)
IMM GRANULOCYTES # BLD AUTO: 0.03 K/UL (ref 0–0.04)
IMM GRANULOCYTES NFR BLD AUTO: 0.5 % (ref 0–0.5)
LYMPHOCYTES # BLD AUTO: 1.3 K/UL (ref 1–4.8)
LYMPHOCYTES NFR BLD: 20.2 % (ref 18–48)
MCH RBC QN AUTO: 29.2 PG (ref 27–31)
MCHC RBC AUTO-ENTMCNC: 30.9 G/DL (ref 32–36)
MCV RBC AUTO: 95 FL (ref 82–98)
MONOCYTES # BLD AUTO: 0.6 K/UL (ref 0.3–1)
MONOCYTES NFR BLD: 9.2 % (ref 4–15)
NEUTROPHILS # BLD AUTO: 4.4 K/UL (ref 1.8–7.7)
NEUTROPHILS NFR BLD: 66.1 % (ref 38–73)
NRBC BLD-RTO: 0 /100 WBC
PLATELET # BLD AUTO: 194 K/UL (ref 150–450)
PMV BLD AUTO: 12.3 FL (ref 9.2–12.9)
POTASSIUM SERPL-SCNC: 4.3 MMOL/L (ref 3.5–5.1)
PROT SERPL-MCNC: 7.7 G/DL (ref 6–8.4)
RBC # BLD AUTO: 3.25 M/UL (ref 4.6–6.2)
SODIUM SERPL-SCNC: 139 MMOL/L (ref 136–145)
WBC # BLD AUTO: 6.65 K/UL (ref 3.9–12.7)

## 2023-08-28 PROCEDURE — 86334 IMMUNOFIX E-PHORESIS SERUM: CPT | Performed by: STUDENT IN AN ORGANIZED HEALTH CARE EDUCATION/TRAINING PROGRAM

## 2023-08-28 PROCEDURE — 1159F MED LIST DOCD IN RCRD: CPT | Mod: CPTII,GC,S$GLB, | Performed by: STUDENT IN AN ORGANIZED HEALTH CARE EDUCATION/TRAINING PROGRAM

## 2023-08-28 PROCEDURE — 84165 PATHOLOGIST INTERPRETATION SPE: ICD-10-PCS | Mod: 26,,, | Performed by: PATHOLOGY

## 2023-08-28 PROCEDURE — 3080F PR MOST RECENT DIASTOLIC BLOOD PRESSURE >= 90 MM HG: ICD-10-PCS | Mod: CPTII,GC,S$GLB, | Performed by: STUDENT IN AN ORGANIZED HEALTH CARE EDUCATION/TRAINING PROGRAM

## 2023-08-28 PROCEDURE — 4010F PR ACE/ARB THEARPY RXD/TAKEN: ICD-10-PCS | Mod: CPTII,GC,S$GLB, | Performed by: STUDENT IN AN ORGANIZED HEALTH CARE EDUCATION/TRAINING PROGRAM

## 2023-08-28 PROCEDURE — 84165 PROTEIN E-PHORESIS SERUM: CPT | Performed by: STUDENT IN AN ORGANIZED HEALTH CARE EDUCATION/TRAINING PROGRAM

## 2023-08-28 PROCEDURE — 80053 COMPREHEN METABOLIC PANEL: CPT | Performed by: STUDENT IN AN ORGANIZED HEALTH CARE EDUCATION/TRAINING PROGRAM

## 2023-08-28 PROCEDURE — 3062F POS MACROALBUMINURIA REV: CPT | Mod: CPTII,GC,S$GLB, | Performed by: STUDENT IN AN ORGANIZED HEALTH CARE EDUCATION/TRAINING PROGRAM

## 2023-08-28 PROCEDURE — 1101F PT FALLS ASSESS-DOCD LE1/YR: CPT | Mod: CPTII,GC,S$GLB, | Performed by: STUDENT IN AN ORGANIZED HEALTH CARE EDUCATION/TRAINING PROGRAM

## 2023-08-28 PROCEDURE — 3008F PR BODY MASS INDEX (BMI) DOCUMENTED: ICD-10-PCS | Mod: CPTII,GC,S$GLB, | Performed by: STUDENT IN AN ORGANIZED HEALTH CARE EDUCATION/TRAINING PROGRAM

## 2023-08-28 PROCEDURE — 84165 PROTEIN E-PHORESIS SERUM: CPT | Mod: 26,,, | Performed by: PATHOLOGY

## 2023-08-28 PROCEDURE — 99999 PR PBB SHADOW E&M-EST. PATIENT-LVL IV: CPT | Mod: PBBFAC,GC,, | Performed by: STUDENT IN AN ORGANIZED HEALTH CARE EDUCATION/TRAINING PROGRAM

## 2023-08-28 PROCEDURE — 86334 PATHOLOGIST INTERPRETATION IFE: ICD-10-PCS | Mod: 26,,, | Performed by: PATHOLOGY

## 2023-08-28 PROCEDURE — 86334 IMMUNOFIX E-PHORESIS SERUM: CPT | Mod: 26,,, | Performed by: PATHOLOGY

## 2023-08-28 PROCEDURE — 99214 PR OFFICE/OUTPT VISIT, EST, LEVL IV, 30-39 MIN: ICD-10-PCS | Mod: GC,S$GLB,, | Performed by: STUDENT IN AN ORGANIZED HEALTH CARE EDUCATION/TRAINING PROGRAM

## 2023-08-28 PROCEDURE — 3077F PR MOST RECENT SYSTOLIC BLOOD PRESSURE >= 140 MM HG: ICD-10-PCS | Mod: CPTII,GC,S$GLB, | Performed by: STUDENT IN AN ORGANIZED HEALTH CARE EDUCATION/TRAINING PROGRAM

## 2023-08-28 PROCEDURE — 3080F DIAST BP >= 90 MM HG: CPT | Mod: CPTII,GC,S$GLB, | Performed by: STUDENT IN AN ORGANIZED HEALTH CARE EDUCATION/TRAINING PROGRAM

## 2023-08-28 PROCEDURE — 4010F ACE/ARB THERAPY RXD/TAKEN: CPT | Mod: CPTII,GC,S$GLB, | Performed by: STUDENT IN AN ORGANIZED HEALTH CARE EDUCATION/TRAINING PROGRAM

## 2023-08-28 PROCEDURE — 1126F PR PAIN SEVERITY QUANTIFIED, NO PAIN PRESENT: ICD-10-PCS | Mod: CPTII,GC,S$GLB, | Performed by: STUDENT IN AN ORGANIZED HEALTH CARE EDUCATION/TRAINING PROGRAM

## 2023-08-28 PROCEDURE — 85025 COMPLETE CBC W/AUTO DIFF WBC: CPT | Performed by: STUDENT IN AN ORGANIZED HEALTH CARE EDUCATION/TRAINING PROGRAM

## 2023-08-28 PROCEDURE — 1101F PR PT FALLS ASSESS DOC 0-1 FALLS W/OUT INJ PAST YR: ICD-10-PCS | Mod: CPTII,GC,S$GLB, | Performed by: STUDENT IN AN ORGANIZED HEALTH CARE EDUCATION/TRAINING PROGRAM

## 2023-08-28 PROCEDURE — 1126F AMNT PAIN NOTED NONE PRSNT: CPT | Mod: CPTII,GC,S$GLB, | Performed by: STUDENT IN AN ORGANIZED HEALTH CARE EDUCATION/TRAINING PROGRAM

## 2023-08-28 PROCEDURE — 83521 IG LIGHT CHAINS FREE EACH: CPT | Mod: 59 | Performed by: STUDENT IN AN ORGANIZED HEALTH CARE EDUCATION/TRAINING PROGRAM

## 2023-08-28 PROCEDURE — 36415 COLL VENOUS BLD VENIPUNCTURE: CPT | Performed by: STUDENT IN AN ORGANIZED HEALTH CARE EDUCATION/TRAINING PROGRAM

## 2023-08-28 PROCEDURE — 3044F HG A1C LEVEL LT 7.0%: CPT | Mod: CPTII,GC,S$GLB, | Performed by: STUDENT IN AN ORGANIZED HEALTH CARE EDUCATION/TRAINING PROGRAM

## 2023-08-28 PROCEDURE — 3044F PR MOST RECENT HEMOGLOBIN A1C LEVEL <7.0%: ICD-10-PCS | Mod: CPTII,GC,S$GLB, | Performed by: STUDENT IN AN ORGANIZED HEALTH CARE EDUCATION/TRAINING PROGRAM

## 2023-08-28 PROCEDURE — 3062F PR POS MACROALBUMINURIA RESULT DOCUMENTED/REVIEW: ICD-10-PCS | Mod: CPTII,GC,S$GLB, | Performed by: STUDENT IN AN ORGANIZED HEALTH CARE EDUCATION/TRAINING PROGRAM

## 2023-08-28 PROCEDURE — 3288F FALL RISK ASSESSMENT DOCD: CPT | Mod: CPTII,GC,S$GLB, | Performed by: STUDENT IN AN ORGANIZED HEALTH CARE EDUCATION/TRAINING PROGRAM

## 2023-08-28 PROCEDURE — 99214 OFFICE O/P EST MOD 30 MIN: CPT | Mod: GC,S$GLB,, | Performed by: STUDENT IN AN ORGANIZED HEALTH CARE EDUCATION/TRAINING PROGRAM

## 2023-08-28 PROCEDURE — 3066F NEPHROPATHY DOC TX: CPT | Mod: CPTII,GC,S$GLB, | Performed by: STUDENT IN AN ORGANIZED HEALTH CARE EDUCATION/TRAINING PROGRAM

## 2023-08-28 PROCEDURE — 99999 PR PBB SHADOW E&M-EST. PATIENT-LVL IV: ICD-10-PCS | Mod: PBBFAC,GC,, | Performed by: STUDENT IN AN ORGANIZED HEALTH CARE EDUCATION/TRAINING PROGRAM

## 2023-08-28 PROCEDURE — 3008F BODY MASS INDEX DOCD: CPT | Mod: CPTII,GC,S$GLB, | Performed by: STUDENT IN AN ORGANIZED HEALTH CARE EDUCATION/TRAINING PROGRAM

## 2023-08-28 PROCEDURE — 3077F SYST BP >= 140 MM HG: CPT | Mod: CPTII,GC,S$GLB, | Performed by: STUDENT IN AN ORGANIZED HEALTH CARE EDUCATION/TRAINING PROGRAM

## 2023-08-28 PROCEDURE — 1159F PR MEDICATION LIST DOCUMENTED IN MEDICAL RECORD: ICD-10-PCS | Mod: CPTII,GC,S$GLB, | Performed by: STUDENT IN AN ORGANIZED HEALTH CARE EDUCATION/TRAINING PROGRAM

## 2023-08-28 PROCEDURE — 3066F PR DOCUMENTATION OF TREATMENT FOR NEPHROPATHY: ICD-10-PCS | Mod: CPTII,GC,S$GLB, | Performed by: STUDENT IN AN ORGANIZED HEALTH CARE EDUCATION/TRAINING PROGRAM

## 2023-08-28 PROCEDURE — 3288F PR FALLS RISK ASSESSMENT DOCUMENTED: ICD-10-PCS | Mod: CPTII,GC,S$GLB, | Performed by: STUDENT IN AN ORGANIZED HEALTH CARE EDUCATION/TRAINING PROGRAM

## 2023-08-28 NOTE — PROGRESS NOTES
"Section of Hematology and Stem Cell Transplantation  Follow Up Note     Visit Date: 08/28/2023    Primary Oncologic Diagnosis: MGUS    History of Present Ilness: (from initial consult 11/15/2022)    Handy Adams (Handy) is a pleasant 67 y.o.male with a past medical history of T2DM, hyperlipidemia, HTN, and recently diagnosed kidney disease referred by PCP for evaluation of IgG Lambda paraprotein noted on SPEP (0.74g/dl). He had not been seen by medical providers in many years, until earlier this year when presenting for evaluation and was noted to have uncontrolled DM with A1C >10 and HTN.  Over the year, he has gradually had worsening anemia and kidney function, which prompted SPEP evaluation. He has normal light chain ratio and normal calcium levels, but we did discuss the spectrum of MGUS/SMM/MM and that his anemia and kidney decline in setting of paraprotein warrants bone marrow biopsy. Patient is hesitant to proceed with bone marrow biopsy and would like to repeat labs and discuss this again in coming weeks. He is seeing nephrology after our visit to discuss kidney biopsy.     He has had no fevers, chills, CP/SOB, abdominal pain, bone pain, recurrent infections, unintended weight loss, LAD.      He is UTD on colonoscopy, had one earlier this year with 8 benign polyps, plan to repeat in 3 years.  He has ~3 pack year history, smoking 1/2 ppd over the last 3 years, does not warrant lung eval and has no pulmonary complaints.  Denies urinary complaints, has not had prostate exam.     Interval History:     Patient presents for follow-up for his MGUS. He reports doing well since our last visit with his only complaint being a mild hemorrhoid flare. He underwent kidney biopsy on 7/19/23 with path showing "arterionephrosclerosis and diabetic nephropathy." He was updated regarding his stable MGUS at this visit and plan for continued monitoring and was agreeable and understanding as well as the need for control of his " DM & HTN to preserve remaining kidney function and was agreeable and understanding.     Past Medical History, Social History, and Past Family History are unchanged since last evaluation except for HPI.     CURRENT MEDICATIONS:   Current Outpatient Medications   Medication Sig    atorvastatin (LIPITOR) 20 MG tablet Take 1 tablet by mouth once daily    blood sugar diagnostic Strp To test twice daily    chlorthalidone (HYGROTEN) 25 MG Tab Take 1 tablet (25 mg total) by mouth once daily.    ergocalciferol (ERGOCALCIFEROL) 50,000 unit Cap Take 1 capsule (50,000 Units total) by mouth every 7 days.    ferrous sulfate 325 (65 FE) MG EC tablet Take 1 tablet (325 mg total) by mouth 2 (two) times daily.    labetaloL (NORMODYNE) 200 MG tablet Take 1 tablet (200 mg total) by mouth 2 (two) times daily.    lancets Misc 1 lancet by Misc.(Non-Drug; Combo Route) route 2 (two) times daily with meals.    linaGLIPtin (TRADJENTA) 5 mg Tab tablet Take 1 tablet (5 mg total) by mouth once daily.    NIFEdipine (PROCARDIA-XL) 90 MG (OSM) 24 hr tablet Take 1 tablet (90 mg total) by mouth once daily.    pioglitazone (ACTOS) 30 MG tablet Take 1 tablet (30 mg total) by mouth once daily.    spironolactone (ALDACTONE) 25 MG tablet Take 1 tablet (25 mg total) by mouth once daily.    triamcinolone acetonide 0.1% (KENALOG) 0.1 % ointment Apply topically 2 (two) times daily.    valsartan (DIOVAN) 320 MG tablet Take 1 tablet (320 mg total) by mouth once daily.    blood-glucose meter kit Use as instructed    fexofenadine (ALLEGRA) 180 MG tablet Take 1 tablet (180 mg total) by mouth once daily. (Patient not taking: Reported on 3/22/2023)    GAVILYTE-G 236-22.74-6.74 -5.86 gram suspension DRINK 4L LIQUID BY MOUTH ONCE FOR 1 DOSE    sildenafiL (VIAGRA) 50 MG tablet Take 1 tablet (50 mg total) by mouth daily as needed for Erectile Dysfunction. (Patient not taking: Reported on 3/22/2023)    sodium bicarbonate 650 MG tablet Take 1 tablet (650  mg total) by mouth 2 (two) times daily.     No current facility-administered medications for this visit.       ALLERGIES:   Review of patient's allergies indicates:  No Known Allergies      Review of Systems:     Review of Systems   Constitutional:  Negative for chills, fever, malaise/fatigue and weight loss.   HENT: Negative.     Eyes: Negative.    Respiratory: Negative.     Cardiovascular: Negative.    Gastrointestinal: Negative.    Genitourinary: Negative.    Musculoskeletal: Negative.    Skin: Negative.    Neurological: Negative.    Endo/Heme/Allergies: Negative.    Psychiatric/Behavioral:  The patient is nervous/anxious.        Physical Exam:     Vitals:    08/28/23 1134   BP: (!) 155/90   Pulse: 69   Resp: 16   Temp: 97.9 °F (36.6 °C)       Physical Exam  Vitals reviewed.   Constitutional:       General: He is not in acute distress.     Appearance: He is obese.   HENT:      Head: Normocephalic.      Right Ear: External ear normal.      Left Ear: External ear normal.      Nose: Nose normal.      Mouth/Throat:      Mouth: Mucous membranes are moist.      Pharynx: Oropharynx is clear.   Eyes:      Extraocular Movements: Extraocular movements intact.      Pupils: Pupils are equal, round, and reactive to light.   Cardiovascular:      Rate and Rhythm: Normal rate and regular rhythm.      Pulses: Normal pulses.   Pulmonary:      Effort: Pulmonary effort is normal.      Breath sounds: Normal breath sounds.   Abdominal:      General: Abdomen is flat.      Palpations: Abdomen is soft.   Musculoskeletal:         General: No swelling. Normal range of motion.      Cervical back: Neck supple.   Lymphadenopathy:      Cervical: No cervical adenopathy.   Skin:     General: Skin is warm.      Findings: No bruising.   Neurological:      General: No focal deficit present.      Mental Status: He is alert.      Motor: No weakness.   Psychiatric:         Mood and Affect: Mood normal.         Labs:   Lab Results   Component Value  "Date    WBC 6.65 08/28/2023    HGB 9.5 (L) 08/28/2023    HCT 30.7 (L) 08/28/2023    MCV 95 08/28/2023     08/28/2023       Lab Results   Component Value Date     08/28/2023    K 4.3 08/28/2023     08/28/2023    CO2 21 (L) 08/28/2023    BUN 40 (H) 08/28/2023    CREATININE 3.1 (H) 08/28/2023    ALBUMIN 3.7 08/28/2023    BILITOT 0.5 08/28/2023    ALKPHOS 46 (L) 08/28/2023    AST 11 08/28/2023    ALT 13 08/28/2023            Assessment and Plan:   Handy Cook) is a pleasant 67 y.o.male referred for evaluation of paraprotein     IgG Lambda MGUS  - Pt with elevated IgG lambda paraprotein on recent SPEP, in setting of declining Hgb and kidney function. We reviewed at length the spectrum of MGUS/SMM/MM.   - Repeat plasma cell evaluation shows M spike of 1.0, K/L ratio of 1.79.   - Given paraprotein with normocytic anemia and kidney dysfunction, he warranted bone marrow biopsy for evaluation  - Bone marrow biopsy significant for 5% plasma cell neoplasm consistent with MDS  - He underwent kidney biopsy on 7/19/23 with path showing "arterionephrosclerosis and diabetic nephropathy."   Plan:  -Repeat MGUS panel ordered at this clinic visit   -Return to clinic in 6 months for continued close monitoring of MGUS       2. Kidney Disease  - Worsening kidney disease over last year in setting of HTN and uncontrolled T2DM, after previously not being seen by medical provider over past few years.  - Ultrasound sound shows medical renal disease  Cr worsening at this clinic visit to 3.8  Plan:  -Encouraged follow-up with Nephrology  -Return to clinic for continued monitoring of MGUS        Orders Placed:            No orders of the defined types were placed in this encounter.        Follow Up:            BMT Chart Routing      Follow up with physician 6 months. 6 months with Dr. Melton   Follow up with [facility]    Provider visit type    Infusion scheduling note    Injection scheduling note    Labs CMP, CBC, SPEP, " immunofixation and free light chains   Scheduling:  Preferred lab:  Lab interval:  Labs in 6 months   Imaging    Pharmacy appointment    Other referrals                 Remigio Grubbs D.O.  Hematology/Oncology Fellow, PGY-IV

## 2023-08-29 LAB
ALBUMIN SERPL ELPH-MCNC: 3.9 G/DL (ref 3.35–5.55)
ALPHA1 GLOB SERPL ELPH-MCNC: 0.25 G/DL (ref 0.17–0.41)
ALPHA2 GLOB SERPL ELPH-MCNC: 0.72 G/DL (ref 0.43–0.99)
B-GLOBULIN SERPL ELPH-MCNC: 0.6 G/DL (ref 0.5–1.1)
GAMMA GLOB SERPL ELPH-MCNC: 1.93 G/DL (ref 0.67–1.58)
INTERPRETATION SERPL IFE-IMP: NORMAL
KAPPA LC SER QL IA: 8.03 MG/DL (ref 0.33–1.94)
KAPPA LC/LAMBDA SER IA: 1.64 (ref 0.26–1.65)
LAMBDA LC SER QL IA: 4.89 MG/DL (ref 0.57–2.63)
PATHOLOGIST INTERPRETATION IFE: NORMAL
PATHOLOGIST INTERPRETATION SPE: NORMAL
PROT SERPL-MCNC: 7.4 G/DL (ref 6–8.4)

## 2023-09-01 ENCOUNTER — CLINICAL SUPPORT (OUTPATIENT)
Dept: ENDOCRINOLOGY | Facility: CLINIC | Age: 68
End: 2023-09-01
Payer: MEDICARE

## 2023-09-01 ENCOUNTER — LAB VISIT (OUTPATIENT)
Dept: LAB | Facility: HOSPITAL | Age: 68
End: 2023-09-01
Attending: NURSE PRACTITIONER
Payer: MEDICARE

## 2023-09-01 DIAGNOSIS — E11.65 TYPE 2 DIABETES MELLITUS WITH HYPERGLYCEMIA, WITHOUT LONG-TERM CURRENT USE OF INSULIN: ICD-10-CM

## 2023-09-01 LAB
ESTIMATED AVG GLUCOSE: 114 MG/DL (ref 68–131)
HBA1C MFR BLD: 5.6 % (ref 4–5.6)

## 2023-09-01 PROCEDURE — 83036 HEMOGLOBIN GLYCOSYLATED A1C: CPT | Performed by: NURSE PRACTITIONER

## 2023-09-01 PROCEDURE — 99499 UNLISTED E&M SERVICE: CPT | Mod: S$GLB,,, | Performed by: NURSE PRACTITIONER

## 2023-09-01 PROCEDURE — 99499 NO LOS: ICD-10-PCS | Mod: S$GLB,,, | Performed by: NURSE PRACTITIONER

## 2023-09-01 PROCEDURE — 36415 COLL VENOUS BLD VENIPUNCTURE: CPT | Performed by: NURSE PRACTITIONER

## 2023-09-01 NOTE — PROGRESS NOTES
Patient is here today to participate in a Continuous Glucose Monitoring Study.  Patient will wear a Dexcom for 7 days in an unblinded study.  Patient will be provided with a Dexcom sensor and transmitter and a copy of the Glucose Monitoring Patient Log to fill out during the study.  A detailed explanation of Continuous Glucose Monitoring was provided.   Patient informed that this is an unblinded procedure and patient assisted with downloading appropriate apps onto cellular phone.   Instructed patient to record meals, drinks,  snacks, activity, and all diabetes medications on glucose log.  Site for insertion was selected and prepared with a sterile alcohol swab and allowed to dry. Glucose Sensor Serial Number [id number] was inserted to right lower quadrant.  Insertion of sensor done in clinic, individually, in private. Time: 25 minutes

## 2023-09-08 ENCOUNTER — OFFICE VISIT (OUTPATIENT)
Dept: NEPHROLOGY | Facility: CLINIC | Age: 68
End: 2023-09-08
Payer: MEDICARE

## 2023-09-08 ENCOUNTER — OFFICE VISIT (OUTPATIENT)
Dept: ENDOCRINOLOGY | Facility: CLINIC | Age: 68
End: 2023-09-08
Payer: MEDICARE

## 2023-09-08 ENCOUNTER — CLINICAL SUPPORT (OUTPATIENT)
Dept: ENDOCRINOLOGY | Facility: CLINIC | Age: 68
End: 2023-09-08
Payer: MEDICARE

## 2023-09-08 VITALS
SYSTOLIC BLOOD PRESSURE: 134 MMHG | HEART RATE: 82 BPM | TEMPERATURE: 98 F | WEIGHT: 282 LBS | DIASTOLIC BLOOD PRESSURE: 70 MMHG | BODY MASS INDEX: 38.25 KG/M2

## 2023-09-08 VITALS
WEIGHT: 280 LBS | HEART RATE: 78 BPM | DIASTOLIC BLOOD PRESSURE: 79 MMHG | SYSTOLIC BLOOD PRESSURE: 131 MMHG | BODY MASS INDEX: 37.97 KG/M2 | OXYGEN SATURATION: 99 %

## 2023-09-08 DIAGNOSIS — E11.22 TYPE 2 DIABETES MELLITUS WITH STAGE 4 CHRONIC KIDNEY DISEASE, UNSPECIFIED WHETHER LONG TERM INSULIN USE: ICD-10-CM

## 2023-09-08 DIAGNOSIS — N18.4 TYPE 2 DIABETES MELLITUS WITH STAGE 4 CHRONIC KIDNEY DISEASE, UNSPECIFIED WHETHER LONG TERM INSULIN USE: ICD-10-CM

## 2023-09-08 DIAGNOSIS — E78.5 HYPERLIPIDEMIA, UNSPECIFIED HYPERLIPIDEMIA TYPE: ICD-10-CM

## 2023-09-08 DIAGNOSIS — E11.8 CONTROLLED TYPE 2 DIABETES MELLITUS WITH COMPLICATION, WITHOUT LONG-TERM CURRENT USE OF INSULIN: Primary | ICD-10-CM

## 2023-09-08 DIAGNOSIS — D64.9 ANEMIA, UNSPECIFIED TYPE: ICD-10-CM

## 2023-09-08 DIAGNOSIS — N18.4 CHRONIC KIDNEY DISEASE, STAGE 4 (SEVERE): Primary | ICD-10-CM

## 2023-09-08 DIAGNOSIS — E11.65 TYPE 2 DIABETES MELLITUS WITH HYPERGLYCEMIA, WITHOUT LONG-TERM CURRENT USE OF INSULIN: Primary | ICD-10-CM

## 2023-09-08 DIAGNOSIS — E66.01 SEVERE OBESITY (BMI 35.0-39.9) WITH COMORBIDITY: ICD-10-CM

## 2023-09-08 DIAGNOSIS — R80.9 PROTEINURIA, UNSPECIFIED TYPE: ICD-10-CM

## 2023-09-08 DIAGNOSIS — N18.4 CKD (CHRONIC KIDNEY DISEASE) STAGE 4, GFR 15-29 ML/MIN: ICD-10-CM

## 2023-09-08 DIAGNOSIS — D47.2 MGUS (MONOCLONAL GAMMOPATHY OF UNKNOWN SIGNIFICANCE): ICD-10-CM

## 2023-09-08 DIAGNOSIS — N25.81 SECONDARY HYPERPARATHYROIDISM: ICD-10-CM

## 2023-09-08 DIAGNOSIS — E87.20 ACIDOSIS: ICD-10-CM

## 2023-09-08 PROCEDURE — 3062F PR POS MACROALBUMINURIA RESULT DOCUMENTED/REVIEW: ICD-10-PCS | Mod: CPTII,S$GLB,, | Performed by: NURSE PRACTITIONER

## 2023-09-08 PROCEDURE — 3066F NEPHROPATHY DOC TX: CPT | Mod: CPTII,S$GLB,, | Performed by: NURSE PRACTITIONER

## 2023-09-08 PROCEDURE — 3044F HG A1C LEVEL LT 7.0%: CPT | Mod: CPTII,S$GLB,, | Performed by: NURSE PRACTITIONER

## 2023-09-08 PROCEDURE — 3078F DIAST BP <80 MM HG: CPT | Mod: CPTII,S$GLB,, | Performed by: NURSE PRACTITIONER

## 2023-09-08 PROCEDURE — 99499 UNLISTED E&M SERVICE: CPT | Mod: S$GLB,,, | Performed by: NURSE PRACTITIONER

## 2023-09-08 PROCEDURE — 3008F PR BODY MASS INDEX (BMI) DOCUMENTED: ICD-10-PCS | Mod: CPTII,S$GLB,, | Performed by: NURSE PRACTITIONER

## 2023-09-08 PROCEDURE — 4010F ACE/ARB THERAPY RXD/TAKEN: CPT | Mod: CPTII,S$GLB,, | Performed by: NURSE PRACTITIONER

## 2023-09-08 PROCEDURE — 95250 PR GLUCOSE MONITORING,72 HRS,SUB-Q SENSOR: ICD-10-PCS | Mod: S$GLB,,, | Performed by: NURSE PRACTITIONER

## 2023-09-08 PROCEDURE — 99214 PR OFFICE/OUTPT VISIT, EST, LEVL IV, 30-39 MIN: ICD-10-PCS | Mod: S$GLB,,, | Performed by: NURSE PRACTITIONER

## 2023-09-08 PROCEDURE — 1159F MED LIST DOCD IN RCRD: CPT | Mod: CPTII,S$GLB,, | Performed by: NURSE PRACTITIONER

## 2023-09-08 PROCEDURE — 3078F PR MOST RECENT DIASTOLIC BLOOD PRESSURE < 80 MM HG: ICD-10-PCS | Mod: CPTII,S$GLB,, | Performed by: NURSE PRACTITIONER

## 2023-09-08 PROCEDURE — 99214 OFFICE O/P EST MOD 30 MIN: CPT | Mod: S$GLB,,, | Performed by: NURSE PRACTITIONER

## 2023-09-08 PROCEDURE — 99999 PR PBB SHADOW E&M-EST. PATIENT-LVL III: CPT | Mod: PBBFAC,,, | Performed by: NURSE PRACTITIONER

## 2023-09-08 PROCEDURE — 3008F BODY MASS INDEX DOCD: CPT | Mod: CPTII,S$GLB,, | Performed by: NURSE PRACTITIONER

## 2023-09-08 PROCEDURE — 1159F PR MEDICATION LIST DOCUMENTED IN MEDICAL RECORD: ICD-10-PCS | Mod: CPTII,S$GLB,, | Performed by: NURSE PRACTITIONER

## 2023-09-08 PROCEDURE — 95251 PR GLUCOSE MONITOR, 72 HOUR, PHYS INTERP: ICD-10-PCS | Mod: S$GLB,,, | Performed by: NURSE PRACTITIONER

## 2023-09-08 PROCEDURE — 3075F SYST BP GE 130 - 139MM HG: CPT | Mod: CPTII,S$GLB,, | Performed by: NURSE PRACTITIONER

## 2023-09-08 PROCEDURE — 99999 PR PBB SHADOW E&M-EST. PATIENT-LVL IV: ICD-10-PCS | Mod: PBBFAC,,, | Performed by: NURSE PRACTITIONER

## 2023-09-08 PROCEDURE — 1101F PT FALLS ASSESS-DOCD LE1/YR: CPT | Mod: CPTII,S$GLB,, | Performed by: NURSE PRACTITIONER

## 2023-09-08 PROCEDURE — 3044F PR MOST RECENT HEMOGLOBIN A1C LEVEL <7.0%: ICD-10-PCS | Mod: CPTII,S$GLB,, | Performed by: NURSE PRACTITIONER

## 2023-09-08 PROCEDURE — 3062F POS MACROALBUMINURIA REV: CPT | Mod: CPTII,S$GLB,, | Performed by: NURSE PRACTITIONER

## 2023-09-08 PROCEDURE — 99999 PR PBB SHADOW E&M-EST. PATIENT-LVL III: ICD-10-PCS | Mod: PBBFAC,,, | Performed by: NURSE PRACTITIONER

## 2023-09-08 PROCEDURE — 99999 PR PBB SHADOW E&M-EST. PATIENT-LVL IV: CPT | Mod: PBBFAC,,, | Performed by: NURSE PRACTITIONER

## 2023-09-08 PROCEDURE — 3075F PR MOST RECENT SYSTOLIC BLOOD PRESS GE 130-139MM HG: ICD-10-PCS | Mod: CPTII,S$GLB,, | Performed by: NURSE PRACTITIONER

## 2023-09-08 PROCEDURE — 3288F FALL RISK ASSESSMENT DOCD: CPT | Mod: CPTII,S$GLB,, | Performed by: NURSE PRACTITIONER

## 2023-09-08 PROCEDURE — 1126F PR PAIN SEVERITY QUANTIFIED, NO PAIN PRESENT: ICD-10-PCS | Mod: CPTII,S$GLB,, | Performed by: NURSE PRACTITIONER

## 2023-09-08 PROCEDURE — 1160F PR REVIEW ALL MEDS BY PRESCRIBER/CLIN PHARMACIST DOCUMENTED: ICD-10-PCS | Mod: CPTII,S$GLB,, | Performed by: NURSE PRACTITIONER

## 2023-09-08 PROCEDURE — 99499 NO LOS: ICD-10-PCS | Mod: S$GLB,,, | Performed by: NURSE PRACTITIONER

## 2023-09-08 PROCEDURE — 95251 CONT GLUC MNTR ANALYSIS I&R: CPT | Mod: S$GLB,,, | Performed by: NURSE PRACTITIONER

## 2023-09-08 PROCEDURE — 1160F RVW MEDS BY RX/DR IN RCRD: CPT | Mod: CPTII,S$GLB,, | Performed by: NURSE PRACTITIONER

## 2023-09-08 PROCEDURE — 3288F PR FALLS RISK ASSESSMENT DOCUMENTED: ICD-10-PCS | Mod: CPTII,S$GLB,, | Performed by: NURSE PRACTITIONER

## 2023-09-08 PROCEDURE — 3066F PR DOCUMENTATION OF TREATMENT FOR NEPHROPATHY: ICD-10-PCS | Mod: CPTII,S$GLB,, | Performed by: NURSE PRACTITIONER

## 2023-09-08 PROCEDURE — 4010F PR ACE/ARB THEARPY RXD/TAKEN: ICD-10-PCS | Mod: CPTII,S$GLB,, | Performed by: NURSE PRACTITIONER

## 2023-09-08 PROCEDURE — 1126F AMNT PAIN NOTED NONE PRSNT: CPT | Mod: CPTII,S$GLB,, | Performed by: NURSE PRACTITIONER

## 2023-09-08 PROCEDURE — 1101F PR PT FALLS ASSESS DOC 0-1 FALLS W/OUT INJ PAST YR: ICD-10-PCS | Mod: CPTII,S$GLB,, | Performed by: NURSE PRACTITIONER

## 2023-09-08 PROCEDURE — 95250 CONT GLUC MNTR PHYS/QHP EQP: CPT | Mod: S$GLB,,, | Performed by: NURSE PRACTITIONER

## 2023-09-08 RX ORDER — LINAGLIPTIN 5 MG/1
5 TABLET, FILM COATED ORAL DAILY
Qty: 90 TABLET | Refills: 2 | Status: SHIPPED | OUTPATIENT
Start: 2023-09-08 | End: 2024-01-10 | Stop reason: SDUPTHER

## 2023-09-08 RX ORDER — PIOGLITAZONEHYDROCHLORIDE 30 MG/1
30 TABLET ORAL DAILY
Qty: 90 TABLET | Refills: 2 | Status: SHIPPED | OUTPATIENT
Start: 2023-09-08 | End: 2024-01-10 | Stop reason: SDUPTHER

## 2023-09-08 NOTE — PROGRESS NOTES
CC: This 68 y.o. Black or  male  is here for evaluation of  T2DM along with comorbidities indicated in the Visit Diagnosis section of this encounter.    HPI: Handy Adams was diagnosed with T2DM in his early 60s.     DM COMPLICATIONS: nephropathy and retinopathy        Prior visit 6/2023  A1c is about the same, from 6.4 to now 6.2%.   Pt believes his glucoses are doing well but he does intend to improve diet and start exercise.   Pt not sure if he is taking pioglitazone or not. He was concerned about decline  in kidney function. He stopped a medication a month ago but does not recall its name.   He has questions about the recent drop in his kidney function as well as anemia. He stopped taking iron supplement after fecal impaction last month. Requests getting blood count retested.   Plan Suspect  that a1c for patient is falsely low due to anemia (baseline hemoglobin ~ 9). Recent glucoses have been high.   Need to resume pioglitazone 30 mg once daily if it has been stopped.   Recommend starting weekly injection to help with dietary improvement and glucose control. Patient declines and will work on lifestyle changes.   Pt will see renal dietician.   Start exercise regimen. - he will go to gym regularly   Recommend bringing rx bottles to all visits.   Test glucose 2x/day. - fasting before breakfast and again at bedtime.   Undergo  Continuous Glucose Monitor (CGM) study.   Return to clinic in 3 months for CGM study review and a1c prior.       Interval hx  A1c is down from 6.2 to 5.6%. Of note, pt is anemic and A1c indicates falsely low glycemia.   Returns for unblinded CGM study review.   Does not bring food diary. Reports he did not change his diet during the study.     CGM interpretation: glucoses very stable with minimal fluctuation. No hypoglycemia; highest glucoses were postprandial and occurred during initial days of the study, which also was the weekend.         LAST DIABETES EDUCATION: years ago    8/18/23 - renal dietician   HOSPITALIZED FOR DIABETES  -  No.    SIGNIFICANT DIABETES MED HISTORY: denies intolerance to prior DM meds      PRESCRIBED DIABETES MEDICATIONS:   Tradjenta 5 mg once daily   Pioglitazone 30 mg once daily     Misses medication doses - no     SELF MONITORING BLOOD GLUCOSE: Checks blood glucose at home - infrequent   enrolled Digital DM Medicine             HYPOGLYCEMIC EPISODES: none        CURRENT DIET: drinks water and diet coke. Recently stopped diet products.   Eats 2-3 meals/day, occ skips breakfast.       CURRENT EXERCISE: none.     SOCIAL: retired IT support, enrolled in a Phd program       /70   Pulse 82   Temp 98 °F (36.7 °C)   Wt 127.9 kg (282 lb)   BMI 38.25 kg/m²       ROS:   CONSTITUTIONAL: Appetite good, denies fatigue  denies dyspnea     PHYSICAL EXAM:  GENERAL: Well developed, well nourished. No acute distress.   PSYCH: AAOx3, appropriate mood and affect, conversant, well-groomed. Judgement and insight good.   NEURO: Cranial nerves grossly intact. Speech clear, no tremor.   CHEST: Respirations even and unlabored.   No BLE         Hemoglobin A1C   Date Value Ref Range Status   09/01/2023 5.6 4.0 - 5.6 % Final     Comment:     ADA Screening Guidelines:  5.7-6.4%  Consistent with prediabetes  >or=6.5%  Consistent with diabetes    High levels of fetal hemoglobin interfere with the HbA1C  assay. Heterozygous hemoglobin variants (HbS, HgC, etc)do  not significantly interfere with this assay.   However, presence of multiple variants may affect accuracy.     06/13/2023 6.2 (H) 4.0 - 5.6 % Final     Comment:     ADA Screening Guidelines:  5.7-6.4%  Consistent with prediabetes  >or=6.5%  Consistent with diabetes    High levels of fetal hemoglobin interfere with the HbA1C  assay. Heterozygous hemoglobin variants (HbS, HgC, etc)do  not significantly interfere with this assay.   However, presence of multiple variants may affect accuracy.     02/07/2023 6.4 (H) 4.0 - 5.6 %  "Final     Comment:     ADA Screening Guidelines:  5.7-6.4%  Consistent with prediabetes  >or=6.5%  Consistent with diabetes    High levels of fetal hemoglobin interfere with the HbA1C  assay. Heterozygous hemoglobin variants (HbS, HgC, etc)do  not significantly interfere with this assay.   However, presence of multiple variants may affect accuracy.         No results found for: "CPEPTIDE", "GLUTAMICACID", "ISLETCELLANT", "FRUCTOSAMINE"     Lab Results   Component Value Date    CHOL 136 02/07/2023    CHOL 197 09/07/2022    CHOL 223 (H) 01/03/2022     Lab Results   Component Value Date    HDL 32 (L) 02/07/2023    HDL 30 (L) 09/07/2022    HDL 34 (L) 01/03/2022     Lab Results   Component Value Date    LDLCALC 81.6 02/07/2023    LDLCALC 94.4 09/07/2022    LDLCALC 133.4 01/03/2022     Lab Results   Component Value Date    TRIG 112 02/07/2023    TRIG 363 (H) 09/07/2022    TRIG 278 (H) 01/03/2022     Lab Results   Component Value Date    CHOLHDL 23.5 02/07/2023    CHOLHDL 15.2 (L) 09/07/2022    CHOLHDL 15.2 (L) 01/03/2022         Component Value Date/Time     08/28/2023 1135    K 4.3 08/28/2023 1135     08/28/2023 1135    CO2 21 (L) 08/28/2023 1135    BUN 40 (H) 08/28/2023 1135    CREATININE 3.1 (H) 08/28/2023 1135     08/28/2023 1135    CALCIUM 9.3 08/28/2023 1135    ALKPHOS 46 (L) 08/28/2023 1135    AST 11 08/28/2023 1135    ALT 13 08/28/2023 1135    BILITOT 0.5 08/28/2023 1135    EGFRNORACEVR 21.1 (A) 08/28/2023 1135    ESTGFRAFRICA 51.1 (A) 01/03/2022 0922         Lab Results   Component Value Date    LABMICR 1316.0 06/13/2023    CREATRANDUR 121.0 08/18/2023    MICALBCREAT 1585.5 (H) 06/13/2023             ASSESSMENT and PLAN:    A1C GOAL: < 7 %     1. Controlled type 2 diabetes mellitus with complication, without long-term current use of insulin  Diabetes is well-controlled based of CGM report and digital medicine readings (although he is not testing often).   Test glucose 1x/day before or 2 hours " after meals.   Continue current treatment - Tradjenta and pioglitazone.   Return to clinic in 4-6 months with labs prior. - virtual visit       linaGLIPtin (TRADJENTA) 5 mg Tab tablet    Hemoglobin A1C      2. CKD (chronic kidney disease) stage 4, GFR 15-29 ml/min  Maintain DM control.       3. Severe obesity (BMI 35.0-39.9) with comorbidity  Start exercise regimen.       4. Hyperlipidemia, unspecified hyperlipidemia type  Lipid Panel          Orders Placed This Encounter   Procedures    Hemoglobin A1C     Standing Status:   Future     Standing Expiration Date:   11/6/2024    Lipid Panel     Standing Status:   Future     Standing Expiration Date:   9/7/2024          Follow up in about 4 months (around 1/8/2024) for Virtual Visit.

## 2023-09-08 NOTE — PROGRESS NOTES
Patient in clinic today to return Glucose Sensor.  Patient tolerated removal well, site intact, no bleeding or drainage.  The CGMS Sensor will be scanned and downloaded. All reports will be imported into the patient's electronic medical record.  Endocrine provider will complete data interpretation and make recommendations.

## 2023-09-08 NOTE — PROGRESS NOTES
"Subjective:       Patient ID: Handy Adams is a 68 y.o. A male who presents for evaluation of of renal dysfunction.      HPI     Patient is new to me. New to clinic.  Prior pertinent chart reviewed since this is patient's first appointment with me.    Patient presents for new evaluation of renal dysfunction.  Baseline creatinine of 1.6-1.7 in 2020-early 2022. Recently had sCr of 2.5. Patient said he has "not been taking care of himself" and "eating more sugar" since January.    Per recent note from PCP: He cannot afford farxiga due to increased price and had stopped taking it for a few months prior to appt in September.    Home BPs: does not take    Rare Excedrin use now, though he used to use it frequently.    Significant other medical problems include HTN since at least 2007, T2DM since at least 2007.      The patient denies taking herbal supplements, or new antibiotics, recreational drugs, recent episode of dehydration, diarrhea, nausea or vomiting, acute illness, hospitalization or exposure to IV radiocontrast.     Significant family hx includes: No known kidney issues    Last renal US: none in EMR    Update 10/24/22:  Presents for f/u of CKD, YESENIA.  Last seen a month ago.  Feet are swelling again, especially when drinking ensure. Has improved since stopping Ensure.  Holding lisinopril-Hctz. Taking labetolol 200 mg instead.  Found to have an IgG lambda specific monoclonal band during proteinuria workup. Referred to hematology.    Recent sCr 2.5--> 2.8-2.9.     Home BPs: does not believe cuff is accurate.    Update 12/28/22:  Returns for f/u of CKD.  sCr has been 2.8-3.0 mg/dL.  Home BPs: not taking because he thinks cuff is inaccurate    Patient had bone marrow biopsy and was diagnosed with IgG-lambda MGUS.  An IgG lambda specific monoclonal protein was identified on 24 hour urine on 12/16/22.  He has not gotten kidney biopsy.    Says he feels great.    Update 2/3/23:  Returns for f/u of CKD and discussion about " biopsy.  sCr now 3.0-3.2 mg/dL. Most recent sCr 3.3.  Increased valsartan to 160 mg at last visit.    Update 9/8/23:  - Presents for follow-up of CKD  - sCr trended up to 3.8. from baseline of about 3.0, now improved to 3.1. Unclear etiology.  - Notes being on Farxiga in the past but has not taken for at least a year   - Denies NSAIDs, reduced PO intake, n/v/d, fluctuations in BP, elevations in blood sugar, recent illness      Review of Systems   Respiratory:  Negative for shortness of breath.    Cardiovascular:  Negative for chest pain and leg swelling.   Gastrointestinal:  Negative for diarrhea, nausea and vomiting.   Genitourinary:  Negative for difficulty urinating, dysuria and hematuria.   All other systems reviewed and are negative.      Objective:       Blood pressure 131/79, pulse 78, weight 127 kg (279 lb 15.8 oz), SpO2 99 %.  Physical Exam  Vitals reviewed.   Constitutional:       Appearance: Normal appearance. He is obese.   HENT:      Head: Normocephalic and atraumatic.   Eyes:      General: No scleral icterus.  Cardiovascular:      Rate and Rhythm: Normal rate and regular rhythm.   Pulmonary:      Effort: No respiratory distress.      Breath sounds: No wheezing or rales.   Musculoskeletal:      Right lower leg: No edema.      Left lower leg: No edema.   Skin:     General: Skin is warm and dry.   Neurological:      Mental Status: He is alert and oriented to person, place, and time.   Psychiatric:         Mood and Affect: Mood normal.         Behavior: Behavior normal.           Lab Results   Component Value Date    CREATININE 3.1 (H) 08/28/2023     Prot/Creat Ratio, Urine   Date Value Ref Range Status   08/18/2023 1.19 (H) 0.00 - 0.20 Final   06/13/2023 2.34 (H) 0.00 - 0.20 Final   01/27/2023 2.27 (H) 0.00 - 0.20 Final     Lab Results   Component Value Date     08/28/2023    K 4.3 08/28/2023    CO2 21 (L) 08/28/2023     08/28/2023     Lab Results   Component Value Date    .5 (H)  12/16/2022    CALCIUM 9.3 08/28/2023    PHOS 3.4 08/18/2023     Lab Results   Component Value Date    HGB 9.5 (L) 08/28/2023    WBC 6.65 08/28/2023    HCT 30.7 (L) 08/28/2023      Lab Results   Component Value Date    HGBA1C 5.6 09/01/2023     08/28/2023    BUN 40 (H) 08/28/2023     Lab Results   Component Value Date    LDLCALC 81.6 02/07/2023         Assessment:       1. Chronic kidney disease, stage 4 (severe)    2. Proteinuria, unspecified type    3. Type 2 diabetes mellitus with stage 4 chronic kidney disease, unspecified whether long term insulin use    4. MGUS (monoclonal gammopathy of unknown significance)    5. Anemia, unspecified type    6. Secondary hyperparathyroidism    7. Acidosis            Plan:   CKD stage IV with eGFR 21.1 mL/min - Underlying CKD clinically 2/2 diabetes + HTN  - Biopsy performed showing hypertensive nephrosclerosis, acute tubular injury, and diabetic nephropathy. sCr uptrend to 3.8 with unclear etiology. Now improved to near baseline ~3.0.   - IgG lambda specific monoclonal band noted on proteinuria workup and UPEP. No evidence of MGRS on biopsy.   - Educated patient to control BP, BG, remain well-hydrated, and avoid NSAIDs to prevent progression of CKD. High risk of progression to ESRD.      UPCR Significant albuminuria since 2011 with increase to almost nephrotic range proteinuria in January. Was on farxiga but could not afford it; renal function is too low to start on recent labs. On ARB  - Proteinuria now slightly improved to ~1g, monitor closely    Acid-base Mild acidosis. Previously on sodium bicarb. Monitor.    Renal osteodystrophy Ca, phos okay. Low vit D. Elevated PTH.  Repeat    Anemia Hgb low last check. TSAT low in Oct. On PO iron.   DM Well-controlled recently. Has had DM since at least 2007, when he had an A1c of 15+.   Lipid Management On statin.   ESRD planning Anticipatory guidance provided about timing of dialysis. Start discussions and planning when eGFR is  about 20 mL/min; most patients start dialysis between 5-10 mL/min.    Attended ESRD treatment choices, but is still unsure about what treatment he would want.  Referred to transplant today.       HTN - WNL on labetalol 200 mg BID, Nifedipine 90 mg, chlorthalidone 25 mg, valsartan 320 mg, spironolactone 25mg qd  - Goal is SBP less than 130/80    MGUS - per hem/onc    All questions patient had were answered.  Asked if further questions. None. F/u in clinic in 3 months with Raven Naylor NP with labs and urine prior to next visit or sooner if needed.  ER for emergency concerns.    Summary of Plan:  - RTC 3 months with RFP, CBC, UA, UPCR, PTH, Vit D  - Discussed potentially restarting low dose farxiga if renal function permits if no improvement in proteinuria

## 2023-09-08 NOTE — PATIENT INSTRUCTIONS
Diabetes is well-controlled based of CGM report and digital medicine readings (although he is not testing often).   Test glucose 1x/day before or 2 hours after meals.   Continue current treatment - Tradjenta and pioglitazone.   Return to clinic in 4-6 months with labs prior.

## 2023-09-19 ENCOUNTER — PROCEDURE VISIT (OUTPATIENT)
Dept: OPHTHALMOLOGY | Facility: CLINIC | Age: 68
End: 2023-09-19
Payer: MEDICARE

## 2023-09-19 DIAGNOSIS — H43.813 VITREOUS DEGENERATION OF BOTH EYES: ICD-10-CM

## 2023-09-19 DIAGNOSIS — E11.3513 TYPE 2 DIABETES MELLITUS WITH BOTH EYES AFFECTED BY PROLIFERATIVE RETINOPATHY AND MACULAR EDEMA, WITHOUT LONG-TERM CURRENT USE OF INSULIN: Primary | ICD-10-CM

## 2023-09-19 DIAGNOSIS — H35.033 HYPERTENSIVE RETINOPATHY, BILATERAL: ICD-10-CM

## 2023-09-19 PROCEDURE — 92134 POSTERIOR SEGMENT OCT RETINA (OCULAR COHERENCE TOMOGRAPHY)-BOTH EYES: ICD-10-PCS | Mod: S$GLB,,, | Performed by: OPHTHALMOLOGY

## 2023-09-19 PROCEDURE — 92012 PR EYE EXAM, EST PATIENT,INTERMED: ICD-10-PCS | Mod: S$GLB,,, | Performed by: OPHTHALMOLOGY

## 2023-09-19 PROCEDURE — 92134 CPTRZ OPH DX IMG PST SGM RTA: CPT | Mod: S$GLB,,, | Performed by: OPHTHALMOLOGY

## 2023-09-19 PROCEDURE — 92012 INTRM OPH EXAM EST PATIENT: CPT | Mod: S$GLB,,, | Performed by: OPHTHALMOLOGY

## 2023-09-19 NOTE — PROGRESS NOTES
HPI     3 month   follow up  ozurdex  injection OD  TODAY     Additional comments: PT DID NOT WANT OZURDEX INJECTION TODAY ??  EXPLAINED LAST VISIT WAS 3 MONTHS AGO ?      DM   LAST BS   110 AV  LAST A1C    5.6           Comments    DLS    07/24/23    EYE MEDS    ART  TEARS  OU PRN            OCT   OD: Good quality. Normal foveal contour  Superior IRF, exudates in parafovea. , decreased in central SRF  OS: Good quality. Normal foveal contour without IRF, SRF. Some small noncentral exudates    Prior WFFA  OD - normal transit time. Hyperfluorescence early c/w Ma/edema - sig NP    OS - Hyperfluorescence early c/w Ma/edema  .NV nasal      A/P    1. 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 DME OU   6/19 - pt did not FU until 8/20  S/p Avastin OD x 2  S/p Eylea OD x 2  S/p Ozurdex OD x 2  7/23 11/22 - not seen x 2 years  3/23 - had second opinion with Dr. Suh.  Pt would like to try a few Eylea prior to steroids if needed    Improved, obs today      3. HTN Ret OU  BS/BP/chol control    4. NS OU  Check for progression with dilation next visit    5. Cyst RLL  Removed by Rhea  Happy with result        2 month OCT/MRx and dilate

## 2023-09-28 DIAGNOSIS — I10 HYPERTENSION, UNSPECIFIED TYPE: ICD-10-CM

## 2023-09-28 RX ORDER — LABETALOL 200 MG/1
200 TABLET, FILM COATED ORAL 2 TIMES DAILY
Qty: 180 TABLET | Refills: 0 | Status: SHIPPED | OUTPATIENT
Start: 2023-09-28 | End: 2023-12-29

## 2023-09-28 RX ORDER — CHLORTHALIDONE 25 MG/1
25 TABLET ORAL
Qty: 90 TABLET | Refills: 0 | Status: SHIPPED | OUTPATIENT
Start: 2023-09-28 | End: 2023-11-06

## 2023-09-29 ENCOUNTER — NURSE TRIAGE (OUTPATIENT)
Dept: ADMINISTRATIVE | Facility: CLINIC | Age: 68
End: 2023-09-29
Payer: MEDICARE

## 2023-09-29 NOTE — TELEPHONE ENCOUNTER
Reason for Disposition   MILD OR MODERATE joint swelling (e.g., feels or looks mildly swollen or puffy)    Additional Information   Negative: Followed an elbow injury   Negative: Elbow pain is main symptom   Negative: [1] Elbow pain AND [2] fever   Negative: [1] Elbow redness AND [2] fever   Negative: Patient sounds very sick or weak to the triager   Negative: [1] SEVERE pain (e.g., excruciating, unable to use hand or wrist at all) AND [2] not improved after 2 hours of pain medicine   Negative: [1] Looks infected (spreading redness, pus) AND [2] large red area (> 2 in. or 5 cm)   Negative: All of arm looks swollen   Negative: SEVERE joint swelling (e.g., can barely bend or move elbow joint)   Negative: [1] Painful joint AND [2] no fever   Negative: Looks like a boil, infected sore, deep ulcer or other infected rash (spreading redness, pus)    Protocols used: Elbow Swelling-A-  Pt states he has new onset swelling in his right elbow. States he has mild pain and denies injuries. Advised per Triage protocol to see a Healthcare Provider within 3 days. No office appointments are available in the Triage time frame on the [facility] as pt requested. My chart/virtual visits are not available at this time. Pt refused the Urgent Care option and agreed to an office appointment in [place] on Monday for 1:45 pm. Instructed to call OOC back if symptoms worsen. He verbalized understanding.

## 2023-10-02 ENCOUNTER — OFFICE VISIT (OUTPATIENT)
Dept: INTERNAL MEDICINE | Facility: CLINIC | Age: 68
End: 2023-10-02
Payer: MEDICARE

## 2023-10-02 VITALS
OXYGEN SATURATION: 98 % | SYSTOLIC BLOOD PRESSURE: 140 MMHG | BODY MASS INDEX: 38.28 KG/M2 | DIASTOLIC BLOOD PRESSURE: 82 MMHG | WEIGHT: 282.63 LBS | HEART RATE: 75 BPM | HEIGHT: 72 IN

## 2023-10-02 DIAGNOSIS — M70.21 OLECRANON BURSITIS OF RIGHT ELBOW: Primary | ICD-10-CM

## 2023-10-02 PROCEDURE — 4010F PR ACE/ARB THEARPY RXD/TAKEN: ICD-10-PCS | Mod: CPTII,S$GLB,, | Performed by: STUDENT IN AN ORGANIZED HEALTH CARE EDUCATION/TRAINING PROGRAM

## 2023-10-02 PROCEDURE — 99213 PR OFFICE/OUTPT VISIT, EST, LEVL III, 20-29 MIN: ICD-10-PCS | Mod: S$GLB,,, | Performed by: STUDENT IN AN ORGANIZED HEALTH CARE EDUCATION/TRAINING PROGRAM

## 2023-10-02 PROCEDURE — 3066F NEPHROPATHY DOC TX: CPT | Mod: CPTII,S$GLB,, | Performed by: STUDENT IN AN ORGANIZED HEALTH CARE EDUCATION/TRAINING PROGRAM

## 2023-10-02 PROCEDURE — 3008F PR BODY MASS INDEX (BMI) DOCUMENTED: ICD-10-PCS | Mod: CPTII,S$GLB,, | Performed by: STUDENT IN AN ORGANIZED HEALTH CARE EDUCATION/TRAINING PROGRAM

## 2023-10-02 PROCEDURE — 99213 OFFICE O/P EST LOW 20 MIN: CPT | Mod: S$GLB,,, | Performed by: STUDENT IN AN ORGANIZED HEALTH CARE EDUCATION/TRAINING PROGRAM

## 2023-10-02 PROCEDURE — 3044F HG A1C LEVEL LT 7.0%: CPT | Mod: CPTII,S$GLB,, | Performed by: STUDENT IN AN ORGANIZED HEALTH CARE EDUCATION/TRAINING PROGRAM

## 2023-10-02 PROCEDURE — 3079F PR MOST RECENT DIASTOLIC BLOOD PRESSURE 80-89 MM HG: ICD-10-PCS | Mod: CPTII,S$GLB,, | Performed by: STUDENT IN AN ORGANIZED HEALTH CARE EDUCATION/TRAINING PROGRAM

## 2023-10-02 PROCEDURE — 3062F PR POS MACROALBUMINURIA RESULT DOCUMENTED/REVIEW: ICD-10-PCS | Mod: CPTII,S$GLB,, | Performed by: STUDENT IN AN ORGANIZED HEALTH CARE EDUCATION/TRAINING PROGRAM

## 2023-10-02 PROCEDURE — 3044F PR MOST RECENT HEMOGLOBIN A1C LEVEL <7.0%: ICD-10-PCS | Mod: CPTII,S$GLB,, | Performed by: STUDENT IN AN ORGANIZED HEALTH CARE EDUCATION/TRAINING PROGRAM

## 2023-10-02 PROCEDURE — 3079F DIAST BP 80-89 MM HG: CPT | Mod: CPTII,S$GLB,, | Performed by: STUDENT IN AN ORGANIZED HEALTH CARE EDUCATION/TRAINING PROGRAM

## 2023-10-02 PROCEDURE — 3288F PR FALLS RISK ASSESSMENT DOCUMENTED: ICD-10-PCS | Mod: CPTII,S$GLB,, | Performed by: STUDENT IN AN ORGANIZED HEALTH CARE EDUCATION/TRAINING PROGRAM

## 2023-10-02 PROCEDURE — 1101F PR PT FALLS ASSESS DOC 0-1 FALLS W/OUT INJ PAST YR: ICD-10-PCS | Mod: CPTII,S$GLB,, | Performed by: STUDENT IN AN ORGANIZED HEALTH CARE EDUCATION/TRAINING PROGRAM

## 2023-10-02 PROCEDURE — 3077F SYST BP >= 140 MM HG: CPT | Mod: CPTII,S$GLB,, | Performed by: STUDENT IN AN ORGANIZED HEALTH CARE EDUCATION/TRAINING PROGRAM

## 2023-10-02 PROCEDURE — 1159F PR MEDICATION LIST DOCUMENTED IN MEDICAL RECORD: ICD-10-PCS | Mod: CPTII,S$GLB,, | Performed by: STUDENT IN AN ORGANIZED HEALTH CARE EDUCATION/TRAINING PROGRAM

## 2023-10-02 PROCEDURE — 99999 PR PBB SHADOW E&M-EST. PATIENT-LVL III: ICD-10-PCS | Mod: PBBFAC,,, | Performed by: STUDENT IN AN ORGANIZED HEALTH CARE EDUCATION/TRAINING PROGRAM

## 2023-10-02 PROCEDURE — 1101F PT FALLS ASSESS-DOCD LE1/YR: CPT | Mod: CPTII,S$GLB,, | Performed by: STUDENT IN AN ORGANIZED HEALTH CARE EDUCATION/TRAINING PROGRAM

## 2023-10-02 PROCEDURE — 3008F BODY MASS INDEX DOCD: CPT | Mod: CPTII,S$GLB,, | Performed by: STUDENT IN AN ORGANIZED HEALTH CARE EDUCATION/TRAINING PROGRAM

## 2023-10-02 PROCEDURE — 1160F RVW MEDS BY RX/DR IN RCRD: CPT | Mod: CPTII,S$GLB,, | Performed by: STUDENT IN AN ORGANIZED HEALTH CARE EDUCATION/TRAINING PROGRAM

## 2023-10-02 PROCEDURE — 3062F POS MACROALBUMINURIA REV: CPT | Mod: CPTII,S$GLB,, | Performed by: STUDENT IN AN ORGANIZED HEALTH CARE EDUCATION/TRAINING PROGRAM

## 2023-10-02 PROCEDURE — 4010F ACE/ARB THERAPY RXD/TAKEN: CPT | Mod: CPTII,S$GLB,, | Performed by: STUDENT IN AN ORGANIZED HEALTH CARE EDUCATION/TRAINING PROGRAM

## 2023-10-02 PROCEDURE — 1126F AMNT PAIN NOTED NONE PRSNT: CPT | Mod: CPTII,S$GLB,, | Performed by: STUDENT IN AN ORGANIZED HEALTH CARE EDUCATION/TRAINING PROGRAM

## 2023-10-02 PROCEDURE — 99999 PR PBB SHADOW E&M-EST. PATIENT-LVL III: CPT | Mod: PBBFAC,,, | Performed by: STUDENT IN AN ORGANIZED HEALTH CARE EDUCATION/TRAINING PROGRAM

## 2023-10-02 PROCEDURE — 3077F PR MOST RECENT SYSTOLIC BLOOD PRESSURE >= 140 MM HG: ICD-10-PCS | Mod: CPTII,S$GLB,, | Performed by: STUDENT IN AN ORGANIZED HEALTH CARE EDUCATION/TRAINING PROGRAM

## 2023-10-02 PROCEDURE — 3066F PR DOCUMENTATION OF TREATMENT FOR NEPHROPATHY: ICD-10-PCS | Mod: CPTII,S$GLB,, | Performed by: STUDENT IN AN ORGANIZED HEALTH CARE EDUCATION/TRAINING PROGRAM

## 2023-10-02 PROCEDURE — 3288F FALL RISK ASSESSMENT DOCD: CPT | Mod: CPTII,S$GLB,, | Performed by: STUDENT IN AN ORGANIZED HEALTH CARE EDUCATION/TRAINING PROGRAM

## 2023-10-02 PROCEDURE — 1159F MED LIST DOCD IN RCRD: CPT | Mod: CPTII,S$GLB,, | Performed by: STUDENT IN AN ORGANIZED HEALTH CARE EDUCATION/TRAINING PROGRAM

## 2023-10-02 PROCEDURE — 1160F PR REVIEW ALL MEDS BY PRESCRIBER/CLIN PHARMACIST DOCUMENTED: ICD-10-PCS | Mod: CPTII,S$GLB,, | Performed by: STUDENT IN AN ORGANIZED HEALTH CARE EDUCATION/TRAINING PROGRAM

## 2023-10-02 PROCEDURE — 1126F PR PAIN SEVERITY QUANTIFIED, NO PAIN PRESENT: ICD-10-PCS | Mod: CPTII,S$GLB,, | Performed by: STUDENT IN AN ORGANIZED HEALTH CARE EDUCATION/TRAINING PROGRAM

## 2023-10-09 NOTE — TELEPHONE ENCOUNTER
Pt was notified that refills are available   Otezla Pregnancy And Lactation Text: This medication is Pregnancy Category C and it isn't known if it is safe during pregnancy. It is unknown if it is excreted in breast milk.

## 2023-11-03 ENCOUNTER — LAB VISIT (OUTPATIENT)
Dept: LAB | Facility: HOSPITAL | Age: 68
End: 2023-11-03
Attending: PHYSICIAN ASSISTANT
Payer: MEDICARE

## 2023-11-03 DIAGNOSIS — N18.4 CHRONIC KIDNEY DISEASE, STAGE 4 (SEVERE): ICD-10-CM

## 2023-11-03 LAB
25(OH)D3+25(OH)D2 SERPL-MCNC: 22 NG/ML (ref 30–96)
ALBUMIN SERPL BCP-MCNC: 3.5 G/DL (ref 3.5–5.2)
ANION GAP SERPL CALC-SCNC: 10 MMOL/L (ref 8–16)
BUN SERPL-MCNC: 40 MG/DL (ref 8–23)
CALCIUM SERPL-MCNC: 9.2 MG/DL (ref 8.7–10.5)
CHLORIDE SERPL-SCNC: 112 MMOL/L (ref 95–110)
CO2 SERPL-SCNC: 19 MMOL/L (ref 23–29)
CREAT SERPL-MCNC: 3.3 MG/DL (ref 0.5–1.4)
ERYTHROCYTE [DISTWIDTH] IN BLOOD BY AUTOMATED COUNT: 16.2 % (ref 11.5–14.5)
EST. GFR  (NO RACE VARIABLE): 19.6 ML/MIN/1.73 M^2
GLUCOSE SERPL-MCNC: 99 MG/DL (ref 70–110)
HCT VFR BLD AUTO: 32.2 % (ref 40–54)
HGB BLD-MCNC: 9.7 G/DL (ref 14–18)
MCH RBC QN AUTO: 28.9 PG (ref 27–31)
MCHC RBC AUTO-ENTMCNC: 30.1 G/DL (ref 32–36)
MCV RBC AUTO: 96 FL (ref 82–98)
PHOSPHATE SERPL-MCNC: 3.9 MG/DL (ref 2.7–4.5)
PLATELET # BLD AUTO: 220 K/UL (ref 150–450)
PMV BLD AUTO: 11.9 FL (ref 9.2–12.9)
POTASSIUM SERPL-SCNC: 4.5 MMOL/L (ref 3.5–5.1)
PTH-INTACT SERPL-MCNC: 196.6 PG/ML (ref 9–77)
RBC # BLD AUTO: 3.36 M/UL (ref 4.6–6.2)
SODIUM SERPL-SCNC: 141 MMOL/L (ref 136–145)
WBC # BLD AUTO: 6.84 K/UL (ref 3.9–12.7)

## 2023-11-03 PROCEDURE — 85027 COMPLETE CBC AUTOMATED: CPT | Performed by: NURSE PRACTITIONER

## 2023-11-03 PROCEDURE — 80069 RENAL FUNCTION PANEL: CPT | Performed by: NURSE PRACTITIONER

## 2023-11-03 PROCEDURE — 36415 COLL VENOUS BLD VENIPUNCTURE: CPT | Mod: PO | Performed by: NURSE PRACTITIONER

## 2023-11-03 PROCEDURE — 82306 VITAMIN D 25 HYDROXY: CPT | Performed by: NURSE PRACTITIONER

## 2023-11-03 PROCEDURE — 83970 ASSAY OF PARATHORMONE: CPT | Performed by: NURSE PRACTITIONER

## 2023-11-06 ENCOUNTER — PATIENT MESSAGE (OUTPATIENT)
Dept: ADMINISTRATIVE | Facility: HOSPITAL | Age: 68
End: 2023-11-06
Payer: MEDICARE

## 2023-11-06 ENCOUNTER — OFFICE VISIT (OUTPATIENT)
Dept: NEPHROLOGY | Facility: CLINIC | Age: 68
End: 2023-11-06
Payer: MEDICARE

## 2023-11-06 VITALS
BODY MASS INDEX: 39.08 KG/M2 | DIASTOLIC BLOOD PRESSURE: 73 MMHG | WEIGHT: 288.56 LBS | HEIGHT: 72 IN | HEART RATE: 79 BPM | SYSTOLIC BLOOD PRESSURE: 157 MMHG | OXYGEN SATURATION: 99 %

## 2023-11-06 DIAGNOSIS — D64.9 ANEMIA, UNSPECIFIED TYPE: ICD-10-CM

## 2023-11-06 DIAGNOSIS — E87.20 ACIDOSIS: ICD-10-CM

## 2023-11-06 DIAGNOSIS — N25.81 SECONDARY HYPERPARATHYROIDISM: ICD-10-CM

## 2023-11-06 DIAGNOSIS — R79.89 LOW VITAMIN D LEVEL: ICD-10-CM

## 2023-11-06 DIAGNOSIS — D47.2 MGUS (MONOCLONAL GAMMOPATHY OF UNKNOWN SIGNIFICANCE): ICD-10-CM

## 2023-11-06 DIAGNOSIS — N18.4 TYPE 2 DIABETES MELLITUS WITH STAGE 4 CHRONIC KIDNEY DISEASE, UNSPECIFIED WHETHER LONG TERM INSULIN USE: ICD-10-CM

## 2023-11-06 DIAGNOSIS — N18.4 CHRONIC KIDNEY DISEASE, STAGE 4 (SEVERE): Primary | ICD-10-CM

## 2023-11-06 DIAGNOSIS — E11.22 TYPE 2 DIABETES MELLITUS WITH STAGE 4 CHRONIC KIDNEY DISEASE, UNSPECIFIED WHETHER LONG TERM INSULIN USE: ICD-10-CM

## 2023-11-06 DIAGNOSIS — I10 HYPERTENSION, UNSPECIFIED TYPE: ICD-10-CM

## 2023-11-06 DIAGNOSIS — E66.01 SEVERE OBESITY (BMI 35.0-39.9) WITH COMORBIDITY: ICD-10-CM

## 2023-11-06 DIAGNOSIS — R80.9 PROTEINURIA, UNSPECIFIED TYPE: ICD-10-CM

## 2023-11-06 PROCEDURE — 3078F DIAST BP <80 MM HG: CPT | Mod: CPTII,S$GLB,, | Performed by: NURSE PRACTITIONER

## 2023-11-06 PROCEDURE — 1160F RVW MEDS BY RX/DR IN RCRD: CPT | Mod: CPTII,S$GLB,, | Performed by: NURSE PRACTITIONER

## 2023-11-06 PROCEDURE — 3066F NEPHROPATHY DOC TX: CPT | Mod: CPTII,S$GLB,, | Performed by: NURSE PRACTITIONER

## 2023-11-06 PROCEDURE — 4010F PR ACE/ARB THEARPY RXD/TAKEN: ICD-10-PCS | Mod: CPTII,S$GLB,, | Performed by: NURSE PRACTITIONER

## 2023-11-06 PROCEDURE — 3044F HG A1C LEVEL LT 7.0%: CPT | Mod: CPTII,S$GLB,, | Performed by: NURSE PRACTITIONER

## 2023-11-06 PROCEDURE — 99417 PROLNG OP E/M EACH 15 MIN: CPT | Mod: S$GLB,,, | Performed by: NURSE PRACTITIONER

## 2023-11-06 PROCEDURE — 1159F MED LIST DOCD IN RCRD: CPT | Mod: CPTII,S$GLB,, | Performed by: NURSE PRACTITIONER

## 2023-11-06 PROCEDURE — 99417 PR PROLONGED SVC, OUTPT, W/WO DIRECT PT CONTACT,  EA ADDTL 15 MIN: ICD-10-PCS | Mod: S$GLB,,, | Performed by: NURSE PRACTITIONER

## 2023-11-06 PROCEDURE — 3078F PR MOST RECENT DIASTOLIC BLOOD PRESSURE < 80 MM HG: ICD-10-PCS | Mod: CPTII,S$GLB,, | Performed by: NURSE PRACTITIONER

## 2023-11-06 PROCEDURE — 3062F PR POS MACROALBUMINURIA RESULT DOCUMENTED/REVIEW: ICD-10-PCS | Mod: CPTII,S$GLB,, | Performed by: NURSE PRACTITIONER

## 2023-11-06 PROCEDURE — 99215 OFFICE O/P EST HI 40 MIN: CPT | Mod: S$GLB,,, | Performed by: NURSE PRACTITIONER

## 2023-11-06 PROCEDURE — 99215 PR OFFICE/OUTPT VISIT, EST, LEVL V, 40-54 MIN: ICD-10-PCS | Mod: S$GLB,,, | Performed by: NURSE PRACTITIONER

## 2023-11-06 PROCEDURE — 1159F PR MEDICATION LIST DOCUMENTED IN MEDICAL RECORD: ICD-10-PCS | Mod: CPTII,S$GLB,, | Performed by: NURSE PRACTITIONER

## 2023-11-06 PROCEDURE — 1101F PR PT FALLS ASSESS DOC 0-1 FALLS W/OUT INJ PAST YR: ICD-10-PCS | Mod: CPTII,S$GLB,, | Performed by: NURSE PRACTITIONER

## 2023-11-06 PROCEDURE — 99999 PR PBB SHADOW E&M-EST. PATIENT-LVL V: CPT | Mod: PBBFAC,,, | Performed by: NURSE PRACTITIONER

## 2023-11-06 PROCEDURE — 1160F PR REVIEW ALL MEDS BY PRESCRIBER/CLIN PHARMACIST DOCUMENTED: ICD-10-PCS | Mod: CPTII,S$GLB,, | Performed by: NURSE PRACTITIONER

## 2023-11-06 PROCEDURE — 3062F POS MACROALBUMINURIA REV: CPT | Mod: CPTII,S$GLB,, | Performed by: NURSE PRACTITIONER

## 2023-11-06 PROCEDURE — 1126F PR PAIN SEVERITY QUANTIFIED, NO PAIN PRESENT: ICD-10-PCS | Mod: CPTII,S$GLB,, | Performed by: NURSE PRACTITIONER

## 2023-11-06 PROCEDURE — 99999 PR PBB SHADOW E&M-EST. PATIENT-LVL V: ICD-10-PCS | Mod: PBBFAC,,, | Performed by: NURSE PRACTITIONER

## 2023-11-06 PROCEDURE — 3066F PR DOCUMENTATION OF TREATMENT FOR NEPHROPATHY: ICD-10-PCS | Mod: CPTII,S$GLB,, | Performed by: NURSE PRACTITIONER

## 2023-11-06 PROCEDURE — 4010F ACE/ARB THERAPY RXD/TAKEN: CPT | Mod: CPTII,S$GLB,, | Performed by: NURSE PRACTITIONER

## 2023-11-06 PROCEDURE — 3008F BODY MASS INDEX DOCD: CPT | Mod: CPTII,S$GLB,, | Performed by: NURSE PRACTITIONER

## 2023-11-06 PROCEDURE — 3077F PR MOST RECENT SYSTOLIC BLOOD PRESSURE >= 140 MM HG: ICD-10-PCS | Mod: CPTII,S$GLB,, | Performed by: NURSE PRACTITIONER

## 2023-11-06 PROCEDURE — 3288F PR FALLS RISK ASSESSMENT DOCUMENTED: ICD-10-PCS | Mod: CPTII,S$GLB,, | Performed by: NURSE PRACTITIONER

## 2023-11-06 PROCEDURE — 1126F AMNT PAIN NOTED NONE PRSNT: CPT | Mod: CPTII,S$GLB,, | Performed by: NURSE PRACTITIONER

## 2023-11-06 PROCEDURE — 3044F PR MOST RECENT HEMOGLOBIN A1C LEVEL <7.0%: ICD-10-PCS | Mod: CPTII,S$GLB,, | Performed by: NURSE PRACTITIONER

## 2023-11-06 PROCEDURE — 3288F FALL RISK ASSESSMENT DOCD: CPT | Mod: CPTII,S$GLB,, | Performed by: NURSE PRACTITIONER

## 2023-11-06 PROCEDURE — 3077F SYST BP >= 140 MM HG: CPT | Mod: CPTII,S$GLB,, | Performed by: NURSE PRACTITIONER

## 2023-11-06 PROCEDURE — 1101F PT FALLS ASSESS-DOCD LE1/YR: CPT | Mod: CPTII,S$GLB,, | Performed by: NURSE PRACTITIONER

## 2023-11-06 PROCEDURE — 3008F PR BODY MASS INDEX (BMI) DOCUMENTED: ICD-10-PCS | Mod: CPTII,S$GLB,, | Performed by: NURSE PRACTITIONER

## 2023-11-06 RX ORDER — SPIRONOLACTONE 50 MG/1
50 TABLET, FILM COATED ORAL DAILY
Qty: 90 TABLET | Refills: 3 | Status: SHIPPED | OUTPATIENT
Start: 2023-11-06 | End: 2024-11-05

## 2023-11-06 RX ORDER — SODIUM BICARBONATE 650 MG/1
1300 TABLET ORAL 2 TIMES DAILY
Qty: 360 TABLET | Refills: 3 | Status: SHIPPED | OUTPATIENT
Start: 2023-11-06 | End: 2024-03-15

## 2023-11-06 NOTE — PROGRESS NOTES
"Subjective:       Patient ID: Handy Adams is a 68 y.o. A male who presents for evaluation of of renal dysfunction.      HPI     Patient is new to me. New to clinic.  Prior pertinent chart reviewed since this is patient's first appointment with me.    Patient presents for new evaluation of renal dysfunction.  Baseline creatinine of 1.6-1.7 in 2020-early 2022. Recently had sCr of 2.5. Patient said he has "not been taking care of himself" and "eating more sugar" since January.    Per recent note from PCP: He cannot afford farxiga due to increased price and had stopped taking it for a few months prior to appt in September.    Home BPs: does not take    Rare Excedrin use now, though he used to use it frequently.    Significant other medical problems include HTN since at least 2007, T2DM since at least 2007.      The patient denies taking herbal supplements, or new antibiotics, recreational drugs, recent episode of dehydration, diarrhea, nausea or vomiting, acute illness, hospitalization or exposure to IV radiocontrast.     Significant family hx includes: No known kidney issues    Last renal US: none in EMR    Update 10/24/22:  Presents for f/u of CKD, YESENIA.  Last seen a month ago.  Feet are swelling again, especially when drinking ensure. Has improved since stopping Ensure.  Holding lisinopril-Hctz. Taking labetolol 200 mg instead.  Found to have an IgG lambda specific monoclonal band during proteinuria workup. Referred to hematology.    Recent sCr 2.5--> 2.8-2.9.     Home BPs: does not believe cuff is accurate.    Update 12/28/22:  Returns for f/u of CKD.  sCr has been 2.8-3.0 mg/dL.  Home BPs: not taking because he thinks cuff is inaccurate    Patient had bone marrow biopsy and was diagnosed with IgG-lambda MGUS.  An IgG lambda specific monoclonal protein was identified on 24 hour urine on 12/16/22.  He has not gotten kidney biopsy.    Says he feels great.    Update 2/3/23:  Returns for f/u of CKD and discussion about " "biopsy.  sCr now 3.0-3.2 mg/dL. Most recent sCr 3.3.  Increased valsartan to 160 mg at last visit.    Update 9/8/23:  - Presents for follow-up of CKD  - sCr trended up to 3.8. from baseline of about 3.0, now improved to 3.1. Unclear etiology.  - Notes being on Farxiga in the past but has not taken for at least a year   - Denies NSAIDs, reduced PO intake, n/v/d, fluctuations in BP, elevations in blood sugar, recent illness    Update 11/6/23:  Presents for f/u of CKD.  Most recent sCr 3.1 -3.4 mg/dL.  Home BPs: 130-140s (SBP)   Admits recent dietary indiscretion.  Says he gained weight recently.  Not exercising.    Advance Care Planning       Serious Illness Conversation Guide    Conversation was held with:   patient[SR1.1]     What is your understanding now of where you are with your illness?:   Comments: Admits he is in denial about kidney disease.[SR1.1]     How much information about what is likely to be ahead with your illness would you like from me?:   wants to be fully informed[SR1.1]     I want to share with you my understanding of where things are with your illness.:   continued decline[SR1.2]     Patient emotions observed or reported:   denial[SR1.1]     What are your most important goals if your health situation worsens?:   being independent[SR1.1]     What are your biggest fears and worries about the future with your health?:   Comments: being debilitated where he has to depend on others to care for him[SR1.1]     What gives you strength as you think about the future of your illness?:   Jewish tae[SR1.1]     What abilities are so critical to your life that you can't imagine living without them? Or, what makes life meaningful?:   being able to care for oneself, living independently[SR1.1]     If you become sicker, how much are you willing to go through for the possibility of gaining more time?:   Comments: Says he needs to think about this, but "I don't want to go through dialysis at all because I " "think that would put a constraint on my independence and constrain me from what I imagine I could be." He says he would want dialysis though if the choice was dialysis or death.    Has not thought about invasive procedures in depth.[SR1.1]     How much does your family know about your priorities and wishes?:   patient has had some incomplete discussions with family[SR1.2]  Comments: Says he is very private. Has not spoken to spouse or friends about this yet. He has shared some information with sister.[SR1.2]     I recommend that we do the following to make sure your treatment plans reflect what's important to you. How does this plan seem to you?:        Attribution       SR1.1 Raven Naylor NP 11/06/23 14:48    SR1.2 Raven Naylor NP 11/06/23 16:04                   Review of Systems   Respiratory:  Negative for shortness of breath.    Cardiovascular:  Negative for leg swelling.   Gastrointestinal:  Negative for diarrhea, nausea and vomiting.   Genitourinary:  Positive for frequency (with high fluid intake) and urgency. Negative for difficulty urinating, dysuria and hematuria.        Some incontinence   Neurological:  Negative for dizziness and headaches.   All other systems reviewed and are negative.      Objective:       Blood pressure (!) 157/73, pulse 79, height 6' (1.829 m), weight 130.9 kg (288 lb 9.3 oz), SpO2 99 %.  Physical Exam  Vitals reviewed.   Constitutional:       Appearance: Normal appearance. He is obese.   HENT:      Head: Normocephalic and atraumatic.   Eyes:      General: No scleral icterus.  Cardiovascular:      Rate and Rhythm: Normal rate and regular rhythm.   Pulmonary:      Effort: No respiratory distress.      Breath sounds: No wheezing or rales.   Musculoskeletal:      Right lower leg: No edema.      Left lower leg: No edema.   Skin:     General: Skin is warm and dry.   Neurological:      Mental Status: He is alert and oriented to person, place, and time.   Psychiatric:    "      Mood and Affect: Mood normal.         Behavior: Behavior normal.           Lab Results   Component Value Date    CREATININE 3.3 (H) 11/03/2023     Prot/Creat Ratio, Urine   Date Value Ref Range Status   11/03/2023 2.46 (H) 0.00 - 0.20 Final   08/18/2023 1.19 (H) 0.00 - 0.20 Final   06/13/2023 2.34 (H) 0.00 - 0.20 Final     Lab Results   Component Value Date     11/03/2023    K 4.5 11/03/2023    CO2 19 (L) 11/03/2023     (H) 11/03/2023     Lab Results   Component Value Date    .6 (H) 11/03/2023    CALCIUM 9.2 11/03/2023    PHOS 3.9 11/03/2023     Lab Results   Component Value Date    HGB 9.7 (L) 11/03/2023    WBC 6.84 11/03/2023    HCT 32.2 (L) 11/03/2023      Lab Results   Component Value Date    HGBA1C 5.6 09/01/2023     11/03/2023    BUN 40 (H) 11/03/2023     Lab Results   Component Value Date    LDLCALC 81.6 02/07/2023         Assessment:       1. Chronic kidney disease, stage 4 (severe)    2. Proteinuria, unspecified type    3. Type 2 diabetes mellitus with stage 4 chronic kidney disease, unspecified whether long term insulin use    4. MGUS (monoclonal gammopathy of unknown significance)    5. Anemia, unspecified type    6. Secondary hyperparathyroidism    7. Acidosis    8. Hypertension, unspecified type    9. Low vitamin D level    10. Severe obesity (BMI 35.0-39.9) with comorbidity              Plan:   CKD stage IV with eGFR 21.1 mL/min - Underlying CKD clinically 2/2 diabetes + HTN  - Biopsy performed showing hypertensive nephrosclerosis, acute tubular injury, and diabetic nephropathy. sCr uptrend to 3.8 with unclear etiology.   - Some progression.  - IgG lambda specific monoclonal band noted on proteinuria workup and UPEP. No evidence of MGRS on biopsy.   - Educated patient to control BP, BG, remain well-hydrated, and avoid NSAIDs to prevent progression of CKD. High risk of progression to ESRD.      UPCR Significant albuminuria since 2011 with increase to almost nephrotic range  proteinuria in January. Was on farxiga but could not afford it; renal function is too low to start on recent labs. On ARB  - Proteinuric   Acid-base Mild acidosis. Resume sodium bicarb at 1300 mg BID.   Renal osteodystrophy Ca, phos okay. Low vit D. Elevated PTH.  On ergo weekly.   Anemia Hgb low last check. Repeat iron studies.    DM Well-controlled recently. Has had DM since at least 2007, when he had an A1c of 15+.   Lipid Management On statin.   ESRD planning Anticipatory guidance provided about timing of dialysis. Start discussions and planning when eGFR is about 20 mL/min; most patients start dialysis between 5-10 mL/min.    Attended ESRD treatment choices, but is still unsure about what treatment he would want.  Denied for transplant due to no response to 30 day letter. Long discussion. Re-referred today.  He does not want dialysis but said if he were to die without it, he would want it. Encouraged him to think about treatment options for next visit. Provided with decision aid online.     HTN - High on labetalol 200 mg BID, Nifedipine 90 mg, chlorthalidone 25 mg, valsartan 320 mg, spironolactone 25mg qd  - Goal is SBP less than 130/80  - Increase spironolactone to 50 mg  - exercise and lose weight (obesity)    MGUS - per hem/onc    All questions patient had were answered.  Asked if further questions. None. F/u in clinic in 3 months with Raven Naylor NP with labs and urine prior to next visit or sooner if needed.  ER for emergency concerns.    Summary of Plan:  Increase aldactone to 50 mg daily; BMP 2 weeks  TSAT, iron, ferritin  Sodium bicarb 1300 mg BID  Exercise  avoid NSAID/ bactrim/ IV contrast/ gadolinium/ aminoglycoside where possible  Refer to transplant; consider ESRD treatment options  RTC in 3 mos    70 minutes of total time spent on the encounter, which includes face to face time and non-face to face time preparing to see the patient (eg, review of tests), Obtaining and/or reviewing separately  obtained history, documenting clinical information in the electronic or other health record, independently interpreting results (not separately reported) and communicating results to the patient/family/caregiver, or Care coordination (not separately reported).

## 2023-11-06 NOTE — PATIENT INSTRUCTIONS
Think about dialysis options.     My Kidney Life Plan: A Kidney Treatment Decision Aid     Increase spironolactone (aldactone) to 50 mg per day  Repeat bloodwork in 2 weeks.    Start sodium bicarbonate 1,300 mg twice a week

## 2023-11-07 ENCOUNTER — TELEPHONE (OUTPATIENT)
Dept: TRANSPLANT | Facility: CLINIC | Age: 68
End: 2023-11-07
Payer: MEDICARE

## 2023-11-20 ENCOUNTER — PATIENT MESSAGE (OUTPATIENT)
Dept: ADMINISTRATIVE | Facility: HOSPITAL | Age: 68
End: 2023-11-20
Payer: MEDICARE

## 2023-11-21 ENCOUNTER — OFFICE VISIT (OUTPATIENT)
Dept: OPHTHALMOLOGY | Facility: CLINIC | Age: 68
End: 2023-11-21
Payer: MEDICARE

## 2023-11-21 ENCOUNTER — LAB VISIT (OUTPATIENT)
Dept: LAB | Facility: HOSPITAL | Age: 68
End: 2023-11-21
Payer: MEDICARE

## 2023-11-21 DIAGNOSIS — E11.3513 TYPE 2 DIABETES MELLITUS WITH BOTH EYES AFFECTED BY PROLIFERATIVE RETINOPATHY AND MACULAR EDEMA, WITHOUT LONG-TERM CURRENT USE OF INSULIN: Primary | ICD-10-CM

## 2023-11-21 DIAGNOSIS — H35.033 HYPERTENSIVE RETINOPATHY, BILATERAL: ICD-10-CM

## 2023-11-21 DIAGNOSIS — N18.4 CHRONIC KIDNEY DISEASE, STAGE 4 (SEVERE): ICD-10-CM

## 2023-11-21 LAB
ANION GAP SERPL CALC-SCNC: 11 MMOL/L (ref 8–16)
BUN SERPL-MCNC: 46 MG/DL (ref 8–23)
CALCIUM SERPL-MCNC: 9.2 MG/DL (ref 8.7–10.5)
CHLORIDE SERPL-SCNC: 112 MMOL/L (ref 95–110)
CO2 SERPL-SCNC: 21 MMOL/L (ref 23–29)
CREAT SERPL-MCNC: 3.6 MG/DL (ref 0.5–1.4)
EST. GFR  (NO RACE VARIABLE): 17.6 ML/MIN/1.73 M^2
GLUCOSE SERPL-MCNC: 89 MG/DL (ref 70–110)
POTASSIUM SERPL-SCNC: 5.1 MMOL/L (ref 3.5–5.1)
SODIUM SERPL-SCNC: 144 MMOL/L (ref 136–145)

## 2023-11-21 PROCEDURE — 1159F MED LIST DOCD IN RCRD: CPT | Mod: CPTII,S$GLB,, | Performed by: OPHTHALMOLOGY

## 2023-11-21 PROCEDURE — 1160F PR REVIEW ALL MEDS BY PRESCRIBER/CLIN PHARMACIST DOCUMENTED: ICD-10-PCS | Mod: CPTII,S$GLB,, | Performed by: OPHTHALMOLOGY

## 2023-11-21 PROCEDURE — 92134 POSTERIOR SEGMENT OCT RETINA (OCULAR COHERENCE TOMOGRAPHY)-BOTH EYES: ICD-10-PCS | Mod: S$GLB,,, | Performed by: OPHTHALMOLOGY

## 2023-11-21 PROCEDURE — 99999 PR PBB SHADOW E&M-EST. PATIENT-LVL II: CPT | Mod: PBBFAC,,, | Performed by: OPHTHALMOLOGY

## 2023-11-21 PROCEDURE — 67028 PR INJECT INTRAVITREAL PHARMCOLOGIC: ICD-10-PCS | Mod: 50,S$GLB,, | Performed by: OPHTHALMOLOGY

## 2023-11-21 PROCEDURE — 92134 CPTRZ OPH DX IMG PST SGM RTA: CPT | Mod: S$GLB,,, | Performed by: OPHTHALMOLOGY

## 2023-11-21 PROCEDURE — 4010F ACE/ARB THERAPY RXD/TAKEN: CPT | Mod: CPTII,S$GLB,, | Performed by: OPHTHALMOLOGY

## 2023-11-21 PROCEDURE — 92014 COMPRE OPH EXAM EST PT 1/>: CPT | Mod: 25,S$GLB,, | Performed by: OPHTHALMOLOGY

## 2023-11-21 PROCEDURE — 92014 PR EYE EXAM, EST PATIENT,COMPREHESV: ICD-10-PCS | Mod: 25,S$GLB,, | Performed by: OPHTHALMOLOGY

## 2023-11-21 PROCEDURE — 3288F FALL RISK ASSESSMENT DOCD: CPT | Mod: CPTII,S$GLB,, | Performed by: OPHTHALMOLOGY

## 2023-11-21 PROCEDURE — 3288F PR FALLS RISK ASSESSMENT DOCUMENTED: ICD-10-PCS | Mod: CPTII,S$GLB,, | Performed by: OPHTHALMOLOGY

## 2023-11-21 PROCEDURE — 3062F PR POS MACROALBUMINURIA RESULT DOCUMENTED/REVIEW: ICD-10-PCS | Mod: CPTII,S$GLB,, | Performed by: OPHTHALMOLOGY

## 2023-11-21 PROCEDURE — 80048 BASIC METABOLIC PNL TOTAL CA: CPT | Mod: NTX | Performed by: NURSE PRACTITIONER

## 2023-11-21 PROCEDURE — 1101F PR PT FALLS ASSESS DOC 0-1 FALLS W/OUT INJ PAST YR: ICD-10-PCS | Mod: CPTII,S$GLB,, | Performed by: OPHTHALMOLOGY

## 2023-11-21 PROCEDURE — 1160F RVW MEDS BY RX/DR IN RCRD: CPT | Mod: CPTII,S$GLB,, | Performed by: OPHTHALMOLOGY

## 2023-11-21 PROCEDURE — 1101F PT FALLS ASSESS-DOCD LE1/YR: CPT | Mod: CPTII,S$GLB,, | Performed by: OPHTHALMOLOGY

## 2023-11-21 PROCEDURE — 3066F NEPHROPATHY DOC TX: CPT | Mod: CPTII,S$GLB,, | Performed by: OPHTHALMOLOGY

## 2023-11-21 PROCEDURE — 3044F HG A1C LEVEL LT 7.0%: CPT | Mod: CPTII,S$GLB,, | Performed by: OPHTHALMOLOGY

## 2023-11-21 PROCEDURE — 99999 PR PBB SHADOW E&M-EST. PATIENT-LVL II: ICD-10-PCS | Mod: PBBFAC,,, | Performed by: OPHTHALMOLOGY

## 2023-11-21 PROCEDURE — 1159F PR MEDICATION LIST DOCUMENTED IN MEDICAL RECORD: ICD-10-PCS | Mod: CPTII,S$GLB,, | Performed by: OPHTHALMOLOGY

## 2023-11-21 PROCEDURE — 3062F POS MACROALBUMINURIA REV: CPT | Mod: CPTII,S$GLB,, | Performed by: OPHTHALMOLOGY

## 2023-11-21 PROCEDURE — 3066F PR DOCUMENTATION OF TREATMENT FOR NEPHROPATHY: ICD-10-PCS | Mod: CPTII,S$GLB,, | Performed by: OPHTHALMOLOGY

## 2023-11-21 PROCEDURE — 3044F PR MOST RECENT HEMOGLOBIN A1C LEVEL <7.0%: ICD-10-PCS | Mod: CPTII,S$GLB,, | Performed by: OPHTHALMOLOGY

## 2023-11-21 PROCEDURE — 36415 COLL VENOUS BLD VENIPUNCTURE: CPT | Mod: TXP | Performed by: NURSE PRACTITIONER

## 2023-11-21 PROCEDURE — 67028 INJECTION EYE DRUG: CPT | Mod: 50,S$GLB,, | Performed by: OPHTHALMOLOGY

## 2023-11-21 PROCEDURE — 4010F PR ACE/ARB THEARPY RXD/TAKEN: ICD-10-PCS | Mod: CPTII,S$GLB,, | Performed by: OPHTHALMOLOGY

## 2023-11-21 NOTE — PROGRESS NOTES
HPI     2 MONTH   DM  ME   and DFE      Additional comments: DFE   OCT   Blurry va  with and without glasses  refraction today  slightly better    Glasses 8 to 9 month s old ?  Got script from  [facility] [place] [facility] ?    LAST BS   LAST A1C                Comments    DLS  09/23       WFFA  IRF SUPERIOR     SRF  OS     SEVERE NPDR   NS OU    HTN  CYST REMOVAL BY Dr MANUEL          Last edited by Felicita Ryan on 11/21/2023  2:22 PM.            OCT  DME OU  OD - mild increase  OS - increase in central ME    Prior WFFA  OD - normal transit time. Hyperfluorescence early c/w Ma/edema - sig NP    OS - Hyperfluorescence early c/w Ma/edema  .NV nasal      A/P    1. 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 DME OU   6/19 - pt did not FU until 8/20  S/p Avastin OD x 2  S/p Eylea OD x 2  S/p Ozurdex OD x 2  7/23 11/22 - not seen x 2 years  3/23 - had second opinion with Dr. Suh.  Pt would like to try a few Eylea prior to steroids if needed  11/23 - increased DME oU    Ozurdex OU      3. HTN Ret OU  BS/BP/chol control    4. NS OU  No sig PSC yet    5. Cyst RLL  Removed by Rhea  Happy with result        3 month OCT and dilate    Risks, benefits, and alternatives to treatment discussed in detail with the patient.  The patient voiced understanding and wished to proceed with the procedure    Injection Procedure Note:  Diagnosis: DME OU    Patient Identified and Time Out complete  Pt marked  Topical Proparacaine and Betadine. Subconj lido  Inject Ozurdex OU at 6:00 @ 3.5-4mm posterior to limbus  Post Operative Dx: Same  Complications: None  Follow up as above.

## 2023-11-22 DIAGNOSIS — N18.4 CHRONIC KIDNEY DISEASE, STAGE 4 (SEVERE): Primary | ICD-10-CM

## 2023-11-27 ENCOUNTER — TELEPHONE (OUTPATIENT)
Dept: TRANSPLANT | Facility: CLINIC | Age: 68
End: 2023-11-27
Payer: MEDICARE

## 2023-11-27 NOTE — TELEPHONE ENCOUNTER
"----- Message from Judi Colon DO sent at 11/27/2023  2:31 PM CST -----  Regarding: RE: MGUS Referral Patient  He is eligible for RR  Christa should be back next week     Judi  ----- Message -----  From: Rain Cochran RN  Sent: 11/27/2023  12:15 PM CST  To: Christa Aldana MD  Subject: MGUS Referral Patient                            Good afternoon Dr. Aldana    I received this referral on Mr. Adams, a 68 year old AA male who is Pre-dialysis (GFR 19.6) whose history includes: DM, Hypertension, bilateral Nuclear sclerosis, diabetic retinopathy, Hyperlipidemia and MGUS (2022).   I was able to find a Hem/Onc Progress Notes 8/28/2023 (Epic) :    Patient with elevated IgG lambda paraprotein on recent SPEP in setting of declining Hgb and kidney function. We reviewed at length the spectrum of MGUS/ SMM/ MM.   -Repeat plasma cell evaluations shows M spike of 1.0 K/L ratio of 1.79.   -Given paraprotein with normocytic anemia and kidney dysfunction; warranted bone marrow biopsy for evaluation.   - Bone marrow biopsy significant for 5% plasma cell neoplasm consistent with MDS.   - Underwent kidney biopsy on 7/19/2023 with path showing "arterionephrosclerosis and diabetic nephropathy."    Plan: Repeat MGUS panel; Return to clinic on 6 mos for continued monitoring of MGUS.    MGUS Panel results 8/28/2023 Epic.    Based on the above information provided is Mr. Adams eligible for RR evaluation.    Thanks  Amalia      "

## 2023-11-28 ENCOUNTER — TELEPHONE (OUTPATIENT)
Dept: TRANSPLANT | Facility: CLINIC | Age: 68
End: 2023-11-28
Payer: MEDICARE

## 2023-11-29 ENCOUNTER — TELEPHONE (OUTPATIENT)
Dept: ADMINISTRATIVE | Facility: HOSPITAL | Age: 68
End: 2023-11-29
Payer: MEDICARE

## 2023-11-29 ENCOUNTER — PATIENT OUTREACH (OUTPATIENT)
Dept: ADMINISTRATIVE | Facility: HOSPITAL | Age: 68
End: 2023-11-29
Payer: MEDICARE

## 2023-11-29 VITALS — SYSTOLIC BLOOD PRESSURE: 133 MMHG | DIASTOLIC BLOOD PRESSURE: 74 MMHG

## 2023-11-29 NOTE — PROGRESS NOTES

## 2023-12-04 ENCOUNTER — TELEPHONE (OUTPATIENT)
Dept: TRANSPLANT | Facility: CLINIC | Age: 68
End: 2023-12-04
Payer: MEDICARE

## 2023-12-06 DIAGNOSIS — Z91.89 CARDIOVASCULAR EVENT RISK: ICD-10-CM

## 2023-12-06 DIAGNOSIS — Z01.810 HIGH RISK SURGERY, PRE-OPERATIVE CARDIOVASCULAR EXAMINATION: ICD-10-CM

## 2023-12-06 DIAGNOSIS — Z76.82 ORGAN TRANSPLANT CANDIDATE: Primary | ICD-10-CM

## 2023-12-24 DIAGNOSIS — I10 HYPERTENSION, UNSPECIFIED TYPE: ICD-10-CM

## 2023-12-29 RX ORDER — LABETALOL 200 MG/1
200 TABLET, FILM COATED ORAL 2 TIMES DAILY
Qty: 180 TABLET | Refills: 0 | Status: SHIPPED | OUTPATIENT
Start: 2023-12-29

## 2024-01-04 ENCOUNTER — LAB VISIT (OUTPATIENT)
Dept: LAB | Facility: HOSPITAL | Age: 69
End: 2024-01-04
Payer: MEDICARE

## 2024-01-04 DIAGNOSIS — E78.5 HYPERLIPIDEMIA, UNSPECIFIED HYPERLIPIDEMIA TYPE: ICD-10-CM

## 2024-01-04 DIAGNOSIS — E11.8 CONTROLLED TYPE 2 DIABETES MELLITUS WITH COMPLICATION, WITHOUT LONG-TERM CURRENT USE OF INSULIN: ICD-10-CM

## 2024-01-04 LAB
CHOLEST SERPL-MCNC: 188 MG/DL (ref 120–199)
CHOLEST/HDLC SERPL: 4.4 {RATIO} (ref 2–5)
ESTIMATED AVG GLUCOSE: 111 MG/DL (ref 68–131)
HBA1C MFR BLD: 5.5 % (ref 4–5.6)
HDLC SERPL-MCNC: 43 MG/DL (ref 40–75)
HDLC SERPL: 22.9 % (ref 20–50)
LDLC SERPL CALC-MCNC: 98.6 MG/DL (ref 63–159)
NONHDLC SERPL-MCNC: 145 MG/DL
TRIGL SERPL-MCNC: 232 MG/DL (ref 30–150)

## 2024-01-04 PROCEDURE — 83036 HEMOGLOBIN GLYCOSYLATED A1C: CPT | Mod: TXP | Performed by: NURSE PRACTITIONER

## 2024-01-04 PROCEDURE — 36415 COLL VENOUS BLD VENIPUNCTURE: CPT | Mod: PO,NTX | Performed by: NURSE PRACTITIONER

## 2024-01-04 PROCEDURE — 80061 LIPID PANEL: CPT | Mod: TXP | Performed by: NURSE PRACTITIONER

## 2024-01-10 ENCOUNTER — OFFICE VISIT (OUTPATIENT)
Dept: ENDOCRINOLOGY | Facility: CLINIC | Age: 69
End: 2024-01-10
Payer: MEDICARE

## 2024-01-10 DIAGNOSIS — E78.2 MIXED HYPERLIPIDEMIA: ICD-10-CM

## 2024-01-10 DIAGNOSIS — R35.0 URINARY FREQUENCY: ICD-10-CM

## 2024-01-10 DIAGNOSIS — N18.4 CKD (CHRONIC KIDNEY DISEASE) STAGE 4, GFR 15-29 ML/MIN: ICD-10-CM

## 2024-01-10 DIAGNOSIS — E11.8 CONTROLLED TYPE 2 DIABETES MELLITUS WITH COMPLICATION, WITHOUT LONG-TERM CURRENT USE OF INSULIN: Primary | ICD-10-CM

## 2024-01-10 PROCEDURE — 3044F HG A1C LEVEL LT 7.0%: CPT | Mod: CPTII,95,NTX, | Performed by: NURSE PRACTITIONER

## 2024-01-10 PROCEDURE — 99214 OFFICE O/P EST MOD 30 MIN: CPT | Mod: 95,NTX,, | Performed by: NURSE PRACTITIONER

## 2024-01-10 PROCEDURE — 1159F MED LIST DOCD IN RCRD: CPT | Mod: CPTII,95,NTX, | Performed by: NURSE PRACTITIONER

## 2024-01-10 PROCEDURE — 1160F RVW MEDS BY RX/DR IN RCRD: CPT | Mod: CPTII,95,NTX, | Performed by: NURSE PRACTITIONER

## 2024-01-10 RX ORDER — PIOGLITAZONEHYDROCHLORIDE 30 MG/1
30 TABLET ORAL DAILY
Qty: 90 TABLET | Refills: 2 | Status: SHIPPED | OUTPATIENT
Start: 2024-01-10 | End: 2025-01-09

## 2024-01-10 RX ORDER — LINAGLIPTIN 5 MG/1
5 TABLET, FILM COATED ORAL DAILY
Qty: 90 TABLET | Refills: 2 | Status: SHIPPED | OUTPATIENT
Start: 2024-01-10 | End: 2025-01-09

## 2024-01-10 RX ORDER — ATORVASTATIN CALCIUM 20 MG/1
20 TABLET, FILM COATED ORAL DAILY
Qty: 90 TABLET | Refills: 2 | Status: SHIPPED | OUTPATIENT
Start: 2024-01-10

## 2024-01-10 NOTE — Clinical Note
Marquez Sanderson, pt is being transferred back to Primary Care with controlled diabetes. He was advised to schedule f/u with you. Thanks, Maryse

## 2024-01-10 NOTE — PROGRESS NOTES
CC: This 68 y.o. Black or  male  is here for evaluation of  T2DM along with comorbidities indicated in the Visit Diagnosis section of this encounter.    HPI: Handy Adams was diagnosed with T2DM in his early 60s.     DM COMPLICATIONS: nephropathy and retinopathy        Prior visit 6/2023  A1c is about the same, from 6.4 to now 6.2%.   Pt believes his glucoses are doing well but he does intend to improve diet and start exercise.   Pt not sure if he is taking pioglitazone or not. He was concerned about decline  in kidney function. He stopped a medication a month ago but does not recall its name.   He has questions about the recent drop in his kidney function as well as anemia. He stopped taking iron supplement after fecal impaction last month. Requests getting blood count retested.   Plan Suspect  that a1c for patient is falsely low due to anemia (baseline hemoglobin ~ 9). Recent glucoses have been high.   Need to resume pioglitazone 30 mg once daily if it has been stopped.   Recommend starting weekly injection to help with dietary improvement and glucose control. Patient declines and will work on lifestyle changes.   Pt will see renal dietician.   Start exercise regimen. - he will go to gym regularly   Recommend bringing rx bottles to all visits.   Test glucose 2x/day. - fasting before breakfast and again at bedtime.   Undergo  Continuous Glucose Monitor (CGM) study.   Return to clinic in 3 months for CGM study review and a1c prior.       Prior visit 9/2023  A1c is down from 6.2 to 5.6%. Of note, pt is anemic and A1c indicates falsely low glycemia.   Returns for unblinded CGM study review.   Does not bring food diary. Reports he did not change his diet during the study.   CGM interpretation: glucoses very stable with minimal fluctuation. No hypoglycemia; highest glucoses were postprandial and occurred during initial days of the study, which also was the weekend.   Plan Diabetes is well-controlled  based of CGM report and digital medicine readings (although he is not testing often).   Test glucose 1x/day before or 2 hours after meals.   Continue current treatment - Tradjenta and pioglitazone.   Return to clinic in 4-6 months with labs prior. - virtual visit     Interval hx virtual visit -- converted to audio   A1c remains about the same from 5.6 to now 5.5%.       LAST DIABETES EDUCATION: years ago   8/18/23 - renal dietician   HOSPITALIZED FOR DIABETES  -  No.    SIGNIFICANT DIABETES MED HISTORY: denies intolerance to prior DM meds      PRESCRIBED DIABETES MEDICATIONS:   Tradjenta 5 mg once daily   Pioglitazone 30 mg once daily     Misses medication doses - no     SELF MONITORING BLOOD GLUCOSE: Checks blood glucose at home - infrequent   enrolled Digital DM Medicine       HYPOGLYCEMIC EPISODES: none        CURRENT DIET: drinks water and diet coke. Recently stopped diet products.   Eats 2-3 meals/day, occ skips breakfast.       CURRENT EXERCISE: none.     SOCIAL: retired IT support, enrolled in a Phd program       There were no vitals taken for this visit.      ROS:   CONSTITUTIONAL: Appetite good, denies fatigue  denies dyspnea or LE edema   + urinary frequency    PHYSICAL EXAM:  GENERAL: Well developed, well nourished. No acute distress.   PSYCH: AAOx3, appropriate mood and affect, conversant, well-groomed. Judgement and insight good.   NEURO: Cranial nerves grossly intact. Speech clear, no tremor.   CHEST: Respirations even and unlabored.           Hemoglobin A1C   Date Value Ref Range Status   01/04/2024 5.5 4.0 - 5.6 % Final     Comment:     ADA Screening Guidelines:  5.7-6.4%  Consistent with prediabetes  >or=6.5%  Consistent with diabetes    High levels of fetal hemoglobin interfere with the HbA1C  assay. Heterozygous hemoglobin variants (HbS, HgC, etc)do  not significantly interfere with this assay.   However, presence of multiple variants may affect accuracy.     09/01/2023 5.6 4.0 - 5.6 % Final      "Comment:     ADA Screening Guidelines:  5.7-6.4%  Consistent with prediabetes  >or=6.5%  Consistent with diabetes    High levels of fetal hemoglobin interfere with the HbA1C  assay. Heterozygous hemoglobin variants (HbS, HgC, etc)do  not significantly interfere with this assay.   However, presence of multiple variants may affect accuracy.     06/13/2023 6.2 (H) 4.0 - 5.6 % Final     Comment:     ADA Screening Guidelines:  5.7-6.4%  Consistent with prediabetes  >or=6.5%  Consistent with diabetes    High levels of fetal hemoglobin interfere with the HbA1C  assay. Heterozygous hemoglobin variants (HbS, HgC, etc)do  not significantly interfere with this assay.   However, presence of multiple variants may affect accuracy.         No results found for: "CPEPTIDE", "GLUTAMICACID", "ISLETCELLANT", "FRUCTOSAMINE"     Lab Results   Component Value Date    CHOL 188 01/04/2024    CHOL 136 02/07/2023    CHOL 197 09/07/2022     Lab Results   Component Value Date    HDL 43 01/04/2024    HDL 32 (L) 02/07/2023    HDL 30 (L) 09/07/2022     Lab Results   Component Value Date    LDLCALC 98.6 01/04/2024    LDLCALC 81.6 02/07/2023    LDLCALC 94.4 09/07/2022     Lab Results   Component Value Date    TRIG 232 (H) 01/04/2024    TRIG 112 02/07/2023    TRIG 363 (H) 09/07/2022     Lab Results   Component Value Date    CHOLHDL 22.9 01/04/2024    CHOLHDL 23.5 02/07/2023    CHOLHDL 15.2 (L) 09/07/2022         Component Value Date/Time     11/21/2023 1606    K 5.1 11/21/2023 1606     (H) 11/21/2023 1606    CO2 21 (L) 11/21/2023 1606    BUN 46 (H) 11/21/2023 1606    CREATININE 3.6 (H) 11/21/2023 1606    GLU 89 11/21/2023 1606    CALCIUM 9.2 11/21/2023 1606    ALKPHOS 46 (L) 08/28/2023 1135    AST 11 08/28/2023 1135    ALT 13 08/28/2023 1135    BILITOT 0.5 08/28/2023 1135    EGFRNORACEVR 17.6 (A) 11/21/2023 1606    ESTGFRAFRICA 51.1 (A) 01/03/2022 0922         Lab Results   Component Value Date    LABMICR 1316.0 06/13/2023    CREATRANDUR " 50.0 11/03/2023    MICALBCREAT 1585.5 (H) 06/13/2023             ASSESSMENT and PLAN:    A1C GOAL: < 7 %     1. Controlled type 2 diabetes mellitus with complication, without long-term current use of insulin  Unable to evaluate glucose trends d/t lack of testing. However, A1c remains stable and low. Continue current treatment.   Test glucose a few times a week at least.   Rtc prn.   F/u with Primary Care. Pt has not seen Primary in a year. Advised him to schedule f/u.     pioglitazone (ACTOS) 30 MG tablet    linaGLIPtin (TRADJENTA) 5 mg Tab tablet      2. Mixed hyperlipidemia  Last lipid panel tested when pt ran out of atorvastatin. Rx sent for   atorvastatin (LIPITOR) 20 MG tablet      3. CKD (chronic kidney disease) stage 4, GFR 15-29 ml/min  Maintain glucose control.       4. Urinary frequency  F/u with Primary.   Explained that this can be a symptom of hyperglycemia although suspicion for hyperglycemia is low since A1c is 5.5%. Needs to resume testing BGs and f/u with Endocrine if BGs are uncontrolled.             No orders of the defined types were placed in this encounter.         Follow up if symptoms worsen or fail to improve.     The patient location is: [place]  The chief complaint leading to consultation is: type 2 diabetes    Visit type: audiovisual    Face to Face time with patient: 18 minutes of total time spent on the encounter, which includes face to face time and non-face to face time preparing to see the patient (eg, review of tests), Obtaining and/or reviewing separately obtained history, Documenting clinical information in the electronic or other health record, Independently interpreting results (not separately reported) and communicating results to the patient/family/caregiver, or Care coordination (not separately reported).         Each patient to whom he or she provides medical services by telemedicine is:  (1) informed of the relationship between the physician and patient and the respective  role of any other health care provider with respect to management of the patient; and (2) notified that he or she may decline to receive medical services by telemedicine and may withdraw from such care at any time.    Notes:

## 2024-01-17 ENCOUNTER — PATIENT OUTREACH (OUTPATIENT)
Dept: ADMINISTRATIVE | Facility: HOSPITAL | Age: 69
End: 2024-01-17
Payer: MEDICARE

## 2024-01-17 ENCOUNTER — PATIENT MESSAGE (OUTPATIENT)
Dept: ADMINISTRATIVE | Facility: HOSPITAL | Age: 69
End: 2024-01-17
Payer: MEDICARE

## 2024-01-17 NOTE — PROGRESS NOTES
Population Health Chart Review & Patient Outreach Details    HCC Report Need to schedule visit with pcp.  Sent portal message for 1st  attempt.   Further Action Needed If Patient Returns Outreach:            Updates Requested / Reviewed:     [x]  Care Everywhere    []     []  External Sources ([facility], [facility], [facility], etc.)    [] [facility]   [] [facility]   [] Other:    []  Care Team Updated   []  Removed  or Duplicate Orders   []  Immunization Reconciliation Completed / Queried    [] [place]   [] [place]   [] [place]   [] [place]      Health Maintenance Topics Addressed and Outreach Outcomes / Actions Taken:             Breast Cancer Screening []  Mammogram Order Placed    []  Mammogram Screening Scheduled    []  External Records Requested & Care Team Updated if Applicable    []  External Records Uploaded & Care Team Updated if Applicable    []  Pt Declined Scheduling Mammogram    []  Pt Will Schedule with External Provider / Order Routed & Care Team Updated if Applicable              Cervical Cancer Screening []  Pap Smear Scheduled in Primary Care or OBGYN    []  External Records Requested & Care Team Updated if Applicable       []  External Records Uploaded, Care Team Updated, & History Updated if Applicable    []  Patient Declined Scheduling Pap Smear    []  Patient Will Schedule with External Provider & Care Team Updated if Applicable                  Colorectal Cancer Screening []  Colonoscopy Case Request / Referral / Home Test Order Placed    []  External Records Requested & Care Team Updated if Applicable    []  External Records Uploaded, Care Team Updated, & History Updated if Applicable    []  Patient Declined Completing Colon Cancer Screening    []  Patient Will Schedule with External Provider & Care Team Updated if Applicable    []  Fit Kit Mailed (add the SmartPhrase under additional notes)    []  Reminded Patient to Complete Home Test                Diabetic Eye Exam []  Eye Exam  Screening Order Placed    []  Eye Camera Scheduled or Optometry/Ophthalmology Referral Placed    []  External Records Requested & Care Team Updated if Applicable    []  External Records Uploaded, Care Team Updated, & History Updated if Applicable    []  Patient Declined Scheduling Eye Exam    []  Patient Will Schedule with External Provider & Care Team Updated if Applicable             Blood Pressure Control []  Primary Care Follow Up Visit Scheduled     []  Remote Blood Pressure Reading Captured    []  Patient Declined Remote Reading or Scheduling Appt - Escalated to PCP    []  Patient Will Call Back or Send Portal Message with Reading                 HbA1c & Other Labs []  Overdue Lab(s) Ordered    []  Overdue Lab(s) Scheduled    []  External Records Uploaded & Care Team Updated if Applicable    []  Primary Care Follow Up Visit Scheduled     []  Reminded Patient to Complete A1c Home Test    []  Patient Declined Scheduling Labs or Will Call Back to Schedule    []  Patient Will Schedule with External Provider / Order Routed, & Care Team Updated if Applicable           Primary Care Appointment []  Primary Care Appt Scheduled    []  Patient Declined Scheduling or Will Call Back to Schedule    []  Pt Established with External Provider, Updated Care Team, & Informed Pt to Notify Payor if Applicable           Medication Adherence /    Statin Use []  Primary Care Appointment Scheduled    []  Patient Reminded to  Prescription    []  Patient Declined, Provider Notified if Needed    []  Sent Provider Message to Review to Evaluate Pt for Statin, Add Exclusion Dx Codes, Document   Exclusion in Problem List, Change Statin Intensity Level to Moderate or High Intensity if Applicable                Osteoporosis Screening []  Dexa Order Placed    []  Dexa Appointment Scheduled    []  External Records Requested & Care Team Updated    []  External Records Uploaded, Care Team Updated, & History Updated if Applicable    []   Patient Declined Scheduling Dexa or Will Call Back to Schedule    []  Patient Will Schedule with External Provider / Order Routed & Care Team Updated if Applicable       Additional Notes:

## 2024-01-22 ENCOUNTER — PATIENT OUTREACH (OUTPATIENT)
Dept: ADMINISTRATIVE | Facility: HOSPITAL | Age: 69
End: 2024-01-22
Payer: MEDICARE

## 2024-01-22 ENCOUNTER — TELEPHONE (OUTPATIENT)
Dept: ADMINISTRATIVE | Facility: HOSPITAL | Age: 69
End: 2024-01-22
Payer: MEDICARE

## 2024-01-22 NOTE — PROGRESS NOTES
Population Health Chart Review & Patient Outreach Details    HCC Report need to see PCP this year. --physi - [email or web address].  Left message for patient to return call. Letter mailed out.   Further Action Needed If Patient Returns Outreach:      Please advise of message above.      Updates Requested / Reviewed:     [x]  Care Everywhere    []     []  External Sources ([facility], [facility], [facility], etc.)    [] [facility]   [] [facility]   [] Other:    []  Care Team Updated   []  Removed  or Duplicate Orders   []  Immunization Reconciliation Completed / Queried    [] [place]   [] [place]   [] [place]   [] [place]      Health Maintenance Topics Addressed and Outreach Outcomes / Actions Taken:             Breast Cancer Screening []  Mammogram Order Placed    []  Mammogram Screening Scheduled    []  External Records Requested & Care Team Updated if Applicable    []  External Records Uploaded & Care Team Updated if Applicable    []  Pt Declined Scheduling Mammogram    []  Pt Will Schedule with External Provider / Order Routed & Care Team Updated if Applicable              Cervical Cancer Screening []  Pap Smear Scheduled in Primary Care or OBGYN    []  External Records Requested & Care Team Updated if Applicable       []  External Records Uploaded, Care Team Updated, & History Updated if Applicable    []  Patient Declined Scheduling Pap Smear    []  Patient Will Schedule with External Provider & Care Team Updated if Applicable                  Colorectal Cancer Screening []  Colonoscopy Case Request / Referral / Home Test Order Placed    []  External Records Requested & Care Team Updated if Applicable    []  External Records Uploaded, Care Team Updated, & History Updated if Applicable    []  Patient Declined Completing Colon Cancer Screening    []  Patient Will Schedule with External Provider & Care Team Updated if Applicable    []  Fit Kit Mailed (add the SmartPhrase under additional notes)    []  Reminded Patient to  Complete Home Test                Diabetic Eye Exam []  Eye Exam Screening Order Placed    []  Eye Camera Scheduled or Optometry/Ophthalmology Referral Placed    []  External Records Requested & Care Team Updated if Applicable    []  External Records Uploaded, Care Team Updated, & History Updated if Applicable    []  Patient Declined Scheduling Eye Exam    []  Patient Will Schedule with External Provider & Care Team Updated if Applicable             Blood Pressure Control []  Primary Care Follow Up Visit Scheduled     []  Remote Blood Pressure Reading Captured    []  Patient Declined Remote Reading or Scheduling Appt - Escalated to PCP    []  Patient Will Call Back or Send Portal Message with Reading                 HbA1c & Other Labs []  Overdue Lab(s) Ordered    []  Overdue Lab(s) Scheduled    []  External Records Uploaded & Care Team Updated if Applicable    []  Primary Care Follow Up Visit Scheduled     []  Reminded Patient to Complete A1c Home Test    []  Patient Declined Scheduling Labs or Will Call Back to Schedule    []  Patient Will Schedule with External Provider / Order Routed, & Care Team Updated if Applicable           Primary Care Appointment []  Primary Care Appt Scheduled    []  Patient Declined Scheduling or Will Call Back to Schedule    []  Pt Established with External Provider, Updated Care Team, & Informed Pt to Notify Payor if Applicable           Medication Adherence /    Statin Use []  Primary Care Appointment Scheduled    []  Patient Reminded to  Prescription    []  Patient Declined, Provider Notified if Needed    []  Sent Provider Message to Review to Evaluate Pt for Statin, Add Exclusion Dx Codes, Document   Exclusion in Problem List, Change Statin Intensity Level to Moderate or High Intensity if Applicable                Osteoporosis Screening []  Dexa Order Placed    []  Dexa Appointment Scheduled    []  External Records Requested & Care Team Updated    []  External Records  Uploaded, Care Team Updated, & History Updated if Applicable    []  Patient Declined Scheduling Dexa or Will Call Back to Schedule    []  Patient Will Schedule with External Provider / Order Routed & Care Team Updated if Applicable       Additional Notes:

## 2024-01-22 NOTE — LETTER
January 22, 2024            Handy Byran  [address] [address]       Dear Mr. Adams,      Our records indicate that you are overdue for an annual checkup. Currently, Toby Sanderson MD is listed as your primary care provider. If this is incorrect, please notify your primary care clinic so we can update your health record.    Your [facility] care team is committed to supporting your overall health. We look forward to seeing you soon and appreciate the opportunity to serve you.    If you would like to schedule your appointment, please contact your primary care clinic.    Sincerely,     Toby Sanderson MD and your [facility] Primary Care Team

## 2024-01-22 NOTE — PROGRESS NOTES

## 2024-02-05 ENCOUNTER — TELEPHONE (OUTPATIENT)
Dept: TRANSPLANT | Facility: CLINIC | Age: 69
End: 2024-02-05
Payer: MEDICARE

## 2024-02-05 NOTE — TELEPHONE ENCOUNTER
"MA notes per Pre Dialysis adherence form/Raven Naylor, NP      FOR THE PAST THREE MONTHS:    0-Appt Adhrence  0-No show    No concerns with labs, care giving, transportation, or mental health  Ko Hubbard-- per Raven, no concerns with no-shows. Per Raven on compliance concerns: "Not really - the main thing is that he delayed biopsy for a while despite my recommendation for it. B/c he kept thinking and hoping he would improve."    Haydee Cartagena  Abdominal Transplant MA   "

## 2024-02-07 ENCOUNTER — HOSPITAL ENCOUNTER (OUTPATIENT)
Dept: RADIOLOGY | Facility: HOSPITAL | Age: 69
Discharge: HOME | End: 2024-02-07
Attending: NURSE PRACTITIONER
Payer: MEDICARE

## 2024-02-07 ENCOUNTER — OFFICE VISIT (OUTPATIENT)
Dept: TRANSPLANT | Facility: CLINIC | Age: 69
End: 2024-02-07
Payer: MEDICARE

## 2024-02-07 VITALS
BODY MASS INDEX: 40.31 KG/M2 | DIASTOLIC BLOOD PRESSURE: 73 MMHG | TEMPERATURE: 97 F | SYSTOLIC BLOOD PRESSURE: 151 MMHG | HEART RATE: 68 BPM | WEIGHT: 297.63 LBS | OXYGEN SATURATION: 100 % | HEIGHT: 72 IN | RESPIRATION RATE: 16 BRPM

## 2024-02-07 DIAGNOSIS — N25.81 HYPERPARATHYROIDISM DUE TO RENAL INSUFFICIENCY: ICD-10-CM

## 2024-02-07 DIAGNOSIS — D47.2 MGUS (MONOCLONAL GAMMOPATHY OF UNKNOWN SIGNIFICANCE): ICD-10-CM

## 2024-02-07 DIAGNOSIS — N18.4 TYPE 2 DIABETES MELLITUS WITH STAGE 4 CHRONIC KIDNEY DISEASE, WITHOUT LONG-TERM CURRENT USE OF INSULIN: ICD-10-CM

## 2024-02-07 DIAGNOSIS — E78.49 OTHER HYPERLIPIDEMIA: ICD-10-CM

## 2024-02-07 DIAGNOSIS — Z76.82 ORGAN TRANSPLANT CANDIDATE: ICD-10-CM

## 2024-02-07 DIAGNOSIS — N18.4 BENIGN HYPERTENSION WITH CKD (CHRONIC KIDNEY DISEASE) STAGE IV: ICD-10-CM

## 2024-02-07 DIAGNOSIS — E66.01 CLASS 3 SEVERE OBESITY DUE TO EXCESS CALORIES WITH SERIOUS COMORBIDITY AND BODY MASS INDEX (BMI) OF 40.0 TO 44.9 IN ADULT: ICD-10-CM

## 2024-02-07 DIAGNOSIS — N18.4 CKD (CHRONIC KIDNEY DISEASE) STAGE 4, GFR 15-29 ML/MIN: ICD-10-CM

## 2024-02-07 DIAGNOSIS — E11.22 TYPE 2 DIABETES MELLITUS WITH STAGE 4 CHRONIC KIDNEY DISEASE, WITHOUT LONG-TERM CURRENT USE OF INSULIN: ICD-10-CM

## 2024-02-07 DIAGNOSIS — Z01.818 PRE-TRANSPLANT EVALUATION FOR CHRONIC KIDNEY DISEASE: Primary | ICD-10-CM

## 2024-02-07 DIAGNOSIS — N18.4 ANEMIA IN STAGE 4 CHRONIC KIDNEY DISEASE: ICD-10-CM

## 2024-02-07 DIAGNOSIS — D63.1 ANEMIA IN STAGE 4 CHRONIC KIDNEY DISEASE: ICD-10-CM

## 2024-02-07 DIAGNOSIS — I12.9 BENIGN HYPERTENSION WITH CKD (CHRONIC KIDNEY DISEASE) STAGE IV: ICD-10-CM

## 2024-02-07 PROBLEM — E66.813 CLASS 3 SEVERE OBESITY DUE TO EXCESS CALORIES WITH SERIOUS COMORBIDITY AND BODY MASS INDEX (BMI) OF 40.0 TO 44.9 IN ADULT: Status: ACTIVE | Noted: 2022-01-07

## 2024-02-07 PROCEDURE — 1160F RVW MEDS BY RX/DR IN RCRD: CPT | Mod: CPTII,S$GLB,TXP, | Performed by: NURSE PRACTITIONER

## 2024-02-07 PROCEDURE — 3066F NEPHROPATHY DOC TX: CPT | Mod: CPTII,S$GLB,TXP, | Performed by: NURSE PRACTITIONER

## 2024-02-07 PROCEDURE — 3077F SYST BP >= 140 MM HG: CPT | Mod: CPTII,S$GLB,TXP, | Performed by: NURSE PRACTITIONER

## 2024-02-07 PROCEDURE — 99214 OFFICE O/P EST MOD 30 MIN: CPT | Mod: S$GLB,TXP,, | Performed by: SURGERY

## 2024-02-07 PROCEDURE — 97802 MEDICAL NUTRITION INDIV IN: CPT | Mod: S$GLB,TXP,,

## 2024-02-07 PROCEDURE — 72170 X-RAY EXAM OF PELVIS: CPT | Mod: TC,TXP

## 2024-02-07 PROCEDURE — 71046 X-RAY EXAM CHEST 2 VIEWS: CPT | Mod: 26,TXP,, | Performed by: RADIOLOGY

## 2024-02-07 PROCEDURE — 72170 X-RAY EXAM OF PELVIS: CPT | Mod: 26,TXP,, | Performed by: RADIOLOGY

## 2024-02-07 PROCEDURE — 3078F DIAST BP <80 MM HG: CPT | Mod: CPTII,S$GLB,TXP, | Performed by: NURSE PRACTITIONER

## 2024-02-07 PROCEDURE — 1159F MED LIST DOCD IN RCRD: CPT | Mod: CPTII,S$GLB,TXP, | Performed by: NURSE PRACTITIONER

## 2024-02-07 PROCEDURE — 3288F FALL RISK ASSESSMENT DOCD: CPT | Mod: CPTII,S$GLB,TXP, | Performed by: NURSE PRACTITIONER

## 2024-02-07 PROCEDURE — 99204 OFFICE O/P NEW MOD 45 MIN: CPT | Mod: S$GLB,TXP,, | Performed by: PHYSICIAN ASSISTANT

## 2024-02-07 PROCEDURE — 93978 VASCULAR STUDY: CPT | Mod: TC,TXP

## 2024-02-07 PROCEDURE — 3062F POS MACROALBUMINURIA REV: CPT | Mod: CPTII,S$GLB,TXP, | Performed by: NURSE PRACTITIONER

## 2024-02-07 PROCEDURE — 76770 US EXAM ABDO BACK WALL COMP: CPT | Mod: 26,TXP,, | Performed by: RADIOLOGY

## 2024-02-07 PROCEDURE — 1101F PT FALLS ASSESS-DOCD LE1/YR: CPT | Mod: CPTII,S$GLB,TXP, | Performed by: NURSE PRACTITIONER

## 2024-02-07 PROCEDURE — 99215 OFFICE O/P EST HI 40 MIN: CPT | Mod: S$GLB,TXP,, | Performed by: NURSE PRACTITIONER

## 2024-02-07 PROCEDURE — 76770 US EXAM ABDO BACK WALL COMP: CPT | Mod: TC,TXP

## 2024-02-07 PROCEDURE — 71046 X-RAY EXAM CHEST 2 VIEWS: CPT | Mod: TC,TXP

## 2024-02-07 PROCEDURE — 99999 PR PBB SHADOW E&M-EST. PATIENT-LVL V: CPT | Mod: PBBFAC,TXP,, | Performed by: NURSE PRACTITIONER

## 2024-02-07 PROCEDURE — 3008F BODY MASS INDEX DOCD: CPT | Mod: CPTII,S$GLB,TXP, | Performed by: NURSE PRACTITIONER

## 2024-02-07 PROCEDURE — 1126F AMNT PAIN NOTED NONE PRSNT: CPT | Mod: CPTII,S$GLB,TXP, | Performed by: NURSE PRACTITIONER

## 2024-02-07 PROCEDURE — 93978 VASCULAR STUDY: CPT | Mod: 26,TXP,, | Performed by: RADIOLOGY

## 2024-02-07 PROCEDURE — 3044F HG A1C LEVEL LT 7.0%: CPT | Mod: CPTII,S$GLB,TXP, | Performed by: NURSE PRACTITIONER

## 2024-02-07 RX ORDER — ERGOCALCIFEROL 1.25 MG/1
50000 CAPSULE ORAL
COMMUNITY
End: 2024-02-15

## 2024-02-07 RX ORDER — FERROUS SULFATE TAB 325 MG (65 MG ELEMENTAL FE) 325 (65 FE) MG
325 TAB ORAL 3 TIMES DAILY
COMMUNITY
Start: 2023-11-22 | End: 2024-02-28 | Stop reason: SDUPTHER

## 2024-02-07 NOTE — PROGRESS NOTES
Transplant Surgery  Kidney Transplant Recipient Evaluation    Referring Physician: Raven Naylor  Current Nephrologist: Raven Naylor    Subjective:     Reason for Visit: evaluate transplant candidacy    History of Present Illness: Handy Adams is a 68 y.o. year old male undergoing transplant evaluation.    Dialysis History: Handy is pre-dialysis.      Transplant History: N/A    Etiology of Renal Disease: Diabetes Mellitus - Type II (based on medical records from referral).    External provider notes reviewed: No    Review of Systems   Constitutional:  Positive for fatigue.   HENT:  Negative for drooling, postnasal drip and sore throat.    Eyes:  Negative for discharge and itching.   Respiratory:  Negative for choking and stridor.    Gastrointestinal:  Negative for rectal pain.   Endocrine: Negative for polydipsia.   Genitourinary:  Negative for enuresis and genital sores.   Musculoskeletal:  Negative for back pain, neck pain and neck stiffness.   Allergic/Immunologic: Negative for immunocompromised state.   Neurological:  Negative for facial asymmetry and numbness.   Hematological:  Negative for adenopathy.   Psychiatric/Behavioral:  Negative for behavioral problems, self-injury and suicidal ideas.    Objective:     Physical Exam:  Constitutional:   Vitals reviewed: yes   Well-nourished and well-groomed: yes  Eyes:   Sclerae icteric: no   Extraocular movements intact: yes  GI:    Bowel sounds normal: yes   Tenderness: no    If yes, quadrant/location: not applicable   Palpable masses: no    If yes, quadrant/location: not applicable   Hepatosplenomegaly: no   Ascites: no   Hernia: no    If yes, type/location: not applicable   Surgical scars: no    If yes, type/location: not applicable  Resp:   Effort normal: yes   Breath sounds normal: yes    CV:   Regular rate and rhythm: yes   Heart sounds normal: yes   Femoral pulses normal: yes   Extremities edematous: no  Skin:   Rashes or lesions present:  no    If yes, describe:not applicable   Jaundice:: no    Musculoskeletal:   Gait normal: yes   Strength normal: yes  Psych:   Oriented to person, place, and time: yes   Affect and mood normal: yes    Additional comments: not applicable    Diagnostics:  The following labs have been reviewed: CBC  CMP    Counseling: We provided Handy Adams with a group education session today.  We discussed kidney transplantation at length with him, including risks, potential complications, and alternatives in the management of his renal failure.  The discussion included complications related to anesthesia, bleeding, infection, primary nonfunction, and ATN.  I discussed the typical postoperative course, length of hospitalization, the need for long-term immunosuppression, and the need for long-term routine follow-up.  I discussed living-donor and -donor transplantation and the relative advantages and disadvantages of each.  I also discussed average waiting times for both living donation and  donation.  I discussed national and center-specific survival rates.  I also mentioned the potential benefit of multicenter listing to candidates listed with centers within more than one organ procurement organization.  All questions were answered.    Patient advised that it is recommended that all transplant candidates, and their close contacts and household members receive Covid vaccination.    Final determination of transplant candidacy will be made once evaluation is complete and reviewed by the Kidney & Kidney/Pancreas Selection Committee.    Coronavirus disease (COVID-19) caused by severe acute respiratory virus coronavirus 2 (SARS-C0V 2) is associated with increased mortality in solid organ transplant recipients (SOT) compared to non-transplant patients. Vaccine responses to vaccination are depressed against SARS-CoV2 compared to normal individuals but improve with third vaccination doses. Vaccination prior to SOT provides  both the best opportunity for transplant candidates to develop protective immunity and to reduce the risk of serious COVID19 infections post transplantation. Organ transplant candidates at [facility] will be required to receive SARS-CoV-2 vaccination prior to being listed with a an active status, whenever possible. Exceptions will be made for disability related reasons or for sincerely held [employer] beliefs.          Transplant Surgery - Candidacy   Assessment/Plan:   Handy Adams is pre-dialysis with CKD stage 4 (GFR 15-29 mL/min). I have concerns with truncal obesity - his BMI is greater than 40 - I discussed weight loss with him. Based on available information, Handy Adams is a suitable kidney transplant candidate.     Additional testing to be completed according to the Written Order Guidelines for Adult Pre-kidney and Pancreas Transplant Evaluation (KI-02).  Interpretation of tests and discussion of patient management involves all members of the multidisciplinary transplant team.    Yonathan Lacey MD

## 2024-02-07 NOTE — PROGRESS NOTES
"Transplant Recipient Adult Psychosocial Assessment    Handy Adams  [address] [facility] [address]  Telephone Information:   Mobile [phone]   Home  [phone] (home)  Work  There is no work phone number on file.  E-mail  [email or web address]    Sex: male  YOB: 1955  Age: 68 y.o.    Encounter Date: 2024  U[place] Citizen: yes  Primary Language: English   Needed: no    Emergency Contact:  Pee Adams, 28 yo son, DARRON, does drive/own car, works full time for [employer]. [phone]    Family/Social Support:   Number of dependents/: pt denies  Marital history: pt reports he and "ex-wife" still live together. Pt reports they were  . Pt said their relationship is "complicated".  Other family dynamics: Pt reports does not have organ transplant caregiver/transportation plan in place. Pt reports parents are . Pt reports lives with "ex-wife" Daniella Adams in [facility] and reports his relationship with Daniella is "complicated"; pt did not indicate Daniella would be available to assist with transplant. Pt reports 1 son, Pee, living in [place], son is supportive and may be able to assist with transplant as caregiver. Pt reports 1 son living in [facility] and son is supportive but most likely will not be able to assist with transplant. Pt reports 2 siblings: 1 sister living in [facility]; 1 brother living in [place]. Pt reports is unsure if siblings can assist with transplant as caregivers.  Pt's sons: Kvng Adams: [phone]     Pee Adams:  [phone]    Household Composition:  Daniella Adams, "ex-wife", DARRON, does drive/own car, retired. Pt did not have Daniella's phone number; pt decided to to give son Pee's number as Daniella's tel number. Pee's number: [phone]    Do you and your caregivers have access to reliable transportation? yes Pt reports does have a car and does drive self. Pt reports does not have transplant " "caregiver/transportation plan in place    PRIMARY CAREGIVER:  Pt reports does not have transplant transportation/caregiver plan in place at this time. Pt reports plan to ask family and will contact transplant team once transplant caregiver/transportation plan is arranged.    Additional Significant Others who will Assist with Transplant:  Pt reports does not have transplant transportation/caregiver plan in place at this time. Pt reports plan to ask family and will contact transplant team once transplant caregiver/transportation plan is arranged.    Living Will: no  Healthcare Power of : no  Advance Directives on file: <<no information> per medical record.  Verbally reviewed LW/HCPA information.   provided patient with copy of LW/HCPA documents and provided education on completion of forms.    Living Donors: Education and resource information given to patient.    Highest Education Level: Post-College Graduate Degree [employer] in online PhD program at this time with [employer].  Reading Ability: college  Reports difficulty with: N/A Pt denies any problems at this time. Pt's snapshot shows "mild cognitive impairment" however patient denies.  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 Pt reports last worked June 2020.    Spouse/Significant Other Employment: pt reports ex-wife Daniella is retired.    Disabled: pt reports is not disabled.    Monthly Income:  $6,000 household income  Able to afford all costs now and if transplanted, including medications: yes  Patient verbalizes understanding of personal responsibilities related to transplant costs and the importance of having a financial plan to ensure that patients transplant costs are fully covered.       provided fundraising " information/education. Patient and Caregiververbalizes understanding.   remains available.    Insurance:   Payer/Plan Subscr  Sex Relation Sub. Ins. ID Effective Group Num   1. [employer] MANAGE* QI GAO 1955 Male Self [id number] 1/1/22 [id number]                                   [address] [address]     Primary Insurance (for UNOS reporting): Public Insurance - Medicare & Choice Pt reports utilizes [facility] for medicines.  Secondary Insurance (for UNOS reporting): None  Patient verbalizes clear understanding that patient may experience difficulty obtaining and/or be denied insurance coverage post-surgery. This includes and is not limited to disability insurance, life insurance, health insurance, burial insurance, long term care insurance, and other insurances.      Patient also reports understanding that future health concerns related to or unrelated to transplantation may not be covered by patient's insurance.  Resources and information provided and reviewed.     Patient provides verbal permission to release any necessary information to outside resources for patient care and discharge planning.  Resources and information provided are reviewed.      Dialysis Adherence: pt reports is not on dialysis at this time.    Infusion Service: patient utilizing? no  Home Health: patient utilizing? no  DME: yes glucometer; bpc  Pulmonary/Cardiac Rehab: pt denies  ADLS:  independent self care, medication management and does drive self/own car    Adherence:   Pt reports high medical compliance with medical appointments and instructions within last 3 months.  Adherence education and counseling provided.     Per History Section:  Past Medical History:   Diagnosis Date    Anemia     Disorder of kidney and ureter     Edema leg     History of rotator cuff tear     History of seasonal allergies     HTN (hypertension)     Hx of colonic polyps     Hyperlipemia     MGUS (monoclonal gammopathy of unknown significance)      NS (nuclear sclerosis), bilateral 06/25/2019    Obesity     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 11/17/2017    Type 2 diabetes mellitus      Social History     Tobacco Use    Smoking status: Every Day     Current packs/day: 0.50     Types: Cigarettes    Smokeless tobacco: Never   Substance Use Topics    Alcohol use: Yes     Comment: occasionally     Social History     Substance and Sexual Activity   Drug Use Not Currently     Social History     Substance and Sexual Activity   Sexual Activity Yes    Partners: Female       Per Today's Psychosocial:  Tobacco: smokes 1/2 pack cigarettes per day  Alcohol: occasional alcohol use  Illicit Drugs/Non-prescribed Medications: pt denies any use. Pt reports plan to abstain.    Patient states clear understanding of the potential impact of substance use as it relates to transplant candidacy and is aware of possible random substance screening.  Substance abstinence/cessation counseling, education and resources provided and reviewed.     Arrests/DWI/Treatment/Rehab: patient denies    Psychiatric History:    Mental Health: Pt denies struggling with mental health issues at this time. Pt denies any need for mental health referral at this time.  Psychiatrist/Counselor: pt denies  Medications:  pt denies  Suicide/Homicide Issues: pt denies any si/hi history  Safety at home: Pt reports living safe home environment at this time.    Knowledge: Patient states having clear understanding and realistic expectations regarding the potential risks and potential benefits of organ transplantation and organ donation and agrees to discuss with health care team members and support system members, as well as to utilize available resources and express questions and/or concerns in order to further facilitate the pt informed decision-making.  Resources and information provided and reviewed.    Patient is aware of [facility]'s  affiliation and/or partnership with agencies in home health care, LTAC, SNF, [facility], and other hospitals and clinics.    Understanding: Patient reports having a clear understanding of the many lifetime commitments involved with being a transplant recipient, including costs, compliance, medications, lab work, procedures, appointments, concrete and financial planning, preparedness, timely and appropriate communication of concerns, abstinence (ETOH, tobacco, illicit non-prescribed drugs), adherence to all health care team recommendations, support system and caregiver involvement, appropriate and timely resource utilization and follow-through, mental health counseling as needed/recommended, and patient and caregiver responsibilities.  Social Service Handbook, resources and detailed educational information provided and reviewed.  Educational information provided.    Patient also reports current and expected compliance with health care regime and states having a clear understanding of the importance of compliance.      Patient reports a clear understanding that risks and benefits may be involved with organ transplantation and with organ donation.       Patient also reports clear understanding that psychosocial risk factors may affect patient, and include but are not limited to feelings of depression, generalized anxiety, anxiety regarding dependence on others, post traumatic stress disorder, feelings of guilt and other emotional and/or mental concerns, and/or exacerbation of existing mental health concerns.  Detailed resources provided and discussed.      Patient agrees to access appropriate resources in a timely manner as needed and/or as recommended, and to communicate concerns appropriately.  Patient and Caregiver also reports a clear understanding of treatment options available.     Patient received education in a group setting.   reviewed education, provided additional information, and answered  questions.    Feelings or Concerns: Pt reports high motivation to pursue kidney organ transplant at this time.    Coping: Identify Patient & Caregiver Strategies to [place]:   1. In the past, coping with major surgery and/or related stress - tae and prayer; both sons supportive; enjoys listening to music   2. Currently & Pre-transplant - tae and prayer; both sons supportive; enjoys listening to music   3. At the time of surgery - tae and prayer; both sons supportive; enjoys listening to music   4. During post-Transplant & Recovery Period - tae and prayer; both sons supportive; enjoys listening to music    Goals: Pt reports hope for successful kidney organ transplant so he may not be placed on dialysis. Patient referred to Vocational Rehabilitation.    Interview Behavior: Patient presents as alert and oriented x 4, pleasant, good eye contact, well groomed, recall good, concentration/judgement good, average intelligence, calm, communicative, cooperative, and asking and answering questions appropriately. Pt did not come with a caregiver today.         Transplant Social Work - Candidacy  Assessment/Plan:     Psychosocial Suitability: Patient presents as high risk candidate for kidney transplant at this time due to patient does not have organ transplant caregiver/transportation plan in place at this time. Pt reports having organ transplant financial plan and insurance plan in place.    Recommendations/Additional Comments: Medical adherence check requested. Pt to contact transplant SW once transplant caregiver/transportation plan is in place and transplant suitability will be reviewed.    SW recommends that pt conduct fundraising to assist pt with pay for cost of medications, food, gas, and other transplant related needs.  SW recommends that pt remain aware of potential mental health concerns and contact the team if any concerns arise.  WENDY recommends that pt remain abstinent from tobacco, ETOH, and drug use.  WENDY  supports pt's continued adherence. SW remains available to answer any questions or concerns that arise as the pt moves through the transplant process.      Final determination of transplant candidacy will be reviewed by the selection committee.      Estela GIBBSW

## 2024-02-07 NOTE — PROGRESS NOTES
Transplant Nephrology  Kidney Transplant Recipient Evaluation    Referring Physician: Raven Naylor  Current Nephrologist: Raven Naylor    Subjective:   CC:  Initial evaluation of kidney transplant candidacy.    HPI:  Mr. Adams is a 68 y.o. year old Black or  male who has presented to be evaluated as a potential kidney transplant recipient.  He has advanced kidney disease secondary to diabetic nephropathy and HTN.  Patient is currently pre-dialysis. He has a  no  dialysis access.  2/7/24 eGFR 15.1    Fx assessment: Does not formally exercise, is active, runs errands and feels he has no limitations. 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 Looks good, not frail.       Kidney bx 7/19/23: MODERATE - TO - SEVERE ARTERIONEPHROSCLEROSIS WITH FEATURES OF ACCELERATED HYPERTENSION , DIFFUSE ACUTE TUBULAR INJURY and  MODERATE DIABETIC NEPHROPATHY      Donors: no ; discussed living donation and potential wait time    States he is still trying to wrap his head around being in kidney failure and is still in somewhat of a denial state.      Previous Transplant: no    DM II  Follows with endocrinology   Lab Results   Component Value Date    HGBA1C 5.4 02/07/2024   Onset of Diabetes and HTN : dx  in his 50s   On insulin: no  Meds: Actos, Tradjenta   Retinopathy: yes       MGUS: follows with hemonc, LOV 8/28/23 Remigio Campoverde, DO   Noted to have stable MGUS with f/u due in 6 months       Past Medical and Surgical History: Mr. Adams  has a past medical history of Anemia, Disorder of kidney and ureter, Edema leg, History of rotator cuff tear, History of seasonal allergies, HTN (hypertension), colonic polyps, Hyperlipemia, MGUS (monoclonal gammopathy of unknown significance), NS (nuclear sclerosis), bilateral, Obesity, 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, and Type 2 diabetes mellitus.  He has a past  "surgical history that includes Mouth surgery; Colonoscopy (N/A, 10/28/2022); and Renal biopsy (Left, 7/19/2023).    Past Social and Family History: Mr. Adams reports that he has been smoking cigarettes. He has never used smokeless tobacco. He reports current alcohol use. He reports that he does not currently use drugs. His family history includes Cancer in his father and mother; Diabetes in his father; Hypertension in his father; No Known Problems in his brother, maternal aunt, maternal grandfather, maternal grandmother, maternal uncle, paternal aunt, paternal grandfather, paternal grandmother, paternal uncle, and sister.    Review of Systems   Constitutional:  Negative for activity change, appetite change, chills, fatigue, fever and unexpected weight change.   HENT:  Negative for congestion, facial swelling, postnasal drip, rhinorrhea, sinus pressure, sore throat and trouble swallowing.    Eyes:  Positive for visual disturbance. Negative for pain and redness.   Respiratory:  Negative for cough, chest tightness, shortness of breath and wheezing.    Cardiovascular:  Positive for leg swelling. Negative for chest pain and palpitations.   Gastrointestinal:  Positive for abdominal distention. Negative for abdominal pain, diarrhea, nausea and vomiting.   Genitourinary:  Negative for dysuria, flank pain and urgency.   Musculoskeletal:  Negative for gait problem, neck pain and neck stiffness.   Skin:  Negative for rash.   Allergic/Immunologic: Negative for environmental allergies, food allergies and immunocompromised state.   Neurological:  Negative for dizziness, weakness, light-headedness and headaches.   Psychiatric/Behavioral:  Negative for agitation and confusion. The patient is not nervous/anxious.        Objective:   Blood pressure (!) 151/73, pulse 68, temperature 97.3 °F (36.3 °C), temperature source Temporal, resp. rate 16, height 6' 0.05" (1.83 m), weight 135 kg (297 lb 9.9 oz), SpO2 100 %.body mass index is " "40.31 kg/m².    Physical Exam  Constitutional:       Appearance: Normal appearance. He is well-developed. He is obese.   HENT:      Head: Normocephalic.      Nose: Nose normal.   Eyes:      Conjunctiva/sclera: Conjunctivae normal.      Pupils: Pupils are equal, round, and reactive to light.   Cardiovascular:      Rate and Rhythm: Normal rate and regular rhythm.      Heart sounds: Normal heart sounds.   Pulmonary:      Effort: Pulmonary effort is normal.      Breath sounds: Normal breath sounds.   Abdominal:      General: Bowel sounds are normal. There is distension.      Palpations: Abdomen is soft. There is no hepatomegaly or splenomegaly.   Musculoskeletal:      Cervical back: Normal range of motion and neck supple.      Right lower leg: Edema present.      Left lower leg: Edema present.      Comments: Bilateral trace peripheral edema   Skin:     General: Skin is warm and dry.   Neurological:      Mental Status: He is alert and oriented to person, place, and time.   Psychiatric:         Behavior: Behavior normal.         Labs:  Lab Results   Component Value Date    WBC 6.79 02/07/2024    HGB 8.3 (L) 02/07/2024    HCT 26.1 (L) 02/07/2024     02/07/2024    K 4.2 02/07/2024     (H) 02/07/2024    CO2 21 (L) 02/07/2024    BUN 44 (H) 02/07/2024    CREATININE 4.1 (H) 02/07/2024    EGFRNORACEVR 15.1 (A) 02/07/2024    CALCIUM 8.7 02/07/2024    PHOS 4.0 02/07/2024    ALBUMIN 3.3 (L) 02/07/2024    AST 14 02/07/2024    ALT 14 02/07/2024    UTPCR 2.46 (H) 11/03/2023    .5 (H) 02/07/2024       Lab Results   Component Value Date    BILIRUBINUA Negative 11/03/2023    PROTEINUA 2+ (A) 11/03/2023    NITRITE Negative 11/03/2023    RBCUA 1 11/03/2023    WBCUA 0 11/03/2023       No results found for: "HLAABCTYPE"    Labs were reviewed with the patient.    Assessment:     1. Pre-transplant evaluation for chronic kidney disease    2. Type 2 diabetes mellitus with stage 4 chronic kidney disease, without long-term " current use of insulin    3. CKD (chronic kidney disease) stage 4, GFR 15-29 ml/min    4. Benign hypertension with CKD (chronic kidney disease) stage IV    5. Other hyperlipidemia    6. MGUS (monoclonal gammopathy of unknown significance)    7. Class 3 severe obesity due to excess calories with serious comorbidity and body mass index (BMI) of 40.0 to 44.9 in adult    8. Hyperparathyroidism due to renal insufficiency    9. Anemia in stage 4 chronic kidney disease        Plan:     Body mass index is 40.31 kg/m².  Weight loss ~ 8 lbs =BMI 39.3 =290 /lbs (131.8 kg)  Encourage /discussed lifestyle modifications: diet, exercise, weight loss   Needs to maintain acceptable BMI for txp/guidelines     -Will need to get an UTD weight prior to presentation      Cardiology clearance: HX HTN, HLD, DM  Hemonc clearance: HX MGUS    Encouraged living donation.     Transplant Candidacy:   Based on available information, Mr. Adams is an unacceptable kidney transplant candidate d/t BMI > than center protocol, but otherwise acceptable.   Meets center eligibility for accepting HCV+ donor offer - Yes.  Patient educated on HCV+ donors. Handy is willing to accept HCV+ donor offer -  would like to think about this option  Patient is a candidate for KDPI > 85 kidney donor offer - No d/t weight  Final determination of transplant candidacy will be made once workup is complete and reviewed by the selection committee.    Patient advised that it is recommended that all transplant candidates, and their close contacts and household members receive Covid vaccination.    UNOS Patient Status  Functional Status: 80% - Normal activity with effort: some symptoms of disease     Kady Verdugo NP

## 2024-02-07 NOTE — PROGRESS NOTES
"INITIAL PATIENT EDUCATION NOTE     Mr. Handy Adams was seen in pre-kidney transplant clinic for evaluation for kidney, kidney/pancreas or pancreas only transplant.  The patient attended an individual video education session that discussed/reviewed the following aspects of transplantation: evaluation including diagnostic and laboratory testing,(Chemistries, Hematology, Serologies including HIV and Hepatitis and HLA) required for transplantation and selection committee process, UNOS waitlist management/multiple listings, types of organs offered (KDPI < 85%, KDPI > 85%, PHS risk, DCD, HCV+, HIV+ for HIV+ recipients and enbloc/dual), financial aspects, surgical procedures, dietary instruction pre- and post-transplant, health maintenance pre- and post-transplant, post-transplant hospitalization and outpatient follow-up, potential to participate in a research protocol, and medication management and side effects.  A question and answer session was provided after the presentation.    The patient was seen by all members of the multi-disciplinary team to include: Nephrologist/HERVE, Surgeon, , Transplant Coordinator, , Pharmacist and Dietician (if applicable).    The patient reviewed and signed all consents for evaluation which were witnessed and sent to scanning into the [facility] chart.    The patient was given an education book and plan for further evaluation based on his individual assessment.     The Patient was educated on OPTN policy change regarding race based eGFR. For Black or  Americans, this eGFR could have shown that their kidneys were working better than they were.    Because of this change, we are looking at everyone's record and assessing waiting time for people who are eligible. We will be reviewing your medical records and will notify you if you are eligible. We also encouraged patient to provide "span 20 labs" that are not in our electronic medical records.    Reviewed " program requirement for complete COVID vaccination with documentation prior to listing.  COVID education information reviewed with patient. Patient encouraged to be up to date on all vaccinations.     The patient was informed that the transplant team would manage immediate post op pain. If the patient requires long term pain management, they will need to have that pain management addressed by their PCP or previous provider who wrote for long term pain medicines.    The patient was encouraged to call with any questions or concerns.

## 2024-02-07 NOTE — LETTER
February 9, 2024        Raven Naylor  [address]  [facility] [address]  Phone: [phone]  Fax: [phone]             [facility] 1st Fl  [address]  [facility] [address]  Phone: [phone]   Patient: Handy Adams   MR Number: [id number]   YOB: 1955   Date of Visit: 2/7/2024       Dear Dr. Raven Naylor    Thank you for referring Handy Adams to me for evaluation. Attached you will find relevant portions of my assessment and plan of care.    If you have questions, please do not hesitate to call me. I look forward to following Handy Adams along with you.    Sincerely,    Kady Verdugo, NP    Enclosure    If you would like to receive this communication electronically, please contact [email or web address] or [phone] to request [employer] access.    [employer] is a tool which provides read-only access to select patient information with whom you have a relationship. Its easy to use and provides real time access to review your patients record including encounter summaries, notes, results, and demographic information.    If you feel you have received this communication in error or would no longer like to receive these types of communications, please e-mail [email or web address]

## 2024-02-07 NOTE — PATIENT INSTRUCTIONS
"  Today: Wt 135 kg (297 lb 9.9 oz)   BMI 40.31 kg/m² , HT 6' 0.05"    Encourage lifestyle modifications: diet, exercise, weight loss   Needs to maintain acceptable BMI for txp/guidelines     Weight loss ~ 8 lbs = BMI 39.3 =290 /lbs (131.8 kg)  "

## 2024-02-07 NOTE — PROGRESS NOTES
PHARM.D. PRE-TRANSPLANT NOTE:    This patient's medication therapy was evaluated as part of his pre-transplant evaluation.      The following general pharmacologic concerns were noted:   Pioglitazone can cause bladder cancer and increase risk for bone fractures - would not recommend continuation of this medication post-transplant    The following concerns for post-operative pain management were noted: N/A    The following pharmacologic concerns related to HCV therapy were noted: Atorvastatin interacts with DAA therapy therefore should be transitioned to rosuvastatin prior to initiating (max: 10 mg)       This patient's medication profile was reviewed for considerations for DAA Hepatitis C therapy:    [x]  No current inducers of CYP 3A4 or PGP  [x]  No amiodarone on this patient's EMR profile in the last 24 months  [x]  No past or current atrial fibrillation on this patient's EMR profile       Current Outpatient Medications   Medication Sig Dispense Refill    atorvastatin (LIPITOR) 20 MG tablet Take 1 tablet (20 mg total) by mouth once daily. 90 tablet 2    blood sugar diagnostic Strp To test twice daily 100 each 3    ergocalciferol (ERGOCALCIFEROL) 50,000 unit Cap Take 50,000 Units by mouth every 7 days.      FEROSUL 325 mg (65 mg iron) Tab tablet Take 325 mg by mouth 2 (two) times daily.      labetaloL (NORMODYNE) 200 MG tablet Take 1 tablet by mouth twice daily 180 tablet 0    lancets Misc 1 lancet by Misc.(Non-Drug; Combo Route) route 2 (two) times daily with meals. 100 each 11    linaGLIPtin (TRADJENTA) 5 mg Tab tablet Take 1 tablet (5 mg total) by mouth once daily. 90 tablet 2    NIFEdipine (PROCARDIA-XL) 90 MG (OSM) 24 hr tablet Take 1 tablet (90 mg total) by mouth once daily. 90 tablet 3    pioglitazone (ACTOS) 30 MG tablet Take 1 tablet (30 mg total) by mouth once daily. 90 tablet 2    sodium bicarbonate 650 MG tablet Take 2 tablets (1,300 mg total) by mouth 2 (two) times daily. 360 tablet 3     spironolactone (ALDACTONE) 50 MG tablet Take 1 tablet (50 mg total) by mouth once daily. 90 tablet 3    valsartan (DIOVAN) 320 MG tablet Take 1 tablet (320 mg total) by mouth once daily. 90 tablet 3    blood-glucose meter kit Use as instructed 1 each 0     No current facility-administered medications for this visit.     I am available for consultation and can be contacted, as needed by the other members of the Transplant team.

## 2024-02-07 NOTE — PROGRESS NOTES
TRANSPLANT NUTRITIONAL ASSESSMENT    Referring Provider: Raven Naylor NP     Reason for Visit: Pre-kidney transplant work-up (pre-dialysis)    Age: 68 y.o.  Sex: male    Patient Active Problem List   Diagnosis    Benign hypertension with CKD (chronic kidney disease) stage IV    Hyperlipemia    Type 2 diabetes mellitus, uncontrolled    Type 2 diabetes mellitus with stage 4 chronic kidney disease, without long-term current use of insulin    Hypertensive retinopathy, bilateral    NS (nuclear sclerosis)    Type 2 diabetes mellitus with both eyes affected by proliferative retinopathy and macular edema, without long-term current use of insulin    Age-related nuclear cataract of both eyes    Class 3 severe obesity due to excess calories with serious comorbidity and body mass index (BMI) of 40.0 to 44.9 in adult    CKD (chronic kidney disease) stage 4, GFR 15-29 ml/min    Adjustment disorder with depressed mood    Mild cognitive impairment    MGUS (monoclonal gammopathy of unknown significance)    Vitreous degeneration of both eyes    Pre-transplant evaluation for chronic kidney disease    Hyperparathyroidism due to renal insufficiency    Anemia in stage 4 chronic kidney disease     Past Medical History:   Diagnosis Date    Anemia     Disorder of kidney and ureter     Edema leg     History of rotator cuff tear     History of seasonal allergies     HTN (hypertension)     Hx of colonic polyps     Hyperlipemia     MGUS (monoclonal gammopathy of unknown significance)     NS (nuclear sclerosis), bilateral 06/25/2019    Obesity     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 11/17/2017    Type 2 diabetes mellitus      Lab Results   Component Value Date    GLU 94 02/07/2024    K 4.2 02/07/2024    PHOS 4.0 02/07/2024    CHOL 139 02/07/2024    HDL 32 (L) 02/07/2024    TRIG 98 02/07/2024    ALBUMIN 3.3 (L) 02/07/2024    HGBA1C 5.4 02/07/2024    CALCIUM  "8.7 02/07/2024     Other Pertinent Labs: N/A    Current Outpatient Medications   Medication Sig    atorvastatin (LIPITOR) 20 MG tablet Take 1 tablet (20 mg total) by mouth once daily.    blood sugar diagnostic Strp To test twice daily    ergocalciferol (ERGOCALCIFEROL) 50,000 unit Cap Take 50,000 Units by mouth every 7 days.    FEROSUL 325 mg (65 mg iron) Tab tablet Take 325 mg by mouth 2 (two) times daily.    labetaloL (NORMODYNE) 200 MG tablet Take 1 tablet by mouth twice daily    lancets Misc 1 lancet by Misc.(Non-Drug; Combo Route) route 2 (two) times daily with meals.    linaGLIPtin (TRADJENTA) 5 mg Tab tablet Take 1 tablet (5 mg total) by mouth once daily.    NIFEdipine (PROCARDIA-XL) 90 MG (OSM) 24 hr tablet Take 1 tablet (90 mg total) by mouth once daily.    pioglitazone (ACTOS) 30 MG tablet Take 1 tablet (30 mg total) by mouth once daily.    sodium bicarbonate 650 MG tablet Take 2 tablets (1,300 mg total) by mouth 2 (two) times daily.    spironolactone (ALDACTONE) 50 MG tablet Take 1 tablet (50 mg total) by mouth once daily.    valsartan (DIOVAN) 320 MG tablet Take 1 tablet (320 mg total) by mouth once daily.    blood-glucose meter kit Use as instructed     No current facility-administered medications for this visit.     Allergies: Patient has no known allergies.    Ht Readings from Last 1 Encounters:   02/07/24 6' 0.05" (1.83 m)     Wt Readings from Last 1 Encounters:   02/07/24 135 kg (297 lb 9.9 oz)      BMI: Body mass index is 40.31 kg/m².    Usual Weight: 297 lb   Weight Change/Time: gradual unintentional wt gain   Current Diet: regular   Appetite/Current Intake: good   Exercise/Physical Activity: functional in ADLs   Nutritional/Herbal Supplements: iron  Chewing/Swallowing Problems: none  Symptoms: none    Estimated Kcal Need: 2700 kcal/day (20 kcal/kg)   Estimated Protein Need: 108-122 gm/day (0.8-0.9 gm/kg)     Nutritional History:   Pt present. Pt reports consuming vegetables daily. Spouse usually " does the cooking at home. Pt has been eating out often.     Diet Recall    Morning: biscuit, fried potatoes, sausage, almond milk     Midday/Evening: enjoys seafood, chicken, spaghetti, potatoes, tomatoes, green beans     Snacks: apples, grapes     Desserts: rarely     Beverages: 5-6 16.9 oz bottled water/day, diet soda occasionally       Seasonings: no salt     Restaurants/Fast Food: 4-5x/wk; enjoys salads (chicken or shrimp, tomato, cucumbers, green pepper, onions, Ranch dressing)       Nutritional Diagnoses  Problem: food- and nutrition-related knowledge deficit  Etiology: RT need for reenforcement of previous education on pre-kidney transplant nutrition recommendations  Symptoms: AEB diet recall and questions from pt    Educational Need? yes  Barriers: none identified  Discussed with: patient  Interventions: Patient taught nutrition information regarding Pre-kidney transplant work-up (pre-dialysis). Renal Nutrition Therapy packet reviewed (high/low food sources of K, Phos and protein, low sodium and fluid intake, emphasis on moderate protein intake). Encouraged physical activity daily, regular exercise as tolerated, stay mobile.  Goals/Recommendations: diet adherence  Actions Taken: instruct/provide written information  Patient and/or family comprehend instructions: yes  Outcome: Verbalizes understanding  Monitoring: Contact information provided, will f/u in clinic and communicate with the care team as needed.     Counseling Time: 20 minutes

## 2024-02-07 NOTE — PROGRESS NOTES
PRE-TRANSPLANT INFECTIOUS DISEASE CONSULT    Reason for Visit:  Pre-transplant evaluation  Referring Provider: Raven Naylor     History of Present Illness:    68 y.o. male with a history of CKD presents for pre-kidney transplant evaluation.  He is not on dialysis.    Infectious History:  Recent hospital admissions: No  Recent infections: No  Recent or current antibiotic use: No  History of recurrent infections *(sinus / pneumonia / UTI / SBP)*: No  History of diabetic foot wound or bone/joint infection: No  Recent dental infections, issues or procedures: No  History of chicken pox: No  History of shingles: No  History of STI: No  History of COVID infection: No    History of Immunosuppression:  Prior chemotherapy / immunosuppression: No  Prior transplant: No  History of splenectomy: No    Tuberculosis:  Prior screening for latent TB: No  Prior diagnosis of latent TB: No  Risk factors for TB *known exposure, incarceration, homelessness*: No    Geographical exposures:  Currently lives in [place] with son and ex wife  Lived in the following states: [place], multiple  Lived or travelled to the [facility] [place]: Yes - [place]  International travel: Yes - younger   Travel-associated illness: No    Social/Environmental:  Occupation:  Retired - [employer]   Pets: No   Livestock: No  Fishing / hunting: No  Hobbies: None  Water: [place]  Consumption of raw/undercooked meat or seafood?  No  Tobacco: Yes - 1/4 pack a day  Alcohol: No  Recreational drug use:  No  Sexual partners: None - Female preference      Past Histories:   Past Medical History:   Diagnosis Date    Disorder of kidney and ureter     Edema leg     History of rotator cuff tear     History of seasonal allergies     HTN (hypertension)     Hx of colonic polyps     Hyperlipemia     NS (nuclear sclerosis), bilateral 06/25/2019    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 11/17/2017     Type 2 diabetes mellitus      Past Surgical History:   Procedure Laterality Date    COLONOSCOPY N/A 10/28/2022    Procedure: COLONOSCOPY;  Surgeon: Guanako Sanchez MD;  Location: [facility] ENDO;  Service: Endoscopy;  Laterality: N/A;  REFENDO placed per Dr Sanderson. case request entered     vaccinated-GT  instr portal    MOUTH SURGERY      RENAL BIOPSY Left 7/19/2023    Procedure: BIOPSY, KIDNEY;  Surgeon: Nikky Soriano MD;  Location: [facility] CATH LAB;  Service: Interventional Nephrology;  Laterality: Left;     Family History   Problem Relation Age of Onset    Cancer Mother         Lung Cancer    Cancer Father         Colon cancer    Diabetes Father     Hypertension Father     No Known Problems Sister     No Known Problems Brother     No Known Problems Maternal Aunt     No Known Problems Maternal Uncle     No Known Problems Paternal Aunt     No Known Problems Paternal Uncle     No Known Problems Maternal Grandmother     No Known Problems Maternal Grandfather     No Known Problems Paternal Grandmother     No Known Problems Paternal Grandfather     Amblyopia Neg Hx     Blindness Neg Hx     Cataracts Neg Hx     Glaucoma Neg Hx     Macular degeneration Neg Hx     Retinal detachment Neg Hx     Strabismus Neg Hx     Stroke Neg Hx     Thyroid disease Neg Hx      Social History     Tobacco Use    Smoking status: Never    Smokeless tobacco: Never   Substance Use Topics    Alcohol use: Yes     Comment: occasionally    Drug use: Not Currently     Review of patient's allergies indicates:  No Known Allergies      Immunization History:  Received all childhood vaccines: Yes  All household members receive annual flu vaccine: No  All household members are up to date on COVID vaccine: Yes      Current antibiotics:  Antibiotics (From admission, onward)      None              Review of Systems  Review of Systems   Constitutional: Negative for chills, fever, malaise/fatigue and night sweats.   Cardiovascular:  Negative for chest pain.    Respiratory:  Negative for cough, hemoptysis, shortness of breath, sputum production and wheezing.    Skin:  Negative for rash and suspicious lesions.   Gastrointestinal:  Negative for abdominal pain, constipation, diarrhea, heartburn, nausea and vomiting.   Genitourinary:  Negative for dysuria and hematuria.          Objective  Physical Exam  Constitutional:       General: He is awake. He is not in acute distress.     Appearance: Normal appearance. He is well-developed and overweight. He is not ill-appearing, toxic-appearing or diaphoretic.       HENT:      Head: Normocephalic and atraumatic.      Mouth/Throat:      Lips: Pink. No lesions.      Mouth: Mucous membranes are moist. No injury or oral lesions.      Dentition: Does not have dentures. Dental caries (one filling only) present. No gingival swelling or dental abscesses.      Tongue: No lesions.      Palate: No lesions.      Pharynx: Oropharynx is clear.   Cardiovascular:      Rate and Rhythm: Normal rate and regular rhythm.      Heart sounds: Normal heart sounds. No murmur heard.     No friction rub. No gallop.   Pulmonary:      Effort: Pulmonary effort is normal. No respiratory distress.      Breath sounds: Normal breath sounds. No wheezing or rales.   Abdominal:      General: Bowel sounds are normal. There is no distension.      Palpations: Abdomen is soft. There is no mass.      Tenderness: There is no abdominal tenderness. There is no guarding or rebound.   Musculoskeletal:      Right lower leg: Edema present.      Left lower leg: Edema present.   Skin:     General: Skin is warm and dry.   Neurological:      Mental Status: He is alert and oriented to person, place, and time.   Psychiatric:         Behavior: Behavior normal. Behavior is cooperative.           Labs:    CBC:   Lab Results   Component Value Date    WBC 6.79 02/07/2024    HGB 8.3 (L) 02/07/2024    HCT 26.1 (L) 02/07/2024    MCV 92 02/07/2024     02/07/2024    GRAN 4.6 02/07/2024     "GRAN 68.2 02/07/2024    LYMPH 1.2 02/07/2024    LYMPH 18.3 02/07/2024    MONO 0.6 02/07/2024    MONO 9.1 02/07/2024    EOSINOPHIL 3.5 02/07/2024       Syphilis screening: No results found for: "RPR", "PRPQ", "FTAABS"     TB screening: No results found for: "TBGOLDPLUS", "TSPOTSCREN"    HIV screening:   Lab Results   Component Value Date    [id number] Non-reactive 09/21/2022       Strongyloides IgG: No results found for: "STRONGANTIGG"    Hepatitis Serologies:   Lab Results   Component Value Date    HEPBSAB <3.00 09/21/2022    HEPBSAB Non-reactive 09/21/2022    HEPCAB Non-reactive 09/21/2022        Varicella IgG: No results found for: "VARICELLAINT"      Immunization History   Administered Date(s) Administered    COVID-19, MRNA, LN-S, PF (MODERNA FULL 0.5 ML DOSE) 06/07/2021, 07/10/2021, 12/27/2021    Pneumococcal Polysaccharide - 23 Valent 07/20/2007          BAssessment and Plan    1. Risks of Infection: Available serologies were reviewed. No unusual risks of infection or significant barriers to transplantation were identified from the infectious disease standpoint given the information available at this time pending acceptable serologies as below.    - Acute infectious issues: None   - Pending serologies: Quantiferon gold / T-spot, RPR, Strongyloides IgG, and VZV IgG Coccidioides Serum Antibody with reflex (added on).   - Please call if any pending serologic testing is positive.    2. Immunizations:  Based on the patient's immunization history and serologies, the following immunizations are recommended:  - Hepatitis A    Patient does not have immunity to hepatitis A    Vaccination ordered today: Yes   - Hepatitis B    Patient does not have immunity to hepatitis B    Vaccination ordered today: Yes   - COVID    Current CDC vaccination recommendations were discussed with the patient   - Annual high dose influenza     Vaccination ordered today: Yes   - Prevnar 20    Vaccination ordered today: Yes   - " Tdap    Vaccination ordered today: Yes   - Shingrix    Vaccination ordered today: Yes    Recommended Pre-Transplant Immunization Schedule   Vaccine  0m 1m 2m 6m   Pneumococcal conjugate vaccine (Prevnar 20) X      Tetanus-diphtheria-pertussis (Tdap)* X      Hepatitis A Vaccine (Havrix)** X   X   Hepatitis B Vaccine (Heplisav)** X X     Influenza (annual) X      Zoster Recombinant Vaccine (Shingrix) X  X           *Administer booster every 10 years.       **Administer if no immunity demonstrated on serologies               Patient will receive vaccines at local pharmacy. A written prescription was provided for all vaccine doses.     3. Counseling:   I discussed with the patient the risk for increased susceptibility to infections following transplantation including increased risk for infection right after transplant and if rejection should occur.  The patient has been counseled on the importance of vaccinations to decrease risk of infection and severe illness. Specific guidance has been provided to the patient regarding the patient's occupation, hobbies and activities to avoid future infectious complications.     4. Transplant Candidacy: Based on available information, there are no identified significant barriers to transplantation from an infectious disease standpoint.  Final determination of transplant candidacy will be made once evaluation is complete and reviewed by the Selection Committee.    5. Vaccine and Serology needs.  A. Coccidioides Serum antibody with reflex - patient spent a significant amount of time in [place]  B.       Follow up with infectious disease as needed.       The total time for evaluation and management services performed on 02/07/2024 was greater than 45 minutes.

## 2024-02-08 ENCOUNTER — LAB VISIT (OUTPATIENT)
Dept: LAB | Facility: HOSPITAL | Age: 69
End: 2024-02-08
Attending: NURSE PRACTITIONER
Payer: MEDICARE

## 2024-02-08 DIAGNOSIS — N18.4 CHRONIC KIDNEY DISEASE, STAGE 4 (SEVERE): ICD-10-CM

## 2024-02-08 LAB
ALBUMIN SERPL BCP-MCNC: 3.2 G/DL (ref 3.5–5.2)
ANION GAP SERPL CALC-SCNC: 7 MMOL/L (ref 8–16)
BASOPHILS # BLD AUTO: 0.03 K/UL (ref 0–0.2)
BASOPHILS NFR BLD: 0.4 % (ref 0–1.9)
BUN SERPL-MCNC: 39 MG/DL (ref 8–23)
CALCIUM SERPL-MCNC: 8.7 MG/DL (ref 8.7–10.5)
CHLORIDE SERPL-SCNC: 113 MMOL/L (ref 95–110)
CO2 SERPL-SCNC: 22 MMOL/L (ref 23–29)
CREAT SERPL-MCNC: 4.1 MG/DL (ref 0.5–1.4)
DIFFERENTIAL METHOD BLD: ABNORMAL
EOSINOPHIL # BLD AUTO: 0.2 K/UL (ref 0–0.5)
EOSINOPHIL NFR BLD: 3.1 % (ref 0–8)
ERYTHROCYTE [DISTWIDTH] IN BLOOD BY AUTOMATED COUNT: 17 % (ref 11.5–14.5)
EST. GFR  (NO RACE VARIABLE): 15.1 ML/MIN/1.73 M^2
GLUCOSE SERPL-MCNC: 83 MG/DL (ref 70–110)
HCT VFR BLD AUTO: 27.9 % (ref 40–54)
HGB BLD-MCNC: 8.5 G/DL (ref 14–18)
IMM GRANULOCYTES # BLD AUTO: 0.05 K/UL (ref 0–0.04)
IMM GRANULOCYTES NFR BLD AUTO: 0.7 % (ref 0–0.5)
LYMPHOCYTES # BLD AUTO: 1.3 K/UL (ref 1–4.8)
LYMPHOCYTES NFR BLD: 17.7 % (ref 18–48)
MCH RBC QN AUTO: 29.3 PG (ref 27–31)
MCHC RBC AUTO-ENTMCNC: 30.5 G/DL (ref 32–36)
MCV RBC AUTO: 96 FL (ref 82–98)
MONOCYTES # BLD AUTO: 0.8 K/UL (ref 0.3–1)
MONOCYTES NFR BLD: 10.5 % (ref 4–15)
NEUTROPHILS # BLD AUTO: 5.1 K/UL (ref 1.8–7.7)
NEUTROPHILS NFR BLD: 67.6 % (ref 38–73)
NRBC BLD-RTO: 0 /100 WBC
PHOSPHATE SERPL-MCNC: 4.1 MG/DL (ref 2.7–4.5)
PLATELET # BLD AUTO: 240 K/UL (ref 150–450)
PMV BLD AUTO: 11.9 FL (ref 9.2–12.9)
POTASSIUM SERPL-SCNC: 4.5 MMOL/L (ref 3.5–5.1)
PTH-INTACT SERPL-MCNC: 374.6 PG/ML (ref 9–77)
RBC # BLD AUTO: 2.9 M/UL (ref 4.6–6.2)
SODIUM SERPL-SCNC: 142 MMOL/L (ref 136–145)
WBC # BLD AUTO: 7.46 K/UL (ref 3.9–12.7)

## 2024-02-08 PROCEDURE — 80069 RENAL FUNCTION PANEL: CPT | Mod: NTX | Performed by: NURSE PRACTITIONER

## 2024-02-08 PROCEDURE — 85025 COMPLETE CBC W/AUTO DIFF WBC: CPT | Mod: TXP | Performed by: NURSE PRACTITIONER

## 2024-02-08 PROCEDURE — 36415 COLL VENOUS BLD VENIPUNCTURE: CPT | Mod: PO,TXP | Performed by: NURSE PRACTITIONER

## 2024-02-08 PROCEDURE — 83970 ASSAY OF PARATHORMONE: CPT | Mod: NTX | Performed by: NURSE PRACTITIONER

## 2024-02-09 ENCOUNTER — OFFICE VISIT (OUTPATIENT)
Dept: NEPHROLOGY | Facility: CLINIC | Age: 69
End: 2024-02-09
Payer: MEDICARE

## 2024-02-09 ENCOUNTER — LAB VISIT (OUTPATIENT)
Dept: LAB | Facility: HOSPITAL | Age: 69
End: 2024-02-09
Payer: MEDICARE

## 2024-02-09 VITALS
OXYGEN SATURATION: 100 % | HEIGHT: 72 IN | RESPIRATION RATE: 18 BRPM | SYSTOLIC BLOOD PRESSURE: 176 MMHG | BODY MASS INDEX: 39.92 KG/M2 | DIASTOLIC BLOOD PRESSURE: 91 MMHG | HEART RATE: 75 BPM | WEIGHT: 294.75 LBS

## 2024-02-09 DIAGNOSIS — N17.9 AKI (ACUTE KIDNEY INJURY): ICD-10-CM

## 2024-02-09 DIAGNOSIS — D64.9 ANEMIA, UNSPECIFIED TYPE: ICD-10-CM

## 2024-02-09 DIAGNOSIS — N25.81 SECONDARY HYPERPARATHYROIDISM: ICD-10-CM

## 2024-02-09 DIAGNOSIS — N18.4 CHRONIC KIDNEY DISEASE, STAGE 4 (SEVERE): ICD-10-CM

## 2024-02-09 DIAGNOSIS — E66.01 SEVERE OBESITY (BMI 35.0-39.9) WITH COMORBIDITY: ICD-10-CM

## 2024-02-09 DIAGNOSIS — N18.4 CHRONIC KIDNEY DISEASE, STAGE 4 (SEVERE): Primary | ICD-10-CM

## 2024-02-09 DIAGNOSIS — I10 HYPERTENSION, UNSPECIFIED TYPE: ICD-10-CM

## 2024-02-09 DIAGNOSIS — D47.2 MGUS (MONOCLONAL GAMMOPATHY OF UNKNOWN SIGNIFICANCE): ICD-10-CM

## 2024-02-09 LAB
BASOPHILS # BLD AUTO: 0.04 K/UL (ref 0–0.2)
BASOPHILS NFR BLD: 0.5 % (ref 0–1.9)
DIFFERENTIAL METHOD BLD: ABNORMAL
EOSINOPHIL # BLD AUTO: 0.3 K/UL (ref 0–0.5)
EOSINOPHIL NFR BLD: 3.9 % (ref 0–8)
ERYTHROCYTE [DISTWIDTH] IN BLOOD BY AUTOMATED COUNT: 16.5 % (ref 11.5–14.5)
FERRITIN SERPL-MCNC: 357 NG/ML (ref 20–300)
HCT VFR BLD AUTO: 27.2 % (ref 40–54)
HGB BLD-MCNC: 8.4 G/DL (ref 14–18)
IMM GRANULOCYTES # BLD AUTO: 0.06 K/UL (ref 0–0.04)
IMM GRANULOCYTES NFR BLD AUTO: 0.8 % (ref 0–0.5)
IRON SERPL-MCNC: 58 UG/DL (ref 45–160)
LYMPHOCYTES # BLD AUTO: 1.5 K/UL (ref 1–4.8)
LYMPHOCYTES NFR BLD: 19.5 % (ref 18–48)
MCH RBC QN AUTO: 28.9 PG (ref 27–31)
MCHC RBC AUTO-ENTMCNC: 30.9 G/DL (ref 32–36)
MCV RBC AUTO: 94 FL (ref 82–98)
MONOCYTES # BLD AUTO: 0.8 K/UL (ref 0.3–1)
MONOCYTES NFR BLD: 10.6 % (ref 4–15)
NEUTROPHILS # BLD AUTO: 5 K/UL (ref 1.8–7.7)
NEUTROPHILS NFR BLD: 64.7 % (ref 38–73)
NRBC BLD-RTO: 0 /100 WBC
PLATELET # BLD AUTO: 231 K/UL (ref 150–450)
PMV BLD AUTO: 11.8 FL (ref 9.2–12.9)
RBC # BLD AUTO: 2.91 M/UL (ref 4.6–6.2)
SATURATED IRON: 19 % (ref 20–50)
TOTAL IRON BINDING CAPACITY: 305 UG/DL (ref 250–450)
TRANSFERRIN SERPL-MCNC: 206 MG/DL (ref 200–375)
WBC # BLD AUTO: 7.7 K/UL (ref 3.9–12.7)

## 2024-02-09 PROCEDURE — 83540 ASSAY OF IRON: CPT | Mod: TXP | Performed by: NURSE PRACTITIONER

## 2024-02-09 PROCEDURE — 85025 COMPLETE CBC W/AUTO DIFF WBC: CPT | Mod: NTX | Performed by: NURSE PRACTITIONER

## 2024-02-09 PROCEDURE — 82728 ASSAY OF FERRITIN: CPT | Mod: NTX | Performed by: NURSE PRACTITIONER

## 2024-02-09 PROCEDURE — 36415 COLL VENOUS BLD VENIPUNCTURE: CPT | Mod: NTX | Performed by: NURSE PRACTITIONER

## 2024-02-09 PROCEDURE — 99999 PR PBB SHADOW E&M-EST. PATIENT-LVL IV: CPT | Mod: PBBFAC,,, | Performed by: NURSE PRACTITIONER

## 2024-02-09 PROCEDURE — 99215 OFFICE O/P EST HI 40 MIN: CPT | Mod: S$GLB,,, | Performed by: NURSE PRACTITIONER

## 2024-02-09 RX ORDER — CALCITRIOL 0.25 UG/1
0.25 CAPSULE ORAL
Qty: 36 CAPSULE | Refills: 3 | Status: SHIPPED | OUTPATIENT
Start: 2024-02-09 | End: 2025-02-03

## 2024-02-09 NOTE — PROGRESS NOTES
"Subjective:       Patient ID: Handy Adams is a 68 y.o. A male who presents for evaluation of of renal dysfunction.      HPI     Patient is new to me. New to clinic.  Prior pertinent chart reviewed since this is patient's first appointment with me.    Patient presents for new evaluation of renal dysfunction.  Baseline creatinine of 1.6-1.7 in 2020-early 2022. Recently had sCr of 2.5. Patient said he has "not been taking care of himself" and "eating more sugar" since January.    Per recent note from PCP: He cannot afford farxiga due to increased price and had stopped taking it for a few months prior to appt in September.    Home BPs: does not take    Rare Excedrin use now, though he used to use it frequently.    Significant other medical problems include HTN since at least 2007, T2DM since at least 2007.      The patient denies taking herbal supplements, or new antibiotics, recreational drugs, recent episode of dehydration, diarrhea, nausea or vomiting, acute illness, hospitalization or exposure to IV radiocontrast.     Significant family hx includes: No known kidney issues    Last renal US: none in EMR    Update 10/24/22:  Presents for f/u of CKD, YESENIA.  Last seen a month ago.  Feet are swelling again, especially when drinking ensure. Has improved since stopping Ensure.  Holding lisinopril-Hctz. Taking labetolol 200 mg instead.  Found to have an IgG lambda specific monoclonal band during proteinuria workup. Referred to hematology.    Recent sCr 2.5--> 2.8-2.9.     Home BPs: does not believe cuff is accurate.    Update 12/28/22:  Returns for f/u of CKD.  sCr has been 2.8-3.0 mg/dL.  Home BPs: not taking because he thinks cuff is inaccurate    Patient had bone marrow biopsy and was diagnosed with IgG-lambda MGUS.  An IgG lambda specific monoclonal protein was identified on 24 hour urine on 12/16/22.  He has not gotten kidney biopsy.    Says he feels great.    Update 2/3/23:  Returns for f/u of CKD and discussion about " "biopsy.  sCr now 3.0-3.2 mg/dL. Most recent sCr 3.3.  Increased valsartan to 160 mg at last visit.    Update 9/8/23:  - Presents for follow-up of CKD  - sCr trended up to 3.8. from baseline of about 3.0, now improved to 3.1. Unclear etiology.  - Notes being on Farxiga in the past but has not taken for at least a year   - Denies NSAIDs, reduced PO intake, n/v/d, fluctuations in BP, elevations in blood sugar, recent illness    Update 11/6/23:  Presents for f/u of CKD.  Most recent sCr 3.1 -3.4 mg/dL.  Home BPs: 130-140s (SBP)   Admits recent dietary indiscretion.  Says he gained weight recently.  Not exercising.    Advance Care Planning       Serious Illness Conversation Guide    Conversation was held with:   patient[SR1.1]     What is your understanding now of where you are with your illness?:   Comments: Admits he is in denial about kidney disease.[SR1.1]     How much information about what is likely to be ahead with your illness would you like from me?:   wants to be fully informed[SR1.1]     I want to share with you my understanding of where things are with your illness.:   continued decline[SR1.2]     Patient emotions observed or reported:   denial[SR1.1]     What are your most important goals if your health situation worsens?:   being independent[SR1.1]     What are your biggest fears and worries about the future with your health?:   Comments: being debilitated where he has to depend on others to care for him[SR1.1]     What gives you strength as you think about the future of your illness?:   Denominational tae[SR1.1]     What abilities are so critical to your life that you can't imagine living without them? Or, what makes life meaningful?:   being able to care for oneself, living independently[SR1.1]     If you become sicker, how much are you willing to go through for the possibility of gaining more time?:   Comments: Says he needs to think about this, but "I don't want to go through dialysis at all because I " "think that would put a constraint on my independence and constrain me from what I imagine I could be." He says he would want dialysis though if the choice was dialysis or death.    Has not thought about invasive procedures in depth.[SR1.1]     How much does your family know about your priorities and wishes?:   patient has had some incomplete discussions with family[SR1.2]  Comments: Says he is very private. Has not spoken to spouse or friends about this yet. He has shared some information with sister.[SR1.2]     I recommend that we do the following to make sure your treatment plans reflect what's important to you. How does this plan seem to you?:        Attribution       SR1.1 Raven Naylor NP 11/06/23 14:48    SR1.2 Raven Naylor NP 11/06/23 16:04               Update 2/9/24:  Presents today for f/u of CKD.  Most recent sCr was 4.1 mg/dL.  Home BPs: SBP 150s  Thinks he took his medication today. Takes his medication daily but not at the same time every day.  Saw transplant team.        Review of Systems   Eyes:  Negative for visual disturbance.   Respiratory:  Negative for shortness of breath.    Cardiovascular:  Negative for leg swelling.   Gastrointestinal:  Negative for diarrhea, nausea and vomiting.   Genitourinary:  Positive for frequency (with high fluid intake). Negative for difficulty urinating, dysuria and hematuria.        Some incontinence   Neurological:  Negative for dizziness and headaches.   All other systems reviewed and are negative.      Objective:       Blood pressure (!) 176/91, pulse 75, resp. rate 18, height 6' (1.829 m), weight 133.7 kg (294 lb 12.1 oz), SpO2 100 %.  Physical Exam  Vitals reviewed.   Constitutional:       Appearance: Normal appearance. He is obese.   HENT:      Head: Normocephalic and atraumatic.   Eyes:      General: No scleral icterus.  Cardiovascular:      Rate and Rhythm: Normal rate and regular rhythm.   Pulmonary:      Effort: No respiratory distress.    "   Breath sounds: No wheezing or rales.   Musculoskeletal:      Right lower leg: Edema (+2) present.      Left lower leg: Edema (+2) present.   Skin:     General: Skin is warm and dry.   Neurological:      Mental Status: He is alert and oriented to person, place, and time.   Psychiatric:         Mood and Affect: Mood normal.         Behavior: Behavior normal.           Lab Results   Component Value Date    CREATININE 4.1 (H) 02/08/2024     Prot/Creat Ratio, Urine   Date Value Ref Range Status   02/08/2024 4.22 (H) 0.00 - 0.20 Final   11/03/2023 2.46 (H) 0.00 - 0.20 Final   08/18/2023 1.19 (H) 0.00 - 0.20 Final     Lab Results   Component Value Date     02/08/2024    K 4.5 02/08/2024    CO2 22 (L) 02/08/2024     (H) 02/08/2024     Lab Results   Component Value Date    .6 (H) 02/08/2024    CALCIUM 8.7 02/08/2024    PHOS 4.1 02/08/2024     Lab Results   Component Value Date    HGB 8.4 (L) 02/09/2024    WBC 7.70 02/09/2024    HCT 27.2 (L) 02/09/2024      Lab Results   Component Value Date    HGBA1C 5.4 02/07/2024     02/09/2024    BUN 39 (H) 02/08/2024     Lab Results   Component Value Date    LDLCALC 87.4 02/07/2024         Assessment:       1. Chronic kidney disease, stage 4 (severe)    2. Anemia, unspecified type    3. YESENIA (acute kidney injury)    4. Secondary hyperparathyroidism    5. MGUS (monoclonal gammopathy of unknown significance)    6. Severe obesity (BMI 35.0-39.9) with comorbidity    7. Hypertension, unspecified type                Plan:   CKD stage IV with eGFR 21.1 mL/min - Underlying CKD clinically 2/2 diabetes + HTN  - Biopsy performed showing hypertensive nephrosclerosis, acute tubular injury, and diabetic nephropathy. sCr uptrend to 3.8 with unclear etiology.   - Some progression.  - IgG lambda specific monoclonal band noted on proteinuria workup and UPEP. No evidence of MGRS on biopsy.   - Educated patient to control BP, BG, remain well-hydrated, and avoid NSAIDs to prevent  progression of CKD. High risk of progression to ESRD.      UPCR Significant albuminuria since 2011 with increase to almost nephrotic range proteinuria in January. Was on farxiga but could not afford it; renal function is too low to start on recent labs. On ARB  - Proteinuric   Acid-base Mild acidosis. On sodium bicarb at 1300 mg BID.   Renal osteodystrophy Ca, phos okay. Low vit D. Elevated PTH.  On ergo weekly.   Anemia Hgb low last check. Repeat iron studies.    DM Well-controlled recently. Has had DM since at least 2007, when he had an A1c of 15+.   Lipid Management On statin.   ESRD planning Anticipatory guidance provided about timing of dialysis. Start discussions and planning when eGFR is about 20 mL/min; most patients start dialysis between 5-10 mL/min.    Attended ESRD treatment choices, but is still unsure about what treatment he would want.  Transplant team said he needs to identify support person/ transportation identified. Also needs to lose weight.    Previously: He does not want dialysis but said if he were to die without it, he would want it. Encouraged him to think about treatment options for next visit. Provided with decision aid online.    Kidney Failure Risk Equation (Tangri)    Kidney Failure Risk at 2 years: 64.6%    Kidney Failure Risk at 5 years: 96.1%    Lab Results   Component Value Date    MICALBCREAT 2510.8 (H) 02/08/2024    CREATININE 4.1 (H) 02/08/2024          HTN - High on labetalol 200 mg BID, Nifedipine 90 mg, valsartan 320 mg, spironolactone 50 mg qd  - Goal is SBP less than 130/80  - exercise and lose weight (obesity)    MGUS - per hem/onc    All questions patient had were answered.  Asked if further questions. None. F/u in clinic in 2 months with Raven Naylor NP with labs and urine prior to next visit or sooner if needed.  ER for emergency concerns.    Summary of Plan:  Iron, TSAT, Ferritin  Start calcitriol 0.25 mcg MWF  Take BP medications on a schedule  He will think about  dialysis options  RTC in 2 mos    50 minutes of total time spent on the encounter, which includes face to face time and non-face to face time preparing to see the patient (eg, review of tests), Obtaining and/or reviewing separately obtained history, documenting clinical information in the electronic or other health record, independently interpreting results (not separately reported) and communicating results to the patient/family/caregiver, or Care coordination (not separately reported).     Visit today included increased complexity associated with managing the longitudinal care of the patient due to the serious and/or complex managed problem(s) CKD, HTN.

## 2024-02-15 ENCOUNTER — PATIENT MESSAGE (OUTPATIENT)
Dept: NEPHROLOGY | Facility: CLINIC | Age: 69
End: 2024-02-15
Payer: MEDICARE

## 2024-02-15 RX ORDER — ERGOCALCIFEROL 1.25 MG/1
50000 CAPSULE ORAL
Qty: 12 CAPSULE | Refills: 0 | Status: SHIPPED | OUTPATIENT
Start: 2024-02-15 | End: 2024-05-07

## 2024-02-16 DIAGNOSIS — D64.9 ANEMIA, UNSPECIFIED TYPE: Primary | ICD-10-CM

## 2024-02-21 ENCOUNTER — TELEPHONE (OUTPATIENT)
Dept: TRANSPLANT | Facility: CLINIC | Age: 69
End: 2024-02-21
Payer: MEDICARE

## 2024-02-21 DIAGNOSIS — Z76.82 ORGAN TRANSPLANT CANDIDATE: Primary | ICD-10-CM

## 2024-02-21 NOTE — TELEPHONE ENCOUNTER
----- Message from Estela Silva sent at 2/7/2024 12:12 PM CST -----  Regarding: high risk no caregiver  Patient presents as high risk candidate for kidney transplant at this time due to patient does not have organ transplant caregiver/transportation plan in place at this time. Pt reports having organ transplant financial plan and insurance plan in place.    Recommendations/Additional Comments: Medical adherence check requested. Pt to contact transplant SW once transplant caregiver/transportation plan is in place and transplant suitability will be reviewed.

## 2024-02-23 ENCOUNTER — EPISODE CHANGES (OUTPATIENT)
Dept: TRANSPLANT | Facility: HOSPITAL | Age: 69
End: 2024-02-23

## 2024-02-28 DIAGNOSIS — N18.4 CHRONIC KIDNEY DISEASE, STAGE 4 (SEVERE): ICD-10-CM

## 2024-02-28 DIAGNOSIS — R80.9 PROTEINURIA, UNSPECIFIED TYPE: ICD-10-CM

## 2024-02-28 RX ORDER — VALSARTAN 320 MG/1
320 TABLET ORAL DAILY
Qty: 90 TABLET | Refills: 3 | Status: SHIPPED | OUTPATIENT
Start: 2024-02-28 | End: 2025-02-27

## 2024-02-28 RX ORDER — FERROUS SULFATE TAB 325 MG (65 MG ELEMENTAL FE) 325 (65 FE) MG
325 TAB ORAL 3 TIMES DAILY
Qty: 270 TABLET | Refills: 3 | Status: SHIPPED | OUTPATIENT
Start: 2024-02-28 | End: 2025-02-22

## 2024-03-01 ENCOUNTER — OFFICE VISIT (OUTPATIENT)
Dept: OPHTHALMOLOGY | Facility: CLINIC | Age: 69
End: 2024-03-01
Payer: MEDICARE

## 2024-03-01 ENCOUNTER — PATIENT MESSAGE (OUTPATIENT)
Dept: ADMINISTRATIVE | Facility: HOSPITAL | Age: 69
End: 2024-03-01
Payer: MEDICARE

## 2024-03-01 DIAGNOSIS — E11.3513 TYPE 2 DIABETES MELLITUS WITH BOTH EYES AFFECTED BY PROLIFERATIVE RETINOPATHY AND MACULAR EDEMA, WITHOUT LONG-TERM CURRENT USE OF INSULIN: Primary | ICD-10-CM

## 2024-03-01 DIAGNOSIS — H35.033 HYPERTENSIVE RETINOPATHY, BILATERAL: ICD-10-CM

## 2024-03-01 PROCEDURE — 3288F FALL RISK ASSESSMENT DOCD: CPT | Mod: CPTII,S$GLB,, | Performed by: OPHTHALMOLOGY

## 2024-03-01 PROCEDURE — 3066F NEPHROPATHY DOC TX: CPT | Mod: CPTII,S$GLB,, | Performed by: OPHTHALMOLOGY

## 2024-03-01 PROCEDURE — 3062F POS MACROALBUMINURIA REV: CPT | Mod: CPTII,S$GLB,, | Performed by: OPHTHALMOLOGY

## 2024-03-01 PROCEDURE — 67028 INJECTION EYE DRUG: CPT | Mod: 50,S$GLB,, | Performed by: OPHTHALMOLOGY

## 2024-03-01 PROCEDURE — 92014 COMPRE OPH EXAM EST PT 1/>: CPT | Mod: 25,S$GLB,, | Performed by: OPHTHALMOLOGY

## 2024-03-01 PROCEDURE — 92134 CPTRZ OPH DX IMG PST SGM RTA: CPT | Mod: S$GLB,,, | Performed by: OPHTHALMOLOGY

## 2024-03-01 PROCEDURE — 99999 PR PBB SHADOW E&M-EST. PATIENT-LVL III: CPT | Mod: PBBFAC,,, | Performed by: OPHTHALMOLOGY

## 2024-03-01 PROCEDURE — 1160F RVW MEDS BY RX/DR IN RCRD: CPT | Mod: CPTII,S$GLB,, | Performed by: OPHTHALMOLOGY

## 2024-03-01 PROCEDURE — 1159F MED LIST DOCD IN RCRD: CPT | Mod: CPTII,S$GLB,, | Performed by: OPHTHALMOLOGY

## 2024-03-01 PROCEDURE — 4010F ACE/ARB THERAPY RXD/TAKEN: CPT | Mod: CPTII,S$GLB,, | Performed by: OPHTHALMOLOGY

## 2024-03-01 PROCEDURE — 1126F AMNT PAIN NOTED NONE PRSNT: CPT | Mod: CPTII,S$GLB,, | Performed by: OPHTHALMOLOGY

## 2024-03-01 PROCEDURE — 3044F HG A1C LEVEL LT 7.0%: CPT | Mod: CPTII,S$GLB,, | Performed by: OPHTHALMOLOGY

## 2024-03-01 PROCEDURE — 1101F PT FALLS ASSESS-DOCD LE1/YR: CPT | Mod: CPTII,S$GLB,, | Performed by: OPHTHALMOLOGY

## 2024-03-01 NOTE — PROGRESS NOTES
HPI     Diabetic Eye Exam     Additional comments: 3 mo check           Comments    Patient here today for 3 mo f/u. VA stable ou, no pain ou and denies   floaters or flashes ou. Patient reports he is having kidney treatments and   would like to discuss whether an FA will interfere.    AT ou prn           OCT  DME OU  OD - mild decrease  OS - decrease in central [place]    Prior WFFA  OD - normal transit time. Hyperfluorescence early c/w Ma/edema - sig NP    OS - Hyperfluorescence early c/w Ma/edema  .NV nasal      A/P    1. 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 DME OU   6/19 - pt did not FU until 8/20  S/p Avastin OD x 2  S/p Eylea OD x 2  S/p Ozurdex OD x 3, OS x 1  11/23 11/22 - not seen x 2 years  3/23 - had second opinion with Dr. Suh.  Pt would like to try a few Eylea prior to steroids if needed  11/23 - increased DME oU    Ozurdex OU    Authorize Iluvien OU      3. HTN Ret OU  BS/BP/chol control    4. NS OU  No sig PSC yet    5. Cyst RLL  Removed by Rhea  Happy with result        3 month OCT and dilate    Risks, benefits, and alternatives to treatment discussed in detail with the patient.  The patient voiced understanding and wished to proceed with the procedure    Injection Procedure Note:  Diagnosis: DME OU    Patient Identified and Time Out complete  Pt marked  Topical Proparacaine and Betadine. Subconj lido  Inject Ozurdex OU at 6:00 @ 3.5-4mm posterior to limbus  Post Operative Dx: Same  Complications: None  Follow up as above.

## 2024-03-07 ENCOUNTER — TELEPHONE (OUTPATIENT)
Dept: CARDIOLOGY | Facility: HOSPITAL | Age: 69
End: 2024-03-07
Payer: MEDICARE

## 2024-03-08 ENCOUNTER — TELEPHONE (OUTPATIENT)
Dept: CARDIOLOGY | Facility: CLINIC | Age: 69
End: 2024-03-08
Payer: MEDICARE

## 2024-03-08 DIAGNOSIS — R00.2 PALPITATIONS: Primary | ICD-10-CM

## 2024-03-11 ENCOUNTER — PATIENT MESSAGE (OUTPATIENT)
Dept: NEPHROLOGY | Facility: CLINIC | Age: 69
End: 2024-03-11
Payer: MEDICARE

## 2024-03-11 ENCOUNTER — HOSPITAL ENCOUNTER (OUTPATIENT)
Dept: CARDIOLOGY | Facility: HOSPITAL | Age: 69
Discharge: HOME | End: 2024-03-11
Attending: NURSE PRACTITIONER
Payer: MEDICARE

## 2024-03-11 VITALS — BODY MASS INDEX: 39.82 KG/M2 | WEIGHT: 294 LBS | HEIGHT: 72 IN

## 2024-03-11 VITALS
RESPIRATION RATE: 16 BRPM | BODY MASS INDEX: 39.82 KG/M2 | WEIGHT: 294 LBS | HEIGHT: 72 IN | DIASTOLIC BLOOD PRESSURE: 63 MMHG | HEART RATE: 69 BPM | SYSTOLIC BLOOD PRESSURE: 142 MMHG

## 2024-03-11 DIAGNOSIS — Z91.89 CARDIOVASCULAR EVENT RISK: ICD-10-CM

## 2024-03-11 DIAGNOSIS — Z76.82 ORGAN TRANSPLANT CANDIDATE: ICD-10-CM

## 2024-03-11 DIAGNOSIS — Z01.810 HIGH RISK SURGERY, PRE-OPERATIVE CARDIOVASCULAR EXAMINATION: ICD-10-CM

## 2024-03-11 PROBLEM — I70.0 AORTIC ATHEROSCLEROSIS: Status: ACTIVE | Noted: 2024-03-11

## 2024-03-11 LAB
ASCENDING AORTA: 4.1 CM
AV INDEX (PROSTH): 0.68
AV MEAN GRADIENT: 3 MMHG
AV PEAK GRADIENT: 7 MMHG
AV VALVE AREA BY VELOCITY RATIO: 2.95 CM²
AV VALVE AREA: 3.12 CM²
AV VELOCITY RATIO: 0.64
BSA FOR ECHO PROCEDURE: 2.6 M2
CFR FLOW - ANTERIOR: 2.09
CFR FLOW - INFERIOR: 2.02
CFR FLOW - LATERAL: 1.84
CFR FLOW - MAX: 2.72
CFR FLOW - MIN: 1.6
CFR FLOW - SEPTAL: 2.06
CFR FLOW - WHOLE HEART: 2
CV ECHO LV RWT: 0.53 CM
CV STRESS BASE HR: 61 BPM
DIASTOLIC BLOOD PRESSURE: 94 MMHG
DOP CALC AO PEAK VEL: 1.31 M/S
DOP CALC AO VTI: 26.16 CM
DOP CALC LVOT AREA: 4.6 CM2
DOP CALC LVOT DIAMETER: 2.42 CM
DOP CALC LVOT PEAK VEL: 0.84 M/S
DOP CALC LVOT STROKE VOLUME: 81.69 CM3
DOP CALCLVOT PEAK VEL VTI: 17.77 CM
E WAVE DECELERATION TIME: 263.25 MSEC
E/A RATIO: 1.22
E/E' RATIO: 9.22 M/S
ECHO LV POSTERIOR WALL: 1.22 CM (ref 0.6–1.1)
EJECTION FRACTION- HIGH: 59 %
END DIASTOLIC INDEX-HIGH: 155 ML/M2
END DIASTOLIC INDEX-LOW: 91 ML/M2
END SYSTOLIC INDEX-HIGH: 78 ML/M2
END SYSTOLIC INDEX-LOW: 40 ML/M2
FRACTIONAL SHORTENING: 25 % (ref 28–44)
INTERVENTRICULAR SEPTUM: 1.24 CM (ref 0.6–1.1)
LA MAJOR: 5.75 CM
LA MINOR: 6 CM
LA WIDTH: 4.18 CM
LEFT ATRIUM SIZE: 3.82 CM
LEFT ATRIUM VOLUME INDEX MOD: 32 ML/M2
LEFT ATRIUM VOLUME INDEX: 31.8 ML/M2
LEFT ATRIUM VOLUME MOD: 80.39 CM3
LEFT ATRIUM VOLUME: 79.7 CM3
LEFT INTERNAL DIMENSION IN SYSTOLE: 3.5 CM (ref 2.1–4)
LEFT VENTRICLE DIASTOLIC VOLUME INDEX: 39.62 ML/M2
LEFT VENTRICLE DIASTOLIC VOLUME: 99.45 ML
LEFT VENTRICLE MASS INDEX: 86 G/M2
LEFT VENTRICLE SYSTOLIC VOLUME INDEX: 20.3 ML/M2
LEFT VENTRICLE SYSTOLIC VOLUME: 50.86 ML
LEFT VENTRICULAR INTERNAL DIMENSION IN DIASTOLE: 4.64 CM (ref 3.5–6)
LEFT VENTRICULAR MASS: 215.27 G
LV LATERAL E/E' RATIO: 8.3 M/S
LV SEPTAL E/E' RATIO: 10.38 M/S
MV PEAK A VEL: 0.68 M/S
MV PEAK E VEL: 0.83 M/S
MV STENOSIS PRESSURE HALF TIME: 76.34 MS
MV VALVE AREA P 1/2 METHOD: 2.88 CM2
NUC REST DIASTOLIC VOLUME INDEX: 189
NUC REST EJECTION FRACTION: 51
NUC REST SYSTOLIC VOLUME INDEX: 93
NUC STRESS DIASTOLIC VOLUME INDEX: 196
NUC STRESS EJECTION FRACTION: 54 %
NUC STRESS SYSTOLIC VOLUME INDEX: 90
OHS CV CPX 1 MINUTE RECOVERY HEART RATE: 82 BPM
OHS CV CPX 85 PERCENT MAX PREDICTED HEART RATE MALE: 129
OHS CV CPX MAX PREDICTED HEART RATE: 152
OHS CV CPX PATIENT IS FEMALE: 0
OHS CV CPX PATIENT IS MALE: 1
OHS CV CPX PEAK DIASTOLIC BLOOD PRESSURE: 72 MMHG
OHS CV CPX PEAK HEAR RATE: 72 BPM
OHS CV CPX PEAK RATE PRESSURE PRODUCT: NORMAL
OHS CV CPX PEAK SYSTOLIC BLOOD PRESSURE: 149 MMHG
OHS CV CPX PERCENT MAX PREDICTED HEART RATE ACHIEVED: 47
OHS CV CPX RATE PRESSURE PRODUCT PRESENTING: NORMAL
PISA MRMAX VEL: 0.02 M/S
RA MAJOR: 4.86 CM
RA PRESSURE ESTIMATED: 3 MMHG
RA WIDTH: 5.56 CM
REST FLOW - ANTERIOR: 0.57 CC/MIN/G
REST FLOW - INFERIOR: 0.56 CC/MIN/G
REST FLOW - LATERAL: 0.53 CC/MIN/G
REST FLOW - MAX: 0.69 CC/MIN/G
REST FLOW - MIN: 0.39 CC/MIN/G
REST FLOW - SEPTAL: 0.55 CC/MIN/G
REST FLOW - WHOLE HEART: 0.55 CC/MIN/G
RETIRED EF AND QEF - SEE NOTES: 47 %
RIGHT VENTRICULAR END-DIASTOLIC DIMENSION: 4.18 CM
SINUS: 3.55 CM
STJ: 2.86 CM
STRESS FLOW - ANTERIOR: 1.2 CC/MIN/G
STRESS FLOW - INFERIOR: 1.13 CC/MIN/G
STRESS FLOW - LATERAL: 0.97 CC/MIN/G
STRESS FLOW - MAX: 1.46 CC/MIN/G
STRESS FLOW - MIN: 0.74 CC/MIN/G
STRESS FLOW - SEPTAL: 1.14 CC/MIN/G
STRESS FLOW - WHOLE HEART: 1.11 CC/MIN/G
SYSTOLIC BLOOD PRESSURE: 183 MMHG
TDI LATERAL: 0.1 M/S
TDI SEPTAL: 0.08 M/S
TDI: 0.09 M/S
TRICUSPID ANNULAR PLANE SYSTOLIC EXCURSION: 3.53 CM
Z-SCORE OF LEFT VENTRICULAR DIMENSION IN END DIASTOLE: -11.25
Z-SCORE OF LEFT VENTRICULAR DIMENSION IN END SYSTOLE: -6.99

## 2024-03-11 PROCEDURE — 93306 TTE W/DOPPLER COMPLETE: CPT | Mod: TXP

## 2024-03-11 PROCEDURE — 93306 TTE W/DOPPLER COMPLETE: CPT | Mod: 26,TXP,, | Performed by: INTERNAL MEDICINE

## 2024-03-11 PROCEDURE — 78431 MYOCRD IMG PET RST&STRS CT: CPT | Mod: 26,TXP,, | Performed by: INTERNAL MEDICINE

## 2024-03-11 PROCEDURE — A9555 RB82 RUBIDIUM: HCPCS | Mod: TXP | Performed by: NURSE PRACTITIONER

## 2024-03-11 PROCEDURE — 93016 CV STRESS TEST SUPVJ ONLY: CPT | Mod: TXP,,, | Performed by: INTERNAL MEDICINE

## 2024-03-11 PROCEDURE — 93018 CV STRESS TEST I&R ONLY: CPT | Mod: TXP,,, | Performed by: INTERNAL MEDICINE

## 2024-03-11 PROCEDURE — 63600175 PHARM REV CODE 636 W HCPCS: Mod: TXP | Performed by: NURSE PRACTITIONER

## 2024-03-11 PROCEDURE — 78434 AQMBF PET REST & RX STRESS: CPT | Mod: TXP

## 2024-03-11 PROCEDURE — 78434 AQMBF PET REST & RX STRESS: CPT | Mod: 26,TXP,, | Performed by: INTERNAL MEDICINE

## 2024-03-11 RX ORDER — REGADENOSON 0.08 MG/ML
0.4 INJECTION, SOLUTION INTRAVENOUS
Status: COMPLETED | OUTPATIENT
Start: 2024-03-11 | End: 2024-03-11

## 2024-03-11 RX ORDER — AMINOPHYLLINE 25 MG/ML
75 INJECTION, SOLUTION INTRAVENOUS
Status: COMPLETED | OUTPATIENT
Start: 2024-03-11 | End: 2024-03-11

## 2024-03-11 RX ADMIN — AMINOPHYLLINE 75 MG: 25 INJECTION, SOLUTION INTRAVENOUS at 02:03

## 2024-03-11 RX ADMIN — RUBIDIUM CHLORIDE RB-82 39.9 MILLICURIE: 150 INJECTION, SOLUTION INTRAVENOUS at 02:03

## 2024-03-11 RX ADMIN — REGADENOSON 0.4 MG: 0.08 INJECTION, SOLUTION INTRAVENOUS at 02:03

## 2024-03-12 ENCOUNTER — TELEPHONE (OUTPATIENT)
Dept: TRANSPLANT | Facility: CLINIC | Age: 69
End: 2024-03-12
Payer: MEDICARE

## 2024-03-12 ENCOUNTER — TELEPHONE (OUTPATIENT)
Dept: FAMILY MEDICINE | Facility: CLINIC | Age: 69
End: 2024-03-12
Payer: MEDICARE

## 2024-03-12 ENCOUNTER — OFFICE VISIT (OUTPATIENT)
Dept: HEMATOLOGY/ONCOLOGY | Facility: CLINIC | Age: 69
End: 2024-03-12
Payer: MEDICARE

## 2024-03-12 ENCOUNTER — OFFICE VISIT (OUTPATIENT)
Dept: CARDIOLOGY | Facility: CLINIC | Age: 69
End: 2024-03-12
Payer: MEDICARE

## 2024-03-12 VITALS
OXYGEN SATURATION: 100 % | BODY MASS INDEX: 37.43 KG/M2 | TEMPERATURE: 98 F | HEART RATE: 76 BPM | SYSTOLIC BLOOD PRESSURE: 142 MMHG | RESPIRATION RATE: 17 BRPM | DIASTOLIC BLOOD PRESSURE: 81 MMHG | HEIGHT: 72 IN | WEIGHT: 276.38 LBS

## 2024-03-12 VITALS
BODY MASS INDEX: 37.65 KG/M2 | SYSTOLIC BLOOD PRESSURE: 146 MMHG | WEIGHT: 278 LBS | HEART RATE: 63 BPM | OXYGEN SATURATION: 100 % | HEIGHT: 72 IN | DIASTOLIC BLOOD PRESSURE: 80 MMHG

## 2024-03-12 DIAGNOSIS — D47.2 MGUS (MONOCLONAL GAMMOPATHY OF UNKNOWN SIGNIFICANCE): ICD-10-CM

## 2024-03-12 DIAGNOSIS — Z01.818 PRE-TRANSPLANT EVALUATION FOR CHRONIC KIDNEY DISEASE: Primary | ICD-10-CM

## 2024-03-12 DIAGNOSIS — N18.4 TYPE 2 DIABETES MELLITUS WITH STAGE 4 CHRONIC KIDNEY DISEASE, WITHOUT LONG-TERM CURRENT USE OF INSULIN: ICD-10-CM

## 2024-03-12 DIAGNOSIS — I12.9 BENIGN HYPERTENSION WITH CKD (CHRONIC KIDNEY DISEASE) STAGE IV: ICD-10-CM

## 2024-03-12 DIAGNOSIS — E11.22 TYPE 2 DIABETES MELLITUS WITH STAGE 4 CHRONIC KIDNEY DISEASE, WITHOUT LONG-TERM CURRENT USE OF INSULIN: ICD-10-CM

## 2024-03-12 DIAGNOSIS — Z76.82 ORGAN TRANSPLANT CANDIDATE: ICD-10-CM

## 2024-03-12 DIAGNOSIS — I70.0 AORTIC ATHEROSCLEROSIS: ICD-10-CM

## 2024-03-12 DIAGNOSIS — H35.033 HYPERTENSIVE RETINOPATHY, BILATERAL: Primary | ICD-10-CM

## 2024-03-12 DIAGNOSIS — E78.5 HYPERLIPIDEMIA, UNSPECIFIED HYPERLIPIDEMIA TYPE: ICD-10-CM

## 2024-03-12 DIAGNOSIS — N18.4 BENIGN HYPERTENSION WITH CKD (CHRONIC KIDNEY DISEASE) STAGE IV: ICD-10-CM

## 2024-03-12 DIAGNOSIS — E66.01 CLASS 3 SEVERE OBESITY DUE TO EXCESS CALORIES WITH SERIOUS COMORBIDITY AND BODY MASS INDEX (BMI) OF 40.0 TO 44.9 IN ADULT: ICD-10-CM

## 2024-03-12 PROCEDURE — 3077F SYST BP >= 140 MM HG: CPT | Mod: CPTII,S$GLB,, | Performed by: INTERNAL MEDICINE

## 2024-03-12 PROCEDURE — 3062F POS MACROALBUMINURIA REV: CPT | Mod: CPTII,S$GLB,TXP, | Performed by: INTERNAL MEDICINE

## 2024-03-12 PROCEDURE — 1101F PT FALLS ASSESS-DOCD LE1/YR: CPT | Mod: CPTII,S$GLB,, | Performed by: INTERNAL MEDICINE

## 2024-03-12 PROCEDURE — 1101F PT FALLS ASSESS-DOCD LE1/YR: CPT | Mod: CPTII,S$GLB,TXP, | Performed by: INTERNAL MEDICINE

## 2024-03-12 PROCEDURE — 3066F NEPHROPATHY DOC TX: CPT | Mod: CPTII,S$GLB,, | Performed by: INTERNAL MEDICINE

## 2024-03-12 PROCEDURE — 3066F NEPHROPATHY DOC TX: CPT | Mod: CPTII,S$GLB,TXP, | Performed by: INTERNAL MEDICINE

## 2024-03-12 PROCEDURE — 1126F AMNT PAIN NOTED NONE PRSNT: CPT | Mod: CPTII,S$GLB,, | Performed by: INTERNAL MEDICINE

## 2024-03-12 PROCEDURE — 3288F FALL RISK ASSESSMENT DOCD: CPT | Mod: CPTII,S$GLB,TXP, | Performed by: INTERNAL MEDICINE

## 2024-03-12 PROCEDURE — 3044F HG A1C LEVEL LT 7.0%: CPT | Mod: CPTII,S$GLB,, | Performed by: INTERNAL MEDICINE

## 2024-03-12 PROCEDURE — 1160F RVW MEDS BY RX/DR IN RCRD: CPT | Mod: CPTII,S$GLB,TXP, | Performed by: INTERNAL MEDICINE

## 2024-03-12 PROCEDURE — 3077F SYST BP >= 140 MM HG: CPT | Mod: CPTII,S$GLB,TXP, | Performed by: INTERNAL MEDICINE

## 2024-03-12 PROCEDURE — 4010F ACE/ARB THERAPY RXD/TAKEN: CPT | Mod: CPTII,S$GLB,TXP, | Performed by: INTERNAL MEDICINE

## 2024-03-12 PROCEDURE — 3079F DIAST BP 80-89 MM HG: CPT | Mod: CPTII,S$GLB,, | Performed by: INTERNAL MEDICINE

## 2024-03-12 PROCEDURE — 99214 OFFICE O/P EST MOD 30 MIN: CPT | Mod: S$GLB,,, | Performed by: INTERNAL MEDICINE

## 2024-03-12 PROCEDURE — 1126F AMNT PAIN NOTED NONE PRSNT: CPT | Mod: CPTII,S$GLB,TXP, | Performed by: INTERNAL MEDICINE

## 2024-03-12 PROCEDURE — 3079F DIAST BP 80-89 MM HG: CPT | Mod: CPTII,S$GLB,TXP, | Performed by: INTERNAL MEDICINE

## 2024-03-12 PROCEDURE — 99204 OFFICE O/P NEW MOD 45 MIN: CPT | Mod: S$GLB,TXP,, | Performed by: INTERNAL MEDICINE

## 2024-03-12 PROCEDURE — 99999 PR PBB SHADOW E&M-EST. PATIENT-LVL III: CPT | Mod: PBBFAC,,, | Performed by: INTERNAL MEDICINE

## 2024-03-12 PROCEDURE — 3008F BODY MASS INDEX DOCD: CPT | Mod: CPTII,S$GLB,TXP, | Performed by: INTERNAL MEDICINE

## 2024-03-12 PROCEDURE — 3288F FALL RISK ASSESSMENT DOCD: CPT | Mod: CPTII,S$GLB,, | Performed by: INTERNAL MEDICINE

## 2024-03-12 PROCEDURE — 1159F MED LIST DOCD IN RCRD: CPT | Mod: CPTII,S$GLB,TXP, | Performed by: INTERNAL MEDICINE

## 2024-03-12 PROCEDURE — 3008F BODY MASS INDEX DOCD: CPT | Mod: CPTII,S$GLB,, | Performed by: INTERNAL MEDICINE

## 2024-03-12 PROCEDURE — 3044F HG A1C LEVEL LT 7.0%: CPT | Mod: CPTII,S$GLB,TXP, | Performed by: INTERNAL MEDICINE

## 2024-03-12 PROCEDURE — 4010F ACE/ARB THERAPY RXD/TAKEN: CPT | Mod: CPTII,S$GLB,, | Performed by: INTERNAL MEDICINE

## 2024-03-12 PROCEDURE — 3062F POS MACROALBUMINURIA REV: CPT | Mod: CPTII,S$GLB,, | Performed by: INTERNAL MEDICINE

## 2024-03-12 PROCEDURE — 99999 PR PBB SHADOW E&M-EST. PATIENT-LVL III: CPT | Mod: PBBFAC,TXP,, | Performed by: INTERNAL MEDICINE

## 2024-03-12 NOTE — TELEPHONE ENCOUNTER
----- Message from Tasha Parar RN sent at 3/11/2024  5:10 PM CDT -----  Regarding: FW: Appt  Contact: Pt  [phone]    ----- Message -----  From: Citlalli Cao  Sent: 3/11/2024   3:51 PM CDT  To: [facility] Pre-Kidney Transplant Non-Clinical  Subject: Appt                                                     Current Appt date: 03/11/24     Type of Appt: Np     Physician: Aniceto Randall MD    Reason for rescheduling:    Had to leave use son car son had to go to work      Caller:  Handy     Contact Preference: [phone]

## 2024-03-12 NOTE — PROGRESS NOTES
"Section of Hematology and Stem Cell Transplantation  Follow Up Note     Visit Date: 03/12/2024    Primary Oncologic Diagnosis: MGUS    History of Present Ilness: (from initial consult 11/15/2022)    Handy Cook) is a pleasant 68 y.o.male with  T2DM, hyperlipidemia, HTN,  IgG Lambda paraproteinemia noted on SPEP (0.74g/dl). He was noted to have gradually worsening anemia and kidney function, which prompted SPEP evaluation. He has normal light chain ratio and normal calcium levels, but we did discuss the spectrum of MGUS/SMM/MM and that his anemia and kidney decline in setting of paraprotein warrants bone marrow biopsy. Patient is hesitant to proceed with bone marrow biopsy and would like to repeat labs and discuss this again in coming weeks. He is seeing nephrology after our visit to discuss kidney biopsy.     Interval History:     Patient presents for follow-up for his MGUS. He reports doing well since our last visit with his only complaint being a mild hemorrhoid flare. He underwent kidney biopsy on 7/19/23 with path showing "arterionephrosclerosis and diabetic nephropathy." He was updated regarding his stable MGUS at this visit and plan for continued monitoring and was agreeable and understanding as well as the need for control of his DM & HTN to preserve remaining kidney function and was agreeable and understanding.     Past Medical History, Social History, and Past Family History are unchanged since last evaluation except for HPI.     CURRENT MEDICATIONS:   Current Outpatient Medications   Medication Sig    atorvastatin (LIPITOR) 20 MG tablet Take 1 tablet (20 mg total) by mouth once daily.    blood sugar diagnostic Strp To test twice daily    blood-glucose meter kit Use as instructed    calcitRIOL (ROCALTROL) 0.25 MCG Cap Take 1 capsule (0.25 mcg total) by mouth 3 (three) times a week. Monday, Wednesday, Friday    ergocalciferol (ERGOCALCIFEROL) 50,000 unit Cap Take 1 capsule by mouth once a week    " FEROSUL 325 mg (65 mg iron) Tab tablet Take 1 tablet (325 mg total) by mouth 3 (three) times daily.    labetaloL (NORMODYNE) 200 MG tablet Take 1 tablet by mouth twice daily    lancets Misc 1 lancet by Misc.(Non-Drug; Combo Route) route 2 (two) times daily with meals.    linaGLIPtin (TRADJENTA) 5 mg Tab tablet Take 1 tablet (5 mg total) by mouth once daily.    NIFEdipine (PROCARDIA-XL) 90 MG (OSM) 24 hr tablet Take 1 tablet (90 mg total) by mouth once daily.    pioglitazone (ACTOS) 30 MG tablet Take 1 tablet (30 mg total) by mouth once daily.    sodium bicarbonate 650 MG tablet Take 2 tablets (1,300 mg total) by mouth 2 (two) times daily.    spironolactone (ALDACTONE) 50 MG tablet Take 1 tablet (50 mg total) by mouth once daily.    valsartan (DIOVAN) 320 MG tablet Take 1 tablet (320 mg total) by mouth once daily.     No current facility-administered medications for this visit.       ALLERGIES:   Review of patient's allergies indicates:  No Known Allergies      Review of Systems:     Review of Systems   Constitutional:  Negative for chills, fever, malaise/fatigue and weight loss.   HENT: Negative.     Eyes: Negative.    Respiratory: Negative.     Cardiovascular: Negative.    Gastrointestinal: Negative.    Genitourinary: Negative.    Musculoskeletal: Negative.    Skin: Negative.    Neurological: Negative.    Endo/Heme/Allergies: Negative.    Psychiatric/Behavioral:  The patient is nervous/anxious.        Physical Exam:         Vitals:    03/12/24 0918   BP: (!) 142/81   Pulse: 76   Resp: 17   Temp: 98.2 °F (36.8 °C)           Physical Exam  Vitals reviewed.   Constitutional:       General: He is not in acute distress.     Appearance: He is obese.   HENT:      Head: Normocephalic.      Right Ear: External ear normal.      Left Ear: External ear normal.      Nose: Nose normal.      Mouth/Throat:      Mouth: Mucous membranes are moist.      Pharynx: Oropharynx is clear.   Eyes:      Extraocular Movements: Extraocular  movements intact.      Pupils: Pupils are equal, round, and reactive to light.   Cardiovascular:      Rate and Rhythm: Normal rate and regular rhythm.      Pulses: Normal pulses.   Pulmonary:      Effort: Pulmonary effort is normal.      Breath sounds: Normal breath sounds.   Abdominal:      General: Abdomen is flat.      Palpations: Abdomen is soft.   Musculoskeletal:         General: No swelling. Normal range of motion.      Cervical back: Neck supple.   Lymphadenopathy:      Cervical: No cervical adenopathy.   Skin:     General: Skin is warm.      Findings: No bruising.   Neurological:      General: No focal deficit present.      Mental Status: He is alert.      Motor: No weakness.   Psychiatric:         Mood and Affect: Mood normal.           12/5/22 BONE MARROW ASPIRATE, TOUCH PREP, CLOT, AND DECALCIFIED NEEDLE CORE BIOPSY: LEFT POSTEROSUPERIOR ILIAC CREST     -tab LAMBDA LIGHT CHAIN RESTRICTED, CD56+ (SUBSET), CYCLIN D1(-) PLASMA CELLS NEOPLASM WITH PREDOMINANT LOSS OF CD19 EXPRESSION     -tab Normocellular marrow (35-40% total cellularity) with 5% involvement By plasma cell neoplasm and adequate trilineage Hematopoiesis     -tab Increased stainable histiocytic iron stores (4+ with focal 5+ out of 6+)    -tab Negative for amyloid deposition by Congo Red special stain     Chromosomal Analysis, Bone Marrow Aspirate  : NORMAL. 46,XY[20]. No clonal abnormality was apparent.   Plasma Cell Proliferative Disorders FISH Panel, Bone Marrow Aspirate : NORMAL.   The result is within normal limits for 1q duplication, TP53 deletion and IGH rearrangement.   7/19/23 KIDNEY BIOPSY    [facility], operated by [facility] DIAGNOSIS:     KIDNEY (PERCUTANEOUS BIOPSY):   1) MODERATE - TO - SEVERE ARTERIONEPHROSCLEROSIS WITH FEATURES OF ACCELERATED HYPERTENSION - RELATED VASCULAR INJURY;   2) DIFFUSE ACUTE TUBULAR INJURY;   3) MODERATE DIABETIC NEPHROPATHY (SEE COMMENT)       Note: Please see attached report for comment.        Component       Latest Ref Rng 2/9/2024 3/11/2024   WBC      3.90 - 12.70 K/uL 7.70     RBC      4.60 - 6.20 M/uL 2.91 (L)     Hemoglobin      14.0 - 18.0 g/dL 8.4 (L)     Hematocrit      40.0 - 54.0 % 27.2 (L)     MCV      82 - 98 fL 94     MCH      27.0 - 31.0 pg 28.9     MCHC      32.0 - 36.0 g/dL 30.9 (L)     RDW      11.5 - 14.5 % 16.5 (H)     Platelet Count      150 - 450 K/uL 231     MPV      9.2 - 12.9 fL 11.8     Immature Granulocytes      0.0 - 0.5 % 0.8 (H)     Gran # (ANC)      1.8 - 7.7 K/uL 5.0     Immature Grans (Abs)      0.00 - 0.04 K/uL 0.06 (H)     Lymph #      1.0 - 4.8 K/uL 1.5     Mono #      0.3 - 1.0 K/uL 0.8     Eos #      0.0 - 0.5 K/uL 0.3     Baso #      0.00 - 0.20 K/uL 0.04     nRBC      0 /100 WBC 0     Gran %      38.0 - 73.0 % 64.7     Lymph %      18.0 - 48.0 % 19.5     Mono %      4.0 - 15.0 % 10.6     Eos %      0.0 - 8.0 % 3.9     Basophil %      0.0 - 1.9 % 0.5     Differential Method Automated     Glucose      70 - 110 mg/dL  92    Sodium      136 - 145 mmol/L  138    Potassium      3.5 - 5.1 mmol/L  5.2 (H)    Chloride      95 - 110 mmol/L  111 (H)    CO2      23 - 29 mmol/L  22 (L)    BUN      8 - 23 mg/dL  37 (H)    Calcium      8.7 - 10.5 mg/dL  9.5    Creatinine      0.5 - 1.4 mg/dL  5.7 (H)    Albumin      3.5 - 5.2 g/dL  3.6    Phosphorus Level      2.7 - 4.5 mg/dL  3.5    eGFR      >60 mL/min/1.73 m^2  10.2 !    Anion Gap      8 - 16 mmol/L  5 (L)    Iron      45 - 160 ug/dL 58  69    Transferrin      200 - 375 mg/dL 206  208    TIBC      250 - 450 ug/dL 305  308    Saturated Iron      20 - 50 % 19 (L)  22    Ferritin      20.0 - 300.0 ng/mL 357 (H)  378 (H)    PTH      9.0 - 77.0 pg/mL  233.8 (H)            Assessment and Plan:   Handy Adams (Handy) is a pleasant 67 y.o.male referred for evaluation of paraprotein     IgG Lambda MGUS  - - Bone marrow biopsy in Dec  2022  significant for 5% plasma cell neoplasm consistent with MGUS. Congo red negative  - He underwent kidney biopsy  "on 7/19/23 with path showing "arterionephrosclerosis and diabetic nephropathy."   -Return to clinic in 6 months for continued close monitoring of MGUS       2. CKD stage 5  - Worsening kidney disease over last year in setting of HTN and uncontrolled T2DM, after previously not being seen by medical provider over past few years.  - Ultrasound sound shows medical renal disease  --Cr 5.7 mg/dl on 3/11/24    3. HTn    Follows with his PCP    4. T2DM not on long term insulin with nephropathy    -follows with his PCP    5. Anemia due to CKD    Hgb 8.4g/dl on 2/9/24            BMT Chart Routing      Follow up with physician 6 months.   Follow up with [facility]E    Provider visit type    Infusion scheduling note    Injection scheduling note    Labs CBC, CMP, free light chains and SPEP   Scheduling:  Preferred lab:  Lab interval:  spep, light chians  [place] on 4/3; cbc, cmp, spep, light chains in 6m on   Imaging PET scan   pet ct in 3-4 weeks   Pharmacy appointment    Other referrals                 "

## 2024-03-12 NOTE — TELEPHONE ENCOUNTER
Spoke to pt regarding rescheduling appt on 4/3 with Dr Sanderson; pt driving at the time and will call back

## 2024-03-12 NOTE — PROGRESS NOTES
Subjective:   Patient ID:  Handy Adams is a 68 y.o. male who presents for evaluation of Pre-op Exam and Kidney Transplant Evaluation (/)      HPI: Very pleasant man with stage IV CKD, HTN, and DM (last A1c 5.4) here for preoperative evaluation.  He had a PET and an echo yesterday.    He has been feeling well with no new symptoms or cardiovascular complaints and no change in exercise capacity.  He denies chest discomfort, SHAFER, palpitations, PND/orthopnea, lightheadedness and syncope.          Past Medical History:   Diagnosis Date    Anemia     Disorder of kidney and ureter     Edema leg     History of rotator cuff tear     History of seasonal allergies     HTN (hypertension)     Hx of colonic polyps     Hyperlipemia     MGUS (monoclonal gammopathy of unknown significance)     NS (nuclear sclerosis), bilateral 06/25/2019    Obesity     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 11/17/2017    Type 2 diabetes mellitus        Past Surgical History:   Procedure Laterality Date    COLONOSCOPY N/A 10/28/2022    Procedure: COLONOSCOPY;  Surgeon: Guanako Sanchez MD;  Location: [facility];  Service: Endoscopy;  Laterality: N/A;  REFENDO placed per Dr Sanderson. case request entered     vaccinated-GT  instr portal    MOUTH SURGERY      RENAL BIOPSY Left 7/19/2023    Procedure: BIOPSY, KIDNEY;  Surgeon: Nikky Soriano MD;  Location: [facility] CATH LAB;  Service: Interventional Nephrology;  Laterality: Left;       Social History     Tobacco Use    Smoking status: Every Day     Current packs/day: 0.50     Types: Cigarettes    Smokeless tobacco: Never   Substance Use Topics    Alcohol use: Yes     Comment: occasionally    Drug use: Not Currently       Family History   Problem Relation Age of Onset    Cancer Mother         Lung Cancer    Cancer Father         Colon cancer    Diabetes Father     Hypertension Father     No Known Problems Sister     No Known  Problems Brother     No Known Problems Maternal Aunt     No Known Problems Maternal Uncle     No Known Problems Paternal Aunt     No Known Problems Paternal Uncle     No Known Problems Maternal Grandmother     No Known Problems Maternal Grandfather     No Known Problems Paternal Grandmother     No Known Problems Paternal Grandfather     Amblyopia Neg Hx     Blindness Neg Hx     Cataracts Neg Hx     Glaucoma Neg Hx     Macular degeneration Neg Hx     Retinal detachment Neg Hx     Strabismus Neg Hx     Stroke Neg Hx     Thyroid disease Neg Hx        Current Outpatient Medications   Medication Sig    atorvastatin (LIPITOR) 20 MG tablet Take 1 tablet (20 mg total) by mouth once daily.    blood sugar diagnostic Strp To test twice daily    calcitRIOL (ROCALTROL) 0.25 MCG Cap Take 1 capsule (0.25 mcg total) by mouth 3 (three) times a week. Monday, Wednesday, Friday    ergocalciferol (ERGOCALCIFEROL) 50,000 unit Cap Take 1 capsule by mouth once a week    FEROSUL 325 mg (65 mg iron) Tab tablet Take 1 tablet (325 mg total) by mouth 3 (three) times daily.    labetaloL (NORMODYNE) 200 MG tablet Take 1 tablet by mouth twice daily    lancets Misc 1 lancet by Misc.(Non-Drug; Combo Route) route 2 (two) times daily with meals.    linaGLIPtin (TRADJENTA) 5 mg Tab tablet Take 1 tablet (5 mg total) by mouth once daily.    NIFEdipine (PROCARDIA-XL) 90 MG (OSM) 24 hr tablet Take 1 tablet (90 mg total) by mouth once daily.    pioglitazone (ACTOS) 30 MG tablet Take 1 tablet (30 mg total) by mouth once daily.    sodium bicarbonate 650 MG tablet Take 2 tablets (1,300 mg total) by mouth 2 (two) times daily.    spironolactone (ALDACTONE) 50 MG tablet Take 1 tablet (50 mg total) by mouth once daily.    valsartan (DIOVAN) 320 MG tablet Take 1 tablet (320 mg total) by mouth once daily.    blood-glucose meter kit Use as instructed     No current facility-administered medications for this visit.       Review of patient's allergies indicates:  No  Known Allergies    ROS  The review of systems is negative except as above.    Objective:   Physical Exam  Vitals reviewed.   Constitutional:       Appearance: He is well-developed.   HENT:      Head: Normocephalic and atraumatic.   Eyes:      General: No scleral icterus.     Conjunctiva/sclera: Conjunctivae normal.   Neck:      Vascular: No JVD.   Cardiovascular:      Rate and Rhythm: Normal rate and regular rhythm.      Pulses: Intact distal pulses.      Heart sounds: Normal heart sounds. No murmur heard.     No friction rub. No gallop.   Pulmonary:      Effort: Pulmonary effort is normal.      Breath sounds: Normal breath sounds. No wheezing or rales.   Abdominal:      General: Bowel sounds are normal. There is no distension.      Palpations: Abdomen is soft.      Tenderness: There is no abdominal tenderness.   Musculoskeletal:         General: Normal range of motion.      Cervical back: Normal range of motion and neck supple.   Skin:     General: Skin is warm and dry.      Findings: No erythema or rash.   Neurological:      Mental Status: He is alert and oriented to person, place, and time.   Psychiatric:         Behavior: Behavior normal.         Thought Content: Thought content normal.         Judgment: Judgment normal.         Lab Results   Component Value Date    WBC 7.70 02/09/2024    HGB 8.4 (L) 02/09/2024    HCT 27.2 (L) 02/09/2024    MCV 94 02/09/2024     02/09/2024         Chemistry        Component Value Date/Time     03/11/2024 1233    K 5.2 (H) 03/11/2024 1233     (H) 03/11/2024 1233    CO2 22 (L) 03/11/2024 1233    BUN 37 (H) 03/11/2024 1233    CREATININE 5.7 (H) 03/11/2024 1233    GLU 92 03/11/2024 1233        Component Value Date/Time    CALCIUM 9.5 03/11/2024 1233    ALKPHOS 42 (L) 02/07/2024 0800    AST 14 02/07/2024 0800    ALT 14 02/07/2024 0800    BILITOT 0.4 02/07/2024 0800    ESTGFRAFRICA 51.1 (A) 01/03/2022 0922    EGFRNONAA 44.2 (A) 01/03/2022 0922            Lab Results    Component Value Date    CHOL 139 02/07/2024    CHOL 188 01/04/2024    CHOL 136 02/07/2023     Lab Results   Component Value Date    HDL 32 (L) 02/07/2024    HDL 43 01/04/2024    HDL 32 (L) 02/07/2023     Lab Results   Component Value Date    LDLCALC 87.4 02/07/2024    LDLCALC 98.6 01/04/2024    LDLCALC 81.6 02/07/2023     Lab Results   Component Value Date    TRIG 98 02/07/2024    TRIG 232 (H) 01/04/2024    TRIG 112 02/07/2023     Lab Results   Component Value Date    CHOLHDL 23.0 02/07/2024    CHOLHDL 22.9 01/04/2024    CHOLHDL 23.5 02/07/2023       Lab Results   Component Value Date    TSH 1.863 09/07/2022       Lab Results   Component Value Date    HGBA1C 5.4 02/07/2024         Assessment:     1. Pre-transplant evaluation for chronic kidney disease    2. MGUS (monoclonal gammopathy of unknown significance)    3. Class 3 severe obesity due to excess calories with serious comorbidity and body mass index (BMI) of 40.0 to 44.9 in adult    4. Type 2 diabetes mellitus with stage 4 chronic kidney disease, without long-term current use of insulin    5. Benign hypertension with CKD (chronic kidney disease) stage IV    6. Aortic atherosclerosis    7. Hyperlipidemia, unspecified hyperlipidemia type        Plan:     He has no angina, heart failure, or unstable arrhythmia.  PET reveals no ischemia or infarct; echo is within normal limits.  No further cardiovascular testing is required prior to proceeding to the operating room.      Continue current medicines.    Diet/exercise goals reinforced.    F/U PRN

## 2024-03-15 ENCOUNTER — PATIENT MESSAGE (OUTPATIENT)
Dept: NEPHROLOGY | Facility: CLINIC | Age: 69
End: 2024-03-15

## 2024-03-15 ENCOUNTER — OFFICE VISIT (OUTPATIENT)
Dept: NEPHROLOGY | Facility: CLINIC | Age: 69
End: 2024-03-15
Payer: MEDICARE

## 2024-03-15 VITALS
SYSTOLIC BLOOD PRESSURE: 125 MMHG | HEART RATE: 78 BPM | DIASTOLIC BLOOD PRESSURE: 81 MMHG | BODY MASS INDEX: 35.8 KG/M2 | WEIGHT: 264 LBS | OXYGEN SATURATION: 97 %

## 2024-03-15 DIAGNOSIS — N18.4 TYPE 2 DIABETES MELLITUS WITH STAGE 4 CHRONIC KIDNEY DISEASE, UNSPECIFIED WHETHER LONG TERM INSULIN USE: ICD-10-CM

## 2024-03-15 DIAGNOSIS — N25.81 SECONDARY HYPERPARATHYROIDISM: ICD-10-CM

## 2024-03-15 DIAGNOSIS — N17.9 AKI (ACUTE KIDNEY INJURY): ICD-10-CM

## 2024-03-15 DIAGNOSIS — R80.9 PROTEINURIA, UNSPECIFIED TYPE: ICD-10-CM

## 2024-03-15 DIAGNOSIS — D47.2 MGUS (MONOCLONAL GAMMOPATHY OF UNKNOWN SIGNIFICANCE): ICD-10-CM

## 2024-03-15 DIAGNOSIS — N18.4 CHRONIC KIDNEY DISEASE, STAGE 4 (SEVERE): Primary | ICD-10-CM

## 2024-03-15 DIAGNOSIS — E87.20 ACIDOSIS: ICD-10-CM

## 2024-03-15 DIAGNOSIS — E11.22 TYPE 2 DIABETES MELLITUS WITH STAGE 4 CHRONIC KIDNEY DISEASE, UNSPECIFIED WHETHER LONG TERM INSULIN USE: ICD-10-CM

## 2024-03-15 DIAGNOSIS — I10 HYPERTENSION, UNSPECIFIED TYPE: ICD-10-CM

## 2024-03-15 DIAGNOSIS — D64.9 ANEMIA, UNSPECIFIED TYPE: ICD-10-CM

## 2024-03-15 DIAGNOSIS — E66.01 SEVERE OBESITY (BMI 35.0-39.9) WITH COMORBIDITY: ICD-10-CM

## 2024-03-15 PROCEDURE — 99999 PR PBB SHADOW E&M-EST. PATIENT-LVL IV: CPT | Mod: PBBFAC,,, | Performed by: NURSE PRACTITIONER

## 2024-03-15 PROCEDURE — 3062F POS MACROALBUMINURIA REV: CPT | Mod: CPTII,S$GLB,, | Performed by: NURSE PRACTITIONER

## 2024-03-15 PROCEDURE — 1126F AMNT PAIN NOTED NONE PRSNT: CPT | Mod: CPTII,S$GLB,, | Performed by: NURSE PRACTITIONER

## 2024-03-15 PROCEDURE — 1159F MED LIST DOCD IN RCRD: CPT | Mod: CPTII,S$GLB,, | Performed by: NURSE PRACTITIONER

## 2024-03-15 PROCEDURE — 3288F FALL RISK ASSESSMENT DOCD: CPT | Mod: CPTII,S$GLB,, | Performed by: NURSE PRACTITIONER

## 2024-03-15 PROCEDURE — 3079F DIAST BP 80-89 MM HG: CPT | Mod: CPTII,S$GLB,, | Performed by: NURSE PRACTITIONER

## 2024-03-15 PROCEDURE — 1160F RVW MEDS BY RX/DR IN RCRD: CPT | Mod: CPTII,S$GLB,, | Performed by: NURSE PRACTITIONER

## 2024-03-15 PROCEDURE — 99215 OFFICE O/P EST HI 40 MIN: CPT | Mod: S$GLB,,, | Performed by: NURSE PRACTITIONER

## 2024-03-15 PROCEDURE — 3066F NEPHROPATHY DOC TX: CPT | Mod: CPTII,S$GLB,, | Performed by: NURSE PRACTITIONER

## 2024-03-15 PROCEDURE — G2211 COMPLEX E/M VISIT ADD ON: HCPCS | Mod: S$GLB,,, | Performed by: NURSE PRACTITIONER

## 2024-03-15 PROCEDURE — 3044F HG A1C LEVEL LT 7.0%: CPT | Mod: CPTII,S$GLB,, | Performed by: NURSE PRACTITIONER

## 2024-03-15 PROCEDURE — 1101F PT FALLS ASSESS-DOCD LE1/YR: CPT | Mod: CPTII,S$GLB,, | Performed by: NURSE PRACTITIONER

## 2024-03-15 PROCEDURE — 3074F SYST BP LT 130 MM HG: CPT | Mod: CPTII,S$GLB,, | Performed by: NURSE PRACTITIONER

## 2024-03-15 PROCEDURE — 4010F ACE/ARB THERAPY RXD/TAKEN: CPT | Mod: CPTII,S$GLB,, | Performed by: NURSE PRACTITIONER

## 2024-03-15 PROCEDURE — 3008F BODY MASS INDEX DOCD: CPT | Mod: CPTII,S$GLB,, | Performed by: NURSE PRACTITIONER

## 2024-03-15 RX ORDER — SODIUM BICARBONATE 650 MG/1
650 TABLET ORAL 2 TIMES DAILY
Qty: 180 TABLET | Refills: 3 | Status: SHIPPED | OUTPATIENT
Start: 2024-03-15 | End: 2025-03-10

## 2024-03-15 NOTE — PROGRESS NOTES
"Subjective:       Patient ID: Handy Adams is a 68 y.o. A male who presents for evaluation of of renal dysfunction.      HPI     Patient is new to me. New to clinic.  Prior pertinent chart reviewed since this is patient's first appointment with me.    Patient presents for new evaluation of renal dysfunction.  Baseline creatinine of 1.6-1.7 in 2020-early 2022. Recently had sCr of 2.5. Patient said he has "not been taking care of himself" and "eating more sugar" since January.    Per recent note from PCP: He cannot afford farxiga due to increased price and had stopped taking it for a few months prior to appt in September.    Home BPs: does not take    Rare Excedrin use now, though he used to use it frequently.    Significant other medical problems include HTN since at least 2007, T2DM since at least 2007.      The patient denies taking herbal supplements, or new antibiotics, recreational drugs, recent episode of dehydration, diarrhea, nausea or vomiting, acute illness, hospitalization or exposure to IV radiocontrast.     Significant family hx includes: No known kidney issues    Last renal US: none in EMR    Update 10/24/22:  Presents for f/u of CKD, YESENIA.  Last seen a month ago.  Feet are swelling again, especially when drinking ensure. Has improved since stopping Ensure.  Holding lisinopril-Hctz. Taking labetolol 200 mg instead.  Found to have an IgG lambda specific monoclonal band during proteinuria workup. Referred to hematology.    Recent sCr 2.5--> 2.8-2.9.     Home BPs: does not believe cuff is accurate.    Update 12/28/22:  Returns for f/u of CKD.  sCr has been 2.8-3.0 mg/dL.  Home BPs: not taking because he thinks cuff is inaccurate    Patient had bone marrow biopsy and was diagnosed with IgG-lambda MGUS.  An IgG lambda specific monoclonal protein was identified on 24 hour urine on 12/16/22.  He has not gotten kidney biopsy.    Says he feels great.    Update 2/3/23:  Returns for f/u of CKD and discussion about " "biopsy.  sCr now 3.0-3.2 mg/dL. Most recent sCr 3.3.  Increased valsartan to 160 mg at last visit.    Update 9/8/23:  - Presents for follow-up of CKD  - sCr trended up to 3.8. from baseline of about 3.0, now improved to 3.1. Unclear etiology.  - Notes being on Farxiga in the past but has not taken for at least a year   - Denies NSAIDs, reduced PO intake, n/v/d, fluctuations in BP, elevations in blood sugar, recent illness    Update 11/6/23:  Presents for f/u of CKD.  Most recent sCr 3.1 -3.4 mg/dL.  Home BPs: 130-140s (SBP)   Admits recent dietary indiscretion.  Says he gained weight recently.  Not exercising.    Advance Care Planning       Serious Illness Conversation Guide    Conversation was held with:   patient[SR1.1]     What is your understanding now of where you are with your illness?:   Comments: Admits he is in denial about kidney disease.[SR1.1]     How much information about what is likely to be ahead with your illness would you like from me?:   wants to be fully informed[SR1.1]     I want to share with you my understanding of where things are with your illness.:   continued decline[SR1.2]     Patient emotions observed or reported:   denial[SR1.1]     What are your most important goals if your health situation worsens?:   being independent[SR1.1]     What are your biggest fears and worries about the future with your health?:   Comments: being debilitated where he has to depend on others to care for him[SR1.1]     What gives you strength as you think about the future of your illness?:   Rastafarian tae[SR1.1]     What abilities are so critical to your life that you can't imagine living without them? Or, what makes life meaningful?:   being able to care for oneself, living independently[SR1.1]     If you become sicker, how much are you willing to go through for the possibility of gaining more time?:   Comments: Says he needs to think about this, but "I don't want to go through dialysis at all because I " "think that would put a constraint on my independence and constrain me from what I imagine I could be." He says he would want dialysis though if the choice was dialysis or death.    Has not thought about invasive procedures in depth.[SR1.1]     How much does your family know about your priorities and wishes?:   patient has had some incomplete discussions with family[SR1.2]  Comments: Says he is very private. Has not spoken to spouse or friends about this yet. He has shared some information with sister.[SR1.2]     I recommend that we do the following to make sure your treatment plans reflect what's important to you. How does this plan seem to you?:        Attribution       SR1.1 Raven Naylor NP 11/06/23 14:48    SR1.2 Raven Naylor NP 11/06/23 16:04               Update 2/9/24:  Presents today for f/u of CKD.  Most recent sCr was 4.1 mg/dL.  Home BPs: SBP 150s  Thinks he took his medication today. Takes his medication daily but not at the same time every day.  Saw transplant team.    Update 3/15/24:  Presents today for f/u of CKD.  Most recent sCr was 5.7    Says he has lost 20+ lbs by only eating on meal a day (Honey nut cheerios; almond milk). Drinks water.     Says he has been "feeling fine." No swelling. "I feel great."  Hematology is considering repeating bone marrow biopsy. Planning to do full body scan.    Review of Systems   Constitutional:  Negative for appetite change.   Respiratory:  Negative for shortness of breath.    Cardiovascular:  Negative for leg swelling.   Gastrointestinal:  Positive for diarrhea (a couple weeks ago). Negative for nausea and vomiting.   Genitourinary:  Positive for frequency (with high fluid intake). Negative for decreased urine volume, difficulty urinating, dysuria and hematuria.        Some incontinence   Neurological:  Negative for dizziness and headaches.   All other systems reviewed and are negative.      Objective:       Blood pressure 125/81, pulse 78, " weight 119.7 kg (264 lb), SpO2 97 %.  Physical Exam  Vitals reviewed.   Constitutional:       Appearance: Normal appearance. He is obese.   HENT:      Head: Normocephalic and atraumatic.   Eyes:      General: No scleral icterus.  Cardiovascular:      Rate and Rhythm: Normal rate and regular rhythm.   Pulmonary:      Effort: No respiratory distress.      Breath sounds: No wheezing or rales.   Musculoskeletal:      Right lower leg: Edema (+2) present.      Left lower leg: Edema (+2) present.   Skin:     General: Skin is warm and dry.   Neurological:      Mental Status: He is alert and oriented to person, place, and time.   Psychiatric:         Mood and Affect: Mood normal.         Behavior: Behavior normal.           Lab Results   Component Value Date    CREATININE 5.7 (H) 03/11/2024     Prot/Creat Ratio, Urine   Date Value Ref Range Status   03/11/2024 1.73 (H) 0.00 - 0.20 Final   02/08/2024 4.22 (H) 0.00 - 0.20 Final   11/03/2023 2.46 (H) 0.00 - 0.20 Final     Lab Results   Component Value Date     03/11/2024    K 5.2 (H) 03/11/2024    CO2 22 (L) 03/11/2024     (H) 03/11/2024     Lab Results   Component Value Date    .8 (H) 03/11/2024    CALCIUM 9.5 03/11/2024    PHOS 3.5 03/11/2024     Lab Results   Component Value Date    HGB 8.4 (L) 02/09/2024    WBC 7.70 02/09/2024    HCT 27.2 (L) 02/09/2024      Lab Results   Component Value Date    HGBA1C 5.4 02/07/2024     02/09/2024    BUN 37 (H) 03/11/2024     Lab Results   Component Value Date    LDLCALC 87.4 02/07/2024         Assessment:       1. Chronic kidney disease, stage 4 (severe)    2. YESENIA (acute kidney injury)    3. Anemia, unspecified type    4. MGUS (monoclonal gammopathy of unknown significance)    5. Severe obesity (BMI 35.0-39.9) with comorbidity    6. Secondary hyperparathyroidism    7. Hypertension, unspecified type    8. Proteinuria, unspecified type    9. Type 2 diabetes mellitus with stage 4 chronic kidney disease, unspecified  whether long term insulin use    10. Acidosis                  Plan:   CKD stage IV with eGFR 21.1 mL/min c YESENIA- Underlying CKD clinically 2/2 diabetes + HTN  - Biopsy performed showing hypertensive nephrosclerosis, acute tubular injury, and diabetic nephropathy. sCr uptrend to 3.8 with unclear etiology.     - IgG lambda specific monoclonal band noted on proteinuria workup and UPEP. No evidence of MGRS on biopsy.   - Educated patient to control BP, BG, remain well-hydrated, and avoid NSAIDs to prevent progression of CKD. High risk of progression to ESRD.    Repeat labs to trend    UPCR Significant albuminuria since 2011 with increase to almost nephrotic range proteinuria. Was on farxiga but could not afford it; renal function is too low to start on recent labs. On ARB  - Proteinuric   Acid-base Mild acidosis. Ran out of sodium bicarb at 1300 mg BID. Refill at 650 mb BID   Renal osteodystrophy Ca, phos okay. Low vit D. Elevated PTH.  On ergo weekly.   Anemia Hgb low last check. Has MGUS. Followed by hem/onc.   DM Well-controlled recently. Has had DM since at least 2007, when he had an A1c of 15+.   Lipid Management On statin.   ESRD planning Anticipatory guidance provided about timing of dialysis. Start discussions and planning when eGFR is about 20 mL/min; most patients start dialysis between 5-10 mL/min.    Attended ESRD treatment choices, but is still unsure about what treatment he would want. Long discussion today. He is most interested in PD at this time. Will reach out to home modality nurse for evaluation.    Transplant team said he needs to identify support person/ transportation identified. Also needs to lose weight.      Kidney Failure Risk Equation (Tangri)    Kidney Failure Risk at 2 years: 73%    Kidney Failure Risk at 5 years: 98.3%    Lab Results   Component Value Date    MICALBCREAT 1245.7 (H) 03/11/2024    CREATININE 5.7 (H) 03/11/2024          HTN - WNL today on labetalol 200 mg BID, Nifedipine 90  mg, valsartan 320 mg, spironolactone 50 mg qd  - Goal is SBP less than 130/80    Obesity - he has been losing weight by restricting dietary intake to one bowl of Honey Nut Cheerios daily. Discussed healthy ways to lose weight and concern for malnutrition.  - Refer to nutritionist    MGUS - per hem/onc    All questions patient had were answered.  Asked if further questions. None. F/u in clinic in 2 months with Raven Naylor NP with labs and urine prior to next visit or sooner if needed.  ER for emergency concerns.    Summary of Plan:  Epo? - discuss with hem  Cystatin-c/ RFP in [place] today  Refer to RD  avoid NSAID/ bactrim/ IV contrast/ gadolinium/ aminoglycoside where possible  Begin clearance process for PD  RTC in 6 weeks    Visit today included increased complexity associated with managing the longitudinal care of the patient due to the serious and/or complex managed problem(s) CKD, HTN.    43 minutes of total time spent on the encounter, which includes face to face time and non-face to face time preparing to see the patient (eg, review of tests), Obtaining and/or reviewing separately obtained history, documenting clinical information in the electronic or other health record, independently interpreting results (not separately reported) and communicating results to the patient/family/caregiver, or Care coordination (not separately reported).

## 2024-03-15 NOTE — Clinical Note
"Marquez Menjivar and team, This is a [facility] patient interested in PD. He has been in denial about progression of CKD for some time. eGFR recently 10. Not uremic yet. Unclear if progression or YESENIA at this point. Repeating labs today.  Tiara, can you please perform home visit/ meet with him to talk more about PD?  I will plan to refer to general surgery after I get your "all clear."  Thanks, Raven"

## 2024-03-15 NOTE — Clinical Note
Emma Melton, [place] pt. Would you consider epo for him at this time or plan for BMB first? Thanks, Raven

## 2024-03-20 ENCOUNTER — TELEPHONE (OUTPATIENT)
Dept: FAMILY MEDICINE | Facility: CLINIC | Age: 69
End: 2024-03-20
Payer: MEDICARE

## 2024-03-20 NOTE — TELEPHONE ENCOUNTER
----- Message from Maria De Jesus Lamas sent at 3/20/2024  9:43 AM CDT -----  Type:  Patient Returning Call    Who Called:  self     Who Left Message for Patient:  yaw    Does the patient know what this is regarding?: no    Would the patient rather a call back or a response via [facility]?  call    Best Call Back Number: .[phone] (home)

## 2024-03-23 ENCOUNTER — LAB VISIT (OUTPATIENT)
Dept: LAB | Facility: HOSPITAL | Age: 69
End: 2024-03-23
Attending: FAMILY MEDICINE
Payer: MEDICARE

## 2024-03-23 DIAGNOSIS — N18.4 TYPE 2 DIABETES MELLITUS WITH STAGE 4 CHRONIC KIDNEY DISEASE, WITHOUT LONG-TERM CURRENT USE OF INSULIN: ICD-10-CM

## 2024-03-23 DIAGNOSIS — E11.22 TYPE 2 DIABETES MELLITUS WITH STAGE 4 CHRONIC KIDNEY DISEASE, WITHOUT LONG-TERM CURRENT USE OF INSULIN: ICD-10-CM

## 2024-03-23 LAB
ESTIMATED AVG GLUCOSE: 105 MG/DL (ref 68–131)
HBA1C MFR BLD: 5.3 % (ref 4–5.6)

## 2024-03-23 PROCEDURE — 36415 COLL VENOUS BLD VENIPUNCTURE: CPT | Mod: PO,NTX | Performed by: FAMILY MEDICINE

## 2024-03-23 PROCEDURE — 83036 HEMOGLOBIN GLYCOSYLATED A1C: CPT | Mod: NTX | Performed by: FAMILY MEDICINE

## 2024-04-04 ENCOUNTER — LAB VISIT (OUTPATIENT)
Dept: LAB | Facility: HOSPITAL | Age: 69
End: 2024-04-04
Payer: MEDICARE

## 2024-04-04 DIAGNOSIS — Z76.82 ORGAN TRANSPLANT CANDIDATE: ICD-10-CM

## 2024-04-04 DIAGNOSIS — D47.2 MGUS (MONOCLONAL GAMMOPATHY OF UNKNOWN SIGNIFICANCE): ICD-10-CM

## 2024-04-04 LAB
ALBUMIN SERPL BCP-MCNC: 3.6 G/DL (ref 3.5–5.2)
ALP SERPL-CCNC: 39 U/L (ref 55–135)
ALT SERPL W/O P-5'-P-CCNC: 8 U/L (ref 10–44)
ANION GAP SERPL CALC-SCNC: 6 MMOL/L (ref 8–16)
AST SERPL-CCNC: 11 U/L (ref 10–40)
BASOPHILS # BLD AUTO: 0.04 K/UL (ref 0–0.2)
BASOPHILS NFR BLD: 0.6 % (ref 0–1.9)
BILIRUB SERPL-MCNC: 0.6 MG/DL (ref 0.1–1)
BUN SERPL-MCNC: 33 MG/DL (ref 8–23)
CALCIUM SERPL-MCNC: 9.6 MG/DL (ref 8.7–10.5)
CHLORIDE SERPL-SCNC: 111 MMOL/L (ref 95–110)
CO2 SERPL-SCNC: 22 MMOL/L (ref 23–29)
CREAT SERPL-MCNC: 4.6 MG/DL (ref 0.5–1.4)
DIFFERENTIAL METHOD BLD: ABNORMAL
EOSINOPHIL # BLD AUTO: 0.2 K/UL (ref 0–0.5)
EOSINOPHIL NFR BLD: 3.1 % (ref 0–8)
ERYTHROCYTE [DISTWIDTH] IN BLOOD BY AUTOMATED COUNT: 16.3 % (ref 11.5–14.5)
EST. GFR  (NO RACE VARIABLE): 13.1 ML/MIN/1.73 M^2
GLUCOSE SERPL-MCNC: 86 MG/DL (ref 70–110)
HCT VFR BLD AUTO: 30.9 % (ref 40–54)
HGB BLD-MCNC: 9.5 G/DL (ref 14–18)
IMM GRANULOCYTES # BLD AUTO: 0.03 K/UL (ref 0–0.04)
IMM GRANULOCYTES NFR BLD AUTO: 0.5 % (ref 0–0.5)
LYMPHOCYTES # BLD AUTO: 1.2 K/UL (ref 1–4.8)
LYMPHOCYTES NFR BLD: 19.6 % (ref 18–48)
MCH RBC QN AUTO: 29.5 PG (ref 27–31)
MCHC RBC AUTO-ENTMCNC: 30.7 G/DL (ref 32–36)
MCV RBC AUTO: 96 FL (ref 82–98)
MONOCYTES # BLD AUTO: 0.6 K/UL (ref 0.3–1)
MONOCYTES NFR BLD: 9.3 % (ref 4–15)
NEUTROPHILS # BLD AUTO: 4.2 K/UL (ref 1.8–7.7)
NEUTROPHILS NFR BLD: 66.9 % (ref 38–73)
NRBC BLD-RTO: 0 /100 WBC
PLATELET # BLD AUTO: 251 K/UL (ref 150–450)
PMV BLD AUTO: 12.5 FL (ref 9.2–12.9)
POTASSIUM SERPL-SCNC: 5.1 MMOL/L (ref 3.5–5.1)
PROT SERPL-MCNC: 6.9 G/DL (ref 6–8.4)
RBC # BLD AUTO: 3.22 M/UL (ref 4.6–6.2)
SODIUM SERPL-SCNC: 139 MMOL/L (ref 136–145)
WBC # BLD AUTO: 6.21 K/UL (ref 3.9–12.7)

## 2024-04-04 PROCEDURE — 36415 COLL VENOUS BLD VENIPUNCTURE: CPT | Mod: PO,TXP | Performed by: INTERNAL MEDICINE

## 2024-04-04 PROCEDURE — 84165 PROTEIN E-PHORESIS SERUM: CPT | Mod: 26,TXP,, | Performed by: PATHOLOGY

## 2024-04-04 PROCEDURE — 84165 PROTEIN E-PHORESIS SERUM: CPT | Mod: TXP | Performed by: INTERNAL MEDICINE

## 2024-04-04 PROCEDURE — 85025 COMPLETE CBC W/AUTO DIFF WBC: CPT | Mod: TXP | Performed by: INTERNAL MEDICINE

## 2024-04-04 PROCEDURE — 83521 IG LIGHT CHAINS FREE EACH: CPT | Mod: 59,TXP | Performed by: INTERNAL MEDICINE

## 2024-04-04 PROCEDURE — 80053 COMPREHEN METABOLIC PANEL: CPT | Mod: TXP | Performed by: INTERNAL MEDICINE

## 2024-04-05 LAB
ALBUMIN SERPL ELPH-MCNC: 3.66 G/DL (ref 3.35–5.55)
ALPHA1 GLOB SERPL ELPH-MCNC: 0.26 G/DL (ref 0.17–0.41)
ALPHA2 GLOB SERPL ELPH-MCNC: 0.7 G/DL (ref 0.43–0.99)
B-GLOBULIN SERPL ELPH-MCNC: 1.64 G/DL (ref 0.5–1.1)
GAMMA GLOB SERPL ELPH-MCNC: 0.64 G/DL (ref 0.67–1.58)
KAPPA LC SER QL IA: 7.99 MG/DL (ref 0.33–1.94)
KAPPA LC/LAMBDA SER IA: 1.54 (ref 0.26–1.65)
LAMBDA LC SER QL IA: 5.19 MG/DL (ref 0.57–2.63)
PROT SERPL-MCNC: 6.9 G/DL (ref 6–8.4)

## 2024-04-06 ENCOUNTER — TELEPHONE (OUTPATIENT)
Dept: TRANSPLANT | Facility: CLINIC | Age: 69
End: 2024-04-06
Payer: MEDICARE

## 2024-04-06 DIAGNOSIS — I10 HYPERTENSION, UNSPECIFIED TYPE: ICD-10-CM

## 2024-04-08 LAB — PATHOLOGIST INTERPRETATION SPE: NORMAL

## 2024-04-11 ENCOUNTER — OFFICE VISIT (OUTPATIENT)
Dept: FAMILY MEDICINE | Facility: CLINIC | Age: 69
End: 2024-04-11
Payer: MEDICARE

## 2024-04-11 VITALS
OXYGEN SATURATION: 99 % | SYSTOLIC BLOOD PRESSURE: 138 MMHG | TEMPERATURE: 99 F | HEIGHT: 72 IN | RESPIRATION RATE: 18 BRPM | BODY MASS INDEX: 38.07 KG/M2 | HEART RATE: 65 BPM | WEIGHT: 281.06 LBS | DIASTOLIC BLOOD PRESSURE: 80 MMHG

## 2024-04-11 DIAGNOSIS — Z00.00 ANNUAL PHYSICAL EXAM: Primary | ICD-10-CM

## 2024-04-11 DIAGNOSIS — N18.4 CKD (CHRONIC KIDNEY DISEASE) STAGE 4, GFR 15-29 ML/MIN: ICD-10-CM

## 2024-04-11 DIAGNOSIS — E11.8 CONTROLLED TYPE 2 DIABETES MELLITUS WITH COMPLICATION, WITHOUT LONG-TERM CURRENT USE OF INSULIN: ICD-10-CM

## 2024-04-11 DIAGNOSIS — E11.22 TYPE 2 DIABETES MELLITUS WITH STAGE 4 CHRONIC KIDNEY DISEASE, WITHOUT LONG-TERM CURRENT USE OF INSULIN: ICD-10-CM

## 2024-04-11 DIAGNOSIS — N53.19 DISORDER OF EJACULATION: ICD-10-CM

## 2024-04-11 DIAGNOSIS — N18.4 TYPE 2 DIABETES MELLITUS WITH STAGE 4 CHRONIC KIDNEY DISEASE, WITHOUT LONG-TERM CURRENT USE OF INSULIN: ICD-10-CM

## 2024-04-11 PROCEDURE — 3075F SYST BP GE 130 - 139MM HG: CPT | Mod: CPTII,S$GLB,, | Performed by: FAMILY MEDICINE

## 2024-04-11 PROCEDURE — 1101F PT FALLS ASSESS-DOCD LE1/YR: CPT | Mod: CPTII,S$GLB,, | Performed by: FAMILY MEDICINE

## 2024-04-11 PROCEDURE — 4010F ACE/ARB THERAPY RXD/TAKEN: CPT | Mod: CPTII,S$GLB,, | Performed by: FAMILY MEDICINE

## 2024-04-11 PROCEDURE — 1159F MED LIST DOCD IN RCRD: CPT | Mod: CPTII,S$GLB,, | Performed by: FAMILY MEDICINE

## 2024-04-11 PROCEDURE — 99999 PR PBB SHADOW E&M-EST. PATIENT-LVL V: CPT | Mod: PBBFAC,,, | Performed by: FAMILY MEDICINE

## 2024-04-11 PROCEDURE — 1126F AMNT PAIN NOTED NONE PRSNT: CPT | Mod: CPTII,S$GLB,, | Performed by: FAMILY MEDICINE

## 2024-04-11 PROCEDURE — 3288F FALL RISK ASSESSMENT DOCD: CPT | Mod: CPTII,S$GLB,, | Performed by: FAMILY MEDICINE

## 2024-04-11 PROCEDURE — 99397 PER PM REEVAL EST PAT 65+ YR: CPT | Mod: S$GLB,,, | Performed by: FAMILY MEDICINE

## 2024-04-11 PROCEDURE — 3008F BODY MASS INDEX DOCD: CPT | Mod: CPTII,S$GLB,, | Performed by: FAMILY MEDICINE

## 2024-04-11 PROCEDURE — 3079F DIAST BP 80-89 MM HG: CPT | Mod: CPTII,S$GLB,, | Performed by: FAMILY MEDICINE

## 2024-04-11 PROCEDURE — 3062F POS MACROALBUMINURIA REV: CPT | Mod: CPTII,S$GLB,, | Performed by: FAMILY MEDICINE

## 2024-04-11 PROCEDURE — 3066F NEPHROPATHY DOC TX: CPT | Mod: CPTII,S$GLB,, | Performed by: FAMILY MEDICINE

## 2024-04-11 PROCEDURE — 3044F HG A1C LEVEL LT 7.0%: CPT | Mod: CPTII,S$GLB,, | Performed by: FAMILY MEDICINE

## 2024-04-11 RX ORDER — LABETALOL 200 MG/1
200 TABLET, FILM COATED ORAL 2 TIMES DAILY
Qty: 180 TABLET | Refills: 0 | Status: SHIPPED | OUTPATIENT
Start: 2024-04-11

## 2024-04-11 NOTE — PROGRESS NOTES
Subjective:       Patient ID: Handy Adams is a 68 y.o. male.    Chief Complaint: Annual Exam      HPI  67 yo male presents for annual exam. States he does not have any ejaculate after and orgasm for the last yr. Feels his testicles are smaller as well.  Denies any tenderness.    Review of Systems   Constitutional: Negative.    HENT: Negative.     Respiratory: Negative.     Cardiovascular: Negative.    Gastrointestinal: Negative.    Endocrine: Negative.    Genitourinary: Negative.    Musculoskeletal: Negative.    Neurological: Negative.    Psychiatric/Behavioral: Negative.            Past Medical History:   Diagnosis Date    Anemia     Disorder of kidney and ureter     Edema leg     History of rotator cuff tear     History of seasonal allergies     HTN (hypertension)     Hx of colonic polyps     Hyperlipemia     MGUS (monoclonal gammopathy of unknown significance)     NS (nuclear sclerosis), bilateral 06/25/2019    Obesity     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 11/17/2017    Type 2 diabetes mellitus      Past Surgical History:   Procedure Laterality Date    COLONOSCOPY N/A 10/28/2022    Procedure: COLONOSCOPY;  Surgeon: Guanako Sanchez MD;  Location: [facility];  Service: Endoscopy;  Laterality: N/A;  REFENDO placed per Dr Sanderson. case request entered     vaccinated-GT  instr portal    MOUTH SURGERY      RENAL BIOPSY Left 7/19/2023    Procedure: BIOPSY, KIDNEY;  Surgeon: Nikky Soriano MD;  Location: [facility] CATH LAB;  Service: Interventional Nephrology;  Laterality: Left;     Family History   Problem Relation Age of Onset    Cancer Mother         Lung Cancer    Cancer Father         Colon cancer    Diabetes Father     Hypertension Father     No Known Problems Sister     No Known Problems Brother     No Known Problems Maternal Aunt     No Known Problems Maternal Uncle     No Known Problems Paternal Aunt     No Known Problems Paternal  Uncle     No Known Problems Maternal Grandmother     No Known Problems Maternal Grandfather     No Known Problems Paternal Grandmother     No Known Problems Paternal Grandfather     Amblyopia Neg Hx     Blindness Neg Hx     Cataracts Neg Hx     Glaucoma Neg Hx     Macular degeneration Neg Hx     Retinal detachment Neg Hx     Strabismus Neg Hx     Stroke Neg Hx     Thyroid disease Neg Hx      Social History     Socioeconomic History    Marital status: Significant Other     Spouse name: Daniella Adams    Number of children: 2    Highest education level: Master's degree (e.g., MA, MS, Sandra, MEd, MSW, CORTEZ)   Tobacco Use    Smoking status: Every Day     Current packs/day: 0.50     Types: Cigarettes    Smokeless tobacco: Never   Substance and Sexual Activity    Alcohol use: Yes     Comment: occasionally    Drug use: Not Currently    Sexual activity: Yes     Partners: Female   Social History Narrative    Caregiver S/O [employer]     Social Determinants of Health     Financial Resource Strain: Low Risk  (3/22/2023)    Overall Financial Resource Strain (CARDIA)     Difficulty of Paying Living Expenses: Not hard at all   Food Insecurity: No Food Insecurity (3/22/2023)    Hunger Vital Sign     Worried About Running Out of Food in the Last Year: Never true     Ran Out of Food in the Last Year: Never true   Transportation Needs: No Transportation Needs (3/22/2023)    PRAPARE - Transportation     Lack of Transportation (Medical): No     Lack of Transportation (Non-Medical): No   Physical Activity: Insufficiently Active (3/22/2023)    Exercise Vital Sign     Days of Exercise per Week: 2 days     Minutes of Exercise per Session: 30 min   Stress: No Stress Concern Present (3/22/2023)    Citizen of [place] [employer] - Occupational Stress Questionnaire     Feeling of Stress : Only a little   Social Connections: Unknown (3/22/2023)    Social Connection and Isolation Panel [NHANES]     Frequency of Communication with Friends  and Family: Twice a week   Housing Stability: Low Risk  (3/22/2023)    Housing Stability Vital Sign     Unable to Pay for Housing in the Last Year: No     Number of Places Lived in the Last Year: 1     Unstable Housing in the Last Year: No       Current Outpatient Medications:     atorvastatin (LIPITOR) 20 MG tablet, Take 1 tablet (20 mg total) by mouth once daily., Disp: 90 tablet, Rfl: 2    blood sugar diagnostic Strp, To test twice daily, Disp: 100 each, Rfl: 3    blood-glucose meter kit, Use as instructed, Disp: 1 each, Rfl: 0    calcitRIOL (ROCALTROL) 0.25 MCG Cap, Take 1 capsule (0.25 mcg total) by mouth 3 (three) times a week. Monday, Wednesday, Friday, Disp: 36 capsule, Rfl: 3    ergocalciferol (ERGOCALCIFEROL) 50,000 unit Cap, Take 1 capsule by mouth once a week, Disp: 12 capsule, Rfl: 0    FEROSUL 325 mg (65 mg iron) Tab tablet, Take 1 tablet (325 mg total) by mouth 3 (three) times daily. (Patient taking differently: Take 325 mg by mouth 2 (two) times daily.), Disp: 270 tablet, Rfl: 3    hepatitis B vacc-CpG 1018, PF, (HEPLISAV-B, PF,) 20 mcg/0.5 mL Syrg, adm by pharmicist, Disp: 0.5 mL, Rfl: 1    labetaloL (NORMODYNE) 200 MG tablet, Take 1 tablet by mouth twice daily, Disp: 180 tablet, Rfl: 0    labetaloL (NORMODYNE) 200 MG tablet, Take 1 tablet (200 mg total) by mouth 2 (two) times daily., Disp: 180 tablet, Rfl: 1    lancets Misc, 1 lancet by Misc.(Non-Drug; Combo Route) route 2 (two) times daily with meals., Disp: 100 each, Rfl: 11    linaGLIPtin (TRADJENTA) 5 mg Tab tablet, Take 1 tablet (5 mg total) by mouth once daily., Disp: 90 tablet, Rfl: 2    NIFEdipine (PROCARDIA-XL) 90 MG (OSM) 24 hr tablet, Take 1 tablet (90 mg total) by mouth once daily., Disp: 90 tablet, Rfl: 3    pioglitazone (ACTOS) 30 MG tablet, Take 1 tablet (30 mg total) by mouth once daily., Disp: 90 tablet, Rfl: 2    sodium bicarbonate 650 MG tablet, Take 1 tablet (650 mg total) by mouth 2 (two) times daily., Disp: 180 tablet, Rfl:  3    spironolactone (ALDACTONE) 50 MG tablet, Take 1 tablet (50 mg total) by mouth once daily., Disp: 90 tablet, Rfl: 3    valsartan (DIOVAN) 320 MG tablet, Take 1 tablet (320 mg total) by mouth once daily., Disp: 90 tablet, Rfl: 3    pneumoc 20-carlos conj-dip cr,PF, (PREVNAR-20, PF,) 0.5 mL Syrg injection, Inject into the muscle. (Patient not taking: Reported on 4/11/2024), Disp: 0.5 mL, Rfl: 0    varicella-zoster gE-AS01B, PF, (SHINGRIX) 50 mcg/0.5 mL injection, Inject into the muscle. (Patient not taking: Reported on 4/11/2024), Disp: 1 each, Rfl: 1   Objective:      Vitals:    04/11/24 1526 04/11/24 1535   BP: (!) 148/88 138/80   BP Location: Left arm Right arm   Patient Position: Sitting    BP Method: Large (Manual)    Pulse: 65    Resp: 18    Temp: 98.6 °F (37 °C)    TempSrc: Oral    SpO2: 99%    Weight: 127.5 kg (281 lb 1.4 oz)    Height: 6' (1.829 m)        Physical Exam  Constitutional:       General: He is not in acute distress.  HENT:      Head: Normocephalic and atraumatic.   Eyes:      Conjunctiva/sclera: Conjunctivae normal.   Cardiovascular:      Rate and Rhythm: Normal rate and regular rhythm.      Heart sounds: Normal heart sounds. No murmur heard.     No friction rub. No gallop.   Pulmonary:      Effort: Pulmonary effort is normal.      Breath sounds: Normal breath sounds. No wheezing or rales.   Genitourinary:     Testes:         Right: Mass or tenderness not present.         Left: Mass or tenderness not present.   Musculoskeletal:      Cervical back: Neck supple.   Skin:     General: Skin is warm and dry.   Neurological:      Mental Status: He is alert and oriented to person, place, and time.   Psychiatric:         Behavior: Behavior normal.         Thought Content: Thought content normal.         Judgment: Judgment normal.            Assessment:       1. Annual physical exam    2. Controlled type 2 diabetes mellitus with complication, without long-term current use of insulin    3. Disorder of  ejaculation    4. CKD (chronic kidney disease) stage 4, GFR 15-29 ml/min    5. Type 2 diabetes mellitus with stage 4 chronic kidney disease, without long-term current use of insulin        Plan:       Annual physical exam    Controlled type 2 diabetes mellitus with complication, without long-term current use of insulin    Disorder of ejaculation  -     Ambulatory referral/consult to Urology; Future; Expected date: 04/18/2024    CKD (chronic kidney disease) stage 4, GFR 15-29 ml/min    Type 2 diabetes mellitus with stage 4 chronic kidney disease, without long-term current use of insulin    Most recent labs are stable.  Advised patient to continue follow with Nephrology.  Place referral to Urology          Future Appointments   Date Time Provider Department Center   4/16/2024  9:30 AM [facility] PET CT LIMIT 500 LBS [facility] PET CT [facility]   4/19/2024  9:00 AM [facility] [employer]   4/19/2024  9:15 AM SPECIMEN, [facility] SPECLAB [employer]   4/22/2024  8:00 AM Raven Naylor NP [facility] NEPHRO [facility]   5/21/2024  9:40 AM SHAWNA Mcmanus MD [facility] OPHTHAL [email or web address]       Patient note was created using [employer].  Any errors in syntax or even information may not have been identified and edited on initial review prior to signing this note.

## 2024-04-11 NOTE — PROGRESS NOTES
Health Maintenance Due   Topic     RSV Vaccine (Age 60+ and Pregnant patients) (1 - 1-dose 60+ series) Not offered at this Facility    Abdominal Aortic Aneurysm Screening  Consult pcp    COVID-19 Vaccine (4 - 2023-24 season) Hx of chickenpox. Notified pt can get vaccine at pharmacy.    Foot Exam  Consult pcp

## 2024-04-16 ENCOUNTER — HOSPITAL ENCOUNTER (OUTPATIENT)
Dept: RADIOLOGY | Facility: HOSPITAL | Age: 69
Discharge: HOME | End: 2024-04-16
Attending: INTERNAL MEDICINE
Payer: MEDICARE

## 2024-04-16 DIAGNOSIS — D47.2 MGUS (MONOCLONAL GAMMOPATHY OF UNKNOWN SIGNIFICANCE): ICD-10-CM

## 2024-04-16 DIAGNOSIS — Z76.82 ORGAN TRANSPLANT CANDIDATE: ICD-10-CM

## 2024-04-16 LAB — POCT GLUCOSE: 87 MG/DL (ref 70–110)

## 2024-04-16 PROCEDURE — 78816 PET IMAGE W/CT FULL BODY: CPT | Mod: 26,TXP,, | Performed by: STUDENT IN AN ORGANIZED HEALTH CARE EDUCATION/TRAINING PROGRAM

## 2024-04-16 PROCEDURE — A9552 F18 FDG: HCPCS | Mod: TXP | Performed by: INTERNAL MEDICINE

## 2024-04-16 PROCEDURE — 78816 PET IMAGE W/CT FULL BODY: CPT | Mod: TC,TXP

## 2024-04-16 RX ORDER — FLUDEOXYGLUCOSE F18 500 MCI/ML
12 INJECTION INTRAVENOUS
Status: COMPLETED | OUTPATIENT
Start: 2024-04-16 | End: 2024-04-16

## 2024-04-16 RX ADMIN — FLUDEOXYGLUCOSE F-18 10.77 MILLICURIE: 500 INJECTION INTRAVENOUS at 09:04

## 2024-04-17 DIAGNOSIS — I10 HYPERTENSION, UNSPECIFIED TYPE: ICD-10-CM

## 2024-04-17 RX ORDER — NIFEDIPINE 90 MG/1
90 TABLET, EXTENDED RELEASE ORAL
Qty: 90 TABLET | Refills: 0 | Status: SHIPPED | OUTPATIENT
Start: 2024-04-17

## 2024-04-19 ENCOUNTER — LAB VISIT (OUTPATIENT)
Dept: LAB | Facility: HOSPITAL | Age: 69
End: 2024-04-19
Attending: NURSE PRACTITIONER
Payer: MEDICARE

## 2024-04-19 DIAGNOSIS — N18.4 CHRONIC KIDNEY DISEASE, STAGE 4 (SEVERE): ICD-10-CM

## 2024-04-19 LAB
BACTERIA #/AREA URNS AUTO: NORMAL /HPF
BILIRUB UR QL STRIP: NEGATIVE
CLARITY UR REFRACT.AUTO: CLEAR
COLOR UR AUTO: COLORLESS
CREAT UR-MCNC: 57 MG/DL (ref 23–375)
GLUCOSE UR QL STRIP: NEGATIVE
HGB UR QL STRIP: ABNORMAL
HYALINE CASTS UR QL AUTO: 0 /LPF
KETONES UR QL STRIP: NEGATIVE
LEUKOCYTE ESTERASE UR QL STRIP: NEGATIVE
MICROSCOPIC COMMENT: NORMAL
NITRITE UR QL STRIP: NEGATIVE
PH UR STRIP: 6 [PH] (ref 5–8)
PROT UR QL STRIP: ABNORMAL
PROT UR-MCNC: 217 MG/DL (ref 0–15)
PROT/CREAT UR: 3.81 MG/G{CREAT} (ref 0–0.2)
RBC #/AREA URNS AUTO: 4 /HPF (ref 0–4)
SP GR UR STRIP: 1.01 (ref 1–1.03)
URN SPEC COLLECT METH UR: ABNORMAL
WBC #/AREA URNS AUTO: 1 /HPF (ref 0–5)

## 2024-04-19 PROCEDURE — 81001 URINALYSIS AUTO W/SCOPE: CPT | Mod: TXP | Performed by: NURSE PRACTITIONER

## 2024-04-19 PROCEDURE — 82570 ASSAY OF URINE CREATININE: CPT | Mod: TXP | Performed by: NURSE PRACTITIONER

## 2024-04-23 ENCOUNTER — OFFICE VISIT (OUTPATIENT)
Dept: NEPHROLOGY | Facility: CLINIC | Age: 69
End: 2024-04-23
Payer: MEDICARE

## 2024-04-23 ENCOUNTER — TELEPHONE (OUTPATIENT)
Dept: FAMILY MEDICINE | Facility: CLINIC | Age: 69
End: 2024-04-23
Payer: MEDICARE

## 2024-04-23 ENCOUNTER — PATIENT MESSAGE (OUTPATIENT)
Dept: HEMATOLOGY/ONCOLOGY | Facility: CLINIC | Age: 69
End: 2024-04-23
Payer: MEDICARE

## 2024-04-23 VITALS
SYSTOLIC BLOOD PRESSURE: 151 MMHG | OXYGEN SATURATION: 99 % | BODY MASS INDEX: 38.46 KG/M2 | WEIGHT: 283.94 LBS | DIASTOLIC BLOOD PRESSURE: 85 MMHG | HEIGHT: 72 IN | HEART RATE: 60 BPM

## 2024-04-23 DIAGNOSIS — R80.9 PROTEINURIA, UNSPECIFIED TYPE: ICD-10-CM

## 2024-04-23 DIAGNOSIS — D64.9 ANEMIA, UNSPECIFIED TYPE: ICD-10-CM

## 2024-04-23 DIAGNOSIS — E66.01 SEVERE OBESITY (BMI 35.0-39.9) WITH COMORBIDITY: ICD-10-CM

## 2024-04-23 DIAGNOSIS — N18.5 CHRONIC KIDNEY DISEASE (CKD), STAGE V: Primary | ICD-10-CM

## 2024-04-23 DIAGNOSIS — N18.5 TYPE 2 DIABETES MELLITUS WITH STAGE 5 CHRONIC KIDNEY DISEASE NOT ON CHRONIC DIALYSIS, UNSPECIFIED WHETHER LONG TERM INSULIN USE: ICD-10-CM

## 2024-04-23 DIAGNOSIS — E11.22 TYPE 2 DIABETES MELLITUS WITH STAGE 4 CHRONIC KIDNEY DISEASE, UNSPECIFIED WHETHER LONG TERM INSULIN USE: ICD-10-CM

## 2024-04-23 DIAGNOSIS — N18.4 CKD (CHRONIC KIDNEY DISEASE) STAGE 4, GFR 15-29 ML/MIN: ICD-10-CM

## 2024-04-23 DIAGNOSIS — N18.4 TYPE 2 DIABETES MELLITUS WITH STAGE 4 CHRONIC KIDNEY DISEASE, WITHOUT LONG-TERM CURRENT USE OF INSULIN: Primary | ICD-10-CM

## 2024-04-23 DIAGNOSIS — E11.22 TYPE 2 DIABETES MELLITUS WITH STAGE 4 CHRONIC KIDNEY DISEASE, WITHOUT LONG-TERM CURRENT USE OF INSULIN: Primary | ICD-10-CM

## 2024-04-23 DIAGNOSIS — E87.20 ACIDOSIS: ICD-10-CM

## 2024-04-23 DIAGNOSIS — E11.22 TYPE 2 DIABETES MELLITUS WITH STAGE 5 CHRONIC KIDNEY DISEASE NOT ON CHRONIC DIALYSIS, UNSPECIFIED WHETHER LONG TERM INSULIN USE: ICD-10-CM

## 2024-04-23 DIAGNOSIS — D47.2 MGUS (MONOCLONAL GAMMOPATHY OF UNKNOWN SIGNIFICANCE): ICD-10-CM

## 2024-04-23 DIAGNOSIS — I10 HYPERTENSION, UNSPECIFIED TYPE: ICD-10-CM

## 2024-04-23 DIAGNOSIS — N25.81 SECONDARY HYPERPARATHYROIDISM: ICD-10-CM

## 2024-04-23 DIAGNOSIS — N18.4 TYPE 2 DIABETES MELLITUS WITH STAGE 4 CHRONIC KIDNEY DISEASE, UNSPECIFIED WHETHER LONG TERM INSULIN USE: ICD-10-CM

## 2024-04-23 DIAGNOSIS — E66.01 CLASS 3 SEVERE OBESITY DUE TO EXCESS CALORIES WITH SERIOUS COMORBIDITY AND BODY MASS INDEX (BMI) OF 40.0 TO 44.9 IN ADULT: ICD-10-CM

## 2024-04-23 PROCEDURE — 3077F SYST BP >= 140 MM HG: CPT | Mod: CPTII,S$GLB,, | Performed by: NURSE PRACTITIONER

## 2024-04-23 PROCEDURE — 3062F POS MACROALBUMINURIA REV: CPT | Mod: CPTII,S$GLB,, | Performed by: NURSE PRACTITIONER

## 2024-04-23 PROCEDURE — 99215 OFFICE O/P EST HI 40 MIN: CPT | Mod: S$GLB,,, | Performed by: NURSE PRACTITIONER

## 2024-04-23 PROCEDURE — G2211 COMPLEX E/M VISIT ADD ON: HCPCS | Mod: S$GLB,,, | Performed by: NURSE PRACTITIONER

## 2024-04-23 PROCEDURE — 3044F HG A1C LEVEL LT 7.0%: CPT | Mod: CPTII,S$GLB,, | Performed by: NURSE PRACTITIONER

## 2024-04-23 PROCEDURE — 3079F DIAST BP 80-89 MM HG: CPT | Mod: CPTII,S$GLB,, | Performed by: NURSE PRACTITIONER

## 2024-04-23 PROCEDURE — 1159F MED LIST DOCD IN RCRD: CPT | Mod: CPTII,S$GLB,, | Performed by: NURSE PRACTITIONER

## 2024-04-23 PROCEDURE — 99999 PR PBB SHADOW E&M-EST. PATIENT-LVL V: CPT | Mod: PBBFAC,,, | Performed by: NURSE PRACTITIONER

## 2024-04-23 PROCEDURE — 3008F BODY MASS INDEX DOCD: CPT | Mod: CPTII,S$GLB,, | Performed by: NURSE PRACTITIONER

## 2024-04-23 PROCEDURE — 4010F ACE/ARB THERAPY RXD/TAKEN: CPT | Mod: CPTII,S$GLB,, | Performed by: NURSE PRACTITIONER

## 2024-04-23 PROCEDURE — 1126F AMNT PAIN NOTED NONE PRSNT: CPT | Mod: CPTII,S$GLB,, | Performed by: NURSE PRACTITIONER

## 2024-04-23 PROCEDURE — 3066F NEPHROPATHY DOC TX: CPT | Mod: CPTII,S$GLB,, | Performed by: NURSE PRACTITIONER

## 2024-04-23 PROCEDURE — 1160F RVW MEDS BY RX/DR IN RCRD: CPT | Mod: CPTII,S$GLB,, | Performed by: NURSE PRACTITIONER

## 2024-04-23 RX ORDER — TORSEMIDE 10 MG/1
10 TABLET ORAL DAILY
Qty: 90 TABLET | Refills: 3 | Status: SHIPPED | OUTPATIENT
Start: 2024-04-23 | End: 2025-04-23

## 2024-04-23 NOTE — TELEPHONE ENCOUNTER
----- Message from Jillian Lee sent at 4/23/2024 10:51 AM CDT -----  Contact: [phone]  [email or web address]     Patient is calling for Medical Advice regarding:Raven Velez with  NEPHROLOGY wants him to see dietitian      How long has patient had these symptoms:    Pharmacy name and phone#:    Would like response via [employer]:  call back     Comments:

## 2024-04-23 NOTE — PROGRESS NOTES
"Subjective:       Patient ID: Handy Adams is a 68 y.o. A male who presents for evaluation of of renal dysfunction.      HPI     Patient is new to me. New to clinic.  Prior pertinent chart reviewed since this is patient's first appointment with me.    Patient presents for new evaluation of renal dysfunction.  Baseline creatinine of 1.6-1.7 in 2020-early 2022. Recently had sCr of 2.5. Patient said he has "not been taking care of himself" and "eating more sugar" since January.    Per recent note from PCP: He cannot afford farxiga due to increased price and had stopped taking it for a few months prior to appt in September.    Home BPs: does not take    Rare Excedrin use now, though he used to use it frequently.    Significant other medical problems include HTN since at least 2007, T2DM since at least 2007.      The patient denies taking herbal supplements, or new antibiotics, recreational drugs, recent episode of dehydration, diarrhea, nausea or vomiting, acute illness, hospitalization or exposure to IV radiocontrast.     Significant family hx includes: No known kidney issues    Last renal US: none in EMR    Update 10/24/22:  Presents for f/u of CKD, YESENIA.  Last seen a month ago.  Feet are swelling again, especially when drinking ensure. Has improved since stopping Ensure.  Holding lisinopril-Hctz. Taking labetolol 200 mg instead.  Found to have an IgG lambda specific monoclonal band during proteinuria workup. Referred to hematology.    Recent sCr 2.5--> 2.8-2.9.     Home BPs: does not believe cuff is accurate.    Update 12/28/22:  Returns for f/u of CKD.  sCr has been 2.8-3.0 mg/dL.  Home BPs: not taking because he thinks cuff is inaccurate    Patient had bone marrow biopsy and was diagnosed with IgG-lambda MGUS.  An IgG lambda specific monoclonal protein was identified on 24 hour urine on 12/16/22.  He has not gotten kidney biopsy.    Says he feels great.    Update 2/3/23:  Returns for f/u of CKD and discussion about " "biopsy.  sCr now 3.0-3.2 mg/dL. Most recent sCr 3.3.  Increased valsartan to 160 mg at last visit.    Update 9/8/23:  - Presents for follow-up of CKD  - sCr trended up to 3.8. from baseline of about 3.0, now improved to 3.1. Unclear etiology.  - Notes being on Farxiga in the past but has not taken for at least a year   - Denies NSAIDs, reduced PO intake, n/v/d, fluctuations in BP, elevations in blood sugar, recent illness    Update 11/6/23:  Presents for f/u of CKD.  Most recent sCr 3.1 -3.4 mg/dL.  Home BPs: 130-140s (SBP)   Admits recent dietary indiscretion.  Says he gained weight recently.  Not exercising.    Advance Care Planning       Serious Illness Conversation Guide    Conversation was held with:   patient[SR1.1]     What is your understanding now of where you are with your illness?:   Comments: Admits he is in denial about kidney disease.[SR1.1]     How much information about what is likely to be ahead with your illness would you like from me?:   wants to be fully informed[SR1.1]     I want to share with you my understanding of where things are with your illness.:   continued decline[SR1.2]     Patient emotions observed or reported:   denial[SR1.1]     What are your most important goals if your health situation worsens?:   being independent[SR1.1]     What are your biggest fears and worries about the future with your health?:   Comments: being debilitated where he has to depend on others to care for him[SR1.1]     What gives you strength as you think about the future of your illness?:   [employer] tae[SR1.1]     What abilities are so critical to your life that you can't imagine living without them? Or, what makes life meaningful?:   being able to care for oneself, living independently[SR1.1]     If you become sicker, how much are you willing to go through for the possibility of gaining more time?:   Comments: Says he needs to think about this, but "I don't want to go through dialysis at all because I " "think that would put a constraint on my independence and constrain me from what I imagine I could be." He says he would want dialysis though if the choice was dialysis or death.    Has not thought about invasive procedures in depth.[SR1.1]     How much does your family know about your priorities and wishes?:   patient has had some incomplete discussions with family[SR1.2]  Comments: Says he is very private. Has not spoken to spouse or friends about this yet. He has shared some information with sister.[SR1.2]     I recommend that we do the following to make sure your treatment plans reflect what's important to you. How does this plan seem to you?:        Attribution       SR1.1 Raven Naylor NP 11/06/23 14:48    SR1.2 Raven Naylor NP 11/06/23 16:04               Update 2/9/24:  Presents today for f/u of CKD.  Most recent sCr was 4.1 mg/dL.  Home BPs: SBP 150s  Thinks he took his medication today. Takes his medication daily but not at the same time every day.  Saw transplant team.    Update 3/15/24:  Presents today for f/u of CKD.  Most recent sCr was 5.7    Says he has lost 20+ lbs by only eating on meal a day (Honey nut cheerios; almond milk). Drinks water.     Says he has been "feeling fine." No swelling. "I feel great."  Hematology is considering repeating bone marrow biopsy. Planning to do full body scan.    Update 4/23/24:  Presents today for f/u of CKD.  Most recent sCr was 4.5  Had clinic visit from PD nurse but said he is unsure of modality choice.   Previously referred to [facility] nutrition services/RD in March but has not been seen yet.  Has gained weight back.    Home BPs: 130s-140s/70s-80s    Review of Systems   Constitutional:  Negative for appetite change.   Respiratory:  Negative for shortness of breath.    Cardiovascular:  Positive for leg swelling (to feet with prolonged sitting and desk).   Gastrointestinal:  Negative for diarrhea, nausea and vomiting.   Genitourinary:  Positive for " frequency (with high fluid intake) and urgency (at baseline). Negative for decreased urine volume, difficulty urinating, dysuria and hematuria.        Some incontinence   All other systems reviewed and are negative.      Objective:       Blood pressure (!) 151/85, pulse 60, height 6' (1.829 m), weight 128.8 kg (283 lb 15.2 oz), SpO2 99%.  Physical Exam  Vitals reviewed.   Constitutional:       Appearance: Normal appearance. He is obese.   HENT:      Head: Normocephalic and atraumatic.   Eyes:      General: No scleral icterus.  Cardiovascular:      Rate and Rhythm: Normal rate and regular rhythm.   Pulmonary:      Effort: No respiratory distress.   Musculoskeletal:      Right lower leg: Edema (+2) present.      Left lower leg: Edema (+2) present.   Skin:     General: Skin is warm and dry.   Neurological:      Mental Status: He is alert and oriented to person, place, and time.   Psychiatric:         Mood and Affect: Mood normal.         Behavior: Behavior normal.           Lab Results   Component Value Date    CREATININE 4.5 (H) 04/19/2024     Prot/Creat Ratio, Urine   Date Value Ref Range Status   04/19/2024 3.81 (H) 0.00 - 0.20 Final   03/11/2024 1.73 (H) 0.00 - 0.20 Final   02/08/2024 4.22 (H) 0.00 - 0.20 Final     Lab Results   Component Value Date     04/19/2024    K 4.5 04/19/2024    CO2 21 (L) 04/19/2024     (H) 04/19/2024     Lab Results   Component Value Date    .8 (H) 04/19/2024    CALCIUM 9.3 04/19/2024    PHOS 3.2 04/19/2024     Lab Results   Component Value Date    HGB 9.2 (L) 04/19/2024    WBC 7.26 04/19/2024    HCT 29.6 (L) 04/19/2024      Lab Results   Component Value Date    HGBA1C 5.3 03/23/2024     04/19/2024    BUN 45 (H) 04/19/2024     Lab Results   Component Value Date    LDLCALC 87.4 02/07/2024         Assessment:       1. Chronic kidney disease (CKD), stage V    2. Anemia, unspecified type    3. MGUS (monoclonal gammopathy of unknown significance)    4. Severe obesity  (BMI 35.0-39.9) with comorbidity    5. Secondary hyperparathyroidism    6. Hypertension, unspecified type    7. Proteinuria, unspecified type    8. Type 2 diabetes mellitus with stage 4 chronic kidney disease, unspecified whether long term insulin use    9. Acidosis    10. Type 2 diabetes mellitus with stage 5 chronic kidney disease not on chronic dialysis, unspecified whether long term insulin use                    Plan:   CKD stage 5- Underlying CKD clinically 2/2 diabetes + HTN  - Biopsy performed showing hypertensive nephrosclerosis, acute tubular injury, and diabetic nephropathy. Progressive.    - IgG lambda specific monoclonal band noted on proteinuria workup and UPEP. No evidence of MGRS on biopsy.   - Educated patient to control BP, BG, remain well-hydrated, and avoid NSAIDs to prevent progression of CKD. High risk of progression to ESRD.        UPCR Nephrotic range proteinuria. Was on farxiga but could not afford it; renal function is too low to start on recent labs. On ARB  - Proteinuric   Acid-base Mild acidosis. On sodium bicarb 650 mg BID   Renal osteodystrophy Ca, phos okay. Low vit D. Elevated PTH.  On ergo weekly and calcitriol 3x/week.   Anemia Hgb low last check. No strong indication for epo at this time. Has MGUS. Followed by hem/onc.   DM Well-controlled recently. Has had DM since at least 2007, when he had an A1c of 15+.   Lipid Management On statin.   ESRD planning Anticipatory guidance provided about timing of dialysis. Start discussions and planning when eGFR is about 20 mL/min; most patients start dialysis between 5-10 mL/min.    Attended ESRD treatment choices and met with PD nurse, but is still unsure about what treatment he would want. Has not had home visit yet.    Transplant team said he needs to identify support person/ transportation identified. Also needs to lose weight.    He says he is not ready for surgery yet, but he is agreeable to meet general surgeon to determine candidacy.  It's also unclear if he will accept a home visit at this time.      Offered counseling to help with expressed feelings of denial but he declined offer.      Kidney Failure Risk Equation (Tangri)    Kidney Failure Risk at 2 years: 59.5%    Kidney Failure Risk at 5 years: 94.1%    Lab Results   Component Value Date    MICALBCREAT 1245.7 (H) 03/11/2024    CREATININE 4.5 (H) 04/19/2024          HTN - High today on labetalol 200 mg BID, Nifedipine 90 mg, valsartan 320 mg, spironolactone 50 mg qd  - Goal is SBP less than 130/80  - Add torsemide 10 mg    Obesity - he has increased dietary intake.  - Refer to nutritionist (entered previously)    MGUS - per hem/onc; due back in Sept. 2024    All questions patient had were answered.  Asked if further questions. None. F/u in clinic in 6 weeks with Raven Naylor, NP with labs and urine prior to next visit or sooner if needed.  ER for emergency concerns.    Summary of Plan:  See RD  Offered counseling - he declined  Add torsemide 10 mg  Will refer to general surgeon to determine PD candidacy. Does not need surgery yet  RTC in 6 weeks    Visit today included increased complexity associated with  managing the longitudinal care of the patient due to the serious and/or complex managed problem(s) CKD.    41 minutes of total time spent on the encounter, which includes face to face time and non-face to face time preparing to see the patient (eg, review of tests), Obtaining and/or reviewing separately obtained history, documenting clinical information in the electronic or other health record, independently interpreting results (not separately reported) and communicating results to the patient/family/caregiver, or Care coordination (not separately reported).

## 2024-04-23 NOTE — Clinical Note
Emma Menjivar, He said he would agree to next step of seeing surgeon but I'm unclear if he will agree to home visit at this time. Can you remind me the name of the surgeon Dr. Wolfe likes and how to refer to him?  Dr. Wolfe, Looping you in just to share a potential future patient with you. He is in a lot of denial at the moment.  Raven

## 2024-04-24 ENCOUNTER — PATIENT MESSAGE (OUTPATIENT)
Dept: ADMINISTRATIVE | Facility: HOSPITAL | Age: 69
End: 2024-04-24
Payer: MEDICARE

## 2024-04-24 ENCOUNTER — PATIENT OUTREACH (OUTPATIENT)
Dept: ADMINISTRATIVE | Facility: HOSPITAL | Age: 69
End: 2024-04-24
Payer: MEDICARE

## 2024-04-24 ENCOUNTER — OFFICE VISIT (OUTPATIENT)
Dept: HEMATOLOGY/ONCOLOGY | Facility: CLINIC | Age: 69
End: 2024-04-24
Payer: MEDICARE

## 2024-04-24 VITALS
TEMPERATURE: 99 F | WEIGHT: 282.06 LBS | OXYGEN SATURATION: 100 % | HEIGHT: 72 IN | SYSTOLIC BLOOD PRESSURE: 159 MMHG | DIASTOLIC BLOOD PRESSURE: 80 MMHG | HEART RATE: 65 BPM | BODY MASS INDEX: 38.2 KG/M2

## 2024-04-24 DIAGNOSIS — I12.9 BENIGN HYPERTENSION WITH CKD (CHRONIC KIDNEY DISEASE) STAGE IV: Primary | ICD-10-CM

## 2024-04-24 DIAGNOSIS — D47.2 MGUS (MONOCLONAL GAMMOPATHY OF UNKNOWN SIGNIFICANCE): ICD-10-CM

## 2024-04-24 DIAGNOSIS — N18.4 TYPE 2 DIABETES MELLITUS WITH STAGE 4 CHRONIC KIDNEY DISEASE, WITHOUT LONG-TERM CURRENT USE OF INSULIN: ICD-10-CM

## 2024-04-24 DIAGNOSIS — N18.4 CKD (CHRONIC KIDNEY DISEASE) STAGE 4, GFR 15-29 ML/MIN: ICD-10-CM

## 2024-04-24 DIAGNOSIS — E78.2 MIXED HYPERLIPIDEMIA: ICD-10-CM

## 2024-04-24 DIAGNOSIS — N18.4 BENIGN HYPERTENSION WITH CKD (CHRONIC KIDNEY DISEASE) STAGE IV: Primary | ICD-10-CM

## 2024-04-24 DIAGNOSIS — E11.22 TYPE 2 DIABETES MELLITUS WITH STAGE 4 CHRONIC KIDNEY DISEASE, WITHOUT LONG-TERM CURRENT USE OF INSULIN: ICD-10-CM

## 2024-04-24 PROCEDURE — 3066F NEPHROPATHY DOC TX: CPT | Mod: CPTII,S$GLB,, | Performed by: INTERNAL MEDICINE

## 2024-04-24 PROCEDURE — 3044F HG A1C LEVEL LT 7.0%: CPT | Mod: CPTII,S$GLB,, | Performed by: INTERNAL MEDICINE

## 2024-04-24 PROCEDURE — 4010F ACE/ARB THERAPY RXD/TAKEN: CPT | Mod: CPTII,S$GLB,, | Performed by: INTERNAL MEDICINE

## 2024-04-24 PROCEDURE — 99999 PR PBB SHADOW E&M-EST. PATIENT-LVL III: CPT | Mod: PBBFAC,,, | Performed by: INTERNAL MEDICINE

## 2024-04-24 PROCEDURE — 3008F BODY MASS INDEX DOCD: CPT | Mod: CPTII,S$GLB,, | Performed by: INTERNAL MEDICINE

## 2024-04-24 PROCEDURE — 1101F PT FALLS ASSESS-DOCD LE1/YR: CPT | Mod: CPTII,S$GLB,, | Performed by: INTERNAL MEDICINE

## 2024-04-24 PROCEDURE — 3062F POS MACROALBUMINURIA REV: CPT | Mod: CPTII,S$GLB,, | Performed by: INTERNAL MEDICINE

## 2024-04-24 PROCEDURE — 3079F DIAST BP 80-89 MM HG: CPT | Mod: CPTII,S$GLB,, | Performed by: INTERNAL MEDICINE

## 2024-04-24 PROCEDURE — 3288F FALL RISK ASSESSMENT DOCD: CPT | Mod: CPTII,S$GLB,, | Performed by: INTERNAL MEDICINE

## 2024-04-24 PROCEDURE — 1159F MED LIST DOCD IN RCRD: CPT | Mod: CPTII,S$GLB,, | Performed by: INTERNAL MEDICINE

## 2024-04-24 PROCEDURE — 3077F SYST BP >= 140 MM HG: CPT | Mod: CPTII,S$GLB,, | Performed by: INTERNAL MEDICINE

## 2024-04-24 PROCEDURE — 1126F AMNT PAIN NOTED NONE PRSNT: CPT | Mod: CPTII,S$GLB,, | Performed by: INTERNAL MEDICINE

## 2024-04-24 PROCEDURE — 99214 OFFICE O/P EST MOD 30 MIN: CPT | Mod: S$GLB,,, | Performed by: INTERNAL MEDICINE

## 2024-04-24 NOTE — PROGRESS NOTES
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 He was noted to have gradually worsening anemia and kidney function, which prompted SPEP evaluation. He has normal light chain ratio and normal calcium levels, but we did discuss the spectrum of MGUS/SMM/MM and that his anemia and kidney decline in setting of paraprotein warrants bone marrow biopsy. Patient is hesitant to proceed with bone marrow biopsy and would like to repeat labs and discuss this again in coming weeks. He is seeing nephrology after our visit to discuss kidney biopsy.     Interval History:     Patient presents for follow-up for his MGUS. He reports doing well since our last visit with his only complaint being a mild hemorrhoid flare. He underwent kidney biopsy on 7/19/23 with path showing "arterionephrosclerosis and diabetic nephropathy." He was updated regarding his stable MGUS at this visit and plan for continued monitoring and was agreeable and understanding as well as the need for control of his DM & HTN to preserve remaining kidney function and was agreeable and understanding.     Past Medical History, Social History, and Past Family History are unchanged since last evaluation except for HPI.     CURRENT MEDICATIONS:   Current Outpatient Medications   Medication Sig Dispense Refill    atorvastatin (LIPITOR) 20 MG tablet Take 1 tablet (20 mg total) by mouth once daily. 90 tablet 2    blood sugar diagnostic Strp To test twice daily 100 each 3    blood-glucose meter kit Use as instructed 1 each 0    calcitRIOL (ROCALTROL) 0.25 MCG Cap Take 1 capsule (0.25 mcg total) by mouth 3 (three) times a week. Monday, Wednesday, Friday 36 capsule 3    ergocalciferol " (ERGOCALCIFEROL) 50,000 unit Cap Take 1 capsule by mouth once a week 12 capsule 0    FEROSUL 325 mg (65 mg iron) Tab tablet Take 1 tablet (325 mg total) by mouth 3 (three) times daily. (Patient taking differently: Take 325 mg by mouth 2 (two) times daily.) 270 tablet 3    hepatitis B vacc-CpG 1018, PF, (HEPLISAV-B, PF,) 20 mcg/0.5 mL Syrg adm by pharmicist 0.5 mL 1    labetaloL (NORMODYNE) 200 MG tablet Take 1 tablet by mouth twice daily 180 tablet 0    labetaloL (NORMODYNE) 200 MG tablet Take 1 tablet (200 mg total) by mouth 2 (two) times daily. 180 tablet 1    lancets Misc 1 lancet by Misc.(Non-Drug; Combo Route) route 2 (two) times daily with meals. 100 each 11    linaGLIPtin (TRADJENTA) 5 mg Tab tablet Take 1 tablet (5 mg total) by mouth once daily. 90 tablet 2    NIFEdipine (PROCARDIA-XL) 90 MG (OSM) 24 hr tablet Take 1 tablet by mouth once daily 90 tablet 0    pioglitazone (ACTOS) 30 MG tablet Take 1 tablet (30 mg total) by mouth once daily. 90 tablet 2    pneumoc 20-carlos conj-dip cr,PF, (PREVNAR-20, PF,) 0.5 mL Syrg injection Inject into the muscle. 0.5 mL 0    sodium bicarbonate 650 MG tablet Take 1 tablet (650 mg total) by mouth 2 (two) times daily. 180 tablet 3    spironolactone (ALDACTONE) 50 MG tablet Take 1 tablet (50 mg total) by mouth once daily. 90 tablet 3    torsemide (DEMADEX) 10 MG Tab Take 1 tablet (10 mg total) by mouth once daily. 90 tablet 3    valsartan (DIOVAN) 320 MG tablet Take 1 tablet (320 mg total) by mouth once daily. 90 tablet 3    varicella-zoster gE-AS01B, PF, (SHINGRIX) 50 mcg/0.5 mL injection Inject into the muscle. (Patient not taking: Reported on 4/11/2024) 1 each 1     No current facility-administered medications for this visit.       ALLERGIES:   Review of patient's allergies indicates:  No Known Allergies      Review of Systems:     Review of Systems   Constitutional:  Negative for chills, fever, malaise/fatigue and weight loss.   HENT: Negative.     Eyes: Negative.     Respiratory: Negative.     Cardiovascular: Negative.    Gastrointestinal: Negative.    Genitourinary: Negative.    Musculoskeletal: Negative.    Skin: Negative.    Neurological: Negative.    Endo/Heme/Allergies: Negative.    Psychiatric/Behavioral:  The patient is nervous/anxious.        Physical Exam:         There were no vitals filed for this visit.          Physical Exam  Vitals reviewed.   Constitutional:       General: He is not in acute distress.     Appearance: He is obese.   HENT:      Head: Normocephalic.      Right Ear: External ear normal.      Left Ear: External ear normal.      Nose: Nose normal.      Mouth/Throat:      Mouth: Mucous membranes are moist.      Pharynx: Oropharynx is clear.   Eyes:      Extraocular Movements: Extraocular movements intact.      Pupils: Pupils are equal, round, and reactive to light.   Cardiovascular:      Rate and Rhythm: Normal rate and regular rhythm.      Pulses: Normal pulses.   Pulmonary:      Effort: Pulmonary effort is normal.      Breath sounds: Normal breath sounds.   Abdominal:      General: Abdomen is flat.      Palpations: Abdomen is soft.   Musculoskeletal:         General: No swelling. Normal range of motion.      Cervical back: Neck supple.   Lymphadenopathy:      Cervical: No cervical adenopathy.   Skin:     General: Skin is warm.      Findings: No bruising.   Neurological:      General: No focal deficit present.      Mental Status: He is alert.      Motor: No weakness.   Psychiatric:         Mood and Affect: Mood normal.           12/5/22 BONE MARROW ASPIRATE, TOUCH PREP, CLOT, AND DECALCIFIED NEEDLE CORE BIOPSY: LEFT POSTEROSUPERIOR ILIAC CREST     -tab LAMBDA LIGHT CHAIN RESTRICTED, CD56+ (SUBSET), CYCLIN D1(-) PLASMA CELLS NEOPLASM WITH PREDOMINANT LOSS OF CD19 EXPRESSION     -tab Normocellular marrow (35-40% total cellularity) with 5% involvement By plasma cell neoplasm and adequate trilineage Hematopoiesis     -tab Increased stainable histiocytic  iron stores (4+ with focal 5+ out of 6+)    -tab Negative for amyloid deposition by [place] Red special stain     Chromosomal Analysis, Bone Marrow Aspirate  : NORMAL. 46,XY[20]. No clonal abnormality was apparent.   Plasma Cell Proliferative Disorders FISH Panel, Bone Marrow Aspirate : NORMAL.   The result is within normal limits for 1q duplication, TP53 deletion and IGH rearrangement.   7/19/23 KIDNEY BIOPSY    [facility] DIAGNOSIS:     KIDNEY (PERCUTANEOUS BIOPSY):   1) MODERATE - TO - SEVERE ARTERIONEPHROSCLEROSIS WITH FEATURES OF ACCELERATED HYPERTENSION - RELATED VASCULAR INJURY;   2) DIFFUSE ACUTE TUBULAR INJURY;   3) MODERATE DIABETIC NEPHROPATHY (SEE COMMENT)       Note: Please see attached report for comment.      4/16/24 PET CT      COMPARISON:  Retroperitoneal ultrasound 02/07/2024     FINDINGS:  In the head and neck, there is radiotracer uptake in the aryepiglottic space without definite CT correlate, likely physiologic.  There are no hypermetabolic lesions worrisome for malignancy. There are no hypermetabolic mucosal lesions, and there are no pathologically enlarged or hypermetabolic lymph nodes.     In the chest, there are no hypermetabolic lesions worrisome for malignancy.  There is a 6 mm nodule in the right middle lobe which is too small to characterize on PET, nonspecific.  There are no pathologically enlarged or hypermetabolic lymph nodes.     In the abdomen and pelvis, there is physiologic tracer distribution within the abdominal organs and excretion into the genitourinary system.  Prominent uptake in the stomach fundus, likely physiologic.     In the bones, there are no hypermetabolic lesions worrisome for malignancy.     In the extremities, there are no hypermetabolic lesions worrisome for malignancy.     Additional findings: Calcific atherosclerosis of the coronary arteries, aorta, iliac vessels.  Bilateral fat containing inguinal hernia.  Right renal cyst.  Nodularity of bilateral  adrenal glands with low level tracer uptake, more prominent on the left.  Possibly represent underlying adenomas although nonspecific.     Impression:     No hypermetabolic tumor.        Component      Latest Ref Rng 4/4/2024   WBC      3.90 - 12.70 K/uL 6.21    RBC      4.60 - 6.20 M/uL 3.22 (L)    Hemoglobin      14.0 - 18.0 g/dL 9.5 (L)    Hematocrit      40.0 - 54.0 % 30.9 (L)    MCV      82 - 98 fL 96    MCH      27.0 - 31.0 pg 29.5    MCHC      32.0 - 36.0 g/dL 30.7 (L)    RDW      11.5 - 14.5 % 16.3 (H)    Platelet Count      150 - 450 K/uL 251    MPV      9.2 - 12.9 fL 12.5    Immature Granulocytes      0.0 - 0.5 % 0.5    Gran # (ANC)      1.8 - 7.7 K/uL 4.2    Immature Grans (Abs)      0.00 - 0.04 K/uL 0.03    Lymph #      1.0 - 4.8 K/uL 1.2    Mono #      0.3 - 1.0 K/uL 0.6    Eos #      0.0 - 0.5 K/uL 0.2    Baso #      0.00 - 0.20 K/uL 0.04    nRBC      0 /100 WBC 0    Gran %      38.0 - 73.0 % 66.9    Lymph %      18.0 - 48.0 % 19.6    Mono %      4.0 - 15.0 % 9.3    Eos %      0.0 - 8.0 % 3.1    Basophil %      0.0 - 1.9 % 0.6    Differential Method Automated    Sodium      136 - 145 mmol/L 139    Potassium      3.5 - 5.1 mmol/L 5.1    Chloride      95 - 110 mmol/L 111 (H)    CO2      23 - 29 mmol/L 22 (L)    Glucose      70 - 110 mg/dL 86    BUN      8 - 23 mg/dL 33 (H)    Creatinine      0.5 - 1.4 mg/dL 4.6 (H)    Calcium      8.7 - 10.5 mg/dL 9.6    PROTEIN TOTAL      6.0 - 8.4 g/dL 6.9    Albumin      3.5 - 5.2 g/dL 3.6    BILIRUBIN TOTAL      0.1 - 1.0 mg/dL 0.6    ALP      55 - 135 U/L 39 (L)    AST      10 - 40 U/L 11    ALT      10 - 44 U/L 8 (L)    eGFR      >60 mL/min/1.73 m^2 13.1 !    Anion Gap      8 - 16 mmol/L 6 (L)    [place] Free Light Chains      0.33 - 1.94 mg/dL 7.99 (H)    Lambda Free Light Chains      0.57 - 2.63 mg/dL 5.19 (H)    Kappa/Lambda FLC Ratio      0.26 - 1.65  1.54       4/4/24 SPEP: Normal total protein. Paraprotein peak in near-gamma = 1.01 g/dL (previously 1.02 g/dL).  "    Assessment and Plan:   Handy Adams (Handy) is a pleasant 68 y.o.male referred for evaluation of paraprotein     IgG Lambda MGUS  - - Bone marrow biopsy in Dec  2022  significant for 5% plasma cell neoplasm consistent with MGUS. Congo red negative  - He underwent kidney biopsy on 7/19/23 with path showing "arterionephrosclerosis and diabetic nephropathy."   -no hypermetabolic lesions on PET CT on 4/16/24  -Risk of developing myeloma  in patients with with IgG lambda MGUS is 1-2% per year  -this risk is variably increased with immunosupression that will be required post kidney transplant, which he is considering  -discussed this in detail today's visit            2. CKD stage 5  - Worsening kidney disease over last year in setting of HTN and uncontrolled T2DM, after previously not being seen by medical provider over past few years.  - Ultrasound sound shows medical renal disease  --Cr 5.7 mg/dl on 3/11/24    3. HTn    Follows with his PCP    4. T2DM not on long term insulin with nephropathy    -follows with his PCP    5. Anemia due to CKD    Hgb 9.5g/dl          BMT Chart Routing      Follow up with physician 1 year.   Follow up with [facility]E    Provider visit type    Infusion scheduling note    Injection scheduling note    Labs CMP, CBC, free light chains, immunoglobulins and SPEP   Scheduling:  Preferred lab:  Lab interval:     Imaging    Pharmacy appointment    Other referrals                   "

## 2024-04-24 NOTE — PROGRESS NOTES
Population Health Chart Review & Patient Outreach Details      Additional Pop Health Notes:      Need remote home blood pressure reading. Left message for patient to return call.Sent [facility] message.          Updates Requested / Reviewed:      Updated Care Coordination Note and Care Everywhere         Health Maintenance Topics Overdue:      VBHM Score: 3     Foot Exam  Uncontrolled BP  AAA Screening    RSV Vaccine                  Health Maintenance Topic(s) Outreach Outcomes & Actions Taken:    Blood Pressure - Outreach Outcomes & Actions Taken  : lm and sent portal

## 2024-05-07 RX ORDER — ERGOCALCIFEROL 1.25 MG/1
50000 CAPSULE ORAL
Qty: 12 CAPSULE | Refills: 0 | Status: SHIPPED | OUTPATIENT
Start: 2024-05-07

## 2024-05-16 DIAGNOSIS — N18.5 CHRONIC KIDNEY DISEASE (CKD), STAGE V: Primary | ICD-10-CM

## 2024-05-20 ENCOUNTER — TELEPHONE (OUTPATIENT)
Dept: TRANSPLANT | Facility: CLINIC | Age: 69
End: 2024-05-20
Payer: MEDICARE

## 2024-05-21 ENCOUNTER — PROCEDURE VISIT (OUTPATIENT)
Dept: OPHTHALMOLOGY | Facility: CLINIC | Age: 69
End: 2024-05-21
Payer: MEDICARE

## 2024-05-21 DIAGNOSIS — E11.3513 TYPE 2 DIABETES MELLITUS WITH BOTH EYES AFFECTED BY PROLIFERATIVE RETINOPATHY AND MACULAR EDEMA, WITHOUT LONG-TERM CURRENT USE OF INSULIN: Primary | ICD-10-CM

## 2024-05-21 DIAGNOSIS — H43.813 VITREOUS DEGENERATION OF BOTH EYES: ICD-10-CM

## 2024-05-21 PROCEDURE — 92014 COMPRE OPH EXAM EST PT 1/>: CPT | Mod: 25,HCNC,S$GLB, | Performed by: OPHTHALMOLOGY

## 2024-05-21 PROCEDURE — 92134 CPTRZ OPH DX IMG PST SGM RTA: CPT | Mod: HCNC,S$GLB,, | Performed by: OPHTHALMOLOGY

## 2024-05-21 PROCEDURE — 67028 INJECTION EYE DRUG: CPT | Mod: 50,HCNC,S$GLB, | Performed by: OPHTHALMOLOGY

## 2024-05-21 NOTE — PROGRESS NOTES
HPI     2 month     DM       pdr    ME   OCT      Additional comments: DM   LAST BS     134  LAST A1C       5.6    BLURRY VA   OZURDEX  OU            Comments    Patient here today for 3 mo f/u. VA stable ou, no pain ou and denies   floaters or flashes ou. Patient reports he is having kidney treatments and   would like to discuss whether an FA will interfere.  DLS     03/1/24  AT ou prn          Last edited by Felicita Ryan on 5/21/2024  9:46 AM.             OCT  DME OU  OD - mild decrease  OS - decrease in central [place]    Prior WFFA  OD - normal transit time. Hyperfluorescence early c/w Ma/edema - sig NP    OS - Hyperfluorescence early c/w Ma/edema  .NV nasal      A/P    1. 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 DME OU   6/19 - pt did not FU until 8/20  S/p Avastin OD x 2  S/p Eylea OD x 2  S/p Ozurdex OD x 4, OS x 2  5/24 11/22 - not seen x 2 years  3/23 - had second opinion with Dr. Suh.  Pt would like to try a few Eylea prior to steroids if needed  11/23 - increased DME oU    Iluvien  OU      3. HTN Ret OU  BS/BP/chol control    4. NS OU  No sig PSC yet    5. Cyst RLL  Removed by Rhea  Happy with result        2  month OCT no dilate see in room - LDFE 5/24    Risks, benefits, and alternatives to treatment discussed in detail with the patient.  The patient voiced understanding and wished to proceed with the procedure    Injection Procedure Note:  Diagnosis: DME OU    Patient Identified and Time Out complete  Pt marked  Topical Proparacaine and Betadine. Subconj lido  Inject iluvien OU at 6:00 @ 3.5-4mm posterior to limbus  Post Operative Dx: Same  Complications: None  Follow up as above.

## 2024-06-03 ENCOUNTER — TELEPHONE (OUTPATIENT)
Dept: TRANSPLANT | Facility: CLINIC | Age: 69
End: 2024-06-03
Payer: MEDICARE

## 2024-06-03 NOTE — TELEPHONE ENCOUNTER
"MA notes per Pre Dialysis adherence       Karime Naylor NP states regarding compliance, "He comes to all his appointments, but he is slow to trust and take medical advice due to denial and hope that things will improve" what Karime Naylor NP means by this is karime had been recommending a kidney biopsy but he was very resistant to getting that and required a lot of convincing on why it was necessary. Karime said she thinks if he was approved for a kidney transplant and followed through with it, he would be compliant with medications/appts etc. however .     Karime Naylor NP also states she is not sure if he has a care partner--she said he is very private and the last she heard from the pt he said he had not told his wife about his kidney disease, so she is not sure if that has changed since then.    Haydee Cartagena  Abdominal Transplant MA   "

## 2024-06-04 ENCOUNTER — OFFICE VISIT (OUTPATIENT)
Dept: UROLOGY | Facility: CLINIC | Age: 69
End: 2024-06-04
Payer: MEDICARE

## 2024-06-04 ENCOUNTER — HOSPITAL ENCOUNTER (OUTPATIENT)
Dept: RADIOLOGY | Facility: HOSPITAL | Age: 69
Discharge: HOME | End: 2024-06-04
Attending: UROLOGY
Payer: MEDICARE

## 2024-06-04 ENCOUNTER — SOCIAL WORK (OUTPATIENT)
Dept: TRANSPLANT | Facility: CLINIC | Age: 69
End: 2024-06-04
Payer: MEDICARE

## 2024-06-04 ENCOUNTER — PATIENT MESSAGE (OUTPATIENT)
Dept: ADMINISTRATIVE | Facility: HOSPITAL | Age: 69
End: 2024-06-04
Payer: MEDICARE

## 2024-06-04 VITALS — BODY MASS INDEX: 38.93 KG/M2 | WEIGHT: 287.06 LBS

## 2024-06-04 DIAGNOSIS — N50.9 TESTICULAR ABNORMALITY: ICD-10-CM

## 2024-06-04 DIAGNOSIS — N53.19 DISORDER OF EJACULATION: ICD-10-CM

## 2024-06-04 DIAGNOSIS — N52.8 OTHER MALE ERECTILE DYSFUNCTION: Primary | ICD-10-CM

## 2024-06-04 PROCEDURE — 76870 US EXAM SCROTUM: CPT | Mod: TC,HCNC,NTX

## 2024-06-04 PROCEDURE — 76870 US EXAM SCROTUM: CPT | Mod: 26,HCNC,NTX, | Performed by: RADIOLOGY

## 2024-06-04 PROCEDURE — 99999 PR PBB SHADOW E&M-EST. PATIENT-LVL IV: CPT | Mod: PBBFAC,HCNC,TXP, | Performed by: UROLOGY

## 2024-06-04 RX ORDER — SILDENAFIL 100 MG/1
100 TABLET, FILM COATED ORAL DAILY PRN
Qty: 10 TABLET | Refills: 11 | Status: SHIPPED | OUTPATIENT
Start: 2024-06-04

## 2024-06-04 NOTE — PROGRESS NOTES
Subjective:       Patient ID: Handy Adams is a 68 y.o. male who was referred by Toby Sanderson MD    Chief Complaint:   Chief Complaint   Patient presents with    [facility]       Erectile Dysfunction  Patient complains of erectile dysfunction. Onset of dysfunction was several years ago and was gradual in onset.  Patient states the nature of difficulty is both attaining and maintaining erection. Full erections occur never. Partial erections occur with masturbation. Libido is not affected. Risk factors for ED include cardiovascular disease and diabetes mellitus. Patient denies history of pelvic radiation. Patient's expectations as to sexual function normal.  Patient's description of relationship with partner normal. Previous treatment of ED includes he took  Viagra once.    He has noted lack of ejaculation for a few years.  He denies any changes in medicine.    He has also noted that his testicles do not feel like they used to.  He feels they are getting smaller.    ACTIVE MEDICAL ISSUES:  Patient Active Problem List   Diagnosis    Benign hypertension with CKD (chronic kidney disease) stage IV    Hyperlipemia    Type 2 diabetes mellitus, uncontrolled    Type 2 diabetes mellitus with stage 4 chronic kidney disease, without long-term current use of insulin    Hypertensive retinopathy, bilateral    NS (nuclear sclerosis)    Type 2 diabetes mellitus with both eyes affected by proliferative retinopathy and macular edema, without long-term current use of insulin    Age-related nuclear cataract of both eyes    Class 3 severe obesity due to excess calories with serious comorbidity and body mass index (BMI) of 40.0 to 44.9 in adult    CKD (chronic kidney disease) stage 4, GFR 15-29 ml/min    Adjustment disorder with depressed mood    Mild cognitive impairment    MGUS (monoclonal gammopathy of unknown significance)    Vitreous degeneration of both eyes    Pre-transplant evaluation for chronic kidney disease     Hyperparathyroidism due to renal insufficiency    Anemia in stage 4 chronic kidney disease    Aortic atherosclerosis    Chronic kidney disease (CKD), stage V       PAST MEDICAL HISTORY  Past Medical History:   Diagnosis Date    Anemia     Disorder of kidney and ureter     Edema leg     History of rotator cuff tear     History of seasonal allergies     HTN (hypertension)     Hx of colonic polyps     Hyperlipemia     MGUS (monoclonal gammopathy of unknown significance)     NS (nuclear sclerosis), bilateral 06/25/2019    Obesity     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 11/17/2017    Type 2 diabetes mellitus        PAST SURGICAL HISTORY:  Past Surgical History:   Procedure Laterality Date    COLONOSCOPY N/A 10/28/2022    Procedure: COLONOSCOPY;  Surgeon: Guanako Sanchez MD;  Location: [facility] ENDO;  Service: Endoscopy;  Laterality: N/A;  REFENDO placed per Dr Sanderson. case request entered     vaccinated-GT  instr portal    MOUTH SURGERY      RENAL BIOPSY Left 7/19/2023    Procedure: BIOPSY, KIDNEY;  Surgeon: Nikky Soriano MD;  Location: [facility] CATH LAB;  Service: Interventional Nephrology;  Laterality: Left;       SOCIAL HISTORY:  Social History     Tobacco Use    Smoking status: Every Day     Current packs/day: 0.50     Types: Cigarettes    Smokeless tobacco: Never   Substance Use Topics    Alcohol use: Yes     Comment: occasionally    Drug use: Not Currently       FAMILY HISTORY:  Family History   Problem Relation Name Age of Onset    Cancer Mother          Lung Cancer    Cancer Father          Colon cancer    Diabetes Father      Hypertension Father      No Known Problems Sister      No Known Problems Brother      No Known Problems Maternal Aunt      No Known Problems Maternal Uncle      No Known Problems Paternal Aunt      No Known Problems Paternal Uncle      No Known Problems Maternal Grandmother      No Known Problems Maternal  Grandfather      No Known Problems Paternal Grandmother      No Known Problems Paternal Grandfather      Amblyopia Neg Hx      Blindness Neg Hx      Cataracts Neg Hx      Glaucoma Neg Hx      Macular degeneration Neg Hx      Retinal detachment Neg Hx      Strabismus Neg Hx      Stroke Neg Hx      Thyroid disease Neg Hx         ALLERGIES AND MEDICATIONS: updated and reviewed.  Review of patient's allergies indicates:  No Known Allergies  Current Outpatient Medications   Medication Sig    atorvastatin (LIPITOR) 20 MG tablet Take 1 tablet (20 mg total) by mouth once daily.    blood sugar diagnostic Strp To test twice daily    calcitRIOL (ROCALTROL) 0.25 MCG Cap Take 1 capsule (0.25 mcg total) by mouth 3 (three) times a week. Monday, Wednesday, Friday    ergocalciferol (ERGOCALCIFEROL) 50,000 unit Cap Take 1 capsule by mouth once a week    FEROSUL 325 mg (65 mg iron) Tab tablet Take 1 tablet (325 mg total) by mouth 3 (three) times daily. (Patient taking differently: Take 325 mg by mouth 2 (two) times daily.)    hepatitis B vacc-CpG 1018, PF, (HEPLISAV-B, PF,) 20 mcg/0.5 mL Syrg adm by pharmicist    labetaloL (NORMODYNE) 200 MG tablet Take 1 tablet by mouth twice daily    labetaloL (NORMODYNE) 200 MG tablet Take 1 tablet (200 mg total) by mouth 2 (two) times daily.    lancets Misc 1 lancet by Misc.(Non-Drug; Combo Route) route 2 (two) times daily with meals.    linaGLIPtin (TRADJENTA) 5 mg Tab tablet Take 1 tablet (5 mg total) by mouth once daily.    NIFEdipine (PROCARDIA-XL) 90 MG (OSM) 24 hr tablet Take 1 tablet by mouth once daily    pioglitazone (ACTOS) 30 MG tablet Take 1 tablet (30 mg total) by mouth once daily.    pneumoc 20-carlos conj-dip cr,PF, (PREVNAR-20, PF,) 0.5 mL Syrg injection Inject into the muscle.    sodium bicarbonate 650 MG tablet Take 1 tablet (650 mg total) by mouth 2 (two) times daily.    spironolactone (ALDACTONE) 50 MG tablet Take 1 tablet (50 mg total) by mouth once daily.    torsemide (DEMADEX)  10 MG Tab Take 1 tablet (10 mg total) by mouth once daily.    valsartan (DIOVAN) 320 MG tablet Take 1 tablet (320 mg total) by mouth once daily.    varicella-zoster gE-AS01B, PF, (SHINGRIX) 50 mcg/0.5 mL injection Inject into the muscle.    blood-glucose meter kit Use as instructed    sildenafiL (VIAGRA) 100 MG tablet Take 1 tablet (100 mg total) by mouth daily as needed for Erectile Dysfunction.     No current facility-administered medications for this visit.       Review of Systems   Constitutional:  Negative for chills and fever.   HENT:  Negative for congestion.    Respiratory:  Negative for chest tightness and shortness of breath.    Cardiovascular:  Negative for chest pain and palpitations.   Gastrointestinal:  Negative for abdominal pain, constipation, diarrhea, nausea and vomiting.   Genitourinary:  Negative for difficulty urinating, dysuria, flank pain, hematuria and urgency.   Musculoskeletal:  Negative for arthralgias.   Neurological:  Negative for dizziness.   Psychiatric/Behavioral:  Negative for confusion.        Objective:      Vitals:    06/04/24 1122   Weight: 130.2 kg (287 lb 0.6 oz)     Physical Exam  Vitals and nursing note reviewed.   Constitutional:       Appearance: He is well-developed.   HENT:      Head: Normocephalic.   Eyes:      Conjunctiva/sclera: Conjunctivae normal.   Neck:      Thyroid: No thyromegaly.      Trachea: No tracheal deviation.   Cardiovascular:      Rate and Rhythm: Normal rate.      Heart sounds: Normal heart sounds.   Pulmonary:      Effort: Pulmonary effort is normal. No respiratory distress.      Breath sounds: Normal breath sounds. No wheezing.   Abdominal:      General: Bowel sounds are normal.      Palpations: Abdomen is soft.      Tenderness: There is no abdominal tenderness. There is no rebound.      Hernia: No hernia is present.   Genitourinary:     Penis: Normal and circumcised.       Testes: Normal.         Right: Mass or tenderness not present.         Left:  Mass or tenderness not present.   Musculoskeletal:         General: No tenderness. Normal range of motion.      Cervical back: Normal range of motion and neck supple.   Lymphadenopathy:      Cervical: No cervical adenopathy.   Skin:     General: Skin is warm and dry.      Findings: No erythema or rash.   Neurological:      Mental Status: He is alert and oriented to person, place, and time.   Psychiatric:         Behavior: Behavior normal.         Thought Content: Thought content normal.         Judgment: Judgment normal.         Urine dipstick shows negative for all components.  Micro exam: negative for WBC's or RBC's.               Component Ref Range & Units 3 mo ago 1 yr ago 3 yr ago 7 yr ago 12 yr ago 17 yr ago 19 yr ago   PSA, Screen 0.00 - 4.00 ng/mL 1.0 1.7 CM 1.2 CM 1.1 CM 1.22 R, CM 0.4 R 0.4 R   Comment: The testing method is a chemiluminescent microparticle immunoassay  manufactured by [employer] and performed on the [employer]  or  eReceipts system. Values obtained with different assay manufacturers  for  methods may be different and cannot be used interchangeably.  PSA Expected levels:  Hormonal Therapy: <0.05 ng/ml  Prostatectomy: <0.01 ng/ml  Radiation Therapy: <1.00 ng/ml   Resulting Agency  [facility]              Narrative  Performed by: [facility]  Collection Site:->Peripheral Vein      Specimen Collected: 02/07/24 08:00 CST             Assessment:       1. Other male erectile dysfunction    2. Disorder of ejaculation    3. Testicular abnormality          Plan:       1. Other male erectile dysfunction    - sildenafiL (VIAGRA) 100 MG tablet; Take 1 tablet (100 mg total) by mouth daily as needed for Erectile Dysfunction.  Dispense: 10 tablet; Refill: 11    2. Disorder of ejaculation    - Ambulatory referral/consult to Urology  - Testosterone; Future    3. Testicular abnormality    - US Scrotum And Testicles; Future            Follow up in about 6 weeks (around  7/16/2024) for Follow up Established, Review PSA, Review X-ray.

## 2024-06-04 NOTE — PROGRESS NOTES
6-6-24 Follow up.    Pt to be presented in kidney transplant selection committee Friday, 6-7-24.    Transplant SW called patient [phone] again for psychosocial follow up. No answer. Left message for return call.    Psychosocial Suitability: Patient presents as high risk candidate for kidney transplant at this time due to patient does not have organ transplant caregiver/transportation plan in place at this time. Pt reports at initial psychosocial having organ transplant financial plan and insurance plan in place.     Recommendations/Additional Comments: 6-3-24 nephrology compliance update shows patient is compliant with medical appointments but may not have caregiver in place. Please review 6-3-24 nephrology compliance update for further information. Pt did not contact transplant SW for any requested psychosocial updates via SW telephone messages.     SW recommends that pt conduct fundraising to assist pt with pay for cost of medications, food, gas, and other transplant related needs.  SW recommends that pt remain aware of potential mental health concerns and contact the team if any concerns arise.  SW recommends that pt remain abstinent from tobacco, ETOH, and drug use.  SW supports pt's continued adherence. SW remains available to answer any questions or concerns that arise as the pt moves through the transplant process.       Final determination of transplant candidacy will be reviewed by the selection committee.       Estela FERNANDES LCSW         6-4-24 Follow up.    Pt to be presented in kidney transplant selection committee Friday, 6-7-24.     At time of psychosocial patient did not have transplant caregiver/transportation plan in place. Transplant SW telephoned patient [phone] to update transplant psychosocial regarding pt's transplant caregiver/transportation plan. No answer; left message for return call.    Estela FERNANDES LCSW

## 2024-06-06 ENCOUNTER — LAB VISIT (OUTPATIENT)
Dept: LAB | Facility: HOSPITAL | Age: 69
End: 2024-06-06
Payer: MEDICARE

## 2024-06-06 DIAGNOSIS — N53.19 DISORDER OF EJACULATION: ICD-10-CM

## 2024-06-06 LAB — TESTOST SERPL-MCNC: 507 NG/DL (ref 304–1227)

## 2024-06-06 PROCEDURE — 84403 ASSAY OF TOTAL TESTOSTERONE: CPT | Mod: HCNC,TXP | Performed by: UROLOGY

## 2024-06-06 PROCEDURE — 36415 COLL VENOUS BLD VENIPUNCTURE: CPT | Mod: HCNC,PO,TXP | Performed by: UROLOGY

## 2024-06-07 ENCOUNTER — COMMITTEE REVIEW (OUTPATIENT)
Dept: TRANSPLANT | Facility: CLINIC | Age: 69
End: 2024-06-07
Payer: MEDICARE

## 2024-06-07 ENCOUNTER — TELEPHONE (OUTPATIENT)
Dept: FAMILY MEDICINE | Facility: CLINIC | Age: 69
End: 2024-06-07
Payer: MEDICARE

## 2024-06-07 ENCOUNTER — TELEPHONE (OUTPATIENT)
Dept: TRANSPLANT | Facility: CLINIC | Age: 69
End: 2024-06-07
Payer: MEDICARE

## 2024-06-07 DIAGNOSIS — Z76.82 ORGAN TRANSPLANT CANDIDATE: Primary | ICD-10-CM

## 2024-06-07 NOTE — TELEPHONE ENCOUNTER
Patient he's on the transplant list and would like to have HH setup prior to the surgery. Advised patient that once he's had the surgery he will be d/c with HH orders if or when needed. Patient verbalized understanding.  Please advise if otherwise.

## 2024-06-07 NOTE — TELEPHONE ENCOUNTER
----- Message from Teja Stoll sent at 6/7/2024 12:54 PM CDT -----  Regarding: Handy  Type:Patient Callback     Who called: Handy     What is the request in detail: Patient would like to know if the doctor put in the orders for Home Health Care. Please reach out to the patient as soon as possible.    Can the clinic reply by [facility]? Yes     Would the patient rather a call back or a response via [facility]? Callback     Best call back number: [phone]  '    Additional Information:

## 2024-06-07 NOTE — LETTER
June 7, 2024    Handy Adams  [address] [address]    Dear Handy Adams:  MRN: [id number]    Your transplant evaluation was reviewed at the [facility] Selection Committee meeting on 6/7/2024.  It is with regret I inform you that your workup is incomplete and we are unable to determine your transplant candidacy at this time due to the lack of caregiver, needing smoking cessation and CT pelvis without contrast to assess for vascular calcification. You will be re-presented to the selection committee once pending studies are completed.  You will be notified of the committees decision once we review the new results.    The [facility] Transplant Selection Committee carefully considers each patients transplant candidacy using established selection criteria to determine if it is safe to proceed with transplantation for each and every person evaluated.  Although the selection committee believes your workup is incomplete and we are unable to determine your transplant candidacy, you have the right to be evaluated at other transplant centers.  You may request your [facility] records be sent to any center of your choice by contacting our Medical Records Department at [phone].    Attached is a letter from the [employer] ([employer]).  It describes the services and information offered to patients by [employer] and the Organ Procurement and Transplant Network.    Sincerely,      Ruchi Yang MD  Medical Director, Kidney & Kidney/Pancreas Transplantation  jr  Cc: Raven Naylor NP    Encl: [employer] Letter               The Organ Procurement and Transplantation Network   Toll-free patient services line: [phone]  Your resource for organ transplant information      Staffed 8:30 am - 5:00 pm ET Monday - Friday   Leave a message 24/7 to receive a call back    The Organ Procurement and Transplantation Network (OPTN) is the national transplant system. It makes the  policies that decide how donated organs are matched to patients waiting for a transplant. The OPTN:    Makes sure donated organs get matched to people on the transplant waiting list  Tells people about the donation and transplant processes  Makes sure that the public knows about the need for more organ and tissue donations    The OPTN has a free patient services line that you can call to:  Get more information about:   o Organ donation and organ transplants   o Donation and transplant policies  Get an information kit with:   o A list of transplant hospitals   o Waiting list information  Talk about any questions you may have about your transplant hospital or organ procurement organization. The staff will do their best to help you or point you to others who may help.  Find out how you can volunteer with the OPTN and help shape transplant policy    The patient services line number is: 0-879-078-4245    Patient services line staff CANNOT answer questions about your own medical care, including:  Waiting list status  Test results  Medical records  You will need to call your transplant hospital for this information.    The following websites have more information about transplantation and donation:  OPTN: https://optn.transplant.hrsa.gov/  For potential living donors and transplant recipients:   o Living with transplant: https://www.transplantliving.org/   o Living donation process: https://optn.transplant.hrsa.gov/living-donation/     o Financial assistance: https://www.livingdonorassistance.org/  Transplantation data: https://www.srtr.org/  Organ donation: https://www.organdonor.gov/    Volunteer with the OPTN: https://optn.transplant.hrsa.gov/get-involved

## 2024-06-07 NOTE — COMMITTEE REVIEW
Addendum: after discussion with patient (see below) and Dr. Colon,  committee's decision will be changed to unable to determine at this due to the lack of caregiver and needing smoking cessation. Patient will need concrete caregiver, negative nicotine screen and CT scan with in the next week. Afterwards, patient's case will be re-presented to committee.       Native Organ Dx: Diabetes Mellitus - Type II      Not approved for LRD/CAD transplant due to lack of caregiver and needing smoking cessation. May return for re-evaluation once patient quit smoking with 3 negative nicotine screens and has a concrete caregiver plan.  If patient returns, will need a  CT of abdomen.     Spoken to patient to discuss committee's decision. Patient stated that his wife is his caregiver and that he quit smoking 10-20 years ago. Explain to that we will need to sort his caregiver plan out by week since he has been needing a caregiver since his initial visit and a negative nicotine screen next since he quit smoking 20 yrs ago. Patient agrees with plan. Will get CT scan to assess for calcifications.     Note written by Catrina Zuniga RN    ===============================================  I was present at the meeting and attest to the decision of the committee.    Judi Colon, DO  Transplant Nephrology

## 2024-06-10 ENCOUNTER — SOCIAL WORK (OUTPATIENT)
Dept: TRANSPLANT | Facility: CLINIC | Age: 69
End: 2024-06-10
Payer: MEDICARE

## 2024-06-10 NOTE — PROGRESS NOTES
Transplant SW spoke with patient by telephone [phone]  and reports plan to discuss transplant caregiver/transportation plan with sister, Ely Parsons and will call back once he knows more about whether she can assist.    Estela Silva MSW [phone]

## 2024-06-11 ENCOUNTER — SOCIAL WORK (OUTPATIENT)
Dept: TRANSPLANT | Facility: CLINIC | Age: 69
End: 2024-06-11
Payer: MEDICARE

## 2024-06-11 NOTE — PROGRESS NOTES
Follow up.    Pt telephoned asking for [facility] kidney organ transplant education video. Transplant  sent patient the education video link today. Pt reports sister Ely Parsons and son Pee Adams in agreement to be transplant caregivers. Transplant  will confirm kidney organ transplant caregiver plan with sister or son on Thursday, June 13th, 2024.    Estela Silva MSW [facility]

## 2024-06-17 ENCOUNTER — SOCIAL WORK (OUTPATIENT)
Dept: TRANSPLANT | Facility: CLINIC | Age: 69
End: 2024-06-17
Payer: MEDICARE

## 2024-06-17 NOTE — PROGRESS NOTES
Follow up.    Transplant SW confirmed pt's transplant caregiver/transportation plan by telephone with sister, Sharon Parsons at [phone].    Psychosocial Suitability: Patient presents as low risk candidate for kidney transplant at this time. Pt reports having kidney organ transplant caregiver/transportation plan, medical financial plan and insurance plan all in place.     Recommendations/Additional Comments: 6-3-24 nephrology compliance update shows patient is compliant with medical appointments. [facility] regarding any transplant benefits for travel and lodging and will plan accordingly.     SW recommends that pt conduct fundraising to assist pt with pay for cost of medications, food, gas, and other transplant related needs.  SW recommends that pt remain aware of potential mental health concerns and contact the team if any concerns arise.  SW recommends that pt remain abstinent from tobacco, ETOH, and drug use.  SW supports pt's continued adherence. SW remains available to answer any questions or concerns that arise as the pt moves through the transplant process.       Final determination of transplant candidacy will be reviewed by the selection committee.       Estela FERNANDES LCSW

## 2024-06-18 ENCOUNTER — HOSPITAL ENCOUNTER (OUTPATIENT)
Dept: RADIOLOGY | Facility: HOSPITAL | Age: 69
Discharge: HOME | End: 2024-06-18
Attending: NURSE PRACTITIONER
Payer: MEDICARE

## 2024-06-18 ENCOUNTER — TELEPHONE (OUTPATIENT)
Dept: NEPHROLOGY | Facility: CLINIC | Age: 69
End: 2024-06-18
Payer: MEDICARE

## 2024-06-18 DIAGNOSIS — Z76.82 ORGAN TRANSPLANT CANDIDATE: ICD-10-CM

## 2024-06-18 PROCEDURE — 72192 CT PELVIS W/O DYE: CPT | Mod: 26,TXP,, | Performed by: RADIOLOGY

## 2024-06-18 PROCEDURE — 72192 CT PELVIS W/O DYE: CPT | Mod: TC,HCNC,TXP

## 2024-06-18 NOTE — TELEPHONE ENCOUNTER
----- Message from Raven Naylor NP sent at 6/18/2024  3:15 PM CDT -----  Regarding: RE: pt advice  Contact: Dat akash/Reid @[phone]  Please route to transplant team.  ----- Message -----  From: Jackie Camacho LPN  Sent: 6/18/2024   3:02 PM CDT  To: Raven Naylor NP  Subject: FW: pt advice                                      ----- Message -----  From: Jaci Chapman  Sent: 6/18/2024   2:59 PM CDT  To: [facility] Staff  Subject: pt advice                                        Caller stated pt wants the vaccine of menactra in order to get approval for kidney transplant. They have one that is ok to substitute with menquadfi. But caller needs confirmation it's ok to substitute. Pls call to discuss.

## 2024-06-18 NOTE — TELEPHONE ENCOUNTER
Spoke with pt. About his vaccination shot and stated he needed to make sure he receive all of his vaccine. I stated to call Transplant Team and ask them about the medications. He became frustrated and hung up the phone.

## 2024-06-22 ENCOUNTER — PATIENT MESSAGE (OUTPATIENT)
Dept: ADMINISTRATIVE | Facility: CLINIC | Age: 69
End: 2024-06-22
Payer: MEDICARE

## 2024-06-26 ENCOUNTER — OFFICE VISIT (OUTPATIENT)
Dept: HOME HEALTH SERVICES | Facility: CLINIC | Age: 69
End: 2024-06-26
Payer: MEDICARE

## 2024-06-26 VITALS — HEIGHT: 72 IN | WEIGHT: 270 LBS | BODY MASS INDEX: 36.57 KG/M2

## 2024-06-26 DIAGNOSIS — I12.9 BENIGN HYPERTENSION WITH CKD (CHRONIC KIDNEY DISEASE) STAGE IV: ICD-10-CM

## 2024-06-26 DIAGNOSIS — N25.81 HYPERPARATHYROIDISM DUE TO RENAL INSUFFICIENCY: ICD-10-CM

## 2024-06-26 DIAGNOSIS — Z00.00 ENCOUNTER FOR PREVENTIVE HEALTH EXAMINATION: Primary | ICD-10-CM

## 2024-06-26 DIAGNOSIS — D47.2 MGUS (MONOCLONAL GAMMOPATHY OF UNKNOWN SIGNIFICANCE): ICD-10-CM

## 2024-06-26 DIAGNOSIS — H25.13 AGE-RELATED NUCLEAR CATARACT OF BOTH EYES: ICD-10-CM

## 2024-06-26 DIAGNOSIS — I70.0 AORTIC ATHEROSCLEROSIS: ICD-10-CM

## 2024-06-26 DIAGNOSIS — E66.01 CLASS 2 SEVERE OBESITY DUE TO EXCESS CALORIES WITH SERIOUS COMORBIDITY AND BODY MASS INDEX (BMI) OF 36.0 TO 36.9 IN ADULT: ICD-10-CM

## 2024-06-26 DIAGNOSIS — D63.1 ANEMIA IN STAGE 5 CHRONIC KIDNEY DISEASE, NOT ON CHRONIC DIALYSIS: ICD-10-CM

## 2024-06-26 DIAGNOSIS — N18.4 BENIGN HYPERTENSION WITH CKD (CHRONIC KIDNEY DISEASE) STAGE IV: ICD-10-CM

## 2024-06-26 DIAGNOSIS — E11.3513 TYPE 2 DIABETES MELLITUS WITH BOTH EYES AFFECTED BY PROLIFERATIVE RETINOPATHY AND MACULAR EDEMA, WITHOUT LONG-TERM CURRENT USE OF INSULIN: ICD-10-CM

## 2024-06-26 DIAGNOSIS — N18.5 CKD (CHRONIC KIDNEY DISEASE) STAGE 5, GFR LESS THAN 15 ML/MIN: ICD-10-CM

## 2024-06-26 DIAGNOSIS — N18.5 ANEMIA IN STAGE 5 CHRONIC KIDNEY DISEASE, NOT ON CHRONIC DIALYSIS: ICD-10-CM

## 2024-06-26 DIAGNOSIS — E11.22 TYPE 2 DIABETES MELLITUS WITH STAGE 5 CHRONIC KIDNEY DISEASE NOT ON CHRONIC DIALYSIS, WITHOUT LONG-TERM CURRENT USE OF INSULIN: ICD-10-CM

## 2024-06-26 DIAGNOSIS — N18.5 TYPE 2 DIABETES MELLITUS WITH STAGE 5 CHRONIC KIDNEY DISEASE NOT ON CHRONIC DIALYSIS, WITHOUT LONG-TERM CURRENT USE OF INSULIN: ICD-10-CM

## 2024-06-26 DIAGNOSIS — E78.5 HYPERLIPIDEMIA, UNSPECIFIED HYPERLIPIDEMIA TYPE: ICD-10-CM

## 2024-06-26 DIAGNOSIS — H35.033 HYPERTENSIVE RETINOPATHY, BILATERAL: ICD-10-CM

## 2024-06-26 PROBLEM — E66.812 CLASS 2 SEVERE OBESITY DUE TO EXCESS CALORIES WITH SERIOUS COMORBIDITY AND BODY MASS INDEX (BMI) OF 36.0 TO 36.9 IN ADULT: Status: ACTIVE | Noted: 2022-01-07

## 2024-06-26 NOTE — PROGRESS NOTES
The patient location is: [place]  The chief complaint leading to consultation is: Health Risk Assessment    Visit type: audiovisual    Face to Face time with patient: 30  45 minutes of total time spent on the encounter, which includes face to face time and non-face to face time preparing to see the patient (eg, review of tests), Obtaining and/or reviewing separately obtained history, Documenting clinical information in the electronic or other health record, Independently interpreting results (not separately reported) and communicating results to the patient/family/caregiver, or Care coordination (not separately reported).         Each patient to whom he or she provides medical services by telemedicine is:  (1) informed of the relationship between the physician and patient and the respective role of any other health care provider with respect to management of the patient; and (2) notified that he or she may decline to receive medical services by telemedicine and may withdraw from such care at any time.    Notes:       Handy Adams presented for a follow-up Medicare AWV today. The following components were reviewed and updated:    Medical history  Family History  Social history  Allergies and Current Medications  Health Risk Assessment  Health Maintenance  Care Team    **See Completed Assessments for Annual Wellness visit with in the encounter summary    The following assessments were completed:  Depression Screening  Cognitive function Screening  Timed Get Up Test  Whisper Test      Opioid documentation:      Patient does not have a current opioid prescription.          Vitals:    06/26/24 1306   Weight: 122.5 kg (270 lb)   Height: 6' (1.829 m)     Body mass index is 36.62 kg/m².       Physical Exam  Constitutional:       Appearance: He is obese.   Neurological:      General: No focal deficit present.      Mental Status: He is alert and oriented to person, place, and time.           Diagnoses and health risks  identified today and associated recommendations/orders:  1. Encounter for preventive health examination  Assessments completed. Preventive measures, health maintenance, and immunizations reviewed with patient.    2. Class 2 severe obesity due to excess calories with serious comorbidity and body mass index (BMI) of 36.0 to 36.9 in adult  Stable, followed by PCP. Healthy diet and tolerable exercises encouraged.    3. Type 2 diabetes mellitus with stage 5 chronic kidney disease not on chronic dialysis, without long-term current use of insulin  Stable, patient on Tradjenta and Actos. Followed by PCP and Endocrinology.    4. Type 2 diabetes mellitus with both eyes affected by proliferative retinopathy and macular edema, without long-term current use of insulin  Stable, followed by Optometry.    5. CKD (chronic kidney disease) stage 5, GFR less than 15 ml/min  Stable, followed by Nephrology and Transplant.    6. Hyperparathyroidism due to renal insufficiency  Stable, followed by Nephrology.    7. Aortic atherosclerosis  Stable, followed by PCP.    8. MGUS (monoclonal gammopathy of unknown significance)  Stable, followed by PCP and Hematology/Oncology.     9. Benign hypertension with CKD (chronic kidney disease) stage IV  Stable, patient on Labetalol, Nifedipine, Aldactone, and Valsartan. Followed by PCP.    10. Anemia in stage 5 chronic kidney disease, not on chronic dialysis  Stable, patient on Ferosul. Followed by PCP.    11. Hypertensive retinopathy, bilateral  Stable, followed by Optometry.    12. Age-related nuclear cataract of both eyes  Stable, followed by PCP.    13. Hyperlipidemia, unspecified hyperlipidemia type  Stable, patient on Lipitor. Followed by PCP.      Provided Handy with a 5-10 year written screening schedule and personal prevention plan. Recommendations were developed using the USPSTF age appropriate recommendations. Education, counseling, and referrals were provided as needed.  After Visit Summary  printed and given to patient which includes a list of additional screenings\tests needed.    Follow up in about 1 year (around 6/26/2025) for your next annual wellness visit.      Cynthia Ortiz NP  I offered to discuss advanced care planning, including how to pick a person who would make decisions for you if you were unable to make them for yourself, called a health care power of , and what kind of decisions you might make such as use of life sustaining treatments such as ventilators and tube feeding when faced with a life limiting illness recorded on a living will that they will need to know. (How you want to be cared for as you near the end of your natural life)     X Patient is interested in learning more about how to make advanced directives.  I provided them paperwork and offered to discuss this with them.

## 2024-06-26 NOTE — PATIENT INSTRUCTIONS
Counseling and Referral of Other Preventative  (Italic type indicates deductible and co-insurance are waived)    Patient Name: Handy Adams  Today's Date: 6/26/2024    Health Maintenance       Date Due Completion Date    Abdominal Aortic Aneurysm Screening Never done ---    COVID-19 Vaccine (4 - 2023-24 season) 09/01/2023 12/27/2021    Foot Exam 09/26/2023 9/26/2022    Hemoglobin A1c 09/23/2024 3/23/2024    Lipid Panel 02/07/2025 2/7/2024    Diabetes Urine Screening 03/11/2025 3/11/2024    Eye Exam 05/21/2025 5/21/2024    Override on 11/15/2017: Done    High Dose Statin 06/04/2025 6/4/2024    Colorectal Cancer Screening 10/28/2025 10/28/2022    TETANUS VACCINE 03/01/2034 3/1/2024        No orders of the defined types were placed in this encounter.      The following information is provided to all patients.  This information is to help you find resources for any of the problems found today that may be affecting your health:                  Living healthy guide: www.Count includes the [facility].[place].gov      Understanding Diabetes: www.diabetes.org      Eating healthy: www.cdc.gov/healthyweight      CDC home safety checklist: www.cdc.gov/steadi/patient.html      Agency on Aging: www.goea.[place].[facility]      Alcoholics anonymous (AA): www.aa.org      Physical Activity: www.crispin.nih.gov/oz1upok      Tobacco use: www.quitwithusla.org

## 2024-07-03 ENCOUNTER — OFFICE VISIT (OUTPATIENT)
Dept: OPTOMETRY | Facility: CLINIC | Age: 69
End: 2024-07-03
Payer: MEDICARE

## 2024-07-03 DIAGNOSIS — E11.311 DIABETIC MACULAR EDEMA: Primary | ICD-10-CM

## 2024-07-03 DIAGNOSIS — Z79.4 TYPE 2 DIABETES MELLITUS WITH RIGHT EYE AFFECTED BY SEVERE NONPROLIFERATIVE RETINOPATHY AND MACULAR EDEMA, WITH LONG-TERM CURRENT USE OF INSULIN: ICD-10-CM

## 2024-07-03 DIAGNOSIS — E11.3411 TYPE 2 DIABETES MELLITUS WITH RIGHT EYE AFFECTED BY SEVERE NONPROLIFERATIVE RETINOPATHY AND MACULAR EDEMA, WITH LONG-TERM CURRENT USE OF INSULIN: ICD-10-CM

## 2024-07-03 DIAGNOSIS — H52.4 PRESBYOPIA OF BOTH EYES: ICD-10-CM

## 2024-07-03 PROCEDURE — 99999 PR PBB SHADOW E&M-EST. PATIENT-LVL III: CPT | Mod: PBBFAC,HCNC,, | Performed by: OPTOMETRIST

## 2024-07-03 NOTE — PROGRESS NOTES
HPI    LAURA: 05/21/2024 - Dr. Mcmanus  Last DFE: 05/21/2024  Chief complaint (CC): 68 year old M patient presents for cloudy vision. Pt   noticed cloudy vision about two weeks ago. No ocular pain. Patient states   Dr. Mcmanus, stated there were cataracts forming.   Glasses? Yes  Contacts? No  H/o eye surgery, injections or laser: Avastin OD, Eylea OD, Ozurdex OD  H/o eye injury: No  Known eye conditions?    Severe NPDR OD, not high risk PDR OS   DME OU   HTN Ret OU   NS OU   Cyst RLL  Family h/o eye conditions? Unknown  Eye gtts? Lumify (Once every Sunday)   Systane (Once a week)    (-) Flashes (-)  Floaters (-) Mucous   (-)  Tearing (-) Itching (-) Burning   (-) Headaches (-) Eye Pain/discomfort (-) Irritation   (-)  Redness (-) Double vision (+) Blurry vision    Diabetic? +  A1c? Lab Results       Component                Value               Date                       HGBA1C                   5.3                 03/23/2024                  Last edited by Himanshu Gruber, OD on 7/3/2024  4:37 PM.            Assessment /Plan     For exam results, see Encounter Report.    Diabetic macular edema of both eyes  Presbyopia of both eyes   - Pt presented today with c/o cloudy vision. BCVA stable OD 20/60 OS 20/25   - OCT macula reveals mild increase in foveal thickness compared to last visit OU    - Pt reassured and advised to follow up with Dr. Mcmanus with concerns about new injection    - Pt scheduled for follow up on 7/23/24, pt advised not to miss appointment

## 2024-07-12 ENCOUNTER — LAB VISIT (OUTPATIENT)
Dept: LAB | Facility: HOSPITAL | Age: 69
End: 2024-07-12
Payer: MEDICARE

## 2024-07-12 DIAGNOSIS — N18.5 CHRONIC KIDNEY DISEASE (CKD), STAGE V: ICD-10-CM

## 2024-07-12 PROCEDURE — 85025 COMPLETE CBC W/AUTO DIFF WBC: CPT | Mod: HCNC,TXP | Performed by: NURSE PRACTITIONER

## 2024-07-12 PROCEDURE — 83970 ASSAY OF PARATHORMONE: CPT | Mod: HCNC,TXP | Performed by: NURSE PRACTITIONER

## 2024-07-12 PROCEDURE — 82306 VITAMIN D 25 HYDROXY: CPT | Mod: HCNC,TXP | Performed by: NURSE PRACTITIONER

## 2024-07-12 PROCEDURE — 36415 COLL VENOUS BLD VENIPUNCTURE: CPT | Mod: HCNC,PO,TXP | Performed by: NURSE PRACTITIONER

## 2024-07-12 PROCEDURE — 80069 RENAL FUNCTION PANEL: CPT | Mod: HCNC,TXP | Performed by: NURSE PRACTITIONER

## 2024-07-13 LAB
25(OH)D3+25(OH)D2 SERPL-MCNC: 22 NG/ML (ref 30–96)
ALBUMIN SERPL BCP-MCNC: 3.1 G/DL (ref 3.5–5.2)
ANION GAP SERPL CALC-SCNC: 12 MMOL/L (ref 8–16)
BASOPHILS # BLD AUTO: 0.01 K/UL (ref 0–0.2)
BASOPHILS NFR BLD: 0.2 % (ref 0–1.9)
BUN SERPL-MCNC: 85 MG/DL (ref 8–23)
CALCIUM SERPL-MCNC: 8.5 MG/DL (ref 8.7–10.5)
CHLORIDE SERPL-SCNC: 106 MMOL/L (ref 95–110)
CO2 SERPL-SCNC: 15 MMOL/L (ref 23–29)
CREAT SERPL-MCNC: 9 MG/DL (ref 0.5–1.4)
DIFFERENTIAL METHOD BLD: ABNORMAL
EOSINOPHIL # BLD AUTO: 0.1 K/UL (ref 0–0.5)
EOSINOPHIL NFR BLD: 1.4 % (ref 0–8)
ERYTHROCYTE [DISTWIDTH] IN BLOOD BY AUTOMATED COUNT: 16.8 % (ref 11.5–14.5)
EST. GFR  (NO RACE VARIABLE): 5.9 ML/MIN/1.73 M^2
GLUCOSE SERPL-MCNC: 80 MG/DL (ref 70–110)
HCT VFR BLD AUTO: 29.2 % (ref 40–54)
HGB BLD-MCNC: 8.8 G/DL (ref 14–18)
IMM GRANULOCYTES # BLD AUTO: 0.07 K/UL (ref 0–0.04)
IMM GRANULOCYTES NFR BLD AUTO: 1.2 % (ref 0–0.5)
LYMPHOCYTES # BLD AUTO: 1.1 K/UL (ref 1–4.8)
LYMPHOCYTES NFR BLD: 18.9 % (ref 18–48)
MCH RBC QN AUTO: 28.9 PG (ref 27–31)
MCHC RBC AUTO-ENTMCNC: 30.1 G/DL (ref 32–36)
MCV RBC AUTO: 96 FL (ref 82–98)
MONOCYTES # BLD AUTO: 0.8 K/UL (ref 0.3–1)
MONOCYTES NFR BLD: 14 % (ref 4–15)
NEUTROPHILS # BLD AUTO: 3.6 K/UL (ref 1.8–7.7)
NEUTROPHILS NFR BLD: 64.3 % (ref 38–73)
NRBC BLD-RTO: 0 /100 WBC
PHOSPHATE SERPL-MCNC: 5.3 MG/DL (ref 2.7–4.5)
PLATELET # BLD AUTO: 291 K/UL (ref 150–450)
PMV BLD AUTO: 12.2 FL (ref 9.2–12.9)
POTASSIUM SERPL-SCNC: 4.7 MMOL/L (ref 3.5–5.1)
PTH-INTACT SERPL-MCNC: 209.4 PG/ML (ref 9–77)
RBC # BLD AUTO: 3.05 M/UL (ref 4.6–6.2)
SODIUM SERPL-SCNC: 133 MMOL/L (ref 136–145)
WBC # BLD AUTO: 5.65 K/UL (ref 3.9–12.7)

## 2024-07-14 DIAGNOSIS — N18.5 CHRONIC KIDNEY DISEASE (CKD), STAGE V: ICD-10-CM

## 2024-07-14 DIAGNOSIS — I10 HYPERTENSION, UNSPECIFIED TYPE: ICD-10-CM

## 2024-07-14 DIAGNOSIS — N17.9 AKI (ACUTE KIDNEY INJURY): Primary | ICD-10-CM

## 2024-07-15 ENCOUNTER — LAB VISIT (OUTPATIENT)
Dept: LAB | Facility: HOSPITAL | Age: 69
End: 2024-07-15
Payer: MEDICARE

## 2024-07-15 DIAGNOSIS — N18.5 CHRONIC KIDNEY DISEASE (CKD), STAGE V: ICD-10-CM

## 2024-07-15 DIAGNOSIS — N17.9 AKI (ACUTE KIDNEY INJURY): ICD-10-CM

## 2024-07-15 LAB
ALBUMIN SERPL BCP-MCNC: 3.1 G/DL (ref 3.5–5.2)
ANION GAP SERPL CALC-SCNC: 8 MMOL/L (ref 8–16)
BASOPHILS # BLD AUTO: 0.02 K/UL (ref 0–0.2)
BASOPHILS NFR BLD: 0.3 % (ref 0–1.9)
BUN SERPL-MCNC: 75 MG/DL (ref 8–23)
CALCIUM SERPL-MCNC: 8.6 MG/DL (ref 8.7–10.5)
CHLORIDE SERPL-SCNC: 113 MMOL/L (ref 95–110)
CO2 SERPL-SCNC: 17 MMOL/L (ref 23–29)
CREAT SERPL-MCNC: 8 MG/DL (ref 0.5–1.4)
DIFFERENTIAL METHOD BLD: ABNORMAL
EOSINOPHIL # BLD AUTO: 0.1 K/UL (ref 0–0.5)
EOSINOPHIL NFR BLD: 1 % (ref 0–8)
ERYTHROCYTE [DISTWIDTH] IN BLOOD BY AUTOMATED COUNT: 16.1 % (ref 11.5–14.5)
EST. GFR  (NO RACE VARIABLE): 6.8 ML/MIN/1.73 M^2
GLUCOSE SERPL-MCNC: 108 MG/DL (ref 70–110)
HCT VFR BLD AUTO: 26.7 % (ref 40–54)
HGB BLD-MCNC: 8.6 G/DL (ref 14–18)
IMM GRANULOCYTES # BLD AUTO: 0.08 K/UL (ref 0–0.04)
IMM GRANULOCYTES NFR BLD AUTO: 1.3 % (ref 0–0.5)
LYMPHOCYTES # BLD AUTO: 1.1 K/UL (ref 1–4.8)
LYMPHOCYTES NFR BLD: 18.7 % (ref 18–48)
MCH RBC QN AUTO: 29.5 PG (ref 27–31)
MCHC RBC AUTO-ENTMCNC: 32.2 G/DL (ref 32–36)
MCV RBC AUTO: 91 FL (ref 82–98)
MONOCYTES # BLD AUTO: 0.6 K/UL (ref 0.3–1)
MONOCYTES NFR BLD: 10.5 % (ref 4–15)
NEUTROPHILS # BLD AUTO: 4 K/UL (ref 1.8–7.7)
NEUTROPHILS NFR BLD: 68.2 % (ref 38–73)
NRBC BLD-RTO: 0 /100 WBC
PHOSPHATE SERPL-MCNC: 4.7 MG/DL (ref 2.7–4.5)
PLATELET # BLD AUTO: 359 K/UL (ref 150–450)
PMV BLD AUTO: 10.6 FL (ref 9.2–12.9)
POTASSIUM SERPL-SCNC: 5.1 MMOL/L (ref 3.5–5.1)
RBC # BLD AUTO: 2.92 M/UL (ref 4.6–6.2)
SODIUM SERPL-SCNC: 138 MMOL/L (ref 136–145)
WBC # BLD AUTO: 5.93 K/UL (ref 3.9–12.7)

## 2024-07-15 PROCEDURE — 36415 COLL VENOUS BLD VENIPUNCTURE: CPT | Mod: HCNC,PO,TXP | Performed by: NURSE PRACTITIONER

## 2024-07-15 PROCEDURE — 80069 RENAL FUNCTION PANEL: CPT | Mod: HCNC,TXP | Performed by: NURSE PRACTITIONER

## 2024-07-15 PROCEDURE — 85025 COMPLETE CBC W/AUTO DIFF WBC: CPT | Mod: HCNC,TXP | Performed by: NURSE PRACTITIONER

## 2024-07-15 RX ORDER — NIFEDIPINE 90 MG/1
90 TABLET, EXTENDED RELEASE ORAL
Qty: 90 TABLET | Refills: 0 | Status: ON HOLD | OUTPATIENT
Start: 2024-07-15 | End: 2024-07-18

## 2024-07-15 NOTE — TELEPHONE ENCOUNTER
Called patient after seeing sCr of 9.0/ BUN of 85.  He says he feels fine. Denies uremic symptoms.  He agreed to repeat labs today.  Advised to go to ER if he begins to feel bad.

## 2024-07-16 ENCOUNTER — TELEPHONE (OUTPATIENT)
Dept: NEPHROLOGY | Facility: CLINIC | Age: 69
End: 2024-07-16
Payer: MEDICARE

## 2024-07-16 ENCOUNTER — HOSPITAL ENCOUNTER (INPATIENT)
Facility: HOSPITAL | Age: 69
LOS: 2 days | Discharge: HOME | DRG: 683 | End: 2024-07-18
Attending: EMERGENCY MEDICINE | Admitting: HOSPITALIST
Payer: MEDICARE

## 2024-07-16 DIAGNOSIS — I10 HYPERTENSION, UNSPECIFIED TYPE: ICD-10-CM

## 2024-07-16 DIAGNOSIS — E83.51 HYPOCALCEMIA: ICD-10-CM

## 2024-07-16 DIAGNOSIS — N17.9 ACUTE RENAL FAILURE: ICD-10-CM

## 2024-07-16 DIAGNOSIS — R00.1 SINUS BRADYCARDIA: ICD-10-CM

## 2024-07-16 DIAGNOSIS — N17.9 ACUTE KIDNEY INJURY SUPERIMPOSED ON CKD: Primary | ICD-10-CM

## 2024-07-16 DIAGNOSIS — N18.9 ACUTE KIDNEY INJURY SUPERIMPOSED ON CKD: Primary | ICD-10-CM

## 2024-07-16 PROBLEM — N18.4 ACUTE RENAL FAILURE SUPERIMPOSED ON STAGE 4 CHRONIC KIDNEY DISEASE: Status: ACTIVE | Noted: 2024-07-16

## 2024-07-16 LAB
ALBUMIN SERPL BCP-MCNC: 2.4 G/DL (ref 3.5–5.2)
ALP SERPL-CCNC: 25 U/L (ref 55–135)
ALT SERPL W/O P-5'-P-CCNC: 5 U/L (ref 10–44)
ANION GAP SERPL CALC-SCNC: 6 MMOL/L (ref 8–16)
AST SERPL-CCNC: 7 U/L (ref 10–40)
BILIRUB SERPL-MCNC: 0.4 MG/DL (ref 0.1–1)
BUN SERPL-MCNC: 60 MG/DL (ref 8–23)
CALCIUM SERPL-MCNC: 6.6 MG/DL (ref 8.7–10.5)
CHLORIDE SERPL-SCNC: 120 MMOL/L (ref 95–110)
CO2 SERPL-SCNC: 13 MMOL/L (ref 23–29)
CREAT SERPL-MCNC: 6.1 MG/DL (ref 0.5–1.4)
ERYTHROCYTE [DISTWIDTH] IN BLOOD BY AUTOMATED COUNT: 16.4 % (ref 11.5–14.5)
EST. GFR  (NO RACE VARIABLE): 9.4 ML/MIN/1.73 M^2
GLUCOSE SERPL-MCNC: 72 MG/DL (ref 70–110)
HCT VFR BLD AUTO: 27.9 % (ref 40–54)
HGB BLD-MCNC: 9.1 G/DL (ref 14–18)
MCH RBC QN AUTO: 29.8 PG (ref 27–31)
MCHC RBC AUTO-ENTMCNC: 32.6 G/DL (ref 32–36)
MCV RBC AUTO: 92 FL (ref 82–98)
OHS QRS DURATION: 96 MS
OHS QTC CALCULATION: 424 MS
PLATELET # BLD AUTO: 388 K/UL (ref 150–450)
PMV BLD AUTO: 10.9 FL (ref 9.2–12.9)
POTASSIUM SERPL-SCNC: 4.1 MMOL/L (ref 3.5–5.1)
PROT SERPL-MCNC: 5.5 G/DL (ref 6–8.4)
RBC # BLD AUTO: 3.05 M/UL (ref 4.6–6.2)
SODIUM SERPL-SCNC: 139 MMOL/L (ref 136–145)
WBC # BLD AUTO: 6.92 K/UL (ref 3.9–12.7)

## 2024-07-16 PROCEDURE — 21400001 HC TELEMETRY ROOM: Mod: HCNC,NTX

## 2024-07-16 PROCEDURE — 63600175 PHARM REV CODE 636 W HCPCS: Mod: HCNC,NTX | Performed by: HOSPITALIST

## 2024-07-16 PROCEDURE — 11000001 HC ACUTE MED/SURG PRIVATE ROOM: Mod: HCNC,NTX

## 2024-07-16 PROCEDURE — 85027 COMPLETE CBC AUTOMATED: CPT | Mod: HCNC,NTX | Performed by: EMERGENCY MEDICINE

## 2024-07-16 PROCEDURE — 93005 ELECTROCARDIOGRAM TRACING: CPT | Mod: HCNC,NTX

## 2024-07-16 PROCEDURE — 80053 COMPREHEN METABOLIC PANEL: CPT | Mod: HCNC,NTX | Performed by: EMERGENCY MEDICINE

## 2024-07-16 PROCEDURE — 25000003 PHARM REV CODE 250: Mod: HCNC,NTX | Performed by: HOSPITALIST

## 2024-07-16 PROCEDURE — 96365 THER/PROPH/DIAG IV INF INIT: CPT | Mod: HCNC,NTX

## 2024-07-16 PROCEDURE — 99285 EMERGENCY DEPT VISIT HI MDM: CPT | Mod: 25,HCNC,NTX

## 2024-07-16 PROCEDURE — 96361 HYDRATE IV INFUSION ADD-ON: CPT | Mod: HCNC,NTX

## 2024-07-16 PROCEDURE — 25000003 PHARM REV CODE 250: Mod: HCNC,NTX | Performed by: EMERGENCY MEDICINE

## 2024-07-16 PROCEDURE — 93010 ELECTROCARDIOGRAM REPORT: CPT | Mod: HCNC,NTX,, | Performed by: INTERNAL MEDICINE

## 2024-07-16 RX ORDER — IBUPROFEN 200 MG
16 TABLET ORAL
Status: DISCONTINUED | OUTPATIENT
Start: 2024-07-16 | End: 2024-07-18 | Stop reason: HOSPADM

## 2024-07-16 RX ORDER — SODIUM BICARBONATE 325 MG/1
650 TABLET ORAL 2 TIMES DAILY
Status: DISCONTINUED | OUTPATIENT
Start: 2024-07-16 | End: 2024-07-18 | Stop reason: HOSPADM

## 2024-07-16 RX ORDER — NALOXONE HCL 0.4 MG/ML
0.02 VIAL (ML) INJECTION
Status: DISCONTINUED | OUTPATIENT
Start: 2024-07-16 | End: 2024-07-18 | Stop reason: HOSPADM

## 2024-07-16 RX ORDER — HEPARIN SODIUM 5000 [USP'U]/ML
5000 INJECTION, SOLUTION INTRAVENOUS; SUBCUTANEOUS EVERY 8 HOURS
Status: DISCONTINUED | OUTPATIENT
Start: 2024-07-16 | End: 2024-07-18 | Stop reason: HOSPADM

## 2024-07-16 RX ORDER — CALCIUM GLUCONATE 20 MG/ML
1 INJECTION, SOLUTION INTRAVENOUS
Status: COMPLETED | OUTPATIENT
Start: 2024-07-16 | End: 2024-07-16

## 2024-07-16 RX ORDER — ACETAMINOPHEN 325 MG/1
650 TABLET ORAL EVERY 4 HOURS PRN
Status: DISCONTINUED | OUTPATIENT
Start: 2024-07-16 | End: 2024-07-18 | Stop reason: HOSPADM

## 2024-07-16 RX ORDER — IBUPROFEN 200 MG
24 TABLET ORAL
Status: DISCONTINUED | OUTPATIENT
Start: 2024-07-16 | End: 2024-07-18 | Stop reason: HOSPADM

## 2024-07-16 RX ORDER — TALC
6 POWDER (GRAM) TOPICAL NIGHTLY PRN
Status: DISCONTINUED | OUTPATIENT
Start: 2024-07-16 | End: 2024-07-18 | Stop reason: HOSPADM

## 2024-07-16 RX ORDER — ONDANSETRON HYDROCHLORIDE 2 MG/ML
4 INJECTION, SOLUTION INTRAVENOUS EVERY 8 HOURS PRN
Status: DISCONTINUED | OUTPATIENT
Start: 2024-07-16 | End: 2024-07-18 | Stop reason: HOSPADM

## 2024-07-16 RX ORDER — PROCHLORPERAZINE EDISYLATE 5 MG/ML
5 INJECTION INTRAMUSCULAR; INTRAVENOUS EVERY 6 HOURS PRN
Status: DISCONTINUED | OUTPATIENT
Start: 2024-07-16 | End: 2024-07-18 | Stop reason: HOSPADM

## 2024-07-16 RX ORDER — GLUCAGON 1 MG
1 KIT INJECTION
Status: DISCONTINUED | OUTPATIENT
Start: 2024-07-16 | End: 2024-07-18 | Stop reason: HOSPADM

## 2024-07-16 RX ORDER — SODIUM CHLORIDE 0.9 % (FLUSH) 0.9 %
10 SYRINGE (ML) INJECTION
Status: DISCONTINUED | OUTPATIENT
Start: 2024-07-16 | End: 2024-07-18 | Stop reason: HOSPADM

## 2024-07-16 RX ADMIN — CALCIUM GLUCONATE 1 G: 20 INJECTION, SOLUTION INTRAVENOUS at 05:07

## 2024-07-16 RX ADMIN — SODIUM BICARBONATE 650 MG: 325 TABLET ORAL at 11:07

## 2024-07-16 RX ADMIN — SODIUM CHLORIDE 500 ML: 9 INJECTION, SOLUTION INTRAVENOUS at 03:07

## 2024-07-16 NOTE — ED PROVIDER NOTES
Emergency Department Encounter  Provider Note    Handy Adams  [id number]  7/16/2024    Evaluation:    History Acquisition:     Chief Complaint   Patient presents with    Abnormal Lab     Elevated kidney function lab drawn yest       History of Present Illness:  Handy Adams is a 68 y.o. male who has a past medical history of Anemia, Disorder of kidney and ureter, Edema leg, History of rotator cuff tear, History of seasonal allergies, HTN (hypertension), colonic polyps, Hyperlipemia, MGUS (monoclonal gammopathy of unknown significance), NS (nuclear sclerosis), bilateral (06/25/2019), Obesity, 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 (11/17/2017), and Type 2 diabetes mellitus.    The patient presents to the ED due to abnormal labs.   Patient reports he had labs drawn yesterday and was told his kidney function was abnormal.  He denies any CP, SOB, abdominal pain.  He does report diarrheal illness last week that has since resolved.  He has no other complaints.    Additional historians utilized:  none    Prior medical records were reviewed:    Labs obtained yesterday.  Cr 8.0, K 5.1.   Optometry visit 07/03 for diabetic  macular edema   Nephrology visit 04/23 for follow-up CKD 4    The patient's list of active medical history, family/social history, medications, and allergies as documented has been reviewed.     Past Medical History:   Diagnosis Date    Anemia     Disorder of kidney and ureter     Edema leg     History of rotator cuff tear     History of seasonal allergies     HTN (hypertension)     Hx of colonic polyps     Hyperlipemia     MGUS (monoclonal gammopathy of unknown significance)     NS (nuclear sclerosis), bilateral 06/25/2019    Obesity     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 11/17/2017    Type 2 diabetes mellitus      Past Surgical History:    Procedure Laterality Date    COLONOSCOPY N/A 10/28/2022    Procedure: COLONOSCOPY;  Surgeon: Guanako Sanchez MD;  Location: [facility] ENDO;  Service: Endoscopy;  Laterality: N/A;  REFENDO placed per Dr Sanderson. case request entered     vaccinated-GT  instr portal    MOUTH SURGERY      RENAL BIOPSY Left 7/19/2023    Procedure: BIOPSY, KIDNEY;  Surgeon: Nikky Soriano MD;  Location: [facility] CATH LAB;  Service: Interventional Nephrology;  Laterality: Left;     Family History   Problem Relation Name Age of Onset    Cancer Mother          Lung Cancer    Cancer Father          Colon cancer    Diabetes Father      Hypertension Father      No Known Problems Sister      No Known Problems Brother      No Known Problems Maternal Aunt      No Known Problems Maternal Uncle      No Known Problems Paternal Aunt      No Known Problems Paternal Uncle      No Known Problems Maternal Grandmother      No Known Problems Maternal Grandfather      No Known Problems Paternal Grandmother      No Known Problems Paternal Grandfather      Amblyopia Neg Hx      Blindness Neg Hx      Cataracts Neg Hx      Glaucoma Neg Hx      Macular degeneration Neg Hx      Retinal detachment Neg Hx      Strabismus Neg Hx      Stroke Neg Hx      Thyroid disease Neg Hx       Social History     Socioeconomic History    Marital status: Significant Other     Spouse name: Daniella Adams    Number of children: 2    Highest education level: Master's degree (e.g., MA, MS, Sandra, MEd, MSW, CORTEZ)   Tobacco Use    Smoking status: Former     Current packs/day: 0.50     Types: Cigarettes    Smokeless tobacco: Never   Substance and Sexual Activity    Alcohol use: Yes     Comment: rarely    Drug use: Not Currently    Sexual activity: Yes     Partners: Female   Social History Narrative    Caregiver S/O Daniella     Social Determinants of Health     Financial Resource Strain: Low Risk  (6/26/2024)    Overall Financial Resource Strain (CARDIA)     Difficulty of Paying Living  Expenses: Not hard at all   Food Insecurity: No Food Insecurity (6/26/2024)    Hunger Vital Sign     Worried About Running Out of Food in the Last Year: Never true     Ran Out of Food in the Last Year: Never true   Transportation Needs: No Transportation Needs (6/26/2024)    PRAPARE - Transportation     Lack of Transportation (Medical): No     Lack of Transportation (Non-Medical): No   Physical Activity: Insufficiently Active (6/26/2024)    Exercise Vital Sign     Days of Exercise per Week: 1 day     Minutes of Exercise per Session: 60 min   Stress: No Stress Concern Present (6/26/2024)    [employer] [place] - Occupational Stress Questionnaire     Feeling of Stress : Not at all   Housing Stability: Low Risk  (6/26/2024)    Housing Stability Vital Sign     Unable to Pay for Housing in the Last Year: No     Homeless in the Last Year: No       Medications:  Current Discharge Medication List        CONTINUE these medications which have NOT CHANGED    Details   atorvastatin (LIPITOR) 20 MG tablet Take 1 tablet (20 mg total) by mouth once daily.  Qty: 90 tablet, Refills: 2    Associated Diagnoses: Mixed hyperlipidemia      blood sugar diagnostic Strp To test twice daily  Qty: 100 each, Refills: 3    Associated Diagnoses: Uncontrolled type 2 diabetes mellitus with both eyes affected by proliferative retinopathy and macular edema, without long-term current use of insulin      blood-glucose meter kit Use as instructed  Qty: 1 each, Refills: 0    Associated Diagnoses: Uncontrolled type 2 diabetes mellitus with both eyes affected by proliferative retinopathy and macular edema, without long-term current use of insulin      calcitRIOL (ROCALTROL) 0.25 MCG Cap Take 1 capsule (0.25 mcg total) by mouth 3 (three) times a week. Monday, Wednesday, Friday  Qty: 36 capsule, Refills: 3    Associated Diagnoses: Secondary hyperparathyroidism      ergocalciferol (ERGOCALCIFEROL) 50,000 unit Cap Take 1 capsule by mouth  once a week  Qty: 12 capsule, Refills: 0      FEROSUL 325 mg (65 mg iron) Tab tablet Take 1 tablet (325 mg total) by mouth 3 (three) times daily.  Qty: 270 tablet, Refills: 3      hepatitis B vacc-CpG 1018, PF, (HEPLISAV-B, PF,) 20 mcg/0.5 mL Syrg adm by pharmicist  Qty: 0.5 mL, Refills: 1      !! labetaloL (NORMODYNE) 200 MG tablet Take 1 tablet by mouth twice daily  Qty: 180 tablet, Refills: 0    Comments: .  Associated Diagnoses: Hypertension, unspecified type      !! labetaloL (NORMODYNE) 200 MG tablet Take 1 tablet (200 mg total) by mouth 2 (two) times daily.  Qty: 180 tablet, Refills: 1    Comments: .  Associated Diagnoses: Hypertension, unspecified type      lancets Misc 1 lancet by Misc.(Non-Drug; Combo Route) route 2 (two) times daily with meals.  Qty: 100 each, Refills: 11    Associated Diagnoses: Uncontrolled type 2 diabetes mellitus with both eyes affected by proliferative retinopathy and macular edema, without long-term current use of insulin      linaGLIPtin (TRADJENTA) 5 mg Tab tablet Take 1 tablet (5 mg total) by mouth once daily.  Qty: 90 tablet, Refills: 2    Associated Diagnoses: Controlled type 2 diabetes mellitus with complication, without long-term current use of insulin      NIFEdipine (PROCARDIA-XL) 90 MG (OSM) 24 hr tablet Take 1 tablet by mouth once daily  Qty: 90 tablet, Refills: 0    Comments: .  Associated Diagnoses: Hypertension, unspecified type      pioglitazone (ACTOS) 30 MG tablet Take 1 tablet (30 mg total) by mouth once daily.  Qty: 90 tablet, Refills: 2    Associated Diagnoses: Controlled type 2 diabetes mellitus with complication, without long-term current use of insulin      pneumoc 20-carlos conj-dip cr,PF, (PREVNAR-20, PF,) 0.5 mL Syrg injection Inject into the muscle.  Qty: 0.5 mL, Refills: 0      sildenafiL (VIAGRA) 100 MG tablet Take 1 tablet (100 mg total) by mouth daily as needed for Erectile Dysfunction.  Qty: 10 tablet, Refills: 11    Associated Diagnoses: Other male  erectile dysfunction      sodium bicarbonate 650 MG tablet Take 1 tablet (650 mg total) by mouth 2 (two) times daily.  Qty: 180 tablet, Refills: 3      spironolactone (ALDACTONE) 50 MG tablet Take 1 tablet (50 mg total) by mouth once daily.  Qty: 90 tablet, Refills: 3    Comments: .      torsemide (DEMADEX) 10 MG Tab Take 1 tablet (10 mg total) by mouth once daily.  Qty: 90 tablet, Refills: 3    Comments: .      valsartan (DIOVAN) 320 MG tablet Take 1 tablet (320 mg total) by mouth once daily.  Qty: 90 tablet, Refills: 3    Comments: .  Associated Diagnoses: Chronic kidney disease, stage 4 (severe); Proteinuria, unspecified type      varicella-zoster [id number], PF, (SHINGRIX) 50 mcg/0.5 mL injection Inject into the muscle.  Qty: 1 each, Refills: 1       !! - Potential duplicate medications found. Please discuss with provider.          Allergies:  Review of patient's allergies indicates:  No Known Allergies    Review of Systems   Gastrointestinal:  Positive for diarrhea (resolved).         Physical Exam:     Initial Vitals [07/16/24 1411]   BP Pulse Resp Temp SpO2   128/73 67 18 98.5 °F (36.9 °C) 100 %      MAP       --         Physical Exam    Nursing note and vitals reviewed.  Constitutional: He appears well-developed and well-nourished. He is not diaphoretic. No distress.   HENT:   Head: Normocephalic and atraumatic.   Mouth/Throat: Oropharynx is clear and moist.   Eyes: EOM are normal. Pupils are equal, round, and reactive to light.   Neck: No tracheal deviation present.   Cardiovascular:  Normal rate, regular rhythm, normal heart sounds and intact distal pulses.           Pulmonary/Chest: Breath sounds normal. No stridor. No respiratory distress.   Abdominal: Abdomen is soft. He exhibits no distension and no mass. There is no abdominal tenderness.   Musculoskeletal:         General: No edema. Normal range of motion.     Neurological: He is alert and oriented to person, place, and time. No cranial nerve deficit or  sensory deficit.   Skin: Skin is warm and dry. Capillary refill takes less than 2 seconds. No rash noted.   Psychiatric: He has a normal mood and affect. His behavior is normal. Thought content normal.         Differential Diagnoses:   Based on available information and initial assessment, Differential Diagnosis includes, but is not limited to:  Sepsis/bacteremia, infection (UTI, pneumonia, cellulitis/abscess, indwelling line/catheter infection), shock (cardiogenic/neurogenic/hypovolemic), anemia, orthostasis/vasovagal episode, viral syndrome, gastroenteritis, vomiting/diarrhea/dehydration, drug/medication reaction, overdose, intoxication/withdrawal syndrome.      ED Management:   Procedures    Orders Placed This Encounter    X-Ray Chest AP Portable    US Retroperitoneal Complete    CBC Without Differential    Comprehensive metabolic panel    Comprehensive Metabolic Panel (CMP)    CBC with Automated Differential    Magnesium    Phosphorus    Diet Renal    Cardiac Monitoring - Adult    Vital signs    Bladder scan    Notify Physician    Place sequential compression device    Strict intake and output    Strict intake and output    Full code    Inpatient consult to Nephrology    Cardiac monitoring strips    EKG 12-lead    Insert peripheral IV    Saline lock IV    Possible Hospitalization    Admit to Inpatient    POCT glucose    POCT glucose    sodium chloride 0.9% bolus 500 mL 500 mL    calcium gluconate 1 g in NS IVPB (premixed)    sodium chloride 0.9% flush 10 mL    melatonin tablet 6 mg    ondansetron injection 4 mg    prochlorperazine injection Soln 5 mg    acetaminophen tablet 650 mg    naloxone 0.4 mg/mL injection 0.02 mg    glucose chewable tablet 16 g    glucose chewable tablet 24 g    glucagon (human recombinant) injection 1 mg    heparin (porcine) injection 5,000 Units    dextrose 10% bolus 125 mL 125 mL    dextrose 10% bolus 250 mL 250 mL    sodium bicarbonate tablet 650 mg    atorvastatin tablet 20 mg     "calcitRIOL capsule 0.25 mcg    labetaloL tablet 200 mg    NIFEdipine 24 hr tablet 90 mg    tamsulosin 24 hr capsule 0.4 mg    sodium zirconium cyclosilicate packet 5 g    IP VTE HIGH RISK PATIENT    Progressive Mobility Protocol (mobilize patient to their highest level of functioning at least twice daily)          EKG:   EKG interpretation by ED attending physician:  NSR, rate 62, 1st degree AV block, no ST changes, no ischemia, normal intervals.  Compared with prior EKG dated 03/2024, grossly stable without significant change.      Labs:     Labs Reviewed   CBC WITHOUT DIFFERENTIAL - Abnormal; Notable for the following components:       Result Value    RBC 3.05 (*)     Hemoglobin 9.1 (*)     Hematocrit 27.9 (*)     RDW 16.4 (*)     All other components within normal limits   COMPREHENSIVE METABOLIC PANEL - Abnormal; Notable for the following components:    Chloride 120 (*)     CO2 13 (*)     BUN 60 (*)     Creatinine 6.1 (*)     Calcium 6.6 (*)     Total Protein 5.5 (*)     Albumin 2.4 (*)     Alkaline Phosphatase 25 (*)     AST 7 (*)     ALT 5 (*)     eGFR 9.4 (*)     Anion Gap 6 (*)     All other components within normal limits     Independent review of the labs ordered include:   See ED course    Imaging:     Imaging Results              X-Ray Chest AP Portable (Final result)  Result time 07/16/24 23:28:31      Final result by Medardo Larios MD (07/16/24 23:28:31)                   Impression:      Borderline enlargement of the cardiomediastinal silhouette and possible trace bilateral pleural fluid.  No focal consolidation identified on this single view.      Electronically signed by: Medardo Larios MD  Date:    07/16/2024  Time:    23:28               Narrative:    EXAMINATION:  XR CHEST AP PORTABLE    CLINICAL HISTORY:  Provided history is "Shortness of breath;  ".    TECHNIQUE:  One view of the chest.    COMPARISON:  02/07/2024.    FINDINGS:  Cardiac wires overlie the chest.  Cardiomediastinal " silhouette is magnified by portable technique and likely at the upper limits of normal in size.  No confluent area of consolidation.  Question trace bilateral pleural fluid.  No large pleural effusion.  No distinct pneumothorax.                                    X-Rays:   Independently Interpreted Readings:   Other Readings:  CXR independently interpreted: no focal infiltrate, effusion, edema, free air, or other acute process.  Agree with radiologist interpretation.         Medications Given:     Medications   sodium chloride 0.9% flush 10 mL (has no administration in time range)   melatonin tablet 6 mg (has no administration in time range)   ondansetron injection 4 mg (has no administration in time range)   prochlorperazine injection Soln 5 mg (has no administration in time range)   acetaminophen tablet 650 mg (has no administration in time range)   naloxone 0.4 mg/mL injection 0.02 mg (has no administration in time range)   glucose chewable tablet 16 g (has no administration in time range)   glucose chewable tablet 24 g (has no administration in time range)   glucagon (human recombinant) injection 1 mg (has no administration in time range)   heparin (porcine) injection 5,000 Units (5,000 Units Subcutaneous Not Given 7/17/24 0600)   dextrose 10% bolus 125 mL 125 mL (has no administration in time range)   dextrose 10% bolus 250 mL 250 mL (has no administration in time range)   sodium bicarbonate tablet 650 mg (650 mg Oral Given 7/17/24 0943)   atorvastatin tablet 20 mg (20 mg Oral Given 7/17/24 0943)   calcitRIOL capsule 0.25 mcg (0.25 mcg Oral Given 7/17/24 0945)   labetaloL tablet 200 mg (200 mg Oral Given 7/17/24 0943)   NIFEdipine 24 hr tablet 90 mg (90 mg Oral Given 7/17/24 0943)   tamsulosin 24 hr capsule 0.4 mg (0.4 mg Oral Not Given 7/17/24 0045)   sodium zirconium cyclosilicate packet 5 g (5 g Oral Given 7/17/24 0943)   sodium chloride 0.9% bolus 500 mL 500 mL (0 mLs Intravenous Stopped 7/16/24 1730)    calcium gluconate 1 g in NS IVPB (premixed) (0 g Intravenous Stopped 7/16/24 1832)        Medical Decision Making:    Additional Consideration:   Additional testing considered during clinical course: none    Social determinants of health considered during development of treatment plan include: poor access to care    Current co-morbidities considered which impacted clinical decision making: HTN, DM, CKD, obesity, MGUS, anemia    Case discussed with additional provider:  service, Dr. Orozco, will evaluate and admit for further medical management     ED Course as of 07/17/24 1146   Tue Jul 16, 2024   1621 SpO2: 100 % [SS]   1621 Resp: 18 [SS]   1621 Pulse: 67 [SS]   1621 Temp Source: Oral [SS]   1621 Temp: 98.5 °F (36.9 °C) [SS]   1621 BP: 128/73  Vitals reassuring  [SS]   1621 CBC Without Differential(!)  Chronic anemia, stable  [SS]   1740 Comprehensive metabolic panel(!!)  Cr 6.1, Ca low, acidotic  [SS]      ED Course User Index  [SS] Alexis Currie MD            Medical Decision Making  67 yo M with HTN, DM, CKD, obesity, MGUS, anemia presents to ED with abnormal labs obtained a few days ago. Cr 9.0 from baseline 4.5.   Labs repeated, Cr remains elevated but improved from prior. Ca low, IV calcium given.   Vitals stable.  contacted for admission for monitoring renal function and further management of acute renal failure.    Problems Addressed:  Acute kidney injury superimposed on CKD: acute illness or injury  Acute renal failure: acute illness or injury  Hypocalcemia: complicated acute illness or injury with systemic symptoms that poses a threat to life or bodily functions    Amount and/or Complexity of Data Reviewed  External Data Reviewed: notes.  Labs: ordered. Decision-making details documented in ED Course.  ECG/medicine tests: ordered and independent interpretation performed. Decision-making details documented in ED Course.    Risk  Decision regarding hospitalization.  Diagnosis or treatment  significantly limited by social determinants of health.        Clinical Impression:       ICD-10-CM ICD-9-CM   1. Acute kidney injury superimposed on CKD  N17.9 584.9    N18.9 585.9   2. Acute renal failure  N17.9 584.9   3. Hypocalcemia  E83.51 275.41         Follow-up Information    None          ED Disposition Condition    Admit Stable              On re-evaluation, the patient's status has remained stable.  At this time, I believe the patient should be admitted to the hospital for further evaluation and management.  The consulting physician/team agrees with plan and will admit under their service.   The patient and family were updated with test results, overall impression, and further plan of care. All questions were answered.       Alexis Currie MD  07/17/24 114

## 2024-07-16 NOTE — ED TRIAGE NOTES
Pt arrives to ED after having an abnormal kidney function lab. Pt was referred to come to ED by his nurse practitioner.  PT states does not have any  complaints. Denies CP and SOB .

## 2024-07-16 NOTE — TELEPHONE ENCOUNTER
Spoke to pt. He says he feels fine.  Concerned about needing IVF for YESENIA vs. Progression of CKD.  Recommended he report to ER. He agreed.    Report called to Sonali SOSA.

## 2024-07-16 NOTE — Clinical Note
Diagnosis: Acute kidney injury superimposed on CKD [[id number]]   Future Attending Provider: MATA HENSLEY [[id number]]   Reason for IP Medical Treatment  (Clinical interventions that can only be accomplished in the IP setting? ) :: IVF, YESENIA   I certify that Inpatient services for greater than or equal to 2 midnights are medically necessary:: Yes   Plans for Post-Acute care--if anticipated (pick the single best option):: A. No post acute care anticipated at this time

## 2024-07-17 ENCOUNTER — TELEPHONE (OUTPATIENT)
Dept: NEPHROLOGY | Facility: CLINIC | Age: 69
End: 2024-07-17
Payer: MEDICARE

## 2024-07-17 LAB
ALBUMIN SERPL BCP-MCNC: 2.9 G/DL (ref 3.5–5.2)
ALP SERPL-CCNC: 32 U/L (ref 55–135)
ALT SERPL W/O P-5'-P-CCNC: 7 U/L (ref 10–44)
ANION GAP SERPL CALC-SCNC: 9 MMOL/L (ref 8–16)
AST SERPL-CCNC: 8 U/L (ref 10–40)
BASOPHILS # BLD AUTO: 0.03 K/UL (ref 0–0.2)
BASOPHILS NFR BLD: 0.5 % (ref 0–1.9)
BILIRUB SERPL-MCNC: 0.4 MG/DL (ref 0.1–1)
BUN SERPL-MCNC: 71 MG/DL (ref 8–23)
CALCIUM SERPL-MCNC: 8.4 MG/DL (ref 8.7–10.5)
CHLORIDE SERPL-SCNC: 114 MMOL/L (ref 95–110)
CO2 SERPL-SCNC: 18 MMOL/L (ref 23–29)
CREAT SERPL-MCNC: 7.4 MG/DL (ref 0.5–1.4)
DIFFERENTIAL METHOD BLD: ABNORMAL
EOSINOPHIL # BLD AUTO: 0.1 K/UL (ref 0–0.5)
EOSINOPHIL NFR BLD: 1.4 % (ref 0–8)
ERYTHROCYTE [DISTWIDTH] IN BLOOD BY AUTOMATED COUNT: 16 % (ref 11.5–14.5)
EST. GFR  (NO RACE VARIABLE): 7.4 ML/MIN/1.73 M^2
GLUCOSE SERPL-MCNC: 97 MG/DL (ref 70–110)
HCT VFR BLD AUTO: 25.9 % (ref 40–54)
HGB BLD-MCNC: 8.1 G/DL (ref 14–18)
IMM GRANULOCYTES # BLD AUTO: 0.07 K/UL (ref 0–0.04)
IMM GRANULOCYTES NFR BLD AUTO: 1.1 % (ref 0–0.5)
LYMPHOCYTES # BLD AUTO: 1.3 K/UL (ref 1–4.8)
LYMPHOCYTES NFR BLD: 20 % (ref 18–48)
MAGNESIUM SERPL-MCNC: 2.1 MG/DL (ref 1.6–2.6)
MCH RBC QN AUTO: 28.8 PG (ref 27–31)
MCHC RBC AUTO-ENTMCNC: 31.3 G/DL (ref 32–36)
MCV RBC AUTO: 92 FL (ref 82–98)
MONOCYTES # BLD AUTO: 0.7 K/UL (ref 0.3–1)
MONOCYTES NFR BLD: 11.9 % (ref 4–15)
NEUTROPHILS # BLD AUTO: 4.1 K/UL (ref 1.8–7.7)
NEUTROPHILS NFR BLD: 65.1 % (ref 38–73)
NRBC BLD-RTO: 0 /100 WBC
PHOSPHATE SERPL-MCNC: 4.7 MG/DL (ref 2.7–4.5)
PLATELET # BLD AUTO: 330 K/UL (ref 150–450)
PMV BLD AUTO: 10.9 FL (ref 9.2–12.9)
POCT GLUCOSE: 100 MG/DL (ref 70–110)
POCT GLUCOSE: 123 MG/DL (ref 70–110)
POCT GLUCOSE: 137 MG/DL (ref 70–110)
POTASSIUM SERPL-SCNC: 5.7 MMOL/L (ref 3.5–5.1)
PROT SERPL-MCNC: 6.6 G/DL (ref 6–8.4)
RBC # BLD AUTO: 2.81 M/UL (ref 4.6–6.2)
SODIUM SERPL-SCNC: 141 MMOL/L (ref 136–145)
WBC # BLD AUTO: 6.24 K/UL (ref 3.9–12.7)

## 2024-07-17 PROCEDURE — 99233 SBSQ HOSP IP/OBS HIGH 50: CPT | Mod: HCNC,NTX,, | Performed by: INTERNAL MEDICINE

## 2024-07-17 PROCEDURE — 84100 ASSAY OF PHOSPHORUS: CPT | Mod: HCNC,NTX | Performed by: HOSPITALIST

## 2024-07-17 PROCEDURE — 36415 COLL VENOUS BLD VENIPUNCTURE: CPT | Mod: HCNC,NTX | Performed by: HOSPITALIST

## 2024-07-17 PROCEDURE — 85025 COMPLETE CBC W/AUTO DIFF WBC: CPT | Mod: HCNC,NTX | Performed by: HOSPITALIST

## 2024-07-17 PROCEDURE — 25000003 PHARM REV CODE 250: Mod: HCNC,NTX | Performed by: HOSPITALIST

## 2024-07-17 PROCEDURE — 80053 COMPREHEN METABOLIC PANEL: CPT | Mod: HCNC,NTX | Performed by: HOSPITALIST

## 2024-07-17 PROCEDURE — 21400001 HC TELEMETRY ROOM: Mod: HCNC,NTX

## 2024-07-17 PROCEDURE — 25000003 PHARM REV CODE 250: Mod: HCNC,NTX | Performed by: STUDENT IN AN ORGANIZED HEALTH CARE EDUCATION/TRAINING PROGRAM

## 2024-07-17 PROCEDURE — 83735 ASSAY OF MAGNESIUM: CPT | Mod: HCNC,NTX | Performed by: HOSPITALIST

## 2024-07-17 RX ORDER — LABETALOL 200 MG/1
200 TABLET, FILM COATED ORAL 2 TIMES DAILY
Status: DISCONTINUED | OUTPATIENT
Start: 2024-07-17 | End: 2024-07-18

## 2024-07-17 RX ORDER — TAMSULOSIN HYDROCHLORIDE 0.4 MG/1
0.4 CAPSULE ORAL NIGHTLY
Status: DISCONTINUED | OUTPATIENT
Start: 2024-07-17 | End: 2024-07-18 | Stop reason: HOSPADM

## 2024-07-17 RX ORDER — CALCITRIOL 0.25 UG/1
0.25 CAPSULE ORAL
Status: DISCONTINUED | OUTPATIENT
Start: 2024-07-17 | End: 2024-07-18 | Stop reason: HOSPADM

## 2024-07-17 RX ORDER — ATORVASTATIN CALCIUM 20 MG/1
20 TABLET, FILM COATED ORAL DAILY
Status: DISCONTINUED | OUTPATIENT
Start: 2024-07-17 | End: 2024-07-18 | Stop reason: HOSPADM

## 2024-07-17 RX ADMIN — SODIUM ZIRCONIUM CYCLOSILICATE 5 G: 5 POWDER, FOR SUSPENSION ORAL at 09:07

## 2024-07-17 RX ADMIN — SODIUM BICARBONATE 650 MG: 325 TABLET ORAL at 09:07

## 2024-07-17 RX ADMIN — LABETALOL HYDROCHLORIDE 200 MG: 200 TABLET, FILM COATED ORAL at 08:07

## 2024-07-17 RX ADMIN — LABETALOL HYDROCHLORIDE 200 MG: 200 TABLET, FILM COATED ORAL at 09:07

## 2024-07-17 RX ADMIN — SODIUM ZIRCONIUM CYCLOSILICATE 5 G: 5 POWDER, FOR SUSPENSION ORAL at 08:07

## 2024-07-17 RX ADMIN — CALCITRIOL CAPSULES 0.25 MCG 0.25 MCG: 0.25 CAPSULE ORAL at 09:07

## 2024-07-17 RX ADMIN — ATORVASTATIN CALCIUM 20 MG: 20 TABLET, FILM COATED ORAL at 09:07

## 2024-07-17 RX ADMIN — SODIUM BICARBONATE 650 MG: 325 TABLET ORAL at 08:07

## 2024-07-17 RX ADMIN — NIFEDIPINE 90 MG: 60 TABLET, FILM COATED, EXTENDED RELEASE ORAL at 09:07

## 2024-07-17 RX ADMIN — SODIUM ZIRCONIUM CYCLOSILICATE 5 G: 5 POWDER, FOR SUSPENSION ORAL at 02:07

## 2024-07-17 RX ADMIN — TAMSULOSIN HYDROCHLORIDE 0.4 MG: 0.4 CAPSULE ORAL at 08:07

## 2024-07-17 NOTE — NURSING
Nurses Note -- 4 Eyes      7/16/2024   10:43 PM      Skin assessed during: Admit      [x] No Altered Skin Integrity Present    []Prevention Measures Documented      [] Yes- Altered Skin Integrity Present or Discovered   [] LDA Added if Not in Epic (Describe Wound)   [] New Altered Skin Integrity was Present on Admit and Documented in LDA   [] Wound Image Taken    Wound Care Consulted? No    Attending Nurse:  Isa Henderson RN/Staff Member:   Dionisio

## 2024-07-17 NOTE — HPI
67 yo M with PMHx CKD IV and HTN who presents to the ED by referral from his nephrologist for worsening kidney function. Pt. Had routine blood work performed per his nephrologist 4 days ago which reveialed Cr 9.0 (baseline ~4.5) and BUN 85. He was contacted by his nephrologist and referred for repeat testing ASAP which showed Cr atill 8.0. He was referred to the ED for fruther care. Pt. Denies any changes in urine output, SOB, chest pain, fatigue, or confusion.

## 2024-07-17 NOTE — H&P
[facility] Medicine  History & Physical    Patient Name: Handy Adams  MRN: [id number]  Patient Class: IP- Inpatient  Admission Date: 7/16/2024  Attending Physician: Aniceto Zhang MD   Primary Care Provider: Toby Sanderson MD         Patient information was obtained from patient, past medical records, and ER records.     Subjective:     Principal Problem:Acute renal failure superimposed on stage 4 chronic kidney disease    Chief Complaint:   Chief Complaint   Patient presents with    Abnormal Lab     Elevated kidney function lab drawn yest        HPI: 67 yo M with PMHx CKD IV and HTN who presents to the ED by referral from his nephrologist for worsening kidney function. Pt. Had routine blood work performed per his nephrologist 4 days ago which reveialed Cr 9.0 (baseline ~4.5) and BUN 85. He was contacted by his nephrologist and referred for repeat testing ASAP which showed Cr atill 8.0. He was referred to the ED for fruther care. Pt. Denies any changes in urine output, SOB, chest pain, fatigue, or confusion.    Past Medical History:   Diagnosis Date    Anemia     Disorder of kidney and ureter     Edema leg     History of rotator cuff tear     History of seasonal allergies     HTN (hypertension)     Hx of colonic polyps     Hyperlipemia     MGUS (monoclonal gammopathy of unknown significance)     NS (nuclear sclerosis), bilateral 06/25/2019    Obesity     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 11/17/2017    Type 2 diabetes mellitus        Past Surgical History:   Procedure Laterality Date    COLONOSCOPY N/A 10/28/2022    Procedure: COLONOSCOPY;  Surgeon: Guanako Sanchez MD;  Location: [facility];  Service: Endoscopy;  Laterality: N/A;  REFENDO placed per Dr Sanderson. case request entered     vaccinated-GT  instr portal    MOUTH SURGERY      RENAL BIOPSY Left 7/19/2023    Procedure: BIOPSY, KIDNEY;  Surgeon:  Nikky Soriano MD;  Location: [facility] CATH LAB;  Service: Interventional Nephrology;  Laterality: Left;       Review of patient's allergies indicates:  No Known Allergies    No current facility-administered medications on file prior to encounter.     Current Outpatient Medications on File Prior to Encounter   Medication Sig    atorvastatin (LIPITOR) 20 MG tablet Take 1 tablet (20 mg total) by mouth once daily.    blood sugar diagnostic Strp To test twice daily    blood-glucose meter kit Use as instructed    calcitRIOL (ROCALTROL) 0.25 MCG Cap Take 1 capsule (0.25 mcg total) by mouth 3 (three) times a week. Monday, Wednesday, Friday    ergocalciferol (ERGOCALCIFEROL) 50,000 unit Cap Take 1 capsule by mouth once a week    FEROSUL 325 mg (65 mg iron) Tab tablet Take 1 tablet (325 mg total) by mouth 3 (three) times daily. (Patient taking differently: Take 325 mg by mouth 2 (two) times daily.)    hepatitis B vacc-CpG 1018, PF, (HEPLISAV-B, PF,) 20 mcg/0.5 mL Syrg adm by pharmicist    labetaloL (NORMODYNE) 200 MG tablet Take 1 tablet by mouth twice daily    labetaloL (NORMODYNE) 200 MG tablet Take 1 tablet (200 mg total) by mouth 2 (two) times daily.    lancets Misc 1 lancet by Misc.(Non-Drug; Combo Route) route 2 (two) times daily with meals.    linaGLIPtin (TRADJENTA) 5 mg Tab tablet Take 1 tablet (5 mg total) by mouth once daily.    NIFEdipine (PROCARDIA-XL) 90 MG (OSM) 24 hr tablet Take 1 tablet by mouth once daily    pioglitazone (ACTOS) 30 MG tablet Take 1 tablet (30 mg total) by mouth once daily.    pneumoc 20-carlos conj-dip cr,PF, (PREVNAR-20, PF,) 0.5 mL Syrg injection Inject into the muscle.    sildenafiL (VIAGRA) 100 MG tablet Take 1 tablet (100 mg total) by mouth daily as needed for Erectile Dysfunction.    sodium bicarbonate 650 MG tablet Take 1 tablet (650 mg total) by mouth 2 (two) times daily.    spironolactone (ALDACTONE) 50 MG tablet Take 1 tablet (50 mg total) by mouth once daily.    torsemide (DEMADEX) 10 MG  Tab Take 1 tablet (10 mg total) by mouth once daily.    valsartan (DIOVAN) 320 MG tablet Take 1 tablet (320 mg total) by mouth once daily.    varicella-zoster gE-AS01B, PF, (SHINGRIX) 50 mcg/0.5 mL injection Inject into the muscle.     Family History       Problem Relation (Age of Onset)    Cancer Mother, Father    Diabetes Father    Hypertension Father    No Known Problems Sister, Brother, Maternal Aunt, Maternal Uncle, Paternal Aunt, Paternal Uncle, Maternal Grandmother, Maternal Grandfather, Paternal Grandmother, Paternal Grandfather          Tobacco Use    Smoking status: Former     Current packs/day: 0.50     Types: Cigarettes    Smokeless tobacco: Never   Substance and Sexual Activity    Alcohol use: Yes     Comment: rarely    Drug use: Not Currently    Sexual activity: Yes     Partners: Female     Review of Systems   Constitutional:  Negative for activity change, chills, fever and unexpected weight change.   HENT:  Negative for congestion and sore throat.    Respiratory:  Negative for cough, shortness of breath and wheezing.    Cardiovascular:  Negative for chest pain, palpitations and leg swelling.   Gastrointestinal:  Negative for abdominal pain, blood in stool, nausea and vomiting.   Genitourinary:  Negative for dysuria and hematuria.   Musculoskeletal:  Negative for arthralgias and neck pain.   Skin:  Negative for color change and rash.   Neurological:  Negative for dizziness, seizures and numbness.   Psychiatric/Behavioral:  Negative for hallucinations and suicidal ideas.      Objective:     Vital Signs (Most Recent):  Temp: 98.5 °F (36.9 °C) (07/16/24 1411)  Pulse: 68 (07/16/24 2115)  Resp: 14 (07/16/24 2115)  BP: (!) 142/89 (07/16/24 2115)  SpO2: 98 % (07/16/24 2115) Vital Signs (24h Range):  Temp:  [98.5 °F (36.9 °C)-98.6 °F (37 °C)] 98.5 °F (36.9 °C)  Pulse:  [64-68] 68  Resp:  [11-18] 14  SpO2:  [98 %-100 %] 98 %  BP: (128-157)/(68-89) 142/89     Weight: 122.5 kg (270 lb)  Body mass index is 36.62  kg/m².     Physical Exam  Vitals reviewed.   Constitutional:       General: He is not in acute distress.     Appearance: He is well-developed.   HENT:      Head: Normocephalic and atraumatic.   Eyes:      Extraocular Movements: Extraocular movements intact.      Pupils: Pupils are equal, round, and reactive to light.   Neck:      Vascular: No JVD.      Trachea: No tracheal deviation.   Cardiovascular:      Rate and Rhythm: Normal rate and regular rhythm.      Heart sounds: No murmur heard.     No friction rub. No gallop.   Pulmonary:      Effort: No respiratory distress.      Breath sounds: Normal breath sounds. No wheezing or rales.   Abdominal:      General: Bowel sounds are normal. There is no distension.      Palpations: Abdomen is soft. There is no mass.      Tenderness: There is no abdominal tenderness.   Musculoskeletal:         General: No deformity.      Cervical back: Neck supple.   Lymphadenopathy:      Cervical: No cervical adenopathy.   Skin:     General: Skin is warm and dry.      Findings: No rash.   Neurological:      Mental Status: He is alert and oriented to person, place, and time.              CRANIAL NERVES     CN III, IV, VI   Pupils are equal, round, and reactive to light.       Significant Labs: All pertinent labs within the past 24 hours have been reviewed.    Significant Imaging: I have reviewed all pertinent imaging results/findings within the past 24 hours.  Assessment/Plan:     * Acute renal failure superimposed on stage 4 chronic kidney disease  -Pt. With Cr 9.0 4 days ago, Cr now 6.1 with BUN 60, no apparent intervention  -Continue to trend renal function with strict intake and output and avoid nephrotoxic meds. Renal U/S ordered for further work-up  -Nephrology consulted for assistance and decision regarding need for HD access/initation    HTN (hypertension)  -Continue home labetalol and nifedpine. Plan to hold aldactone, torsemide, and valsartan due to YESENIA until nephrology  evalautes      VTE Risk Mitigation (From admission, onward)           Ordered     heparin (porcine) injection 5,000 Units  Every 8 hours         07/16/24 2109     IP VTE HIGH RISK PATIENT  Once         07/16/24 2109     Place sequential compression device  Until discontinued         07/16/24 2109                                    Leobardo Orozco MD  Department of Hospital Medicine  [facility]

## 2024-07-17 NOTE — NURSING
Admission set up, reviewed plan of care and agreed, stable VS, no pain, came to the unit with belongings, no skin issue, normal lung sounds but weak of heart sounds. Pt refused to take heparin. Scheduled medications are pending d/t pyxis issue.  X-rays done and taken down for US now.     0130  Stable VS, refused to take Tamsulosin.     0530  Pt refused to take heparin shot.

## 2024-07-17 NOTE — ASSESSMENT & PLAN NOTE
67 yo M with PMHx CKD IV and HTN who presents to the hospital by referral from his outpatient nephrologist for worsening kidney function. Given the patient's history of advanced chronic kidney disease stage 4, secondary to longstanding diabetic nephropathy and hypertensive nephrosclerosis, the observed rise in serum creatinine is likely attributable to the progressive nature of their underlying renal pathology. The absence of acute clinical signs, such as volume depletion, and the lack of recent exposure to nephrotoxic agents, radiocontrast, or infectious processes, further supports the chronic trajectory of renal decline.    Plan:   - There is no acute indication for dialysis at this time.   - Follow-up potassium levels with pm CMP. In the meantime, continue sodium bicarbonate and Lokelma.   - Scheduled for Friday follow-up with Nephrology    - Send home with 10 g daily Lokelma   - Recommend evaluation by the transplant team.    - Arrange for a dietitian consultation to discuss a renal diet.    - Emphasize the importance of controlling blood pressure and blood sugar.

## 2024-07-17 NOTE — HPI
69 yo M with PMHx CKD IV and HTN who presents to the hospital by referral from his outpatient nephrologist for worsening kidney function. Pt. Had routine blood work performed per his nephrologist 4 days ago which reveialed Cr 9.0 (baseline ~4.5) and BUN 85. He was contacted by his nephrologist and referred for repeat testing ASAP which showed Cr atill 8.0. Patient states only recent medication change is beginning his prescribed torsemide 3-4 weeks ago, but he did not notice any symptomatic changes, only reduction in his blood pressure over that time frame and reduction of swelling. Had cold like symptoms 3 weeks ago for a few days but denies any ongoing illness. Denies change in urine output, SOB, confusion of fatigue.

## 2024-07-17 NOTE — SUBJECTIVE & OBJECTIVE
Past Medical History:   Diagnosis Date    Anemia     Disorder of kidney and ureter     Edema leg     History of rotator cuff tear     History of seasonal allergies     HTN (hypertension)     Hx of colonic polyps     Hyperlipemia     MGUS (monoclonal gammopathy of unknown significance)     NS (nuclear sclerosis), bilateral 06/25/2019    Obesity     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 11/17/2017    Type 2 diabetes mellitus        Past Surgical History:   Procedure Laterality Date    COLONOSCOPY N/A 10/28/2022    Procedure: COLONOSCOPY;  Surgeon: Guanako Sanchez MD;  Location: [facility] ENDO;  Service: Endoscopy;  Laterality: N/A;  REFENDO placed per Dr Sanderson. case request entered     vaccinated-GT  instr portal    MOUTH SURGERY      RENAL BIOPSY Left 7/19/2023    Procedure: BIOPSY, KIDNEY;  Surgeon: Nikky Soriano MD;  Location: [facility] CATH LAB;  Service: Interventional Nephrology;  Laterality: Left;       Review of patient's allergies indicates:  No Known Allergies    No current facility-administered medications on file prior to encounter.     Current Outpatient Medications on File Prior to Encounter   Medication Sig    atorvastatin (LIPITOR) 20 MG tablet Take 1 tablet (20 mg total) by mouth once daily.    blood sugar diagnostic Strp To test twice daily    blood-glucose meter kit Use as instructed    calcitRIOL (ROCALTROL) 0.25 MCG Cap Take 1 capsule (0.25 mcg total) by mouth 3 (three) times a week. Monday, Wednesday, Friday    ergocalciferol (ERGOCALCIFEROL) 50,000 unit Cap Take 1 capsule by mouth once a week    FEROSUL 325 mg (65 mg iron) Tab tablet Take 1 tablet (325 mg total) by mouth 3 (three) times daily. (Patient taking differently: Take 325 mg by mouth 2 (two) times daily.)    hepatitis B vacc-CpG 1018, PF, (HEPLISAV-B, PF,) 20 mcg/0.5 mL Syrg adm by pharmicist    labetaloL (NORMODYNE) 200 MG tablet Take 1 tablet by mouth twice  daily    labetaloL (NORMODYNE) 200 MG tablet Take 1 tablet (200 mg total) by mouth 2 (two) times daily.    lancets Misc 1 lancet by Misc.(Non-Drug; Combo Route) route 2 (two) times daily with meals.    linaGLIPtin (TRADJENTA) 5 mg Tab tablet Take 1 tablet (5 mg total) by mouth once daily.    NIFEdipine (PROCARDIA-XL) 90 MG (OSM) 24 hr tablet Take 1 tablet by mouth once daily    pioglitazone (ACTOS) 30 MG tablet Take 1 tablet (30 mg total) by mouth once daily.    pneumoc 20-carlos conj-dip cr,PF, (PREVNAR-20, PF,) 0.5 mL Syrg injection Inject into the muscle.    sildenafiL (VIAGRA) 100 MG tablet Take 1 tablet (100 mg total) by mouth daily as needed for Erectile Dysfunction.    sodium bicarbonate 650 MG tablet Take 1 tablet (650 mg total) by mouth 2 (two) times daily.    spironolactone (ALDACTONE) 50 MG tablet Take 1 tablet (50 mg total) by mouth once daily.    torsemide (DEMADEX) 10 MG Tab Take 1 tablet (10 mg total) by mouth once daily.    valsartan (DIOVAN) 320 MG tablet Take 1 tablet (320 mg total) by mouth once daily.    varicella-zoster gE-AS01B, PF, (SHINGRIX) 50 mcg/0.5 mL injection Inject into the muscle.     Family History       Problem Relation (Age of Onset)    Cancer Mother, Father    Diabetes Father    Hypertension Father    No Known Problems Sister, Brother, Maternal Aunt, Maternal Uncle, Paternal Aunt, Paternal Uncle, Maternal Grandmother, Maternal Grandfather, Paternal Grandmother, Paternal Grandfather          Tobacco Use    Smoking status: Former     Current packs/day: 0.50     Types: Cigarettes    Smokeless tobacco: Never   Substance and Sexual Activity    Alcohol use: Yes     Comment: rarely    Drug use: Not Currently    Sexual activity: Yes     Partners: Female     Review of Systems   Constitutional:  Negative for activity change, chills, fever and unexpected weight change.   HENT:  Negative for congestion and sore throat.    Respiratory:  Negative for cough, shortness of breath and wheezing.     Cardiovascular:  Negative for chest pain, palpitations and leg swelling.   Gastrointestinal:  Negative for abdominal pain, blood in stool, nausea and vomiting.   Genitourinary:  Negative for dysuria and hematuria.   Musculoskeletal:  Negative for arthralgias and neck pain.   Skin:  Negative for color change and rash.   Neurological:  Negative for dizziness, seizures and numbness.   Psychiatric/Behavioral:  Negative for hallucinations and suicidal ideas.      Objective:     Vital Signs (Most Recent):  Temp: 98.5 °F (36.9 °C) (07/16/24 1411)  Pulse: 68 (07/16/24 2115)  Resp: 14 (07/16/24 2115)  BP: (!) 142/89 (07/16/24 2115)  SpO2: 98 % (07/16/24 2115) Vital Signs (24h Range):  Temp:  [98.5 °F (36.9 °C)-98.6 °F (37 °C)] 98.5 °F (36.9 °C)  Pulse:  [64-68] 68  Resp:  [11-18] 14  SpO2:  [98 %-100 %] 98 %  BP: (128-157)/(68-89) 142/89     Weight: 122.5 kg (270 lb)  Body mass index is 36.62 kg/m².     Physical Exam  Vitals reviewed.   Constitutional:       General: He is not in acute distress.     Appearance: He is well-developed.   HENT:      Head: Normocephalic and atraumatic.   Eyes:      Extraocular Movements: Extraocular movements intact.      Pupils: Pupils are equal, round, and reactive to light.   Neck:      Vascular: No JVD.      Trachea: No tracheal deviation.   Cardiovascular:      Rate and Rhythm: Normal rate and regular rhythm.      Heart sounds: No murmur heard.     No friction rub. No gallop.   Pulmonary:      Effort: No respiratory distress.      Breath sounds: Normal breath sounds. No wheezing or rales.   Abdominal:      General: Bowel sounds are normal. There is no distension.      Palpations: Abdomen is soft. There is no mass.      Tenderness: There is no abdominal tenderness.   Musculoskeletal:         General: No deformity.      Cervical back: Neck supple.   Lymphadenopathy:      Cervical: No cervical adenopathy.   Skin:     General: Skin is warm and dry.      Findings: No rash.   Neurological:       Mental Status: He is alert and oriented to person, place, and time.              CRANIAL NERVES     CN III, IV, VI   Pupils are equal, round, and reactive to light.       Significant Labs: All pertinent labs within the past 24 hours have been reviewed.    Significant Imaging: I have reviewed all pertinent imaging results/findings within the past 24 hours.

## 2024-07-17 NOTE — ASSESSMENT & PLAN NOTE
-Continue home labetalol and nifedpine. Plan to hold aldactone, torsemide, and valsartan due to YESENIA until nephrology evalautes

## 2024-07-17 NOTE — SUBJECTIVE & OBJECTIVE
Past Medical History:   Diagnosis Date    Anemia     Disorder of kidney and ureter     Edema leg     History of rotator cuff tear     History of seasonal allergies     HTN (hypertension)     Hx of colonic polyps     Hyperlipemia     MGUS (monoclonal gammopathy of unknown significance)     NS (nuclear sclerosis), bilateral 06/25/2019    Obesity     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 11/17/2017    Type 2 diabetes mellitus        Past Surgical History:   Procedure Laterality Date    COLONOSCOPY N/A 10/28/2022    Procedure: COLONOSCOPY;  Surgeon: Guanako Sanchez MD;  Location: [facility] ENDO;  Service: Endoscopy;  Laterality: N/A;  REFENDO placed per Dr Sanderson. case request entered     vaccinated-GT  instr portal    MOUTH SURGERY      RENAL BIOPSY Left 7/19/2023    Procedure: BIOPSY, KIDNEY;  Surgeon: Nikky Soriano MD;  Location: [facility] CATH LAB;  Service: Interventional Nephrology;  Laterality: Left;       Review of patient's allergies indicates:  No Known Allergies  Current Facility-Administered Medications   Medication Frequency    acetaminophen tablet 650 mg Q4H PRN    atorvastatin tablet 20 mg Daily    calcitRIOL capsule 0.25 mcg Once per day on Monday Wednesday Friday    dextrose 10% bolus 125 mL 125 mL PRN    dextrose 10% bolus 250 mL 250 mL PRN    glucagon (human recombinant) injection 1 mg PRN    glucose chewable tablet 16 g PRN    glucose chewable tablet 24 g PRN    heparin (porcine) injection 5,000 Units Q8H    labetaloL tablet 200 mg BID    melatonin tablet 6 mg Nightly PRN    naloxone 0.4 mg/mL injection 0.02 mg PRN    NIFEdipine 24 hr tablet 90 mg Daily    ondansetron injection 4 mg Q8H PRN    prochlorperazine injection Soln 5 mg Q6H PRN    sodium bicarbonate tablet 650 mg BID    sodium chloride 0.9% flush 10 mL PRN    sodium zirconium cyclosilicate packet 5 g TID    tamsulosin 24 hr capsule 0.4 mg QHS     Family History        Problem Relation (Age of Onset)    Cancer Mother, Father    Diabetes Father    Hypertension Father    No Known Problems Sister, Brother, Maternal Aunt, Maternal Uncle, Paternal Aunt, Paternal Uncle, Maternal Grandmother, Maternal Grandfather, Paternal Grandmother, Paternal Grandfather          Tobacco Use    Smoking status: Former     Current packs/day: 0.50     Types: Cigarettes    Smokeless tobacco: Never   Substance and Sexual Activity    Alcohol use: Yes     Comment: rarely    Drug use: Not Currently    Sexual activity: Yes     Partners: Female     Review of Systems   Constitutional:  Negative for fatigue and fever.   Respiratory:  Negative for shortness of breath.    Cardiovascular:  Negative for chest pain and leg swelling.   Gastrointestinal:  Negative for abdominal pain.   Endocrine: Negative for polyuria.   Genitourinary:  Negative for decreased urine volume.   Neurological:  Negative for weakness.     Objective:     Vital Signs (Most Recent):  Temp: 97.2 °F (36.2 °C) (07/17/24 1141)  Pulse: 63 (07/17/24 1141)  Resp: 17 (07/17/24 1141)  BP: (!) 156/84 (07/17/24 1141)  SpO2: 100 % (07/17/24 1141) Vital Signs (24h Range):  Temp:  [97.2 °F (36.2 °C)-98.8 °F (37.1 °C)] 97.2 °F (36.2 °C)  Pulse:  [63-79] 63  Resp:  [11-19] 17  SpO2:  [98 %-100 %] 100 %  BP: (132-156)/(66-89) 156/84     Weight: 123 kg (271 lb 2.7 oz) (07/16/24 2235)  Body mass index is 36.78 kg/m².  Body surface area is 2.5 meters squared.    I/O last 3 completed shifts:  In: 550 [IV Piggyback:550]  Out: -      Physical Exam  Constitutional:       Appearance: Normal appearance.   HENT:      Head: Normocephalic and atraumatic.      Nose: Nose normal.      Mouth/Throat:      Mouth: Mucous membranes are dry.   Cardiovascular:      Rate and Rhythm: Regular rhythm.      Heart sounds: Normal heart sounds.   Pulmonary:      Effort: Pulmonary effort is normal. No respiratory distress.      Breath sounds: Normal breath sounds.   Abdominal:       Palpations: Abdomen is soft.      Tenderness: There is no abdominal tenderness.   Musculoskeletal:      Cervical back: Normal range of motion.   Skin:     General: Skin is warm.      Coloration: Skin is not jaundiced.   Neurological:      General: No focal deficit present.      Mental Status: He is alert. Mental status is at baseline.   Psychiatric:         Mood and Affect: Mood normal.         Behavior: Behavior normal.         Thought Content: Thought content normal.         Judgment: Judgment normal.          Significant Labs:  CBC:   Recent Labs   Lab 07/17/24  0431   WBC 6.24   RBC 2.81*   HGB 8.1*   HCT 25.9*      MCV 92   MCH 28.8   MCHC 31.3*     CMP:   Recent Labs   Lab 07/17/24  0431   GLU 97   CALCIUM 8.4*   ALBUMIN 2.9*   PROT 6.6      K 5.7*   CO2 18*   *   BUN 71*   CREATININE 7.4*   ALKPHOS 32*   ALT 7*   AST 8*   BILITOT 0.4     All labs within the past 24 hours have been reviewed.    Significant Imaging:  US: Reviewed

## 2024-07-17 NOTE — CONSULTS
[facility] Surg  Nephrology  Consult Note    Patient Name: Handy Adams  MRN: [id number]  Admission Date: 7/16/2024  Hospital Length of Stay: 1 days  Attending Provider: Aniceto Zhang MD   Primary Care Physician: Toby Sanderson MD  Principal Problem:Acute renal failure superimposed on stage 4 chronic kidney disease    Consults  Subjective:     HPI: 67 yo M with PMHx CKD IV and HTN who presents to the hospital by referral from his outpatient nephrologist for worsening kidney function. Pt. Had routine blood work performed per his nephrologist 4 days ago which reveialed Cr 9.0 (baseline ~4.5) and BUN 85. He was contacted by his nephrologist and referred for repeat testing ASAP which showed Cr atill 8.0. Patient states only recent medication change is beginning his prescribed torsemide 3-4 weeks ago, but he did not notice any symptomatic changes, only reduction in his blood pressure over that time frame and reduction of swelling. Had cold like symptoms 3 weeks ago for a few days but denies any ongoing illness. Denies change in urine output, SOB, confusion of fatigue.     Past Medical History:   Diagnosis Date    Anemia     Disorder of kidney and ureter     Edema leg     History of rotator cuff tear     History of seasonal allergies     HTN (hypertension)     Hx of colonic polyps     Hyperlipemia     MGUS (monoclonal gammopathy of unknown significance)     NS (nuclear sclerosis), bilateral 06/25/2019    Obesity     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 11/17/2017    Type 2 diabetes mellitus        Past Surgical History:   Procedure Laterality Date    COLONOSCOPY N/A 10/28/2022    Procedure: COLONOSCOPY;  Surgeon: Guanako Sanchez MD;  Location: [facility];  Service: Endoscopy;  Laterality: N/A;  REFENDO placed per Dr Sanderson. case request entered     vaccinated-GT  instr portal    MOUTH SURGERY      RENAL BIOPSY Left 7/19/2023     Procedure: BIOPSY, KIDNEY;  Surgeon: Nikky Soriano MD;  Location: [facility] CATH LAB;  Service: Interventional Nephrology;  Laterality: Left;       Review of patient's allergies indicates:  No Known Allergies  Current Facility-Administered Medications   Medication Frequency    acetaminophen tablet 650 mg Q4H PRN    atorvastatin tablet 20 mg Daily    calcitRIOL capsule 0.25 mcg Once per day on Monday Wednesday Friday    dextrose 10% bolus 125 mL 125 mL PRN    dextrose 10% bolus 250 mL 250 mL PRN    glucagon (human recombinant) injection 1 mg PRN    glucose chewable tablet 16 g PRN    glucose chewable tablet 24 g PRN    heparin (porcine) injection 5,000 Units Q8H    labetaloL tablet 200 mg BID    melatonin tablet 6 mg Nightly PRN    naloxone 0.4 mg/mL injection 0.02 mg PRN    NIFEdipine 24 hr tablet 90 mg Daily    ondansetron injection 4 mg Q8H PRN    prochlorperazine injection Soln 5 mg Q6H PRN    sodium bicarbonate tablet 650 mg BID    sodium chloride 0.9% flush 10 mL PRN    sodium zirconium cyclosilicate packet 5 g TID    tamsulosin 24 hr capsule 0.4 mg QHS     Family History       Problem Relation (Age of Onset)    Cancer Mother, Father    Diabetes Father    Hypertension Father    No Known Problems Sister, Brother, Maternal Aunt, Maternal Uncle, Paternal Aunt, Paternal Uncle, Maternal Grandmother, Maternal Grandfather, Paternal Grandmother, Paternal Grandfather          Tobacco Use    Smoking status: Former     Current packs/day: 0.50     Types: Cigarettes    Smokeless tobacco: Never   Substance and Sexual Activity    Alcohol use: Yes     Comment: rarely    Drug use: Not Currently    Sexual activity: Yes     Partners: Female     Review of Systems   Constitutional:  Negative for fatigue and fever.   Respiratory:  Negative for shortness of breath.    Cardiovascular:  Negative for chest pain and leg swelling.   Gastrointestinal:  Negative for abdominal pain.   Endocrine: Negative for polyuria.   Genitourinary:  Negative  for decreased urine volume.   Neurological:  Negative for weakness.     Objective:     Vital Signs (Most Recent):  Temp: 97.2 °F (36.2 °C) (07/17/24 1141)  Pulse: 63 (07/17/24 1141)  Resp: 17 (07/17/24 1141)  BP: (!) 156/84 (07/17/24 1141)  SpO2: 100 % (07/17/24 1141) Vital Signs (24h Range):  Temp:  [97.2 °F (36.2 °C)-98.8 °F (37.1 °C)] 97.2 °F (36.2 °C)  Pulse:  [63-79] 63  Resp:  [11-19] 17  SpO2:  [98 %-100 %] 100 %  BP: (132-156)/(66-89) 156/84     Weight: 123 kg (271 lb 2.7 oz) (07/16/24 2235)  Body mass index is 36.78 kg/m².  Body surface area is 2.5 meters squared.    I/O last 3 completed shifts:  In: 550 [IV Piggyback:550]  Out: -      Physical Exam  Constitutional:       Appearance: Normal appearance.   HENT:      Head: Normocephalic and atraumatic.      Nose: Nose normal.      Mouth/Throat:      Mouth: Mucous membranes are dry.   Cardiovascular:      Rate and Rhythm: Regular rhythm.      Heart sounds: Normal heart sounds.   Pulmonary:      Effort: Pulmonary effort is normal. No respiratory distress.      Breath sounds: Normal breath sounds.   Abdominal:      Palpations: Abdomen is soft.      Tenderness: There is no abdominal tenderness.   Musculoskeletal:      Cervical back: Normal range of motion.   Skin:     General: Skin is warm.      Coloration: Skin is not jaundiced.   Neurological:      General: No focal deficit present.      Mental Status: He is alert. Mental status is at baseline.   Psychiatric:         Mood and Affect: Mood normal.         Behavior: Behavior normal.         Thought Content: Thought content normal.         Judgment: Judgment normal.          Significant Labs:  CBC:   Recent Labs   Lab 07/17/24  0431   WBC 6.24   RBC 2.81*   HGB 8.1*   HCT 25.9*      MCV 92   MCH 28.8   MCHC 31.3*     CMP:   Recent Labs   Lab 07/17/24  0431   GLU 97   CALCIUM 8.4*   ALBUMIN 2.9*   PROT 6.6      K 5.7*   CO2 18*   *   BUN 71*   CREATININE 7.4*   ALKPHOS 32*   ALT 7*   AST 8*    BILITOT 0.4     All labs within the past 24 hours have been reviewed.    Significant Imaging:  US: Reviewed    Assessment/Plan:     Renal/  * Acute renal failure superimposed on stage 4 chronic kidney disease   67 yo M with PMHx CKD IV and HTN who presents to the hospital by referral from his outpatient nephrologist for worsening kidney function. Given the patient's history of advanced chronic kidney disease stage 4, secondary to longstanding diabetic nephropathy and hypertensive nephrosclerosis, the observed rise in serum creatinine is likely attributable to the progressive nature of their underlying renal pathology. The absence of acute clinical signs, such as volume depletion, and the lack of recent exposure to nephrotoxic agents, radiocontrast, or infectious processes, further supports the chronic trajectory of renal decline.    Plan:   - There is no acute indication for dialysis at this time.   - Follow-up potassium levels with pm CMP. In the meantime, continue sodium bicarbonate and Lokelma.   - Scheduled for Friday follow-up with Nephrology    - Send home with 10 g daily Lokelma   - Recommend evaluation by the transplant team.    - Arrange for a dietitian consultation to discuss a renal diet.    - Emphasize the importance of controlling blood pressure and blood sugar.        Thank you for your consult. I will follow-up with patient. Please contact us if you have any additional questions.    Miguel Mckinnon MD  Nephrology  [facility] Surg

## 2024-07-17 NOTE — PLAN OF CARE
Hospital Medicine Plan of Care Note    Admission H&P dated earlier this morning reviewed, and agree with assessment and plan as documented. Pt seen and examined this morning on rounds, KIKO.     Pt remains grossly asymptomatic. Lokelma given for K 5.7. Nephrology consulted for dispo moving forward.      Aniceto Zhang MD  Attending Physician  Medical Director - [facility]  Department of Hospital Medicine  7/17/2024

## 2024-07-17 NOTE — H&P
[facility] Medicine  History & Physical    Patient Name: Handy Adams  MRN: [id number]  Patient Class: IP- Inpatient  Admission Date: 7/16/2024  Attending Physician: Aniceto Zhang MD   Primary Care Provider: Toby Sanderson MD         Patient information was obtained from patient, past medical records, and ER records.     Subjective:     Principal Problem:Acute renal failure superimposed on stage 4 chronic kidney disease    Chief Complaint:   Chief Complaint   Patient presents with    Abnormal Lab     Elevated kidney function lab drawn yest        HPI: 69 yo M with PMHx CKD IV and HTN who presents to the ED by referral from his nephrologist for worsening kidney function. Pt. Had routine blood work performed per his nephrologist 4 days ago which reveialed Cr 9.0 (baseline ~4.5) and BUN 85. He was contacted by his nephrologist and referred for repeat testing ASAP which showed Cr atill 8.0. He was referred to the ED for fruther care. Pt. Denies any changes in urine output, SOB, chest pain, fatigue, or confusion.    No new subjective & objective note has been filed under this hospital service since the last note was generated.    Assessment/Plan:     * Acute renal failure superimposed on stage 4 chronic kidney disease  -Pt. With Cr 9.0 4 days ago, Cr now 6.1 with BUN 60, no apparent intervention  -Continue to trend renal function with strict intake and output and avoid nephrotoxic meds. Renal U/S ordered for further work-up  -Nephrology consulted for assistance and decision regarding need for HD access/initation    HTN (hypertension)  -Continue home labetalol and nifedpine. Plan to hold aldactone, torsemide, and valsartan due to YESENIA until nephrology evalautes      VTE Risk Mitigation (From admission, onward)           Ordered     heparin (porcine) injection 5,000 Units  Every 8 hours         07/16/24 2109     IP VTE HIGH RISK PATIENT  Once         07/16/24 2109     Place sequential compression  device  Until discontinued         07/16/24 9412                                    Leobardo Orozco MD  Department of Hospital Medicine  [facility] Surg

## 2024-07-17 NOTE — PLAN OF CARE
[facility] Med Surg  Initial Discharge Assessment       Primary Care Provider: Toby Sanderson MD    Admission Diagnosis: Hypocalcemia [E83.51]  Acute renal failure [N17.9]  Acute kidney injury superimposed on CKD [N17.9, N18.9]    Admission Date: 7/16/2024  Expected Discharge Date: 7/19/2024    Transition of Care Barriers: None    Payor: HU[employer] MANAGED MEDICARE / Plan: HU[employer]NA MEDICARE HMO / Product Type: Capitation /     Extended Emergency Contact Information  Primary Emergency Contact: Daniella Adams  Address: [address] [place] [id number] [place]  Work Phone: [phone]  Mobile Phone: [phone]  Relation: Significant other    Discharge Plan A: Home with family  Discharge Plan B: Home      [facility] [address] [place], [place] - [address]  [address] [place] [address]  Phone: [phone] Fax: [phone]    [facility] - [facility] [address]  [address] [facility] [address]  Phone: [phone] Fax: [phone]      Initial Assessment (most recent)       Adult Discharge Assessment - 07/17/24 1347          Discharge Assessment    Assessment Type Discharge Planning Assessment     Confirmed/corrected address, phone number and insurance Yes     Confirmed Demographics Correct on Facesheet     Source of Information patient     Does patient/caregiver understand observation status Yes     Communicated LILLIAM with patient/caregiver Yes     Reason For Admission Hypocalcemia     People in Home spouse;significant other     Facility Arrived From: Home     Do you expect to return to your current living situation? Yes     Do you have help at home or someone to help you manage your care at home? Yes     Who are your caregiver(s) and their phone number(s)? Daniella Adams SO/Spouse     Prior to hospitilization cognitive status: Alert/Oriented;No Deficits     Current cognitive status: No Deficits;Alert/Oriented      Walking or Climbing Stairs Difficulty no     Dressing/Bathing Difficulty no     Home Accessibility wheelchair accessible     Home Layout Able to live on 1st floor     Equipment Currently Used at Home none     Readmission within 30 days? No     Patient currently being followed by outpatient case management? No     Do you currently have service(s) that help you manage your care at home? No     Do you take prescription medications? Yes     Do you have prescription coverage? Yes     Do you have any problems affording any of your prescribed medications? No     Is the patient taking medications as prescribed? yes     Who is going to help you get home at discharge? SO/Spouse     How do you get to doctors appointments? car, drives self     Are you on dialysis? No     Do you take coumadin? No     Discharge Plan A Home with family     Discharge Plan B Home     DME Needed Upon Discharge  none     Discharge Plan discussed with: Patient     Transition of Care Barriers None        Physical Activity    On average, how many days per week do you engage in moderate to strenuous exercise (like a brisk walk)? 7 days     On average, how many minutes do you engage in exercise at this level? 60 min        Financial Resource Strain    How hard is it for you to pay for the very basics like food, housing, medical care, and heating? Not hard at all        Housing Stability    In the last 12 months, was there a time when you were not able to pay the mortgage or rent on time? No     At any time in the past 12 months, were you homeless or living in a shelter (including now)? No        Transportation Needs    Has the lack of transportation kept you from medical appointments, meetings, work or from getting things needed for daily living? No        Food Insecurity    Within the past 12 months, you worried that your food would run out before you got the money to buy more. Never true     Within the past 12 months, the food you bought just didn't last  and you didn't have money to get more. Never true        Stress    Do you feel stress - tense, restless, nervous, or anxious, or unable to sleep at night because your mind is troubled all the time - these days? Not at all        Social Isolation    How often do you feel lonely or isolated from those around you?  Never        Alcohol Use    Q1: How often do you have a drink containing alcohol? Never     Q2: How many drinks containing alcohol do you have on a typical day when you are drinking? Patient does not drink     Q3: How often do you have six or more drinks on one occasion? Never        Utilities    In the past 12 months has the electric, gas, oil, or water company threatened to shut off services in your home? No        Health Literacy    How often do you need to have someone help you when you read instructions, pamphlets, or other written material from your doctor or pharmacy? Never        OTHER    Name(s) of People in Home Daniella Adams                      Discharge Plan A and Plan B have been determined by review of patient's clinical status, future medical and therapeutic needs, and coverage/benefits for post-acute care in coordination with multidisciplinary team members.

## 2024-07-17 NOTE — ED NOTES
Telemetry Verification   Patient placed on Telemetry Box  Verified with War Room  Box # [id number]   Monitor Tech Aniah   Rate 67   Rhythm NSR

## 2024-07-17 NOTE — ASSESSMENT & PLAN NOTE
-Pt. With Cr 9.0 4 days ago, Cr now 6.1 with BUN 60, no apparent intervention  -Continue to trend renal function with strict intake and output and avoid nephrotoxic meds. Renal U/S ordered for further work-up  -Nephrology consulted for assistance and decision regarding need for HD access/initation

## 2024-07-18 VITALS
DIASTOLIC BLOOD PRESSURE: 71 MMHG | WEIGHT: 271.19 LBS | SYSTOLIC BLOOD PRESSURE: 135 MMHG | HEART RATE: 67 BPM | OXYGEN SATURATION: 100 % | HEIGHT: 72 IN | TEMPERATURE: 98 F | RESPIRATION RATE: 17 BRPM | BODY MASS INDEX: 36.73 KG/M2

## 2024-07-18 PROBLEM — E87.5 HYPERKALEMIA: Status: ACTIVE | Noted: 2024-07-18

## 2024-07-18 LAB
ALBUMIN SERPL BCP-MCNC: 2.8 G/DL (ref 3.5–5.2)
ALP SERPL-CCNC: 33 U/L (ref 55–135)
ALT SERPL W/O P-5'-P-CCNC: 8 U/L (ref 10–44)
ANION GAP SERPL CALC-SCNC: 7 MMOL/L (ref 8–16)
AST SERPL-CCNC: 8 U/L (ref 10–40)
BASOPHILS # BLD AUTO: 0.03 K/UL (ref 0–0.2)
BASOPHILS NFR BLD: 0.5 % (ref 0–1.9)
BILIRUB SERPL-MCNC: 0.3 MG/DL (ref 0.1–1)
BUN SERPL-MCNC: 67 MG/DL (ref 8–23)
CALCIUM SERPL-MCNC: 8.1 MG/DL (ref 8.7–10.5)
CHLORIDE SERPL-SCNC: 111 MMOL/L (ref 95–110)
CO2 SERPL-SCNC: 19 MMOL/L (ref 23–29)
CREAT SERPL-MCNC: 7 MG/DL (ref 0.5–1.4)
DIFFERENTIAL METHOD BLD: ABNORMAL
EOSINOPHIL # BLD AUTO: 0.1 K/UL (ref 0–0.5)
EOSINOPHIL NFR BLD: 1.7 % (ref 0–8)
ERYTHROCYTE [DISTWIDTH] IN BLOOD BY AUTOMATED COUNT: 15.9 % (ref 11.5–14.5)
EST. GFR  (NO RACE VARIABLE): 7.9 ML/MIN/1.73 M^2
GLUCOSE SERPL-MCNC: 92 MG/DL (ref 70–110)
HCT VFR BLD AUTO: 23.2 % (ref 40–54)
HGB BLD-MCNC: 7.5 G/DL (ref 14–18)
IMM GRANULOCYTES # BLD AUTO: 0.05 K/UL (ref 0–0.04)
IMM GRANULOCYTES NFR BLD AUTO: 0.9 % (ref 0–0.5)
LYMPHOCYTES # BLD AUTO: 1.3 K/UL (ref 1–4.8)
LYMPHOCYTES NFR BLD: 21.7 % (ref 18–48)
MCH RBC QN AUTO: 29.5 PG (ref 27–31)
MCHC RBC AUTO-ENTMCNC: 32.3 G/DL (ref 32–36)
MCV RBC AUTO: 91 FL (ref 82–98)
MONOCYTES # BLD AUTO: 0.6 K/UL (ref 0.3–1)
MONOCYTES NFR BLD: 10.2 % (ref 4–15)
NEUTROPHILS # BLD AUTO: 3.7 K/UL (ref 1.8–7.7)
NEUTROPHILS NFR BLD: 65 % (ref 38–73)
NRBC BLD-RTO: 0 /100 WBC
OHS QRS DURATION: 94 MS
OHS QTC CALCULATION: 427 MS
PLATELET # BLD AUTO: 295 K/UL (ref 150–450)
PMV BLD AUTO: 10.3 FL (ref 9.2–12.9)
POCT GLUCOSE: 95 MG/DL (ref 70–110)
POTASSIUM SERPL-SCNC: 5 MMOL/L (ref 3.5–5.1)
PROT SERPL-MCNC: 6.3 G/DL (ref 6–8.4)
RBC # BLD AUTO: 2.54 M/UL (ref 4.6–6.2)
SODIUM SERPL-SCNC: 137 MMOL/L (ref 136–145)
WBC # BLD AUTO: 5.76 K/UL (ref 3.9–12.7)

## 2024-07-18 PROCEDURE — 99233 SBSQ HOSP IP/OBS HIGH 50: CPT | Mod: HCNC,NTX,, | Performed by: INTERNAL MEDICINE

## 2024-07-18 PROCEDURE — 25000003 PHARM REV CODE 250: Mod: HCNC,NTX | Performed by: HOSPITALIST

## 2024-07-18 PROCEDURE — 93005 ELECTROCARDIOGRAM TRACING: CPT | Mod: HCNC,NTX

## 2024-07-18 PROCEDURE — 36415 COLL VENOUS BLD VENIPUNCTURE: CPT | Mod: HCNC,NTX | Performed by: HOSPITALIST

## 2024-07-18 PROCEDURE — 80053 COMPREHEN METABOLIC PANEL: CPT | Mod: HCNC,NTX | Performed by: HOSPITALIST

## 2024-07-18 PROCEDURE — 93010 ELECTROCARDIOGRAM REPORT: CPT | Mod: HCNC,NTX,, | Performed by: INTERNAL MEDICINE

## 2024-07-18 PROCEDURE — 85025 COMPLETE CBC W/AUTO DIFF WBC: CPT | Mod: HCNC,NTX | Performed by: HOSPITALIST

## 2024-07-18 RX ORDER — NIFEDIPINE 90 MG/1
90 TABLET, EXTENDED RELEASE ORAL DAILY
Qty: 90 TABLET | Refills: 0 | Status: SHIPPED | OUTPATIENT
Start: 2024-07-18 | End: 2024-07-25

## 2024-07-18 RX ORDER — FERROUS SULFATE TAB 325 MG (65 MG ELEMENTAL FE) 325 (65 FE) MG
325 TAB ORAL 2 TIMES DAILY
Qty: 180 TABLET | Refills: 3 | Status: SHIPPED | OUTPATIENT
Start: 2024-07-18 | End: 2025-07-13

## 2024-07-18 RX ORDER — TAMSULOSIN HYDROCHLORIDE 0.4 MG/1
0.4 CAPSULE ORAL NIGHTLY
Qty: 30 CAPSULE | Refills: 0 | Status: SHIPPED | OUTPATIENT
Start: 2024-07-18 | End: 2025-07-18

## 2024-07-18 RX ORDER — LABETALOL 200 MG/1
200 TABLET, FILM COATED ORAL 2 TIMES DAILY
Start: 2024-07-18

## 2024-07-18 RX ADMIN — NIFEDIPINE 90 MG: 60 TABLET, FILM COATED, EXTENDED RELEASE ORAL at 09:07

## 2024-07-18 RX ADMIN — ATORVASTATIN CALCIUM 20 MG: 20 TABLET, FILM COATED ORAL at 09:07

## 2024-07-18 RX ADMIN — SODIUM BICARBONATE 650 MG: 325 TABLET ORAL at 09:07

## 2024-07-18 RX ADMIN — LABETALOL HYDROCHLORIDE 200 MG: 200 TABLET, FILM COATED ORAL at 09:07

## 2024-07-18 NOTE — PROGRESS NOTES
[facility] S[facility]  Nephrology  Progress Note    Patient Name: Handy Adams  MRN: [id number]  Admission Date: 7/16/2024  Hospital Length of Stay: 2 days  Attending Provider: No att. providers found   Primary Care Physician: Toby Sanderson MD  Principal Problem:Acute renal failure superimposed on stage 4 chronic kidney disease    Subjective:     HPI: 69 yo M with PMHx CKD IV and HTN who presents to the hospital by referral from his outpatient nephrologist for worsening kidney function. Pt. Had routine blood work performed per his nephrologist 4 days ago which reveialed Cr 9.0 (baseline ~4.5) and BUN 85. He was contacted by his nephrologist and referred for repeat testing ASAP which showed Cr atill 8.0. Patient states only recent medication change is beginning his prescribed torsemide 3-4 weeks ago, but he did not notice any symptomatic changes, only reduction in his blood pressure over that time frame and reduction of swelling. Had cold like symptoms 3 weeks ago for a few days but denies any ongoing illness. Denies change in urine output, SOB, confusion of fatigue.     Interval History: No acute complaints this morning. The patient is eager to leave. Denies chest pain, SOB, new weakness or other issues.     Review of patient's allergies indicates:  No Known Allergies  Current Facility-Administered Medications   Medication Frequency    acetaminophen tablet 650 mg Q4H PRN    atorvastatin tablet 20 mg Daily    calcitRIOL capsule 0.25 mcg Once per day on Monday Wednesday Friday    dextrose 10% bolus 125 mL 125 mL PRN    dextrose 10% bolus 250 mL 250 mL PRN    glucagon (human recombinant) injection 1 mg PRN    glucose chewable tablet 16 g PRN    glucose chewable tablet 24 g PRN    heparin (porcine) injection 5,000 Units Q8H    labetaloL tablet 200 mg BID    melatonin tablet 6 mg Nightly PRN    naloxone 0.4 mg/mL injection 0.02 mg PRN    NIFEdipine 24 hr tablet 90 mg Daily    ondansetron injection 4 mg Q8H PRN     prochlorperazine injection Soln 5 mg Q6H PRN    sodium bicarbonate tablet 650 mg BID    sodium chloride 0.9% flush 10 mL PRN    tamsulosin 24 hr capsule 0.4 mg QHS       Objective:     Vital Signs (Most Recent):  Temp: 97.8 °F (36.6 °C) (07/18/24 0737)  Pulse: 67 (07/18/24 0737)  Resp: 17 (07/18/24 0737)  BP: 135/71 (07/18/24 0737)  SpO2: 100 % (07/18/24 0737) Vital Signs (24h Range):  Temp:  [97.2 °F (36.2 °C)-98.4 °F (36.9 °C)] 97.8 °F (36.6 °C)  Pulse:  [63-80] 67  Resp:  [16-18] 17  SpO2:  [100 %] 100 %  BP: (123-156)/(65-84) 135/71     Weight: 123 kg (271 lb 2.7 oz) (07/16/24 2235)  Body mass index is 36.78 kg/m².  Body surface area is 2.5 meters squared.    No intake/output data recorded.     Physical Exam  Vitals reviewed.   Constitutional:       Appearance: Normal appearance. He is obese.   HENT:      Head: Normocephalic and atraumatic.      Nose: Nose normal.   Eyes:      Extraocular Movements: Extraocular movements intact.      Pupils: Pupils are equal, round, and reactive to light.   Cardiovascular:      Rate and Rhythm: Normal rate and regular rhythm.   Pulmonary:      Effort: Pulmonary effort is normal.      Breath sounds: Normal breath sounds.   Abdominal:      Palpations: Abdomen is soft.   Musculoskeletal:         General: Normal range of motion.      Cervical back: Normal range of motion.   Skin:     General: Skin is dry.   Neurological:      General: No focal deficit present.      Mental Status: He is alert. Mental status is at baseline. He is disoriented.   Psychiatric:         Mood and Affect: Mood normal.         Behavior: Behavior normal.         Thought Content: Thought content normal.          Significant Labs:  CBC:   Recent Labs   Lab 07/18/24 0447   WBC 5.76   RBC 2.54*   HGB 7.5*   HCT 23.2*      MCV 91   MCH 29.5   MCHC 32.3     CMP:   Recent Labs   Lab 07/18/24 0447   GLU 92   CALCIUM 8.1*   ALBUMIN 2.8*   PROT 6.3      K 5.0   CO2 19*   *   BUN 67*   CREATININE  7.0*   ALKPHOS 33*   ALT 8*   AST 8*   BILITOT 0.3     All labs within the past 24 hours have been reviewed.     Significant Imaging:  Labs: Reviewed    Assessment/Plan:     Renal/  * Acute renal failure superimposed on stage 4 chronic kidney disease   67 yo M with PMHx CKD IV and HTN who presents to the hospital by referral from his outpatient nephrologist for worsening kidney function. Given the patient's history of advanced chronic kidney disease stage 4, secondary to longstanding diabetic nephropathy and hypertensive nephrosclerosis, the observed rise in serum creatinine is likely attributable to the progressive nature of their underlying renal pathology. The absence of acute clinical signs, such as volume depletion, and the lack of recent exposure to nephrotoxic agents, radiocontrast, or infectious processes, further supports the chronic trajectory of renal decline.    Plan:   - There is no acute indication for dialysis at this time.   - Scheduled for Friday follow-up with Nephrology    - Send home with 10 g daily Lokelma   - Recommend evaluation by the transplant team.    - Arrange for a dietitian consultation to discuss a renal diet.    - Emphasize the importance of controlling blood pressure and blood sugar.        Thank you for your consult. I will sign off. Please contact us if you have any additional questions.    Miguel Mckinnon MD  Nephrology  [facility] Surg

## 2024-07-18 NOTE — PLAN OF CARE
[facility] Med Surg  Discharge Final Note    Primary Care Provider: Toby Sanderson MD    Expected Discharge Date: 7/18/2024    Final Discharge Note (most recent)       Final Note - 07/18/24 1035          Final Note    Assessment Type Final Discharge Note     Anticipated Discharge Disposition Home or Self Care     Hospital Resources/Appts/Education Provided Appointments scheduled and added to AVS        Post-Acute Status    Post-Acute Authorization Other     Other Status No Post-Acute Service Needs     Discharge Delays None known at this time                     Important Message from Medicare

## 2024-07-18 NOTE — DISCHARGE SUMMARY
[facility] Medicine  Discharge Summary      Patient Name: Handy Adams  MRN: [id number]  ALLEN: [id number]  Patient Class: IP- Inpatient  Admission Date: 7/16/2024  Hospital Length of Stay: 2 days  Discharge Date and Time:  07/18/2024 2:30 PM  Attending Physician: Rhonda att. providers found   Discharging Provider: Chuy Grewal MD  Primary Care Provider: Toby Sanderson MD  [facility] Medicine Team: [facility] Chuy Grewal MD  Primary Care Team: [facility]    HPI:   67 yo M with PMHx CKD IV and HTN who presents to the ED by referral from his nephrologist for worsening kidney function. Pt. Had routine blood work performed per his nephrologist 4 days ago which reveialed Cr 9.0 (baseline ~4.5) and BUN 85. He was contacted by his nephrologist and referred for repeat testing ASAP which showed Cr atill 8.0. He was referred to the ED for fruther care. Pt. Denies any changes in urine output, SOB, chest pain, fatigue, or confusion.    * No surgery found *      Hospital Course:   67 yo M with PMHx CKD IV, DM, HTN who presents to the hospital by referral from his outpatient nephrologist for worsening kidney function. He had routine blood work performed per his nephrologist 4 days ago which reveialed Cr 9.0 (baseline ~4.5) and BUN 85. Worsening kidney function likely secondary to longstanding diabetic nephropathy and hypertensive nephrosclerosis. He received lokelma for hyperkalemia with improvement noted. Kidney function has remained stable with no emergent need for HD. He is scheduled for Friday follow-up with Nephrology with repeat labwork and will be discharged on renal diet and 10 g daily Lokelma. Will give referral for KTM evaluation.      Goals of Care Treatment Preferences:  Code Status: Full Code      Consults:   Consults (From admission, onward)          Status Ordering Provider     Inpatient consult to Registered Dietitian/Nutritionist  Once        Provider:  (Not yet assigned)     LUKE العراقي     Inpatient consult to Nephrology  Once        Provider:  (Not yet assigned)    Completed MATA HENSLEY          Physical Exam  Vitals reviewed.   Constitutional:       General: He is not in acute distress.     Appearance: He is well-developed.   HENT:      Head: Normocephalic and atraumatic.   Eyes:      Extraocular Movements: Extraocular movements intact.      Pupils: Pupils are equal, round, and reactive to light.   Neck:      Vascular: No JVD.      Trachea: No tracheal deviation.   Cardiovascular:      Rate and Rhythm: Normal rate and regular rhythm.      Heart sounds: No murmur heard.     No friction rub. No gallop.   Pulmonary:      Effort: No respiratory distress.      Breath sounds: Normal breath sounds. No wheezing or rales.   Abdominal:      General: Bowel sounds are normal. There is no distension.      Palpations: Abdomen is soft. There is no mass.      Tenderness: There is no abdominal tenderness.   Musculoskeletal:         General: No deformity.      Cervical back: Neck supple.   Lymphadenopathy:      Cervical: No cervical adenopathy.   Skin:     General: Skin is warm and dry.      Findings: No rash.   Neurological:      Mental Status: He is alert and oriented to person, place, and time.     No new Assessment & Plan notes have been filed under this hospital service since the last note was generated.  Service: Hospital Medicine    Final Active Diagnoses:    Diagnosis Date Noted POA    PRINCIPAL PROBLEM:  Acute renal failure superimposed on stage 4 chronic kidney disease [N17.9, N18.4] 07/16/2024 Yes    Hyperkalemia [E87.5] 07/18/2024 Yes    HTN (hypertension) [I10] 07/16/2024 Yes    Chronic kidney disease (CKD), stage V [N18.5] 04/23/2024 Yes    Anemia in stage 5 chronic kidney disease, not on chronic dialysis [N18.5, D63.1] 02/07/2024 Yes    Pre-transplant evaluation for chronic kidney disease [Z01.818] 02/07/2024 Not Applicable    Class 2 severe obesity due to excess  calories with serious comorbidity and body mass index (BMI) of 36.0 to 36.9 in adult [E66.01, Z68.36] 01/07/2022 Not Applicable    Type 2 diabetes mellitus with both eyes affected by proliferative retinopathy and macular edema, without long-term current use of insulin [E11.3513] 06/25/2019 Yes      Problems Resolved During this Admission:       Discharged Condition: good    Disposition: Home or Self Care    Follow Up:   Follow-up Information       Toby Sanderson MD Follow up.    Specialty: Family Medicine  Contact information:  [address] [place] [address]  [phone]                           Patient Instructions:      Basic metabolic panel   Standing Status: Future Standing Exp. Date: 10/16/25     Ambulatory referral/consult to Nephrology   Standing Status: Future   Referral Priority: Routine Referral Type: Consultation   Referral Reason: Specialty Services Required   Requested Specialty: Nephrology   Number of Visits Requested: 1     Ambulatory referral/consult to Transplant, Kidney   Standing Status: Future   Referral Priority: Routine Referral Type: Transplants   Number of Visits Requested: 1     Diet renal     Notify your health care provider if you experience any of the following:  temperature >100.4     Notify your health care provider if you experience any of the following:  persistent nausea and vomiting or diarrhea     Notify your health care provider if you experience any of the following:  difficulty breathing or increased cough     Notify your health care provider if you experience any of the following:  persistent dizziness, light-headedness, or visual disturbances     Notify your health care provider if you experience any of the following:  increased confusion or weakness     Activity as tolerated       Significant Diagnostic Studies: Labs: All labs within the past 24 hours have been reviewed    Pending Diagnostic Studies:       None           Medications:  Reconciled Home  Medications:      Medication List        START taking these medications      LOKELMA 10 gram packet  Generic drug: sodium zirconium cyclosilicate  Take 1 packet (10 g total) by mouth once daily. Mix entire contents of packet(s) into drinking glass containing 3 tablespoons of water; stir well and drink immediately. Add water and repeat until no powder remains to receive entire dose.     tamsulosin 0.4 mg Cap  Commonly known as: FLOMAX  Take 1 capsule (0.4 mg total) by mouth every evening.            CHANGE how you take these medications      labetaloL 200 MG tablet  Commonly known as: NORMODYNE  Take 1 tablet (200 mg total) by mouth 2 (two) times daily.  What changed: Another medication with the same name was removed. Continue taking this medication, and follow the directions you see here.     NIFEdipine 90 MG (OSM) 24 hr tablet  Commonly known as: PROCARDIA-XL  Take 1 tablet (90 mg total) by mouth once daily.  What changed: when to take this            CONTINUE taking these medications      atorvastatin 20 MG tablet  Commonly known as: LIPITOR  Take 1 tablet (20 mg total) by mouth once daily.     blood sugar diagnostic Strp  To test twice daily     blood-glucose meter kit  Use as instructed     calcitRIOL 0.25 MCG Cap  Commonly known as: ROCALTROL  Take 1 capsule (0.25 mcg total) by mouth 3 (three) times a week. Monday, Wednesday, Friday     ergocalciferol 50,000 unit Cap  Commonly known as: ERGOCALCIFEROL  Take 1 capsule by mouth once a week     FeroSuL 325 mg (65 mg iron) Tab tablet  Generic drug: ferrous sulfate  Take 1 tablet (325 mg total) by mouth 2 (two) times daily.     HEPLISAV-B (PF) 20 mcg/0.5 mL Syrg  Generic drug: hepatitis B vacc-CpG 1018 (PF)  adm by pharmicist     lancets Misc  1 lancet by Misc.(Non-Drug; Combo Route) route 2 (two) times daily with meals.     pioglitazone 30 MG tablet  Commonly known as: ACTOS  Take 1 tablet (30 mg total) by mouth once daily.     PREVNAR 20 (PF) 0.5 mL Syrg  injection  Generic drug: pneumoc 20-carlos conj-dip cr(PF)  Inject into the muscle.     SHINGRIX (PF) 50 mcg/0.5 mL injection  Generic drug: varicella-zoster gE-AS01B (PF)  Inject into the muscle.     sildenafiL 100 MG tablet  Commonly known as: VIAGRA  Take 1 tablet (100 mg total) by mouth daily as needed for Erectile Dysfunction.     sodium bicarbonate 650 MG tablet  Take 1 tablet (650 mg total) by mouth 2 (two) times daily.     TRADJENTA 5 mg Tab tablet  Generic drug: linaGLIPtin  Take 1 tablet (5 mg total) by mouth once daily.            STOP taking these medications      spironolactone 50 MG tablet  Commonly known as: ALDACTONE     torsemide 10 MG Tab  Commonly known as: DEMADEX     valsartan 320 MG tablet  Commonly known as: DIOVAN              Indwelling Lines/Drains at time of discharge:   Lines/Drains/Airways       None                   Time spent on the discharge of patient: 35 minutes         Chuy Grewal MD  Department of Hospital Medicine  [facility] S[facility]

## 2024-07-18 NOTE — HOSPITAL COURSE
67 yo M with PMHx CKD IV, DM, HTN who presents to the hospital by referral from his outpatient nephrologist for worsening kidney function. He had routine blood work performed per his nephrologist 4 days ago which reveialed Cr 9.0 (baseline ~4.5) and BUN 85. Worsening kidney function likely secondary to longstanding diabetic nephropathy and hypertensive nephrosclerosis. He received lokelma for hyperkalemia with improvement noted. Kidney function has remained stable with no emergent need for HD. He is scheduled for Friday follow-up with Nephrology with repeat labwork and will be discharged on renal diet and 10 g daily Lokelma. Will give referral for KTM evaluation.

## 2024-07-18 NOTE — NURSING
Pt received d/c instructions/education. No concerns verbalized. IV cath removed, cath intact. Pt tolerated well. Pt to d/c with belongings.

## 2024-07-18 NOTE — SUBJECTIVE & OBJECTIVE
Interval History: No acute complaints this morning. The patient is eager to leave. Denies chest pain, SOB, new weakness or other issues.     Review of patient's allergies indicates:  No Known Allergies  Current Facility-Administered Medications   Medication Frequency    acetaminophen tablet 650 mg Q4H PRN    atorvastatin tablet 20 mg Daily    calcitRIOL capsule 0.25 mcg Once per day on Monday Wednesday Friday    dextrose 10% bolus 125 mL 125 mL PRN    dextrose 10% bolus 250 mL 250 mL PRN    glucagon (human recombinant) injection 1 mg PRN    glucose chewable tablet 16 g PRN    glucose chewable tablet 24 g PRN    heparin (porcine) injection 5,000 Units Q8H    labetaloL tablet 200 mg BID    melatonin tablet 6 mg Nightly PRN    naloxone 0.4 mg/mL injection 0.02 mg PRN    NIFEdipine 24 hr tablet 90 mg Daily    ondansetron injection 4 mg Q8H PRN    prochlorperazine injection Soln 5 mg Q6H PRN    sodium bicarbonate tablet 650 mg BID    sodium chloride 0.9% flush 10 mL PRN    tamsulosin 24 hr capsule 0.4 mg QHS       Objective:     Vital Signs (Most Recent):  Temp: 97.8 °F (36.6 °C) (07/18/24 0737)  Pulse: 67 (07/18/24 0737)  Resp: 17 (07/18/24 0737)  BP: 135/71 (07/18/24 0737)  SpO2: 100 % (07/18/24 0737) Vital Signs (24h Range):  Temp:  [97.2 °F (36.2 °C)-98.4 °F (36.9 °C)] 97.8 °F (36.6 °C)  Pulse:  [63-80] 67  Resp:  [16-18] 17  SpO2:  [100 %] 100 %  BP: (123-156)/(65-84) 135/71     Weight: 123 kg (271 lb 2.7 oz) (07/16/24 2235)  Body mass index is 36.78 kg/m².  Body surface area is 2.5 meters squared.    No intake/output data recorded.     Physical Exam  Vitals reviewed.   Constitutional:       Appearance: Normal appearance. He is obese.   HENT:      Head: Normocephalic and atraumatic.      Nose: Nose normal.   Eyes:      Extraocular Movements: Extraocular movements intact.      Pupils: Pupils are equal, round, and reactive to light.   Cardiovascular:      Rate and Rhythm: Normal rate and regular rhythm.   Pulmonary:       Effort: Pulmonary effort is normal.      Breath sounds: Normal breath sounds.   Abdominal:      Palpations: Abdomen is soft.   Musculoskeletal:         General: Normal range of motion.      Cervical back: Normal range of motion.   Skin:     General: Skin is dry.   Neurological:      General: No focal deficit present.      Mental Status: He is alert. Mental status is at baseline. He is disoriented.   Psychiatric:         Mood and Affect: Mood normal.         Behavior: Behavior normal.         Thought Content: Thought content normal.          Significant Labs:  CBC:   Recent Labs   Lab 07/18/24  0447   WBC 5.76   RBC 2.54*   HGB 7.5*   HCT 23.2*      MCV 91   MCH 29.5   MCHC 32.3     CMP:   Recent Labs   Lab 07/18/24 0447   GLU 92   CALCIUM 8.1*   ALBUMIN 2.8*   PROT 6.3      K 5.0   CO2 19*   *   BUN 67*   CREATININE 7.0*   ALKPHOS 33*   ALT 8*   AST 8*   BILITOT 0.3     All labs within the past 24 hours have been reviewed.     Significant Imaging:  Labs: Reviewed

## 2024-07-18 NOTE — ASSESSMENT & PLAN NOTE
67 yo M with PMHx CKD IV and HTN who presents to the hospital by referral from his outpatient nephrologist for worsening kidney function. Given the patient's history of advanced chronic kidney disease stage 4, secondary to longstanding diabetic nephropathy and hypertensive nephrosclerosis, the observed rise in serum creatinine is likely attributable to the progressive nature of their underlying renal pathology. The absence of acute clinical signs, such as volume depletion, and the lack of recent exposure to nephrotoxic agents, radiocontrast, or infectious processes, further supports the chronic trajectory of renal decline.    Plan:   - There is no acute indication for dialysis at this time.   - Scheduled for Friday follow-up with Nephrology    - Send home with 10 g daily Lokelma   - Recommend evaluation by the transplant team.    - Arrange for a dietitian consultation to discuss a renal diet.    - Emphasize the importance of controlling blood pressure and blood sugar.

## 2024-07-19 ENCOUNTER — TELEPHONE (OUTPATIENT)
Dept: NEPHROLOGY | Facility: CLINIC | Age: 69
End: 2024-07-19
Payer: MEDICARE

## 2024-07-22 ENCOUNTER — PATIENT OUTREACH (OUTPATIENT)
Dept: ADMINISTRATIVE | Facility: CLINIC | Age: 69
End: 2024-07-22
Payer: MEDICARE

## 2024-07-22 ENCOUNTER — TELEPHONE (OUTPATIENT)
Dept: NEPHROLOGY | Facility: CLINIC | Age: 69
End: 2024-07-22
Payer: MEDICARE

## 2024-07-22 DIAGNOSIS — N18.5 CHRONIC KIDNEY DISEASE (CKD), STAGE V: Primary | ICD-10-CM

## 2024-07-22 NOTE — PROGRESS NOTES
C3 nurse spoke with Handy Adams  for a TCC post hospital discharge follow up call. The patient has a scheduled HOSFU appointment with Jeniffer Alicia PA-C on 7/23/2024 AT 11:00 AM.

## 2024-07-22 NOTE — PROGRESS NOTES
C3 nurse attempted to contact Handy Adams  for a TCC post hospital discharge follow up call. The patient is unable to conduct the call @ this time. The patient requested a callback.    The patient has a scheduled HOS appointment with Jeniffer Alicia PA-C on 7/23/2024 AT 11:00 AM.

## 2024-07-23 ENCOUNTER — OFFICE VISIT (OUTPATIENT)
Dept: OPHTHALMOLOGY | Facility: CLINIC | Age: 69
End: 2024-07-23
Payer: MEDICARE

## 2024-07-23 ENCOUNTER — TELEPHONE (OUTPATIENT)
Dept: TRANSPLANT | Facility: CLINIC | Age: 69
End: 2024-07-23
Payer: MEDICARE

## 2024-07-23 ENCOUNTER — TELEPHONE (OUTPATIENT)
Dept: OPHTHALMOLOGY | Facility: CLINIC | Age: 69
End: 2024-07-23
Payer: MEDICARE

## 2024-07-23 ENCOUNTER — CLINICAL SUPPORT (OUTPATIENT)
Dept: OPHTHALMOLOGY | Facility: CLINIC | Age: 69
End: 2024-07-23
Payer: MEDICARE

## 2024-07-23 DIAGNOSIS — E11.3513 TYPE 2 DIABETES MELLITUS WITH BOTH EYES AFFECTED BY PROLIFERATIVE RETINOPATHY AND MACULAR EDEMA, WITHOUT LONG-TERM CURRENT USE OF INSULIN: Primary | ICD-10-CM

## 2024-07-23 DIAGNOSIS — H43.813 VITREOUS DEGENERATION OF BOTH EYES: ICD-10-CM

## 2024-07-23 DIAGNOSIS — H25.13 NS (NUCLEAR SCLEROSIS), BILATERAL: ICD-10-CM

## 2024-07-23 DIAGNOSIS — H35.033 HYPERTENSIVE RETINOPATHY, BILATERAL: ICD-10-CM

## 2024-07-23 PROCEDURE — 3288F FALL RISK ASSESSMENT DOCD: CPT | Mod: HCNC,CPTII,S$GLB, | Performed by: OPHTHALMOLOGY

## 2024-07-23 PROCEDURE — 3044F HG A1C LEVEL LT 7.0%: CPT | Mod: HCNC,CPTII,S$GLB, | Performed by: OPHTHALMOLOGY

## 2024-07-23 PROCEDURE — 1159F MED LIST DOCD IN RCRD: CPT | Mod: HCNC,CPTII,S$GLB, | Performed by: OPHTHALMOLOGY

## 2024-07-23 PROCEDURE — 92134 CPTRZ OPH DX IMG PST SGM RTA: CPT | Mod: HCNC,S$GLB,, | Performed by: OPHTHALMOLOGY

## 2024-07-23 PROCEDURE — 1126F AMNT PAIN NOTED NONE PRSNT: CPT | Mod: HCNC,CPTII,S$GLB, | Performed by: OPHTHALMOLOGY

## 2024-07-23 PROCEDURE — 3066F NEPHROPATHY DOC TX: CPT | Mod: HCNC,CPTII,S$GLB, | Performed by: OPHTHALMOLOGY

## 2024-07-23 PROCEDURE — 4010F ACE/ARB THERAPY RXD/TAKEN: CPT | Mod: HCNC,CPTII,S$GLB, | Performed by: OPHTHALMOLOGY

## 2024-07-23 PROCEDURE — 92012 INTRM OPH EXAM EST PATIENT: CPT | Mod: HCNC,S$GLB,, | Performed by: OPHTHALMOLOGY

## 2024-07-23 PROCEDURE — 1160F RVW MEDS BY RX/DR IN RCRD: CPT | Mod: HCNC,CPTII,S$GLB, | Performed by: OPHTHALMOLOGY

## 2024-07-23 PROCEDURE — 1101F PT FALLS ASSESS-DOCD LE1/YR: CPT | Mod: HCNC,CPTII,S$GLB, | Performed by: OPHTHALMOLOGY

## 2024-07-23 PROCEDURE — 99999 PR PBB SHADOW E&M-EST. PATIENT-LVL III: CPT | Mod: PBBFAC,HCNC,, | Performed by: OPHTHALMOLOGY

## 2024-07-23 PROCEDURE — 3062F POS MACROALBUMINURIA REV: CPT | Mod: HCNC,CPTII,S$GLB, | Performed by: OPHTHALMOLOGY

## 2024-07-23 RX ORDER — DEXAMETHASONE 0.7 MG/1
IMPLANT INTRAVITREAL
COMMUNITY
Start: 2024-03-01

## 2024-07-23 RX ORDER — RSV VACC, PREF A AND PREF B/PF 120MCG/0.5
VIAL (EA) INTRAMUSCULAR
COMMUNITY
Start: 2024-06-18

## 2024-07-23 RX ORDER — TETANUS TOXOID, REDUCED DIPHTHERIA TOXOID AND ACELLULAR PERTUSSIS VACCINE, ADSORBED 5; 2.5; 8; 8; 2.5 [IU]/.5ML; [IU]/.5ML; UG/.5ML; UG/.5ML; UG/.5ML
SUSPENSION INTRAMUSCULAR
COMMUNITY
Start: 2024-03-01

## 2024-07-23 RX ORDER — FLUOCINOLONE ACETONIDE 0.19 MG/1
IMPLANT INTRAVITREAL
COMMUNITY
Start: 2024-05-21

## 2024-07-23 RX ORDER — INFLUENZA A VIRUS A/VICTORIA/4897/2022 IVR-238 (H1N1) ANTIGEN (FORMALDEHYDE INACTIVATED), INFLUENZA A VIRUS A/DARWIN/9/2021 SAN-010 (H3N2) ANTIGEN (FORMALDEHYDE INACTIVATED), INFLUENZA B VIRUS B/PHUKET/3073/2013 ANTIGEN (FORMALDEHYDE INACTIVATED), AND INFLUENZA B VIRUS B/MICHIGAN/01/2021 ANTIGEN (FORMALDEHYDE INACTIVATED) 60; 60; 60; 60 UG/.7ML; UG/.7ML; UG/.7ML; UG/.7ML
INJECTION, SUSPENSION INTRAMUSCULAR
COMMUNITY
Start: 2024-03-01

## 2024-07-23 NOTE — PROGRESS NOTES
HPI     Follow-up     Additional comments: 2 mo Iluvien ou           Comments    Patient states VA OD blurry/ cloudy, no pain ou and denies floaters/   flashes ou.    No gtts          Last edited by Lorraine Warren on 7/23/2024  9:57 AM.          OCT  DME OU  Trace central increase OU    Prior WFFA  OD - normal transit time. Hyperfluorescence early c/w Ma/edema - sig NP    OS - Hyperfluorescence early c/w Ma/edema  .NV nasal      A/P    1. 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 DME OU   6/19 - pt did not FU until 8/20  S/p Avastin OD x 2  S/p Eylea OD x 2  S/p Ozurdex OD x 4, OS x 2  5/24  S/p Iluvien OU 5/24 11/22 - not seen x 2 years  3/23 - had second opinion with Dr. Suh.  Pt would like to try a few Eylea prior to steroids if needed  11/23 - increased DME oU  7/24 trace increase       3. HTN Ret OU  BS/BP/chol control    4. NS OU  Increased PSC OD>OS    Will need CE OD soon, OS shortly thereafter     5. Cyst RLL  Removed by Rhea  Happy with result        2  month OCT and dilate LDFE 5/24    Risks, benefits, and alternatives to treatment discussed in detail with the patient.  The patient voiced understanding and wished to proceed with the procedure    Injection Procedure Note:  Diagnosis: DME OU    Patient Identified and Time Out complete  Pt marked  Topical Proparacaine and Betadine. Subconj lido  Inject iluvien OU at 6:00 @ 3.5-4mm posterior to limbus  Post Operative Dx: Same  Complications: None  Follow up as above.

## 2024-07-23 NOTE — TELEPHONE ENCOUNTER
----- Message from Bridget Jacobs sent at 7/23/2024 11:14 AM CDT -----  Please call pt for CE  thanks

## 2024-07-24 ENCOUNTER — LAB VISIT (OUTPATIENT)
Dept: LAB | Facility: HOSPITAL | Age: 69
End: 2024-07-24
Payer: MEDICARE

## 2024-07-24 DIAGNOSIS — N18.5 CHRONIC KIDNEY DISEASE (CKD), STAGE V: ICD-10-CM

## 2024-07-24 DIAGNOSIS — N18.5 CHRONIC KIDNEY DISEASE (CKD), STAGE V: Primary | ICD-10-CM

## 2024-07-24 LAB
25(OH)D3+25(OH)D2 SERPL-MCNC: 19 NG/ML (ref 30–96)
ALBUMIN SERPL BCP-MCNC: 3 G/DL (ref 3.5–5.2)
ANION GAP SERPL CALC-SCNC: 5 MMOL/L (ref 8–16)
BASOPHILS # BLD AUTO: 0.03 K/UL (ref 0–0.2)
BASOPHILS NFR BLD: 0.6 % (ref 0–1.9)
BUN SERPL-MCNC: 38 MG/DL (ref 8–23)
CALCIUM SERPL-MCNC: 8.3 MG/DL (ref 8.7–10.5)
CHLORIDE SERPL-SCNC: 113 MMOL/L (ref 95–110)
CO2 SERPL-SCNC: 23 MMOL/L (ref 23–29)
CREAT SERPL-MCNC: 4.9 MG/DL (ref 0.5–1.4)
DIFFERENTIAL METHOD BLD: ABNORMAL
EOSINOPHIL # BLD AUTO: 0.1 K/UL (ref 0–0.5)
EOSINOPHIL NFR BLD: 1.2 % (ref 0–8)
ERYTHROCYTE [DISTWIDTH] IN BLOOD BY AUTOMATED COUNT: 15.9 % (ref 11.5–14.5)
EST. GFR  (NO RACE VARIABLE): 12.2 ML/MIN/1.73 M^2
GLUCOSE SERPL-MCNC: 79 MG/DL (ref 70–110)
HCT VFR BLD AUTO: 24.4 % (ref 40–54)
HGB BLD-MCNC: 7.7 G/DL (ref 14–18)
IMM GRANULOCYTES # BLD AUTO: 0.02 K/UL (ref 0–0.04)
IMM GRANULOCYTES NFR BLD AUTO: 0.4 % (ref 0–0.5)
LYMPHOCYTES # BLD AUTO: 1.1 K/UL (ref 1–4.8)
LYMPHOCYTES NFR BLD: 21.7 % (ref 18–48)
MCH RBC QN AUTO: 29.6 PG (ref 27–31)
MCHC RBC AUTO-ENTMCNC: 31.6 G/DL (ref 32–36)
MCV RBC AUTO: 94 FL (ref 82–98)
MONOCYTES # BLD AUTO: 0.5 K/UL (ref 0.3–1)
MONOCYTES NFR BLD: 9.1 % (ref 4–15)
NEUTROPHILS # BLD AUTO: 3.5 K/UL (ref 1.8–7.7)
NEUTROPHILS NFR BLD: 67 % (ref 38–73)
NRBC BLD-RTO: 0 /100 WBC
PHOSPHATE SERPL-MCNC: 3.7 MG/DL (ref 2.7–4.5)
PLATELET # BLD AUTO: 248 K/UL (ref 150–450)
PMV BLD AUTO: 11.6 FL (ref 9.2–12.9)
POTASSIUM SERPL-SCNC: 4.1 MMOL/L (ref 3.5–5.1)
PTH-INTACT SERPL-MCNC: 345.5 PG/ML (ref 9–77)
RBC # BLD AUTO: 2.6 M/UL (ref 4.6–6.2)
SODIUM SERPL-SCNC: 141 MMOL/L (ref 136–145)
WBC # BLD AUTO: 5.17 K/UL (ref 3.9–12.7)

## 2024-07-24 PROCEDURE — 85025 COMPLETE CBC W/AUTO DIFF WBC: CPT | Mod: HCNC,TXP | Performed by: NURSE PRACTITIONER

## 2024-07-24 PROCEDURE — 83970 ASSAY OF PARATHORMONE: CPT | Mod: HCNC,TXP | Performed by: NURSE PRACTITIONER

## 2024-07-24 PROCEDURE — 82306 VITAMIN D 25 HYDROXY: CPT | Mod: HCNC,TXP | Performed by: NURSE PRACTITIONER

## 2024-07-24 PROCEDURE — 36415 COLL VENOUS BLD VENIPUNCTURE: CPT | Mod: HCNC,PO,TXP | Performed by: NURSE PRACTITIONER

## 2024-07-24 PROCEDURE — 80069 RENAL FUNCTION PANEL: CPT | Mod: HCNC,TXP | Performed by: NURSE PRACTITIONER

## 2024-07-24 NOTE — PROGRESS NOTES
"Subjective:       Patient ID: Hadny Adams is a 68 y.o. A male who presents for evaluation of of renal dysfunction.      HPI     Patient is new to me. New to clinic.  Prior pertinent chart reviewed since this is patient's first appointment with me.    Patient presents for new evaluation of renal dysfunction.  Baseline creatinine of 1.6-1.7 in 2020-early 2022. Recently had sCr of 2.5. Patient said he has "not been taking care of himself" and "eating more sugar" since January.    Per recent note from PCP: He cannot afford farxiga due to increased price and had stopped taking it for a few months prior to appt in September.    Home BPs: does not take    Rare Excedrin use now, though he used to use it frequently.    Significant other medical problems include HTN since at least 2007, T2DM since at least 2007.      The patient denies taking herbal supplements, or new antibiotics, recreational drugs, recent episode of dehydration, diarrhea, nausea or vomiting, acute illness, hospitalization or exposure to IV radiocontrast.     Significant family hx includes: No known kidney issues    Last renal US: none in EMR    Update 10/24/22:  Presents for f/u of CKD, YESENIA.  Last seen a month ago.  Feet are swelling again, especially when drinking ensure. Has improved since stopping Ensure.  Holding lisinopril-Hctz. Taking labetolol 200 mg instead.  Found to have an IgG lambda specific monoclonal band during proteinuria workup. Referred to hematology.    Recent sCr 2.5--> 2.8-2.9.     Home BPs: does not believe cuff is accurate.    Update 12/28/22:  Returns for f/u of CKD.  sCr has been 2.8-3.0 mg/dL.  Home BPs: not taking because he thinks cuff is inaccurate    Patient had bone marrow biopsy and was diagnosed with IgG-lambda MGUS.  An IgG lambda specific monoclonal protein was identified on 24 hour urine on 12/16/22.  He has not gotten kidney biopsy.    Says he feels great.    Update 2/3/23:  Returns for f/u of CKD and discussion about " "biopsy.  sCr now 3.0-3.2 mg/dL. Most recent sCr 3.3.  Increased valsartan to 160 mg at last visit.    Update 9/8/23:  - Presents for follow-up of CKD  - sCr trended up to 3.8. from baseline of about 3.0, now improved to 3.1. Unclear etiology.  - Notes being on Farxiga in the past but has not taken for at least a year   - Denies NSAIDs, reduced PO intake, n/v/d, fluctuations in BP, elevations in blood sugar, recent illness    Update 11/6/23:  Presents for f/u of CKD.  Most recent sCr 3.1 -3.4 mg/dL.  Home BPs: 130-140s (SBP)   Admits recent dietary indiscretion.  Says he gained weight recently.  Not exercising.    Advance Care Planning       Serious Illness Conversation Guide    Conversation was held with:   patient[SR1.1]     What is your understanding now of where you are with your illness?:   Comments: Admits he is in denial about kidney disease.[SR1.1]     How much information about what is likely to be ahead with your illness would you like from me?:   wants to be fully informed[SR1.1]     I want to share with you my understanding of where things are with your illness.:   continued decline[SR1.2]     Patient emotions observed or reported:   denial[SR1.1]     What are your most important goals if your health situation worsens?:   being independent[SR1.1]     What are your biggest fears and worries about the future with your health?:   Comments: being debilitated where he has to depend on others to care for him[SR1.1]     What gives you strength as you think about the future of your illness?:   [employer] tae[SR1.1]     What abilities are so critical to your life that you can't imagine living without them? Or, what makes life meaningful?:   being able to care for oneself, living independently[SR1.1]     If you become sicker, how much are you willing to go through for the possibility of gaining more time?:   Comments: Says he needs to think about this, but "I don't want to go through dialysis at all because I " "think that would put a constraint on my independence and constrain me from what I imagine I could be." He says he would want dialysis though if the choice was dialysis or death.    Has not thought about invasive procedures in depth.[SR1.1]     How much does your family know about your priorities and wishes?:   patient has had some incomplete discussions with family[SR1.2]  Comments: Says he is very private. Has not spoken to spouse or friends about this yet. He has shared some information with sister.[SR1.2]     I recommend that we do the following to make sure your treatment plans reflect what's important to you. How does this plan seem to you?:        Attribution       SR1.1 Raven Naylor NP 11/06/23 14:48    SR1.2 Raven Naylor NP 11/06/23 16:04               Update 2/9/24:  Presents today for f/u of CKD.  Most recent sCr was 4.1 mg/dL.  Home BPs: SBP 150s  Thinks he took his medication today. Takes his medication daily but not at the same time every day.  Saw transplant team.    Update 3/15/24:  Presents today for f/u of CKD.  Most recent sCr was 5.7    Says he has lost 20+ lbs by only eating on meal a day (Honey nut cheerios; almond milk). Drinks water.     Says he has been "feeling fine." No swelling. "I feel great."  Hematology is considering repeating bone marrow biopsy. Planning to do full body scan.    Update 4/23/24:  Presents today for f/u of CKD.  Most recent sCr was 4.5  Had clinic visit from PD nurse but said he is unsure of modality choice.   Previously referred to [facility] nutrition services/RD in March but has not been seen yet.  Has gained weight back.    Home BPs: 130s-140s/70s-80s    Update 7/24/24:  Presents today for f/u of CKD/ hospital f/u for YESENIA.  Had sCr on routine labs of 9.0. Repeat of 8.0. No uremic symptoms, but concerned for overdiuresis, need for IVF. Sent to ER. He was admitted; aldactone, torsemide, valsartan held. Discharged c sCr of 7.0. He required one dose of " "anu while hospitalized.     Most recent sCr was 4.9 but UPCR up to 4 grams.  Ran out of nifedipine, but he took it yesterday.    Home BPs: 120s-140s    Review of Systems   "I'm feeling fine."  Constitutional:  Negative for appetite change. Food tastes good.  Respiratory:  Negative for shortness of breath.    Cardiovascular:  Negative for swelling to legs.   Gastrointestinal:  Negative for diarrhea, nausea and vomiting. Has constipation.   Genitourinary:  Positive for frequency (at baseline) and urgency (at baseline). Negative for decreased urine volume, difficulty urinating, dysuria and hematuria.        Some incontinence         Objective:       Blood pressure 127/80, pulse 66, resp. rate 18, height 6' (1.829 m), weight 124.6 kg (274 lb 11.1 oz), SpO2 100%.  Physical Exam  Vitals reviewed.   Constitutional:       Appearance: Normal appearance. He is obese.   HENT:      Head: Normocephalic and atraumatic.   Eyes:      General: No scleral icterus.  Cardiovascular:      Rate and Rhythm: Normal rate and regular rhythm.   Pulmonary:      Effort: No respiratory distress.   CTA  Musculoskeletal:      Right lower leg: No edema     Left lower leg: No edema  Skin:     General: Skin is warm and dry.   Neurological:      Mental Status: He is alert and oriented to person, place, and time.   Psychiatric:         Mood and Affect: Mood normal.         Behavior: Behavior normal.           Lab Results   Component Value Date    CREATININE 4.9 (H) 07/24/2024     Prot/Creat Ratio, Urine   Date Value Ref Range Status   07/24/2024 4.04 (H) 0.00 - 0.20 Final   07/12/2024 2.52 (H) 0.00 - 0.20 Final   04/19/2024 3.81 (H) 0.00 - 0.20 Final     Lab Results   Component Value Date     07/24/2024    K 4.1 07/24/2024    CO2 23 07/24/2024     (H) 07/24/2024     Lab Results   Component Value Date    .5 (H) 07/24/2024    CALCIUM 8.3 (L) 07/24/2024    PHOS 3.7 07/24/2024     Lab Results   Component Value Date    HGB 7.7 (L) " 07/24/2024    WBC 5.17 07/24/2024    HCT 24.4 (L) 07/24/2024      Lab Results   Component Value Date    HGBA1C 5.3 03/23/2024     07/24/2024    BUN 38 (H) 07/24/2024     Lab Results   Component Value Date    LDLCALC 87.4 02/07/2024         Assessment:       1. Chronic kidney disease (CKD), stage V    2. Acute kidney injury superimposed on CKD    3. Hypertension, unspecified type    4. Anemia, unspecified type    5. Severe obesity (BMI 35.0-39.9) with comorbidity    6. MGUS (monoclonal gammopathy of unknown significance)    7. Proteinuria, unspecified type    8. Secondary hyperparathyroidism    9. Acidosis    10. Type 2 diabetes mellitus with stage 5 chronic kidney disease not on chronic dialysis, unspecified whether long term insulin use                      Plan:   CKD stage 5- Underlying CKD clinically 2/2 diabetes + HTN  - Biopsy performed showing hypertensive nephrosclerosis, acute tubular injury, and diabetic nephropathy. Progressive.    - Currently off aldactone, torsemide, valsartan. sCr has come down off these medications, but UPCR increased.    - IgG lambda specific monoclonal band noted on proteinuria workup and UPEP. No evidence of MGRS on biopsy.   - Educated patient to control BP, BG, remain well-hydrated, and avoid NSAIDs to prevent progression of CKD. High risk of progression to ESRD.        UPCR Nephrotic range proteinuria. Was on farxiga but could not afford it; renal function is too low to start on recent labs. On ARB  - Proteinuric   Acid-base Mild acidosis. On sodium bicarb 650 mg BID   Renal osteodystrophy Corrected Ca, phos okay. Low vit D. Elevated PTH.  On ergo weekly and calcitriol 3x/week.  Will trend to determine need to increase calcitriol to daily.   Anemia Hgb low last check. No strong indication for epo at this time. Has MGUS. Followed by hem/onc.    On PO iron twice a day.   DM Well-controlled recently. Has had DM since at least 2007, when he had an A1c of 15+.   Lipid  Management On statin.   ESRD planning Anticipatory guidance provided about timing of dialysis. Start discussions and planning when eGFR is about 20 mL/min; most patients start dialysis between 5-10 mL/min.    Attended ESRD treatment choices and met with PD nurse, but is still unsure about what treatment he would want. Has not had home visit yet.    Will f/u c transplant team to see where he is in the process.    He agreed to home visit from PD nurse.    Offered counseling to help with expressed feelings of denial but he declined offer.      Kidney Failure Risk Equation (Tangri)    Kidney Failure Risk at 2 years: 64.9%    Kidney Failure Risk at 5 years: 96.2%    Lab Results   Component Value Date    MICALBCREAT 1245.7 (H) 03/11/2024    CREATININE 4.9 (H) 07/24/2024          HTN - WNL on labetalol 200 mg BID, Nifedipine 90 mg (missed this morning),   - Holding torsemide 10, valsartan 320 mg, spironolactone 50 mg qd  - Goal is SBP less than 130/80  - D/c nifedipine. Resume valsartan 80 mg for RAASi   - Monitor BP with digital monitoring.    Obesity - he has increased dietary intake after severely limiting intake  - Refer to nutritionist (entered previously). He unfortunately missed last appointment due to being hospitalized. Will refer to renal dietician to be in her work queue when she starts    MGUS - per hem/onc; due back in Sept. 2024    All questions patient had were answered.  Asked if further questions. None. F/u in clinic in 6 weeks with Raven Naylor NP with labs and urine prior to next visit or sooner if needed.  ER for emergency concerns.    Summary of Plan:  Needs home visit for PD; will message HTRN  Refer to RD  3. Stop nifedipine; resume valsartan at lower dose 80 mg  4. BMP, TSAT, Iron, CBC in 2 weeks  5. RTC as scheduled in October; discussed potential need for interim appt depending on labs        Visit today included increased complexity associated with  managing the longitudinal care of the  patient due to the serious and/or complex managed problem(s) CKD.    45 minutes of total time spent on the encounter, which includes face to face time and non-face to face time preparing to see the patient (eg, review of tests), Obtaining and/or reviewing separately obtained history, documenting clinical information in the electronic or other health record, independently interpreting results (not separately reported) and communicating results to the patient/family/caregiver, or Care coordination (not separately reported).

## 2024-07-25 ENCOUNTER — OFFICE VISIT (OUTPATIENT)
Dept: NEPHROLOGY | Facility: CLINIC | Age: 69
End: 2024-07-25
Payer: MEDICARE

## 2024-07-25 VITALS
RESPIRATION RATE: 18 BRPM | SYSTOLIC BLOOD PRESSURE: 127 MMHG | OXYGEN SATURATION: 100 % | DIASTOLIC BLOOD PRESSURE: 80 MMHG | BODY MASS INDEX: 37.21 KG/M2 | HEIGHT: 72 IN | WEIGHT: 274.69 LBS | HEART RATE: 66 BPM

## 2024-07-25 DIAGNOSIS — N18.5 TYPE 2 DIABETES MELLITUS WITH STAGE 5 CHRONIC KIDNEY DISEASE NOT ON CHRONIC DIALYSIS, UNSPECIFIED WHETHER LONG TERM INSULIN USE: ICD-10-CM

## 2024-07-25 DIAGNOSIS — N18.5 CHRONIC KIDNEY DISEASE (CKD), STAGE V: Primary | ICD-10-CM

## 2024-07-25 DIAGNOSIS — N17.9 ACUTE KIDNEY INJURY SUPERIMPOSED ON CKD: ICD-10-CM

## 2024-07-25 DIAGNOSIS — I10 HYPERTENSION, UNSPECIFIED TYPE: ICD-10-CM

## 2024-07-25 DIAGNOSIS — N18.9 ACUTE KIDNEY INJURY SUPERIMPOSED ON CKD: ICD-10-CM

## 2024-07-25 DIAGNOSIS — E87.20 ACIDOSIS: ICD-10-CM

## 2024-07-25 DIAGNOSIS — D47.2 MGUS (MONOCLONAL GAMMOPATHY OF UNKNOWN SIGNIFICANCE): ICD-10-CM

## 2024-07-25 DIAGNOSIS — E66.01 SEVERE OBESITY (BMI 35.0-39.9) WITH COMORBIDITY: ICD-10-CM

## 2024-07-25 DIAGNOSIS — R80.9 PROTEINURIA, UNSPECIFIED TYPE: ICD-10-CM

## 2024-07-25 DIAGNOSIS — D64.9 ANEMIA, UNSPECIFIED TYPE: ICD-10-CM

## 2024-07-25 DIAGNOSIS — E11.22 TYPE 2 DIABETES MELLITUS WITH STAGE 5 CHRONIC KIDNEY DISEASE NOT ON CHRONIC DIALYSIS, UNSPECIFIED WHETHER LONG TERM INSULIN USE: ICD-10-CM

## 2024-07-25 DIAGNOSIS — N25.81 SECONDARY HYPERPARATHYROIDISM: ICD-10-CM

## 2024-07-25 PROCEDURE — 99999 PR PBB SHADOW E&M-EST. PATIENT-LVL V: CPT | Mod: PBBFAC,HCNC,, | Performed by: NURSE PRACTITIONER

## 2024-07-25 RX ORDER — VALSARTAN 80 MG/1
80 TABLET ORAL DAILY
Qty: 90 TABLET | Refills: 3 | Status: SHIPPED | OUTPATIENT
Start: 2024-07-25 | End: 2025-07-25

## 2024-07-25 NOTE — PATIENT INSTRUCTIONS
Stop nifedipine.  Start valsartan 80 mg.  Bloodwork in 2 weeks.  Keep taking blood pressure with digital medicine.    Tiara, the home dialysis nurse, should contact you to schedule a home visit. Please schedule with her.    Please let us know if you develop worsened fatigue or weakness, worsened shortness of breath or swelling, worsened nausea or vomiting, brain fog/ confusion, or a bad taste in your mouth, low appetite, or feelings that food does not taste right (worsened from your baseline).

## 2024-07-25 NOTE — Clinical Note
Marquez Menjivar, I met with Mr. Adams today. He is agreeable to home visit. eGFR is 12 most recently, but I am resuming valsartan, so it may drop a little further. I told him you'd be reaching out. Thanks, Raven

## 2024-07-25 NOTE — Clinical Note
Marquez Pretty, Mr. Adams is on digital medicine. Do you know who his assigned clinician is? I'm adjusting BP medications and wanted to discuss/ have them monitor a little closer. Thanks for your help! Raven no

## 2024-07-25 NOTE — PROGRESS NOTES
Oct macula done ou today, per Dr. Mcmanus./MA          There are no diagnoses linked to this encounter.     68 y.o. y/o here for screening for Diabetic Renopathy with non-dilated fundus photos per Toby Sanderson MD

## 2024-07-25 NOTE — Clinical Note
Marquez Cordova,  I saw Mr. Adams today. I was wondering if y'all had any updates on his status for being listed? Thanks, Raven

## 2024-07-26 ENCOUNTER — COMMITTEE REVIEW (OUTPATIENT)
Dept: TRANSPLANT | Facility: CLINIC | Age: 69
End: 2024-07-26
Payer: MEDICARE

## 2024-07-26 NOTE — COMMITTEE REVIEW
Native Organ Dx: Diabetes Mellitus - Type II      SELECTION COMMITTEE NOTE    Handy Adams was presented at selection committee on 7/26/2024.  Patient met selection criteria for kidney transplant related to CKD, Stage 5 due to    Diabetes Mellitus - Type II.  No absolute contraindications to transplant at this time.  Patient will be placed on the cadaveric wait list pending final financial approval from insurance company.  Patient will return to clinic for routine appointment in 6 month(s). Patient does not meet criteria for High KDPI kidney offer. Patient has not consented for HCV+ kidney offer, unsure. Patient does not meet criteria for dual/enbloc, due to weight.    Planned immunosuppression thymoglobulin.    Patient informed of committee's decision. Discussed HCV + donors with patient, patient is still unsure and would like to speak to his nephrologist. All questions were answered and patient voiced understanding.       Note written by Catrina Zuniga RN    ===============================================    I was present at the meeting and attest to the decision of the committee

## 2024-07-26 NOTE — LETTER
July 26, 2024    Dr. Raven Naylor  [address] [facility] [address]  Phone: [phone]  Fax: [phone]     Dear Dr. Raven Naylor:    Patient: Handy Adams   MR Number: [id number]   YOB: 1955     Your patient, Handy Adams, was recently re-discussed at the [facility] Selection Committee meeting on 7/26/2024. I am happy to inform you that Handy has been approved for transplantation.  He has met selection criteria for a kidney transplant related to CKD stage 5 secondary to primary diagnosis of Diabetes Mellitus - Type II. Your patient will be placed on the cadaveric wait list pending final financial approval from insurance company.     We appreciate your confidence in allowing us to participate in your patients care.  If you have any questions or concerns, please do not hesitate to contact me.    Sincerely,      Ruchi Yang MD  Medical Director, Kidney & Kidney/Pancreas Transplantation  jr  Cc: Handy Adams (Patient)

## 2024-08-02 ENCOUNTER — OFFICE VISIT (OUTPATIENT)
Dept: FAMILY MEDICINE | Facility: CLINIC | Age: 69
End: 2024-08-02
Payer: MEDICARE

## 2024-08-02 VITALS
SYSTOLIC BLOOD PRESSURE: 160 MMHG | HEIGHT: 72 IN | HEART RATE: 65 BPM | OXYGEN SATURATION: 99 % | BODY MASS INDEX: 39.45 KG/M2 | DIASTOLIC BLOOD PRESSURE: 88 MMHG | TEMPERATURE: 98 F | RESPIRATION RATE: 16 BRPM | WEIGHT: 291.25 LBS

## 2024-08-02 DIAGNOSIS — I12.9 BENIGN HYPERTENSION WITH CKD (CHRONIC KIDNEY DISEASE) STAGE IV: ICD-10-CM

## 2024-08-02 DIAGNOSIS — I10 HYPERTENSION, UNSPECIFIED TYPE: Primary | ICD-10-CM

## 2024-08-02 DIAGNOSIS — N18.4 BENIGN HYPERTENSION WITH CKD (CHRONIC KIDNEY DISEASE) STAGE IV: ICD-10-CM

## 2024-08-02 DIAGNOSIS — E66.01 CLASS 2 SEVERE OBESITY DUE TO EXCESS CALORIES WITH SERIOUS COMORBIDITY AND BODY MASS INDEX (BMI) OF 36.0 TO 36.9 IN ADULT: ICD-10-CM

## 2024-08-02 PROCEDURE — 99999 PR PBB SHADOW E&M-EST. PATIENT-LVL V: CPT | Mod: PBBFAC,HCNC,,

## 2024-08-02 NOTE — PROGRESS NOTES
Health Maintenance Due   Topic     Abdominal Aortic Aneurysm Screening  CONSULT WITH PCP    COVID-19 Vaccine (4 - 2023-24 season) Not given at this facility       Foot Exam  CONSULT WITH PCP

## 2024-08-02 NOTE — PROGRESS NOTES
HPI     Chief Complaint:  Chief Complaint   Patient presents with    Follow-up    Hypertension     With the meds been running high       Handy Adams is a 69 y.o. male with multiple medical diagnoses as listed in the medical history and problem list that presents for  follow up and hypertension     Follow-up  Pertinent negatives include no abdominal pain, chest pain, chills, fever, headaches, nausea, numbness, visual change or weakness. Exacerbated by: weight change from 275 Ibs on 7/25/2024 to 291 Ibs today.   Hypertension  Chronicity: on digital medicine for HTN. Pertinent negatives include no chest pain, headaches or shortness of breath. Treatments tried: saw Neph 7/25/2024 and nipf was d/c and Valsatran was reduced to 80 mg.       Diet: has recently eaten out of frequently than usual. Will start  reconfiguring time for meal prepping to reduce salt intake    Water intake: 4-6 16 oz bottles of water per day. No other drinks recently    Exercise: 2 story home and starting to get more active daily   Patient states that he does not do a lot of physical activity because he is in a PhD program and requires him to do a lot of sitting to read     Patient is concerned about his Weight gain  from 07/25/2024 to today.  Concerned about his blood pressure as he was taken off of 1 of his blood pressure medications and BP increase and swelling in LLE started 2 days ago.   Patient was in do thought about the different foods that he has eaten over the past week and how it affected his weight   Patient is concerned as he does not want to do dialysis          Discharge Summary        Patient Name: Handy Adams  MRN: [id number]  ALLEN: [id number]  Patient Class: IP- Inpatient  Admission Date: 7/16/2024  Hospital Length of Stay: 2 days  Discharge Date and Time:  07/18/2024 2:30 PM  Attending Physician: No att. providers found   Discharging Provider: Chuy Grewal MD  Primary Care Provider: Toby Sanderson MD  [facility] Medicine  Team: [facility]  Chuy Grewal MD  Primary Care Team: [facility]      HPI:   67 yo M with PMHx CKD IV and HTN who presents to the ED by referral from his nephrologist for worsening kidney function. Pt. Had routine blood work performed per his nephrologist 4 days ago which reveialed Cr 9.0 (baseline ~4.5) and BUN 85. He was contacted by his nephrologist and referred for repeat testing ASAP which showed Cr atill 8.0. He was referred to the ED for fruther care. Pt. Denies any changes in urine output, SOB, chest pain, fatigue, or confusion.     * No surgery found *       Hospital Course:   67 yo M with PMHx CKD IV, DM, HTN who presents to the hospital by referral from his outpatient nephrologist for worsening kidney function. He had routine blood work performed per his nephrologist 4 days ago which reveialed Cr 9.0 (baseline ~4.5) and BUN 85. Worsening kidney function likely secondary to longstanding diabetic nephropathy and hypertensive nephrosclerosis. He received lokelma for hyperkalemia with improvement noted. Kidney function has remained stable with no emergent need for HD. He is scheduled for Friday follow-up with Nephrology with repeat labwork and will be discharged on renal diet and 10 g daily Lokelma. Will give referral for KTM evaluation.       Goals of Care Treatment Preferences:  Code Status: Full Code        START taking these medications       LOKELMA 10 gram packet  Generic drug: sodium zirconium cyclosilicate  Take 1 packet (10 g total) by mouth once daily. Mix entire contents of packet(s) into drinking glass containing 3 tablespoons of water; stir well and drink immediately. Add water and repeat until no powder remains to receive entire dose.      tamsulosin 0.4 mg Cap  Commonly known as: FLOMAX  Take 1 capsule (0.4 mg total) by mouth every evening.                CHANGE how you take these medications       labetaloL 200 MG tablet  Commonly known as: NORMODYNE  Take 1 tablet (200 mg total) by  "mouth 2 (two) times daily.  What changed: Another medication with the same name was removed. Continue taking this medication, and follow the directions you see here.      NIFEdipine 90 MG (OSM) 24 hr tablet  Commonly known as: PROCARDIA-XL  Take 1 tablet (90 mg total) by mouth once daily.  What changed: when to take this          7/25/2024 Nephrology       HPI      Patient is new to me. New to clinic.  Prior pertinent chart reviewed since this is patient's first appointment with me.     Patient presents for new evaluation of renal dysfunction.  Baseline creatinine of 1.6-1.7 in 2020-early 2022. Recently had sCr of 2.5. Patient said he has "not been taking care of himself" and "eating more sugar" since January.     Per recent note from PCP: He cannot afford farxiga due to increased price and had stopped taking it for a few months prior to appt in September.     Home BPs: does not take     Rare Excedrin use now, though he used to use it frequently.     Significant other medical problems include HTN since at least 2007, T2DM since at least 2007.       The patient denies taking herbal supplements, or new antibiotics, recreational drugs, recent episode of dehydration, diarrhea, nausea or vomiting, acute illness, hospitalization or exposure to IV radiocontrast.      Significant family hx includes: No known kidney issues     Last renal US: none in EMR     Update 10/24/22:  Presents for f/u of CKD, YESENIA.  Last seen a month ago.  Feet are swelling again, especially when drinking ensure. Has improved since stopping Ensure.  Holding lisinopril-Hctz. Taking labetolol 200 mg instead.  Found to have an IgG lambda specific monoclonal band during proteinuria workup. Referred to hematology.     Recent sCr 2.5--> 2.8-2.9.      Home BPs: does not believe cuff is accurate.     Update 12/28/22:  Returns for f/u of CKD.  sCr has been 2.8-3.0 mg/dL.  Home BPs: not taking because he thinks cuff is inaccurate     Patient had bone marrow " "biopsy and was diagnosed with IgG-lambda MGUS.  An IgG lambda specific monoclonal protein was identified on 24 hour urine on 12/16/22.  He has not gotten kidney biopsy.     Says he feels great.     Update 2/3/23:  Returns for f/u of CKD and discussion about biopsy.  sCr now 3.0-3.2 mg/dL. Most recent sCr 3.3.  Increased valsartan to 160 mg at last visit.     Update 9/8/23:  - Presents for follow-up of CKD  - sCr trended up to 3.8. from baseline of about 3.0, now improved to 3.1. Unclear etiology.  - Notes being on Farxiga in the past but has not taken for at least a year   - Denies NSAIDs, reduced PO intake, n/v/d, fluctuations in BP, elevations in blood sugar, recent illness     Update 11/6/23:  Presents for f/u of CKD.  Most recent sCr 3.1 -3.4 mg/dL.  Home BPs: 130-140s (SBP)   Admits recent dietary indiscretion.  Says he gained weight recently.  Not exercising.         Update 2/9/24:  Presents today for f/u of CKD.  Most recent sCr was 4.1 mg/dL.  Home BPs: SBP 150s  Thinks he took his medication today. Takes his medication daily but not at the same time every day.  Saw transplant team.     Update 3/15/24:  Presents today for f/u of CKD.  Most recent sCr was 5.7     Says he has lost 20+ lbs by only eating on meal a day (Honey nut cheerios; almond milk). Drinks water.      Says he has been "feeling fine." No swelling. "I feel great."  Hematology is considering repeating bone marrow biopsy. Planning to do full body scan.     Update 4/23/24:  Presents today for f/u of CKD.  Most recent sCr was 4.5  Had clinic visit from PD nurse but said he is unsure of modality choice.   Previously referred to [facility] nutrition services/RD in March but has not been seen yet.  Has gained weight back.     Home BPs: 130s-140s/70s-80s     Update 7/24/24:  Presents today for f/u of CKD/ hospital f/u for YESENIA.  Had sCr on routine labs of 9.0. Repeat of 8.0. No uremic symptoms, but concerned for overdiuresis, need for IVF. Sent to ER. He " "was admitted; aldactone, torsemide, valsartan held. Discharged c sCr of 7.0. He required one dose of lokelma while hospitalized.      Most recent sCr was 4.9 but UPCR up to 4 grams.  Ran out of nifedipine, but he took it yesterday.     Home BPs: 120s-140s     Review of Systems   "I'm feeling fine."  Constitutional:  Negative for appetite change. Food tastes good.  Respiratory:  Negative for shortness of breath.    Cardiovascular:  Negative for swelling to legs.   Gastrointestinal:  Negative for diarrhea, nausea and vomiting. Has constipation.   Genitourinary:  Positive for frequency (at baseline) and urgency (at baseline). Negative for decreased urine volume, difficulty urinating, dysuria and hematuria.        Some incontinence          Objective:      Objective  Blood pressure 127/80, pulse 66, resp. rate 18, height 6' (1.829 m), weight 124.6 kg (274 lb 11.1 oz), SpO2 100%.  Physical Exam  Vitals reviewed.   Constitutional:       Appearance: Normal appearance. He is obese.   HENT:      Head: Normocephalic and atraumatic.   Eyes:      General: No scleral icterus.  Cardiovascular:      Rate and Rhythm: Normal rate and regular rhythm.   Pulmonary:      Effort: No respiratory distress.   CTA  Musculoskeletal:      Right lower leg: No edema     Left lower leg: No edema  Skin:     General: Skin is warm and dry.   Neurological:      Mental Status: He is alert and oriented to person, place, and time.   Psychiatric:         Mood and Affect: Mood normal.         Behavior: Behavior normal.                     Lab Results   Component Value Date     CREATININE 4.9 (H) 07/24/2024            Prot/Creat Ratio, Urine   Date Value Ref Range Status   07/24/2024 4.04 (H) 0.00 - 0.20 Final   07/12/2024 2.52 (H) 0.00 - 0.20 Final   04/19/2024 3.81 (H) 0.00 - 0.20 Final            Lab Results   Component Value Date      07/24/2024     K 4.1 07/24/2024     CO2 23 07/24/2024      (H) 07/24/2024            Lab Results "   Component Value Date     .5 (H) 07/24/2024     CALCIUM 8.3 (L) 07/24/2024     PHOS 3.7 07/24/2024            Lab Results   Component Value Date     HGB 7.7 (L) 07/24/2024     WBC 5.17 07/24/2024     HCT 24.4 (L) 07/24/2024            Lab Results   Component Value Date     HGBA1C 5.3 03/23/2024      07/24/2024     BUN 38 (H) 07/24/2024            Lab Results   Component Value Date     LDLCALC 87.4 02/07/2024            Assessment:      Assessment  1. Chronic kidney disease (CKD), stage V    2. Acute kidney injury superimposed on CKD    3. Hypertension, unspecified type    4. Anemia, unspecified type    5. Severe obesity (BMI 35.0-39.9) with comorbidity    6. MGUS (monoclonal gammopathy of unknown significance)    7. Proteinuria, unspecified type    8. Secondary hyperparathyroidism    9. Acidosis    10. Type 2 diabetes mellitus with stage 5 chronic kidney disease not on chronic dialysis, unspecified whether long term insulin use                               Plan:   CKD stage 5- Underlying CKD clinically 2/2 diabetes + HTN  - Biopsy performed showing hypertensive nephrosclerosis, acute tubular injury, and diabetic nephropathy. Progressive.     - Currently off aldactone, torsemide, valsartan. sCr has come down off these medications, but UPCR increased.     - IgG lambda specific monoclonal band noted on proteinuria workup and UPEP. No evidence of MGRS on biopsy.   - Educated patient to control BP, BG, remain well-hydrated, and avoid NSAIDs to prevent progression of CKD. High risk of progression to ESRD.           UPCR Nephrotic range proteinuria. Was on farxiga but could not afford it; renal function is too low to start on recent labs. On ARB  - Proteinuric   Acid-base Mild acidosis. On sodium bicarb 650 mg BID   Renal osteodystrophy Corrected Ca, phos okay. Low vit D. Elevated PTH.  On ergo weekly and calcitriol 3x/week.  Will trend to determine need to increase calcitriol to daily.   Anemia Hgb low  last check. No strong indication for epo at this time. Has MGUS. Followed by hem/onc.     On PO iron twice a day.   DM Well-controlled recently. Has had DM since at least 2007, when he had an A1c of 15+.   Lipid Management On statin.   ESRD planning Anticipatory guidance provided about timing of dialysis. Start discussions and planning when eGFR is about 20 mL/min; most patients start dialysis between 5-10 mL/min.     Attended ESRD treatment choices and met with PD nurse, but is still unsure about what treatment he would want. Has not had home visit yet.     Will f/u c transplant team to see where he is in the process.     He agreed to home visit from PD nurse.     Offered counseling to help with expressed feelings of denial but he declined offer.        Kidney Failure Risk Equation (Tangri)     Kidney Failure Risk at 2 years: 64.9%     Kidney Failure Risk at 5 years: 96.2%           Lab Results   Component Value Date     MICALBCREAT 1245.7 (H) 03/11/2024     CREATININE 4.9 (H) 07/24/2024             HTN - WNL on labetalol 200 mg BID, Nifedipine 90 mg (missed this morning),   - Holding torsemide 10, valsartan 320 mg, spironolactone 50 mg qd  - Goal is SBP less than 130/80  - D/c nifedipine. Resume valsartan 80 mg for RAASi   - Monitor BP with digital monitoring.     Obesity - he has increased dietary intake after severely limiting intake  - Refer to nutritionist (entered previously). He unfortunately missed last appointment due to being hospitalized. Will refer to renal dietician to be in her work queue when she starts     MGUS - per hem/onc; due back in Sept. 2024     All questions patient had were answered.  Asked if further questions. None. F/u in clinic in 6 weeks with Raven Naylor NP with labs and urine prior to next visit or sooner if needed.  ER for emergency concerns.     Summary of Plan:  Needs home visit for PD; will message HTRN  Refer to RD  3. Stop nifedipine; resume valsartan at lower dose 80  mg  4. BMP, TSAT, Iron, CBC in 2 weeks  5. RTC as scheduled in October; discussed potential need for interim appt depending on labs           Visit today included increased complexity associated with  managing the longitudinal care of the patient due to the serious and/or complex managed problem(s) CKD.     45 minutes of total time spent on the encounter, which includes face to face time and non-face to face time preparing to see the patient (eg, review of tests), Obtaining and/or reviewing separately obtained history, documenting clinical information in the electronic or other health record, independently interpreting results (not separately reported) and communicating results to the patient/family/caregiver, or Care coordination (not separately reported).                Electronically signed by Raven Naylor NP at 7/25/2024 11:55 AM     Instructions    Stop nifedipine.  Start valsartan 80 mg.  Bloodwork in 2 weeks.  Keep taking blood pressure with digital medicine.     Tiara, the home dialysis nurse, should contact you to schedule a home visit. Please schedule with her.     Please let us know if you develop worsened fatigue or weakness, worsened shortness of breath or swelling, worsened nausea or vomiting, brain fog/ confusion, or a bad taste in your mouth, low appetite, or feelings that food does not taste right (worsened from your baseline).          Assessment & Plan    Patient last seen by Nephrology and was taken off of nifedipine.  Is to  resume valsartan at a lower dose, 80 mg.   Assured patient to continue with this plan  to do more lifestyle modifications.  Discussed a stationary bike to incorporate more daily physical activity,  especially while he sits to read and study.  Provided information about diet changes.   Continue to follow up with Nephrology for chronic kidney disease.  Follow with Nephrology for home dialysis placement your decision to either do so or not   Advised patient to  elevate his legs at night,  use compression stockings /socks during the day if standing long periods   Information provided on AVS    Problem List Items Addressed This Visit          Cardiac/Vascular    Benign hypertension with CKD (chronic kidney disease) stage IV    Discontinue nifedipine and continue with Diovan   Continue to follow up with Nephrology      HTN (hypertension)- Primary   Continue  Diovan   Take blood pressure at home as your enrolled in digital medicine   Continue lifestyle modifications       Endocrine    Class 2 severe obesity due to excess calories with serious comorbidity and body mass index (BMI) of 36.0 to 36.9 in adult  We discussed effective ways of losing  weight:  1) diet:  low carbohydrate, low fat diet, stay away from fast food, fried and processed food, use whole grain, lot of fruits and vegetables, use healthy fat such as avocado, nuts and olive oil in reasonable quantity, stay away from sodas. Regular meals with lean proteins.  2) physical activity: ideally 150 min a week, with cardiovascular and resistance activity.   How to incorporate a stationary bike, walking, getting up to a physical exercise class         --------------------------------------------      Health Maintenance:  Health Maintenance         Date Due Completion Date    Abdominal Aortic Aneurysm Screening Never done ---    COVID-19 Vaccine (4 - 2023-24 season) 09/01/2023 12/27/2021    Foot Exam 09/26/2023 9/26/2022    Influenza Vaccine (1) 09/01/2024 3/1/2024    Hemoglobin A1c 09/23/2024 3/23/2024    Lipid Panel 02/07/2025 2/7/2024    Diabetes Urine Screening 03/11/2025 3/11/2024    Eye Exam 07/03/2025 7/3/2024    Override on 11/15/2017: Done    High Dose Statin 08/02/2025 8/2/2024    Colorectal Cancer Screening 10/28/2025 10/28/2022    TETANUS VACCINE 03/01/2034 3/1/2024            Health maintenance reviewed    Follow Up:  Follow up if symptoms worsen or fail to improve.    Exam     Review of Systems:  (as noted  above)  Review of Systems   Constitutional:  Negative for chills and fever.   Respiratory:  Negative for shortness of breath.    Cardiovascular:  Negative for chest pain.   Gastrointestinal:  Negative for abdominal pain and nausea.   Genitourinary:  Negative for difficulty urinating.   Neurological:  Negative for dizziness, weakness, numbness and headaches.       Physical Exam:   Physical Exam  Constitutional:       General: He is not in acute distress.     Appearance: He is not ill-appearing, toxic-appearing or diaphoretic.   HENT:      Head: Normocephalic and atraumatic.   Cardiovascular:      Rate and Rhythm: Normal rate and regular rhythm.      Pulses: Normal pulses.   Pulmonary:      Effort: Pulmonary effort is normal. No respiratory distress.      Breath sounds: Normal breath sounds.   Musculoskeletal:      Right lower leg: Edema present.      Left lower leg: Edema present.   Neurological:      Mental Status: He is alert and oriented to person, place, and time.   Psychiatric:         Mood and Affect: Mood normal.       Vitals:    08/02/24 0903   BP: (!) 160/88   BP Location: Right arm   Patient Position: Sitting   BP Method: Large (Manual)   Pulse: 65   Resp: 16   Temp: 97.9 °F (36.6 °C)   TempSrc: Oral   SpO2: 99%   Weight: 132.1 kg (291 lb 3.6 oz)   Height: 6' (1.829 m)      Body mass index is 39.5 kg/m².        History     Past Medical History:  Past Medical History:   Diagnosis Date    Anemia     Disorder of kidney and ureter     Edema leg     History of rotator cuff tear     History of seasonal allergies     HTN (hypertension)     Hx of colonic polyps     Hyperlipemia     MGUS (monoclonal gammopathy of unknown significance)     NS (nuclear sclerosis), bilateral 06/25/2019    Obesity     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 11/17/2017    Type 2 diabetes mellitus        Past Surgical History:  Past Surgical History:   Procedure  Laterality Date    COLONOSCOPY N/A 10/28/2022    Procedure: COLONOSCOPY;  Surgeon: Guanako Sanchez MD;  Location: [facility] ENDO;  Service: Endoscopy;  Laterality: N/A;  REFENDO placed per Dr Sanderson. case request entered     vaccinated-GT  instr portal    MOUTH SURGERY      RENAL BIOPSY Left 7/19/2023    Procedure: BIOPSY, KIDNEY;  Surgeon: Nikky Soriano MD;  [facility] CATH LAB;  Service: Interventional Nephrology;  Laterality: Left;       Social History:  Social History     Socioeconomic History    Marital status: Significant Other     Spouse name: Daniella Adams    Number of children: 2    Highest education level: Master's degree (e.g., MA, MS, Sandra, MEd, MSW, CORTEZ)   Tobacco Use    Smoking status: Former     Current packs/day: 0.50     Types: Cigarettes    Smokeless tobacco: Never   Substance and Sexual Activity    Alcohol use: Yes     Comment: rarely    Drug use: Not Currently    Sexual activity: Yes     Partners: Female   Social History Narrative    Caregiver S/O Daniella     Social Determinants of Health     Financial Resource Strain: Low Risk  (7/17/2024)    Overall Financial Resource Strain (CARDIA)     Difficulty of Paying Living Expenses: Not hard at all   Food Insecurity: No Food Insecurity (7/17/2024)    Hunger Vital Sign     Worried About Running Out of Food in the Last Year: Never true     Ran Out of Food in the Last Year: Never true   Transportation Needs: No Transportation Needs (7/17/2024)    TRANSPORTATION NEEDS     Transportation : No   Physical Activity: Sufficiently Active (7/17/2024)    Exercise Vital Sign     Days of Exercise per Week: 7 days     Minutes of Exercise per Session: 60 min   Recent Concern: Physical Activity - Insufficiently Active (6/26/2024)    Exercise Vital Sign     Days of Exercise per Week: 1 day     Minutes of Exercise per Session: 60 min   Stress: No Stress Concern Present (7/17/2024)    [employer] - Occupational Stress Questionnaire      Feeling of Stress : Not at all   Housing Stability: Low Risk  (7/17/2024)    Housing Stability Vital Sign     Unable to Pay for Housing in the Last Year: No     Homeless in the Last Year: No       Family History:  Family History   Problem Relation Name Age of Onset    Cancer Mother          Lung Cancer    Cancer Father          Colon cancer    Diabetes Father      Hypertension Father      No Known Problems Sister      No Known Problems Brother      No Known Problems Maternal Aunt      No Known Problems Maternal Uncle      No Known Problems Paternal Aunt      No Known Problems Paternal Uncle      No Known Problems Maternal Grandmother      No Known Problems Maternal Grandfather      No Known Problems Paternal Grandmother      No Known Problems Paternal Grandfather      Amblyopia Neg Hx      Blindness Neg Hx      Cataracts Neg Hx      Glaucoma Neg Hx      Macular degeneration Neg Hx      Retinal detachment Neg Hx      Strabismus Neg Hx      Stroke Neg Hx      Thyroid disease Neg Hx         Allergies and Medications: (updated and reviewed)  Review of patient's allergies indicates:  No Known Allergies  Current Outpatient Medications   Medication Sig Dispense Refill    ABRYSVO 120 mcg/0.5 mL SolR vaccine       atorvastatin (LIPITOR) 20 MG tablet Take 1 tablet (20 mg total) by mouth once daily. 90 tablet 2    blood sugar diagnostic Strp To test twice daily 100 each 3    blood-glucose meter kit Use as instructed 1 each 0    BOOSTRIX TDAP 2.5-8-5 Lf-mcg-Lf/0.5mL Syrg injection       calcitRIOL (ROCALTROL) 0.25 MCG Cap Take 1 capsule (0.25 mcg total) by mouth 3 (three) times a week. Monday, Wednesday, Friday 36 capsule 3    ergocalciferol (ERGOCALCIFEROL) 50,000 unit Cap Take 1 capsule by mouth once a week 12 capsule 0    FEROSUL 325 mg (65 mg iron) Tab tablet Take 1 tablet (325 mg total) by mouth 2 (two) times daily. 180 tablet 3    ILUVIEN 0.19 mg intravitreal implant       labetaloL (NORMODYNE) 200 MG tablet Take 1 tablet  (200 mg total) by mouth 2 (two) times daily.      lancets Misc 1 lancet by Misc.(Non-Drug; Combo Route) route 2 (two) times daily with meals. 100 each 11    linaGLIPtin (TRADJENTA) 5 mg Tab tablet Take 1 tablet (5 mg total) by mouth once daily. 90 tablet 2    OZURDEX 0.7 mg Impl intravitreal implant       pioglitazone (ACTOS) 30 MG tablet Take 1 tablet (30 mg total) by mouth once daily. 90 tablet 2    pneumoc 20-carlos conj-dip cr,PF, (PREVNAR-20, PF,) 0.5 mL Syrg injection Inject into the muscle. 0.5 mL 0    sildenafiL (VIAGRA) 100 MG tablet Take 1 tablet (100 mg total) by mouth daily as needed for Erectile Dysfunction. 10 tablet 11    sodium bicarbonate 650 MG tablet Take 1 tablet (650 mg total) by mouth 2 (two) times daily. 180 tablet 3    sodium zirconium cyclosilicate (LOKELMA) 10 gram packet Take 1 packet (10 g total) by mouth once daily. Mix entire contents of packet(s) into drinking glass containing 3 tablespoons of water; stir well and drink immediately. Add water and repeat until no powder remains to receive entire dose. 30 packet 0    tamsulosin (FLOMAX) 0.4 mg Cap Take 1 capsule (0.4 mg total) by mouth every evening. 30 capsule 0    valsartan (DIOVAN) 80 MG tablet Take 1 tablet (80 mg total) by mouth once daily. 90 tablet 3    FLUZONE HIGHDOSE QUAD 23-24  mcg/0.7 mL Syrg  (Patient not taking: Reported on 8/2/2024)      hepatitis B vacc-CpG 1018, PF, (HEPLISAV-B, PF,) 20 mcg/0.5 mL Syrg adm by pharmicist (Patient not taking: Reported on 8/2/2024) 0.5 mL 1    varicella-zoster gE-AS01B, PF, (SHINGRIX) 50 mcg/0.5 mL injection Inject into the muscle. (Patient not taking: Reported on 8/2/2024) 1 each 1     No current facility-administered medications for this visit.       Patient Care Team:  Toby Sanderson MD as PCP - General (Family Medicine)  Arlene Padron LPN (Inactive) as Care Coordinator  Marycarmen Raman MA as Care Coordinator  Toby Sanderson MD as Hypertension Digital  Medicine Responsible Provider (Family Medicine)  Nwoglouisa, Maria De Jesus as Hypertension Digital Medicine Clinician  Johnny, Maria De Jesus as Diabetes Digital Medicine Clinician  Toby Sandersno MD as Diabetes Digital Medicine Responsible Provider (Family Medicine)  Medicare, [employer] Managed as Diabetes Digital Medicine Contract  Teresita Tompkins, PhD as Psychologist (Neuropsychology)  Raven Naylor NP as Nurse Practitioner (Nephrology)  Remigio Grubbs DO as Resident (Oncology)  Otilio Suh MD as Consulting Physician (Ophthalmology)  SHAWNA Mcmanus MD as Consulting Physician (Ophthalmology)  Maryse Smith NP as Nurse Practitioner (Endocrinology)  Medicare, [employer] Managed as Hypertension Digital Medicine Contract  Cindy Angel MA as Care Coordinator  Christian Louie MD as 1st Call (Urology)  Kady Verdugo NP as Nurse Practitioner (Transplant)  Gregory Melton MD as Consulting Physician (Hematology and Oncology)  Aniceto Randall MD as Consulting Physician (Cardiology)         - The patient is given an After Visit Summary that lists all medications with directions, allergies, education, orders placed during this encounter and follow-up instructions.      - I have reviewed the patient's medical information including past medical, family, and social history sections including the medications and allergies.      - We discussed the patient's current medications.     This note was created by combination of typed  and MModal dictation.  Transcription errors may be present.  If there are any questions, please contact me.       Jeniffer Alicia PA-C

## 2024-08-06 ENCOUNTER — TELEPHONE (OUTPATIENT)
Dept: NEPHROLOGY | Facility: CLINIC | Age: 69
End: 2024-08-06
Payer: MEDICARE

## 2024-08-06 ENCOUNTER — TELEPHONE (OUTPATIENT)
Dept: TRANSPLANT | Facility: CLINIC | Age: 69
End: 2024-08-06
Payer: MEDICARE

## 2024-08-06 DIAGNOSIS — N18.6 END STAGE RENAL DISEASE: ICD-10-CM

## 2024-08-06 DIAGNOSIS — Z76.82 ORGAN TRANSPLANT CANDIDATE: Primary | ICD-10-CM

## 2024-08-07 ENCOUNTER — LAB VISIT (OUTPATIENT)
Dept: LAB | Facility: HOSPITAL | Age: 69
End: 2024-08-07
Payer: MEDICARE

## 2024-08-07 DIAGNOSIS — I10 HYPERTENSION, UNSPECIFIED TYPE: ICD-10-CM

## 2024-08-07 DIAGNOSIS — D64.9 ANEMIA, UNSPECIFIED TYPE: ICD-10-CM

## 2024-08-07 DIAGNOSIS — N18.6 END STAGE RENAL DISEASE: ICD-10-CM

## 2024-08-07 LAB
ANION GAP SERPL CALC-SCNC: 9 MMOL/L (ref 8–16)
BASOPHILS # BLD AUTO: 0.03 K/UL (ref 0–0.2)
BASOPHILS NFR BLD: 0.5 % (ref 0–1.9)
BUN SERPL-MCNC: 38 MG/DL (ref 8–23)
CALCIUM SERPL-MCNC: 8.7 MG/DL (ref 8.7–10.5)
CHLORIDE SERPL-SCNC: 111 MMOL/L (ref 95–110)
CO2 SERPL-SCNC: 20 MMOL/L (ref 23–29)
CREAT SERPL-MCNC: 4.8 MG/DL (ref 0.5–1.4)
DIFFERENTIAL METHOD BLD: ABNORMAL
EOSINOPHIL # BLD AUTO: 0.2 K/UL (ref 0–0.5)
EOSINOPHIL NFR BLD: 2.5 % (ref 0–8)
ERYTHROCYTE [DISTWIDTH] IN BLOOD BY AUTOMATED COUNT: 16 % (ref 11.5–14.5)
EST. GFR  (NO RACE VARIABLE): 12.4 ML/MIN/1.73 M^2
FERRITIN SERPL-MCNC: 406 NG/ML (ref 20–300)
GLUCOSE SERPL-MCNC: 96 MG/DL (ref 70–110)
HCT VFR BLD AUTO: 23.2 % (ref 40–54)
HGB BLD-MCNC: 7 G/DL (ref 14–18)
IMM GRANULOCYTES # BLD AUTO: 0.05 K/UL (ref 0–0.04)
IMM GRANULOCYTES NFR BLD AUTO: 0.8 % (ref 0–0.5)
IRON SERPL-MCNC: 51 UG/DL (ref 45–160)
LYMPHOCYTES # BLD AUTO: 1.1 K/UL (ref 1–4.8)
LYMPHOCYTES NFR BLD: 16.4 % (ref 18–48)
MCH RBC QN AUTO: 28.6 PG (ref 27–31)
MCHC RBC AUTO-ENTMCNC: 30.2 G/DL (ref 32–36)
MCV RBC AUTO: 95 FL (ref 82–98)
MONOCYTES # BLD AUTO: 0.7 K/UL (ref 0.3–1)
MONOCYTES NFR BLD: 11.3 % (ref 4–15)
NEUTROPHILS # BLD AUTO: 4.5 K/UL (ref 1.8–7.7)
NEUTROPHILS NFR BLD: 68.5 % (ref 38–73)
NRBC BLD-RTO: 0 /100 WBC
PLATELET # BLD AUTO: 213 K/UL (ref 150–450)
PMV BLD AUTO: 12.1 FL (ref 9.2–12.9)
POTASSIUM SERPL-SCNC: 3.8 MMOL/L (ref 3.5–5.1)
RBC # BLD AUTO: 2.45 M/UL (ref 4.6–6.2)
SATURATED IRON: 21 % (ref 20–50)
SODIUM SERPL-SCNC: 140 MMOL/L (ref 136–145)
TOTAL IRON BINDING CAPACITY: 238 UG/DL (ref 250–450)
TRANSFERRIN SERPL-MCNC: 161 MG/DL (ref 200–375)
WBC # BLD AUTO: 6.48 K/UL (ref 3.9–12.7)

## 2024-08-07 PROCEDURE — 83540 ASSAY OF IRON: CPT | Mod: HCNC,TXP | Performed by: NURSE PRACTITIONER

## 2024-08-07 PROCEDURE — 85025 COMPLETE CBC W/AUTO DIFF WBC: CPT | Mod: HCNC,TXP | Performed by: NURSE PRACTITIONER

## 2024-08-07 PROCEDURE — 82728 ASSAY OF FERRITIN: CPT | Mod: HCNC,TXP | Performed by: NURSE PRACTITIONER

## 2024-08-07 PROCEDURE — 36415 COLL VENOUS BLD VENIPUNCTURE: CPT | Mod: HCNC,PO,TXP | Performed by: NURSE PRACTITIONER

## 2024-08-07 PROCEDURE — 80048 BASIC METABOLIC PNL TOTAL CA: CPT | Mod: HCNC,TXP | Performed by: NURSE PRACTITIONER

## 2024-08-08 ENCOUNTER — TELEPHONE (OUTPATIENT)
Dept: NEPHROLOGY | Facility: CLINIC | Age: 69
End: 2024-08-08
Payer: MEDICARE

## 2024-08-08 ENCOUNTER — TELEPHONE (OUTPATIENT)
Dept: RHEUMATOLOGY | Facility: CLINIC | Age: 69
End: 2024-08-08
Payer: MEDICARE

## 2024-08-08 DIAGNOSIS — N18.5 CHRONIC KIDNEY DISEASE (CKD), STAGE V: Primary | ICD-10-CM

## 2024-08-08 RX ORDER — FUROSEMIDE 20 MG/1
20 TABLET ORAL DAILY
Qty: 90 TABLET | Refills: 3 | Status: SHIPPED | OUTPATIENT
Start: 2024-08-08 | End: 2025-08-08

## 2024-08-09 DIAGNOSIS — Z76.82 ORGAN TRANSPLANT CANDIDATE: Primary | ICD-10-CM

## 2024-08-09 RX ORDER — ERGOCALCIFEROL 1.25 MG/1
50000 CAPSULE ORAL
Qty: 12 CAPSULE | Refills: 3 | Status: SHIPPED | OUTPATIENT
Start: 2024-08-09

## 2024-08-12 ENCOUNTER — TELEPHONE (OUTPATIENT)
Dept: TRANSPLANT | Facility: CLINIC | Age: 69
End: 2024-08-12
Payer: MEDICARE

## 2024-08-12 NOTE — TELEPHONE ENCOUNTER
Spoke to pt confirming scheduled monthly HLA labs (Sept - Feb [facility]). Appt reminders were mailed.

## 2024-08-12 NOTE — TELEPHONE ENCOUNTER
I have attempted without success to contact this patient by phone to schedule appts for monthly HLA labs. Message was left to return call.

## 2024-08-13 DIAGNOSIS — D64.9 ANEMIA, UNSPECIFIED TYPE: Primary | ICD-10-CM

## 2024-08-16 ENCOUNTER — PATIENT MESSAGE (OUTPATIENT)
Dept: ADMINISTRATIVE | Facility: OTHER | Age: 69
End: 2024-08-16
Payer: MEDICARE

## 2024-08-19 ENCOUNTER — TELEPHONE (OUTPATIENT)
Dept: NEPHROLOGY | Facility: CLINIC | Age: 69
End: 2024-08-19
Payer: MEDICARE

## 2024-08-19 ENCOUNTER — E-VISIT (OUTPATIENT)
Dept: FAMILY MEDICINE | Facility: CLINIC | Age: 69
End: 2024-08-19
Payer: MEDICARE

## 2024-08-19 ENCOUNTER — PATIENT MESSAGE (OUTPATIENT)
Dept: ADMINISTRATIVE | Facility: OTHER | Age: 69
End: 2024-08-19
Payer: MEDICARE

## 2024-08-19 DIAGNOSIS — M79.89 LEG SWELLING: ICD-10-CM

## 2024-08-19 DIAGNOSIS — I10 ESSENTIAL HYPERTENSION: Primary | ICD-10-CM

## 2024-08-19 DIAGNOSIS — N18.4 CKD (CHRONIC KIDNEY DISEASE) STAGE 4, GFR 15-29 ML/MIN: ICD-10-CM

## 2024-08-19 PROCEDURE — 99499 UNLISTED E&M SERVICE: CPT | Mod: ,,, | Performed by: INTERNAL MEDICINE

## 2024-08-19 NOTE — PROGRESS NOTES
Patient reporting persistent of the uncontrolled hypertension and leg swelling.  See my response.  Patient not seen by me from 2022.  Advised to seek for medical attention through an office visit and also to contact his Nephrologist.

## 2024-08-19 NOTE — PROGRESS NOTES
You have no anemia, and no suspicion of infection based on your blood work.  Your Comprehensive Metabolic Panel (CMP) which includes sodium, potassium, chloride, calcium, blood sugar, protein and liver tests is normal. Your kidney function is slightly decreased, but stable from previous. I recommend avoiding anti-inflammatory medications and staying well hydrated.  Your A1C is ***  Your microalbuminuria is elevated, meaning that your kidneys suffer from your diabetes and hypertension, so it is important to go on working on a good diabetes control as well as to take ACE/ARB that protects your kidney.  Your vit D level is ***

## 2024-08-21 DIAGNOSIS — N18.6 END STAGE RENAL DISEASE: ICD-10-CM

## 2024-08-21 DIAGNOSIS — Z76.82 ORGAN TRANSPLANT CANDIDATE: Primary | ICD-10-CM

## 2024-08-21 NOTE — PROGRESS NOTES
YEARLY LIST MANAGEMENT NOTE    Handy Adams's kidney transplant listing status reviewed.  Patient is due for follow-up appointments in November 2024.  Appointments will be scheduled per protocol.  HLA sample is up to date and being received on a regular basis.

## 2024-08-22 ENCOUNTER — LAB VISIT (OUTPATIENT)
Dept: LAB | Facility: HOSPITAL | Age: 69
End: 2024-08-22
Payer: MEDICARE

## 2024-08-22 DIAGNOSIS — D64.9 ANEMIA, UNSPECIFIED TYPE: ICD-10-CM

## 2024-08-22 DIAGNOSIS — N18.5 CHRONIC KIDNEY DISEASE (CKD), STAGE V: ICD-10-CM

## 2024-08-22 LAB
ANION GAP SERPL CALC-SCNC: 6 MMOL/L (ref 8–16)
BUN SERPL-MCNC: 38 MG/DL (ref 8–23)
CALCIUM SERPL-MCNC: 9 MG/DL (ref 8.7–10.5)
CHLORIDE SERPL-SCNC: 109 MMOL/L (ref 95–110)
CO2 SERPL-SCNC: 26 MMOL/L (ref 23–29)
CREAT SERPL-MCNC: 5.4 MG/DL (ref 0.5–1.4)
EST. GFR  (NO RACE VARIABLE): 10.8 ML/MIN/1.73 M^2
GLUCOSE SERPL-MCNC: 101 MG/DL (ref 70–110)
HCT VFR BLD AUTO: 24.3 % (ref 40–54)
HGB BLD-MCNC: 7.4 G/DL (ref 14–18)
IRON SERPL-MCNC: 53 UG/DL (ref 45–160)
POTASSIUM SERPL-SCNC: 4.4 MMOL/L (ref 3.5–5.1)
SATURATED IRON: 20 % (ref 20–50)
SODIUM SERPL-SCNC: 141 MMOL/L (ref 136–145)
TOTAL IRON BINDING CAPACITY: 262 UG/DL (ref 250–450)
TRANSFERRIN SERPL-MCNC: 177 MG/DL (ref 200–375)

## 2024-08-22 PROCEDURE — 36415 COLL VENOUS BLD VENIPUNCTURE: CPT | Mod: HCNC,PO,NTX | Performed by: NURSE PRACTITIONER

## 2024-08-22 PROCEDURE — 85014 HEMATOCRIT: CPT | Mod: HCNC,NTX | Performed by: NURSE PRACTITIONER

## 2024-08-22 PROCEDURE — 85018 HEMOGLOBIN: CPT | Mod: HCNC,TXP | Performed by: NURSE PRACTITIONER

## 2024-08-22 PROCEDURE — 80048 BASIC METABOLIC PNL TOTAL CA: CPT | Mod: HCNC,TXP | Performed by: NURSE PRACTITIONER

## 2024-08-22 PROCEDURE — 83540 ASSAY OF IRON: CPT | Mod: HCNC,TXP | Performed by: NURSE PRACTITIONER

## 2024-08-23 ENCOUNTER — TELEPHONE (OUTPATIENT)
Dept: NEPHROLOGY | Facility: CLINIC | Age: 69
End: 2024-08-23
Payer: MEDICARE

## 2024-08-23 NOTE — PROGRESS NOTES
Good morning, Please call patient and ask how is leg swelling is and his blood pressures and let me know. Please also let him know kidney function is a little worse, around 10%, so please keep appointment with home dialysis nurse visiting his house on Monday.

## 2024-08-23 NOTE — TELEPHONE ENCOUNTER
----- Message from Raven Naylor NP sent at 8/23/2024  9:12 AM CDT -----  Good morning, Please call patient and ask how is leg swelling is and his blood pressures and let me know. Please also let him know kidney function is a little worse, around 10%, so please keep appointment with home dialysis nurse visiting his house on Monday.

## 2024-08-26 ENCOUNTER — HOSPITAL ENCOUNTER (INPATIENT)
Facility: HOSPITAL | Age: 69
LOS: 2 days | Discharge: HOME | DRG: 291 | End: 2024-08-29
Attending: EMERGENCY MEDICINE | Admitting: STUDENT IN AN ORGANIZED HEALTH CARE EDUCATION/TRAINING PROGRAM
Payer: MEDICARE

## 2024-08-26 DIAGNOSIS — Z79.4 TYPE 2 DIABETES MELLITUS WITH STAGE 5 CHRONIC KIDNEY DISEASE NOT ON CHRONIC DIALYSIS, WITH LONG-TERM CURRENT USE OF INSULIN: ICD-10-CM

## 2024-08-26 DIAGNOSIS — I50.32 CHRONIC DIASTOLIC CONGESTIVE HEART FAILURE: ICD-10-CM

## 2024-08-26 DIAGNOSIS — I50.30 HEART FAILURE WITH PRESERVED EJECTION FRACTION, UNSPECIFIED HF CHRONICITY: ICD-10-CM

## 2024-08-26 DIAGNOSIS — E11.22 TYPE 2 DIABETES MELLITUS WITH STAGE 5 CHRONIC KIDNEY DISEASE NOT ON CHRONIC DIALYSIS, WITH LONG-TERM CURRENT USE OF INSULIN: ICD-10-CM

## 2024-08-26 DIAGNOSIS — N18.5 CKD (CHRONIC KIDNEY DISEASE), STAGE V: ICD-10-CM

## 2024-08-26 DIAGNOSIS — N25.81 HYPERPARATHYROIDISM DUE TO RENAL INSUFFICIENCY: ICD-10-CM

## 2024-08-26 DIAGNOSIS — N18.5 CHRONIC KIDNEY DISEASE (CKD), STAGE V: ICD-10-CM

## 2024-08-26 DIAGNOSIS — R60.0 LOWER EXTREMITY EDEMA: ICD-10-CM

## 2024-08-26 DIAGNOSIS — R07.9 CHEST PAIN: ICD-10-CM

## 2024-08-26 DIAGNOSIS — R06.02 SOB (SHORTNESS OF BREATH): Primary | ICD-10-CM

## 2024-08-26 DIAGNOSIS — N18.5 TYPE 2 DIABETES MELLITUS WITH STAGE 5 CHRONIC KIDNEY DISEASE NOT ON CHRONIC DIALYSIS, WITH LONG-TERM CURRENT USE OF INSULIN: ICD-10-CM

## 2024-08-26 DIAGNOSIS — R06.02 SHORTNESS OF BREATH: ICD-10-CM

## 2024-08-26 PROBLEM — J96.01 ACUTE HYPOXIC RESPIRATORY FAILURE: Status: ACTIVE | Noted: 2024-08-26

## 2024-08-26 PROBLEM — N17.9 AKI (ACUTE KIDNEY INJURY): Status: ACTIVE | Noted: 2024-08-26

## 2024-08-26 LAB
ALBUMIN SERPL BCP-MCNC: 3.3 G/DL (ref 3.5–5.2)
ALP SERPL-CCNC: 40 U/L (ref 55–135)
ALT SERPL W/O P-5'-P-CCNC: 18 U/L (ref 10–44)
ANION GAP SERPL CALC-SCNC: 9 MMOL/L (ref 8–16)
AST SERPL-CCNC: 17 U/L (ref 10–40)
BASOPHILS # BLD AUTO: 0.03 K/UL (ref 0–0.2)
BASOPHILS NFR BLD: 0.3 % (ref 0–1.9)
BILIRUB SERPL-MCNC: 0.8 MG/DL (ref 0.1–1)
BNP SERPL-MCNC: 1160 PG/ML (ref 0–99)
BUN SERPL-MCNC: 43 MG/DL (ref 8–23)
CALCIUM SERPL-MCNC: 8.5 MG/DL (ref 8.7–10.5)
CHLORIDE SERPL-SCNC: 109 MMOL/L (ref 95–110)
CO2 SERPL-SCNC: 23 MMOL/L (ref 23–29)
CREAT SERPL-MCNC: 5.4 MG/DL (ref 0.5–1.4)
DIFFERENTIAL METHOD BLD: ABNORMAL
EOSINOPHIL # BLD AUTO: 0.2 K/UL (ref 0–0.5)
EOSINOPHIL NFR BLD: 2.3 % (ref 0–8)
ERYTHROCYTE [DISTWIDTH] IN BLOOD BY AUTOMATED COUNT: 16.3 % (ref 11.5–14.5)
EST. GFR  (NO RACE VARIABLE): 10.8 ML/MIN/1.73 M^2
ESTIMATED AVG GLUCOSE: 100 MG/DL (ref 68–131)
ESTIMATED AVG GLUCOSE: 103 MG/DL (ref 68–131)
GLUCOSE SERPL-MCNC: 137 MG/DL (ref 70–110)
HBA1C MFR BLD: 5.1 % (ref 4–5.6)
HBA1C MFR BLD: 5.2 % (ref 4–5.6)
HCT VFR BLD AUTO: 23.8 % (ref 40–54)
HGB BLD-MCNC: 7.4 G/DL (ref 14–18)
IMM GRANULOCYTES # BLD AUTO: 0.06 K/UL (ref 0–0.04)
IMM GRANULOCYTES NFR BLD AUTO: 0.6 % (ref 0–0.5)
LYMPHOCYTES # BLD AUTO: 0.8 K/UL (ref 1–4.8)
LYMPHOCYTES NFR BLD: 7.6 % (ref 18–48)
MCH RBC QN AUTO: 29.1 PG (ref 27–31)
MCHC RBC AUTO-ENTMCNC: 31.1 G/DL (ref 32–36)
MCV RBC AUTO: 94 FL (ref 82–98)
MONOCYTES # BLD AUTO: 0.6 K/UL (ref 0.3–1)
MONOCYTES NFR BLD: 5.6 % (ref 4–15)
NEUTROPHILS # BLD AUTO: 8.8 K/UL (ref 1.8–7.7)
NEUTROPHILS NFR BLD: 83.6 % (ref 38–73)
NRBC BLD-RTO: 0 /100 WBC
OHS QRS DURATION: 106 MS
OHS QTC CALCULATION: 455 MS
PLATELET # BLD AUTO: 215 K/UL (ref 150–450)
PMV BLD AUTO: 12.1 FL (ref 9.2–12.9)
POCT GLUCOSE: 175 MG/DL (ref 70–110)
POTASSIUM SERPL-SCNC: 4.3 MMOL/L (ref 3.5–5.1)
PROT SERPL-MCNC: 6.9 G/DL (ref 6–8.4)
RBC # BLD AUTO: 2.54 M/UL (ref 4.6–6.2)
SODIUM SERPL-SCNC: 141 MMOL/L (ref 136–145)
TROPONIN I SERPL DL<=0.01 NG/ML-MCNC: 0.36 NG/ML (ref 0–0.03)
TROPONIN I SERPL DL<=0.01 NG/ML-MCNC: 0.37 NG/ML (ref 0–0.03)
TROPONIN I SERPL DL<=0.01 NG/ML-MCNC: 0.39 NG/ML (ref 0–0.03)
WBC # BLD AUTO: 10.49 K/UL (ref 3.9–12.7)

## 2024-08-26 PROCEDURE — 83880 ASSAY OF NATRIURETIC PEPTIDE: CPT | Mod: HCNC,NTX | Performed by: STUDENT IN AN ORGANIZED HEALTH CARE EDUCATION/TRAINING PROGRAM

## 2024-08-26 PROCEDURE — 25000003 PHARM REV CODE 250: Mod: HCNC,NTX

## 2024-08-26 PROCEDURE — 96376 TX/PRO/DX INJ SAME DRUG ADON: CPT

## 2024-08-26 PROCEDURE — G0378 HOSPITAL OBSERVATION PER HR: HCPCS | Mod: HCNC,NTX

## 2024-08-26 PROCEDURE — 85025 COMPLETE CBC W/AUTO DIFF WBC: CPT | Mod: HCNC,NTX | Performed by: STUDENT IN AN ORGANIZED HEALTH CARE EDUCATION/TRAINING PROGRAM

## 2024-08-26 PROCEDURE — 80053 COMPREHEN METABOLIC PANEL: CPT | Mod: HCNC,NTX | Performed by: STUDENT IN AN ORGANIZED HEALTH CARE EDUCATION/TRAINING PROGRAM

## 2024-08-26 PROCEDURE — 63600175 PHARM REV CODE 636 W HCPCS: Mod: HCNC,NTX

## 2024-08-26 PROCEDURE — 94761 N-INVAS EAR/PLS OXIMETRY MLT: CPT | Mod: HCNC,NTX

## 2024-08-26 PROCEDURE — 93005 ELECTROCARDIOGRAM TRACING: CPT | Mod: HCNC,NTX

## 2024-08-26 PROCEDURE — 99291 CRITICAL CARE FIRST HOUR: CPT | Mod: HCNC,NTX

## 2024-08-26 PROCEDURE — 63600175 PHARM REV CODE 636 W HCPCS: Mod: HCNC,NTX | Performed by: STUDENT IN AN ORGANIZED HEALTH CARE EDUCATION/TRAINING PROGRAM

## 2024-08-26 PROCEDURE — 83036 HEMOGLOBIN GLYCOSYLATED A1C: CPT | Mod: 91,HCNC,NTX | Performed by: STUDENT IN AN ORGANIZED HEALTH CARE EDUCATION/TRAINING PROGRAM

## 2024-08-26 PROCEDURE — 84484 ASSAY OF TROPONIN QUANT: CPT | Mod: 91,HCNC,NTX

## 2024-08-26 PROCEDURE — 84484 ASSAY OF TROPONIN QUANT: CPT | Mod: 91,HCNC,NTX | Performed by: STUDENT IN AN ORGANIZED HEALTH CARE EDUCATION/TRAINING PROGRAM

## 2024-08-26 PROCEDURE — 36415 COLL VENOUS BLD VENIPUNCTURE: CPT | Mod: HCNC,NTX

## 2024-08-26 PROCEDURE — 83036 HEMOGLOBIN GLYCOSYLATED A1C: CPT | Mod: HCNC,NTX

## 2024-08-26 PROCEDURE — 93010 ELECTROCARDIOGRAM REPORT: CPT | Mod: HCNC,NTX,, | Performed by: INTERNAL MEDICINE

## 2024-08-26 RX ORDER — LANOLIN ALCOHOL/MO/W.PET/CERES
1 CREAM (GRAM) TOPICAL EVERY OTHER DAY
Status: DISCONTINUED | OUTPATIENT
Start: 2024-08-27 | End: 2024-08-29 | Stop reason: HOSPADM

## 2024-08-26 RX ORDER — FUROSEMIDE 10 MG/ML
40 INJECTION INTRAMUSCULAR; INTRAVENOUS DAILY
Status: DISCONTINUED | OUTPATIENT
Start: 2024-08-27 | End: 2024-08-26

## 2024-08-26 RX ORDER — IBUPROFEN 200 MG
24 TABLET ORAL
Status: DISCONTINUED | OUTPATIENT
Start: 2024-08-26 | End: 2024-08-26

## 2024-08-26 RX ORDER — IBUPROFEN 200 MG
16 TABLET ORAL
Status: DISCONTINUED | OUTPATIENT
Start: 2024-08-26 | End: 2024-08-29 | Stop reason: HOSPADM

## 2024-08-26 RX ORDER — INSULIN GLARGINE 100 [IU]/ML
26 INJECTION, SOLUTION SUBCUTANEOUS DAILY
Status: DISCONTINUED | OUTPATIENT
Start: 2024-08-26 | End: 2024-08-26

## 2024-08-26 RX ORDER — GLUCAGON 1 MG
1 KIT INJECTION
Status: DISCONTINUED | OUTPATIENT
Start: 2024-08-26 | End: 2024-08-26

## 2024-08-26 RX ORDER — LABETALOL 200 MG/1
200 TABLET, FILM COATED ORAL 2 TIMES DAILY
Status: DISCONTINUED | OUTPATIENT
Start: 2024-08-26 | End: 2024-08-29 | Stop reason: HOSPADM

## 2024-08-26 RX ORDER — TALC
6 POWDER (GRAM) TOPICAL NIGHTLY PRN
Status: DISCONTINUED | OUTPATIENT
Start: 2024-08-26 | End: 2024-08-29 | Stop reason: HOSPADM

## 2024-08-26 RX ORDER — INSULIN ASPART 100 [IU]/ML
0-5 INJECTION, SOLUTION INTRAVENOUS; SUBCUTANEOUS
Status: DISCONTINUED | OUTPATIENT
Start: 2024-08-26 | End: 2024-08-29 | Stop reason: HOSPADM

## 2024-08-26 RX ORDER — SODIUM CHLORIDE 0.9 % (FLUSH) 0.9 %
10 SYRINGE (ML) INJECTION EVERY 12 HOURS PRN
Status: DISCONTINUED | OUTPATIENT
Start: 2024-08-26 | End: 2024-08-29 | Stop reason: HOSPADM

## 2024-08-26 RX ORDER — FUROSEMIDE 10 MG/ML
40 INJECTION INTRAMUSCULAR; INTRAVENOUS ONCE
Status: COMPLETED | OUTPATIENT
Start: 2024-08-26 | End: 2024-08-26

## 2024-08-26 RX ORDER — NALOXONE HCL 0.4 MG/ML
0.02 VIAL (ML) INJECTION
Status: DISCONTINUED | OUTPATIENT
Start: 2024-08-26 | End: 2024-08-29 | Stop reason: HOSPADM

## 2024-08-26 RX ORDER — VALSARTAN 160 MG/1
160 TABLET ORAL DAILY
Status: DISCONTINUED | OUTPATIENT
Start: 2024-08-26 | End: 2024-08-26

## 2024-08-26 RX ORDER — HYDRALAZINE HYDROCHLORIDE 50 MG/1
50 TABLET, FILM COATED ORAL EVERY 8 HOURS
Status: DISCONTINUED | OUTPATIENT
Start: 2024-08-26 | End: 2024-08-27

## 2024-08-26 RX ORDER — CALCITRIOL 0.25 UG/1
0.25 CAPSULE ORAL
Status: DISCONTINUED | OUTPATIENT
Start: 2024-08-26 | End: 2024-08-26

## 2024-08-26 RX ORDER — ATORVASTATIN CALCIUM 20 MG/1
20 TABLET, FILM COATED ORAL DAILY
Status: DISCONTINUED | OUTPATIENT
Start: 2024-08-26 | End: 2024-08-29 | Stop reason: HOSPADM

## 2024-08-26 RX ORDER — GLUCAGON 1 MG
1 KIT INJECTION
Status: DISCONTINUED | OUTPATIENT
Start: 2024-08-26 | End: 2024-08-29 | Stop reason: HOSPADM

## 2024-08-26 RX ORDER — IBUPROFEN 200 MG
24 TABLET ORAL
Status: DISCONTINUED | OUTPATIENT
Start: 2024-08-26 | End: 2024-08-29 | Stop reason: HOSPADM

## 2024-08-26 RX ORDER — INSULIN ASPART 100 [IU]/ML
8 INJECTION, SOLUTION INTRAVENOUS; SUBCUTANEOUS
Status: DISCONTINUED | OUTPATIENT
Start: 2024-08-26 | End: 2024-08-26

## 2024-08-26 RX ORDER — HEPARIN SODIUM 5000 [USP'U]/ML
5000 INJECTION, SOLUTION INTRAVENOUS; SUBCUTANEOUS EVERY 8 HOURS
Status: DISCONTINUED | OUTPATIENT
Start: 2024-08-26 | End: 2024-08-29 | Stop reason: HOSPADM

## 2024-08-26 RX ORDER — NIFEDIPINE 90 MG/1
90 TABLET, EXTENDED RELEASE ORAL DAILY
COMMUNITY
End: 2024-09-06

## 2024-08-26 RX ORDER — INSULIN ASPART 100 [IU]/ML
0-5 INJECTION, SOLUTION INTRAVENOUS; SUBCUTANEOUS
Status: DISCONTINUED | OUTPATIENT
Start: 2024-08-26 | End: 2024-08-26

## 2024-08-26 RX ORDER — TAMSULOSIN HYDROCHLORIDE 0.4 MG/1
0.4 CAPSULE ORAL NIGHTLY
Status: DISCONTINUED | OUTPATIENT
Start: 2024-08-26 | End: 2024-08-29 | Stop reason: HOSPADM

## 2024-08-26 RX ORDER — FUROSEMIDE 10 MG/ML
40 INJECTION INTRAMUSCULAR; INTRAVENOUS
Status: COMPLETED | OUTPATIENT
Start: 2024-08-26 | End: 2024-08-26

## 2024-08-26 RX ORDER — IBUPROFEN 200 MG
16 TABLET ORAL
Status: DISCONTINUED | OUTPATIENT
Start: 2024-08-26 | End: 2024-08-26

## 2024-08-26 RX ORDER — FUROSEMIDE 10 MG/ML
40 INJECTION INTRAMUSCULAR; INTRAVENOUS 2 TIMES DAILY
Status: DISCONTINUED | OUTPATIENT
Start: 2024-08-27 | End: 2024-08-27

## 2024-08-26 RX ADMIN — FUROSEMIDE 40 MG: 10 INJECTION, SOLUTION INTRAVENOUS at 08:08

## 2024-08-26 RX ADMIN — LABETALOL HYDROCHLORIDE 200 MG: 200 TABLET, FILM COATED ORAL at 12:08

## 2024-08-26 RX ADMIN — FUROSEMIDE 40 MG: 10 INJECTION, SOLUTION INTRAVENOUS at 05:08

## 2024-08-26 RX ADMIN — SODIUM ZIRCONIUM CYCLOSILICATE 10 G: 10 POWDER, FOR SUSPENSION ORAL at 02:08

## 2024-08-26 RX ADMIN — ATORVASTATIN CALCIUM 20 MG: 20 TABLET, FILM COATED ORAL at 12:08

## 2024-08-26 RX ADMIN — LABETALOL HYDROCHLORIDE 200 MG: 200 TABLET, FILM COATED ORAL at 09:08

## 2024-08-26 RX ADMIN — VALSARTAN 160 MG: 160 TABLET, FILM COATED ORAL at 12:08

## 2024-08-26 RX ADMIN — HYDRALAZINE HYDROCHLORIDE 50 MG: 50 TABLET ORAL at 02:08

## 2024-08-26 RX ADMIN — HYDRALAZINE HYDROCHLORIDE 50 MG: 50 TABLET ORAL at 09:08

## 2024-08-26 RX ADMIN — TAMSULOSIN HYDROCHLORIDE 0.4 MG: 0.4 CAPSULE ORAL at 09:08

## 2024-08-26 NOTE — ASSESSMENT & PLAN NOTE
- Will continue home labetalol 200 mg  - Discontinue Valsartan due to current YESENIA  - Start hydralazine 50mg Q8h

## 2024-08-26 NOTE — ED NOTES
Pt presents to ED via EMS from home w/ c/o shortness of breath. Pt arrives to ED on CPAP + EMS reports 88% oxygen saturation on room air. Pt reports acute onset of shortness of breath around ~830 this morning that was progressively worsening. Pt reports recently low hemoglobin after he had labs drawn for nephrology appointment. Bilateral lower extremity edema. Does not wear oxygen at baseline + no PRN oxygen at home. 96% on room air at this time. Reports compliance w/ all medications.    Patient identifiers for Handy Adams 69 y.o. male checked and correct.  Chief Complaint   Patient presents with    Shortness of Breath     Arrives via EMS with SOB, arrives on CPAP     Past Medical History:   Diagnosis Date    Anemia     Disorder of kidney and ureter     Edema leg     History of rotator cuff tear     History of seasonal allergies     HTN (hypertension)     Hx of colonic polyps     Hyperlipemia     MGUS (monoclonal gammopathy of unknown significance)     NS (nuclear sclerosis), bilateral 06/25/2019    Obesity     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 11/17/2017    Type 2 diabetes mellitus

## 2024-08-26 NOTE — ASSESSMENT & PLAN NOTE
Pt's current Cr is 5.4, his baseline might be 4.5-4.8, unclear at this time.     - Continue to trend Cr

## 2024-08-26 NOTE — HPI
Mr. Adams is a 70 y/o M with a PMHx of CKD5, HTN, MGUS, cataracts T2DM, diabetic retinopathy and hyperparathyroidism presented to the ED this morning with complaints of shortness of breath onset last night. Pt states that last night he had trouble sleeping due to his SOB and could not find a comfortable position to sleep in. He states that his symptoms worsened this morning (8/26/24) around 0830 and called EMS for assistance. Per EMS, he was placed on 15 L O2 due to reported hypoxia with SpO2 in the 80s. At the time of EMS arrival to the ED the patient was demonstrating increased work of breathing patient and initially given a DuoNeb and Solu-Medrol. He had no change in his breathing after treatment and was subsequently placed on CPAP -- he saturated appropriately during that time. Pt denies CP, abdominal pain, N/Vor any other symptoms.     Of note, pt reports that he has been having progressively worsening swelling in his b/l lower extremities. He also notes that he recently was told he had low Hgb after having labs drawn for an upcoming nephrology appointment -- he also notes medication changes made at that nephrology appointment. Pt reports that he was supposed to have an IV iron infusion today for CKD-associated anemia. Pt states that he has had episodes of shortness of breath since July but says that they have never been this severe. Pt does not wear oxygen at baseline and is compliant with all of his medications. He still makes urine and is not on dialysis currently but states that he was due to have a meeting today with a  regarding starting home dialysis.

## 2024-08-26 NOTE — ASSESSMENT & PLAN NOTE
Pt's current Cr is 5.4, his baseline might be 4.5-4.8, unclear at this time.     - Continue to trend Cr  - See YESENIA

## 2024-08-26 NOTE — ASSESSMENT & PLAN NOTE
Pt's current Cr is 5.4, his baseline might be 4.5-4.8.     Recent Labs     08/26/24  0756 08/27/24  0230   CREATININE 5.4* 5.8*   EGFRNORACEVR 10.8* 9.9*       - YESENIA is likely cardiorenal in nature, creatinine should improve with diuresis  - Suspect that current episode of acute hypoxic respiratory failure is associated by YESENIA.   - Lasix 60 mg IV BID.  - Avoid nephrotoxins and renally dose meds for GFR listed above  - Monitor urine output, serial BMP, and adjust therapy as needed  - Will continue to trend creatinine

## 2024-08-26 NOTE — NURSING
Nurses Note -- 4 Eyes      8/26/2024   3:44 PM      Skin assessed during: Admit      [x] No Altered Skin Integrity Present    []Prevention Measures Documented      [] Yes- Altered Skin Integrity Present or Discovered   [] LDA Added if Not in Epic (Describe Wound)   [] New Altered Skin Integrity was Present on Admit and Documented in LDA   [] Wound Image Taken    Wound Care Consulted? No    Attending Nurse:  RAVEN Rodriguez    Second RN/Staff Member:   RAVEN Duran

## 2024-08-26 NOTE — SUBJECTIVE & OBJECTIVE
Past Medical History:   Diagnosis Date    Anemia     Disorder of kidney and ureter     Edema leg     History of rotator cuff tear     History of seasonal allergies     HTN (hypertension)     Hx of colonic polyps     Hyperlipemia     MGUS (monoclonal gammopathy of unknown significance)     NS (nuclear sclerosis), bilateral 06/25/2019    Obesity     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 11/17/2017    Type 2 diabetes mellitus        Past Surgical History:   Procedure Laterality Date    COLONOSCOPY N/A 10/28/2022    Procedure: COLONOSCOPY;  Surgeon: Guanako Sanchez MD;  Location: [facility] ENDO;  Service: Endoscopy;  Laterality: N/A;  REFENDO placed per Dr Sanderson. case request entered     vaccinated-GT  instr portal    MOUTH SURGERY      RENAL BIOPSY Left 7/19/2023    Procedure: BIOPSY, KIDNEY;  Surgeon: Nikky Soriano MD;  Location: [facility] CATH LAB;  Service: Interventional Nephrology;  Laterality: Left;       Review of patient's allergies indicates:  No Known Allergies    No current facility-administered medications on file prior to encounter.     Current Outpatient Medications on File Prior to Encounter   Medication Sig    ABRYSVO 120 mcg/0.5 mL SolR vaccine     atorvastatin (LIPITOR) 20 MG tablet Take 1 tablet (20 mg total) by mouth once daily.    blood sugar diagnostic Strp To test twice daily    blood-glucose meter kit Use as instructed    BOOSTRIX TDAP 2.5-8-5 Lf-mcg-Lf/0.5mL Syrg injection     calcitRIOL (ROCALTROL) 0.25 MCG Cap Take 1 capsule (0.25 mcg total) by mouth 3 (three) times a week. Monday, Wednesday, Friday    ergocalciferol (ERGOCALCIFEROL) 50,000 unit Cap Take 1 capsule by mouth once a week    FEROSUL 325 mg (65 mg iron) Tab tablet Take 1 tablet (325 mg total) by mouth 2 (two) times daily.    furosemide (LASIX) 20 MG tablet Take 1 tablet (20 mg total) by mouth once daily.    ILUVIEN 0.19 mg intravitreal implant      labetaloL (NORMODYNE) 200 MG tablet Take 1 tablet (200 mg total) by mouth 2 (two) times daily.    lancets Misc 1 lancet by Misc.(Non-Drug; Combo Route) route 2 (two) times daily with meals.    linaGLIPtin (TRADJENTA) 5 mg Tab tablet Take 1 tablet (5 mg total) by mouth once daily.    OZURDEX 0.7 mg Impl intravitreal implant     pioglitazone (ACTOS) 30 MG tablet Take 1 tablet (30 mg total) by mouth once daily.    sildenafiL (VIAGRA) 100 MG tablet Take 1 tablet (100 mg total) by mouth daily as needed for Erectile Dysfunction.    sodium bicarbonate 650 MG tablet Take 1 tablet (650 mg total) by mouth 2 (two) times daily.    sodium zirconium cyclosilicate (LOKELMA) 10 gram packet Take 1 packet (10 g total) by mouth once daily. Mix entire contents of packet(s) into drinking glass containing 3 tablespoons of water; stir well and drink immediately. Add water and repeat until no powder remains to receive entire dose.    tamsulosin (FLOMAX) 0.4 mg Cap Take 1 capsule (0.4 mg total) by mouth every evening.    valsartan (DIOVAN) 160 MG tablet Take 1 tablet (160 mg total) by mouth once daily.    [DISCONTINUED] FLUZONE HIGHDOSE QUAD 23-24  mcg/0.7 mL Syrg  (Patient not taking: Reported on 8/2/2024)    [DISCONTINUED] hepatitis B vacc-CpG 1018, PF, (HEPLISAV-B, PF,) 20 mcg/0.5 mL Syrg adm by pharmicist (Patient not taking: Reported on 8/2/2024)    [DISCONTINUED] pneumoc 20-carlos conj-dip cr,PF, (PREVNAR-20, PF,) 0.5 mL Syrg injection Inject into the muscle.    [DISCONTINUED] varicella-zoster gE-AS01B, PF, (SHINGRIX) 50 mcg/0.5 mL injection Inject into the muscle. (Patient not taking: Reported on 8/2/2024)     Family History       Problem Relation (Age of Onset)    Cancer Mother, Father    Diabetes Father    Hypertension Father    No Known Problems Sister, Brother, Maternal Aunt, Maternal Uncle, Paternal Aunt, Paternal Uncle, Maternal Grandmother, Maternal Grandfather, Paternal Grandmother, Paternal Grandfather          Tobacco  Use    Smoking status: Former     Current packs/day: 0.50     Types: Cigarettes    Smokeless tobacco: Never   Substance and Sexual Activity    Alcohol use: Yes     Comment: rarely    Drug use: Not Currently    Sexual activity: Yes     Partners: Female     Review of Systems   Constitutional: Negative.    HENT: Negative.     Eyes: Negative.    Respiratory:  Positive for shortness of breath.    Cardiovascular:  Positive for leg swelling. Negative for chest pain.   Gastrointestinal: Negative.    Endocrine: Negative.    Genitourinary: Negative.    Musculoskeletal: Negative.    Allergic/Immunologic: Negative.    Neurological: Negative.    Hematological: Negative.    Psychiatric/Behavioral: Negative.       Objective:     Vital Signs (Most Recent):  Temp: 97.6 °F (36.4 °C) (08/26/24 0758)  Pulse: 71 (08/26/24 1204)  Resp: 18 (08/26/24 1021)  BP: (!) 181/89 (08/26/24 1204)  SpO2: 99 % (08/26/24 1021) Vital Signs (24h Range):  Temp:  [97.6 °F (36.4 °C)] 97.6 °F (36.4 °C)  Pulse:  [71-85] 71  Resp:  [18-20] 18  SpO2:  [96 %-99 %] 99 %  BP: (157-181)/(76-89) 181/89     Weight: 132 kg (291 lb)  Body mass index is 39.47 kg/m².     Physical Exam  Constitutional:       Appearance: Normal appearance.   HENT:      Head: Normocephalic and atraumatic.      Right Ear: External ear normal.      Left Ear: External ear normal.      Nose: Nose normal.   Eyes:      General:         Right eye: No discharge.         Left eye: No discharge.   Neck:      Comments: No JVD noted  Cardiovascular:      Rate and Rhythm: Normal rate and regular rhythm.      Heart sounds: Normal heart sounds.   Pulmonary:      Effort: Pulmonary effort is normal.      Breath sounds: Normal breath sounds.   Abdominal:      General: Abdomen is flat. Bowel sounds are normal. There is no distension.      Palpations: Abdomen is soft. There is no mass.      Tenderness: There is no abdominal tenderness. There is no guarding or rebound.      Hernia: No hernia is present.    Musculoskeletal:      Right lower leg: Edema present.      Left lower leg: Edema present.      Comments: 1+ pitting edema noted bilaterally   Skin:     General: Skin is warm and dry.      Capillary Refill: Capillary refill takes less than 2 seconds.   Neurological:      Mental Status: He is alert and oriented to person, place, and time.   Psychiatric:         Mood and Affect: Mood normal.         Behavior: Behavior normal.                Significant Labs: All pertinent labs within the past 24 hours have been reviewed.    Significant Imaging: I have reviewed all pertinent imaging results/findings within the past 24 hours.

## 2024-08-26 NOTE — H&P
[facility] Emergency Dept  [facility] Medicine  History & Physical    Patient Name: Handy Adams  MRN: [id number]  Patient Class: OP- Observation  Admission Date: 8/26/2024  Attending Physician: Micha Weiner MD   Primary Care Provider: Toby Sanderson MD         Patient information was obtained from patient, past medical records, and ER records.     Subjective:     Principal Problem:<principal problem not specified>    Chief Complaint:   Chief Complaint   Patient presents with    Shortness of Breath     Arrives via EMS with SOB, arrives on CPAP        HPI: Mr. Adams is a 70 y/o M with a PMHx of CKD5, HTN, MGUS, cataracts T2DM, diabetic retinopathy and hyperparathyroidism presented to the ED this morning with complaints of shortness of breath onset last night. Pt states that last night he had trouble sleeping due to his SOB and could not find a comfortable position to sleep in. He states that his symptoms worsened this morning (8/26/24) around 0830 and called EMS for assistance. Per EMS, he was placed on 15 L O2 due to reported hypoxia with SpO2 in the 80s. At the time of EMS arrival to the ED the patient was demonstrating increased work of breathing patient and initially given a DuoNeb and Solu-Medrol. He had no change in his breathing after treatment and was subsequently placed on CPAP -- he saturated appropriately during that time. Pt denies CP, abdominal pain or any other symptoms.     Of note, pt reports that he has been having progressively worsening swelling in his b/l lower extremities. He also notes that he recently was told he had low Hgb after having labs drawn for an upcoming nephrology appointment -- he also notes medication changes made at that nephrology appointment. Pt reports that he was supposed to have an IV iron infusion today for CKD-associated anemia. Pt states that he has had episodes of shortness of breath since July but says that they have never been this severe. Pt does not wear  oxygen at baseline and is compliant with all of his medications. He still makes urine and is not on dialysis currently but states that he was due to have a meeting today with a  regarding starting home dialysis.     Past Medical History:   Diagnosis Date    Anemia     Disorder of kidney and ureter     Edema leg     History of rotator cuff tear     History of seasonal allergies     HTN (hypertension)     Hx of colonic polyps     Hyperlipemia     MGUS (monoclonal gammopathy of unknown significance)     NS (nuclear sclerosis), bilateral 06/25/2019    Obesity     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 11/17/2017    Type 2 diabetes mellitus        Past Surgical History:   Procedure Laterality Date    COLONOSCOPY N/A 10/28/2022    Procedure: COLONOSCOPY;  Surgeon: Guanako Sanchez MD;  Location: [facility];  Service: Endoscopy;  Laterality: N/A;  REFENDO placed per Dr Sanderson. case request entered     vaccinated-GT  instr portal    MOUTH SURGERY      RENAL BIOPSY Left 7/19/2023    Procedure: BIOPSY, KIDNEY;  Surgeon: Nikky Soriano MD;  Location: [facility] CATH LAB;  Service: Interventional Nephrology;  Laterality: Left;       Review of patient's allergies indicates:  No Known Allergies    No current facility-administered medications on file prior to encounter.     Current Outpatient Medications on File Prior to Encounter   Medication Sig    ABRYSVO 120 mcg/0.5 mL SolR vaccine     atorvastatin (LIPITOR) 20 MG tablet Take 1 tablet (20 mg total) by mouth once daily.    blood sugar diagnostic Strp To test twice daily    blood-glucose meter kit Use as instructed    BOOSTRIX TDAP 2.5-8-5 Lf-mcg-Lf/0.5mL Syrg injection     calcitRIOL (ROCALTROL) 0.25 MCG Cap Take 1 capsule (0.25 mcg total) by mouth 3 (three) times a week. Monday, Wednesday, Friday    ergocalciferol (ERGOCALCIFEROL) 50,000 unit Cap Take 1 capsule by mouth once a week     FEROSUL 325 mg (65 mg iron) Tab tablet Take 1 tablet (325 mg total) by mouth 2 (two) times daily.    furosemide (LASIX) 20 MG tablet Take 1 tablet (20 mg total) by mouth once daily.    ILUVIEN 0.19 mg intravitreal implant     labetaloL (NORMODYNE) 200 MG tablet Take 1 tablet (200 mg total) by mouth 2 (two) times daily.    lancets Misc 1 lancet by Misc.(Non-Drug; Combo Route) route 2 (two) times daily with meals.    linaGLIPtin (TRADJENTA) 5 mg Tab tablet Take 1 tablet (5 mg total) by mouth once daily.    OZURDEX 0.7 mg Impl intravitreal implant     pioglitazone (ACTOS) 30 MG tablet Take 1 tablet (30 mg total) by mouth once daily.    sildenafiL (VIAGRA) 100 MG tablet Take 1 tablet (100 mg total) by mouth daily as needed for Erectile Dysfunction.    sodium bicarbonate 650 MG tablet Take 1 tablet (650 mg total) by mouth 2 (two) times daily.    sodium zirconium cyclosilicate (LOKELMA) 10 gram packet Take 1 packet (10 g total) by mouth once daily. Mix entire contents of packet(s) into drinking glass containing 3 tablespoons of water; stir well and drink immediately. Add water and repeat until no powder remains to receive entire dose.    tamsulosin (FLOMAX) 0.4 mg Cap Take 1 capsule (0.4 mg total) by mouth every evening.    valsartan (DIOVAN) 160 MG tablet Take 1 tablet (160 mg total) by mouth once daily.    [DISCONTINUED] FLUZONE HIGHDOSE QUAD 23-24  mcg/0.7 mL Syrg  (Patient not taking: Reported on 8/2/2024)    [DISCONTINUED] hepatitis B vacc-CpG 1018, PF, (HEPLISAV-B, PF,) 20 mcg/0.5 mL Syrg adm by pharmicist (Patient not taking: Reported on 8/2/2024)    [DISCONTINUED] pneumoc 20-carlos conj-dip cr,PF, (PREVNAR-20, PF,) 0.5 mL Syrg injection Inject into the muscle.    [DISCONTINUED] varicella-zoster gE-AS01B, PF, (SHINGRIX) 50 mcg/0.5 mL injection Inject into the muscle. (Patient not taking: Reported on 8/2/2024)     Family History       Problem Relation (Age of Onset)    Cancer Mother, Father    Diabetes Father     Hypertension Father    No Known Problems Sister, Brother, Maternal Aunt, Maternal Uncle, Paternal Aunt, Paternal Uncle, Maternal Grandmother, Maternal Grandfather, Paternal Grandmother, Paternal Grandfather          Tobacco Use    Smoking status: Former     Current packs/day: 0.50     Types: Cigarettes    Smokeless tobacco: Never   Substance and Sexual Activity    Alcohol use: Yes     Comment: rarely    Drug use: Not Currently    Sexual activity: Yes     Partners: Female     Review of Systems   Constitutional: Negative.    HENT: Negative.     Eyes: Negative.    Respiratory:  Positive for shortness of breath.    Cardiovascular:  Positive for leg swelling. Negative for chest pain.   Gastrointestinal: Negative.    Endocrine: Negative.    Genitourinary: Negative.    Musculoskeletal: Negative.    Allergic/Immunologic: Negative.    Neurological: Negative.    Hematological: Negative.    Psychiatric/Behavioral: Negative.       Objective:     Vital Signs (Most Recent):  Temp: 97.6 °F (36.4 °C) (08/26/24 0758)  Pulse: 71 (08/26/24 1204)  Resp: 18 (08/26/24 1021)  BP: (!) 181/89 (08/26/24 1204)  SpO2: 99 % (08/26/24 1021) Vital Signs (24h Range):  Temp:  [97.6 °F (36.4 °C)] 97.6 °F (36.4 °C)  Pulse:  [71-85] 71  Resp:  [18-20] 18  SpO2:  [96 %-99 %] 99 %  BP: (157-181)/(76-89) 181/89     Weight: 132 kg (291 lb)  Body mass index is 39.47 kg/m².     Physical Exam  Constitutional:       Appearance: Normal appearance.   HENT:      Head: Normocephalic and atraumatic.      Right Ear: External ear normal.      Left Ear: External ear normal.      Nose: Nose normal.   Eyes:      General:         Right eye: No discharge.         Left eye: No discharge.   Neck:      Comments: No JVD noted  Cardiovascular:      Rate and Rhythm: Normal rate and regular rhythm.      Heart sounds: Normal heart sounds.   Pulmonary:      Effort: Pulmonary effort is normal.      Breath sounds: Normal breath sounds.   Abdominal:      General: Abdomen is flat.  Bowel sounds are normal. There is no distension.      Palpations: Abdomen is soft. There is no mass.      Tenderness: There is no abdominal tenderness. There is no guarding or rebound.      Hernia: No hernia is present.   Musculoskeletal:      Right lower leg: Edema present.      Left lower leg: Edema present.      Comments: 1+ pitting edema noted bilaterally   Skin:     General: Skin is warm and dry.      Capillary Refill: Capillary refill takes less than 2 seconds.   Neurological:      Mental Status: He is alert and oriented to person, place, and time.   Psychiatric:         Mood and Affect: Mood normal.         Behavior: Behavior normal.                Significant Labs: All pertinent labs within the past 24 hours have been reviewed.    Significant Imaging: I have reviewed all pertinent imaging results/findings within the past 24 hours.  Assessment/Plan:     * Acute hypoxic respiratory failure  - Echocardiogram ordered, results pending  - Lasix 40 IV BID  - Is and Os    YESENIA (acute kidney injury)  Pt's current Cr is 5.4, his baseline might be 4.5-4.8.     Recent Labs     08/26/24  0756   CREATININE 5.4*   EGFRNORACEVR 10.8*       - YESENIA is likely cardiorenal in nature, creatinine should improve with diuresis  - Avoid nephrotoxins and renally dose meds for GFR listed above  - Monitor urine output, serial BMP, and adjust therapy as needed  - Will continue to trend creatinine    Shortness of breath  See acute hypoxic respiratory failure      HTN (hypertension)  - Will continue home labetalol 200 mg  - Discontinue Valsartan due to current YESENIA  - Start hydralazine 50mg Q8h    Chronic kidney disease (CKD), stage V  Pt's current Cr is 5.4, his baseline might be 4.5-4.8, unclear at this time.     - Continue to trend Cr  - See YESENIA    Type 2 diabetes mellitus with stage 5 chronic kidney disease not on chronic dialysis, without long-term current use of insulin  Pt's most recent A1c was 5.3, he does not use insulin at  home.    - LDSSI      Hyperlipemia  - Continue home atorvastatin 20mg       VTE Risk Mitigation (From admission, onward)           Ordered     heparin (porcine) injection 5,000 Units  Every 8 hours         08/26/24 1137     IP VTE HIGH RISK PATIENT  Once         08/26/24 1137     Place sequential compression device  Until discontinued         08/26/24 1137                           On 08/26/2024, patient should be placed in hospital observation services under my care.         Moni Graf DO  Department of Hospital Medicine  [facility] - Emergency Dept

## 2024-08-26 NOTE — PLAN OF CARE
[facility] Emergency Dept  Initial Discharge Assessment       Primary Care Provider: Toby Sanderson MD    Admission Diagnosis: Chest pain [R07.9]    Admission Date: 8/26/2024  Expected Discharge Date:     Pt stated he is independent with his ADL's and ambulation and does not require assistance or equipment.    Pt to d/c home with no needs when ready    Transition of Care Barriers: (P) None    Payor: [employer] MANAGED MEDICARE / Plan: [employer] MEDICARE HMO / Product Type: Capitation /     Extended Emergency Contact Information  Primary Emergency Contact: Daniella Adams  Address: [address] [place] [address] .           [place] [address] [place]  Work Phone: [phone]  Mobile Phone: [phone]  Relation: Significant other    Discharge Plan A: (P) Home  Discharge Plan B: (P) Home      [facility] [address] [place], [place] - [address]  [address] [place] [address]  Phone: [phone] Fax: [phone]    [facility] - [facility] [address]  [address] [facility] [address]  Phone: [phone] Fax: [phone]      Initial Assessment (most recent)       Adult Discharge Assessment - 08/26/24 1318          Discharge Assessment    Assessment Type Discharge Planning Assessment (P)      Confirmed/corrected address, phone number and insurance Yes (P)      Confirmed Demographics Correct on Facesheet (P)      Source of Information patient (P)      People in Home spouse;child(niurka), adult (P)      Facility Arrived From: home (P)      Do you expect to return to your current living situation? Yes (P)      Do you have help at home or someone to help you manage your care at home? No (P)      Prior to hospitilization cognitive status: Alert/Oriented;No Deficits (P)      Current cognitive status: No Deficits;Alert/Oriented (P)      Walking or Climbing Stairs Difficulty no (P)      Dressing/Bathing Difficulty no (P)      Home Accessibility stairs to enter  home (P)      Number of Stairs, Main Entrance two (P)      Home Layout Able to live on 1st floor (P)      Equipment Currently Used at Home none (P)      Patient currently being followed by outpatient case management? No (P)      Do you currently have service(s) that help you manage your care at home? No (P)      Do you have any problems affording any of your prescribed medications? No (P)      Is the patient taking medications as prescribed? yes (P)      Who is going to help you get home at discharge? family/friends (P)      How do you get to doctors appointments? car, drives self (P)      Are you on dialysis? No (P)      Do you take coumadin? No (P)      Discharge Plan A Home (P)      Discharge Plan B Home (P)      DME Needed Upon Discharge  none (P)      Discharge Plan discussed with: Patient (P)      Transition of Care Barriers None (P)         Physical Activity    On average, how many days per week do you engage in moderate to strenuous exercise (like a brisk walk)? 2 days (P)      On average, how many minutes do you engage in exercise at this level? 60 min (P)         Financial Resource Strain    How hard is it for you to pay for the very basics like food, housing, medical care, and heating? Not very hard (P)         Housing Stability    In the last 12 months, was there a time when you were not able to pay the mortgage or rent on time? No (P)      At any time in the past 12 months, were you homeless or living in a shelter (including now)? No (P)         Transportation Needs    Has the lack of transportation kept you from medical appointments, meetings, work or from getting things needed for daily living? No (P)         Food Insecurity    Within the past 12 months, you worried that your food would run out before you got the money to buy more. Never true (P)      Within the past 12 months, the food you bought just didn't last and you didn't have money to get more. Never true (P)         Stress    Do you feel  stress - tense, restless, nervous, or anxious, or unable to sleep at night because your mind is troubled all the time - these days? To some extent (P)         Social Isolation    How often do you feel lonely or isolated from those around you?  Rarely (P)         Alcohol Use    Q1: How often do you have a drink containing alcohol? Never (P)      Q2: How many drinks containing alcohol do you have on a typical day when you are drinking? Patient does not drink (P)      Q3: How often do you have six or more drinks on one occasion? Never (P)         Utilities    In the past 12 months has the electric, gas, oil, or water company threatened to shut off services in your home? No (P)         Health Literacy    How often do you need to have someone help you when you read instructions, pamphlets, or other written material from your doctor or pharmacy? Rarely (P)         OTHER    Name(s) of People in Home significant other Daniella and adult son (P)                       Sabina Al CD, MSW, LMSW, RSW   Case Management  [facility]  Email: [email or web address]

## 2024-08-26 NOTE — ED NOTES
Assumed care of this patient. Pt is gowned on stretcher with side rails up x2. Pt has no complaints at this time.

## 2024-08-26 NOTE — ASSESSMENT & PLAN NOTE
Pt's current Cr is 5.4, his baseline might be 4.5-4.8.     Recent Labs     08/26/24  0756   CREATININE 5.4*   EGFRNORACEVR 10.8*       - YESENIA is likely cardiorenal in nature, creatinine should improve with diuresis  - Avoid nephrotoxins and renally dose meds for GFR listed above  - Monitor urine output, serial BMP, and adjust therapy as needed  - Will continue to trend creatinine

## 2024-08-26 NOTE — ED NOTES
Telemetry Verification   Patient placed on Telemetry Box  Verified with War Room  Box # [id number]   Rate 86   Rhythm  NS

## 2024-08-26 NOTE — ED PROVIDER NOTES
Encounter Date: 8/26/2024       History     Chief Complaint   Patient presents with    Shortness of Breath     Arrives via EMS with SOB, arrives on CPAP     Mr Adams is a 69-year-old male with past medical history of CKD stage 5, hyperlipidemia, type 2 diabetes, OB hyperparathyroidism, anemia , and hypertension is presenting with shortness of breath.    Patient states that he began feeling short of breath around 8:00 p.m. last night.  States that his shortness of breath progressively worsened and he was unable to sleep because he could not find a comfortable position.  He called EMS this morning for assistance.  Denies associated chest pain.  Denies any associated recent illness including fever, cough, congestion, abdominal pain, or diarrhea.  He reports significant improvement in his symptoms after being placed on CPAP by EMS.  He endorses gradual increasing swelling in his feet.  States that he recently saw his nephrologist who made some medication changes.  He also reports plan for IV iron infusion today for his CKD associated anemia.  Reports good compliance with his medications.  He has no other acute concerns at this time.  Note patient is not on oxygen at home or CPAP at night.    Per EMS fire department initially assessed the patient at home.  States that he was placed on 15 L O2 due to reported hypoxia with SpO2 in the 80s.  At the time of EMS arrival patient is still demonstrating increased work of breathing patient was initially given a DuoNeb and Solu-Medrol.  However, patient had no change in his breathing after treatment and was subsequently  placed on CPAP.  However at this time he was always saturating appropriately.        Review of patient's allergies indicates:  No Known Allergies  Past Medical History:   Diagnosis Date    Anemia     Disorder of kidney and ureter     Edema leg     History of rotator cuff tear     History of seasonal allergies     HTN (hypertension)     Hx of colonic polyps      Hyperlipemia     MGUS (monoclonal gammopathy of unknown significance)     NS (nuclear sclerosis), bilateral 06/25/2019    Obesity     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 11/17/2017    Type 2 diabetes mellitus      Past Surgical History:   Procedure Laterality Date    COLONOSCOPY N/A 10/28/2022    Procedure: COLONOSCOPY;  Surgeon: Guanako Sanchez MD;  Location: [facility] ENDO;  Service: Endoscopy;  Laterality: N/A;  REFENDO placed per Dr Sanderson. case request entered     vaccinated-GT  instr portal    MOUTH SURGERY      RENAL BIOPSY Left 7/19/2023    Procedure: BIOPSY, KIDNEY;  Surgeon: Nikky Soriano MD;  Location: [facility] CATH LAB;  Service: Interventional Nephrology;  Laterality: Left;     Family History   Problem Relation Name Age of Onset    Cancer Mother          Lung Cancer    Cancer Father          Colon cancer    Diabetes Father      Hypertension Father      No Known Problems Sister      No Known Problems Brother      No Known Problems Maternal Aunt      No Known Problems Maternal Uncle      No Known Problems Paternal Aunt      No Known Problems Paternal Uncle      No Known Problems Maternal Grandmother      No Known Problems Maternal Grandfather      No Known Problems Paternal Grandmother      No Known Problems Paternal Grandfather      Amblyopia Neg Hx      Blindness Neg Hx      Cataracts Neg Hx      Glaucoma Neg Hx      Macular degeneration Neg Hx      Retinal detachment Neg Hx      Strabismus Neg Hx      Stroke Neg Hx      Thyroid disease Neg Hx       Social History     Tobacco Use    Smoking status: Former     Current packs/day: 0.50     Types: Cigarettes    Smokeless tobacco: Never   Substance Use Topics    Alcohol use: Yes     Comment: rarely    Drug use: Not Currently     Review of Systems   Constitutional:  Negative for fatigue and fever.   Respiratory:  Positive for shortness of breath. Negative for wheezing and stridor.     Cardiovascular:  Negative for chest pain.   Gastrointestinal:  Negative for abdominal pain, nausea and vomiting.   All other systems reviewed and are negative.      Physical Exam     Initial Vitals   BP Pulse Resp Temp SpO2   08/26/24 0758 08/26/24 0757 08/26/24 0757 08/26/24 0758 08/26/24 0758   (!) 157/76 77 18 97.6 °F (36.4 °C) 96 %      MAP       --                Physical Exam    Nursing note and vitals reviewed.  Constitutional: He appears well-developed and well-nourished.   HENT:   Head: Normocephalic and atraumatic.   Eyes: Conjunctivae and EOM are normal. Pupils are equal, round, and reactive to light.   Cardiovascular:  Normal rate and regular rhythm.           No murmur heard.  Pulmonary/Chest: No respiratory distress. He has no wheezes. He has rales (bilateral bases).   Abdominal: Abdomen is soft. Bowel sounds are normal. He exhibits no distension. There is no abdominal tenderness.   Musculoskeletal:         General: Edema (bilateral feet, 1+) present.     Neurological: He is alert and oriented to person, place, and time.   Skin: Capillary refill takes less than 2 seconds.   Psychiatric: He has a normal mood and affect.         ED Course   Critical Care    Date/Time: 8/27/2024 9:24 AM    Performed by: Carloz Ocampo MD  Authorized by: Carloz Ocamop MD  Direct patient critical care time: 12 minutes  Additional history critical care time: 8 minutes  Ordering / reviewing critical care time: 10 minutes  Documentation critical care time: 6 minutes  Consulting other physicians critical care time: 5 minutes  Total critical care time (exclusive of procedural time) : 41 minutes        Labs Reviewed   CBC W/ AUTO DIFFERENTIAL - Abnormal       Result Value    WBC 10.49      RBC 2.54 (*)     Hemoglobin 7.4 (*)     Hematocrit 23.8 (*)     MCV 94      MCH 29.1      MCHC 31.1 (*)     RDW 16.3 (*)     Platelets 215      MPV 12.1      Immature Granulocytes 0.6 (*)     Gran # (ANC) 8.8 (*)     Immature Grans (Abs)  0.06 (*)     Lymph # 0.8 (*)     Mono # 0.6      Eos # 0.2      Baso # 0.03      nRBC 0      Gran % 83.6 (*)     Lymph % 7.6 (*)     Mono % 5.6      Eosinophil % 2.3      Basophil % 0.3      Differential Method Automated     COMPREHENSIVE METABOLIC PANEL - Abnormal    Sodium 141      Potassium 4.3      Chloride 109      CO2 23      Glucose 137 (*)     BUN 43 (*)     Creatinine 5.4 (*)     Calcium 8.5 (*)     Total Protein 6.9      Albumin 3.3 (*)     Total Bilirubin 0.8      Alkaline Phosphatase 40 (*)     AST 17      ALT 18      eGFR 10.8 (*)     Anion Gap 9     TROPONIN I - Abnormal    Troponin I 0.357 (*)    B-TYPE NATRIURETIC PEPTIDE - Abnormal    BNP 1,160 (*)    TROPONIN I - Abnormal    Troponin I 0.386 (*)    HEMOGLOBIN A1C   HEMOGLOBIN A1C    Hemoglobin A1C 5.2      Estimated Avg Glucose 103      Narrative:     add on GHGB per MD Micha Weiner   POCT GLUCOSE MONITORING CONTINUOUS        ECG Results              EKG 12-lead (Final result)        Collection Time Result Time QRS Duration OHS QTC Calculation    08/26/24 07:43:39 08/26/24 08:23:04 106 455                     Final result by Interface, Lab In [facility] (08/26/24 08:23:08)                   Narrative:    Test Reason : R07.9,    Vent. Rate : 082 BPM     Atrial Rate : 082 BPM     P-R Int : 236 ms          QRS Dur : 106 ms      QT Int : 390 ms       P-R-T Axes : -17 -02 045 degrees     QTc Int : 455 ms    Sinus rhythm with 1st degree A-V block  Nonspecific T wave abnormality  Abnormal ECG  When compared with ECG of 18-JUL-2024 10:37,  No significant change was found  Confirmed by Singh Leo MD ([id number]) on 8/26/2024 8:22:59 AM    Referred By: System System           Confirmed By:Singh Leo MD                                  Imaging Results              X-Ray Chest AP Portable (Final result)  Result time 08/26/24 08:43:06      Final result by Hussain Corona MD (08/26/24 08:43:06)                   Impression:      Minimal cardiomegaly, central  pulmonary vascular prominence, and tiny volumes of pleural fluid on both sides are now appreciated, representing a detrimental interval change since 07/16/2024 at 22:41.  Findings may relate to some element of cardiac decompensation.      Electronically signed by: Hussain Corona MD  Date:    08/26/2024  Time:    08:43               Narrative:    EXAMINATION:  XR CHEST AP PORTABLE    CLINICAL HISTORY:  Chest Pain;    TECHNIQUE:  One view    COMPARISON:  Comparison is made to 07/16/2024 at 22:41.  Clinical information of acute on set of shortness of breath is obtained from the electronic medical record.    FINDINGS:  Even allowing for magnification of the cardiomediastinal silhouette related to projection, the heart appears minimally enlarged and is slightly larger than was the case on the prior examination.  Central pulmonary vascularity appears minimally more prominent.  Lung zones remain essentially clear and are free of significant airspace consolidation or volume loss.  Very small amounts of pleural fluid are now present on both sides.  No pneumothorax.                                       Medications   atorvastatin tablet 20 mg (20 mg Oral Given 8/27/24 0917)   ferrous sulfate tablet 1 each (1 each Oral Given 8/27/24 0917)   tamsulosin 24 hr capsule 0.4 mg (0.4 mg Oral Given 8/26/24 2132)   sodium chloride 0.9% flush 10 mL (has no administration in time range)   naloxone 0.4 mg/mL injection 0.02 mg (has no administration in time range)   glucose chewable tablet 16 g (has no administration in time range)   glucose chewable tablet 24 g (has no administration in time range)   glucagon (human recombinant) injection 1 mg (has no administration in time range)   heparin (porcine) injection 5,000 Units (5,000 Units Subcutaneous Not Given 8/27/24 0600)   melatonin tablet 6 mg (has no administration in time range)   dextrose 10% bolus 125 mL 125 mL (has no administration in time range)   labetaloL tablet 200 mg (200 mg Oral  Given 8/27/24 0917)   sodium zirconium cyclosilicate packet 10 g (10 g Oral Given 8/26/24 1449)   insulin aspart U-100 pen 0-5 Units (has no administration in time range)   furosemide injection 40 mg (40 mg Intravenous Given 8/27/24 0917)   hydrALAZINE tablet 50 mg (50 mg Oral Given 8/27/24 0621)   furosemide injection 40 mg (40 mg Intravenous Given 8/26/24 0834)   furosemide injection 40 mg (40 mg Intravenous Given 8/26/24 1736)     Medical Decision Making  This is a 69-year-old male with past medical history of CKD stage 5, hyperlipidemia, type 2 diabetes, OB hyperparathyroidism, anemia , and hypertension is presenting with shortness of breath.  On arrival patient is hemodynamically stable while on CPAP.  Patient tolerating room air well after removing CPAP.  Does not appear to be in respiratory distress.  Exam notable for bilateral crackles in the lower bases of lung fields.  Patient has 1+ pitting edema on his feet.  Differentials at this time include CHF exacerbation, anemia, pneumonia, PE, and ACS.  Plan to obtain a CBC CK, CMP, troponin, BNP, EKG, chest x-ray, and give 80 mg IV Lasix.    CBC notable for hemoglobin of 7.5 which is stable compared to previous.  CMP notable for increased creatinine of 5.4 and GFR of 10.8.  Note patient is not on dialysis.  BNP elevated at 1 160 and troponin 0.357.  Chest x-ray with increased pulmonary vasculature suggestive of pulmonary edema.  At this time suspect patient has shortness of breath is secondary to pulmonary edema secondary to CHF exacerbation.  Suspect elevated troponin is more likely caused from CKD and cardiac strain from fluid overload rather than NSTEMI however repeat troponin was also ordered.  At this time patient requires hospitalization for management of his shortness of breath, pulmonary edema, and elevated troponin.    Amount and/or Complexity of Data Reviewed  Labs: ordered. Decision-making details documented in ED Course.  Radiology: ordered.  Decision-making details documented in ED Course.  ECG/medicine tests:  Decision-making details documented in ED Course.    Risk  Prescription drug management.              Attending Attestation:   Physician Attestation Statement for Resident:  As the supervising MD   Physician Attestation Statement: I have personally seen and examined this patient.   I agree with the above history.  -:   As the supervising MD I agree with the above PE.     As the supervising MD I agree with the above treatment, course, plan, and disposition.   -: This is an emergent evaluation of a critically ill patient.  EMS called pre arrival to notify us that a patient was coming to the emergency department in respiratory distress on CPAP.  Upon arrival, the patient had increased work of breathing and was in respiratory distress.  Over the course of the 1st 30 minutes of his ED visit, his respiratory status improved with CPAP.  Ultimately, it was noted that the patient does have an elevated creatinine which is chronic.  He is not currently on dialysis.  This may be 1 component of his shortness of breath and volume overload.  However, his troponin is moderately elevated, as is his BNP.  It is difficult to assess whether the patient had an NSTEMI that led to the acute congestive heart failure exacerbation or if the patient had an acute congestive heart failure exacerbation that led to heart strain and the leaking of troponin.  Regardless, with these findings, the patient was admitted for further evaluation and treatment.                   ED Course as of 08/27/24 0924   Mon Aug 26, 2024   0815 Hemoglobin(!): 7.4  Improved from most recent. Within baseline. [AC]   0828 EKG 12-lead  Sinus with AV block.  82 beats per minute no STEMI [AC]   0931 Troponin I(!): 0.357 [AC]   0931 BNP(!): 1,160  Patient getting iv lasix [AC]   0934 Creatinine(!): 5.4 [AC]   0937 eGFR(!): 10.8 [AC]   0941 X-Ray Chest AP Portable  Minimal cardiomegaly, central pulmonary  vascular prominence, and tiny volumes of pleural fluid on both sides are now appreciated, representing a detrimental interval change since 07/16/2024 at 22:41.  Findings may relate to some element of cardiac decompensation. [AC]      ED Course User Index  [AC] Susy Toro MD                           Clinical Impression:  Final diagnoses:  [R06.02] SOB (shortness of breath) (Primary)          ED Disposition Condition    Observation Stable               Plan discussed with Dr. Damaris Toro MD  Emergency Medicine, PGY1       Susy Toro MD  Resident  08/26/24 1053       Carloz Ocampo MD  08/27/24 0994       Carloz Ocampo MD  08/27/24 0905

## 2024-08-27 PROBLEM — I50.9 CONGESTIVE HEART FAILURE: Status: RESOLVED | Noted: 2024-08-27 | Resolved: 2024-08-27

## 2024-08-27 PROBLEM — N04.9 NEPHROTIC SYNDROME: Status: ACTIVE | Noted: 2024-08-27

## 2024-08-27 PROBLEM — I50.30 HEART FAILURE WITH PRESERVED EJECTION FRACTION: Status: ACTIVE | Noted: 2024-08-27

## 2024-08-27 PROBLEM — I50.9 CONGESTIVE HEART FAILURE: Status: ACTIVE | Noted: 2024-08-27

## 2024-08-27 PROBLEM — R06.02 SHORTNESS OF BREATH: Status: RESOLVED | Noted: 2024-08-26 | Resolved: 2024-08-27

## 2024-08-27 LAB
ALBUMIN SERPL BCP-MCNC: 3.1 G/DL (ref 3.5–5.2)
ALP SERPL-CCNC: 39 U/L (ref 55–135)
ALT SERPL W/O P-5'-P-CCNC: 14 U/L (ref 10–44)
ANION GAP SERPL CALC-SCNC: 12 MMOL/L (ref 8–16)
ASCENDING AORTA: 3.8 CM
AST SERPL-CCNC: 15 U/L (ref 10–40)
AV INDEX (PROSTH): 0.78
AV MEAN GRADIENT: 7 MMHG
AV PEAK GRADIENT: 11 MMHG
AV VALVE AREA BY VELOCITY RATIO: 3.11 CM²
AV VALVE AREA: 3.32 CM²
AV VELOCITY RATIO: 0.73
BASOPHILS # BLD AUTO: 0.01 K/UL (ref 0–0.2)
BASOPHILS NFR BLD: 0.1 % (ref 0–1.9)
BILIRUB SERPL-MCNC: 0.5 MG/DL (ref 0.1–1)
BSA FOR ECHO PROCEDURE: 2.58 M2
BUN SERPL-MCNC: 55 MG/DL (ref 8–23)
CALCIUM SERPL-MCNC: 8.4 MG/DL (ref 8.7–10.5)
CHLORIDE SERPL-SCNC: 107 MMOL/L (ref 95–110)
CO2 SERPL-SCNC: 19 MMOL/L (ref 23–29)
CREAT SERPL-MCNC: 5.8 MG/DL (ref 0.5–1.4)
CV ECHO LV RWT: 0.42 CM
DIFFERENTIAL METHOD BLD: ABNORMAL
DOP CALC AO PEAK VEL: 1.63 M/S
DOP CALC AO VTI: 28.88 CM
DOP CALC LVOT AREA: 4.3 CM2
DOP CALC LVOT DIAMETER: 2.33 CM
DOP CALC LVOT PEAK VEL: 1.19 M/S
DOP CALC LVOT STROKE VOLUME: 95.76 CM3
DOP CALCLVOT PEAK VEL VTI: 22.47 CM
E WAVE DECELERATION TIME: 147.09 MSEC
E/A RATIO: 1.8
E/E' RATIO: 13.78 M/S
ECHO LV POSTERIOR WALL: 1.23 CM (ref 0.6–1.1)
EOSINOPHIL # BLD AUTO: 0 K/UL (ref 0–0.5)
EOSINOPHIL NFR BLD: 0 % (ref 0–8)
ERYTHROCYTE [DISTWIDTH] IN BLOOD BY AUTOMATED COUNT: 15.9 % (ref 11.5–14.5)
EST. GFR  (NO RACE VARIABLE): 9.9 ML/MIN/1.73 M^2
FRACTIONAL SHORTENING: 27 % (ref 28–44)
GLOBAL LONGITUIDAL STRAIN: 16.6 %
GLUCOSE SERPL-MCNC: 124 MG/DL (ref 70–110)
HCT VFR BLD AUTO: 23.1 % (ref 40–54)
HGB BLD-MCNC: 7.3 G/DL (ref 14–18)
IMM GRANULOCYTES # BLD AUTO: 0.08 K/UL (ref 0–0.04)
IMM GRANULOCYTES NFR BLD AUTO: 0.9 % (ref 0–0.5)
INTERVENTRICULAR SEPTUM: 1.29 CM (ref 0.6–1.1)
LA MAJOR: 6.18 CM
LA MINOR: 6.7 CM
LA WIDTH: 4.92 CM
LEFT ATRIUM SIZE: 5.01 CM
LEFT ATRIUM VOLUME INDEX MOD: 32.7 ML/M2
LEFT ATRIUM VOLUME INDEX: 54.1 ML/M2
LEFT ATRIUM VOLUME MOD: 81.45 CM3
LEFT ATRIUM VOLUME: 134.71 CM3
LEFT INTERNAL DIMENSION IN SYSTOLE: 4.28 CM (ref 2.1–4)
LEFT VENTRICLE DIASTOLIC VOLUME INDEX: 68.61 ML/M2
LEFT VENTRICLE DIASTOLIC VOLUME: 170.83 ML
LEFT VENTRICLE MASS INDEX: 130 G/M2
LEFT VENTRICLE SYSTOLIC VOLUME INDEX: 33 ML/M2
LEFT VENTRICLE SYSTOLIC VOLUME: 82.19 ML
LEFT VENTRICULAR INTERNAL DIMENSION IN DIASTOLE: 5.86 CM (ref 3.5–6)
LEFT VENTRICULAR MASS: 322.79 G
LV LATERAL E/E' RATIO: 13.78 M/S
LV SEPTAL E/E' RATIO: 13.78 M/S
LYMPHOCYTES # BLD AUTO: 0.7 K/UL (ref 1–4.8)
LYMPHOCYTES NFR BLD: 7.4 % (ref 18–48)
MAGNESIUM SERPL-MCNC: 2.1 MG/DL (ref 1.6–2.6)
MCH RBC QN AUTO: 28.6 PG (ref 27–31)
MCHC RBC AUTO-ENTMCNC: 31.6 G/DL (ref 32–36)
MCV RBC AUTO: 91 FL (ref 82–98)
MONOCYTES # BLD AUTO: 0.9 K/UL (ref 0.3–1)
MONOCYTES NFR BLD: 10.3 % (ref 4–15)
MV PEAK A VEL: 0.69 M/S
MV PEAK E VEL: 1.24 M/S
MV STENOSIS PRESSURE HALF TIME: 42.66 MS
MV VALVE AREA P 1/2 METHOD: 5.16 CM2
NEUTROPHILS # BLD AUTO: 7.1 K/UL (ref 1.8–7.7)
NEUTROPHILS NFR BLD: 81.3 % (ref 38–73)
NRBC BLD-RTO: 0 /100 WBC
PHOSPHATE SERPL-MCNC: 3.8 MG/DL (ref 2.7–4.5)
PLATELET # BLD AUTO: 187 K/UL (ref 150–450)
PMV BLD AUTO: 11.9 FL (ref 9.2–12.9)
POCT GLUCOSE: 118 MG/DL (ref 70–110)
POCT GLUCOSE: 127 MG/DL (ref 70–110)
POCT GLUCOSE: 127 MG/DL (ref 70–110)
POCT GLUCOSE: 154 MG/DL (ref 70–110)
POTASSIUM SERPL-SCNC: 4.1 MMOL/L (ref 3.5–5.1)
PROT SERPL-MCNC: 6.5 G/DL (ref 6–8.4)
RA MAJOR: 6.09 CM
RA PRESSURE ESTIMATED: 3 MMHG
RA WIDTH: 5.04 CM
RBC # BLD AUTO: 2.55 M/UL (ref 4.6–6.2)
RIGHT VENTRICLE DIASTOLIC BASEL DIMENSION: 5.4 CM
SINUS: 3.27 CM
SODIUM SERPL-SCNC: 138 MMOL/L (ref 136–145)
STJ: 3.09 CM
TDI LATERAL: 0.09 M/S
TDI SEPTAL: 0.09 M/S
TDI: 0.09 M/S
TRICUSPID ANNULAR PLANE SYSTOLIC EXCURSION: 3.07 CM
WBC # BLD AUTO: 8.73 K/UL (ref 3.9–12.7)
Z-SCORE OF LEFT VENTRICULAR DIMENSION IN END DIASTOLE: -8.4
Z-SCORE OF LEFT VENTRICULAR DIMENSION IN END SYSTOLE: -5

## 2024-08-27 PROCEDURE — 25000003 PHARM REV CODE 250: Mod: HCNC,NTX

## 2024-08-27 PROCEDURE — 80053 COMPREHEN METABOLIC PANEL: CPT | Mod: HCNC,NTX

## 2024-08-27 PROCEDURE — 36415 COLL VENOUS BLD VENIPUNCTURE: CPT | Mod: HCNC,NTX

## 2024-08-27 PROCEDURE — 63600175 PHARM REV CODE 636 W HCPCS: Mod: HCNC,NTX

## 2024-08-27 PROCEDURE — 85025 COMPLETE CBC W/AUTO DIFF WBC: CPT | Mod: HCNC,NTX

## 2024-08-27 PROCEDURE — 99222 1ST HOSP IP/OBS MODERATE 55: CPT | Mod: HCNC,NTX,, | Performed by: INTERNAL MEDICINE

## 2024-08-27 PROCEDURE — 83735 ASSAY OF MAGNESIUM: CPT | Mod: HCNC,NTX

## 2024-08-27 PROCEDURE — 84100 ASSAY OF PHOSPHORUS: CPT | Mod: HCNC,NTX

## 2024-08-27 PROCEDURE — 21400001 HC TELEMETRY ROOM: Mod: HCNC,NTX

## 2024-08-27 RX ORDER — VALSARTAN 160 MG/1
160 TABLET ORAL DAILY
Status: DISCONTINUED | OUTPATIENT
Start: 2024-08-27 | End: 2024-08-28

## 2024-08-27 RX ORDER — FUROSEMIDE 10 MG/ML
60 INJECTION INTRAMUSCULAR; INTRAVENOUS 2 TIMES DAILY
Status: DISCONTINUED | OUTPATIENT
Start: 2024-08-27 | End: 2024-08-29 | Stop reason: HOSPADM

## 2024-08-27 RX ADMIN — TAMSULOSIN HYDROCHLORIDE 0.4 MG: 0.4 CAPSULE ORAL at 09:08

## 2024-08-27 RX ADMIN — LABETALOL HYDROCHLORIDE 200 MG: 200 TABLET, FILM COATED ORAL at 09:08

## 2024-08-27 RX ADMIN — FERROUS SULFATE TAB EC 325 MG (65 MG FE EQUIVALENT) 1 EACH: 325 (65 FE) TABLET DELAYED RESPONSE at 09:08

## 2024-08-27 RX ADMIN — ATORVASTATIN CALCIUM 20 MG: 20 TABLET, FILM COATED ORAL at 09:08

## 2024-08-27 RX ADMIN — FUROSEMIDE 60 MG: 10 INJECTION, SOLUTION INTRAMUSCULAR; INTRAVENOUS at 09:08

## 2024-08-27 RX ADMIN — HYDRALAZINE HYDROCHLORIDE 50 MG: 50 TABLET ORAL at 06:08

## 2024-08-27 RX ADMIN — FUROSEMIDE 40 MG: 10 INJECTION, SOLUTION INTRAVENOUS at 09:08

## 2024-08-27 RX ADMIN — VALSARTAN 160 MG: 160 TABLET, FILM COATED ORAL at 01:08

## 2024-08-27 RX ADMIN — SODIUM ZIRCONIUM CYCLOSILICATE 10 G: 10 POWDER, FOR SUSPENSION ORAL at 09:08

## 2024-08-27 NOTE — SUBJECTIVE & OBJECTIVE
Past Medical History:   Diagnosis Date    Anemia     Disorder of kidney and ureter     Edema leg     History of rotator cuff tear     History of seasonal allergies     HTN (hypertension)     Hx of colonic polyps     Hyperlipemia     MGUS (monoclonal gammopathy of unknown significance)     NS (nuclear sclerosis), bilateral 06/25/2019    Obesity     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 11/17/2017    Type 2 diabetes mellitus        Past Surgical History:   Procedure Laterality Date    COLONOSCOPY N/A 10/28/2022    Procedure: COLONOSCOPY;  Surgeon: Guanako Sanchez MD;  Location: [facility] ENDO;  Service: Endoscopy;  Laterality: N/A;  REFENDO placed per Dr Sanderson. case request entered     vaccinated-GT  instr portal    MOUTH SURGERY      RENAL BIOPSY Left 7/19/2023    Procedure: BIOPSY, KIDNEY;  Surgeon: Nikky Soriano MD;  Location: [facility] CATH LAB;  Service: Interventional Nephrology;  Laterality: Left;       Review of patient's allergies indicates:  No Known Allergies  Current Facility-Administered Medications   Medication Frequency    atorvastatin tablet 20 mg Daily    dextrose 10% bolus 125 mL 125 mL PRN    ferrous sulfate tablet 1 each Every other day    furosemide injection 40 mg BID    glucagon (human recombinant) injection 1 mg PRN    glucose chewable tablet 16 g PRN    glucose chewable tablet 24 g PRN    heparin (porcine) injection 5,000 Units Q8H    insulin aspart U-100 pen 0-5 Units QID (AC + HS) PRN    labetaloL tablet 200 mg BID    melatonin tablet 6 mg Nightly PRN    naloxone 0.4 mg/mL injection 0.02 mg PRN    sodium chloride 0.9% flush 10 mL Q12H PRN    sodium zirconium cyclosilicate packet 10 g Daily    tamsulosin 24 hr capsule 0.4 mg QHS    valsartan tablet 160 mg Daily     Family History       Problem Relation (Age of Onset)    Cancer Mother, Father    Diabetes Father    Hypertension Father    No Known Problems Sister,  "Brother, Maternal Aunt, Maternal Uncle, Paternal Aunt, Paternal Uncle, Maternal Grandmother, Maternal Grandfather, Paternal Grandmother, Paternal Grandfather          Tobacco Use    Smoking status: Former     Current packs/day: 0.50     Types: Cigarettes    Smokeless tobacco: Never   Substance and Sexual Activity    Alcohol use: Yes     Comment: rarely    Drug use: Not Currently    Sexual activity: Yes     Partners: Female     Review of Systems  Objective:     Vital Signs (Most Recent):  Temp: 97.8 °F (36.6 °C) (08/27/24 1202)  Pulse: 70 (08/27/24 1202)  Resp: 18 (08/27/24 1202)  BP: (!) 151/80 (08/27/24 1202)  SpO2: 100 % (08/27/24 1202) Vital Signs (24h Range):  Temp:  [97.8 °F (36.6 °C)-98.5 °F (36.9 °C)] 97.8 °F (36.6 °C)  Pulse:  [70-88] 70  Resp:  [18-20] 18  SpO2:  [98 %-100 %] 100 %  BP: (136-166)/(60-88) 151/80     Weight: 131.5 kg (290 lb) (08/27/24 0835)  Body mass index is 39.33 kg/m².  Body surface area is 2.58 meters squared.    I/O last 3 completed shifts:  In: -   Out: 400 [Urine:400]     Physical Exam  HENT:      Head: Normocephalic.      Mouth/Throat:      Mouth: Mucous membranes are moist.   Cardiovascular:      Rate and Rhythm: Normal rate.      Pulses: Normal pulses.   Pulmonary:      Effort: Pulmonary effort is normal.   Musculoskeletal:      Cervical back: Normal range of motion.   Skin:     General: Skin is warm.   Neurological:      General: No focal deficit present.      Mental Status: He is alert.   Psychiatric:         Mood and Affect: Mood normal.          Significant Labs:  ABGs: No results for input(s): "PH", "PCO2", "HCO3", "POCSATURATED", "BE" in the last 168 hours.  BMP:   Recent Labs   Lab 08/27/24  0230   *      K 4.1      CO2 19*   BUN 55*   CREATININE 5.8*   CALCIUM 8.4*   MG 2.1     Cardiac Markers: No results for input(s): "CKMB", "TROPONINT", "MYOGLOBIN" in the last 168 hours.  CBC:   Recent Labs   Lab 08/27/24  0230   WBC 8.73   RBC 2.55*   HGB 7.3*   HCT " "23.1*      MCV 91   MCH 28.6   MCHC 31.6*     CMP:   Recent Labs   Lab 08/27/24  0230   *   CALCIUM 8.4*   ALBUMIN 3.1*   PROT 6.5      K 4.1   CO2 19*      BUN 55*   CREATININE 5.8*   ALKPHOS 39*   ALT 14   AST 15   BILITOT 0.5     Coagulation: No results for input(s): "PT", "INR", "APTT" in the last 168 hours.  LFTs:   Recent Labs   Lab 08/27/24  0230   ALT 14   AST 15   ALKPHOS 39*   BILITOT 0.5   PROT 6.5   ALBUMIN 3.1*     Microbiology Results (last 7 days)       ** No results found for the last 168 hours. **          Specimen (24h ago, onward)      None          PTH: No results for input(s): "PTH" in the last 168 hours.  TSH: No results for input(s): "TSH" in the last 168 hours.  No results for input(s): "COLORU", "CLARITYU", "SPECGRAV", "PHUR", "PROTEINUA", "GLUCOSEU", "BILIRUBINCON", "BLOODU", "WBCU", "RBCU", "BACTERIA", "MUCUS", "NITRITE", "LEUKOCYTESUR", "UROBILINOGEN", "HYALINECASTS" in the last 168 hours.        "

## 2024-08-27 NOTE — NURSING
Tele box not working. None available on the floor. Will be waiting for one to come available. Will continue to monitor patient.

## 2024-08-27 NOTE — PROGRESS NOTES
[facility] Medicine  Progress Note    Patient Name: Handy Adams  MRN: [id number]  Patient Class: IP- Inpatient   Admission Date: 8/26/2024  Length of Stay: 0 days  Attending Physician: Micha Weiner MD  Primary Care Provider: Toby Sanderson MD        Subjective:     Principal Problem:Acute hypoxic respiratory failure        HPI:  Mr. Adams is a 70 y/o M with a PMHx of CKD5, HTN, MGUS, cataracts T2DM, diabetic retinopathy and hyperparathyroidism presented to the ED this morning with complaints of shortness of breath onset last night. Pt states that last night he had trouble sleeping due to his SOB and could not find a comfortable position to sleep in. He states that his symptoms worsened this morning (8/26/24) around 0830 and called EMS for assistance. Per EMS, he was placed on 15 L O2 due to reported hypoxia with SpO2 in the 80s. At the time of EMS arrival to the ED the patient was demonstrating increased work of breathing patient and initially given a DuoNeb and Solu-Medrol. He had no change in his breathing after treatment and was subsequently placed on CPAP -- he saturated appropriately during that time. Pt denies CP, abdominal pain, N/Vor any other symptoms.     Of note, pt reports that he has been having progressively worsening swelling in his b/l lower extremities. He also notes that he recently was told he had low Hgb after having labs drawn for an upcoming nephrology appointment -- he also notes medication changes made at that nephrology appointment. Pt reports that he was supposed to have an IV iron infusion today for CKD-associated anemia. Pt states that he has had episodes of shortness of breath since July but says that they have never been this severe. Pt does not wear oxygen at baseline and is compliant with all of his medications. He still makes urine and is not on dialysis currently but states that he was due to have a meeting today with a  regarding starting  home dialysis.     Overview/Hospital Course:  Pt admitted to hospital medicine after acute hypoxic respiratory failure episode. Pt was started on Lasix 40mg IV BID with strict Is and Os. Pt had echocardiogram on 8/27/24 which showed EF of 55-60% and global longitudinal strain of -16.6%. Eccentric hypertrophy noted in the left ventricle with mildly increased wall thickness, severe biatrial enlargement, pressure in IVC and SVC is 3 mmHg, trivial pericardial effusion. Pt will be set up to follow up with outpatient cardiology regarding his diastolic HFpEF. For his YESENIA on CKD and longstanding nephrotic syndrome with proteinuria, nephrology has been consulted, recs pending.     No new subjective & objective note has been filed under this hospital service since the last note was generated.      Assessment/Plan:      * Heart failure with preserved ejection fraction  Echo results obtained on 8/27/24: EF 55-60%, global longitudinal strain is -16.6%. Eccentric hypertrophy noted in the left ventricle with mildly increased wall thickness. Severe biatrial enlargement. Pressure in IVC and SVC is 3 mmHg. Trivial pericardial effusion.     - Increase Lasix to 60mg IV BID.  - Pt's newly diagnosed CHF could be associated with his prior diagnosis of NGUS - heart failure could be associated with cardiac amyloidosis. Recommend patient follow up with cardiology outpatient after discharge.       Nephrotic syndrome  Pt has a longstanding history of nephrotic syndrome.    - Patient may benefit from therapeutic anticoagulation in the setting of his longstanding nephrotic syndrome and associated proteinuria. Pt has unequal leg swelling on exam (R>L).   - Nephrology consulted, recommendations pending.       Acute hypoxic respiratory failure  See HFpEF    YESENIA (acute kidney injury)  Pt's current Cr is 5.4, his baseline might be 4.5-4.8.     Recent Labs     08/26/24  0756 08/27/24  0230   CREATININE 5.4* 5.8*   EGFRNORACEVR 10.8* 9.9*       - YESENIA  is likely cardiorenal in nature, creatinine should improve with diuresis  - Suspect that current episode of acute hypoxic respiratory failure is associated by YESENIA.   - Lasix 60 mg IV BID.  - Avoid nephrotoxins and renally dose meds for GFR listed above  - Monitor urine output, serial BMP, and adjust therapy as needed  - Will continue to trend creatinine    HTN (hypertension)  - Will continue home labetalol 200 mg  - Restart Valsartan  - Discontinue hydralazine 50mg Q8h    Chronic kidney disease (CKD), stage V  Pt's current Cr is 5.4, his baseline might be 4.5-4.8, unclear at this time.     - Continue to trend Cr  - See YESENIA    Type 2 diabetes mellitus with stage 5 chronic kidney disease not on chronic dialysis, without long-term current use of insulin  Pt's most recent A1c was 5.3, he does not use insulin at home.    - LDSSI      Hyperlipemia  - Continue home atorvastatin 20mg       VTE Risk Mitigation (From admission, onward)           Ordered     heparin (porcine) injection 5,000 Units  Every 8 hours         08/26/24 1137     IP VTE HIGH RISK PATIENT  Once         08/26/24 1137     Place sequential compression device  Until discontinued         08/26/24 1137                    Discharge Planning   LILLIAM: 8/29/2024     Code Status: Full Code   Is the patient medically ready for discharge?:     Reason for patient still in hospital (select all that apply): Treatment and Consult recommendations  Discharge Plan A: Home          Moni Graf DO  Department of Hospital Medicine   [facility]

## 2024-08-27 NOTE — ASSESSMENT & PLAN NOTE
Non oliguric YESENIA on CKD5 secondary to uncontrolled Hypertension.  In July scr went up to 9 secondary to over diuresis.  Scr up trending , BNP in 1160

## 2024-08-27 NOTE — CONSULTS
[facility] S[facility]  Nephrology  Consult Note    Patient Name: Handy dAams  MRN: [id number]  Admission Date: 8/26/2024  Hospital Length of Stay: 0 days  Attending Provider: Micha Weiner MD   Primary Care Physician: Toby Sanderson MD  Principal Problem:Heart failure with preserved ejection fraction    Inpatient consult to Nephrology  Consult performed by: Milan Rivero MD  Consult ordered by: Moni Graf DO  Reason for consult: deb on CKD5        Subjective:     HPI: Mr. Adams is a 68 y/o M with a PMHx of CKD5, HTN, MGUS, cataracts T2DM, diabetic retinopathy and hyperparathyroidism presented to the ED this morning with complaints of shortness of breath onset last night. Pt states that last night he had trouble sleeping due to his SOB and could not find a comfortable position to sleep in. He states that his symptoms worsened this morning (8/26/24) around 0830 and called EMS for assistance. Per EMS, he was placed on 15 L O2 due to reported hypoxia with SpO2 in the 80s. At the time of EMS arrival to the ED the patient was demonstrating increased work of breathing patient and initially given a DuoNeb and Solu-Medrol. He had no change in his breathing after treatment and was subsequently placed on CPAP -- he saturated appropriately during that time. Pt denies CP, abdominal pain or any other symptoms.      Of note, pt reports that he has been having progressively worsening swelling in his b/l lower extremities. He also notes that he recently was told he had low Hgb after having labs drawn for an upcoming nephrology appointment -- he also notes medication changes made at that nephrology appointment. Pt reports that he was supposed to have an IV iron infusion today for CKD-associated anemia. Pt states that he has had episodes of shortness of breath since July but says that they have never been this severe. Pt does not wear oxygen at baseline and is compliant with all of his medications. He still makes urine  and is not on dialysis currently but states that he was due to have a meeting today with a  regarding starting home dialysis.     Nephrology consulted For YESENIA on CKD5    Past Medical History:   Diagnosis Date    Anemia     Disorder of kidney and ureter     Edema leg     History of rotator cuff tear     History of seasonal allergies     HTN (hypertension)     Hx of colonic polyps     Hyperlipemia     MGUS (monoclonal gammopathy of unknown significance)     NS (nuclear sclerosis), bilateral 06/25/2019    Obesity     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 11/17/2017    Type 2 diabetes mellitus        Past Surgical History:   Procedure Laterality Date    COLONOSCOPY N/A 10/28/2022    Procedure: COLONOSCOPY;  Surgeon: Guanako Sanchez MD;  Location: [facility] ENDO;  Service: Endoscopy;  Laterality: N/A;  REFENDO placed per Dr Sanderson. case request entered     vaccinated-GT  instr portal    MOUTH SURGERY      RENAL BIOPSY Left 7/19/2023    Procedure: BIOPSY, KIDNEY;  Surgeon: Nikky Soriano MD;  Location: [facility] CATH LAB;  Service: Interventional Nephrology;  Laterality: Left;       Review of patient's allergies indicates:  No Known Allergies  Current Facility-Administered Medications   Medication Frequency    atorvastatin tablet 20 mg Daily    dextrose 10% bolus 125 mL 125 mL PRN    ferrous sulfate tablet 1 each Every other day    furosemide injection 40 mg BID    glucagon (human recombinant) injection 1 mg PRN    glucose chewable tablet 16 g PRN    glucose chewable tablet 24 g PRN    heparin (porcine) injection 5,000 Units Q8H    insulin aspart U-100 pen 0-5 Units QID (AC + HS) PRN    labetaloL tablet 200 mg BID    melatonin tablet 6 mg Nightly PRN    naloxone 0.4 mg/mL injection 0.02 mg PRN    sodium chloride 0.9% flush 10 mL Q12H PRN    sodium zirconium cyclosilicate packet 10 g Daily    tamsulosin 24 hr capsule 0.4 mg QHS     "valsartan tablet 160 mg Daily     Family History       Problem Relation (Age of Onset)    Cancer Mother, Father    Diabetes Father    Hypertension Father    No Known Problems Sister, Brother, Maternal Aunt, Maternal Uncle, Paternal Aunt, Paternal Uncle, Maternal Grandmother, Maternal Grandfather, Paternal Grandmother, Paternal Grandfather          Tobacco Use    Smoking status: Former     Current packs/day: 0.50     Types: Cigarettes    Smokeless tobacco: Never   Substance and Sexual Activity    Alcohol use: Yes     Comment: rarely    Drug use: Not Currently    Sexual activity: Yes     Partners: Female     Review of Systems  Objective:     Vital Signs (Most Recent):  Temp: 97.8 °F (36.6 °C) (08/27/24 1202)  Pulse: 70 (08/27/24 1202)  Resp: 18 (08/27/24 1202)  BP: (!) 151/80 (08/27/24 1202)  SpO2: 100 % (08/27/24 1202) Vital Signs (24h Range):  Temp:  [97.8 °F (36.6 °C)-98.5 °F (36.9 °C)] 97.8 °F (36.6 °C)  Pulse:  [70-88] 70  Resp:  [18-20] 18  SpO2:  [98 %-100 %] 100 %  BP: (136-166)/(60-88) 151/80     Weight: 131.5 kg (290 lb) (08/27/24 0835)  Body mass index is 39.33 kg/m².  Body surface area is 2.58 meters squared.    I/O last 3 completed shifts:  In: -   Out: 400 [Urine:400]     Physical Exam  HENT:      Head: Normocephalic.      Mouth/Throat:      Mouth: Mucous membranes are moist.   Cardiovascular:      Rate and Rhythm: Normal rate.      Pulses: Normal pulses.   Pulmonary:      Effort: Pulmonary effort is normal.   Musculoskeletal:      Cervical back: Normal range of motion.   Skin:     General: Skin is warm.   Neurological:      General: No focal deficit present.      Mental Status: He is alert.   Psychiatric:         Mood and Affect: Mood normal.          Significant Labs:  ABGs: No results for input(s): "PH", "PCO2", "HCO3", "POCSATURATED", "BE" in the last 168 hours.  BMP:   Recent Labs   Lab 08/27/24  0230   *      K 4.1      CO2 19*   BUN 55*   CREATININE 5.8*   CALCIUM 8.4*   MG 2.1 " "    Cardiac Markers: No results for input(s): "CKMB", "TROPONINT", "MYOGLOBIN" in the last 168 hours.  CBC:   Recent Labs   Lab 08/27/24  0230   WBC 8.73   RBC 2.55*   HGB 7.3*   HCT 23.1*      MCV 91   MCH 28.6   MCHC 31.6*     CMP:   Recent Labs   Lab 08/27/24  0230   *   CALCIUM 8.4*   ALBUMIN 3.1*   PROT 6.5      K 4.1   CO2 19*      BUN 55*   CREATININE 5.8*   ALKPHOS 39*   ALT 14   AST 15   BILITOT 0.5     Coagulation: No results for input(s): "PT", "INR", "APTT" in the last 168 hours.  LFTs:   Recent Labs   Lab 08/27/24 0230   ALT 14   AST 15   ALKPHOS 39*   BILITOT 0.5   PROT 6.5   ALBUMIN 3.1*     Microbiology Results (last 7 days)       ** No results found for the last 168 hours. **          Specimen (24h ago, onward)      None          PTH: No results for input(s): "PTH" in the last 168 hours.  TSH: No results for input(s): "TSH" in the last 168 hours.  No results for input(s): "COLORU", "CLARITYU", "SPECGRAV", "PHUR", "PROTEINUA", "GLUCOSEU", "BILIRUBINCON", "BLOODU", "WBCU", "RBCU", "BACTERIA", "MUCUS", "NITRITE", "LEUKOCYTESUR", "UROBILINOGEN", "HYALINECASTS" in the last 168 hours.        Assessment/Plan:     Pulmonary  Shortness of breath  Per primary    Renal/  YESENIA (acute kidney injury)  Non oliguric YESENIA on CKD5 secondary to uncontrolled Hypertension.  In July scr went up to 9 secondary to over diuresis.?   to start home peritoneal dialysis in the near future     Scr up trending , BNP in 1160  Scr trend 4.8--5.4--5.8    On room air , bilateral LE edema he believes worse then before  X-ray look mild pulmonary infiltrate    Plan  Continue lasix 40 mg IV BID  Renal panel daily  No emergent dialysis indication        Chronic kidney disease (CKD), stage V  See YESENIA              Thank you for your consult. I will follow-up with patient. Please contact us if you have any additional questions.    Milan Rivero MD  Nephrology  [facility]  "

## 2024-08-27 NOTE — PLAN OF CARE
Inpatient Upgrade Note    Handy Adams has warranted treatment spanning two or more midnights of hospital level care for the management of heart failure and CHF and CKD on IV Lasix. 20lbs over dry weight. UPgrade as remains on IV Lasix . He continues to require IV diuresis, daily labs, monitoring of vital signs, medication adjustments, and further evaluation by consultants. His condition is also complicated by the following comorbidities: Diabetes and Chronic kidney disease.

## 2024-08-27 NOTE — HOSPITAL COURSE
Pt admitted to hospital medicine after acute hypoxic respiratory failure episode. Pt was started on Lasix 40mg IV BID, increased to 60mg IV bid for augmented diuresis. Pt had TTE on 8/27/24 which showed EF of 55-60% with G2DD and global longitudinal strain of -16.6% thereby establishing new diagnosis of HFpEF.     Pt will be set up to follow up with outpatient cardiology regarding his diastolic HFpEF. For his YESENIA on CKD and longstanding nephrotic syndrome with proteinuria, nephrology has been consulted who recommended home Torsemide to be increased to 40mg bid until achieving dry weight of 275lb then switching to 40mg qd. Pt was also discharged home on nifedipine and valsartan and recommended to follow up outpatient for blood pressure control. His pioglitazone was also discontinued due to risk of fluid retention.

## 2024-08-27 NOTE — HPI
Mr. Adams is a 68 y/o M with a PMHx of CKD5, HTN, MGUS, cataracts T2DM, diabetic retinopathy and hyperparathyroidism presented to the ED this morning with complaints of shortness of breath onset last night. Pt states that last night he had trouble sleeping due to his SOB and could not find a comfortable position to sleep in. He states that his symptoms worsened this morning (8/26/24) around 0830 and called EMS for assistance. Per EMS, he was placed on 15 L O2 due to reported hypoxia with SpO2 in the 80s. At the time of EMS arrival to the ED the patient was demonstrating increased work of breathing patient and initially given a DuoNeb and Solu-Medrol. He had no change in his breathing after treatment and was subsequently placed on CPAP -- he saturated appropriately during that time. Pt denies CP, abdominal pain or any other symptoms.      Of note, pt reports that he has been having progressively worsening swelling in his b/l lower extremities. He also notes that he recently was told he had low Hgb after having labs drawn for an upcoming nephrology appointment -- he also notes medication changes made at that nephrology appointment. Pt reports that he was supposed to have an IV iron infusion today for CKD-associated anemia. Pt states that he has had episodes of shortness of breath since July but says that they have never been this severe. Pt does not wear oxygen at baseline and is compliant with all of his medications. He still makes urine and is not on dialysis currently but states that he was due to have a meeting today with a  regarding starting home dialysis.     Nephrology consulted For YESENIA on CKD5

## 2024-08-27 NOTE — PLAN OF CARE
Problem: Adult Inpatient Plan of Care  Goal: Plan of Care Review  8/27/2024 1716 by Valeri Pena RN  Outcome: Progressing  8/27/2024 1703 by Valeri Pena RN  Outcome: Progressing  Goal: Patient-Specific Goal (Individualized)  8/27/2024 1716 by Valeri Pena RN  Outcome: Progressing  8/27/2024 1703 by Valeri Pena RN  Outcome: Progressing  Goal: Absence of Hospital-Acquired Illness or Injury  8/27/2024 1716 by Valeri Pena RN  Outcome: Progressing  8/27/2024 1703 by Valeri Pena RN  Outcome: Progressing  Goal: Optimal Comfort and Wellbeing  8/27/2024 1716 by Valeri Pena RN  Outcome: Progressing  8/27/2024 1703 by Valeri Pena RN  Outcome: Progressing  Goal: Readiness for Transition of Care  8/27/2024 1716 by Valeri Pena RN  Outcome: Progressing  8/27/2024 1703 by Valeri Pena RN  Outcome: Progressing     Problem: Diabetes Comorbidity  Goal: Blood Glucose Level Within Targeted Range  8/27/2024 1716 by Valeri Pena RN  Outcome: Progressing  8/27/2024 1703 by Valeri Pena RN  Outcome: Progressing     Problem: Acute Kidney Injury/Impairment  Goal: Fluid and Electrolyte Balance  8/27/2024 1716 by Valeri Pena RN  Outcome: Progressing  8/27/2024 1703 by Valeri Pena RN  Outcome: Progressing  Goal: Improved Oral Intake  8/27/2024 1716 by Valeri Pena RN  Outcome: Progressing  8/27/2024 1703 by Valeri Pena RN  Outcome: Progressing  Goal: Effective Renal Function  8/27/2024 1716 by Valeri Pena RN  Outcome: Progressing  8/27/2024 1703 by Valeri Pena RN  Outcome: Progressing

## 2024-08-27 NOTE — ASSESSMENT & PLAN NOTE
Echo results obtained on 8/27/24: EF 55-60%, global longitudinal strain is -16.6%. Eccentric hypertrophy noted in the left ventricle with mildly increased wall thickness. Severe biatrial enlargement. Pressure in IVC and SVC is 3 mmHg. Trivial pericardial effusion.     - Increase Lasix to 60mg IV BID.  - Pt's newly diagnosed CHF could be associated with his prior diagnosis of NGUS - heart failure could be associated with cardiac amyloidosis. Recommend patient follow up with cardiology outpatient after discharge.

## 2024-08-27 NOTE — PLAN OF CARE
APPOINTMENT:    Patient Appointment(s) scheduled with   Ena Snider Thursday Aug 29, 2024 10:00 AM    Provider requires schedule review by Office Nurse - Office to call patient with date and time.

## 2024-08-27 NOTE — ASSESSMENT & PLAN NOTE
Pt has a longstanding history of nephrotic syndrome.    - Patient may benefit from therapeutic anticoagulation in the setting of his longstanding nephrotic syndrome and associated proteinuria. Pt has unequal leg swelling on exam (R>L).   - Nephrology consulted, recommendations pending.

## 2024-08-28 ENCOUNTER — TELEPHONE (OUTPATIENT)
Dept: TRANSPLANT | Facility: CLINIC | Age: 69
End: 2024-08-28
Payer: MEDICARE

## 2024-08-28 PROBLEM — R60.0 LOWER EXTREMITY EDEMA: Status: ACTIVE | Noted: 2024-08-28

## 2024-08-28 PROBLEM — E11.22 TYPE 2 DIABETES MELLITUS WITH STAGE 5 CHRONIC KIDNEY DISEASE NOT ON CHRONIC DIALYSIS, WITH LONG-TERM CURRENT USE OF INSULIN: Status: ACTIVE | Noted: 2019-06-25

## 2024-08-28 PROBLEM — N18.5 TYPE 2 DIABETES MELLITUS WITH STAGE 5 CHRONIC KIDNEY DISEASE NOT ON CHRONIC DIALYSIS, WITH LONG-TERM CURRENT USE OF INSULIN: Status: ACTIVE | Noted: 2019-06-25

## 2024-08-28 PROBLEM — I50.32 CHRONIC DIASTOLIC CONGESTIVE HEART FAILURE: Status: ACTIVE | Noted: 2024-08-28

## 2024-08-28 LAB
ALBUMIN SERPL BCP-MCNC: 3.2 G/DL (ref 3.5–5.2)
ALP SERPL-CCNC: 46 U/L (ref 55–135)
ALT SERPL W/O P-5'-P-CCNC: 13 U/L (ref 10–44)
ANION GAP SERPL CALC-SCNC: 12 MMOL/L (ref 8–16)
AST SERPL-CCNC: 14 U/L (ref 10–40)
BASOPHILS # BLD AUTO: 0.04 K/UL (ref 0–0.2)
BASOPHILS NFR BLD: 0.5 % (ref 0–1.9)
BILIRUB SERPL-MCNC: 0.5 MG/DL (ref 0.1–1)
BUN SERPL-MCNC: 59 MG/DL (ref 8–23)
CALCIUM SERPL-MCNC: 8.1 MG/DL (ref 8.7–10.5)
CHLORIDE SERPL-SCNC: 105 MMOL/L (ref 95–110)
CO2 SERPL-SCNC: 22 MMOL/L (ref 23–29)
CREAT SERPL-MCNC: 5.6 MG/DL (ref 0.5–1.4)
DIFFERENTIAL METHOD BLD: ABNORMAL
EOSINOPHIL # BLD AUTO: 0.1 K/UL (ref 0–0.5)
EOSINOPHIL NFR BLD: 0.6 % (ref 0–8)
ERYTHROCYTE [DISTWIDTH] IN BLOOD BY AUTOMATED COUNT: 16.5 % (ref 11.5–14.5)
EST. GFR  (NO RACE VARIABLE): 10.3 ML/MIN/1.73 M^2
GLUCOSE SERPL-MCNC: 102 MG/DL (ref 70–110)
HCT VFR BLD AUTO: 23.4 % (ref 40–54)
HGB BLD-MCNC: 7.4 G/DL (ref 14–18)
IMM GRANULOCYTES # BLD AUTO: 0.11 K/UL (ref 0–0.04)
IMM GRANULOCYTES NFR BLD AUTO: 1.3 % (ref 0–0.5)
LYMPHOCYTES # BLD AUTO: 1.7 K/UL (ref 1–4.8)
LYMPHOCYTES NFR BLD: 18.9 % (ref 18–48)
MAGNESIUM SERPL-MCNC: 2.3 MG/DL (ref 1.6–2.6)
MCH RBC QN AUTO: 29.2 PG (ref 27–31)
MCHC RBC AUTO-ENTMCNC: 31.6 G/DL (ref 32–36)
MCV RBC AUTO: 93 FL (ref 82–98)
MONOCYTES # BLD AUTO: 0.8 K/UL (ref 0.3–1)
MONOCYTES NFR BLD: 8.6 % (ref 4–15)
NEUTROPHILS # BLD AUTO: 6.2 K/UL (ref 1.8–7.7)
NEUTROPHILS NFR BLD: 70.1 % (ref 38–73)
NRBC BLD-RTO: 0 /100 WBC
PHOSPHATE SERPL-MCNC: 5 MG/DL (ref 2.7–4.5)
PLATELET # BLD AUTO: 226 K/UL (ref 150–450)
PMV BLD AUTO: 12.5 FL (ref 9.2–12.9)
POCT GLUCOSE: 109 MG/DL (ref 70–110)
POCT GLUCOSE: 129 MG/DL (ref 70–110)
POCT GLUCOSE: 172 MG/DL (ref 70–110)
POCT GLUCOSE: 95 MG/DL (ref 70–110)
POTASSIUM SERPL-SCNC: 3.8 MMOL/L (ref 3.5–5.1)
PROT SERPL-MCNC: 6.8 G/DL (ref 6–8.4)
RBC # BLD AUTO: 2.53 M/UL (ref 4.6–6.2)
SODIUM SERPL-SCNC: 139 MMOL/L (ref 136–145)
WBC # BLD AUTO: 8.79 K/UL (ref 3.9–12.7)

## 2024-08-28 PROCEDURE — 84100 ASSAY OF PHOSPHORUS: CPT | Mod: HCNC,NTX

## 2024-08-28 PROCEDURE — 25000003 PHARM REV CODE 250: Mod: HCNC,NTX

## 2024-08-28 PROCEDURE — 21400001 HC TELEMETRY ROOM: Mod: HCNC,NTX

## 2024-08-28 PROCEDURE — 83735 ASSAY OF MAGNESIUM: CPT | Mod: HCNC,NTX

## 2024-08-28 PROCEDURE — 25000003 PHARM REV CODE 250: Mod: HCNC,NTX | Performed by: STUDENT IN AN ORGANIZED HEALTH CARE EDUCATION/TRAINING PROGRAM

## 2024-08-28 PROCEDURE — 85025 COMPLETE CBC W/AUTO DIFF WBC: CPT | Mod: HCNC,NTX

## 2024-08-28 PROCEDURE — 63600175 PHARM REV CODE 636 W HCPCS: Mod: HCNC,NTX

## 2024-08-28 PROCEDURE — 80053 COMPREHEN METABOLIC PANEL: CPT | Mod: HCNC,NTX

## 2024-08-28 PROCEDURE — 99233 SBSQ HOSP IP/OBS HIGH 50: CPT | Mod: HCNC,NTX,, | Performed by: HOSPITALIST

## 2024-08-28 PROCEDURE — 36415 COLL VENOUS BLD VENIPUNCTURE: CPT | Mod: HCNC,NTX

## 2024-08-28 RX ORDER — NIFEDIPINE 60 MG/1
60 TABLET, EXTENDED RELEASE ORAL DAILY
Status: DISCONTINUED | OUTPATIENT
Start: 2024-08-28 | End: 2024-08-28

## 2024-08-28 RX ORDER — VALSARTAN 160 MG/1
160 TABLET ORAL ONCE
Status: COMPLETED | OUTPATIENT
Start: 2024-08-28 | End: 2024-08-28

## 2024-08-28 RX ORDER — VALSARTAN 320 MG/1
320 TABLET ORAL DAILY
Qty: 90 TABLET | Refills: 3 | Status: SHIPPED | OUTPATIENT
Start: 2024-08-29 | End: 2025-08-29

## 2024-08-28 RX ORDER — SEVELAMER CARBONATE FOR ORAL SUSPENSION 800 MG/1
0.8 POWDER, FOR SUSPENSION ORAL
Qty: 90 PACKET | Refills: 11 | Status: SHIPPED | OUTPATIENT
Start: 2024-08-28 | End: 2024-08-29 | Stop reason: HOSPADM

## 2024-08-28 RX ORDER — TORSEMIDE 20 MG/1
40 TABLET ORAL 2 TIMES DAILY
Qty: 120 TABLET | Refills: 0 | Status: SHIPPED | OUTPATIENT
Start: 2024-08-28 | End: 2025-08-28

## 2024-08-28 RX ORDER — POTASSIUM CHLORIDE 20 MEQ/1
40 TABLET, EXTENDED RELEASE ORAL ONCE
Status: COMPLETED | OUTPATIENT
Start: 2024-08-28 | End: 2024-08-28

## 2024-08-28 RX ORDER — VALSARTAN 160 MG/1
320 TABLET ORAL DAILY
Status: DISCONTINUED | OUTPATIENT
Start: 2024-08-29 | End: 2024-08-29 | Stop reason: HOSPADM

## 2024-08-28 RX ORDER — CLONIDINE HYDROCHLORIDE 0.2 MG/1
0.2 TABLET ORAL EVERY 8 HOURS PRN
Status: DISCONTINUED | OUTPATIENT
Start: 2024-08-28 | End: 2024-08-29 | Stop reason: HOSPADM

## 2024-08-28 RX ADMIN — CLONIDINE HYDROCHLORIDE 0.2 MG: 0.2 TABLET ORAL at 06:08

## 2024-08-28 RX ADMIN — VALSARTAN 160 MG: 160 TABLET, FILM COATED ORAL at 08:08

## 2024-08-28 RX ADMIN — POTASSIUM CHLORIDE 40 MEQ: 1500 TABLET, EXTENDED RELEASE ORAL at 08:08

## 2024-08-28 RX ADMIN — FUROSEMIDE 60 MG: 10 INJECTION, SOLUTION INTRAMUSCULAR; INTRAVENOUS at 08:08

## 2024-08-28 RX ADMIN — VALSARTAN 160 MG: 160 TABLET, FILM COATED ORAL at 04:08

## 2024-08-28 RX ADMIN — ATORVASTATIN CALCIUM 20 MG: 20 TABLET, FILM COATED ORAL at 08:08

## 2024-08-28 RX ADMIN — TAMSULOSIN HYDROCHLORIDE 0.4 MG: 0.4 CAPSULE ORAL at 08:08

## 2024-08-28 RX ADMIN — LABETALOL HYDROCHLORIDE 200 MG: 200 TABLET, FILM COATED ORAL at 08:08

## 2024-08-28 RX ADMIN — NIFEDIPINE 60 MG: 60 TABLET, FILM COATED, EXTENDED RELEASE ORAL at 07:08

## 2024-08-28 NOTE — ASSESSMENT & PLAN NOTE
Anemia is likely due to chronic disease due to Chronic Kidney Disease. Most recent hemoglobin and hematocrit are listed below.  Recent Labs     08/26/24  0756 08/27/24  0230 08/28/24  0344   HGB 7.4* 7.3* 7.4*   HCT 23.8* 23.1* 23.4*     Plan  - Monitor serial CBC: Daily  - Transfuse PRBC if patient becomes hemodynamically unstable, symptomatic or H/H drops below 7/21.  - Patient has not received any PRBC transfusions to date  - Patient's anemia is currently stable  - EPO as OP

## 2024-08-28 NOTE — ASSESSMENT & PLAN NOTE
Pt has a longstanding history of nephrotic syndrome.    - Patient may benefit from therapeutic anticoagulation in the setting of his longstanding nephrotic syndrome and associated proteinuria. Pt has unequal leg swelling on exam (R>L). DVT (-).    - Nephrology consulted - torsemide 40mg bid until dry weight 275lb, then switch to 40mg qd

## 2024-08-28 NOTE — ASSESSMENT & PLAN NOTE
- Will continue home labetalol 200 mg  - Restart Valsartan at 320mg  - Nifedipine 90  - Clonidine 0.2mg prn for sbp >170

## 2024-08-28 NOTE — PROGRESS NOTES
[facility] [place] [facility]  Progress Note    Patient Name: Handy Adams  MRN: [id number]  Patient Class: IP- Inpatient   Admission Date: 8/26/2024  Length of Stay: 1 days  Attending Physician: Micha Weiner MD  Primary Care Provider: Toby Sanderson MD        Subjective:     Principal Problem:Heart failure with preserved ejection fraction        HPI:  Mr. Adams is a 70 y/o M with a PMHx of CKD5, HTN, MGUS, cataracts T2DM, diabetic retinopathy and hyperparathyroidism presented to the ED this morning with complaints of shortness of breath onset last night. Pt states that last night he had trouble sleeping due to his SOB and could not find a comfortable position to sleep in. He states that his symptoms worsened this morning (8/26/24) around 0830 and called EMS for assistance. Per EMS, he was placed on 15 L O2 due to reported hypoxia with SpO2 in the 80s. At the time of EMS arrival to the ED the patient was demonstrating increased work of breathing patient and initially given a DuoNeb and Solu-Medrol. He had no change in his breathing after treatment and was subsequently placed on CPAP -- he saturated appropriately during that time. Pt denies CP, abdominal pain, N/Vor any other symptoms.     Of note, pt reports that he has been having progressively worsening swelling in his b/l lower extremities. He also notes that he recently was told he had low Hgb after having labs drawn for an upcoming nephrology appointment -- he also notes medication changes made at that nephrology appointment. Pt reports that he was supposed to have an IV iron infusion today for CKD-associated anemia. Pt states that he has had episodes of shortness of breath since July but says that they have never been this severe. Pt does not wear oxygen at baseline and is compliant with all of his medications. He still makes urine and is not on dialysis currently but states that he was due to have a meeting today with a   regarding starting home dialysis.     Overview/Hospital Course:  Pt admitted to hospital medicine after acute hypoxic respiratory failure episode. Pt was started on Lasix 40mg IV BID, increased to 60mg IV bid for augmented diuresis. Pt had TTE on 8/27/24 which showed EF of 55-60% with [id number] global longitudinal strain of -16.6% thereby establishing new diagnosis of HFpEF.     Pt will be set up to follow up with outpatient cardiology regarding his diastolic HFpEF. For his YESENIA on CKD and longstanding nephrotic syndrome with proteinuria, nephrology has been consulted who recommended home Torsemide to be increased to 40mg bid until achieving dry weight of 275lb then switching to 40mg qd.     Interval History: Hypertensive, otherwise VSS. No output charted, states he has urinated large amt approx 4 times yesterday/overnight. Asked to use urinal for recording output. Swelling improved. SOB improved. DVT (-)    Review of Systems   Constitutional: Negative.    HENT: Negative.     Eyes: Negative.    Respiratory:  Positive for shortness of breath.    Cardiovascular:  Positive for leg swelling. Negative for chest pain.   Gastrointestinal: Negative.    Endocrine: Negative.    Genitourinary: Negative.    Musculoskeletal: Negative.    Allergic/Immunologic: Negative.    Neurological: Negative.    Hematological: Negative.    Psychiatric/Behavioral: Negative.       Objective:     Vital Signs (Most Recent):  Temp: 97.9 °F (36.6 °C) (08/28/24 1153)  Pulse: 98 (08/28/24 1452)  Resp: 18 (08/28/24 1153)  BP: (!) 162/86 (08/28/24 1153)  SpO2: 99 % (08/28/24 1153) Vital Signs (24h Range):  Temp:  [97.7 °F (36.5 °C)-98.5 °F (36.9 °C)] 97.9 °F (36.6 °C)  Pulse:  [69-98] 98  Resp:  [14-19] 18  SpO2:  [93 %-99 %] 99 %  BP: (154-189)/(64-95) 162/86     Weight: 131.5 kg (290 lb)  Body mass index is 39.33 kg/m².  No intake or output data in the 24 hours ending 08/28/24 1550      Physical Exam  Constitutional:       Appearance: Normal appearance.    HENT:      Head: Normocephalic and atraumatic.      Right Ear: External ear normal.      Left Ear: External ear normal.      Nose: Nose normal.   Eyes:      General:         Right eye: No discharge.         Left eye: No discharge.   Neck:      Comments: No JVD noted  Cardiovascular:      Rate and Rhythm: Normal rate and regular rhythm.      Heart sounds: Normal heart sounds.   Pulmonary:      Effort: Pulmonary effort is normal.      Breath sounds: Normal breath sounds.   Abdominal:      General: Abdomen is flat. Bowel sounds are normal. There is no distension.      Palpations: Abdomen is soft. There is no mass.      Tenderness: There is no abdominal tenderness. There is no guarding or rebound.      Hernia: No hernia is present.   Musculoskeletal:      Right lower leg: Edema present.      Left lower leg: Edema present.      Comments: 1+ pitting edema noted bilaterally (R>L)   Skin:     General: Skin is warm and dry.      Capillary Refill: Capillary refill takes less than 2 seconds.   Neurological:      Mental Status: He is alert and oriented to person, place, and time.   Psychiatric:         Mood and Affect: Mood normal.         Behavior: Behavior normal.             Significant Labs: All pertinent labs within the past 24 hours have been reviewed.  CBC:   Recent Labs   Lab 08/27/24  0230 08/28/24  0344   WBC 8.73 8.79   HGB 7.3* 7.4*   HCT 23.1* 23.4*    226     CMP:   Recent Labs   Lab 08/27/24  0230 08/28/24  0344    139   K 4.1 3.8    105   CO2 19* 22*   * 102   BUN 55* 59*   CREATININE 5.8* 5.6*   CALCIUM 8.4* 8.1*   PROT 6.5 6.8   ALBUMIN 3.1* 3.2*   BILITOT 0.5 0.5   ALKPHOS 39* 46*   AST 15 14   ALT 14 13   ANIONGAP 12 12       Significant Imaging: I have reviewed all pertinent imaging results/findings within the past 24 hours.    Assessment/Plan:      * Heart failure with preserved ejection fraction  Echo results obtained on 8/27/24: EF 55-60%, global longitudinal strain is -16.6%.  Eccentric hypertrophy noted in the left ventricle with mildly increased wall thickness. Severe biatrial enlargement. Pressure in IVC and SVC is 3 mmHg. Trivial pericardial effusion.     - Increase Lasix to 60mg IV BID.  - Pt's newly diagnosed CHF could be associated with his prior diagnosis of NGUS - heart failure could be associated with cardiac amyloidosis. Recommend patient follow up with cardiology outpatient after discharge.       Nephrotic syndrome  Pt has a longstanding history of nephrotic syndrome.    - Patient may benefit from therapeutic anticoagulation in the setting of his longstanding nephrotic syndrome and associated proteinuria. Pt has unequal leg swelling on exam (R>L). DVT (-).    - Nephrology consulted - torsemide 40mg bid until dry weight 275lb, then switch to 40mg qd      Acute hypoxic respiratory failure  See HFpEF    YESENIA (acute kidney injury)  Pt's current Cr is 5.4, his baseline might be 4.5-4.8.     Recent Labs     08/26/24 0756 08/27/24  0230   CREATININE 5.4* 5.8*   EGFRNORACEVR 10.8* 9.9*       - YESENIA is likely cardiorenal in nature, creatinine should improve with diuresis  - Suspect that current episode of acute hypoxic respiratory failure is associated by YESENIA.   - Lasix 60 mg IV BID.  - Avoid nephrotoxins and renally dose meds for GFR listed above  - Monitor urine output, serial BMP, and adjust therapy as needed  - Will continue to trend creatinine    HTN (hypertension)  - Will continue home labetalol 200 mg  - Restart Valsartan at 320mg  - Nifedipine 90  - Clonidine 0.2mg prn for sbp >170    Chronic kidney disease (CKD), stage V  Pt's current Cr is 5.4, his baseline might be 4.5-4.8, unclear at this time.     - Continue to trend Cr  - See YESENIA    Anemia in stage 5 chronic kidney disease, not on chronic dialysis  Anemia is likely due to chronic disease due to Chronic Kidney Disease. Most recent hemoglobin and hematocrit are listed below.  Recent Labs     08/26/24  0756 08/27/24  0230  08/28/24  0344   HGB 7.4* 7.3* 7.4*   HCT 23.8* 23.1* 23.4*     Plan  - Monitor serial CBC: Daily  - Transfuse PRBC if patient becomes hemodynamically unstable, symptomatic or H/H drops below 7/21.  - Patient has not received any PRBC transfusions to date  - Patient's anemia is currently stable  - EPO as OP    Type 2 diabetes mellitus with stage 5 chronic kidney disease not on chronic dialysis, without long-term current use of insulin  Pt's most recent A1c was 5.3, he does not use insulin at home.    - LDSSI      Hyperlipemia  - Continue home atorvastatin 20mg       VTE Risk Mitigation (From admission, onward)           Ordered     heparin (porcine) injection 5,000 Units  Every 8 hours         08/26/24 1137     IP VTE HIGH RISK PATIENT  Once         08/26/24 1137     Place sequential compression device  Until discontinued         08/26/24 1137                    Discharge Planning   LILLIAM: 8/29/2024     Code Status: Full Code   Is the patient medically ready for discharge?:     Reason for patient still in hospital (select all that apply): Patient trending condition  Discharge Plan A: Home                  Meron Mehta MD  Department of Hospital Medicine   [facility]

## 2024-08-28 NOTE — NURSING
0605: Contacted Dr. Weiner for BP of 189/95 recheck /84 HR 86 pt has no PRN BP meds. Pt is asymptomatic. Waiting for response.

## 2024-08-28 NOTE — SUBJECTIVE & OBJECTIVE
Interval History: Hypertensive, otherwise VSS. No output charted, states he has urinated large amt approx 4 times yesterday/overnight. Asked to use urinal for recording output. Swelling improved. SOB improved. DVT (-)    Review of Systems   Constitutional: Negative.    HENT: Negative.     Eyes: Negative.    Respiratory:  Positive for shortness of breath.    Cardiovascular:  Positive for leg swelling. Negative for chest pain.   Gastrointestinal: Negative.    Endocrine: Negative.    Genitourinary: Negative.    Musculoskeletal: Negative.    Allergic/Immunologic: Negative.    Neurological: Negative.    Hematological: Negative.    Psychiatric/Behavioral: Negative.       Objective:     Vital Signs (Most Recent):  Temp: 97.9 °F (36.6 °C) (08/28/24 1153)  Pulse: 98 (08/28/24 1452)  Resp: 18 (08/28/24 1153)  BP: (!) 162/86 (08/28/24 1153)  SpO2: 99 % (08/28/24 1153) Vital Signs (24h Range):  Temp:  [97.7 °F (36.5 °C)-98.5 °F (36.9 °C)] 97.9 °F (36.6 °C)  Pulse:  [69-98] 98  Resp:  [14-19] 18  SpO2:  [93 %-99 %] 99 %  BP: (154-189)/(64-95) 162/86     Weight: 131.5 kg (290 lb)  Body mass index is 39.33 kg/m².  No intake or output data in the 24 hours ending 08/28/24 1550      Physical Exam  Constitutional:       Appearance: Normal appearance.   HENT:      Head: Normocephalic and atraumatic.      Right Ear: External ear normal.      Left Ear: External ear normal.      Nose: Nose normal.   Eyes:      General:         Right eye: No discharge.         Left eye: No discharge.   Neck:      Comments: No JVD noted  Cardiovascular:      Rate and Rhythm: Normal rate and regular rhythm.      Heart sounds: Normal heart sounds.   Pulmonary:      Effort: Pulmonary effort is normal.      Breath sounds: Normal breath sounds.   Abdominal:      General: Abdomen is flat. Bowel sounds are normal. There is no distension.      Palpations: Abdomen is soft. There is no mass.      Tenderness: There is no abdominal tenderness. There is no guarding or  rebound.      Hernia: No hernia is present.   Musculoskeletal:      Right lower leg: Edema present.      Left lower leg: Edema present.      Comments: 1+ pitting edema noted bilaterally (R>L)   Skin:     General: Skin is warm and dry.      Capillary Refill: Capillary refill takes less than 2 seconds.   Neurological:      Mental Status: He is alert and oriented to person, place, and time.   Psychiatric:         Mood and Affect: Mood normal.         Behavior: Behavior normal.             Significant Labs: All pertinent labs within the past 24 hours have been reviewed.  CBC:   Recent Labs   Lab 08/27/24 0230 08/28/24  0344   WBC 8.73 8.79   HGB 7.3* 7.4*   HCT 23.1* 23.4*    226     CMP:   Recent Labs   Lab 08/27/24 0230 08/28/24  0344    139   K 4.1 3.8    105   CO2 19* 22*   * 102   BUN 55* 59*   CREATININE 5.8* 5.6*   CALCIUM 8.4* 8.1*   PROT 6.5 6.8   ALBUMIN 3.1* 3.2*   BILITOT 0.5 0.5   ALKPHOS 39* 46*   AST 15 14   ALT 14 13   ANIONGAP 12 12       Significant Imaging: I have reviewed all pertinent imaging results/findings within the past 24 hours.

## 2024-08-28 NOTE — ASSESSMENT & PLAN NOTE
Non oliguric YESENIA on CKD5 secondary to uncontrolled Hypertension.  In July scr went up to 9 secondary to over diuresis.?   to start home peritoneal dialysis in the near future      Scr up trending , BNP in 1160  Scr trend 4.8--5.4--5.8     On room air , bilateral LE edema he believes better then before  X-ray look mild pulmonary infiltrate    Plan   - while in-patient can continue 40 mg IV lasix BID  - recommend discharge dose of Torsemide 40 mg BID, decrease dose to 40 mg torsemide daily once his weight down to 275 pounds.  -Close out patient follow up with karime.  - WENDY dominguez aware of outpatient PD clinic follow up.  - Renal panel daily  -No emergent dialysis indication

## 2024-08-28 NOTE — SUBJECTIVE & OBJECTIVE
Past Medical History:   Diagnosis Date    Anemia     Disorder of kidney and ureter     Edema leg     History of rotator cuff tear     History of seasonal allergies     HTN (hypertension)     Hx of colonic polyps     Hyperlipemia     MGUS (monoclonal gammopathy of unknown significance)     NS (nuclear sclerosis), bilateral 06/25/2019    Obesity     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 11/17/2017    Type 2 diabetes mellitus        Past Surgical History:   Procedure Laterality Date    COLONOSCOPY N/A 10/28/2022    Procedure: COLONOSCOPY;  Surgeon: Guanako Sanchez MD;  Location: [facility] ENDO;  Service: Endoscopy;  Laterality: N/A;  REFENDO placed per Dr Sanderson. case request entered     vaccinated-GT  instr portal    MOUTH SURGERY      RENAL BIOPSY Left 7/19/2023    Procedure: BIOPSY, KIDNEY;  Surgeon: Nikky Soriano MD;  Location: [facility] CATH LAB;  Service: Interventional Nephrology;  Laterality: Left;       Review of patient's allergies indicates:  No Known Allergies  Current Facility-Administered Medications   Medication Frequency    atorvastatin tablet 20 mg Daily    cloNIDine tablet 0.2 mg Q8H PRN    dextrose 10% bolus 125 mL 125 mL PRN    ferrous sulfate tablet 1 each Every other day    furosemide injection 60 mg BID    glucagon (human recombinant) injection 1 mg PRN    glucose chewable tablet 16 g PRN    glucose chewable tablet 24 g PRN    heparin (porcine) injection 5,000 Units Q8H    insulin aspart U-100 pen 0-5 Units QID (AC + HS) PRN    labetaloL tablet 200 mg BID    melatonin tablet 6 mg Nightly PRN    naloxone 0.4 mg/mL injection 0.02 mg PRN    [START ON 8/29/2024] NIFEdipine 24 hr tablet 90 mg Daily    sodium chloride 0.9% flush 10 mL Q12H PRN    tamsulosin 24 hr capsule 0.4 mg QHS    [START ON 8/29/2024] valsartan tablet 320 mg Daily     Family History       Problem Relation (Age of Onset)    Cancer Mother, Father    Diabetes  "Father    Hypertension Father    No Known Problems Sister, Brother, Maternal Aunt, Maternal Uncle, Paternal Aunt, Paternal Uncle, Maternal Grandmother, Maternal Grandfather, Paternal Grandmother, Paternal Grandfather          Tobacco Use    Smoking status: Former     Current packs/day: 0.50     Types: Cigarettes    Smokeless tobacco: Never   Substance and Sexual Activity    Alcohol use: Yes     Comment: rarely    Drug use: Not Currently    Sexual activity: Yes     Partners: Female     Review of Systems  Objective:     Vital Signs (Most Recent):  Temp: 97.9 °F (36.6 °C) (08/28/24 1153)  Pulse: 98 (08/28/24 1452)  Resp: 18 (08/28/24 1153)  BP: (!) 162/86 (08/28/24 1153)  SpO2: 99 % (08/28/24 1153) Vital Signs (24h Range):  Temp:  [97.7 °F (36.5 °C)-98.5 °F (36.9 °C)] 97.9 °F (36.6 °C)  Pulse:  [69-98] 98  Resp:  [14-19] 18  SpO2:  [93 %-99 %] 99 %  BP: (154-189)/(64-95) 162/86     Weight: 131.5 kg (290 lb) (08/27/24 0835)  Body mass index is 39.33 kg/m².  Body surface area is 2.58 meters squared.    No intake/output data recorded.     Physical Exam  HENT:      Head: Normocephalic.      Mouth/Throat:      Mouth: Mucous membranes are moist.   Cardiovascular:      Rate and Rhythm: Normal rate.      Pulses: Normal pulses.   Pulmonary:      Effort: Pulmonary effort is normal.   Musculoskeletal:      Cervical back: Normal range of motion.   Skin:     General: Skin is warm.   Neurological:      General: No focal deficit present.      Mental Status: He is alert.   Psychiatric:         Mood and Affect: Mood normal.          Significant Labs:  ABGs: No results for input(s): "PH", "PCO2", "HCO3", "POCSATURATED", "BE" in the last 168 hours.  BMP:   Recent Labs   Lab 08/28/24  0344         K 3.8      CO2 22*   BUN 59*   CREATININE 5.6*   CALCIUM 8.1*   MG 2.3     Cardiac Markers: No results for input(s): "CKMB", "TROPONINT", "MYOGLOBIN" in the last 168 hours.  CBC:   Recent Labs   Lab 08/28/24  0344   WBC 8.79 " "  RBC 2.53*   HGB 7.4*   HCT 23.4*      MCV 93   MCH 29.2   MCHC 31.6*     CMP:   Recent Labs   Lab 08/28/24  0344      CALCIUM 8.1*   ALBUMIN 3.2*   PROT 6.8      K 3.8   CO2 22*      BUN 59*   CREATININE 5.6*   ALKPHOS 46*   ALT 13   AST 14   BILITOT 0.5     Coagulation: No results for input(s): "PT", "INR", "APTT" in the last 168 hours.  LFTs:   Recent Labs   Lab 08/28/24  0344   ALT 13   AST 14   ALKPHOS 46*   BILITOT 0.5   PROT 6.8   ALBUMIN 3.2*     Microbiology Results (last 7 days)       ** No results found for the last 168 hours. **          Specimen (24h ago, onward)      None          PTH: No results for input(s): "PTH" in the last 168 hours.  TSH: No results for input(s): "TSH" in the last 168 hours.  No results for input(s): "COLORU", "CLARITYU", "SPECGRAV", "PHUR", "PROTEINUA", "GLUCOSEU", "BILIRUBINCON", "BLOODU", "WBCU", "RBCU", "BACTERIA", "MUCUS", "NITRITE", "LEUKOCYTESUR", "UROBILINOGEN", "HYALINECASTS" in the last 168 hours.        "

## 2024-08-28 NOTE — PROGRESS NOTES
[facility]  Nephrology  Progress Note    Patient Name: Handy Adams  MRN: [id number]  Admission Date: 8/26/2024  Hospital Length of Stay: 1 days  Attending Provider: Micha Weiner MD   Primary Care Physician: Toby Sanderson MD  Principal Problem:Heart failure with preserved ejection fraction    Subjective:     HPI: Mr. Adams is a 68 y/o M with a PMHx of CKD5, HTN, MGUS, cataracts T2DM, diabetic retinopathy and hyperparathyroidism presented to the ED this morning with complaints of shortness of breath onset last night. Pt states that last night he had trouble sleeping due to his SOB and could not find a comfortable position to sleep in. He states that his symptoms worsened this morning (8/26/24) around 0830 and called EMS for assistance. Per EMS, he was placed on 15 L O2 due to reported hypoxia with SpO2 in the 80s. At the time of EMS arrival to the ED the patient was demonstrating increased work of breathing patient and initially given a DuoNeb and Solu-Medrol. He had no change in his breathing after treatment and was subsequently placed on CPAP -- he saturated appropriately during that time. Pt denies CP, abdominal pain or any other symptoms.      Of note, pt reports that he has been having progressively worsening swelling in his b/l lower extremities. He also notes that he recently was told he had low Hgb after having labs drawn for an upcoming nephrology appointment -- he also notes medication changes made at that nephrology appointment. Pt reports that he was supposed to have an IV iron infusion today for CKD-associated anemia. Pt states that he has had episodes of shortness of breath since July but says that they have never been this severe. Pt does not wear oxygen at baseline and is compliant with all of his medications. He still makes urine and is not on dialysis currently but states that he was due to have a meeting today with a  regarding starting home dialysis.      Nephrology consulted For YESENIA on CKD5    Past Medical History:   Diagnosis Date    Anemia     Disorder of kidney and ureter     Edema leg     History of rotator cuff tear     History of seasonal allergies     HTN (hypertension)     Hx of colonic polyps     Hyperlipemia     MGUS (monoclonal gammopathy of unknown significance)     NS (nuclear sclerosis), bilateral 06/25/2019    Obesity     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 11/17/2017    Type 2 diabetes mellitus        Past Surgical History:   Procedure Laterality Date    COLONOSCOPY N/A 10/28/2022    Procedure: COLONOSCOPY;  Surgeon: Guanako Sanchez MD;  Location: [facility] ENDO;  Service: Endoscopy;  Laterality: N/A;  REFENDO placed per Dr Sanderson. case request entered     vaccinated-GT  instr portal    MOUTH SURGERY      RENAL BIOPSY Left 7/19/2023    Procedure: BIOPSY, KIDNEY;  Surgeon: Nikky Soriano MD;  Location: [facility] CATH LAB;  Service: Interventional Nephrology;  Laterality: Left;       Review of patient's allergies indicates:  No Known Allergies  Current Facility-Administered Medications   Medication Frequency    atorvastatin tablet 20 mg Daily    cloNIDine tablet 0.2 mg Q8H PRN    dextrose 10% bolus 125 mL 125 mL PRN    ferrous sulfate tablet 1 each Every other day    furosemide injection 60 mg BID    glucagon (human recombinant) injection 1 mg PRN    glucose chewable tablet 16 g PRN    glucose chewable tablet 24 g PRN    heparin (porcine) injection 5,000 Units Q8H    insulin aspart U-100 pen 0-5 Units QID (AC + HS) PRN    labetaloL tablet 200 mg BID    melatonin tablet 6 mg Nightly PRN    naloxone 0.4 mg/mL injection 0.02 mg PRN    [START ON 8/29/2024] NIFEdipine 24 hr tablet 90 mg Daily    sodium chloride 0.9% flush 10 mL Q12H PRN    tamsulosin 24 hr capsule 0.4 mg QHS    [START ON 8/29/2024] valsartan tablet 320 mg Daily     Family History       Problem Relation (Age of  "Onset)    Cancer Mother, Father    Diabetes Father    Hypertension Father    No Known Problems Sister, Brother, Maternal Aunt, Maternal Uncle, Paternal Aunt, Paternal Uncle, Maternal Grandmother, Maternal Grandfather, Paternal Grandmother, Paternal Grandfather          Tobacco Use    Smoking status: Former     Current packs/day: 0.50     Types: Cigarettes    Smokeless tobacco: Never   Substance and Sexual Activity    Alcohol use: Yes     Comment: rarely    Drug use: Not Currently    Sexual activity: Yes     Partners: Female     Review of Systems  Objective:     Vital Signs (Most Recent):  Temp: 97.9 °F (36.6 °C) (08/28/24 1153)  Pulse: 98 (08/28/24 1452)  Resp: 18 (08/28/24 1153)  BP: (!) 162/86 (08/28/24 1153)  SpO2: 99 % (08/28/24 1153) Vital Signs (24h Range):  Temp:  [97.7 °F (36.5 °C)-98.5 °F (36.9 °C)] 97.9 °F (36.6 °C)  Pulse:  [69-98] 98  Resp:  [14-19] 18  SpO2:  [93 %-99 %] 99 %  BP: (154-189)/(64-95) 162/86     Weight: 131.5 kg (290 lb) (08/27/24 0835)  Body mass index is 39.33 kg/m².  Body surface area is 2.58 meters squared.    No intake/output data recorded.     Physical Exam  HENT:      Head: Normocephalic.      Mouth/Throat:      Mouth: Mucous membranes are moist.   Cardiovascular:      Rate and Rhythm: Normal rate.      Pulses: Normal pulses.   Pulmonary:      Effort: Pulmonary effort is normal.   Musculoskeletal:      Cervical back: Normal range of motion.   Skin:     General: Skin is warm.   Neurological:      General: No focal deficit present.      Mental Status: He is alert.   Psychiatric:         Mood and Affect: Mood normal.          Significant Labs:  ABGs: No results for input(s): "PH", "PCO2", "HCO3", "POCSATURATED", "BE" in the last 168 hours.  BMP:   Recent Labs   Lab 08/28/24  0344         K 3.8      CO2 22*   BUN 59*   CREATININE 5.6*   CALCIUM 8.1*   MG 2.3     Cardiac Markers: No results for input(s): "CKMB", "TROPONINT", "MYOGLOBIN" in the last 168 hours.  CBC: " "  Recent Labs   Lab 08/28/24  0344   WBC 8.79   RBC 2.53*   HGB 7.4*   HCT 23.4*      MCV 93   MCH 29.2   MCHC 31.6*     CMP:   Recent Labs   Lab 08/28/24  0344      CALCIUM 8.1*   ALBUMIN 3.2*   PROT 6.8      K 3.8   CO2 22*      BUN 59*   CREATININE 5.6*   ALKPHOS 46*   ALT 13   AST 14   BILITOT 0.5     Coagulation: No results for input(s): "PT", "INR", "APTT" in the last 168 hours.  LFTs:   Recent Labs   Lab 08/28/24  0344   ALT 13   AST 14   ALKPHOS 46*   BILITOT 0.5   PROT 6.8   ALBUMIN 3.2*     Microbiology Results (last 7 days)       ** No results found for the last 168 hours. **          Specimen (24h ago, onward)      None          PTH: No results for input(s): "PTH" in the last 168 hours.  TSH: No results for input(s): "TSH" in the last 168 hours.  No results for input(s): "COLORU", "CLARITYU", "SPECGRAV", "PHUR", "PROTEINUA", "GLUCOSEU", "BILIRUBINCON", "BLOODU", "WBCU", "RBCU", "BACTERIA", "MUCUS", "NITRITE", "LEUKOCYTESUR", "UROBILINOGEN", "HYALINECASTS" in the last 168 hours.        Assessment/Plan:     Cardiac/Vascular  * Heart failure with preserved ejection fraction  Per primary    Renal/  YESENIA (acute kidney injury)  Non oliguric YESENIA on CKD5 secondary to uncontrolled Hypertension.  In July scr went up to 9 secondary to over diuresis.?   to start home peritoneal dialysis in the near future      Scr up trending , BNP in 1160  Scr trend 4.8--5.4--5.8     On room air , bilateral LE edema he believes better then before  X-ray look mild pulmonary infiltrate    Plan   - while in-patient can continue 40 mg IV lasix BID  - recommend discharge dose of oral Torsemide 40 mg BID, decrease dose to 40 mg torsemide daily once his weight down to 275 pounds.  -Close out patient follow up with karime.  - WENDY dominguez aware of outpatient PD clinic follow up.  - Renal panel daily  -No emergent dialysis indication      Chronic kidney disease (CKD), stage V  See YESENIA              Thank " you for your consult. I will follow-up with patient. Please contact us if you have any additional questions.    Milan Rivero MD  Nephrology  [facility]

## 2024-08-29 ENCOUNTER — DOCUMENTATION ONLY (OUTPATIENT)
Dept: CARDIOLOGY | Facility: CLINIC | Age: 69
End: 2024-08-29
Payer: MEDICARE

## 2024-08-29 ENCOUNTER — EPISODE CHANGES (OUTPATIENT)
Dept: CARDIOLOGY | Facility: CLINIC | Age: 69
End: 2024-08-29

## 2024-08-29 VITALS
SYSTOLIC BLOOD PRESSURE: 152 MMHG | WEIGHT: 290 LBS | DIASTOLIC BLOOD PRESSURE: 73 MMHG | RESPIRATION RATE: 16 BRPM | OXYGEN SATURATION: 100 % | BODY MASS INDEX: 39.28 KG/M2 | TEMPERATURE: 98 F | HEART RATE: 66 BPM | HEIGHT: 72 IN

## 2024-08-29 LAB
ALBUMIN SERPL BCP-MCNC: 3.1 G/DL (ref 3.5–5.2)
ALP SERPL-CCNC: 46 U/L (ref 55–135)
ALT SERPL W/O P-5'-P-CCNC: 13 U/L (ref 10–44)
ANION GAP SERPL CALC-SCNC: 11 MMOL/L (ref 8–16)
AST SERPL-CCNC: 12 U/L (ref 10–40)
BASOPHILS # BLD AUTO: 0.02 K/UL (ref 0–0.2)
BASOPHILS NFR BLD: 0.3 % (ref 0–1.9)
BILIRUB SERPL-MCNC: 0.5 MG/DL (ref 0.1–1)
BUN SERPL-MCNC: 63 MG/DL (ref 8–23)
CALCIUM SERPL-MCNC: 8 MG/DL (ref 8.7–10.5)
CHLORIDE SERPL-SCNC: 106 MMOL/L (ref 95–110)
CO2 SERPL-SCNC: 23 MMOL/L (ref 23–29)
CREAT SERPL-MCNC: 5.8 MG/DL (ref 0.5–1.4)
DIFFERENTIAL METHOD BLD: ABNORMAL
EOSINOPHIL # BLD AUTO: 0.1 K/UL (ref 0–0.5)
EOSINOPHIL NFR BLD: 1.9 % (ref 0–8)
ERYTHROCYTE [DISTWIDTH] IN BLOOD BY AUTOMATED COUNT: 16.2 % (ref 11.5–14.5)
EST. GFR  (NO RACE VARIABLE): 9.9 ML/MIN/1.73 M^2
GLUCOSE SERPL-MCNC: 114 MG/DL (ref 70–110)
HCT VFR BLD AUTO: 22.7 % (ref 40–54)
HGB BLD-MCNC: 7.1 G/DL (ref 14–18)
IMM GRANULOCYTES # BLD AUTO: 0.06 K/UL (ref 0–0.04)
IMM GRANULOCYTES NFR BLD AUTO: 1 % (ref 0–0.5)
LYMPHOCYTES # BLD AUTO: 1.2 K/UL (ref 1–4.8)
LYMPHOCYTES NFR BLD: 19.8 % (ref 18–48)
MAGNESIUM SERPL-MCNC: 2.2 MG/DL (ref 1.6–2.6)
MCH RBC QN AUTO: 29.6 PG (ref 27–31)
MCHC RBC AUTO-ENTMCNC: 31.3 G/DL (ref 32–36)
MCV RBC AUTO: 95 FL (ref 82–98)
MONOCYTES # BLD AUTO: 0.7 K/UL (ref 0.3–1)
MONOCYTES NFR BLD: 11.9 % (ref 4–15)
NEUTROPHILS # BLD AUTO: 4 K/UL (ref 1.8–7.7)
NEUTROPHILS NFR BLD: 65.1 % (ref 38–73)
NRBC BLD-RTO: 0 /100 WBC
PHOSPHATE SERPL-MCNC: 4.8 MG/DL (ref 2.7–4.5)
PLATELET # BLD AUTO: 205 K/UL (ref 150–450)
PMV BLD AUTO: 12.6 FL (ref 9.2–12.9)
POCT GLUCOSE: 105 MG/DL (ref 70–110)
POCT GLUCOSE: 142 MG/DL (ref 70–110)
POTASSIUM SERPL-SCNC: 4 MMOL/L (ref 3.5–5.1)
PROT SERPL-MCNC: 6.4 G/DL (ref 6–8.4)
RBC # BLD AUTO: 2.4 M/UL (ref 4.6–6.2)
SODIUM SERPL-SCNC: 140 MMOL/L (ref 136–145)
WBC # BLD AUTO: 6.21 K/UL (ref 3.9–12.7)

## 2024-08-29 PROCEDURE — 63600175 PHARM REV CODE 636 W HCPCS: Mod: HCNC,NTX

## 2024-08-29 PROCEDURE — 36415 COLL VENOUS BLD VENIPUNCTURE: CPT | Mod: HCNC,NTX

## 2024-08-29 PROCEDURE — 25000003 PHARM REV CODE 250: Mod: HCNC,NTX

## 2024-08-29 PROCEDURE — 84100 ASSAY OF PHOSPHORUS: CPT | Mod: HCNC,NTX

## 2024-08-29 PROCEDURE — 85025 COMPLETE CBC W/AUTO DIFF WBC: CPT | Mod: HCNC,NTX

## 2024-08-29 PROCEDURE — 80053 COMPREHEN METABOLIC PANEL: CPT | Mod: HCNC,NTX

## 2024-08-29 PROCEDURE — 99233 SBSQ HOSP IP/OBS HIGH 50: CPT | Mod: HCNC,NTX,, | Performed by: HOSPITALIST

## 2024-08-29 PROCEDURE — 83735 ASSAY OF MAGNESIUM: CPT | Mod: HCNC,NTX

## 2024-08-29 RX ORDER — SEVELAMER CARBONATE 800 MG/1
800 TABLET, FILM COATED ORAL
Qty: 90 TABLET | Refills: 11 | Status: SHIPPED | OUTPATIENT
Start: 2024-08-29 | End: 2025-08-29

## 2024-08-29 RX ORDER — GLYBURIDE 5 MG/1
2.5 TABLET ORAL
Qty: 45 TABLET | Refills: 3 | Status: SHIPPED | OUTPATIENT
Start: 2024-08-29 | End: 2024-08-29 | Stop reason: HOSPADM

## 2024-08-29 RX ORDER — POLYETHYLENE GLYCOL 3350 17 G/17G
17 POWDER, FOR SOLUTION ORAL ONCE
Status: COMPLETED | OUTPATIENT
Start: 2024-08-29 | End: 2024-08-29

## 2024-08-29 RX ADMIN — VALSARTAN 320 MG: 160 TABLET, FILM COATED ORAL at 08:08

## 2024-08-29 RX ADMIN — LABETALOL HYDROCHLORIDE 200 MG: 200 TABLET, FILM COATED ORAL at 08:08

## 2024-08-29 RX ADMIN — POLYETHYLENE GLYCOL 3350 17 G: 17 POWDER, FOR SOLUTION ORAL at 08:08

## 2024-08-29 RX ADMIN — NIFEDIPINE 90 MG: 60 TABLET, FILM COATED, EXTENDED RELEASE ORAL at 08:08

## 2024-08-29 RX ADMIN — FUROSEMIDE 60 MG: 10 INJECTION, SOLUTION INTRAMUSCULAR; INTRAVENOUS at 08:08

## 2024-08-29 RX ADMIN — ATORVASTATIN CALCIUM 20 MG: 20 TABLET, FILM COATED ORAL at 08:08

## 2024-08-29 RX ADMIN — FERROUS SULFATE TAB EC 325 MG (65 MG FE EQUIVALENT) 1 EACH: 325 (65 FE) TABLET DELAYED RESPONSE at 08:08

## 2024-08-29 NOTE — DISCHARGE INSTRUCTIONS
- Discontinue pioglitazone and start taking glyburide 2.5mg daily for diabetes.  - Continue outpatient nephrology follow up regarding CKD and home peritoneal dialysis.  - Follow up with outpatient cardiology for heart failure management.

## 2024-08-29 NOTE — ASSESSMENT & PLAN NOTE
Non oliguric YESENIA on CKD5 secondary to uncontrolled Hypertension.  In July scr went up to 9 secondary to over diuresis.?   to start home peritoneal dialysis in the near future      Scr up trending , BNP in 1160  Scr trend 4.8--5.4--5.8     On room air , bilateral LE edema he believes better then before  X-ray look mild pulmonary infiltrate    Plan   - recommend discharge dose of Torsemide 40 mg BID, decrease dose to 40 mg torsemide daily once his weight down to 275 pounds.  -Close out patient follow up with karime.  - WENDY dominguez aware of outpatient PD clinic follow up.  - Renal panel daily  -No emergent dialysis indication

## 2024-08-29 NOTE — ASSESSMENT & PLAN NOTE
Pt has a longstanding history of nephrotic syndrome.    - Patient may benefit from therapeutic anticoagulation in the setting of his longstanding nephrotic syndrome and associated proteinuria. Pt has unequal leg swelling on exam (R>L). DVT (-).    - Nephrology consulted - torsemide 40mg bid until dry weight 275lb, then switch to 40mg qd; no need for anticoagulation at this time.  - Patient will continue to follow up with nephrology outpatient.

## 2024-08-29 NOTE — NURSING
Pt received d/c instructions/education. Pt verbalizes understanding. IV cath removed, cath intact, tolerated well. Pt to d/c with belongings. Awaiting for transport to d/c [facility].

## 2024-08-29 NOTE — ASSESSMENT & PLAN NOTE
Pt's current Cr is 5.4, his baseline might be 4.5-4.8.     Recent Labs     08/27/24  0230 08/28/24  0344 08/29/24  0317   CREATININE 5.8* 5.6* 5.8*   EGFRNORACEVR 9.9* 10.3* 9.9*         - YESENIA is likely cardiorenal in nature, creatinine should improve with diuresis  - Suspect that current episode of acute hypoxic respiratory failure is associated by YESENIA.   - Lasix 60 mg IV BID while hospitalized, will be discharged on torsemide  - Avoid nephrotoxins and renally dose meds for GFR listed above  - Monitor urine output, serial BMP, and adjust therapy as needed  - Will continue to trend creatinine

## 2024-08-29 NOTE — PLAN OF CARE
[facility]  Discharge Final Note    Primary Care Provider: Toby Sanderson MD    Expected Discharge Date: 8/29/2024    Final Discharge Note (most recent)       Final Note - 08/29/24 0913          Final Note    Assessment Type Final Discharge Note     Anticipated Discharge Disposition Home or Self Care     Hospital Resources/Appts/Education Provided Appointments scheduled and added to AVS        Post-Acute Status    Post-Acute Authorization Other     Other Status No Post-Acute Service Needs     Discharge Delays None known at this time                     Important Message from Medicare             Contact Info       Toby Sanderson MD   Specialty: Family Medicine   Relationship: PCP - General  Hypertension Digital Medicine Responsible Provider  Diabetes Digital Medicine Responsible Provider    [address] [place] [address]   Phone: [phone]       Next Steps: Follow up

## 2024-08-29 NOTE — DISCHARGE SUMMARY
[facility] Medicine  Discharge Summary      Patient Name: Handy Adams  MRN: [id number]  ALLEN: [id number]  Patient Class: IP- Inpatient  Admission Date: 8/26/2024  Hospital Length of Stay: 2 days  Discharge Date and Time:  08/29/2024 1:34 PM  Attending Physician: Rhonda att. providers found   Discharging Provider: Moni Graf DO  Primary Care Provider: Toby Sanderson MD  [facility] Medicine Team: [facility] 3 Moni Graf DO  Primary Care Team: [facility] 3    HPI:   Mr. Adams is a 68 y/o M with a PMHx of CKD5, HTN, MGUS, cataracts T2DM, diabetic retinopathy and hyperparathyroidism presented to the ED this morning with complaints of shortness of breath onset last night. Pt states that last night he had trouble sleeping due to his SOB and could not find a comfortable position to sleep in. He states that his symptoms worsened this morning (8/26/24) around 0830 and called EMS for assistance. Per EMS, he was placed on 15 L O2 due to reported hypoxia with SpO2 in the 80s. At the time of EMS arrival to the ED the patient was demonstrating increased work of breathing patient and initially given a DuoNeb and Solu-Medrol. He had no change in his breathing after treatment and was subsequently placed on CPAP -- he saturated appropriately during that time. Pt denies CP, abdominal pain, N/Vor any other symptoms.     Of note, pt reports that he has been having progressively worsening swelling in his b/l lower extremities. He also notes that he recently was told he had low Hgb after having labs drawn for an upcoming nephrology appointment -- he also notes medication changes made at that nephrology appointment. Pt reports that he was supposed to have an IV iron infusion today for CKD-associated anemia. Pt states that he has had episodes of shortness of breath since July but says that they have never been this severe. Pt does not wear oxygen at baseline and is compliant with all of his medications. He  still makes urine and is not on dialysis currently but states that he was due to have a meeting today with a  regarding starting home dialysis.     * No surgery found *      Hospital Course:   Pt admitted to hospital medicine after acute hypoxic respiratory failure episode. Pt was started on Lasix 40mg IV BID, increased to 60mg IV bid for augmented diuresis. Pt had TTE on 8/27/24 which showed EF of 55-60% with G2DD and global longitudinal strain of -16.6% thereby establishing new diagnosis of HFpEF.     Pt will be set up to follow up with outpatient cardiology regarding his diastolic HFpEF. For his YESENIA on CKD and longstanding nephrotic syndrome with proteinuria, nephrology has been consulted who recommended home Torsemide to be increased to 40mg bid until achieving dry weight of 275lb then switching to 40mg qd. Pt was also discharged home on nifedipine and valsartan and recommended to follow up outpatient for blood pressure control. His pioglitazone was also discontinued due to risk of fluid retention.      Goals of Care Treatment Preferences:  Code Status: Full Code    Physical Exam  Vitals and nursing note reviewed.   Constitutional:       General: He is not in acute distress.     Appearance: Normal appearance. He is not ill-appearing.   HENT:      Head: Atraumatic.      Right Ear: External ear normal.      Left Ear: External ear normal.   Eyes:      Extraocular Movements: Extraocular movements intact.   Cardiovascular:      Rate and Rhythm: Normal rate and regular rhythm.      Heart sounds: Normal heart sounds. No murmur heard.  Pulmonary:      Effort: Pulmonary effort is normal. No respiratory distress.      Breath sounds: Normal breath sounds.   Abdominal:      General: Abdomen is flat. Bowel sounds are normal.      Palpations: Abdomen is soft. There is no mass.      Tenderness: There is no abdominal tenderness. There is no guarding.   Musculoskeletal:         General: No tenderness. Normal range  of motion.      Right lower leg: No edema.      Left lower leg: No edema.   Skin:     General: Skin is warm and dry.   Neurological:      General: No focal deficit present.      Mental Status: He is alert and oriented to person, place, and time.   Psychiatric:         Mood and Affect: Mood normal.         Behavior: Behavior normal.             SDOH Screening:  The patient was screened for utility difficulties, food insecurity, transport difficulties, housing insecurity, and interpersonal safety and there were no concerns identified this admission.     Consults:   Consults (From admission, onward)          Status Ordering Provider     Inpatient consult to Nephrology  Once        Provider:  (Not yet assigned)    Completed MALENA SERNA            Cardiac/Vascular  * Heart failure with preserved ejection fraction  Echo results obtained on 8/27/24: EF 55-60%, global longitudinal strain is -16.6%. Eccentric hypertrophy noted in the left ventricle with mildly increased wall thickness. Severe biatrial enlargement. Pressure in IVC and SVC is 3 mmHg. Trivial pericardial effusion.     - Lasix to 60mg IV BID while hospitalized. Pt discharged on torsemide 40mg BID, will go down to QD once goal weight is reached.   - Pt's newly diagnosed CHF could be associated with his prior diagnosis of MGUS - heart failure could be associated with cardiac amyloidosis. Recommend patient follow up with cardiology outpatient after discharge.       Renal/  Nephrotic syndrome  Pt has a longstanding history of nephrotic syndrome.    - Patient may benefit from therapeutic anticoagulation in the setting of his longstanding nephrotic syndrome and associated proteinuria. Pt has unequal leg swelling on exam (R>L). DVT (-).    - Nephrology consulted - torsemide 40mg bid until dry weight 275lb, then switch to 40mg qd; no need for anticoagulation at this time.  - Patient will continue to follow up with nephrology outpatient.      YESENIA (acute kidney  injury)  Pt's current Cr is 5.4, his baseline might be 4.5-4.8.     Recent Labs     08/27/24  0230 08/28/24  0344 08/29/24  0317   CREATININE 5.8* 5.6* 5.8*   EGFRNORACEVR 9.9* 10.3* 9.9*         - YESENIA is likely cardiorenal in nature, creatinine should improve with diuresis  - Suspect that current episode of acute hypoxic respiratory failure is associated by YESENIA.   - Lasix 60 mg IV BID while hospitalized, will be discharged on torsemide  - Avoid nephrotoxins and renally dose meds for GFR listed above  - Monitor urine output, serial BMP, and adjust therapy as needed  - Will continue to trend creatinine    Endocrine  Type 2 diabetes mellitus with stage 5 chronic kidney disease not on chronic dialysis, without long-term current use of insulin  Pt's most recent A1c was 5.3, he does not use insulin at home.    - LDSSI while hospitalized  - Continue home Linagliptin        Final Active Diagnoses:    Diagnosis Date Noted POA    PRINCIPAL PROBLEM:  Heart failure with preserved ejection fraction [I50.30] 08/27/2024 Yes    Chronic diastolic congestive heart failure [I50.32] 08/28/2024 Yes    Lower extremity edema [R60.0] 08/28/2024 Yes    Nephrotic syndrome [N04.9] 08/27/2024 Yes    YESENIA (acute kidney injury) [N17.9] 08/26/2024 Yes    Acute hypoxic respiratory failure [J96.01] 08/26/2024 Yes    HTN (hypertension) [I10] 07/16/2024 Yes    CKD (chronic kidney disease), stage V [N18.5] 06/26/2024 Yes    Chronic kidney disease (CKD), stage V [N18.5] 04/23/2024 Yes    Anemia in stage 5 chronic kidney disease, not on chronic dialysis [N18.5, D63.1] 02/07/2024 Yes    Type 2 diabetes mellitus with stage 5 chronic kidney disease not on chronic dialysis, with long-term current use of insulin [E11.22, N18.5, Z79.4] 06/25/2019 Not Applicable    Type 2 diabetes mellitus with stage 5 chronic kidney disease not on chronic dialysis, without long-term current use of insulin [E11.22, N18.5] 12/05/2017 Yes    Hyperlipemia [E78.5]  Yes       Problems Resolved During this Admission:    Diagnosis Date Noted Date Resolved POA    Congestive heart failure [I50.9] 08/27/2024 08/27/2024 Yes    Shortness of breath [R06.02] 08/26/2024 08/27/2024 Yes       Discharged Condition: good    Disposition: Home or Self Care    Follow Up:   Follow-up Information       Toby Sanderson MD Follow up.    Specialty: Family Medicine  Contact information:  [address] [place] [address]  [phone]                           Patient Instructions:      Ambulatory referral/consult to Cardiology   Standing Status: Future   Referral Priority: Routine Referral Type: Consultation   Referral Reason: Specialty Services Required   Requested Specialty: Cardiology   Number of Visits Requested: 1       Significant Diagnostic Studies: All significant diagnostic studies from this admission reviewed prior to discharge.      Pending Diagnostic Studies:       None           Medications:  Reconciled Home Medications:      Medication List        START taking these medications      sevelamer carbonate 800 mg Tab  Commonly known as: RENVELA  Take 1 tablet (800 mg total) by mouth 3 (three) times daily with meals.     torsemide 20 MG Tab  Commonly known as: DEMADEX  TAKE 40MG (2 TABLETS) TWICE A DAY (4 TABLETS TOTAL) UNTIL AT DRY WEIGHT (275LB) THEN TAKE 40MG (2 TABLETS) ONCE DAILY            CHANGE how you take these medications      valsartan 320 MG tablet  Commonly known as: DIOVAN  Take 1 tablet (320 mg total) by mouth once daily.  What changed:   medication strength  how much to take            CONTINUE taking these medications      atorvastatin 20 MG tablet  Commonly known as: LIPITOR  Take 1 tablet (20 mg total) by mouth once daily.     blood sugar diagnostic Strp  To test twice daily     blood-glucose meter kit  Use as instructed     calcitRIOL 0.25 MCG Cap  Commonly known as: ROCALTROL  Take 1 capsule (0.25 mcg total) by mouth 3 (three) times a week. Monday, Wednesday,  Friday     ergocalciferol 50,000 unit Cap  Commonly known as: ERGOCALCIFEROL  Take 1 capsule by mouth once a week     FeroSuL 325 mg (65 mg iron) Tab tablet  Generic drug: ferrous sulfate  Take 1 tablet (325 mg total) by mouth 2 (two) times daily.     labetaloL 200 MG tablet  Commonly known as: NORMODYNE  Take 1 tablet (200 mg total) by mouth 2 (two) times daily.     lancets Misc  1 lancet by Misc.(Non-Drug; Combo Route) route 2 (two) times daily with meals.     LOKELMA 10 gram packet  Generic drug: sodium zirconium cyclosilicate  Take 1 packet (10 g total) by mouth once daily. Mix entire contents of packet(s) into drinking glass containing 3 tablespoons of water; stir well and drink immediately. Add water and repeat until no powder remains to receive entire dose.     NIFEdipine 90 MG (OSM) 24 hr tablet  Commonly known as: PROCARDIA-XL  Take 90 mg by mouth once daily.     sildenafiL 100 MG tablet  Commonly known as: VIAGRA  Take 1 tablet (100 mg total) by mouth daily as needed for Erectile Dysfunction.     sodium bicarbonate 650 MG tablet  Take 1 tablet (650 mg total) by mouth 2 (two) times daily.     tamsulosin 0.4 mg Cap  Commonly known as: FLOMAX  Take 1 capsule (0.4 mg total) by mouth every evening.     TRADJENTA 5 mg Tab tablet  Generic drug: linaGLIPtin  Take 1 tablet (5 mg total) by mouth once daily.            STOP taking these medications      furosemide 20 MG tablet  Commonly known as: LASIX     pioglitazone 30 MG tablet  Commonly known as: ACTOS              Indwelling Lines/Drains at time of discharge:   Lines/Drains/Airways       None                   Time spent on the discharge of patient: 60 minutes         Moni Graf DO  Department of Hospital Medicine  [facility]

## 2024-08-29 NOTE — SUBJECTIVE & OBJECTIVE
Past Medical History:   Diagnosis Date    Anemia     Disorder of kidney and ureter     Edema leg     History of rotator cuff tear     History of seasonal allergies     HTN (hypertension)     Hx of colonic polyps     Hyperlipemia     MGUS (monoclonal gammopathy of unknown significance)     NS (nuclear sclerosis), bilateral 06/25/2019    Obesity     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 11/17/2017    Type 2 diabetes mellitus        Past Surgical History:   Procedure Laterality Date    COLONOSCOPY N/A 10/28/2022    Procedure: COLONOSCOPY;  Surgeon: Guanako Sanchez MD;  Location: [facility] ENDO;  Service: Endoscopy;  Laterality: N/A;  REFENDO placed per Dr Sanderson. case request entered     vaccinated-GT  instr portal    MOUTH SURGERY      RENAL BIOPSY Left 7/19/2023    Procedure: BIOPSY, KIDNEY;  Surgeon: Nikky Soriano MD;  Location: [facility] CATH LAB;  Service: Interventional Nephrology;  Laterality: Left;       Review of patient's allergies indicates:  No Known Allergies  No current facility-administered medications for this encounter.     Current Outpatient Medications   Medication    atorvastatin (LIPITOR) 20 MG tablet    calcitRIOL (ROCALTROL) 0.25 MCG Cap    ergocalciferol (ERGOCALCIFEROL) 50,000 unit Cap    labetaloL (NORMODYNE) 200 MG tablet    linaGLIPtin (TRADJENTA) 5 mg Tab tablet    NIFEdipine (PROCARDIA-XL) 90 MG (OSM) 24 hr tablet    sodium bicarbonate 650 MG tablet    tamsulosin (FLOMAX) 0.4 mg Cap    blood sugar diagnostic Strp    blood-glucose meter kit    FEROSUL 325 mg (65 mg iron) Tab tablet    lancets Misc    sevelamer carbonate (RENVELA) 800 mg Tab    sildenafiL (VIAGRA) 100 MG tablet    sodium zirconium cyclosilicate (LOKELMA) 10 gram packet    torsemide (DEMADEX) 20 MG Tab    valsartan (DIOVAN) 320 MG tablet     Family History       Problem Relation (Age of Onset)    Cancer Mother, Father    Diabetes Father    Hypertension  "Father    No Known Problems Sister, Brother, Maternal Aunt, Maternal Uncle, Paternal Aunt, Paternal Uncle, Maternal Grandmother, Maternal Grandfather, Paternal Grandmother, Paternal Grandfather          Tobacco Use    Smoking status: Former     Current packs/day: 0.50     Types: Cigarettes    Smokeless tobacco: Never   Substance and Sexual Activity    Alcohol use: Yes     Comment: rarely    Drug use: Not Currently    Sexual activity: Yes     Partners: Female     Review of Systems  Objective:     Vital Signs (Most Recent):  Temp: 97.5 °F (36.4 °C) (08/29/24 1140)  Pulse: 66 (08/29/24 1141)  Resp: 16 (08/29/24 1140)  BP: (!) 152/73 (08/29/24 1140)  SpO2: 100 % (08/29/24 1140) Vital Signs (24h Range):  Temp:  [97.5 °F (36.4 °C)-98.1 °F (36.7 °C)] 97.5 °F (36.4 °C)  Pulse:  [65-77] 66  Resp:  [16-18] 16  SpO2:  [96 %-100 %] 100 %  BP: (114-187)/(55-98) 152/73     Weight: 131.5 kg (290 lb) (08/27/24 0835)  Body mass index is 39.33 kg/m².  Body surface area is 2.58 meters squared.    No intake/output data recorded.     Physical Exam  HENT:      Head: Normocephalic.      Mouth/Throat:      Mouth: Mucous membranes are moist.   Cardiovascular:      Rate and Rhythm: Normal rate.      Pulses: Normal pulses.   Pulmonary:      Effort: Pulmonary effort is normal.   Musculoskeletal:      Cervical back: Normal range of motion.   Skin:     General: Skin is warm.   Neurological:      General: No focal deficit present.      Mental Status: He is alert.   Psychiatric:         Mood and Affect: Mood normal.          Significant Labs:  ABGs: No results for input(s): "PH", "PCO2", "HCO3", "POCSATURATED", "BE" in the last 168 hours.  BMP:   Recent Labs   Lab 08/29/24  0317   *      K 4.0      CO2 23   BUN 63*   CREATININE 5.8*   CALCIUM 8.0*   MG 2.2     Cardiac Markers: No results for input(s): "CKMB", "TROPONINT", "MYOGLOBIN" in the last 168 hours.  CBC:   Recent Labs   Lab 08/29/24  0317   WBC 6.21   RBC 2.40*   HGB " "7.1*   HCT 22.7*      MCV 95   MCH 29.6   MCHC 31.3*     CMP:   Recent Labs   Lab 08/29/24 0317   *   CALCIUM 8.0*   ALBUMIN 3.1*   PROT 6.4      K 4.0   CO2 23      BUN 63*   CREATININE 5.8*   ALKPHOS 46*   ALT 13   AST 12   BILITOT 0.5     Coagulation: No results for input(s): "PT", "INR", "APTT" in the last 168 hours.  LFTs:   Recent Labs   Lab 08/29/24 0317   ALT 13   AST 12   ALKPHOS 46*   BILITOT 0.5   PROT 6.4   ALBUMIN 3.1*     Microbiology Results (last 7 days)       ** No results found for the last 168 hours. **          Specimen (24h ago, onward)      None          PTH: No results for input(s): "PTH" in the last 168 hours.  TSH: No results for input(s): "TSH" in the last 168 hours.  No results for input(s): "COLORU", "CLARITYU", "SPECGRAV", "PHUR", "PROTEINUA", "GLUCOSEU", "BILIRUBINCON", "BLOODU", "WBCU", "RBCU", "BACTERIA", "MUCUS", "NITRITE", "LEUKOCYTESUR", "UROBILINOGEN", "HYALINECASTS" in the last 168 hours.        "

## 2024-08-29 NOTE — ASSESSMENT & PLAN NOTE
Pt's most recent A1c was 5.3, he does not use insulin at home.    - LDSSI while hospitalized  - Continue home Linagliptin

## 2024-08-29 NOTE — ASSESSMENT & PLAN NOTE
Echo results obtained on 8/27/24: EF 55-60%, global longitudinal strain is -16.6%. Eccentric hypertrophy noted in the left ventricle with mildly increased wall thickness. Severe biatrial enlargement. Pressure in IVC and SVC is 3 mmHg. Trivial pericardial effusion.     - Lasix to 60mg IV BID while hospitalized. Pt discharged on torsemide 40mg BID, will go down to QD once goal weight is reached.   - Pt's newly diagnosed CHF could be associated with his prior diagnosis of MGUS - heart failure could be associated with cardiac amyloidosis. Recommend patient follow up with cardiology outpatient after discharge.

## 2024-08-29 NOTE — PROGRESS NOTES
[facility] - Med S[facility]  Nephrology  Progress Note    Patient Name: Handy Adams  MRN: [id number]  Admission Date: 8/26/2024  Hospital Length of Stay: 2 days  Attending Provider: No att. providers found   Primary Care Physician: Toby Sanderson MD  Principal Problem:Heart failure with preserved ejection fraction    Subjective:     HPI: Mr. Adams is a 68 y/o M with a PMHx of CKD5, HTN, MGUS, cataracts T2DM, diabetic retinopathy and hyperparathyroidism presented to the ED this morning with complaints of shortness of breath onset last night. Pt states that last night he had trouble sleeping due to his SOB and could not find a comfortable position to sleep in. He states that his symptoms worsened this morning (8/26/24) around 0830 and called EMS for assistance. Per EMS, he was placed on 15 L O2 due to reported hypoxia with SpO2 in the 80s. At the time of EMS arrival to the ED the patient was demonstrating increased work of breathing patient and initially given a DuoNeb and Solu-Medrol. He had no change in his breathing after treatment and was subsequently placed on CPAP -- he saturated appropriately during that time. Pt denies CP, abdominal pain or any other symptoms.      Of note, pt reports that he has been having progressively worsening swelling in his b/l lower extremities. He also notes that he recently was told he had low Hgb after having labs drawn for an upcoming nephrology appointment -- he also notes medication changes made at that nephrology appointment. Pt reports that he was supposed to have an IV iron infusion today for CKD-associated anemia. Pt states that he has had episodes of shortness of breath since July but says that they have never been this severe. Pt does not wear oxygen at baseline and is compliant with all of his medications. He still makes urine and is not on dialysis currently but states that he was due to have a meeting today with a  regarding starting home dialysis.      Nephrology consulted For YESENIA on CKD5    Past Medical History:   Diagnosis Date    Anemia     Disorder of kidney and ureter     Edema leg     History of rotator cuff tear     History of seasonal allergies     HTN (hypertension)     Hx of colonic polyps     Hyperlipemia     MGUS (monoclonal gammopathy of unknown significance)     NS (nuclear sclerosis), bilateral 06/25/2019    Obesity     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 11/17/2017    Type 2 diabetes mellitus        Past Surgical History:   Procedure Laterality Date    COLONOSCOPY N/A 10/28/2022    Procedure: COLONOSCOPY;  Surgeon: Guanako Sanchez MD;  Location: [facility] ENDO;  Service: Endoscopy;  Laterality: N/A;  REFENDO placed per Dr Sanderson. case request entered     vaccinated-GT  instr portal    MOUTH SURGERY      RENAL BIOPSY Left 7/19/2023    Procedure: BIOPSY, KIDNEY;  Surgeon: Nikky Soriano MD;  Location: [facility] CATH LAB;  Service: Interventional Nephrology;  Laterality: Left;       Review of patient's allergies indicates:  No Known Allergies  No current facility-administered medications for this encounter.     Current Outpatient Medications   Medication    atorvastatin (LIPITOR) 20 MG tablet    calcitRIOL (ROCALTROL) 0.25 MCG Cap    ergocalciferol (ERGOCALCIFEROL) 50,000 unit Cap    labetaloL (NORMODYNE) 200 MG tablet    linaGLIPtin (TRADJENTA) 5 mg Tab tablet    NIFEdipine (PROCARDIA-XL) 90 MG (OSM) 24 hr tablet    sodium bicarbonate 650 MG tablet    tamsulosin (FLOMAX) 0.4 mg Cap    blood sugar diagnostic Strp    blood-glucose meter kit    FEROSUL 325 mg (65 mg iron) Tab tablet    lancets Misc    sevelamer carbonate (RENVELA) 800 mg Tab    sildenafiL (VIAGRA) 100 MG tablet    sodium zirconium cyclosilicate (LOKELMA) 10 gram packet    torsemide (DEMADEX) 20 MG Tab    valsartan (DIOVAN) 320 MG tablet     Family History       Problem Relation (Age of Onset)    Cancer  "Mother, Father    Diabetes Father    Hypertension Father    No Known Problems Sister, Brother, Maternal Aunt, Maternal Uncle, Paternal Aunt, Paternal Uncle, Maternal Grandmother, Maternal Grandfather, Paternal Grandmother, Paternal Grandfather          Tobacco Use    Smoking status: Former     Current packs/day: 0.50     Types: Cigarettes    Smokeless tobacco: Never   Substance and Sexual Activity    Alcohol use: Yes     Comment: rarely    Drug use: Not Currently    Sexual activity: Yes     Partners: Female     Review of Systems  Objective:     Vital Signs (Most Recent):  Temp: 97.5 °F (36.4 °C) (08/29/24 1140)  Pulse: 66 (08/29/24 1141)  Resp: 16 (08/29/24 1140)  BP: (!) 152/73 (08/29/24 1140)  SpO2: 100 % (08/29/24 1140) Vital Signs (24h Range):  Temp:  [97.5 °F (36.4 °C)-98.1 °F (36.7 °C)] 97.5 °F (36.4 °C)  Pulse:  [65-77] 66  Resp:  [16-18] 16  SpO2:  [96 %-100 %] 100 %  BP: (114-187)/(55-98) 152/73     Weight: 131.5 kg (290 lb) (08/27/24 0835)  Body mass index is 39.33 kg/m².  Body surface area is 2.58 meters squared.    No intake/output data recorded.     Physical Exam  HENT:      Head: Normocephalic.      Mouth/Throat:      Mouth: Mucous membranes are moist.   Cardiovascular:      Rate and Rhythm: Normal rate.      Pulses: Normal pulses.   Pulmonary:      Effort: Pulmonary effort is normal.   Musculoskeletal:      Cervical back: Normal range of motion.   Skin:     General: Skin is warm.   Neurological:      General: No focal deficit present.      Mental Status: He is alert.   Psychiatric:         Mood and Affect: Mood normal.          Significant Labs:  ABGs: No results for input(s): "PH", "PCO2", "HCO3", "POCSATURATED", "BE" in the last 168 hours.  BMP:   Recent Labs   Lab 08/29/24  0317   *      K 4.0      CO2 23   BUN 63*   CREATININE 5.8*   CALCIUM 8.0*   MG 2.2     Cardiac Markers: No results for input(s): "CKMB", "TROPONINT", "MYOGLOBIN" in the last 168 hours.  CBC:   Recent Labs " "  Lab 08/29/24 0317   WBC 6.21   RBC 2.40*   HGB 7.1*   HCT 22.7*      MCV 95   MCH 29.6   MCHC 31.3*     CMP:   Recent Labs   Lab 08/29/24 0317   *   CALCIUM 8.0*   ALBUMIN 3.1*   PROT 6.4      K 4.0   CO2 23      BUN 63*   CREATININE 5.8*   ALKPHOS 46*   ALT 13   AST 12   BILITOT 0.5     Coagulation: No results for input(s): "PT", "INR", "APTT" in the last 168 hours.  LFTs:   Recent Labs   Lab 08/29/24 0317   ALT 13   AST 12   ALKPHOS 46*   BILITOT 0.5   PROT 6.4   ALBUMIN 3.1*     Microbiology Results (last 7 days)       ** No results found for the last 168 hours. **          Specimen (24h ago, onward)      None          PTH: No results for input(s): "PTH" in the last 168 hours.  TSH: No results for input(s): "TSH" in the last 168 hours.  No results for input(s): "COLORU", "CLARITYU", "SPECGRAV", "PHUR", "PROTEINUA", "GLUCOSEU", "BILIRUBINCON", "BLOODU", "WBCU", "RBCU", "BACTERIA", "MUCUS", "NITRITE", "LEUKOCYTESUR", "UROBILINOGEN", "HYALINECASTS" in the last 168 hours.        Assessment/Plan:     Renal/  YESENIA (acute kidney injury)  Non oliguric YESENIA on CKD5 secondary to uncontrolled Hypertension.  In July scr went up to 9 secondary to over diuresis.?   to start home peritoneal dialysis in the near future      Scr up trending , BNP in 1160  Scr trend 4.8--5.4--5.8     On room air , bilateral LE edema he believes better then before  X-ray look mild pulmonary infiltrate    Plan   - recommend discharge dose of Torsemide 40 mg BID, decrease dose to 40 mg torsemide daily once his weight down to 275 pounds.  -Close out patient follow up with karime.  - WENDY dominguez aware of outpatient PD clinic follow up.  - Renal panel daily  -No emergent dialysis indication      CKD (chronic kidney disease), stage V  See yesenia    Chronic kidney disease (CKD), stage V  See YESENIA          Oncology  Anemia in stage 5 chronic kidney disease, not on chronic dialysis  -        Thank you for your consult. I " will follow-up with patient. Please contact us if you have any additional questions.    Milan Rivero MD  Nephrology  [facility] Surg

## 2024-08-29 NOTE — PLAN OF CARE
APPOINTMENT:    Patient Appointment(s) scheduled with Myranda Avila  Tuesday Sep 17, 2024 9:40 AM    Spoke with Angely at [facility] appointment office, will contact patient with sooner appointment pending availability and schedule.

## 2024-08-29 NOTE — PROGRESS NOTES
Heart Failure Transitional Care Clinic (HFTCC) Team notified of pt referral via Ambulatory Referral to Heart Failure Transitional Care ([id number]).    Patient screened today by provider and RN for enrollment to program.      Pt was deemed not a candidate for enrollment at this time related to  pt with h/o ESRD with plan to start dialysis    Pt will require additional follow up planning per primary team.     If pt status, diagnosis, or treatment plan changes , please place AMB referral to Heart Failure Transitional Care Clinic ([id number]) for HFTCC enrollment re-evalution.

## 2024-08-30 ENCOUNTER — PATIENT OUTREACH (OUTPATIENT)
Dept: ADMINISTRATIVE | Facility: CLINIC | Age: 69
End: 2024-08-30
Payer: MEDICARE

## 2024-08-30 ENCOUNTER — PATIENT MESSAGE (OUTPATIENT)
Dept: NEPHROLOGY | Facility: CLINIC | Age: 69
End: 2024-08-30
Payer: MEDICARE

## 2024-08-30 RX ORDER — ERGOCALCIFEROL 1.25 MG/1
50000 CAPSULE ORAL
Qty: 12 CAPSULE | Refills: 3 | Status: SHIPPED | OUTPATIENT
Start: 2024-08-30

## 2024-08-30 RX ORDER — TAMSULOSIN HYDROCHLORIDE 0.4 MG/1
0.4 CAPSULE ORAL NIGHTLY
Qty: 90 CAPSULE | Refills: 0 | Status: SHIPPED | OUTPATIENT
Start: 2024-08-30 | End: 2025-08-30

## 2024-08-30 NOTE — TELEPHONE ENCOUNTER
No care due was identified.  [facility] Embedded Care Due Messages. Reference number: [id number].   8/30/2024 3:34:16 PM CDT

## 2024-08-30 NOTE — PROGRESS NOTES
C3 nurse spoke with Handy Adams  for a TCC post hospital discharge follow up call. The patient has a scheduled HOSFU appointment with Myranda Avila MD  on 09/17/24 @ 0940. Will route message to provider staff to add patient to waiting list for sooner appt.

## 2024-09-03 ENCOUNTER — LAB VISIT (OUTPATIENT)
Dept: LAB | Facility: HOSPITAL | Age: 69
End: 2024-09-03
Payer: MEDICARE

## 2024-09-03 DIAGNOSIS — Z76.82 ORGAN TRANSPLANT CANDIDATE: ICD-10-CM

## 2024-09-03 DIAGNOSIS — D64.9 ANEMIA, UNSPECIFIED TYPE: ICD-10-CM

## 2024-09-03 LAB
HCT VFR BLD AUTO: 24.1 % (ref 40–54)
HGB BLD-MCNC: 7.5 G/DL (ref 14–18)
IRON SERPL-MCNC: 53 UG/DL (ref 45–160)
SATURATED IRON: 20 % (ref 20–50)
TOTAL IRON BINDING CAPACITY: 259 UG/DL (ref 250–450)
TRANSFERRIN SERPL-MCNC: 175 MG/DL (ref 200–375)

## 2024-09-03 PROCEDURE — 84466 ASSAY OF TRANSFERRIN: CPT | Mod: HCNC,TXP | Performed by: NURSE PRACTITIONER

## 2024-09-03 PROCEDURE — 36415 COLL VENOUS BLD VENIPUNCTURE: CPT | Mod: HCNC,PO,TXP | Performed by: NURSE PRACTITIONER

## 2024-09-03 PROCEDURE — 85018 HEMOGLOBIN: CPT | Mod: HCNC,TXP | Performed by: NURSE PRACTITIONER

## 2024-09-03 PROCEDURE — 85014 HEMATOCRIT: CPT | Mod: HCNC,TXP | Performed by: NURSE PRACTITIONER

## 2024-09-05 ENCOUNTER — PATIENT MESSAGE (OUTPATIENT)
Dept: NEPHROLOGY | Facility: CLINIC | Age: 69
End: 2024-09-05
Payer: MEDICARE

## 2024-09-10 ENCOUNTER — TELEPHONE (OUTPATIENT)
Dept: NEPHROLOGY | Facility: CLINIC | Age: 69
End: 2024-09-10
Payer: MEDICARE

## 2024-09-12 ENCOUNTER — TELEPHONE (OUTPATIENT)
Dept: TRANSPLANT | Facility: CLINIC | Age: 69
End: 2024-09-12
Payer: MEDICARE

## 2024-09-12 NOTE — LETTER
2024    Handy Adams  [address] [facility] [address]        Dear Handy Adams:  MRN: [id number]  : 1955    You are scheduled on 2024 for follow up in the transplant clinic to update your records and evaluate your health status as you wait for a transplant.  It is very important that we ensure you are ready for your transplant.      Please make arrangements to be in our transplant clinic from 12:30 pm- 4 pm.  During this time you will watch an educational video and then be seen by our transplant , financial counselor, and advance practice provider.     If you have had a change in your medical history since your last visit, please call your transplant coordinator to ensure we have the medical records to review prior to your appointment.     Please bring the following with you to this appointment:  A Caregiver is REQUIRED  Bring ALL insurance information including medication card  Current list of medications  Copies of any outside medical records from the past year, such as: mammogram, pap smear, ultrasound, CAT scan, colonoscopy, cardiac angiogram or cardiac stress test    To reschedule your appointments please call [phone] or [phone] ([phone]). Please ask for the .      Failure to cancel in advance or arrive on time could result in a $50 cancellation fee.  Your transplant candidacy could be affected by no showing these appointments.  We look forward to seeing you.    Sincerely,    [facility] [place]  [facility] [place] [address]  [phone]

## 2024-09-13 ENCOUNTER — LAB VISIT (OUTPATIENT)
Dept: LAB | Facility: HOSPITAL | Age: 69
End: 2024-09-13
Payer: MEDICARE

## 2024-09-13 ENCOUNTER — CLINICAL SUPPORT (OUTPATIENT)
Dept: NEPHROLOGY | Facility: CLINIC | Age: 69
End: 2024-09-13
Payer: MEDICARE

## 2024-09-13 ENCOUNTER — OFFICE VISIT (OUTPATIENT)
Dept: NEPHROLOGY | Facility: CLINIC | Age: 69
End: 2024-09-13
Payer: MEDICARE

## 2024-09-13 VITALS
SYSTOLIC BLOOD PRESSURE: 149 MMHG | OXYGEN SATURATION: 99 % | DIASTOLIC BLOOD PRESSURE: 81 MMHG | HEART RATE: 75 BPM | WEIGHT: 264.56 LBS | BODY MASS INDEX: 35.88 KG/M2

## 2024-09-13 VITALS
BODY MASS INDEX: 35.88 KG/M2 | OXYGEN SATURATION: 99 % | DIASTOLIC BLOOD PRESSURE: 81 MMHG | WEIGHT: 264.56 LBS | HEART RATE: 75 BPM | SYSTOLIC BLOOD PRESSURE: 149 MMHG

## 2024-09-13 DIAGNOSIS — N18.9 ANEMIA IN CHRONIC KIDNEY DISEASE, UNSPECIFIED CKD STAGE: ICD-10-CM

## 2024-09-13 DIAGNOSIS — N18.5 CHRONIC KIDNEY DISEASE (CKD), STAGE V: Primary | ICD-10-CM

## 2024-09-13 DIAGNOSIS — N18.5 CKD (CHRONIC KIDNEY DISEASE), STAGE V: ICD-10-CM

## 2024-09-13 DIAGNOSIS — E87.20 ACIDOSIS: ICD-10-CM

## 2024-09-13 DIAGNOSIS — N18.5 ANEMIA IN STAGE 5 CHRONIC KIDNEY DISEASE, NOT ON CHRONIC DIALYSIS: Primary | ICD-10-CM

## 2024-09-13 DIAGNOSIS — N17.9 ACUTE KIDNEY INJURY SUPERIMPOSED ON CKD: ICD-10-CM

## 2024-09-13 DIAGNOSIS — R77.8 ABNORMAL SPEP: ICD-10-CM

## 2024-09-13 DIAGNOSIS — E11.22 TYPE 2 DIABETES MELLITUS WITH STAGE 5 CHRONIC KIDNEY DISEASE NOT ON CHRONIC DIALYSIS, UNSPECIFIED WHETHER LONG TERM INSULIN USE: ICD-10-CM

## 2024-09-13 DIAGNOSIS — I10 HYPERTENSION, UNSPECIFIED TYPE: ICD-10-CM

## 2024-09-13 DIAGNOSIS — N18.9 ACUTE KIDNEY INJURY SUPERIMPOSED ON CKD: ICD-10-CM

## 2024-09-13 DIAGNOSIS — D63.1 ANEMIA IN CHRONIC KIDNEY DISEASE, UNSPECIFIED CKD STAGE: ICD-10-CM

## 2024-09-13 DIAGNOSIS — N18.5 CHRONIC KIDNEY DISEASE (CKD), STAGE V: ICD-10-CM

## 2024-09-13 DIAGNOSIS — N18.5 TYPE 2 DIABETES MELLITUS WITH STAGE 5 CHRONIC KIDNEY DISEASE NOT ON CHRONIC DIALYSIS, UNSPECIFIED WHETHER LONG TERM INSULIN USE: ICD-10-CM

## 2024-09-13 DIAGNOSIS — D47.2 MGUS (MONOCLONAL GAMMOPATHY OF UNKNOWN SIGNIFICANCE): ICD-10-CM

## 2024-09-13 DIAGNOSIS — N25.81 SECONDARY HYPERPARATHYROIDISM: ICD-10-CM

## 2024-09-13 DIAGNOSIS — R80.9 PROTEINURIA, UNSPECIFIED TYPE: ICD-10-CM

## 2024-09-13 DIAGNOSIS — D63.1 ANEMIA IN STAGE 5 CHRONIC KIDNEY DISEASE, NOT ON CHRONIC DIALYSIS: Primary | ICD-10-CM

## 2024-09-13 LAB
ALBUMIN SERPL BCP-MCNC: 3.6 G/DL (ref 3.5–5.2)
ANION GAP SERPL CALC-SCNC: 9 MMOL/L (ref 8–16)
BUN SERPL-MCNC: 41 MG/DL (ref 8–23)
CALCIUM SERPL-MCNC: 8.8 MG/DL (ref 8.7–10.5)
CHLORIDE SERPL-SCNC: 109 MMOL/L (ref 95–110)
CO2 SERPL-SCNC: 23 MMOL/L (ref 23–29)
CREAT SERPL-MCNC: 5.5 MG/DL (ref 0.5–1.4)
EST. GFR  (NO RACE VARIABLE): 10.5 ML/MIN/1.73 M^2
GLUCOSE SERPL-MCNC: 98 MG/DL (ref 70–110)
PHOSPHATE SERPL-MCNC: 3.7 MG/DL (ref 2.7–4.5)
POTASSIUM SERPL-SCNC: 4.2 MMOL/L (ref 3.5–5.1)
SODIUM SERPL-SCNC: 141 MMOL/L (ref 136–145)

## 2024-09-13 PROCEDURE — 36415 COLL VENOUS BLD VENIPUNCTURE: CPT | Mod: HCNC,NTX | Performed by: NURSE PRACTITIONER

## 2024-09-13 PROCEDURE — 80069 RENAL FUNCTION PANEL: CPT | Mod: HCNC,TXP | Performed by: NURSE PRACTITIONER

## 2024-09-13 PROCEDURE — 99999 PR PBB SHADOW E&M-EST. PATIENT-LVL IV: CPT | Mod: PBBFAC,HCNC,, | Performed by: NURSE PRACTITIONER

## 2024-09-13 PROCEDURE — 99999 PR PBB SHADOW E&M-EST. PATIENT-LVL II: CPT | Mod: PBBFAC,HCNC,,

## 2024-09-13 PROCEDURE — 96372 THER/PROPH/DIAG INJ SC/IM: CPT | Mod: HCNC,S$GLB,, | Performed by: NURSE PRACTITIONER

## 2024-09-13 RX ORDER — NIFEDIPINE 90 MG/1
90 TABLET, EXTENDED RELEASE ORAL DAILY
COMMUNITY

## 2024-09-13 NOTE — PROGRESS NOTES
"Subjective:       Patient ID: Handy Adams is a 69 y.o. A male who presents for evaluation of of renal dysfunction.      HPI     Patient is new to me. New to clinic.  Prior pertinent chart reviewed since this is patient's first appointment with me.    Patient presents for new evaluation of renal dysfunction.  Baseline creatinine of 1.6-1.7 in 2020-early 2022. Recently had sCr of 2.5. Patient said he has "not been taking care of himself" and "eating more sugar" since January.    Per recent note from PCP: He cannot afford farxiga due to increased price and had stopped taking it for a few months prior to appt in September.    Home BPs: does not take    Rare Excedrin use now, though he used to use it frequently.    Significant other medical problems include HTN since at least 2007, T2DM since at least 2007.      The patient denies taking herbal supplements, or new antibiotics, recreational drugs, recent episode of dehydration, diarrhea, nausea or vomiting, acute illness, hospitalization or exposure to IV radiocontrast.     Significant family hx includes: No known kidney issues    Last renal US: none in EMR    Update 10/24/22:  Presents for f/u of CKD, YESENIA.  Last seen a month ago.  Feet are swelling again, especially when drinking ensure. Has improved since stopping Ensure.  Holding lisinopril-Hctz. Taking labetolol 200 mg instead.  Found to have an IgG lambda specific monoclonal band during proteinuria workup. Referred to hematology.    Recent sCr 2.5--> 2.8-2.9.     Home BPs: does not believe cuff is accurate.    Update 12/28/22:  Returns for f/u of CKD.  sCr has been 2.8-3.0 mg/dL.  Home BPs: not taking because he thinks cuff is inaccurate    Patient had bone marrow biopsy and was diagnosed with IgG-lambda MGUS.  An IgG lambda specific monoclonal protein was identified on 24 hour urine on 12/16/22.  He has not gotten kidney biopsy.    Says he feels great.    Update 2/3/23:  Returns for f/u of CKD and discussion about " "biopsy.  sCr now 3.0-3.2 mg/dL. Most recent sCr 3.3.  Increased valsartan to 160 mg at last visit.    Update 9/8/23:  - Presents for follow-up of CKD  - sCr trended up to 3.8. from baseline of about 3.0, now improved to 3.1. Unclear etiology.  - Notes being on Farxiga in the past but has not taken for at least a year   - Denies NSAIDs, reduced PO intake, n/v/d, fluctuations in BP, elevations in blood sugar, recent illness    Update 11/6/23:  Presents for f/u of CKD.  Most recent sCr 3.1 -3.4 mg/dL.  Home BPs: 130-140s (SBP)   Admits recent dietary indiscretion.  Says he gained weight recently.  Not exercising.    Advance Care Planning       Serious Illness Conversation Guide    Conversation was held with:   patient[SR1.1]     What is your understanding now of where you are with your illness?:   Comments: Admits he is in denial about kidney disease.[SR1.1]     How much information about what is likely to be ahead with your illness would you like from me?:   wants to be fully informed[SR1.1]     I want to share with you my understanding of where things are with your illness.:   continued decline[SR1.2]     Patient emotions observed or reported:   denial[SR1.1]     What are your most important goals if your health situation worsens?:   being independent[SR1.1]     What are your biggest fears and worries about the future with your health?:   Comments: being debilitated where he has to depend on others to care for him[SR1.1]     What gives you strength as you think about the future of your illness?:   Oriental orthodox tae[SR1.1]     What abilities are so critical to your life that you can't imagine living without them? Or, what makes life meaningful?:   being able to care for oneself, living independently[SR1.1]     If you become sicker, how much are you willing to go through for the possibility of gaining more time?:   Comments: Says he needs to think about this, but "I don't want to go through dialysis at all because I " "think that would put a constraint on my independence and constrain me from what I imagine I could be." He says he would want dialysis though if the choice was dialysis or death.    Has not thought about invasive procedures in depth.[SR1.1]     How much does your family know about your priorities and wishes?:   patient has had some incomplete discussions with family[SR1.2]  Comments: Says he is very private. Has not spoken to spouse or friends about this yet. He has shared some information with sister.[SR1.2]     I recommend that we do the following to make sure your treatment plans reflect what's important to you. How does this plan seem to you?:        Attribution       SR1.1 Raven Naylor NP 11/06/23 14:48    SR1.2 Raven Naylor NP 11/06/23 16:04               Update 2/9/24:  Presents today for f/u of CKD.  Most recent sCr was 4.1 mg/dL.  Home BPs: SBP 150s  Thinks he took his medication today. Takes his medication daily but not at the same time every day.  Saw transplant team.    Update 3/15/24:  Presents today for f/u of CKD.  Most recent sCr was 5.7    Says he has lost 20+ lbs by only eating on meal a day (Honey nut cheerios; almond milk). Drinks water.     Says he has been "feeling fine." No swelling. "I feel great."  Hematology is considering repeating bone marrow biopsy. Planning to do full body scan.    Update 4/23/24:  Presents today for f/u of CKD.  Most recent sCr was 4.5  Had clinic visit from PD nurse but said he is unsure of modality choice.   Previously referred to [facility] nutrition services/RD in March but has not been seen yet.  Has gained weight back.    Home BPs: 130s-140s/70s-80s    Update 7/24/24:  Presents today for f/u of CKD/ hospital f/u for YESENIA.  Had sCr on routine labs of 9.0. Repeat of 8.0. No uremic symptoms, but concerned for overdiuresis, need for IVF. Sent to ER. He was admitted; aldactone, torsemide, valsartan held. Discharged c sCr of 7.0. He required one dose of " anu while hospitalized.     Most recent sCr was 4.9 but UPCR up to 4 grams.  Ran out of nifedipine, but he took it yesterday.    Home BPs: 120s-140s    Update 9/13/24:  Presents for f/u of CKD/ hospital f/u of fluid overload.  He was hospitalized with fluid overload/ SOB. Diagnosed with HFpEF Lasix 40 mg BID changed to torsemide 40 mg daily. While hospital, he admitted he was having intermittent SOB since July that was relieved with position changes.  Last sCr in hospital was 5.8.  Home BPs: 150s/80s on digital medicine. Says he is back on nifedipine, but it is not on his med list.    SOB and edema improved.  Had decreased fluid intake to 24-48 oz daily.    Had home visit. Needs to clean and have another visit.    Review of Systems     Constitutional:  Negative for appetite change. Food tastes good.  Respiratory:  Negative for shortness of breath.    Cardiovascular:  Negative for swelling to legs.   Gastrointestinal:  Negative for diarrhea, nausea and vomiting.    Genitourinary:  Decreased urine output associated with decreased fluid intake       Some incontinence         Objective:       Blood pressure (!) 149/81, pulse 75, weight 120 kg (264 lb 8.8 oz), SpO2 99%.  Physical Exam  Vitals reviewed.   Constitutional:       Appearance: Normal appearance. He is obese.   HENT:      Head: Normocephalic and atraumatic.   Eyes:      General: No scleral icterus.  Cardiovascular:      Rate and Rhythm: Normal rate and regular rhythm.   Pulmonary:      Effort: No respiratory distress.     Musculoskeletal:     Skin:     General: Skin is warm and dry.   Neurological:      Mental Status: He is alert and oriented to person, place, and time.   Psychiatric:         Mood and Affect: Mood normal.         Behavior: Behavior normal.           Lab Results   Component Value Date    CREATININE 5.8 (H) 08/29/2024     Prot/Creat Ratio, Urine   Date Value Ref Range Status   07/24/2024 4.04 (H) 0.00 - 0.20 Final   07/12/2024 2.52 (H) 0.00 -  0.20 Final   04/19/2024 3.81 (H) 0.00 - 0.20 Final     Lab Results   Component Value Date     08/29/2024    K 4.0 08/29/2024    CO2 23 08/29/2024     08/29/2024     Lab Results   Component Value Date    .5 (H) 07/24/2024    CALCIUM 8.0 (L) 08/29/2024    PHOS 4.8 (H) 08/29/2024     Lab Results   Component Value Date    HGB 7.5 (L) 09/03/2024    WBC 6.21 08/29/2024    HCT 24.1 (L) 09/03/2024      Lab Results   Component Value Date    HGBA1C 5.1 08/26/2024     08/29/2024    BUN 63 (H) 08/29/2024     Lab Results   Component Value Date    LDLCALC 87.4 02/07/2024         Assessment:       1. Chronic kidney disease (CKD), stage V    2. Acute kidney injury superimposed on CKD    3. Hypertension, unspecified type    4. MGUS (monoclonal gammopathy of unknown significance)    5. Proteinuria, unspecified type    6. Secondary hyperparathyroidism    7. Acidosis    8. Type 2 diabetes mellitus with stage 5 chronic kidney disease not on chronic dialysis, unspecified whether long term insulin use    9. Anemia in chronic kidney disease, unspecified CKD stage    10. Abnormal SPEP                        Plan:   CKD stage 5 c superimposed YESENIA-   - Biopsy performed showing hypertensive nephrosclerosis, acute tubular injury, and diabetic nephropathy. Progressive.  - IgG lambda specific monoclonal band noted on proteinuria workup and UPEP. No evidence of MGRS on biopsy.     YESENIA thought to be r/t CRS in hospital. Repeat labs today.    - Educated patient to control BP, BG, remain well-hydrated, and avoid NSAIDs   - High risk of progression to ESRD.        UPCR Nephrotic range proteinuria. Was on farxiga but could not afford it; renal function is too low to start on recent labs. On ARB  - Proteinuric   Acid-base Controlled on sodium bicarb 650 mg BID   Renal osteodystrophy Ca low. Phos marginally high on Renvela. Low vit D.     Elevated PTH.  On ergo weekly and calcitriol 3x/week.  Will trend to determine need to  increase calcitriol to daily.    On Renvela   Anemia Hgb low last check. Has MGUS. Followed by hem/onc.  Starting epo today.    On PO iron twice a day.   DM Well-controlled recently. Has had DM since at least 2007, when he had an A1c of 15+.   Lipid Management On statin.   ESRD planning Anticipatory guidance provided about timing of dialysis. Start discussions and planning when eGFR is about 20 mL/min; most patients start dialysis between 5-10 mL/min.    Had home visit. Needs to clean and have another visit prior to surgery evaluation. He agrees to do this next week with plans to second visit the week after, pending the HTRN's availability.    Will f/u c transplant team to see where he is in the process.    He agreed to home visit from PD nurse.    Offered counseling to help with expressed feelings of denial but he declined offer.      Kidney Failure Risk Equation (Tangri)    Kidney Failure Risk at 2 years: 73.3%    Kidney Failure Risk at 5 years: 98.4%    Lab Results   Component Value Date    MICALBCREAT 1245.7 (H) 03/11/2024    CREATININE 5.8 (H) 08/29/2024          HTN - High on labetalol 200 mg BID, valsartan 320 mg, torsemide 20 mg, and nifedipine (? Not on med list)  - Continue monitoring with digital medicine    HFpEF - new diagnosis. Plans to make cardiology appt. Encouraged him to do so.     Obesity - he has increased dietary intake after severely limiting intake  - Refer to nutritionist (entered previously).     MGUS - per hem/onc; due back in April. 2024    All questions patient had were answered.  Asked if further questions. None. F/u in clinic in 6 weeks with Raven Naylor NP with labs and urine prior to next visit or sooner if needed.  ER for emergency concerns.    Summary of Plan:  Repeat RFP  Recommend cardiology appt  Tiara not next week but the following   4. avoid NSAID/ bactrim/ IV contrast/ gadolinium/ aminoglycoside where possible  5. RTC as scheduled in October        Visit today  included increased complexity associated with  managing the longitudinal care of the patient due to the serious and/or complex managed problem(s) CKD.    I spent a total of 54 minutes on the day of the visit.  This includes face to face time and non-face to face time preparing to see the patient (eg, review of tests), obtaining and/or reviewing separately obtained history, documenting clinical information in the electronic or other health record, independently interpreting results and communicating results to the patient/family/caregiver, or care coordinator.

## 2024-09-13 NOTE — Clinical Note
Marquez Menjivar, I saw Mr. Adams today and reinforced the need to clean vents and declutter his storage area. I think he will need to start in the next couple months, especially with new HF. Would you be available to do a second visit the week of the 23rd? He said he will plan to declutter next week.  Thanks, Raven

## 2024-09-17 ENCOUNTER — OFFICE VISIT (OUTPATIENT)
Dept: FAMILY MEDICINE | Facility: CLINIC | Age: 69
End: 2024-09-17
Payer: MEDICARE

## 2024-09-17 VITALS
OXYGEN SATURATION: 98 % | WEIGHT: 274.06 LBS | RESPIRATION RATE: 16 BRPM | DIASTOLIC BLOOD PRESSURE: 96 MMHG | HEIGHT: 72 IN | HEART RATE: 74 BPM | BODY MASS INDEX: 37.12 KG/M2 | TEMPERATURE: 99 F | SYSTOLIC BLOOD PRESSURE: 156 MMHG

## 2024-09-17 DIAGNOSIS — N18.5 TYPE 2 DIABETES MELLITUS WITH STAGE 5 CHRONIC KIDNEY DISEASE NOT ON CHRONIC DIALYSIS, WITHOUT LONG-TERM CURRENT USE OF INSULIN: ICD-10-CM

## 2024-09-17 DIAGNOSIS — M79.89 LEG SWELLING: ICD-10-CM

## 2024-09-17 DIAGNOSIS — I50.30 HEART FAILURE WITH PRESERVED EJECTION FRACTION, UNSPECIFIED HF CHRONICITY: Primary | ICD-10-CM

## 2024-09-17 DIAGNOSIS — I12.0 BENIGN HYPERTENSION WITH CKD (CHRONIC KIDNEY DISEASE) STAGE V: ICD-10-CM

## 2024-09-17 DIAGNOSIS — N18.5 BENIGN HYPERTENSION WITH CKD (CHRONIC KIDNEY DISEASE) STAGE V: ICD-10-CM

## 2024-09-17 DIAGNOSIS — E11.22 TYPE 2 DIABETES MELLITUS WITH STAGE 5 CHRONIC KIDNEY DISEASE NOT ON CHRONIC DIALYSIS, WITHOUT LONG-TERM CURRENT USE OF INSULIN: ICD-10-CM

## 2024-09-17 PROBLEM — R60.0 LOWER EXTREMITY EDEMA: Status: RESOLVED | Noted: 2024-08-28 | Resolved: 2024-09-17

## 2024-09-17 PROBLEM — I50.32 CHRONIC DIASTOLIC CONGESTIVE HEART FAILURE: Status: RESOLVED | Noted: 2024-08-28 | Resolved: 2024-09-17

## 2024-09-17 PROBLEM — Z79.4 TYPE 2 DIABETES MELLITUS WITH STAGE 5 CHRONIC KIDNEY DISEASE NOT ON CHRONIC DIALYSIS, WITH LONG-TERM CURRENT USE OF INSULIN: Status: RESOLVED | Noted: 2019-06-25 | Resolved: 2024-09-17

## 2024-09-17 PROBLEM — N17.9 AKI (ACUTE KIDNEY INJURY): Status: RESOLVED | Noted: 2024-08-26 | Resolved: 2024-09-17

## 2024-09-17 PROBLEM — N18.4 ACUTE RENAL FAILURE SUPERIMPOSED ON STAGE 4 CHRONIC KIDNEY DISEASE: Status: RESOLVED | Noted: 2024-07-16 | Resolved: 2024-09-17

## 2024-09-17 PROBLEM — I10 HTN (HYPERTENSION): Status: RESOLVED | Noted: 2024-07-16 | Resolved: 2024-09-17

## 2024-09-17 PROBLEM — J96.01 ACUTE HYPOXIC RESPIRATORY FAILURE: Status: RESOLVED | Noted: 2024-08-26 | Resolved: 2024-09-17

## 2024-09-17 PROBLEM — N17.9 ACUTE RENAL FAILURE SUPERIMPOSED ON STAGE 4 CHRONIC KIDNEY DISEASE: Status: RESOLVED | Noted: 2024-07-16 | Resolved: 2024-09-17

## 2024-09-17 PROCEDURE — 3079F DIAST BP 80-89 MM HG: CPT | Mod: HCNC,CPTII,S$GLB, | Performed by: INTERNAL MEDICINE

## 2024-09-17 PROCEDURE — 1126F AMNT PAIN NOTED NONE PRSNT: CPT | Mod: HCNC,CPTII,S$GLB, | Performed by: INTERNAL MEDICINE

## 2024-09-17 PROCEDURE — 1160F RVW MEDS BY RX/DR IN RCRD: CPT | Mod: HCNC,CPTII,S$GLB, | Performed by: INTERNAL MEDICINE

## 2024-09-17 PROCEDURE — 1159F MED LIST DOCD IN RCRD: CPT | Mod: HCNC,CPTII,S$GLB, | Performed by: INTERNAL MEDICINE

## 2024-09-17 PROCEDURE — 3008F BODY MASS INDEX DOCD: CPT | Mod: HCNC,CPTII,S$GLB, | Performed by: INTERNAL MEDICINE

## 2024-09-17 PROCEDURE — 99214 OFFICE O/P EST MOD 30 MIN: CPT | Mod: HCNC,S$GLB,, | Performed by: INTERNAL MEDICINE

## 2024-09-17 PROCEDURE — 1111F DSCHRG MED/CURRENT MED MERGE: CPT | Mod: HCNC,CPTII,S$GLB, | Performed by: INTERNAL MEDICINE

## 2024-09-17 PROCEDURE — 4010F ACE/ARB THERAPY RXD/TAKEN: CPT | Mod: HCNC,CPTII,S$GLB, | Performed by: INTERNAL MEDICINE

## 2024-09-17 PROCEDURE — 3288F FALL RISK ASSESSMENT DOCD: CPT | Mod: HCNC,CPTII,S$GLB, | Performed by: INTERNAL MEDICINE

## 2024-09-17 PROCEDURE — 3044F HG A1C LEVEL LT 7.0%: CPT | Mod: HCNC,CPTII,S$GLB, | Performed by: INTERNAL MEDICINE

## 2024-09-17 PROCEDURE — 99999 PR PBB SHADOW E&M-EST. PATIENT-LVL V: CPT | Mod: PBBFAC,HCNC,, | Performed by: INTERNAL MEDICINE

## 2024-09-17 PROCEDURE — 1101F PT FALLS ASSESS-DOCD LE1/YR: CPT | Mod: HCNC,CPTII,S$GLB, | Performed by: INTERNAL MEDICINE

## 2024-09-17 PROCEDURE — 3077F SYST BP >= 140 MM HG: CPT | Mod: HCNC,CPTII,S$GLB, | Performed by: INTERNAL MEDICINE

## 2024-09-17 PROCEDURE — 3062F POS MACROALBUMINURIA REV: CPT | Mod: HCNC,CPTII,S$GLB, | Performed by: INTERNAL MEDICINE

## 2024-09-17 PROCEDURE — 3066F NEPHROPATHY DOC TX: CPT | Mod: HCNC,CPTII,S$GLB, | Performed by: INTERNAL MEDICINE

## 2024-09-17 RX ORDER — TORSEMIDE 20 MG/1
20 TABLET ORAL 2 TIMES DAILY
Start: 2024-09-17 | End: 2025-09-17

## 2024-09-17 NOTE — PROGRESS NOTES
Chief Complaint: Hospital Follow Up      Handy Adams  is a 69 y.o. year old patient who presents today for hosp f/up     Adm (8/29-8/30): presented to the ED this morning with complaints of shortness of breath onset last night. Pt states that last night he had trouble sleeping due to his SOB and could not find a comfortable position to sleep in. Was placed on CPAP and given IV lasix. Pt had TTE on 8/27/24 which showed EF of 55-60% with G2DD and global longitudinal strain of -16.6% thereby establishing new diagnosis of HFpEF.     Reports he is at his dry weight. Takes torsemide 20mg BID now.     Past Medical History:   Diagnosis Date    Anemia     Disorder of kidney and ureter     Edema leg     History of rotator cuff tear     History of seasonal allergies     HTN (hypertension)     Hx of colonic polyps     Hyperlipemia     MGUS (monoclonal gammopathy of unknown significance)     NS (nuclear sclerosis), bilateral 06/25/2019    Obesity     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 11/17/2017    Type 2 diabetes mellitus        Past Surgical History:   Procedure Laterality Date    COLONOSCOPY N/A 10/28/2022    Procedure: COLONOSCOPY;  Surgeon: Guanako Sanchez MD;  Location: [facility] ENDO;  Service: Endoscopy;  Laterality: N/A;  REFENDO placed per Dr Sanderson. case request entered     vaccinated-GT  instr portal    MOUTH SURGERY      RENAL BIOPSY Left 7/19/2023    Procedure: BIOPSY, KIDNEY;  Surgeon: Nikky Soriano MD;  Location: [facility] CATH LAB;  Service: Interventional Nephrology;  Laterality: Left;        Family History   Problem Relation Name Age of Onset    Cancer Mother          Lung Cancer    Cancer Father          Colon cancer    Diabetes Father      Hypertension Father      No Known Problems Sister      No Known Problems Brother      No Known Problems Maternal Aunt      No Known Problems Maternal Uncle      No Known Problems Paternal Aunt       No Known Problems Paternal Uncle      No Known Problems Maternal Grandmother      No Known Problems Maternal Grandfather      No Known Problems Paternal Grandmother      No Known Problems Paternal Grandfather      Amblyopia Neg Hx      Blindness Neg Hx      Cataracts Neg Hx      Glaucoma Neg Hx      Macular degeneration Neg Hx      Retinal detachment Neg Hx      Strabismus Neg Hx      Stroke Neg Hx      Thyroid disease Neg Hx          Social History     Socioeconomic History    Marital status: Significant Other     Spouse name: Daniella Adams    Number of children: 2    Highest education level: Master's degree (e.g., MA, MS, Sandra, MEd, MSW, CORTEZ)   Tobacco Use    Smoking status: Former     Current packs/day: 0.50     Types: Cigarettes    Smokeless tobacco: Never   Substance and Sexual Activity    Alcohol use: Yes     Comment: rarely    Drug use: Not Currently    Sexual activity: Yes     Partners: Female   Social History Narrative    Caregiver S/O Daniella     Social Determinants of Health     Financial Resource Strain: Low Risk  (8/28/2024)    Overall Financial Resource Strain (CARDIA)     Difficulty of Paying Living Expenses: Not very hard   Food Insecurity: No Food Insecurity (8/28/2024)    Hunger Vital Sign     Worried About Running Out of Food in the Last Year: Never true     Ran Out of Food in the Last Year: Never true   Transportation Needs: No Transportation Needs (8/28/2024)    TRANSPORTATION NEEDS     Transportation : No   Physical Activity: Insufficiently Active (8/26/2024)    Exercise Vital Sign     Days of Exercise per Week: 2 days     Minutes of Exercise per Session: 60 min   Stress: Stress Concern Present (8/28/2024)    [place] citizen [place] [employer] - Occupational Stress Questionnaire     Feeling of Stress : To some extent   Housing Stability: Low Risk  (8/28/2024)    Housing Stability Vital Sign     Unable to Pay for Housing in the Last Year: No     Homeless in the Last Year: No          Current Outpatient Medications:     atorvastatin (LIPITOR) 20 MG tablet, Take 1 tablet (20 mg total) by mouth once daily., Disp: 90 tablet, Rfl: 2    blood sugar diagnostic Strp, To test twice daily, Disp: 100 each, Rfl: 3    blood-glucose meter kit, Use as instructed, Disp: 1 each, Rfl: 0    calcitRIOL (ROCALTROL) 0.25 MCG Cap, Take 1 capsule (0.25 mcg total) by mouth 3 (three) times a week. Monday, Wednesday, Friday, Disp: 36 capsule, Rfl: 3    ergocalciferol (ERGOCALCIFEROL) 50,000 unit Cap, Take 1 capsule (50,000 Units total) by mouth every 7 days., Disp: 12 capsule, Rfl: 3    FEROSUL 325 mg (65 mg iron) Tab tablet, Take 1 tablet (325 mg total) by mouth 2 (two) times daily., Disp: 180 tablet, Rfl: 3    labetaloL (NORMODYNE) 200 MG tablet, Take 1 tablet (200 mg total) by mouth 2 (two) times daily., Disp: , Rfl:     lancets Misc, 1 lancet by Misc.(Non-Drug; Combo Route) route 2 (two) times daily with meals., Disp: 100 each, Rfl: 11    linaGLIPtin (TRADJENTA) 5 mg Tab tablet, Take 1 tablet (5 mg total) by mouth once daily., Disp: 90 tablet, Rfl: 2    NIFEdipine (ADALAT CC) 90 MG TbSR, Take 90 mg by mouth once daily., Disp: , Rfl:     sevelamer carbonate (RENVELA) 800 mg Tab, Take 1 tablet (800 mg total) by mouth 3 (three) times daily with meals., Disp: 90 tablet, Rfl: 11    sildenafiL (VIAGRA) 100 MG tablet, Take 1 tablet (100 mg total) by mouth daily as needed for Erectile Dysfunction., Disp: 10 tablet, Rfl: 11    sodium bicarbonate 650 MG tablet, Take 1 tablet (650 mg total) by mouth 2 (two) times daily., Disp: 180 tablet, Rfl: 3    sodium zirconium cyclosilicate (LOKELMA) 10 gram packet, Take 1 packet (10 g total) by mouth once daily. Mix entire contents of packet(s) into drinking glass containing 3 tablespoons of water; stir well and drink immediately. Add water and repeat until no powder remains to receive entire dose., Disp: 30 packet, Rfl: 0    tamsulosin (FLOMAX) 0.4 mg Cap, Take 1 capsule (0.4 mg  total) by mouth every evening., Disp: 90 capsule, Rfl: 0    valsartan (DIOVAN) 320 MG tablet, Take 1 tablet (320 mg total) by mouth once daily., Disp: 90 tablet, Rfl: 3    torsemide (DEMADEX) 20 MG Tab, Take 1 tablet (20 mg total) by mouth 2 (two) times a day., Disp: , Rfl:      Review of Systems   Respiratory:  Negative for shortness of breath.    Cardiovascular:  Positive for leg swelling.        Objective:      Vitals:    09/17/24 0951   BP: (!) 156/96   Pulse:    Resp:    Temp:        Physical Exam  Constitutional:       Appearance: Normal appearance.   HENT:      Head: Normocephalic and atraumatic.   Cardiovascular:      Rate and Rhythm: Normal rate.   Musculoskeletal:         General: Normal range of motion.      Right lower leg: Edema present.      Left lower leg: Edema present.   Skin:     General: Skin is warm and dry.   Neurological:      General: No focal deficit present.      Mental Status: He is alert and oriented to person, place, and time.      Protective Sensation (w/ 10 gram monofilament):  Right: Intact  Left: Intact    Visual Inspection:  Normal -  Bilateral    Pedal Pulses:   Right: Present  Left: Present    Posterior Tibialis Pulses:   Right: -  Left:  -      Assessment:       1. Heart failure with preserved ejection fraction, unspecified HF chronicity    2. Leg swelling    3. Benign hypertension with CKD (chronic kidney disease) stage V    4. Type 2 diabetes mellitus with stage 5 chronic kidney disease not on chronic dialysis, without long-term current use of insulin          Plan:   1. Heart failure with preserved ejection fraction, unspecified HF chronicity  Assessment & Plan:  Not present in march, G2DD in recent ECHO   No acute sx today   Back to dry weight (274lbs)   - has appt with cards   - cont toresemide 20mg BID, advised to increase dose if lbs increase by 3-4  - educated patient of pathology of disease and sx of exacerbation       2. Leg swelling  Assessment & Plan:  Chronic,  unresolved, bilateral LE edema +3    - advised to use compression stockings   - to keep legs elevated when sitting   - try to stay active and avoid prolonged sitting if possible  - continue torsemide BID       Orders:  -     torsemide (DEMADEX) 20 MG Tab; Take 1 tablet (20 mg total) by mouth 2 (two) times a day.    3. Benign hypertension with CKD (chronic kidney disease) stage V  Assessment & Plan:  Chronic, uncontrolled. BP: (!) 156/96 (9/17/2024  9:51 AM)    - counseled patient on lifestyle changes to reduce blood pressure including reducing salt intake, weight loss, eating plenty of fruits and vegetables, cutting down on alcohol and exercise  - continue nifedipine, valsartan, torsemide and labetalol   -  has appt with cards in one month  - advised to measure BP at least three times a week         4. Type 2 diabetes mellitus with stage 5 chronic kidney disease not on chronic dialysis, without long-term current use of insulin  Assessment & Plan:  Chronic, controlled   Stopped Pioglitazone due to heart failure   Lab Results   Component Value Date    HGBA1C 5.1 08/26/2024     - continue tradjenta            Follow up in about 4 months (around 1/17/2025) for f/up.

## 2024-09-17 NOTE — ASSESSMENT & PLAN NOTE
Not present in march, G2DD in recent ECHO   No acute sx today   Back to dry weight (274lbs)   - has appt with cards   - cont toresemide 20mg BID, advised to increase dose if lbs increase by 3-4  - educated patient of pathology of disease and sx of exacerbation

## 2024-09-17 NOTE — ASSESSMENT & PLAN NOTE
Chronic, unresolved, bilateral LE edema +3    - advised to use compression stockings   - to keep legs elevated when sitting   - try to stay active and avoid prolonged sitting if possible  - continue torsemide BID

## 2024-09-17 NOTE — ASSESSMENT & PLAN NOTE
Chronic, controlled   Stopped Pioglitazone due to heart failure   Lab Results   Component Value Date    HGBA1C 5.1 08/26/2024     - continue tradjenta

## 2024-09-17 NOTE — ASSESSMENT & PLAN NOTE
Chronic, uncontrolled. BP: (!) 156/96 (9/17/2024  9:51 AM)    - counseled patient on lifestyle changes to reduce blood pressure including reducing salt intake, weight loss, eating plenty of fruits and vegetables, cutting down on alcohol and exercise  - continue nifedipine, valsartan, torsemide and labetalol   -  has appt with cards in one month  - advised to measure BP at least three times a week

## 2024-09-17 NOTE — PROGRESS NOTES
Health Maintenance Due   Topic     Abdominal Aortic Aneurysm Screening  Consult pcp    Foot Exam  Consult pcp    Influenza Vaccine (1) Pt decline    COVID-19 Vaccine (4 - 2023-24 season) Not offered at this Facility

## 2024-09-19 NOTE — PROGRESS NOTES
Hgb 7.5 / Hct 24.1    1st Dose Retacrit [id number] administered , Per Form # [id number]. Pt was educated on possible side effects and drug mechanism of action. Pt was also given handout explaining these things as well . All questions and concerns were addressed .Pt was ask to wait in office for 15 min after injection, no reactions noted after this time ,tolerated injection well. 1 week appt given.       GFR  10.5

## 2024-09-20 ENCOUNTER — TELEPHONE (OUTPATIENT)
Dept: NEPHROLOGY | Facility: CLINIC | Age: 69
End: 2024-09-20
Payer: MEDICARE

## 2024-09-22 ENCOUNTER — HOSPITAL ENCOUNTER (EMERGENCY)
Facility: HOSPITAL | Age: 69
Discharge: HOME | End: 2024-09-22
Attending: EMERGENCY MEDICINE
Payer: MEDICARE

## 2024-09-22 VITALS
RESPIRATION RATE: 16 BRPM | OXYGEN SATURATION: 98 % | HEIGHT: 72 IN | SYSTOLIC BLOOD PRESSURE: 131 MMHG | TEMPERATURE: 98 F | HEART RATE: 68 BPM | DIASTOLIC BLOOD PRESSURE: 68 MMHG | BODY MASS INDEX: 36.57 KG/M2 | WEIGHT: 270 LBS
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
## 2024-09-22 PROCEDURE — 25000003 PHARM REV CODE 250: Mod: HCNC,NTX | Performed by: EMERGENCY MEDICINE

## 2024-09-22 PROCEDURE — 99283 EMERGENCY DEPT VISIT LOW MDM: CPT | Mod: HCNC,NTX

## 2024-09-22 PROCEDURE — 63600175 PHARM REV CODE 636 W HCPCS: Mod: HCNC,NTX | Performed by: EMERGENCY MEDICINE

## 2024-09-22 RX ORDER — FAMOTIDINE 20 MG/1
40 TABLET, FILM COATED ORAL
Status: COMPLETED | OUTPATIENT
Start: 2024-09-22 | End: 2024-09-22

## 2024-09-22 RX ORDER — PREDNISONE 20 MG/1
40 TABLET ORAL
Status: COMPLETED | OUTPATIENT
Start: 2024-09-22 | End: 2024-09-22

## 2024-09-22 RX ORDER — DIPHENHYDRAMINE HCL 25 MG
50 CAPSULE ORAL
Status: DISCONTINUED | OUTPATIENT
Start: 2024-09-22 | End: 2024-09-22 | Stop reason: HOSPADM

## 2024-09-22 RX ADMIN — FAMOTIDINE 40 MG: 20 TABLET, FILM COATED ORAL at 01:09

## 2024-09-22 RX ADMIN — PREDNISONE 40 MG: 20 TABLET ORAL at 01:09

## 2024-09-22 NOTE — ED PROVIDER NOTES
Encounter Date: 9/22/2024       History     Chief Complaint   Patient presents with    Allergic Reaction     Allergic reaction after getting bit by ants 2hrs pta.  Pt states that he was assisting son to change a flat tire when he stepping in an ant pile.  Generalized hives present.  No meds pta.  Denies SOB.      69-year-old male presenting for possible allergic reaction.  The patient reports the incident occurred around 7:00 p.m..  The patient reports he was helping his son change a tire when he stepped on a pile of ants. The patient reports he was bit in the leg.  On the drive back home he noted itching in his arms.  He reports a previous anaphylaxis reaction when he was a teenager to a wasp sting.  He denies any respiratory component.  He reports he did note some lip swelling.  No medication taken prior to arrival.  The event occurred 6 hours prior to my evaluation.      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 case request entered     vaccinated-GT  instr portal    MOUTH SURGERY      RENAL BIOPSY Left 7/19/2023    Procedure: BIOPSY, KIDNEY;  Surgeon: Nikky Soriano MD;  Location: [facility] CATH LAB;  Service: Interventional Nephrology;  Laterality: Left;     Family  History   Problem Relation Name Age of Onset    Cancer Mother          Lung Cancer    Cancer Father          Colon cancer    Diabetes Father      Hypertension Father      No Known Problems Sister      No Known Problems Brother      No Known Problems Maternal Aunt      No Known Problems Maternal Uncle      No Known Problems Paternal Aunt      No Known Problems Paternal Uncle      No Known Problems Maternal Grandmother      No Known Problems Maternal Grandfather      No Known Problems Paternal Grandmother      No Known Problems Paternal Grandfather      Amblyopia Neg Hx      Blindness Neg Hx      Cataracts Neg Hx      Glaucoma Neg Hx      Macular degeneration Neg Hx      Retinal detachment Neg Hx      Strabismus Neg Hx      Stroke Neg Hx      Thyroid disease Neg Hx       Social History     Tobacco Use    Smoking status: Former     Current packs/day: 0.50     Types: Cigarettes    Smokeless tobacco: Never   Substance Use Topics    Alcohol use: Yes     Comment: rarely    Drug use: Not Currently     Review of Systems   Constitutional:  Negative for chills and fever.   Respiratory:  Negative for cough, shortness of breath, wheezing and stridor.    Cardiovascular:  Negative for chest pain.   Gastrointestinal:  Negative for abdominal pain, nausea and vomiting.   Skin:  Positive for rash.   Neurological:  Negative for dizziness and light-headedness.       Physical Exam     Initial Vitals [09/22/24 0029]   BP Pulse Resp Temp SpO2   (!) 113/59 74 20 98 °F (36.7 °C) 99 %      MAP       --         Physical Exam    Nursing note and vitals reviewed.  Constitutional: He appears well-developed. He is not diaphoretic. No distress.   HENT:   Head: Normocephalic.   No hoarseness.  Able to speak in full sentences.  No lip edema.  No tongue edema.  Able to visualize the oropharynx.  No oropharyngeal edema.    Eyes: EOM are normal. Right conjunctiva is not injected. Left conjunctiva is not injected.   Neck: No thyroid mass present. No  stridor present.   Cardiovascular:  Normal rate and regular rhythm.           No murmur heard.  Pulmonary/Chest: Effort normal and breath sounds normal. No tachypnea. He has no wheezes.   Abdominal: Abdomen is soft and flat. He exhibits no distension and no mass. There is no abdominal tenderness.   Musculoskeletal:         General: Normal range of motion.     Neurological: He is alert.   Skin: Skin is warm.   No rash present on the upper extremities with the lower extremities.         ED Course   Procedures  Labs Reviewed - No data to display       Imaging Results    None          Medications   diphenhydrAMINE capsule 50 mg (50 mg Oral Not Given 9/22/24 0100)   famotidine tablet 40 mg (40 mg Oral Given 9/22/24 0105)   predniSONE tablet 40 mg (40 mg Oral Given 9/22/24 0106)     Medical Decision Making    69-year-old male presenting for concerns of allergic reaction following multiple ant bites.  There appears to be no rash on exam.  No respiratory component.  No head or facial edema.  No dysphonia.  Patient tolerating secretions.  The patient does not appear to be in any respiratory distress.  Hemodynamically stable.  The patient's incident occurred roughly 7 hours prior from discharge.  Patient appears stable for outpatient follow up.  Recommended Benadryl as needed for itching.        Medical Decision Making:     A. Problem List:  1. Insect bite     B. Differential diagnosis:    Allergic reaction, insect bite irritation    Part of the note was done using electronic dictation services.           Risk  OTC drugs.  Prescription drug management.                                      Clinical Impression:  Final diagnoses:  [W57.XXXA] Insect bite, unspecified site, initial encounter (Primary)          ED Disposition Condition    Discharge Stable          ED Prescriptions    None       Follow-up Information       Follow up With Specialties Details Why Contact Info    Myranda Avila MD Internal Medicine Schedule an appointment  as soon as possible for a visit in 1 week Primary care [address] [place] [address]  [phone]      [facility]  Schedule an appointment as soon as possible for a visit in 1 week Primary care [address] [id number] [phone]    [facility] - Emergency Dept Emergency Medicine  If symptoms worsen [address] [facility] [place] [id number] [phone]             Hussain Roque MD  09/22/24 0138

## 2024-09-22 NOTE — DISCHARGE INSTRUCTIONS
Recommend Benadryl pill or Benadryl cream as needed for itching.    Seek medical care for any worsening symptoms or concerns.

## 2024-09-23 ENCOUNTER — OFFICE VISIT (OUTPATIENT)
Dept: OPHTHALMOLOGY | Facility: CLINIC | Age: 69
End: 2024-09-23
Payer: MEDICARE

## 2024-09-23 ENCOUNTER — TELEPHONE (OUTPATIENT)
Dept: OPHTHALMOLOGY | Facility: CLINIC | Age: 69
End: 2024-09-23

## 2024-09-23 ENCOUNTER — PATIENT OUTREACH (OUTPATIENT)
Dept: EMERGENCY MEDICINE | Facility: HOSPITAL | Age: 69
End: 2024-09-23
Payer: MEDICARE

## 2024-09-23 ENCOUNTER — CLINICAL SUPPORT (OUTPATIENT)
Dept: OPHTHALMOLOGY | Facility: CLINIC | Age: 69
End: 2024-09-23
Payer: MEDICARE

## 2024-09-23 ENCOUNTER — LAB VISIT (OUTPATIENT)
Dept: LAB | Facility: HOSPITAL | Age: 69
End: 2024-09-23
Payer: MEDICARE

## 2024-09-23 DIAGNOSIS — D64.9 ANEMIA, UNSPECIFIED TYPE: ICD-10-CM

## 2024-09-23 DIAGNOSIS — H25.13 NS (NUCLEAR SCLEROSIS), BILATERAL: ICD-10-CM

## 2024-09-23 DIAGNOSIS — E11.3513 TYPE 2 DIABETES MELLITUS WITH BOTH EYES AFFECTED BY PROLIFERATIVE RETINOPATHY AND MACULAR EDEMA, WITH LONG-TERM CURRENT USE OF INSULIN: Primary | ICD-10-CM

## 2024-09-23 DIAGNOSIS — H35.033 HYPERTENSIVE RETINOPATHY, BILATERAL: ICD-10-CM

## 2024-09-23 DIAGNOSIS — Z79.4 TYPE 2 DIABETES MELLITUS WITH BOTH EYES AFFECTED BY PROLIFERATIVE RETINOPATHY AND MACULAR EDEMA, WITH LONG-TERM CURRENT USE OF INSULIN: Primary | ICD-10-CM

## 2024-09-23 DIAGNOSIS — E11.3513 TYPE 2 DIABETES MELLITUS WITH BOTH EYES AFFECTED BY PROLIFERATIVE RETINOPATHY AND MACULAR EDEMA, WITHOUT LONG-TERM CURRENT USE OF INSULIN: ICD-10-CM

## 2024-09-23 LAB
HCT VFR BLD AUTO: 25 % (ref 40–54)
HGB BLD-MCNC: 7.6 G/DL (ref 14–18)
IRON SERPL-MCNC: 62 UG/DL (ref 45–160)
SATURATED IRON: 22 % (ref 20–50)
TOTAL IRON BINDING CAPACITY: 286 UG/DL (ref 250–450)
TRANSFERRIN SERPL-MCNC: 193 MG/DL (ref 200–375)

## 2024-09-23 PROCEDURE — 92201 OPSCPY EXTND RTA DRAW UNI/BI: CPT | Mod: HCNC,S$GLB,, | Performed by: OPHTHALMOLOGY

## 2024-09-23 PROCEDURE — 3288F FALL RISK ASSESSMENT DOCD: CPT | Mod: HCNC,CPTII,S$GLB, | Performed by: OPHTHALMOLOGY

## 2024-09-23 PROCEDURE — 3066F NEPHROPATHY DOC TX: CPT | Mod: HCNC,CPTII,S$GLB, | Performed by: OPHTHALMOLOGY

## 2024-09-23 PROCEDURE — 3062F POS MACROALBUMINURIA REV: CPT | Mod: HCNC,CPTII,S$GLB, | Performed by: OPHTHALMOLOGY

## 2024-09-23 PROCEDURE — 1101F PT FALLS ASSESS-DOCD LE1/YR: CPT | Mod: HCNC,CPTII,S$GLB, | Performed by: OPHTHALMOLOGY

## 2024-09-23 PROCEDURE — 1160F RVW MEDS BY RX/DR IN RCRD: CPT | Mod: HCNC,CPTII,S$GLB, | Performed by: OPHTHALMOLOGY

## 2024-09-23 PROCEDURE — 1159F MED LIST DOCD IN RCRD: CPT | Mod: HCNC,CPTII,S$GLB, | Performed by: OPHTHALMOLOGY

## 2024-09-23 PROCEDURE — 85014 HEMATOCRIT: CPT | Mod: HCNC,TXP | Performed by: NURSE PRACTITIONER

## 2024-09-23 PROCEDURE — 36415 COLL VENOUS BLD VENIPUNCTURE: CPT | Mod: HCNC,NTX | Performed by: NURSE PRACTITIONER

## 2024-09-23 PROCEDURE — 92014 COMPRE OPH EXAM EST PT 1/>: CPT | Mod: HCNC,S$GLB,, | Performed by: OPHTHALMOLOGY

## 2024-09-23 PROCEDURE — 3044F HG A1C LEVEL LT 7.0%: CPT | Mod: HCNC,CPTII,S$GLB, | Performed by: OPHTHALMOLOGY

## 2024-09-23 PROCEDURE — 99999 PR PBB SHADOW E&M-EST. PATIENT-LVL III: CPT | Mod: PBBFAC,HCNC,, | Performed by: OPHTHALMOLOGY

## 2024-09-23 PROCEDURE — 85018 HEMOGLOBIN: CPT | Mod: HCNC,TXP | Performed by: NURSE PRACTITIONER

## 2024-09-23 PROCEDURE — 92134 CPTRZ OPH DX IMG PST SGM RTA: CPT | Mod: HCNC,S$GLB,, | Performed by: OPHTHALMOLOGY

## 2024-09-23 PROCEDURE — 1126F AMNT PAIN NOTED NONE PRSNT: CPT | Mod: HCNC,CPTII,S$GLB, | Performed by: OPHTHALMOLOGY

## 2024-09-23 PROCEDURE — 83540 ASSAY OF IRON: CPT | Mod: HCNC,NTX | Performed by: NURSE PRACTITIONER

## 2024-09-23 PROCEDURE — 4010F ACE/ARB THERAPY RXD/TAKEN: CPT | Mod: HCNC,CPTII,S$GLB, | Performed by: OPHTHALMOLOGY

## 2024-09-23 NOTE — TELEPHONE ENCOUNTER
Left message requesting patient call back to schedule next available cataract eval with Dr. Cooper per Dr. Mcmanus.

## 2024-09-23 NOTE — PROGRESS NOTES
Patient was seen in the ED on 9/22/24. Phoned patient to assist with Post ED Discharge Navigation.   Patient declined assisted scheduling a PCP follow-up.  Christal Amaya

## 2024-09-23 NOTE — PROGRESS NOTES
HPI    8 wk OCT/DFE    Pt states va is worsening OD>OS since last visit. Worsening foggy va   OD>OS. Pt c/o very very very bad va OD. Wants to communicate that better   this visit.   Hemoglobin A1C       Date                     Value               Ref Range             Status                08/26/2024               5.1                 4.0 - 5.6 %           Final              No flashes  No floaters  No pain  Gtts: Red eye drops, every Sunday.  Last edited by Manuela Henson on 9/23/2024  9:53 AM.             OCT  DME OU  OD improved  OS stable  low grade DME    Prior WFFA  OD - normal transit time. Hyperfluorescence early c/w Ma/edema - sig NP    OS - Hyperfluorescence early c/w Ma/edema  .NV nasal      A/P    1. 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 DME OU   6/19 - pt did not FU until 8/20  S/p Avastin OD x 2  S/p Eylea OD x 2  S/p Ozurdex OD x 4, OS x 2  5/24  S/p Iluvien OU 5/24 11/22 - not seen x 2 years  3/23 - had second opinion with Dr. Suh.  Pt would like to try a few Eylea prior to steroids if needed  11/23 - increased DME oU  7/24 trace increase   9/24 - OD improving, OS stable    No injection today      3. HTN Ret OU  BS/BP/chol control    4. NS OU  Increased PSC OD>OS    CE eval    5. Cyst RLL  Removed by Rhea  Happy with result        3   month OCT mac and RNFL no dilate - see in room LDFE 9/24

## 2024-09-27 ENCOUNTER — CLINICAL SUPPORT (OUTPATIENT)
Dept: NEPHROLOGY | Facility: CLINIC | Age: 69
End: 2024-09-27
Payer: MEDICARE

## 2024-09-27 VITALS
HEART RATE: 79 BPM | DIASTOLIC BLOOD PRESSURE: 71 MMHG | BODY MASS INDEX: 36.63 KG/M2 | SYSTOLIC BLOOD PRESSURE: 126 MMHG | WEIGHT: 270.06 LBS | OXYGEN SATURATION: 99 %

## 2024-09-27 DIAGNOSIS — N18.5 ANEMIA IN STAGE 5 CHRONIC KIDNEY DISEASE, NOT ON CHRONIC DIALYSIS: Primary | ICD-10-CM

## 2024-09-27 DIAGNOSIS — N18.5 CKD (CHRONIC KIDNEY DISEASE), STAGE V: ICD-10-CM

## 2024-09-27 DIAGNOSIS — D63.1 ANEMIA IN STAGE 5 CHRONIC KIDNEY DISEASE, NOT ON CHRONIC DIALYSIS: Primary | ICD-10-CM

## 2024-09-27 PROCEDURE — 99999 PR PBB SHADOW E&M-EST. PATIENT-LVL II: CPT | Mod: PBBFAC,HCNC,,

## 2024-09-30 ENCOUNTER — OFFICE VISIT (OUTPATIENT)
Dept: OPHTHALMOLOGY | Facility: CLINIC | Age: 69
End: 2024-09-30
Payer: MEDICARE

## 2024-09-30 DIAGNOSIS — H25.11 NUCLEAR SCLEROTIC CATARACT OF RIGHT EYE: Primary | ICD-10-CM

## 2024-09-30 DIAGNOSIS — Z79.4 TYPE 2 DIABETES MELLITUS WITH BOTH EYES AFFECTED BY PROLIFERATIVE RETINOPATHY AND MACULAR EDEMA, WITH LONG-TERM CURRENT USE OF INSULIN: ICD-10-CM

## 2024-09-30 DIAGNOSIS — H35.033 HYPERTENSIVE RETINOPATHY, BILATERAL: ICD-10-CM

## 2024-09-30 DIAGNOSIS — H25.12 NUCLEAR SCLEROTIC CATARACT OF LEFT EYE: ICD-10-CM

## 2024-09-30 DIAGNOSIS — E11.3513 TYPE 2 DIABETES MELLITUS WITH BOTH EYES AFFECTED BY PROLIFERATIVE RETINOPATHY AND MACULAR EDEMA, WITH LONG-TERM CURRENT USE OF INSULIN: ICD-10-CM

## 2024-09-30 DIAGNOSIS — E11.3513 TYPE 2 DIABETES MELLITUS WITH BOTH EYES AFFECTED BY PROLIFERATIVE RETINOPATHY AND MACULAR EDEMA, WITHOUT LONG-TERM CURRENT USE OF INSULIN: ICD-10-CM

## 2024-09-30 PROCEDURE — 1159F MED LIST DOCD IN RCRD: CPT | Mod: HCNC,CPTII,S$GLB, | Performed by: OPHTHALMOLOGY

## 2024-09-30 PROCEDURE — 92136 OPHTHALMIC BIOMETRY: CPT | Mod: HCNC,RT,S$GLB, | Performed by: OPHTHALMOLOGY

## 2024-09-30 PROCEDURE — 1101F PT FALLS ASSESS-DOCD LE1/YR: CPT | Mod: HCNC,CPTII,S$GLB, | Performed by: OPHTHALMOLOGY

## 2024-09-30 PROCEDURE — 99214 OFFICE O/P EST MOD 30 MIN: CPT | Mod: HCNC,S$GLB,, | Performed by: OPHTHALMOLOGY

## 2024-09-30 PROCEDURE — 3288F FALL RISK ASSESSMENT DOCD: CPT | Mod: HCNC,CPTII,S$GLB, | Performed by: OPHTHALMOLOGY

## 2024-09-30 PROCEDURE — G2211 COMPLEX E/M VISIT ADD ON: HCPCS | Mod: HCNC,S$GLB,, | Performed by: OPHTHALMOLOGY

## 2024-09-30 PROCEDURE — 3062F POS MACROALBUMINURIA REV: CPT | Mod: HCNC,CPTII,S$GLB, | Performed by: OPHTHALMOLOGY

## 2024-09-30 PROCEDURE — 99999 PR PBB SHADOW E&M-EST. PATIENT-LVL III: CPT | Mod: PBBFAC,HCNC,, | Performed by: OPHTHALMOLOGY

## 2024-09-30 PROCEDURE — 3044F HG A1C LEVEL LT 7.0%: CPT | Mod: HCNC,CPTII,S$GLB, | Performed by: OPHTHALMOLOGY

## 2024-09-30 PROCEDURE — 4010F ACE/ARB THERAPY RXD/TAKEN: CPT | Mod: HCNC,CPTII,S$GLB, | Performed by: OPHTHALMOLOGY

## 2024-09-30 PROCEDURE — 1126F AMNT PAIN NOTED NONE PRSNT: CPT | Mod: HCNC,CPTII,S$GLB, | Performed by: OPHTHALMOLOGY

## 2024-09-30 PROCEDURE — 92134 CPTRZ OPH DX IMG PST SGM RTA: CPT | Mod: HCNC,S$GLB,, | Performed by: OPHTHALMOLOGY

## 2024-09-30 PROCEDURE — 3066F NEPHROPATHY DOC TX: CPT | Mod: HCNC,CPTII,S$GLB, | Performed by: OPHTHALMOLOGY

## 2024-09-30 NOTE — PROGRESS NOTES
HPI    Jaksteve     Severe NPDR OD, not high risk PDR OS  Uncontrolled T2, NO insulin  - Last A1C 10.4 7/20  No FU from 4/18 to 6/19     2. DME OU   6/19 - pt did not FU until 8/20  S/p Avastin OD x 2  S/p Eylea OD x 2  S/p Ozurdex OD x 4, OS x 2  5/24  S/p Iluvien OU 5/24 11/22 - not seen x 2 years  3/23 - had second opinion with Dr. Suh.  Pt would like to try a few Eylea   prior to steroids if needed  11/23 - increased DME oU  7/24 trace increase   9/24 - OD improving, OS stable      VA OD cloudy a lot after injection (iluvien) in 7/2024 gradually. VA OS   getting cloudy also gradually.  Extremely frustrated with decreased in   vision. No glare problems.     Meds: none  Last edited by Dolores Cooper MD on 9/30/2024 11:14 AM.            Assessment /Plan     For exam results, see Encounter Report.    Nuclear sclerotic cataract of right eye  -     IOL Master - OD - Right Eye    Nuclear sclerotic cataract of left eye    Type 2 diabetes mellitus with both eyes affected by proliferative retinopathy and macular edema, without long-term current use of insulin  -     Posterior Segment OCT Retina-Both eyes    Type 2 diabetes mellitus with both eyes affected by proliferative retinopathy and macular edema, with long-term current use of insulin    Hypertensive retinopathy, bilateral                       Visually significant nuclear sclerotic cataract    - Cataracts are interfering with activities of daily living, including night time driving.  - Pt desires cataract surgery for visual rehabilitation.   - Risks / benefits/ alternatives were discussed and patient agrees to proceed with surgery.   - IOL options discussed as well, according to patient's goals and concomitant ocular pathology.  - Target: plano.    Pt agreed to distance target and readers for near.  I do not recommend MFIOL in light of DBR, this was explained in detail to pt.    DIBOO 22.0 OD -      DIBOO 20.5 OS    Explained to patient that he has an unknown  visual potential in both eyes due to diabetic retinopathy treated by Dr. WEEKS, details below.    Severe NPDR OD, not high risk PDR OS  Uncontrolled T2, NO insulin       DME OU   6/19 - pt did not FU until 8/20  S/p Avastin OD x 2  S/p Eylea OD x 2  S/p Ozurdex OD x 4, OS x 2  5/24  S/p Iluvien OU 5/24 11/22 - not seen x 2 years  3/23 - had second opinion with Dr. Suh.  Pt would like to try a few Eylea   prior to steroids if needed  11/23 - increased DME oU  7/24 trace increase   9/24 - OD improving, OS stable    Today's visit is associated with current and anticipated ongoing medical care related to this patient's single serious/complex condition (dbr). Follow up is to be continued indefinitely to monitor the condition.

## 2024-10-01 ENCOUNTER — LAB VISIT (OUTPATIENT)
Dept: LAB | Facility: HOSPITAL | Age: 69
End: 2024-10-01
Payer: MEDICARE

## 2024-10-01 DIAGNOSIS — Z76.82 ORGAN TRANSPLANT CANDIDATE: ICD-10-CM

## 2024-10-01 DIAGNOSIS — H25.11 NUCLEAR SCLEROTIC CATARACT OF RIGHT EYE: Primary | ICD-10-CM

## 2024-10-01 PROCEDURE — 86833 HLA CLASS II HIGH DEFIN QUAL: CPT | Mod: HCNC,TXP | Performed by: NURSE PRACTITIONER

## 2024-10-01 PROCEDURE — 86832 HLA CLASS I HIGH DEFIN QUAL: CPT | Mod: HCNC,TXP | Performed by: NURSE PRACTITIONER

## 2024-10-01 RX ORDER — PREDNISOLONE/MOXIFLOX/BROMF/PF 1 %-0.5 %
1 DROPS OPHTHALMIC (EYE) 3 TIMES DAILY
Qty: 5 ML | Refills: 3 | Status: SHIPPED | OUTPATIENT
Start: 2024-10-01

## 2024-10-04 ENCOUNTER — TELEPHONE (OUTPATIENT)
Dept: NEPHROLOGY | Facility: CLINIC | Age: 69
End: 2024-10-04
Payer: MEDICARE

## 2024-10-04 ENCOUNTER — CLINICAL SUPPORT (OUTPATIENT)
Dept: NEPHROLOGY | Facility: CLINIC | Age: 69
End: 2024-10-04
Payer: MEDICARE

## 2024-10-04 VITALS
SYSTOLIC BLOOD PRESSURE: 156 MMHG | DIASTOLIC BLOOD PRESSURE: 74 MMHG | HEART RATE: 80 BPM | BODY MASS INDEX: 36.63 KG/M2 | OXYGEN SATURATION: 97 % | WEIGHT: 270.06 LBS

## 2024-10-04 DIAGNOSIS — N18.5 ANEMIA IN STAGE 5 CHRONIC KIDNEY DISEASE, NOT ON CHRONIC DIALYSIS: Primary | ICD-10-CM

## 2024-10-04 DIAGNOSIS — D63.1 ANEMIA IN STAGE 5 CHRONIC KIDNEY DISEASE, NOT ON CHRONIC DIALYSIS: Primary | ICD-10-CM

## 2024-10-04 DIAGNOSIS — N18.5 CKD (CHRONIC KIDNEY DISEASE), STAGE V: ICD-10-CM

## 2024-10-04 LAB — HPRA INTERPRETATION: NORMAL

## 2024-10-04 PROCEDURE — 99999 PR PBB SHADOW E&M-EST. PATIENT-LVL II: CPT | Mod: PBBFAC,HCNC,,

## 2024-10-04 PROCEDURE — 96372 THER/PROPH/DIAG INJ SC/IM: CPT | Mod: HCNC,S$GLB,, | Performed by: NURSE PRACTITIONER

## 2024-10-07 ENCOUNTER — TELEPHONE (OUTPATIENT)
Dept: TRANSPLANT | Facility: CLINIC | Age: 69
End: 2024-10-07
Payer: MEDICARE

## 2024-10-07 NOTE — TELEPHONE ENCOUNTER
Spoke to pt confirming rescheduled lab and clinic visit on 11/19/2024. Appt reminder mailed and pt is aware to bring care giver.    ----- Message from Carlota Montejo sent at 10/7/2024 11:05 AM CDT -----  Regarding: FW: Reschedule Existing Appointment  Contact: [phone]    ----- Message -----  From: Marciano Gross  Sent: 10/7/2024  10:55 AM CDT  To: [facility] Waitlisted Coordinator  Subject: Reschedule Existing Appointment                  Reschedule Existing Appointment     Current appt date: 11/01/2024     Type of appt : Casimiro and appt     Physician: N/A     Reason for rescheduling: Has sx two days before and was trying to reschedule these appt for 11/7     Caller: Handy Adams     Contact Preference:  [phone]

## 2024-10-08 ENCOUNTER — OFFICE VISIT (OUTPATIENT)
Dept: NEPHROLOGY | Facility: CLINIC | Age: 69
End: 2024-10-08
Payer: MEDICARE

## 2024-10-08 ENCOUNTER — LAB VISIT (OUTPATIENT)
Dept: LAB | Facility: HOSPITAL | Age: 69
End: 2024-10-08
Payer: MEDICARE

## 2024-10-08 VITALS
OXYGEN SATURATION: 97 % | HEART RATE: 81 BPM | BODY MASS INDEX: 36.18 KG/M2 | SYSTOLIC BLOOD PRESSURE: 154 MMHG | DIASTOLIC BLOOD PRESSURE: 77 MMHG | WEIGHT: 266.75 LBS

## 2024-10-08 DIAGNOSIS — N18.5 ANEMIA IN STAGE 5 CHRONIC KIDNEY DISEASE, NOT ON CHRONIC DIALYSIS: ICD-10-CM

## 2024-10-08 DIAGNOSIS — R80.9 PROTEINURIA, UNSPECIFIED TYPE: ICD-10-CM

## 2024-10-08 DIAGNOSIS — R77.8 ABNORMAL SPEP: ICD-10-CM

## 2024-10-08 DIAGNOSIS — D47.2 MGUS (MONOCLONAL GAMMOPATHY OF UNKNOWN SIGNIFICANCE): ICD-10-CM

## 2024-10-08 DIAGNOSIS — E11.22 TYPE 2 DIABETES MELLITUS WITH STAGE 5 CHRONIC KIDNEY DISEASE NOT ON CHRONIC DIALYSIS, UNSPECIFIED WHETHER LONG TERM INSULIN USE: ICD-10-CM

## 2024-10-08 DIAGNOSIS — N18.5 TYPE 2 DIABETES MELLITUS WITH STAGE 5 CHRONIC KIDNEY DISEASE NOT ON CHRONIC DIALYSIS, UNSPECIFIED WHETHER LONG TERM INSULIN USE: ICD-10-CM

## 2024-10-08 DIAGNOSIS — N18.5 CHRONIC KIDNEY DISEASE (CKD), STAGE V: Primary | ICD-10-CM

## 2024-10-08 DIAGNOSIS — D64.9 ANEMIA, UNSPECIFIED TYPE: ICD-10-CM

## 2024-10-08 DIAGNOSIS — E66.01 SEVERE OBESITY (BMI 35.0-39.9) WITH COMORBIDITY: ICD-10-CM

## 2024-10-08 DIAGNOSIS — E87.20 ACIDOSIS: ICD-10-CM

## 2024-10-08 DIAGNOSIS — I10 HYPERTENSION, UNSPECIFIED TYPE: ICD-10-CM

## 2024-10-08 DIAGNOSIS — D63.1 ANEMIA IN STAGE 5 CHRONIC KIDNEY DISEASE, NOT ON CHRONIC DIALYSIS: ICD-10-CM

## 2024-10-08 LAB
HCT VFR BLD AUTO: 24.1 % (ref 40–54)
HGB BLD-MCNC: 7.4 G/DL (ref 14–18)
IRON SERPL-MCNC: 47 UG/DL (ref 45–160)
SATURATED IRON: 16 % (ref 20–50)
TOTAL IRON BINDING CAPACITY: 287 UG/DL (ref 250–450)
TRANSFERRIN SERPL-MCNC: 194 MG/DL (ref 200–375)

## 2024-10-08 PROCEDURE — 83540 ASSAY OF IRON: CPT | Mod: HCNC,TXP | Performed by: NURSE PRACTITIONER

## 2024-10-08 PROCEDURE — 99999 PR PBB SHADOW E&M-EST. PATIENT-LVL IV: CPT | Mod: PBBFAC,HCNC,, | Performed by: NURSE PRACTITIONER

## 2024-10-08 PROCEDURE — 85018 HEMOGLOBIN: CPT | Mod: HCNC,TXP | Performed by: NURSE PRACTITIONER

## 2024-10-08 PROCEDURE — 85014 HEMATOCRIT: CPT | Mod: HCNC,NTX | Performed by: NURSE PRACTITIONER

## 2024-10-08 PROCEDURE — 36415 COLL VENOUS BLD VENIPUNCTURE: CPT | Mod: HCNC,NTX | Performed by: NURSE PRACTITIONER

## 2024-10-08 NOTE — PROGRESS NOTES
"  Subjective:       Patient ID: Handy Adams is a 69 y.o. A male who presents for evaluation of of renal dysfunction.      HPI     Patient is new to me. New to clinic.  Prior pertinent chart reviewed since this is patient's first appointment with me.    Patient presents for new evaluation of renal dysfunction.  Baseline creatinine of 1.6-1.7 in 2020-early 2022. Recently had sCr of 2.5. Patient said he has "not been taking care of himself" and "eating more sugar" since January.    Per recent note from PCP: He cannot afford farxiga due to increased price and had stopped taking it for a few months prior to appt in September.    Home BPs: does not take    Rare Excedrin use now, though he used to use it frequently.    Significant other medical problems include HTN since at least 2007, T2DM since at least 2007.      The patient denies taking herbal supplements, or new antibiotics, recreational drugs, recent episode of dehydration, diarrhea, nausea or vomiting, acute illness, hospitalization or exposure to IV radiocontrast.     Significant family hx includes: No known kidney issues    Last renal US: none in EMR    Update 10/24/22:  Presents for f/u of CKD, YESENIA.  Last seen a month ago.  Feet are swelling again, especially when drinking ensure. Has improved since stopping Ensure.  Holding lisinopril-Hctz. Taking labetolol 200 mg instead.  Found to have an IgG lambda specific monoclonal band during proteinuria workup. Referred to hematology.    Recent sCr 2.5--> 2.8-2.9.     Home BPs: does not believe cuff is accurate.    Update 12/28/22:  Returns for f/u of CKD.  sCr has been 2.8-3.0 mg/dL.  Home BPs: not taking because he thinks cuff is inaccurate    Patient had bone marrow biopsy and was diagnosed with IgG-lambda MGUS.  An IgG lambda specific monoclonal protein was identified on 24 hour urine on 12/16/22.  He has not gotten kidney biopsy.    Says he feels great.    Update 2/3/23:  Returns for f/u of CKD and discussion " "about biopsy.  sCr now 3.0-3.2 mg/dL. Most recent sCr 3.3.  Increased valsartan to 160 mg at last visit.    Update 9/8/23:  - Presents for follow-up of CKD  - sCr trended up to 3.8. from baseline of about 3.0, now improved to 3.1. Unclear etiology.  - Notes being on Farxiga in the past but has not taken for at least a year   - Denies NSAIDs, reduced PO intake, n/v/d, fluctuations in BP, elevations in blood sugar, recent illness    Update 11/6/23:  Presents for f/u of CKD.  Most recent sCr 3.1 -3.4 mg/dL.  Home BPs: 130-140s (SBP)   Admits recent dietary indiscretion.  Says he gained weight recently.  Not exercising.    Advance Care Planning       Serious Illness Conversation Guide    Conversation was held with:   patient[SR1.1]     What is your understanding now of where you are with your illness?:   Comments: Admits he is in denial about kidney disease.[SR1.1]     How much information about what is likely to be ahead with your illness would you like from me?:   wants to be fully informed[SR1.1]     I want to share with you my understanding of where things are with your illness.:   continued decline[SR1.2]     Patient emotions observed or reported:   denial[SR1.1]     What are your most important goals if your health situation worsens?:   being independent[SR1.1]     What are your biggest fears and worries about the future with your health?:   Comments: being debilitated where he has to depend on others to care for him[SR1.1]     What gives you strength as you think about the future of your illness?:   Gnosticist tae[SR1.1]     What abilities are so critical to your life that you can't imagine living without them? Or, what makes life meaningful?:   being able to care for oneself, living independently[SR1.1]     If you become sicker, how much are you willing to go through for the possibility of gaining more time?:   Comments: Says he needs to think about this, but "I don't want to go through dialysis at all because " "I think that would put a constraint on my independence and constrain me from what I imagine I could be." He says he would want dialysis though if the choice was dialysis or death.    Has not thought about invasive procedures in depth.[SR1.1]     How much does your family know about your priorities and wishes?:   patient has had some incomplete discussions with family[SR1.2]  Comments: Says he is very private. Has not spoken to spouse or friends about this yet. He has shared some information with sister.[SR1.2]     I recommend that we do the following to make sure your treatment plans reflect what's important to you. How does this plan seem to you?:        Attribution       SR1.1 Raven Naylor NP 11/06/23 14:48    SR1.2 Raven Naylor NP 11/06/23 16:04               Update 2/9/24:  Presents today for f/u of CKD.  Most recent sCr was 4.1 mg/dL.  Home BPs: SBP 150s  Thinks he took his medication today. Takes his medication daily but not at the same time every day.  Saw transplant team.    Update 3/15/24:  Presents today for f/u of CKD.  Most recent sCr was 5.7    Says he has lost 20+ lbs by only eating on meal a day (Honey nut cheerios; almond milk). Drinks water.     Says he has been "feeling fine." No swelling. "I feel great."  Hematology is considering repeating bone marrow biopsy. Planning to do full body scan.    Update 4/23/24:  Presents today for f/u of CKD.  Most recent sCr was 4.5  Had clinic visit from PD nurse but said he is unsure of modality choice.   Previously referred to [facility] nutrition services/RD in March but has not been seen yet.  Has gained weight back.    Home BPs: 130s-140s/70s-80s    Update 7/24/24:  Presents today for f/u of CKD/ hospital f/u for YESENIA.  Had sCr on routine labs of 9.0. Repeat of 8.0. No uremic symptoms, but concerned for overdiuresis, need for IVF. Sent to ER. He was admitted; aldactone, torsemide, valsartan held. Discharged c sCr of 7.0. He required one dose of " starrafi while hospitalized.     Most recent sCr was 4.9 but UPCR up to 4 grams.  Ran out of nifedipine, but he took it yesterday.    Home BPs: 120s-140s    Update 9/13/24:  Presents for f/u of CKD/ hospital f/u of fluid overload.  He was hospitalized with fluid overload/ SOB. Diagnosed with HFpEF Lasix 40 mg BID changed to torsemide 40 mg daily. While hospital, he admitted he was having intermittent SOB since July that was relieved with position changes.  Last sCr in hospital was 5.8.  Home BPs: 150s/80s on digital medicine. Says he is back on nifedipine, but it is not on his med list.    SOB and edema improved.  Had decreased fluid intake to 24-48 oz daily.    Had home visit. Needs to clean and have another visit.    Update 10/8/24:  Returns for f/u of CKD.   Most recent sCr 5.5 mg/dL.  Home BPs: 140-150s on digital medicine.  He has not de-cluttered his home yet to prep for PD.  He has questions about how dietary intake of protein will affect the protein in his urine.  Seeing cardiology this month.    Review of Systems     Constitutional:  Negative for appetite change. Food tastes good.  Respiratory:  Negative for shortness of breath.    Cardiovascular:  Negative for swelling to legs.   Gastrointestinal:  Negative for diarrhea, nausea and vomiting.    Genitourinary:  Decreased urine output associated        Some incontinence         Objective:       Blood pressure (!) 154/77, pulse 81, weight 121 kg (266 lb 12.1 oz), SpO2 97%.  Physical Exam  Vitals reviewed.   Constitutional:       Appearance: Normal appearance. He is obese.   HENT:      Head: Normocephalic and atraumatic.   Eyes:      General: No scleral icterus.  Cardiovascular:      Rate and Rhythm: Normal rate and regular rhythm.   BLE +2 edema.  Pulmonary:      Effort: No respiratory distress.     Musculoskeletal:     Skin:     General: Skin is warm and dry.   Neurological:      Mental Status: He is alert and oriented to person, place, and time.    Psychiatric:         Mood and Affect: Mood normal.         Behavior: Behavior normal.           Lab Results   Component Value Date    CREATININE 5.5 (H) 10/01/2024     Prot/Creat Ratio, Urine   Date Value Ref Range Status   10/01/2024 4.04 (H) 0.00 - 0.20 Final   07/24/2024 4.04 (H) 0.00 - 0.20 Final   07/12/2024 2.52 (H) 0.00 - 0.20 Final     Lab Results   Component Value Date     10/01/2024    K 4.1 10/01/2024    CO2 20 (L) 10/01/2024     10/01/2024     Lab Results   Component Value Date    .9 (H) 10/01/2024    CALCIUM 8.5 (L) 10/01/2024    PHOS 4.1 10/01/2024     Lab Results   Component Value Date    HGB 7.4 (L) 10/08/2024    WBC 7.17 10/01/2024    HCT 24.1 (L) 10/08/2024      Lab Results   Component Value Date    HGBA1C 5.1 08/26/2024     10/01/2024    BUN 55 (H) 10/01/2024     Lab Results   Component Value Date    LDLCALC 87.4 02/07/2024         Assessment:       1. Chronic kidney disease (CKD), stage V    2. Hypertension, unspecified type    3. MGUS (monoclonal gammopathy of unknown significance)    4. Proteinuria, unspecified type    5. Acidosis    6. Type 2 diabetes mellitus with stage 5 chronic kidney disease not on chronic dialysis, unspecified whether long term insulin use    7. Abnormal SPEP    8. Anemia in stage 5 chronic kidney disease, not on chronic dialysis    9. Severe obesity (BMI 35.0-39.9) with comorbidity                          Plan:   CKD stage 5   - Biopsy performed showing hypertensive nephrosclerosis, acute tubular injury, and diabetic nephropathy. Progressive.  - IgG lambda specific monoclonal band noted on proteinuria workup and UPEP. No evidence of MGRS on biopsy.     - Educated patient to control BP, BG, remain well-hydrated, and avoid NSAIDs   - High risk of progression to ESRD.        UPCR Nephrotic range proteinuria. Was on farxiga but could not afford it; renal function is too low to start on recent labs. On ARB  - Proteinuric   Acid-base Controlled on  sodium bicarb 650 mg BID   Renal osteodystrophy Ca low. Phos okay on Renvela. Low vit D.     Elevated PTH.  On ergo weekly and calcitriol 3x/week.  Will trend to determine need to increase calcitriol to daily.    On Renvela   Anemia Hgb low last check. Has MGUS. Followed by hem/onc.  On PAOLA..    On PO iron twice a day.   DM Well-controlled recently. Has had DM since at least 2007, when he had an A1c of 15+.   Lipid Management On statin.   ESRD planning   Had home visit. Needs to clean and have another visit prior to surgery evaluation. He agrees to do this next week with plans to second visit the week after, pending the HTRN's availability. When she called to schedule, he had not cleaned yet. He agreed to clean in the next two weeks. Explained that if this is not done, he may lose his candidacy for home dialysis due to concerns for noncompliance with recommendations. She will attempt to schedule in about 2 weeks, pending her availability. He verbalized understanding.    Offered appt with LCSW for counseling and help with transition and overcoming barriers, but he declined.    Has transplant appointment coming up.      Offered counseling to help with expressed feelings of denial but he declined offer.      Kidney Failure Risk Equation (Tangri)    Kidney Failure Risk at 2 years: 79.2%    Kidney Failure Risk at 5 years: 99.3%    Lab Results   Component Value Date    MICALBCREAT 2397.8 (H) 10/01/2024    CREATININE 5.5 (H) 10/01/2024          HTN - High on labetalol 200 mg BID, valsartan 320 mg, torsemide 20 mg BID, and nifedipine 90 mg  - Continue monitoring with digital medicine    HFpEF - new diagnosis. Plans to make cardiology appt. Encouraged him to do so.     Obesity - he has increased dietary intake after severely limiting intake  - Refer to nutritionist (entered previously).     MGUS - per hem/onc; due back in April. 2024    All questions patient had were answered.  Asked if further questions. None. F/u in clinic  in 6 weeks with Raven Naylor, NP with labs and urine prior to next visit or sooner if needed.  ER for emergency concerns.    Summary of Plan:  Continue PAOLA  Keep cardiology appt  Discuss with HTRN  4. avoid NSAID/ bactrim/ IV contrast/ gadolinium/ aminoglycoside where possible  5. RTC in 1 mos        Visit today included increased complexity associated with  managing the longitudinal care of the patient due to the serious and/or complex managed problem(s) CKD.    I spent a total of 43 minutes on the day of the visit.  This includes face to face time and non-face to face time preparing to see the patient (eg, review of tests), obtaining and/or reviewing separately obtained history, documenting clinical information in the electronic or other health record, independently interpreting results and communicating results to the patient/family/caregiver, or care coordinator.

## 2024-10-08 NOTE — PATIENT INSTRUCTIONS
[facility]  (check this out on Youtube)    Please clean/ declutter a room in your house for PD supplies. The home dialysis nurse will reach out in a couple weeks to come back and check. If this is not done, it is possible that you may be denied as a home dialysis candidate and need to do in-center dialysis when you need it.

## 2024-10-08 NOTE — Clinical Note
"Marquez Menjivar, I saw Mr. Adams today. I reinforced your conversation and basically told him that you would reach out to visit again in 2 weeks (pending your availability), and that if he was not decluttered, then he may lose his candidacy for PD. I discussed this plan with Dr. Wolfe as well. Thank you so much for your help with him.    I do feel that there is something "off" as well, but I think he is capable of following a process."

## 2024-10-08 NOTE — Clinical Note
Hey guys, Here's an update:   Had home visit. Needs to clean and have another visit prior to surgery evaluation. He agrees to do this next week with plans to second visit the week after, pending the HTRN's availability. When she called to schedule, he had not cleaned yet. He agreed to clean in the next two weeks. Explained that if this is not done, he may lose his candidacy for home dialysis due to concerns for noncompliance with recommendations. She will attempt to schedule in about 2 weeks, pending her availability. He verbalized understanding.

## 2024-10-08 NOTE — Clinical Note
Good morning, Patient has appt next week. It's virtual for a new patient, and he wants to make sure this is okay. Thanks, Raven

## 2024-10-08 NOTE — Clinical Note
Ko Massey, He declined offer for an appt with you. Thanks, Raven Topical Sulfur Applications Pregnancy And Lactation Text: This medication is Pregnancy Category C and has an unknown safety profile during pregnancy. It is unknown if this topical medication is excreted in breast milk.

## 2024-10-09 NOTE — PROGRESS NOTES
Hgb 7.6 / Hct 25.0     2nd Dose Retacrit [id number] administered , Per Form # DRORD-428. All questions and concerns were addressed .Pt had no reactions noted at this time ,tolerated injection well. 1 week appt given.       GFR 10.5

## 2024-10-11 ENCOUNTER — CLINICAL SUPPORT (OUTPATIENT)
Dept: NEPHROLOGY | Facility: CLINIC | Age: 69
End: 2024-10-11
Payer: MEDICARE

## 2024-10-11 VITALS
OXYGEN SATURATION: 96 % | BODY MASS INDEX: 36.18 KG/M2 | SYSTOLIC BLOOD PRESSURE: 142 MMHG | WEIGHT: 266.75 LBS | DIASTOLIC BLOOD PRESSURE: 75 MMHG | HEART RATE: 79 BPM

## 2024-10-11 DIAGNOSIS — N18.5 CKD (CHRONIC KIDNEY DISEASE), STAGE V: ICD-10-CM

## 2024-10-11 DIAGNOSIS — N18.5 ANEMIA IN STAGE 5 CHRONIC KIDNEY DISEASE, NOT ON CHRONIC DIALYSIS: Primary | ICD-10-CM

## 2024-10-11 DIAGNOSIS — D63.1 ANEMIA IN STAGE 5 CHRONIC KIDNEY DISEASE, NOT ON CHRONIC DIALYSIS: Primary | ICD-10-CM

## 2024-10-11 PROCEDURE — 96372 THER/PROPH/DIAG INJ SC/IM: CPT | Mod: HCNC,S$GLB,, | Performed by: NURSE PRACTITIONER

## 2024-10-11 PROCEDURE — 99999 PR PBB SHADOW E&M-EST. PATIENT-LVL II: CPT | Mod: PBBFAC,HCNC,,

## 2024-10-15 NOTE — PROGRESS NOTES
Hgb 7.5 / Hct 25.3     3rd Dose Retacrit [id number] administered , Per Form # DRORD-428. All questions and concerns were addressed .Pt had no reactions noted at this time ,tolerated injection well. 1 week appt given.         GFR 10.5

## 2024-10-16 ENCOUNTER — OFFICE VISIT (OUTPATIENT)
Dept: CARDIOLOGY | Facility: CLINIC | Age: 69
End: 2024-10-16
Payer: MEDICARE

## 2024-10-16 DIAGNOSIS — N18.4 CKD (CHRONIC KIDNEY DISEASE) STAGE 4, GFR 15-29 ML/MIN: ICD-10-CM

## 2024-10-16 DIAGNOSIS — I50.32 CHRONIC DIASTOLIC CONGESTIVE HEART FAILURE: Primary | ICD-10-CM

## 2024-10-16 DIAGNOSIS — E66.812 CLASS 2 SEVERE OBESITY DUE TO EXCESS CALORIES WITH SERIOUS COMORBIDITY AND BODY MASS INDEX (BMI) OF 36.0 TO 36.9 IN ADULT: ICD-10-CM

## 2024-10-16 DIAGNOSIS — E66.01 CLASS 2 SEVERE OBESITY DUE TO EXCESS CALORIES WITH SERIOUS COMORBIDITY AND BODY MASS INDEX (BMI) OF 36.0 TO 36.9 IN ADULT: ICD-10-CM

## 2024-10-16 DIAGNOSIS — I70.0 AORTIC ATHEROSCLEROSIS: ICD-10-CM

## 2024-10-16 DIAGNOSIS — N18.5 CKD (CHRONIC KIDNEY DISEASE), STAGE V: ICD-10-CM

## 2024-10-16 DIAGNOSIS — I50.32 CHRONIC HEART FAILURE WITH PRESERVED EJECTION FRACTION: ICD-10-CM

## 2024-10-16 DIAGNOSIS — E78.2 MIXED HYPERLIPIDEMIA: ICD-10-CM

## 2024-10-16 RX ORDER — LABETALOL 200 MG/1
300 TABLET, FILM COATED ORAL EVERY 12 HOURS
Qty: 90 TABLET | Refills: 11 | Status: SHIPPED | OUTPATIENT
Start: 2024-10-16 | End: 2024-10-16 | Stop reason: SDUPTHER

## 2024-10-16 RX ORDER — LABETALOL 200 MG/1
300 TABLET, FILM COATED ORAL EVERY 12 HOURS
Qty: 90 TABLET | Refills: 11 | Status: SHIPPED | OUTPATIENT
Start: 2024-10-16 | End: 2025-10-16

## 2024-10-16 NOTE — PROGRESS NOTES
Subjective:   Patient ID:  Handy Adams is a 69 y.o. male who presents for follow-up of No chief complaint on file.  Recent hospital discharge  Mr. Adams is a 70 y/o M with a PMHx of CKD5, HTN, MGUS, cataracts T2DM, diabetic retinopathy and hyperparathyroidism presented to the ED this morning with complaints of shortness of breath onset last night. Pt states that last night he had trouble sleeping due to his SOB and could not find a comfortable position to sleep in. He states that his symptoms worsened this morning (8/26/24) around 0830 and called EMS for assistance. Per EMS, he was placed on 15 L O2 due to reported hypoxia with SpO2 in the 80s. At the time of EMS arrival to the ED the patient was demonstrating increased work of breathing patient and initially given a DuoNeb and Solu-Medrol. He had no change in his breathing after treatment and was subsequently placed on CPAP -- he saturated appropriately during that time. Pt denies CP, abdominal pain, N/Vor any other symptoms.      Of note, pt reports that he has been having progressively worsening swelling in his b/l lower extremities. He also notes that he recently was told he had low Hgb after having labs drawn for an upcoming nephrology appointment -- he also notes medication changes made at that nephrology appointment. Pt reports that he was supposed to have an IV iron infusion today for CKD-associated anemia. Pt states that he has had episodes of shortness of breath since July but says that they have never been this severe. Pt does not wear oxygen at baseline and is compliant with all of his medications. He still makes urine and is not on dialysis currently but states that he was due to have a meeting today with a  regarding starting home dialysis.      * No surgery found *       Hospital Course:   Pt admitted to hospital medicine after acute hypoxic respiratory failure episode. Pt was started on Lasix 40mg IV BID, increased to 60mg IV bid for  augmented diuresis. Pt had TTE on 8/27/24 which showed EF of 55-60% with G2DD and global longitudinal strain of -16.6% thereby establishing new diagnosis of HFpEF.      Pt will be set up to follow up with outpatient cardiology regarding his diastolic HFpEF. For his YESENIA on CKD and longstanding nephrotic syndrome with proteinuria, nephrology has been consulted who recommended home Torsemide to be increased to 40mg bid until achieving dry weight of 275lb then switching to 40mg qd. Pt was also discharged home on nifedipine and valsartan and recommended to follow up outpatient for blood pressure control. His pioglitazone was also discontinued due to risk of fluid retention.         HPI: Very pleasant man with stage IV CKD, HTN, and DM (last A1c 5.4) here for preoperative evaluation.  He had a PET and an echo yesterday.  He has been feeling well with no new symptoms or cardiovascular complaints and no change in exercise capacity.  He denies chest discomfort, SHAFER, palpitations, PND/orthopnea, lightheadedness and syncope.    Echo 2024      Left Ventricle: The left ventricle is normal in size. Mildly increased wall thickness. There is eccentric hypertrophy. There is normal systolic function with a visually estimated ejection fraction of 55 - 60%. Global longitudinal strain is -16.6%. Grade II diastolic dysfunction.    Right Ventricle: Normal right ventricular cavity size. Wall thickness is normal. Systolic function is normal.    Severe biatrial enlargement    IVC/SVC: Normal venous pressure at 3 mmHg.    Pericardium: There is a trivial effusion.       PET stress 2024    The myocardial perfusion images are normal without evidence of ischemia or scar.    The whole heart absolute myocardial perfusion values averaged 0.55 cc/min/g at rest, which is reduced; 1.11 cc/min/g at stress, which is mildly reduced; and CFR is 2.00 , which is mildly reduced.    CT attenuation images demonstrate mild diffuse coronary calcifications in the  LAD territory and no aortic calcifications.    The gated perfusion images showed an ejection fraction of 51% at rest and 54% during stress. A normal ejection fraction is greater than 47%.    The wall motion is normal at rest and during stress.    The LV cavity size is mildly enlarged at rest and stress.    The ECG portion of the study is negative for ischemia.    During stress, rare PVCs are noted.    The patient reported no chest pain during the stress test.    There are no prior studies for comparison.     HPI:   Feeling well and tolerating lasix  No chest pain, Orthopnea, PND of heart failure symptoms.   No palpitations  Weight is unchanged   Occasional orthopnea    The patient location is: clinic  The chief complaint leading to consultation is: hFpeEf    Visit type: audiovisual    Face to Face time with patient: 20 minutes of total time spent on the encounter, which includes face to face time and non-face to face time preparing to see the patient (eg, review of tests), Obtaining and/or reviewing separately obtained history, Documenting clinical information in the electronic or other health record, Independently interpreting results (not separately reported) and communicating results to the patient/family/caregiver, or Care coordination (not separately reported).         Each patient to whom he or she provides medical services by telemedicine is:  (1) informed of the relationship between the physician and patient and the respective role of any other health care provider with respect to management of the patient; and (2) notified that he or she may decline to receive medical services by telemedicine and may withdraw from such care at any time.    Notes:       Patient Active Problem List   Diagnosis    Benign hypertension with CKD (chronic kidney disease) stage V    Hyperlipemia    Type 2 diabetes mellitus with stage 5 chronic kidney disease not on chronic dialysis, without long-term current use of insulin     Hypertensive retinopathy, bilateral    NS (nuclear sclerosis), bilateral    Age-related nuclear cataract of both eyes    Class 2 severe obesity due to excess calories with serious comorbidity and body mass index (BMI) of 36.0 to 36.9 in adult    CKD (chronic kidney disease) stage 4, GFR 15-29 ml/min    Adjustment disorder with depressed mood    Mild cognitive impairment    MGUS (monoclonal gammopathy of unknown significance)    Vitreous degeneration of both eyes    Pre-transplant evaluation for chronic kidney disease    Hyperparathyroidism due to renal insufficiency    Anemia in stage 5 chronic kidney disease, not on chronic dialysis    Aortic atherosclerosis    Chronic kidney disease (CKD), stage V    CKD (chronic kidney disease), stage V    Hyperkalemia    Nephrotic syndrome    Heart failure with preserved ejection fraction    Leg swelling    Type 2 diabetes mellitus with both eyes affected by proliferative retinopathy and macular edema, with long-term current use of insulin     There were no vitals taken for this visit.  There is no height or weight on file to calculate BMI.  CrCl cannot be calculated (Patient's most recent lab result is older than the maximum 7 days allowed.).    Lab Results   Component Value Date     10/01/2024    K 4.1 10/01/2024     10/01/2024    CO2 20 (L) 10/01/2024    BUN 55 (H) 10/01/2024    CREATININE 5.5 (H) 10/01/2024    GLU 75 10/01/2024    HGBA1C 5.1 08/26/2024    MG 2.2 08/29/2024    AST 12 08/29/2024    ALT 13 08/29/2024    ALBUMIN 3.4 (L) 10/01/2024    PROT 6.4 08/29/2024    BILITOT 0.5 08/29/2024    WBC 7.17 10/01/2024    HGB 7.4 (L) 10/08/2024    HCT 24.1 (L) 10/08/2024    MCV 96 10/01/2024     10/01/2024    INR 1.0 02/07/2024    PSA 1.0 02/07/2024    TSH 1.863 09/07/2022    CHOL 139 02/07/2024    HDL 32 (L) 02/07/2024    LDLCALC 87.4 02/07/2024    TRIG 98 02/07/2024       Lab Results   Component Value Date    HSCRP 1.40 03/09/2005         No results found for  this or any previous visit.        Current Outpatient Medications   Medication Sig    atorvastatin (LIPITOR) 20 MG tablet Take 1 tablet (20 mg total) by mouth once daily.    blood sugar diagnostic Strp To test twice daily    blood-glucose meter kit Use as instructed    calcitRIOL (ROCALTROL) 0.25 MCG Cap Take 1 capsule (0.25 mcg total) by mouth 3 (three) times a week. Monday, Wednesday, Friday    ergocalciferol (ERGOCALCIFEROL) 50,000 unit Cap Take 1 capsule (50,000 Units total) by mouth every 7 days.    FEROSUL 325 mg (65 mg iron) Tab tablet Take 1 tablet (325 mg total) by mouth 2 (two) times daily.    labetaloL (NORMODYNE) 200 MG tablet Take 1.5 tablets (300 mg total) by mouth every 12 (twelve) hours.    lancets Misc 1 lancet by Misc.(Non-Drug; Combo Route) route 2 (two) times daily with meals.    linaGLIPtin (TRADJENTA) 5 mg Tab tablet Take 1 tablet (5 mg total) by mouth once daily.    NIFEdipine (ADALAT CC) 90 MG TbSR Take 90 mg by mouth once daily.    prednisolone/moxiflox/bromf/PF (OXRTMOFMDEG-ATTBCDGY-TZOQE,PF,) 1-0.5-0.09 % Drop Apply 1 drop to eye 3 (three) times daily.    sevelamer carbonate (RENVELA) 800 mg Tab Take 1 tablet (800 mg total) by mouth 3 (three) times daily with meals.    sildenafiL (VIAGRA) 100 MG tablet Take 1 tablet (100 mg total) by mouth daily as needed for Erectile Dysfunction.    sodium bicarbonate 650 MG tablet Take 1 tablet (650 mg total) by mouth 2 (two) times daily.    sodium zirconium cyclosilicate (LOKELMA) 10 gram packet Take 1 packet (10 g total) by mouth once daily. Mix entire contents of packet(s) into drinking glass containing 3 tablespoons of water; stir well and drink immediately. Add water and repeat until no powder remains to receive entire dose.    tamsulosin (FLOMAX) 0.4 mg Cap Take 1 capsule (0.4 mg total) by mouth every evening.    torsemide (DEMADEX) 20 MG Tab Take 1 tablet (20 mg total) by mouth 2 (two) times a day.    valsartan (DIOVAN) 320 MG tablet Take 1 tablet  (320 mg total) by mouth once daily.     No current facility-administered medications for this visit.       Review of Systems   Constitutional: Negative for chills, decreased appetite, malaise/fatigue, night sweats, weight gain and weight loss.   Eyes:  Negative for blurred vision, double vision, visual disturbance and visual halos.   Cardiovascular:  Negative for chest pain, claudication, cyanosis, dyspnea on exertion, irregular heartbeat, leg swelling, near-syncope, orthopnea, palpitations, paroxysmal nocturnal dyspnea and syncope.   Respiratory:  Negative for cough, hemoptysis, snoring, sputum production and wheezing.    Endocrine: Negative for cold intolerance, heat intolerance, polydipsia and polyphagia.   Hematologic/Lymphatic: Negative for adenopathy and bleeding problem. Does not bruise/bleed easily.   Skin:  Negative for flushing, itching, poor wound healing and rash.   Musculoskeletal:  Negative for arthritis, back pain, falls, gout, joint pain, joint swelling, muscle cramps, muscle weakness, myalgias, neck pain and stiffness.   Gastrointestinal:  Negative for bloating, abdominal pain, anorexia, diarrhea, dysphagia, excessive appetite, flatus, hematemesis, jaundice, melena and nausea.   Genitourinary:  Negative for hesitancy and incomplete emptying.   Neurological:  Negative for aphonia, brief paralysis, difficulty with concentration, disturbances in coordination, excessive daytime sleepiness, dizziness, focal weakness, light-headedness, loss of balance and weakness.   Psychiatric/Behavioral:  Negative for altered mental status, depression, hallucinations, hypervigilance, memory loss, substance abuse and suicidal ideas. The patient does not have insomnia and is not nervous/anxious.        Objective:   Physical Exam  Vitals reviewed: n/a.         Assessment:     1. Chronic diastolic congestive heart failure    2. CKD (chronic kidney disease) stage 4, GFR 15-29 ml/min    3. CKD (chronic kidney disease), stage  V    4. Class 2 severe obesity due to excess calories with serious comorbidity and body mass index (BMI) of 36.0 to 36.9 in adult    5. Aortic atherosclerosis    6. Mixed hyperlipidemia    7. Chronic heart failure with preserved ejection fraction        Plan:   Continue diuretic at the same dose. Increase labetalol. SGLT-2 not indicated due to very low GFR/   Diagnoses and all orders for this visit:    Chronic diastolic congestive heart failure  -     Ambulatory referral/consult to Cardiology    CKD (chronic kidney disease) stage 4, GFR 15-29 ml/min    CKD (chronic kidney disease), stage V    Class 2 severe obesity due to excess calories with serious comorbidity and body mass index (BMI) of 36.0 to 36.9 in adult    Aortic atherosclerosis    Mixed hyperlipidemia    Chronic heart failure with preserved ejection fraction    Other orders  -     Discontinue: labetaloL (NORMODYNE) 200 MG tablet; Take 1.5 tablets (300 mg total) by mouth every 12 (twelve) hours.  -     labetaloL (NORMODYNE) 200 MG tablet; Take 1.5 tablets (300 mg total) by mouth every 12 (twelve) hours.

## 2024-10-17 ENCOUNTER — LAB VISIT (OUTPATIENT)
Dept: LAB | Facility: HOSPITAL | Age: 69
End: 2024-10-17
Payer: MEDICARE

## 2024-10-17 DIAGNOSIS — D64.9 ANEMIA, UNSPECIFIED TYPE: ICD-10-CM

## 2024-10-17 LAB
HCT VFR BLD AUTO: 24.3 % (ref 40–54)
HGB BLD-MCNC: 7.7 G/DL (ref 14–18)
IRON SERPL-MCNC: 42 UG/DL (ref 45–160)
SATURATED IRON: 15 % (ref 20–50)
TOTAL IRON BINDING CAPACITY: 274 UG/DL (ref 250–450)
TRANSFERRIN SERPL-MCNC: 185 MG/DL (ref 200–375)

## 2024-10-17 PROCEDURE — 85018 HEMOGLOBIN: CPT | Mod: HCNC,NTX | Performed by: NURSE PRACTITIONER

## 2024-10-17 PROCEDURE — 36415 COLL VENOUS BLD VENIPUNCTURE: CPT | Mod: HCNC,PO,TXP | Performed by: NURSE PRACTITIONER

## 2024-10-17 PROCEDURE — 83540 ASSAY OF IRON: CPT | Mod: HCNC,TXP | Performed by: NURSE PRACTITIONER

## 2024-10-17 PROCEDURE — 85014 HEMATOCRIT: CPT | Mod: HCNC,TXP | Performed by: NURSE PRACTITIONER

## 2024-10-17 PROCEDURE — 84466 ASSAY OF TRANSFERRIN: CPT | Mod: HCNC,NTX | Performed by: NURSE PRACTITIONER

## 2024-10-18 DIAGNOSIS — D64.9 ANEMIA, UNSPECIFIED TYPE: Primary | ICD-10-CM

## 2024-10-22 RX ORDER — TAMSULOSIN HYDROCHLORIDE 0.4 MG/1
0.4 CAPSULE ORAL NIGHTLY
Qty: 90 CAPSULE | Refills: 0 | Status: SHIPPED | OUTPATIENT
Start: 2024-10-22 | End: 2025-10-22

## 2024-10-22 NOTE — PROGRESS NOTES
Hgb 7.7/ Hct 24.3     Retacrit [id number] administered , Per Form # [id number]. All questions and concerns were addressed .Pt had no reactions noted at this time ,tolerated injection well. 1 week appt given.         GFR 10.5

## 2024-10-22 NOTE — TELEPHONE ENCOUNTER
No care due was identified.  [facility] Embedded Care Due Messages. Reference number: [id number].   10/22/2024 7:28:23 AM CDT

## 2024-10-23 ENCOUNTER — CLINICAL SUPPORT (OUTPATIENT)
Dept: NEPHROLOGY | Facility: CLINIC | Age: 69
End: 2024-10-23
Payer: MEDICARE

## 2024-10-23 VITALS
SYSTOLIC BLOOD PRESSURE: 135 MMHG | DIASTOLIC BLOOD PRESSURE: 74 MMHG | BODY MASS INDEX: 36.18 KG/M2 | OXYGEN SATURATION: 97 % | WEIGHT: 266.75 LBS | HEART RATE: 79 BPM

## 2024-10-23 DIAGNOSIS — D63.1 ANEMIA IN STAGE 5 CHRONIC KIDNEY DISEASE, NOT ON CHRONIC DIALYSIS: Primary | ICD-10-CM

## 2024-10-23 DIAGNOSIS — N18.5 CKD (CHRONIC KIDNEY DISEASE), STAGE V: ICD-10-CM

## 2024-10-23 DIAGNOSIS — N18.5 ANEMIA IN STAGE 5 CHRONIC KIDNEY DISEASE, NOT ON CHRONIC DIALYSIS: Primary | ICD-10-CM

## 2024-10-23 PROCEDURE — 96372 THER/PROPH/DIAG INJ SC/IM: CPT | Mod: HCNC,S$GLB,, | Performed by: NURSE PRACTITIONER

## 2024-10-23 PROCEDURE — 99999 PR PBB SHADOW E&M-EST. PATIENT-LVL II: CPT | Mod: PBBFAC,HCNC,,

## 2024-10-26 DIAGNOSIS — E78.2 MIXED HYPERLIPIDEMIA: ICD-10-CM

## 2024-10-28 RX ORDER — ATORVASTATIN CALCIUM 20 MG/1
20 TABLET, FILM COATED ORAL DAILY
Qty: 90 TABLET | Refills: 3 | Status: SHIPPED | OUTPATIENT
Start: 2024-10-28 | End: 2025-10-28

## 2024-10-29 NOTE — PRE-PROCEDURE INSTRUCTIONS
Patient reviewed on 10/29/2024.  Okay to proceed at [facility]. The following pre-procedure instructions and arrival time have been reviewed with patient via phone and sent to patient portal for review.  Patient verbalized an understanding.  Pt to be accompanied by sonEdwige day of procedure as responsible .    Dear Handy     Below you will find basic pre-procedure instructions in preparation for your procedure on 10/15/24 with Dr. Cooper        You should have received your arrival time already from Dr's office.     - Nothing to eat or drink after midnight the night before your procedure until after your procedure, except AM meds with small sips of water.     - HOLD all oral Diabetic medications night before and morning of procedure  - HOLD all Insulin morning of procedure  - HOLD all Fluid pills morning of procedure  - HOLD all non-insulin shots until after surgery (Ozempic, Mounjaro, Trulicity, Victoza, Byetta, Wegovy and Adlyxin) (7 days prior)  - HOLD all vitamins, minerals and herbal supplements morning of procedure   - TAKE all B/P meds, EXCEPT those that contain a fluid pill (ex. Lasix, Hydroclorothiazide/HCTZ, Spirnolactone)  - USE inhalers as needed and bring AM of surgery  - USE EYE DROPS as directed  -TAKE blood thinner meds AM of surgery unless otherwise instructed by your provider to not take     - Shower and wash face with antibacterial soap (ex. Dial) for 3 mins PM prior and AM of surgery  - No powder, lotions, creams, oils, gels, ointments, makeup,  or jewelry    - Wear comfortable clothing (button up shirt)     (Patient is required to have a responsible ride to transport home, ride may not leave while patient is in surgery)     -- [facility], 2nd floor Surgery Center, located   @ [address] [place] [id number]  2nd Floor Registration        If you have any questions or concerns please feel free to contact your surgeon's office.           Please reply to this  message as receipt of delivery.     [place], LPN [facility]  Pre-Admit - Anesthesia Dept

## 2024-10-30 ENCOUNTER — HOSPITAL ENCOUNTER (OUTPATIENT)
Facility: HOSPITAL | Age: 69
Discharge: HOME | End: 2024-10-30
Attending: OPHTHALMOLOGY | Admitting: OPHTHALMOLOGY
Payer: MEDICARE

## 2024-10-30 VITALS
RESPIRATION RATE: 16 BRPM | SYSTOLIC BLOOD PRESSURE: 145 MMHG | OXYGEN SATURATION: 99 % | DIASTOLIC BLOOD PRESSURE: 82 MMHG | TEMPERATURE: 98 F | HEART RATE: 66 BPM

## 2024-10-30 DIAGNOSIS — H25.10 SENILE NUCLEAR SCLEROSIS: ICD-10-CM

## 2024-10-30 LAB — POCT GLUCOSE: 98 MG/DL (ref 70–110)

## 2024-10-30 PROCEDURE — 94761 N-INVAS EAR/PLS OXIMETRY MLT: CPT | Mod: NTX

## 2024-10-30 PROCEDURE — 71000015 HC POSTOP RECOV 1ST HR: Mod: TXP | Performed by: OPHTHALMOLOGY

## 2024-10-30 PROCEDURE — 99152 MOD SED SAME PHYS/QHP 5/>YRS: CPT | Mod: TXP | Performed by: OPHTHALMOLOGY

## 2024-10-30 PROCEDURE — 82962 GLUCOSE BLOOD TEST: CPT | Mod: TXP | Performed by: OPHTHALMOLOGY

## 2024-10-30 PROCEDURE — 25000003 PHARM REV CODE 250: Mod: TXP | Performed by: OPHTHALMOLOGY

## 2024-10-30 PROCEDURE — 99900035 HC TECH TIME PER 15 MIN (STAT): Mod: NTX

## 2024-10-30 PROCEDURE — 36000706: Mod: TXP | Performed by: OPHTHALMOLOGY

## 2024-10-30 PROCEDURE — V2632 POST CHMBR INTRAOCULAR LENS: HCPCS | Mod: TXP | Performed by: OPHTHALMOLOGY

## 2024-10-30 PROCEDURE — 99152 MOD SED SAME PHYS/QHP 5/>YRS: CPT | Mod: HCNC,NTX,, | Performed by: OPHTHALMOLOGY

## 2024-10-30 PROCEDURE — 36000707: Mod: TXP | Performed by: OPHTHALMOLOGY

## 2024-10-30 PROCEDURE — 63600175 PHARM REV CODE 636 W HCPCS: Mod: TXP | Performed by: OPHTHALMOLOGY

## 2024-10-30 PROCEDURE — 27201423 OPTIME MED/SURG SUP & DEVICES STERILE SUPPLY: Mod: TXP | Performed by: OPHTHALMOLOGY

## 2024-10-30 PROCEDURE — 66984 XCAPSL CTRC RMVL W/O ECP: CPT | Mod: HCNC,RT,NTX, | Performed by: OPHTHALMOLOGY

## 2024-10-30 DEVICE — LENS EYHANCE +22.0D: Type: IMPLANTABLE DEVICE | Site: EYE | Status: FUNCTIONAL

## 2024-10-30 RX ORDER — CYCLOP/TROP/PROPA/PHEN/KET/WAT 1-1-0.1%
1 DROPS (EA) OPHTHALMIC (EYE)
Status: COMPLETED | OUTPATIENT
Start: 2024-10-30 | End: 2024-10-30

## 2024-10-30 RX ORDER — LIDOCAINE HYDROCHLORIDE 10 MG/ML
INJECTION, SOLUTION EPIDURAL; INFILTRATION; INTRACAUDAL; PERINEURAL
Status: DISCONTINUED | OUTPATIENT
Start: 2024-10-30 | End: 2024-10-30 | Stop reason: HOSPADM

## 2024-10-30 RX ORDER — ACETAMINOPHEN 325 MG/1
650 TABLET ORAL EVERY 4 HOURS PRN
Status: DISCONTINUED | OUTPATIENT
Start: 2024-10-30 | End: 2024-10-30 | Stop reason: HOSPADM

## 2024-10-30 RX ORDER — MIDAZOLAM HYDROCHLORIDE 1 MG/ML
2 INJECTION, SOLUTION INTRAMUSCULAR; INTRAVENOUS
Status: DISCONTINUED | OUTPATIENT
Start: 2024-10-30 | End: 2024-10-30 | Stop reason: HOSPADM

## 2024-10-30 RX ORDER — LIDOCAINE HYDROCHLORIDE 40 MG/ML
INJECTION, SOLUTION RETROBULBAR
Status: DISCONTINUED | OUTPATIENT
Start: 2024-10-30 | End: 2024-10-30 | Stop reason: HOSPADM

## 2024-10-30 RX ORDER — MOXIFLOXACIN 5 MG/ML
1 SOLUTION/ DROPS OPHTHALMIC
Status: COMPLETED | OUTPATIENT
Start: 2024-10-30 | End: 2024-10-30

## 2024-10-30 RX ORDER — PROPARACAINE HYDROCHLORIDE 5 MG/ML
1 SOLUTION/ DROPS OPHTHALMIC
Status: DISCONTINUED | OUTPATIENT
Start: 2024-10-30 | End: 2024-10-30 | Stop reason: HOSPADM

## 2024-10-30 RX ORDER — PREDNISOLONE ACETATE 10 MG/ML
SUSPENSION/ DROPS OPHTHALMIC
Status: DISCONTINUED | OUTPATIENT
Start: 2024-10-30 | End: 2024-10-30 | Stop reason: HOSPADM

## 2024-10-30 RX ORDER — PROPARACAINE HYDROCHLORIDE 5 MG/ML
SOLUTION/ DROPS OPHTHALMIC
Status: DISCONTINUED | OUTPATIENT
Start: 2024-10-30 | End: 2024-10-30 | Stop reason: HOSPADM

## 2024-10-30 RX ORDER — FENTANYL CITRATE 50 UG/ML
25 INJECTION, SOLUTION INTRAMUSCULAR; INTRAVENOUS EVERY 5 MIN PRN
Status: DISCONTINUED | OUTPATIENT
Start: 2024-10-30 | End: 2024-10-30 | Stop reason: HOSPADM

## 2024-10-30 RX ORDER — MOXIFLOXACIN 5 MG/ML
SOLUTION/ DROPS OPHTHALMIC
Status: DISCONTINUED | OUTPATIENT
Start: 2024-10-30 | End: 2024-10-30 | Stop reason: HOSPADM

## 2024-10-30 RX ADMIN — MOXIFLOXACIN OPHTHALMIC 1 DROP: 5 SOLUTION/ DROPS OPHTHALMIC at 07:10

## 2024-10-30 RX ADMIN — MIDAZOLAM HYDROCHLORIDE 2 MG: 1 INJECTION, SOLUTION INTRAMUSCULAR; INTRAVENOUS at 08:10

## 2024-10-30 RX ADMIN — MOXIFLOXACIN 1 DROP: 5 SOLUTION/ DROPS OPHTHALMIC at 08:10

## 2024-10-30 RX ADMIN — Medication 1 DROP: at 07:10

## 2024-10-30 RX ADMIN — FENTANYL CITRATE 25 MCG: 50 INJECTION, SOLUTION INTRAMUSCULAR; INTRAVENOUS at 08:10

## 2024-10-30 NOTE — OP NOTE
DATE OF PROCEDURE: 10/30/2024    SURGEON: PAPITO MEYER MD    PREOPERATIVE DIAGNOSIS:  Senile nuclear sclerotic cataract right eye.     POSTOPERATIVE DIAGNOSIS: Senile nuclear sclerotic cataract right eye.     PROCEDURE PERFORMED:  Phacoemulsification with placement of intraocular lens, right eye.    IMPLANT:  DIBOO  22.0    Anesthesia: Moderate sedation with topical Lidocaine. Patient ID confirmed and re-evaluated the patient and anesthesia plan confirming it is suitable for the patient's condition and procedure.     COMPLICATIONS: none    ESTIMATED BLOOD LOSS: <1cc    SPECIMENS: none    INDICATIONS FOR PROCEDURE:   The patient has a history of painless progressive vision loss.  The patient has described difficulties with activities of daily living, which specifically include driving, which is secondary to cataract formation and progression. After we had a thorough discussion about risks, benefits, and alternatives to cataract surgery, the patient agreed to proceed with phacoemulsification and implantation of a lens in the right eye.  These risks include, but are not limited to, hemorrhage, pain, infection, need for additional surgery, need for glasses or contacts, loss of vision, or even loss of the eye.    PROCEDURE IN DETAIL:  The patient was met in the preop holding area.  Consent was confirmed to be signed.  The operative site was marked.  The patient was brought into the operating room by the anesthesia team and placed under monitored anesthesia care.  The right eye was prepped and draped in a sterile ophthalmic fashion.  A Michelle speculum was placed into the right eye.   A paracentesis site was made and 1% preservative-free lidocaine was injected into the anterior chamber.  Viscoelastic  material was injected into the anterior chamber.  A keratome blade was used to make a clear corneal incision.  A cystotome was used to initiate the continuous curvilinear capsulorrhexis which was completed with Utrata  forceps.  BSS on a shay cannula was used to perform hydrodissection.  The phacoemulsification tip was introduced into the eye and the nucleus was removed in a standard divide-and-conquer fashion.  Remaining cortical material was removed from the eye using irrigation-aspiration.  The capsular bag was filled with viscoelastic material and the intraocular lens was injected and positioned into place. Remaining viscoelastic material was removed from the eye using irrigation and aspiration.  The corneal wounds were hydrated.  The eye was filled to physiologic pressure. The wounds were found to be watertight. Drops of Vigamox and prednisilone were placed into the eye.  The eye was washed, dried, and shielded.  The patient tolerated the procedure well and knows to follow up with me tomorrow morning, sooner if needed.      Under my direct supervision, intravenous moderate sedation was administered during the course of this procedure, with continuous monitoring of hemodynamic parameters.  Monitoring of the patient's vital signs and respiratory status was provided by trained nursing staff during the entire course of the procedure and under my supervision and recorded in the patient's medical record.  The total time for sedation was 13 minutes.

## 2024-10-30 NOTE — H&P
History    Chief complaint:  Painless progressive vision loss    Present Ilness/Diagnosis: Nuclear sclerotic Cataract    Past Medical History:  has a past medical history of Anemia, Disorder of kidney and ureter, Edema leg, History of rotator cuff tear, History of seasonal allergies, HTN (hypertension), colonic polyps, Hyperlipemia, MGUS (monoclonal gammopathy of unknown significance), NS (nuclear sclerosis), bilateral (06/25/2019), Obesity, 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 (11/17/2017), and Type 2 diabetes mellitus.    Family History/Social History: refer to chart    Allergies: Review of patient's allergies indicates:  No Known Allergies    Current Medications: No current facility-administered medications for this encounter.    ASA Score: II     Mallampati Score: II     Plan for Sedation: Moderate Sedation     Patient or Family History of Anesthesia problems : No    Physical Exam    BP: Vital signs stable  General: No apparent distress  HEENT: nuclear sclerotic cataract  Lungs: adequate respirations  Heart: + pulses  Abdomen: soft  Rectal/pelvic: deferred    Impression: Visually significant Cataract.    See previous clinic notes for surgical indications.    Plan: Phacoemulsification with implantation of Intraocular lens

## 2024-10-30 NOTE — DISCHARGE INSTRUCTIONS
Dr. Cooper     Cataract Post-Operative Instructions       Day of surgery:     -Resume drops THREE times daily into the operative eye.     -Do not rub your eye     -Wear protective sunglasses during the day.     -Resume moderate activity.     -Bathe/shower/wash face normally     -Do not apply makeup around the operative eye for 1 week.     -You should expect:     Blurry vision and halos for 24-48 hours     Dilated pupil for 24-48 hours     Scratchy feeling in the eye for 1-2 days     Curved shadow in your peripheral vision for 2-3 weeks     Occasional flickering of lights for up to 1 week     -If you experience severe pain or nausea, call Dr. Cooper or the on-call doctor at [phone].       Plan to see Dr. Cooper at:     [facility] [address]    [address] [place]  [address]    **Most patients can drive the next morning.  If you do not feel comfortable driving, please arrange for transportation. **

## 2024-10-30 NOTE — DISCHARGE SUMMARY
[facility]  Discharge Note  Short Stay    Procedure(s) (LRB):  EXTRACTION, CATARACT, WITH IOL INSERTION (Right)  BRIEF DISCHARGE NOTE:    Date of discharge: 10/30/2024    Reason for hospitalization -  Cataract surgery     Final Diagnosis - Visually significant Cataract    Procedures and treatment provided - Status post phacoemulsification with placement of intraocular lens     Diet - Advance to regular as tolerated    Activity - as tolerated    Disposition at the end of the case - Good.    Discharge: to home    The patient tolerated the procedure well and knows to follow up with me tomorrow in the eye clinic, sooner if needed.    Patient and family instructions (as appropriate) - Given to patient on discharge    Dolores Cooper MD

## 2024-10-31 ENCOUNTER — OFFICE VISIT (OUTPATIENT)
Dept: OPHTHALMOLOGY | Facility: CLINIC | Age: 69
End: 2024-10-31
Payer: MEDICARE

## 2024-10-31 DIAGNOSIS — Z98.41 STATUS POST CATARACT EXTRACTION AND INSERTION OF INTRAOCULAR LENS, RIGHT: Primary | ICD-10-CM

## 2024-10-31 DIAGNOSIS — H25.12 NUCLEAR SCLEROSIS OF LEFT EYE: ICD-10-CM

## 2024-10-31 DIAGNOSIS — Z96.1 STATUS POST CATARACT EXTRACTION AND INSERTION OF INTRAOCULAR LENS, RIGHT: Primary | ICD-10-CM

## 2024-10-31 PROCEDURE — 4010F ACE/ARB THERAPY RXD/TAKEN: CPT | Mod: HCNC,CPTII,S$GLB, | Performed by: OPHTHALMOLOGY

## 2024-10-31 PROCEDURE — 99024 POSTOP FOLLOW-UP VISIT: CPT | Mod: HCNC,S$GLB,, | Performed by: OPHTHALMOLOGY

## 2024-10-31 PROCEDURE — 3066F NEPHROPATHY DOC TX: CPT | Mod: HCNC,CPTII,S$GLB, | Performed by: OPHTHALMOLOGY

## 2024-10-31 PROCEDURE — 99999 PR PBB SHADOW E&M-EST. PATIENT-LVL III: CPT | Mod: PBBFAC,HCNC,, | Performed by: OPHTHALMOLOGY

## 2024-10-31 PROCEDURE — 3288F FALL RISK ASSESSMENT DOCD: CPT | Mod: HCNC,CPTII,S$GLB, | Performed by: OPHTHALMOLOGY

## 2024-10-31 PROCEDURE — 1159F MED LIST DOCD IN RCRD: CPT | Mod: HCNC,CPTII,S$GLB, | Performed by: OPHTHALMOLOGY

## 2024-10-31 PROCEDURE — 1126F AMNT PAIN NOTED NONE PRSNT: CPT | Mod: HCNC,CPTII,S$GLB, | Performed by: OPHTHALMOLOGY

## 2024-10-31 PROCEDURE — 3062F POS MACROALBUMINURIA REV: CPT | Mod: HCNC,CPTII,S$GLB, | Performed by: OPHTHALMOLOGY

## 2024-10-31 PROCEDURE — 3044F HG A1C LEVEL LT 7.0%: CPT | Mod: HCNC,CPTII,S$GLB, | Performed by: OPHTHALMOLOGY

## 2024-10-31 PROCEDURE — 1101F PT FALLS ASSESS-DOCD LE1/YR: CPT | Mod: HCNC,CPTII,S$GLB, | Performed by: OPHTHALMOLOGY

## 2024-11-05 ENCOUNTER — LAB VISIT (OUTPATIENT)
Dept: LAB | Facility: HOSPITAL | Age: 69
End: 2024-11-05
Attending: NURSE PRACTITIONER
Payer: MEDICARE

## 2024-11-05 DIAGNOSIS — Z76.82 ORGAN TRANSPLANT CANDIDATE: ICD-10-CM

## 2024-11-05 DIAGNOSIS — D64.9 ANEMIA, UNSPECIFIED TYPE: ICD-10-CM

## 2024-11-05 LAB
FERRITIN SERPL-MCNC: 253 NG/ML (ref 20–300)
HCT VFR BLD AUTO: 25.4 % (ref 40–54)
HGB BLD-MCNC: 7.8 G/DL (ref 14–18)
IRON SERPL-MCNC: 36 UG/DL (ref 45–160)
SATURATED IRON: 14 % (ref 20–50)
TOTAL IRON BINDING CAPACITY: 260 UG/DL (ref 250–450)
TRANSFERRIN SERPL-MCNC: 176 MG/DL (ref 200–375)

## 2024-11-05 PROCEDURE — 99001 SPECIMEN HANDLING PT-LAB: CPT | Mod: HCNC,TXP | Performed by: NURSE PRACTITIONER

## 2024-11-05 PROCEDURE — 82728 ASSAY OF FERRITIN: CPT | Mod: HCNC,TXP | Performed by: NURSE PRACTITIONER

## 2024-11-05 PROCEDURE — 84466 ASSAY OF TRANSFERRIN: CPT | Mod: HCNC,TXP | Performed by: NURSE PRACTITIONER

## 2024-11-05 PROCEDURE — 85018 HEMOGLOBIN: CPT | Mod: HCNC,TXP | Performed by: NURSE PRACTITIONER

## 2024-11-05 PROCEDURE — 85014 HEMATOCRIT: CPT | Mod: HCNC,TXP | Performed by: NURSE PRACTITIONER

## 2024-11-07 ENCOUNTER — OFFICE VISIT (OUTPATIENT)
Dept: OPHTHALMOLOGY | Facility: CLINIC | Age: 69
End: 2024-11-07
Payer: MEDICARE

## 2024-11-07 DIAGNOSIS — Z96.1 STATUS POST CATARACT EXTRACTION AND INSERTION OF INTRAOCULAR LENS, RIGHT: ICD-10-CM

## 2024-11-07 DIAGNOSIS — H25.12 NUCLEAR SCLEROSIS OF LEFT EYE: Primary | ICD-10-CM

## 2024-11-07 DIAGNOSIS — Z98.41 STATUS POST CATARACT EXTRACTION AND INSERTION OF INTRAOCULAR LENS, RIGHT: ICD-10-CM

## 2024-11-07 DIAGNOSIS — M79.89 LEG SWELLING: ICD-10-CM

## 2024-11-07 PROCEDURE — 1126F AMNT PAIN NOTED NONE PRSNT: CPT | Mod: HCNC,CPTII,S$GLB, | Performed by: OPHTHALMOLOGY

## 2024-11-07 PROCEDURE — 4010F ACE/ARB THERAPY RXD/TAKEN: CPT | Mod: HCNC,CPTII,S$GLB, | Performed by: OPHTHALMOLOGY

## 2024-11-07 PROCEDURE — 3288F FALL RISK ASSESSMENT DOCD: CPT | Mod: HCNC,CPTII,S$GLB, | Performed by: OPHTHALMOLOGY

## 2024-11-07 PROCEDURE — 99024 POSTOP FOLLOW-UP VISIT: CPT | Mod: HCNC,S$GLB,, | Performed by: OPHTHALMOLOGY

## 2024-11-07 PROCEDURE — 3066F NEPHROPATHY DOC TX: CPT | Mod: HCNC,CPTII,S$GLB, | Performed by: OPHTHALMOLOGY

## 2024-11-07 PROCEDURE — 1101F PT FALLS ASSESS-DOCD LE1/YR: CPT | Mod: HCNC,CPTII,S$GLB, | Performed by: OPHTHALMOLOGY

## 2024-11-07 PROCEDURE — 3062F POS MACROALBUMINURIA REV: CPT | Mod: HCNC,CPTII,S$GLB, | Performed by: OPHTHALMOLOGY

## 2024-11-07 PROCEDURE — 1159F MED LIST DOCD IN RCRD: CPT | Mod: HCNC,CPTII,S$GLB, | Performed by: OPHTHALMOLOGY

## 2024-11-07 PROCEDURE — 99999 PR PBB SHADOW E&M-EST. PATIENT-LVL III: CPT | Mod: PBBFAC,HCNC,, | Performed by: OPHTHALMOLOGY

## 2024-11-07 PROCEDURE — 92136 OPHTHALMIC BIOMETRY: CPT | Mod: 26,HCNC,LT,S$GLB | Performed by: OPHTHALMOLOGY

## 2024-11-07 PROCEDURE — 3044F HG A1C LEVEL LT 7.0%: CPT | Mod: HCNC,CPTII,S$GLB, | Performed by: OPHTHALMOLOGY

## 2024-11-07 RX ORDER — TORSEMIDE 20 MG/1
20 TABLET ORAL 2 TIMES DAILY
Qty: 60 TABLET | Refills: 11
Start: 2024-11-07 | End: 2024-11-08 | Stop reason: SDUPTHER

## 2024-11-07 RX ORDER — PREDNISOLONE/MOXIFLOX/BROMFEN 1 %-0.5 %
1 SUSPENSION, DROPS(FINAL DOSAGE FORM)(ML) OPHTHALMIC (EYE) 3 TIMES DAILY
Qty: 5 ML | Refills: 3 | Status: SHIPPED | OUTPATIENT
Start: 2024-11-07

## 2024-11-07 NOTE — TELEPHONE ENCOUNTER
Care Due:                  Date            Visit Type   Department     Provider  --------------------------------------------------------------------------------                                [facility]     [facility]  Last Visit: 09-      FOLLOW UP    JARON Avila                               -                              [facility]  Next Visit: 01-      CARE ([facility])   MEDICINE       Myranda Avila                                                            Last  Test          Frequency    Reason                     Performed    Due Date  --------------------------------------------------------------------------------    Lipid Panel.  12 months..  atorvastatin.............  02- 02-    [facility] Embedded Care Due Messages. Reference number: [id number].   11/07/2024 10:25:13 AM CST

## 2024-11-07 NOTE — PROGRESS NOTES
HPI    Mazzulla    S/p  Phacoemulsification with placement of intraocular lens, right eye.   10/30/2024    Severe NPDR OD, not high risk PDR OS  Uncontrolled T2, NO insulin  - Last A1C 10.4 7/20  No FU from 4/18 to 6/19     2. DME OU   6/19 - pt did not FU until 8/20  S/p Avastin OD x 2  S/p Eylea OD x 2  S/p Ozurdex OD x 4, OS x 2  5/24  S/p Iluvien OU 5/24 11/22 - not seen x 2 years  3/23 - had second opinion with Dr. Suh.  Pt would like to try a few Eylea   prior to steroids if needed  11/23 - increased DME oU  7/24 trace increase   9/24 - OD improving, OS stable      VA OD cloudy a lot after injection (iluvien) in 7/2024 gradually. VA OS   getting cloudy also gradually.  Extremely frustrated with decreased in   vision. No glare problems.     Gtt's:  PMB TID OD    Patient is here for 1 week post op OD.  Pt. States vision is doing better today.  Pt. Denies pain.  Last edited by Neida Sousa on 11/7/2024  3:34 PM.            Assessment /Plan     For exam results, see Encounter Report.    Nuclear sclerosis of left eye  -     IOL Master - OU - Both Eyes    Status post cataract extraction and insertion of intraocular lens, right                       PO week #1 s/p phaco/IOL -    - doing well, no issues    Continue combo drops for a total of 1 month versus: d/c abx gtt, continue PF/ketorolocTID for total of 1 month        Visually significant nuclear sclerotic cataract    - Cataracts are interfering with activities of daily living, including night time driving.  - Pt desires cataract surgery for visual rehabilitation.   - Risks / benefits/ alternatives were discussed and patient agrees to proceed with surgery.   - IOL options discussed as well, according to patient's goals and concomitant ocular pathology.  - Target: plano.    Pt agreed to distance target and readers for near.  I do not recommend MFIOL in light of DBR, this was explained in detail to pt.       DIBOO 20.5 OS    Explained to patient that he has an  unknown visual potential in both eyes due to diabetic retinopathy treated by Dr. WEEKS, details below.    Severe NPDR OD, not high risk PDR OS  Uncontrolled T2, NO insulin       DME OU   6/19 - pt did not FU until 8/20  S/p Avastin OD x 2  S/p Eylea OD x 2  S/p Ozurdex OD x 4, OS x 2  5/24  S/p Iluvien OU 5/24 11/22 - not seen x 2 years  3/23 - had second opinion with Dr. Suh.  Pt would like to try a few Eylea   prior to steroids if needed  11/23 - increased DME oU  7/24 trace increase   9/24 - OD improving, OS stable

## 2024-11-08 DIAGNOSIS — M79.89 LEG SWELLING: ICD-10-CM

## 2024-11-08 RX ORDER — TORSEMIDE 20 MG/1
20 TABLET ORAL 2 TIMES DAILY
Qty: 180 TABLET | Refills: 3 | Status: SHIPPED | OUTPATIENT
Start: 2024-11-08 | End: 2025-11-08

## 2024-11-08 NOTE — TELEPHONE ENCOUNTER
----- Message from Betty sent at 11/8/2024 11:18 AM CST -----  Regarding: Refill Request  Type: RX Refill Request      Who Called:  Self       Refill or New Rx: refill       RX Name and Strength: torsemide     ```` the pharmacy said that they do not have it       Preferred Pharmacy with phone number:   [facility] [address] [address] [place]    Would the patient rather a call back or a response via My [facility]? Call       Best Call Back Number:  [phone]      Needs today

## 2024-11-08 NOTE — TELEPHONE ENCOUNTER
"Patient requesting to have script for medication Torsemide sent to pharmacy.  Was set to "No Print".  Please advise.   "

## 2024-11-08 NOTE — TELEPHONE ENCOUNTER
No care due was identified.  [facility] Embedded Care Due Messages. Reference number: [id number].   11/08/2024 11:25:37 AM CST

## 2024-11-11 ENCOUNTER — CLINICAL SUPPORT (OUTPATIENT)
Dept: NEPHROLOGY | Facility: CLINIC | Age: 69
End: 2024-11-11
Payer: MEDICARE

## 2024-11-11 DIAGNOSIS — N18.5 CKD (CHRONIC KIDNEY DISEASE), STAGE V: ICD-10-CM

## 2024-11-11 DIAGNOSIS — D63.1 ANEMIA IN STAGE 5 CHRONIC KIDNEY DISEASE, NOT ON CHRONIC DIALYSIS: Primary | ICD-10-CM

## 2024-11-11 DIAGNOSIS — N18.5 ANEMIA IN STAGE 5 CHRONIC KIDNEY DISEASE, NOT ON CHRONIC DIALYSIS: Primary | ICD-10-CM

## 2024-11-11 PROCEDURE — 99999 PR PBB SHADOW E&M-EST. PATIENT-LVL II: CPT | Mod: PBBFAC,HCNC,,

## 2024-11-14 ENCOUNTER — TELEPHONE (OUTPATIENT)
Dept: TRANSPLANT | Facility: CLINIC | Age: 69
End: 2024-11-14
Payer: MEDICARE

## 2024-11-14 ENCOUNTER — LAB VISIT (OUTPATIENT)
Dept: LAB | Facility: HOSPITAL | Age: 69
End: 2024-11-14
Attending: NURSE PRACTITIONER
Payer: MEDICARE

## 2024-11-14 DIAGNOSIS — N18.5 CHRONIC KIDNEY DISEASE (CKD), STAGE V: ICD-10-CM

## 2024-11-14 LAB
ALBUMIN/CREAT UR: 2647.8 UG/MG (ref 0–30)
CREAT UR-MCNC: 67 MG/DL (ref 23–375)
MICROALBUMIN UR DL<=1MG/L-MCNC: 1774 UG/ML

## 2024-11-14 PROCEDURE — 81001 URINALYSIS AUTO W/SCOPE: CPT | Mod: HCNC,TXP | Performed by: NURSE PRACTITIONER

## 2024-11-14 PROCEDURE — 84156 ASSAY OF PROTEIN URINE: CPT | Mod: HCNC,TXP | Performed by: NURSE PRACTITIONER

## 2024-11-14 PROCEDURE — 82043 UR ALBUMIN QUANTITATIVE: CPT | Mod: HCNC,TXP | Performed by: NURSE PRACTITIONER

## 2024-11-14 NOTE — TELEPHONE ENCOUNTER
"MA compliance notes per   Raven Naylor NP for patientts appt on 11/19/24    "he has a lot of denial and has been slow to make decisions. He does usually come to appointments though."     Patients compliance really has not improved since last compliance notes on 6/03/24    Haydee Cartagena  Abdominal Transplant MA        "

## 2024-11-15 LAB
BACTERIA #/AREA URNS AUTO: NORMAL /HPF
BILIRUB UR QL STRIP: NEGATIVE
CLARITY UR REFRACT.AUTO: CLEAR
COLOR UR AUTO: COLORLESS
CREAT UR-MCNC: 67 MG/DL (ref 23–375)
GLUCOSE UR QL STRIP: NEGATIVE
HGB UR QL STRIP: ABNORMAL
HYALINE CASTS UR QL AUTO: 0 /LPF
KETONES UR QL STRIP: NEGATIVE
LEUKOCYTE ESTERASE UR QL STRIP: NEGATIVE
MICROSCOPIC COMMENT: NORMAL
NITRITE UR QL STRIP: NEGATIVE
PH UR STRIP: 6 [PH] (ref 5–8)
PROT UR QL STRIP: ABNORMAL
PROT UR-MCNC: 238 MG/DL (ref 0–15)
PROT/CREAT UR: 3.55 MG/G{CREAT} (ref 0–0.2)
RBC #/AREA URNS AUTO: 1 /HPF (ref 0–4)
SP GR UR STRIP: 1.01 (ref 1–1.03)
URN SPEC COLLECT METH UR: ABNORMAL
WBC #/AREA URNS AUTO: 0 /HPF (ref 0–5)

## 2024-11-16 LAB
HLA FREEZE AND HOLD INTERPRETATION: NORMAL
HLAFH COLLECTION DATE: NORMAL
HPRA INTERPRETATION: NORMAL

## 2024-11-19 ENCOUNTER — OFFICE VISIT (OUTPATIENT)
Dept: TRANSPLANT | Facility: CLINIC | Age: 69
End: 2024-11-19
Payer: MEDICARE

## 2024-11-19 ENCOUNTER — LAB VISIT (OUTPATIENT)
Dept: LAB | Facility: HOSPITAL | Age: 69
End: 2024-11-19
Payer: MEDICARE

## 2024-11-19 VITALS
HEART RATE: 69 BPM | TEMPERATURE: 97 F | WEIGHT: 264.13 LBS | OXYGEN SATURATION: 95 % | HEIGHT: 72 IN | BODY MASS INDEX: 35.78 KG/M2 | SYSTOLIC BLOOD PRESSURE: 143 MMHG | RESPIRATION RATE: 20 BRPM | DIASTOLIC BLOOD PRESSURE: 70 MMHG

## 2024-11-19 DIAGNOSIS — Z76.82 PATIENT ON WAITING LIST FOR KIDNEY TRANSPLANT: Primary | ICD-10-CM

## 2024-11-19 DIAGNOSIS — N18.5 TYPE 2 DIABETES MELLITUS WITH STAGE 5 CHRONIC KIDNEY DISEASE NOT ON CHRONIC DIALYSIS, WITHOUT LONG-TERM CURRENT USE OF INSULIN: ICD-10-CM

## 2024-11-19 DIAGNOSIS — N18.5 CHRONIC KIDNEY DISEASE (CKD), STAGE V: ICD-10-CM

## 2024-11-19 DIAGNOSIS — D47.2 MGUS (MONOCLONAL GAMMOPATHY OF UNKNOWN SIGNIFICANCE): ICD-10-CM

## 2024-11-19 DIAGNOSIS — Z76.82 ORGAN TRANSPLANT CANDIDATE: ICD-10-CM

## 2024-11-19 DIAGNOSIS — E11.22 TYPE 2 DIABETES MELLITUS WITH STAGE 5 CHRONIC KIDNEY DISEASE NOT ON CHRONIC DIALYSIS, WITHOUT LONG-TERM CURRENT USE OF INSULIN: ICD-10-CM

## 2024-11-19 PROCEDURE — 3288F FALL RISK ASSESSMENT DOCD: CPT | Mod: CPTII,S$GLB,TXP, | Performed by: NURSE PRACTITIONER

## 2024-11-19 PROCEDURE — 3044F HG A1C LEVEL LT 7.0%: CPT | Mod: CPTII,S$GLB,TXP, | Performed by: NURSE PRACTITIONER

## 2024-11-19 PROCEDURE — 3008F BODY MASS INDEX DOCD: CPT | Mod: CPTII,S$GLB,TXP, | Performed by: NURSE PRACTITIONER

## 2024-11-19 PROCEDURE — 86706 HEP B SURFACE ANTIBODY: CPT | Mod: HCNC,TXP | Performed by: NURSE PRACTITIONER

## 2024-11-19 PROCEDURE — 3077F SYST BP >= 140 MM HG: CPT | Mod: CPTII,S$GLB,TXP, | Performed by: NURSE PRACTITIONER

## 2024-11-19 PROCEDURE — 36415 COLL VENOUS BLD VENIPUNCTURE: CPT | Mod: HCNC,TXP | Performed by: NURSE PRACTITIONER

## 2024-11-19 PROCEDURE — 99999 PR PBB SHADOW E&M-EST. PATIENT-LVL V: CPT | Mod: PBBFAC,HCNC,TXP, | Performed by: NURSE PRACTITIONER

## 2024-11-19 PROCEDURE — 1101F PT FALLS ASSESS-DOCD LE1/YR: CPT | Mod: CPTII,S$GLB,TXP, | Performed by: NURSE PRACTITIONER

## 2024-11-19 PROCEDURE — 3078F DIAST BP <80 MM HG: CPT | Mod: CPTII,S$GLB,TXP, | Performed by: NURSE PRACTITIONER

## 2024-11-19 PROCEDURE — 1126F AMNT PAIN NOTED NONE PRSNT: CPT | Mod: CPTII,S$GLB,TXP, | Performed by: NURSE PRACTITIONER

## 2024-11-19 PROCEDURE — 4010F ACE/ARB THERAPY RXD/TAKEN: CPT | Mod: CPTII,S$GLB,TXP, | Performed by: NURSE PRACTITIONER

## 2024-11-19 PROCEDURE — 3062F POS MACROALBUMINURIA REV: CPT | Mod: CPTII,S$GLB,TXP, | Performed by: NURSE PRACTITIONER

## 2024-11-19 PROCEDURE — 3066F NEPHROPATHY DOC TX: CPT | Mod: CPTII,S$GLB,TXP, | Performed by: NURSE PRACTITIONER

## 2024-11-19 PROCEDURE — 99215 OFFICE O/P EST HI 40 MIN: CPT | Mod: S$GLB,TXP,, | Performed by: NURSE PRACTITIONER

## 2024-11-19 NOTE — LETTER
November 21, 2024        Raven Naylor  [address]  [facility] [address]  Phone: [phone]  Fax: [phone]             [facility] [address]  [address]  [facility] [address]  Phone: [phone]   Patient: Handy Adams   MR Number: [id number]   YOB: 1955   Date of Visit: 11/19/2024       Dear Dr. Raven Naylor    Thank you for referring Handy Adams to me for evaluation. Attached you will find relevant portions of my assessment and plan of care.    If you have questions, please do not hesitate to call me. I look forward to following Handy Adams along with you.    Sincerely,    Dorina Patel NP    Enclosure    If you would like to receive this communication electronically, please contact [email or web address] or [phone] to request [employer] Link access.    [employer] Link is a tool which provides read-only access to select patient information with whom you have a relationship. Its easy to use and provides real time access to review your patients record including encounter summaries, notes, results, and demographic information.    If you feel you have received this communication in error or would no longer like to receive these types of communications, please e-mail [email or web address]

## 2024-11-19 NOTE — PROGRESS NOTES
Kidney Transplant Recipient Reevalulation    Referring Physician: Raven Naylor  Current Nephrologist: Raven Naylor  Waitlist Status: active  Dialysis Start Date: (Not currently on dialysis)    Subjective:     CC:  Six month reassessment of kidney transplant candidacy.    HPI:  Mr. Adams is a 69 y.o. year old Black or  male with advanced kidney disease secondary to diabetic nephropathy and HTN.  He has been on the wait list for a kidney transplant at [facility] since 11/8/2022. Patient is currently pre-dialysis. He has a  none . Patient denies any recent hospitalizations or ED visits.      Hospitalizations/ED visits:  none    Interval History:   MGUS: last seen Hematology 6/3/24 -Return to clinic in 6 months for continued close monitoring of MGUS     Reports doing well. No complaints. Has been working with his Nephrologist on when to start to dialysis. Currently he is not interested. He has an appoint with Raven tomorrow in Nephrology. He has an upcoming appointment with  for home assessment for PD.      CXR: 8/26/24 tiny volumes of pleural fluid on both sides   CT: 6/18/24 per surgeon test result is favorable for transplant  Renal US: 7/16/24 Bilateral simple renal cysts.   Iliacs : 2/7/24 triphasic bilaterally   Echo:  Results for orders placed during the hospital encounter of 08/26/24    Echo    Interpretation Summary    Left Ventricle: The left ventricle is normal in size. Mildly increased wall thickness. There is eccentric hypertrophy. There is normal systolic function with a visually estimated ejection fraction of 55 - 60%. Global longitudinal strain is -16.6%. Grade II diastolic dysfunction.    Right Ventricle: Normal right ventricular cavity size. Wall thickness is normal. Systolic function is normal.    Severe biatrial enlargement    IVC/SVC: Normal venous pressure at 3 mmHg.    Pericardium: There is a trivial effusion.      Stress:  3/11/24    The myocardial perfusion images are  normal without evidence of ischemia or scar.    The whole heart absolute myocardial perfusion values averaged 0.55 cc/min/g at rest, which is reduced; 1.11 cc/min/g at stress, which is mildly reduced; and CFR is 2.00 , which is mildly reduced.    CT attenuation images demonstrate mild diffuse coronary calcifications in the LAD territory and no aortic calcifications.    The gated perfusion images showed an ejection fraction of 51% at rest and 54% during stress. A normal ejection fraction is greater than 47%.    The wall motion is normal at rest and during stress.    The LV cavity size is mildly enlarged at rest and stress.    The ECG portion of the study is negative for ischemia.    During stress, rare PVCs are noted.    The patient reported no chest pain during the stress test.    There are no prior studies for comparison.      Colonoscopy: 10/28/22 tubular adenoma  PTH:  Lab Results   Component Value Date    .7 (H) 11/14/2024    CALCIUM 8.2 (L) 11/14/2024    PHOS 4.2 11/14/2024     PSA:  PSA, Screen (ng/mL)   Date Value   02/07/2024 1.0           Current Outpatient Medications:     atorvastatin (LIPITOR) 20 MG tablet, Take 1 tablet (20 mg total) by mouth once daily., Disp: 90 tablet, Rfl: 3    blood sugar diagnostic Strp, To test twice daily, Disp: 100 each, Rfl: 3    blood-glucose meter kit, Use as instructed, Disp: 1 each, Rfl: 0    calcitRIOL (ROCALTROL) 0.25 MCG Cap, Take 1 capsule (0.25 mcg total) by mouth 3 (three) times a week. Monday, Wednesday, Friday, Disp: 36 capsule, Rfl: 3    ergocalciferol (ERGOCALCIFEROL) 50,000 unit Cap, Take 1 capsule (50,000 Units total) by mouth every 7 days., Disp: 12 capsule, Rfl: 3    FEROSUL 325 mg (65 mg iron) Tab tablet, Take 1 tablet (325 mg total) by mouth 2 (two) times daily., Disp: 180 tablet, Rfl: 3    labetaloL (NORMODYNE) 200 MG tablet, Take 1.5 tablets (300 mg total) by mouth every 12 (twelve) hours., Disp: 90 tablet, Rfl: 11    lancets Misc, 1 lancet by  Misc.(Non-Drug; Combo Route) route 2 (two) times daily with meals., Disp: 100 each, Rfl: 11    linaGLIPtin (TRADJENTA) 5 mg Tab tablet, Take 1 tablet (5 mg total) by mouth once daily., Disp: 90 tablet, Rfl: 2    NIFEdipine (ADALAT CC) 90 MG TbSR, Take 90 mg by mouth once daily., Disp: , Rfl:     prednisoLONE-moxiflox-bromfen 1-0.5-0.075 % DrpS, Apply 1 drop to eye 3 (three) times daily., Disp: 5 mL, Rfl: 3    prednisolone/moxiflox/bromf/PF (ZBGJJSZWXPT-CNPHBUUK-UEZPD,PF,) 1-0.5-0.09 % Drop, Apply 1 drop to eye 3 (three) times daily., Disp: 5 mL, Rfl: 3    sevelamer carbonate (RENVELA) 800 mg Tab, Take 1 tablet (800 mg total) by mouth 3 (three) times daily with meals., Disp: 90 tablet, Rfl: 11    sildenafiL (VIAGRA) 100 MG tablet, Take 1 tablet (100 mg total) by mouth daily as needed for Erectile Dysfunction., Disp: 10 tablet, Rfl: 11    sodium bicarbonate 650 MG tablet, Take 1 tablet (650 mg total) by mouth 2 (two) times daily., Disp: 180 tablet, Rfl: 3    sodium zirconium cyclosilicate (LOKELMA) 10 gram packet, Take 1 packet (10 g total) by mouth once daily. Mix entire contents of packet(s) into drinking glass containing 3 tablespoons of water; stir well and drink immediately. Add water and repeat until no powder remains to receive entire dose., Disp: 30 packet, Rfl: 0    tamsulosin (FLOMAX) 0.4 mg Cap, Take 1 capsule (0.4 mg total) by mouth every evening., Disp: 90 capsule, Rfl: 0    torsemide (DEMADEX) 20 MG Tab, Take 1 tablet (20 mg total) by mouth 2 (two) times a day., Disp: 180 tablet, Rfl: 3    valsartan (DIOVAN) 320 MG tablet, Take 1 tablet (320 mg total) by mouth once daily., Disp: 90 tablet, Rfl: 3      Past Medical History:   Diagnosis Date    Anemia     Disorder of kidney and ureter     Edema leg     History of rotator cuff tear     History of seasonal allergies     HTN (hypertension)     Hx of colonic polyps     Hyperlipemia     MGUS (monoclonal gammopathy of unknown significance)     NS (nuclear  sclerosis), bilateral 06/25/2019    Obesity     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 11/17/2017    Type 2 diabetes mellitus        Past Surgical History:   Procedure Laterality Date    CATARACT EXTRACTION W/  INTRAOCULAR LENS IMPLANT Right 10/30/2024    Procedure: EXTRACTION, CATARACT, WITH IOL INSERTION;  Surgeon: Dolores Cooper MD;  Location: [facility] OR;  Service: Ophthalmology;  Laterality: Right;    COLONOSCOPY N/A 10/28/2022    Procedure: COLONOSCOPY;  Surgeon: Guanako Sanchez MD;  Location: [facility] ENDO;  Service: Endoscopy;  Laterality: N/A;  REFENDO placed per Dr Sanderson. case request entered     vaccinated-GT  instr portal    MOUTH SURGERY      RENAL BIOPSY Left 7/19/2023    Procedure: BIOPSY, KIDNEY;  Surgeon: Nikky Soriano MD;  Location: [facility] CATH LAB;  Service: Interventional Nephrology;  Laterality: Left;         Review of Systems   Constitutional:  Negative for activity change, appetite change, chills, fatigue, fever and unexpected weight change.   HENT:  Negative for congestion, facial swelling, postnasal drip, rhinorrhea, sinus pressure, sore throat and trouble swallowing.    Eyes:  Positive for visual disturbance. Negative for pain and redness.   Respiratory:  Negative for cough, chest tightness, shortness of breath and wheezing.    Cardiovascular:  Positive for leg swelling. Negative for chest pain and palpitations.   Gastrointestinal:  Positive for abdominal distention. Negative for abdominal pain, diarrhea, nausea and vomiting.   Genitourinary:  Negative for dysuria, flank pain and urgency.   Musculoskeletal:  Negative for gait problem, neck pain and neck stiffness.   Skin:  Negative for rash.   Allergic/Immunologic: Negative for environmental allergies, food allergies and immunocompromised state.   Neurological:  Negative for dizziness, weakness, light-headedness and headaches.   Psychiatric/Behavioral:  Negative  "for agitation and confusion. The patient is not nervous/anxious.        Objective:   body mass index is 35.81 kg/m². BP (!) 143/70 (BP Location: Right arm, Patient Position: Sitting)   Pulse 69   Temp 96.8 °F (36 °C) (Temporal)   Resp 20   Ht 6' 0.01" (1.829 m)   Wt 119.8 kg (264 lb 1.8 oz)   SpO2 95%   BMI 35.81 kg/m²       Physical Exam  Constitutional:       Appearance: Normal appearance. He is well-developed. He is obese.   HENT:      Head: Normocephalic.      Nose: Nose normal.   Eyes:      Conjunctiva/sclera: Conjunctivae normal.      Pupils: Pupils are equal, round, and reactive to light.   Cardiovascular:      Rate and Rhythm: Normal rate and regular rhythm.      Heart sounds: Normal heart sounds.   Pulmonary:      Effort: Pulmonary effort is normal.      Breath sounds: Normal breath sounds.   Abdominal:      General: Bowel sounds are normal. There is distension.      Palpations: Abdomen is soft. There is no hepatomegaly or splenomegaly.   Musculoskeletal:      Cervical back: Normal range of motion and neck supple.      Right lower leg: Edema present.      Left lower leg: Edema present.      Comments: Bilateral trace peripheral edema   Skin:     General: Skin is warm and dry.   Neurological:      Mental Status: He is alert and oriented to person, place, and time.   Psychiatric:         Behavior: Behavior normal.         Labs:  Lab Results   Component Value Date    WBC 6.55 11/14/2024    HGB 7.4 (L) 11/14/2024    HCT 22.9 (L) 11/14/2024     11/14/2024    K 4.1 11/14/2024     (H) 11/14/2024    CO2 22 (L) 11/14/2024    BUN 46 (H) 11/14/2024    CREATININE 6.3 (H) 11/14/2024    EGFRNORACEVR 8.9 (A) 11/14/2024    CALCIUM 8.2 (L) 11/14/2024    PHOS 4.2 11/14/2024    MG 2.2 08/29/2024    ALBUMIN 3.3 (L) 11/14/2024    AST 12 08/29/2024    ALT 13 08/29/2024    UTPCR 3.55 (H) 11/14/2024    .7 (H) 11/14/2024       Lab Results   Component Value Date    BILIRUBINUA Negative 11/14/2024    " "PROTEINUA 2+ (A) 11/14/2024    NITRITE Negative 11/14/2024    RBCUA 1 11/14/2024    WBCUA 0 11/14/2024       No results found for: "HLAABCTYPE"    Lab Results   Component Value Date    CPRA 0 08/07/2024    CPRA 0 08/07/2024    CPRA 0 08/07/2024    KS0LXTD Negative 08/07/2024    ABCMT WEAK: DQ9 08/07/2024       Labs were reviewed with the patient.    Pre-transplant Workup:   Reviewed with the patient.    Assessment:     1. Patient on waiting list for kidney transplant    2. Chronic kidney disease (CKD), stage V    3. MGUS (monoclonal gammopathy of unknown significance)    4. Type 2 diabetes mellitus with stage 5 chronic kidney disease not on chronic dialysis, without long-term current use of insulin        Plan:     Transplant Candidacy:   Mr. Adams is a suitable kidney transplant candidate.  Meets center eligibility for accepting HCV+ donor offer - Yes.  Patient educated on HCV+ donors. Handy is willing  to accept HCV+ donor offer -  Yes   Patient is a candidate for KDPI > 85 kidney donor offer - No.  He remains in overall stable health, and will remain active on the transplant list.    Patient advised that it is recommended that all transplant candidates, and their close contacts and household members receive Covid vaccination.    Dorina Patel NP       Follow-up:   In addition to the tests noted in the plan, Mr. Adams will continue to have reevaluation as per the standing pre-kidney transplant protocol:  Monthly blood for PRA  Annual return to clinic, except HIV positive, > 65 years of age, or pancreas transplant candidates who will be scheduled to see transplant every 6 months while in pre-transplant phase  Annual re-testing: CXR, EKG, yearly mammograms for women over 40 and PSA for males over 40, cardiology follow-up as recommended by initial cardiology pre-transplant evaluation  Renal ultrasound every 2 years  Baseline colonoscopy after age 50 and repeated as recommended    UNOS Patient Status  Functional " Status: 90% - Able to carry on normal activity: minor symptoms of disease  Physical Capacity: No Limitations

## 2024-11-19 NOTE — PROGRESS NOTES
AA YEARLY PATIENT EDUCATION NOTE    Mr. Handy Adams was seen in pre-kidney transplant clinic for yearly (or six months) evaluation for kidney, kidney/pancreas or pancreas only transplant.  The patient attended a web based education session that discussed/reviewed the following aspects of transplantation: UNOS waitlist management/multiple listings, types of organs offered (KDPI < 85%, KDPI > 85%, PHS increased risk, DCD, HCV+), financial aspects, surgical procedures, dietary instruction pre- and post-transplant, health maintenance pre- and post-transplant, post-transplant hospitalization and outpatient follow-up, potential to participate in a research protocol, and medication management and side effects. A question and answer session was provided after the presentation.    The patient was seen by all members of the multi-disciplinary team to include: Nephrologist/PA, , , Pharmacist and Dietician (if applicable).    The Patient was educated on OPTN policy change regarding race based eGFR. For Black or  Americans, this eGFR could have shown that their kidneys were working better than they were.    Because of this change, we are looking at everyones record and assessing waiting time for people who are eligible. We will be reviewing your medical records and will notify you if you are eligible. We also encouraged patient to provide span 20 labs that are not in our electronic medical records.    The patient reviewed and signed all consents for evaluation which were witnessed and sent to scanning into the [facility] chart.    The patient was given an education book and plan for further evaluation based on his individual assessment.      The patient was encouraged to call with any questions or concerns.

## 2024-11-19 NOTE — PROGRESS NOTES
Transplant Psychosocial Evaluation Update:  Last psychosocial evaluation for transplant was completed on 2/7/2024 by Estela Silva, .    Pt presents today in company of Sharon Carolina, sister/caregiver. Pt and sister  present as alert, oriented to person, place, time, purpose of visit. Pt and sister deny concerns about going through with transplant and state motivation and sense of preparedness for transplantation, caregiver role, psychosocial risk factors, medical risk factors, financial aspects, and lifetime commitments. All concerns and education points as per transplant psychosocial evaluation process addressed (also refer to psychosocial dated 2/7/2024). Pt actively participated in today's interview, with sister participating as caregiver. Pt asking questions appropriately and denies changes to previous psychosocial evaluation for transplantation which we reviewed line by line with pt and, per pt choice, with pts caregiver today.    Primary Caregivers and Transportation for Transplant:  1. Sharon Carolina, sister, [phone]  2. Pee Adams, son [phone]     Both pt and caregiver/s plan to stay locally at home - information reviewed in depth. Caregivers plan to stay at hospital as appropriate for transplant and post-transplant process.    Pts Vocation: Pt works is retired.    DME: glucometer; bpc      ADLS:  Patient is independent. Patient can ambulate, complete ADL's, drive and manage medications without assistance.    Household and Dependents: Pt resides with Danielal Adams, ex-wife and has no dependents at this time.    Income: Pt states ability to afford all monthly expenses and costs including for medications now and if transplanted based on income and on savings and assets.    Dialysis Adherence: Patient reported being adherence with appointments. Patient reported he has an appoint with Raven tomorrow in Nephrology. He has an upcoming appointment with [facility] for home  "assessment for PD.  MA compliance notes per Raven Naylor NP for patients appt on 11/19/24 "he has a lot of denial and has been slow to make decisions. He does usually come to appointments though." Patients compliance really has not improved since last compliance notes on 6/03/24    Pt and sister deny any additional concerns, stating preparedness and motivation to proceed with organ transplant.     Patient denies having any concerns and/or overwhelming feelings regarding transplant currently. Patient appeared guarded during  visit. Patient denied needing or wanting resources or referrals at this time. Patient agreed to contact  team as needed      Suitability for Transplant:   Pt is suitable for transplant at this time based on psychosocial risk factors and strengths. Patient has adequate caregiver support, good adherence, appropriate coping skills, medical insurance, reliable transportation and being able to financially can afford medications.    Patient is highly motivated to receive kidney transplant. SW recommends patient contact insurance provider regarding lodging/travel benefits, conduct fundraising to assist patient with pay for cost of medications, food, gas, and other transplant related needs. SW recommends that patient remain aware of potential mental health concerns and contact the team if any concerns arise.  SW recommends that patient remain abstinent from tobacco, ETOH, and drug use. SW supports patient continued adherence. SW remains available to answer any questions or concerns that arise as the patient moves through the transplant process.       Final determination of transplant candidacy will be reviewed and determined by the selection committee.      Blaire Fish LCSW         "

## 2024-11-20 ENCOUNTER — CLINICAL SUPPORT (OUTPATIENT)
Dept: NEPHROLOGY | Facility: CLINIC | Age: 69
End: 2024-11-20
Payer: MEDICARE

## 2024-11-20 ENCOUNTER — OFFICE VISIT (OUTPATIENT)
Dept: NEPHROLOGY | Facility: CLINIC | Age: 69
End: 2024-11-20
Payer: MEDICARE

## 2024-11-20 VITALS
OXYGEN SATURATION: 97 % | SYSTOLIC BLOOD PRESSURE: 149 MMHG | DIASTOLIC BLOOD PRESSURE: 80 MMHG | HEART RATE: 67 BPM | WEIGHT: 263.69 LBS | BODY MASS INDEX: 35.72 KG/M2 | HEIGHT: 72 IN

## 2024-11-20 DIAGNOSIS — N25.81 SECONDARY HYPERPARATHYROIDISM: ICD-10-CM

## 2024-11-20 DIAGNOSIS — I10 HYPERTENSION, UNSPECIFIED TYPE: ICD-10-CM

## 2024-11-20 DIAGNOSIS — N18.5 TYPE 2 DIABETES MELLITUS WITH STAGE 5 CHRONIC KIDNEY DISEASE NOT ON CHRONIC DIALYSIS, UNSPECIFIED WHETHER LONG TERM INSULIN USE: ICD-10-CM

## 2024-11-20 DIAGNOSIS — E66.01 SEVERE OBESITY (BMI 35.0-39.9) WITH COMORBIDITY: ICD-10-CM

## 2024-11-20 DIAGNOSIS — N18.5 CKD (CHRONIC KIDNEY DISEASE), STAGE V: Primary | ICD-10-CM

## 2024-11-20 DIAGNOSIS — R80.9 PROTEINURIA, UNSPECIFIED TYPE: ICD-10-CM

## 2024-11-20 DIAGNOSIS — N18.5 ANEMIA IN STAGE 5 CHRONIC KIDNEY DISEASE, NOT ON CHRONIC DIALYSIS: ICD-10-CM

## 2024-11-20 DIAGNOSIS — E11.22 TYPE 2 DIABETES MELLITUS WITH STAGE 5 CHRONIC KIDNEY DISEASE NOT ON CHRONIC DIALYSIS, UNSPECIFIED WHETHER LONG TERM INSULIN USE: ICD-10-CM

## 2024-11-20 DIAGNOSIS — D64.9 ANEMIA, UNSPECIFIED TYPE: ICD-10-CM

## 2024-11-20 DIAGNOSIS — D63.1 ANEMIA IN CHRONIC KIDNEY DISEASE, UNSPECIFIED CKD STAGE: ICD-10-CM

## 2024-11-20 DIAGNOSIS — D47.2 MGUS (MONOCLONAL GAMMOPATHY OF UNKNOWN SIGNIFICANCE): ICD-10-CM

## 2024-11-20 DIAGNOSIS — M79.89 LEG SWELLING: ICD-10-CM

## 2024-11-20 DIAGNOSIS — N18.5 ANEMIA IN STAGE 5 CHRONIC KIDNEY DISEASE, NOT ON CHRONIC DIALYSIS: Primary | ICD-10-CM

## 2024-11-20 DIAGNOSIS — N18.5 CKD (CHRONIC KIDNEY DISEASE), STAGE V: ICD-10-CM

## 2024-11-20 DIAGNOSIS — D63.1 ANEMIA IN STAGE 5 CHRONIC KIDNEY DISEASE, NOT ON CHRONIC DIALYSIS: Primary | ICD-10-CM

## 2024-11-20 DIAGNOSIS — N18.9 ANEMIA IN CHRONIC KIDNEY DISEASE, UNSPECIFIED CKD STAGE: ICD-10-CM

## 2024-11-20 DIAGNOSIS — E87.20 ACIDOSIS: ICD-10-CM

## 2024-11-20 DIAGNOSIS — D63.1 ANEMIA IN STAGE 5 CHRONIC KIDNEY DISEASE, NOT ON CHRONIC DIALYSIS: ICD-10-CM

## 2024-11-20 PROCEDURE — 3044F HG A1C LEVEL LT 7.0%: CPT | Mod: HCNC,CPTII,S$GLB, | Performed by: NURSE PRACTITIONER

## 2024-11-20 PROCEDURE — 99999 PR PBB SHADOW E&M-EST. PATIENT-LVL II: CPT | Mod: PBBFAC,HCNC,,

## 2024-11-20 PROCEDURE — 1126F AMNT PAIN NOTED NONE PRSNT: CPT | Mod: HCNC,CPTII,S$GLB, | Performed by: NURSE PRACTITIONER

## 2024-11-20 PROCEDURE — 1160F RVW MEDS BY RX/DR IN RCRD: CPT | Mod: HCNC,CPTII,S$GLB, | Performed by: NURSE PRACTITIONER

## 2024-11-20 PROCEDURE — 3008F BODY MASS INDEX DOCD: CPT | Mod: HCNC,CPTII,S$GLB, | Performed by: NURSE PRACTITIONER

## 2024-11-20 PROCEDURE — 3077F SYST BP >= 140 MM HG: CPT | Mod: HCNC,CPTII,S$GLB, | Performed by: NURSE PRACTITIONER

## 2024-11-20 PROCEDURE — 3079F DIAST BP 80-89 MM HG: CPT | Mod: HCNC,CPTII,S$GLB, | Performed by: NURSE PRACTITIONER

## 2024-11-20 PROCEDURE — 1101F PT FALLS ASSESS-DOCD LE1/YR: CPT | Mod: HCNC,CPTII,S$GLB, | Performed by: NURSE PRACTITIONER

## 2024-11-20 PROCEDURE — 99215 OFFICE O/P EST HI 40 MIN: CPT | Mod: HCNC,S$GLB,, | Performed by: NURSE PRACTITIONER

## 2024-11-20 PROCEDURE — 3062F POS MACROALBUMINURIA REV: CPT | Mod: HCNC,CPTII,S$GLB, | Performed by: NURSE PRACTITIONER

## 2024-11-20 PROCEDURE — 99999 PR PBB SHADOW E&M-EST. PATIENT-LVL V: CPT | Mod: PBBFAC,HCNC,, | Performed by: NURSE PRACTITIONER

## 2024-11-20 PROCEDURE — 3066F NEPHROPATHY DOC TX: CPT | Mod: HCNC,CPTII,S$GLB, | Performed by: NURSE PRACTITIONER

## 2024-11-20 PROCEDURE — G2211 COMPLEX E/M VISIT ADD ON: HCPCS | Mod: HCNC,S$GLB,, | Performed by: NURSE PRACTITIONER

## 2024-11-20 PROCEDURE — 4010F ACE/ARB THERAPY RXD/TAKEN: CPT | Mod: HCNC,CPTII,S$GLB, | Performed by: NURSE PRACTITIONER

## 2024-11-20 PROCEDURE — 3288F FALL RISK ASSESSMENT DOCD: CPT | Mod: HCNC,CPTII,S$GLB, | Performed by: NURSE PRACTITIONER

## 2024-11-20 PROCEDURE — 1159F MED LIST DOCD IN RCRD: CPT | Mod: HCNC,CPTII,S$GLB, | Performed by: NURSE PRACTITIONER

## 2024-11-20 RX ORDER — CALCITRIOL 0.25 UG/1
0.25 CAPSULE ORAL DAILY
Qty: 90 CAPSULE | Refills: 3 | Status: SHIPPED | OUTPATIENT
Start: 2024-11-20 | End: 2025-11-15

## 2024-11-20 NOTE — PATIENT INSTRUCTIONS
Increase calcitriol to daily.    Increase torsemide to 40 mg twice a day.  Take your weight daily. Write them down. Call in a week or message over portal with your numbers [phone].    Please let us know if you start to have a lower appetite, fatigue, weakness, nausea, vomiting, worsened swelling, decreased urine output, or brain fog.

## 2024-11-20 NOTE — PROGRESS NOTES
"  Subjective:       Patient ID: Handy Adams is a 69 y.o. A male who presents for evaluation of of renal dysfunction.      HPI     Patient is new to me. New to clinic.  Prior pertinent chart reviewed since this is patient's first appointment with me.    Patient presents for new evaluation of renal dysfunction.  Baseline creatinine of 1.6-1.7 in 2020-early 2022. Recently had sCr of 2.5. Patient said he has "not been taking care of himself" and "eating more sugar" since January.    Per recent note from PCP: He cannot afford farxiga due to increased price and had stopped taking it for a few months prior to appt in September.    Home BPs: does not take    Rare Excedrin use now, though he used to use it frequently.    Significant other medical problems include HTN since at least 2007, T2DM since at least 2007.      The patient denies taking herbal supplements, or new antibiotics, recreational drugs, recent episode of dehydration, diarrhea, nausea or vomiting, acute illness, hospitalization or exposure to IV radiocontrast.     Significant family hx includes: No known kidney issues    Last renal US: none in EMR    Update 10/24/22:  Presents for f/u of CKD, YESENIA.  Last seen a month ago.  Feet are swelling again, especially when drinking ensure. Has improved since stopping Ensure.  Holding lisinopril-Hctz. Taking labetolol 200 mg instead.  Found to have an IgG lambda specific monoclonal band during proteinuria workup. Referred to hematology.    Recent sCr 2.5--> 2.8-2.9.     Home BPs: does not believe cuff is accurate.    Update 12/28/22:  Returns for f/u of CKD.  sCr has been 2.8-3.0 mg/dL.  Home BPs: not taking because he thinks cuff is inaccurate    Patient had bone marrow biopsy and was diagnosed with IgG-lambda MGUS.  An IgG lambda specific monoclonal protein was identified on 24 hour urine on 12/16/22.  He has not gotten kidney biopsy.    Says he feels great.    Update 2/3/23:  Returns for f/u of CKD and discussion " "about biopsy.  sCr now 3.0-3.2 mg/dL. Most recent sCr 3.3.  Increased valsartan to 160 mg at last visit.    Update 9/8/23:  - Presents for follow-up of CKD  - sCr trended up to 3.8. from baseline of about 3.0, now improved to 3.1. Unclear etiology.  - Notes being on Farxiga in the past but has not taken for at least a year   - Denies NSAIDs, reduced PO intake, n/v/d, fluctuations in BP, elevations in blood sugar, recent illness    Update 11/6/23:  Presents for f/u of CKD.  Most recent sCr 3.1 -3.4 mg/dL.  Home BPs: 130-140s (SBP)   Admits recent dietary indiscretion.  Says he gained weight recently.  Not exercising.    Advance Care Planning       Serious Illness Conversation Guide    Conversation was held with:   patient[SR1.1]     What is your understanding now of where you are with your illness?:   Comments: Admits he is in denial about kidney disease.[SR1.1]     How much information about what is likely to be ahead with your illness would you like from me?:   wants to be fully informed[SR1.1]     I want to share with you my understanding of where things are with your illness.:   continued decline[SR1.2]     Patient emotions observed or reported:   denial[SR1.1]     What are your most important goals if your health situation worsens?:   being independent[SR1.1]     What are your biggest fears and worries about the future with your health?:   Comments: being debilitated where he has to depend on others to care for him[SR1.1]     What gives you strength as you think about the future of your illness?:   [employer] tae[SR1.1]     What abilities are so critical to your life that you can't imagine living without them? Or, what makes life meaningful?:   being able to care for oneself, living independently[SR1.1]     If you become sicker, how much are you willing to go through for the possibility of gaining more time?:   Comments: Says he needs to think about this, but "I don't want to go through dialysis at all because " "I think that would put a constraint on my independence and constrain me from what I imagine I could be." He says he would want dialysis though if the choice was dialysis or death.    Has not thought about invasive procedures in depth.[SR1.1]     How much does your family know about your priorities and wishes?:   patient has had some incomplete discussions with family[SR1.2]  Comments: Says he is very private. Has not spoken to spouse or friends about this yet. He has shared some information with sister.[SR1.2]     I recommend that we do the following to make sure your treatment plans reflect what's important to you. How does this plan seem to you?:        Attribution       SR1.1 Raven Naylor NP 11/06/23 14:48    SR1.2 Raven Naylor NP 11/06/23 16:04               Update 2/9/24:  Presents today for f/u of CKD.  Most recent sCr was 4.1 mg/dL.  Home BPs: SBP 150s  Thinks he took his medication today. Takes his medication daily but not at the same time every day.  Saw transplant team.    Update 3/15/24:  Presents today for f/u of CKD.  Most recent sCr was 5.7    Says he has lost 20+ lbs by only eating on meal a day (Honey nut cheerios; almond milk). Drinks water.     Says he has been "feeling fine." No swelling. "I feel great."  Hematology is considering repeating bone marrow biopsy. Planning to do full body scan.    Update 4/23/24:  Presents today for f/u of CKD.  Most recent sCr was 4.5  Had clinic visit from PD nurse but said he is unsure of modality choice.   Previously referred to [facility] nutrition services/RD in March but has not been seen yet.  Has gained weight back.    Home BPs: 130s-140s/70s-80s    Update 7/24/24:  Presents today for f/u of CKD/ hospital f/u for YESENIA.  Had sCr on routine labs of 9.0. Repeat of 8.0. No uremic symptoms, but concerned for overdiuresis, need for IVF. Sent to ER. He was admitted; aldactone, torsemide, valsartan held. Discharged c sCr of 7.0. He required one dose of " anu while hospitalized.     Most recent sCr was 4.9 but UPCR up to 4 grams.  Ran out of nifedipine, but he took it yesterday.    Home BPs: 120s-140s    Update 9/13/24:  Presents for f/u of CKD/ hospital f/u of fluid overload.  He was hospitalized with fluid overload/ SOB. Diagnosed with HFpEF Lasix 40 mg BID changed to torsemide 40 mg daily. While hospital, he admitted he was having intermittent SOB since July that was relieved with position changes.  Last sCr in hospital was 5.8.  Home BPs: 150s/80s on digital medicine. Says he is back on nifedipine, but it is not on his med list.    SOB and edema improved.  Had decreased fluid intake to 24-48 oz daily.    Had home visit. Needs to clean and have another visit.    Update 10/8/24:  Returns for f/u of CKD.   Most recent sCr 5.5 mg/dL.  Home BPs: 140-150s on digital medicine.  He has not de-cluttered his home yet to prep for PD.  He has questions about how dietary intake of protein will affect the protein in his urine.  Seeing cardiology this month.    Update 11/20/24:  Returns for f/u of CKD.   Most recent sCr 5.5 mg/dL.  Home BPs: 140-150s on digital medicine.  Cardiology increased labetolol to 300 mg BID. He is taking this.  Multiple epo appointments have been cancelled. He said he cancelled one, but other times the clinic may have rescheduled him.  He says [facility]    Review of Systems   Constitutional:  Negative for appetite change. Food tastes good.  Respiratory:  Some shortness of breath at night. He has to sit upright in order to breathe well. Occurs when he lays flat. Says he has to position himself to help with breathing about 50%.  Cardiovascular:  Some swelling in legs. Torsemide helps.  Gastrointestinal:  Negative for diarrhea, nausea and vomiting.    Genitourinary:  Decreased urine output associated        Some incontinence   Taking naps during the day - has fatigue especially  "after nights where he did not sleep well.  Denies brain fog.         Objective:       Blood pressure (!) 149/80, pulse 67, height 6' 0.01" (1.829 m), weight 119.6 kg (263 lb 10.7 oz), SpO2 97%.  Physical Exam  Vitals reviewed.   Constitutional:       Appearance: Normal appearance. He is obese.   HENT:      Head: Normocephalic and atraumatic.   Eyes:      General: No scleral icterus.  Cardiovascular:      Rate and Rhythm: Normal rate and regular rhythm.   BLE +2 edema.  Pulmonary:      Effort: No respiratory distress. CTA    Musculoskeletal:     Skin:     General: Skin is warm and dry.   Neurological:      Mental Status: He is alert and oriented to person, place, and time.   Psychiatric:         Mood and Affect: Mood normal.         Behavior: Behavior normal.           Lab Results   Component Value Date    CREATININE 6.3 (H) 11/14/2024     Prot/Creat Ratio, Urine   Date Value Ref Range Status   11/14/2024 3.55 (H) 0.00 - 0.20 Final   10/01/2024 4.04 (H) 0.00 - 0.20 Final   07/24/2024 4.04 (H) 0.00 - 0.20 Final     Lab Results   Component Value Date     11/14/2024    K 4.1 11/14/2024    CO2 22 (L) 11/14/2024     (H) 11/14/2024     Lab Results   Component Value Date    .7 (H) 11/14/2024    CALCIUM 8.2 (L) 11/14/2024    PHOS 4.2 11/14/2024     Lab Results   Component Value Date    HGB 7.4 (L) 11/14/2024    WBC 6.55 11/14/2024    HCT 22.9 (L) 11/14/2024      Lab Results   Component Value Date    HGBA1C 5.1 08/26/2024     11/14/2024    BUN 46 (H) 11/14/2024     Lab Results   Component Value Date    LDLCALC 87.4 02/07/2024         Assessment:       1. CKD (chronic kidney disease), stage V    2. Secondary hyperparathyroidism    3. Leg swelling    4. Anemia in stage 5 chronic kidney disease, not on chronic dialysis    5. Anemia, unspecified type    6. Hypertension, unspecified type    7. MGUS (monoclonal gammopathy of unknown significance)    8. Proteinuria, unspecified type    9. Acidosis    10. " Type 2 diabetes mellitus with stage 5 chronic kidney disease not on chronic dialysis, unspecified whether long term insulin use    11. Severe obesity (BMI 35.0-39.9) with comorbidity    12. Anemia in chronic kidney disease, unspecified CKD stage                            Plan:   CKD stage 5   - Biopsy performed showing hypertensive nephrosclerosis, acute tubular injury, and diabetic nephropathy. Progressive.  - IgG lambda specific monoclonal band noted on proteinuria workup and UPEP. No evidence of MGRS on biopsy.     - Educated patient to control BP, BG, remain well-hydrated, and avoid NSAIDs   - High risk of progression to ESRD.      UPCR Nephrotic range proteinuria. Was on farxiga but could not afford it; renal function is too low to start on recent labs. On ARB  - Proteinuric   Acid-base Mild acidosis on sodium bicarb 650 mg BID   Renal osteodystrophy Ca low. Phos okay on Renvela. Low vit D.     Elevated PTH.  On ergo weekly and calcitriol 3x/week. Increase calcitriol to daily.       Anemia Hgb low last check. Has MGUS. Followed by hem/onc.  On PAOLA, but he has missed multiple appointments for injections (one he cancelled; he says others were cancelled by clinic).    On PO iron twice a day..  Increase epo dose by 25% to Retacrit 12,500 units.   DM Well-controlled recently. Has had DM since at least 2007, when he had an A1c of 15+.   Lipid Management On statin.   ESRD planning   Previously: Had home visit. Needs to clean and have another visit prior to surgery evaluation. He agrees to do this next week with plans to second visit the week after, pending the HTRN's availability. When she called to schedule, he had not cleaned yet. He agreed to clean in the next two weeks. Explained that if this is not done, he may lose his candidacy for home dialysis due to concerns for noncompliance with recommendations. She will attempt to schedule in about 2 weeks, pending her availability. He verbalized understanding.  -  Offered appt with LCSW for counseling and help with denial, transition, and overcoming barriers, but he declined.    Today: He has appt scheduled for tomorrow for HTRN clearance. Will also place referral for outpatient dialysis placement. Counseled patient that he will likely need dialysis soon.    He is on transplant wait list.     Kidney Failure Risk Equation (Tangri)    Kidney Failure Risk at 2 years: 87.1%    Kidney Failure Risk at 5 years: 99.8%    Lab Results   Component Value Date    MICALBCREAT 2647.8 (H) 11/14/2024    CREATININE 6.3 (H) 11/14/2024     Uremic symptoms today: volume overload (orthopnea, edema). He is often reticent to admit symptoms.     HTN - High on labetalol 300 mg BID, valsartan 320 mg, torsemide 20 mg BID, and nifedipine 90 mg  - Continue monitoring with digital medicine  - Increase torsemide to 40 mg BID    HFpEF - new diagnosis. Now seeing cardiology    Orthopnea/ Edema - Increase torsemide to 40 mg BID. Report daily weights next Tuesday. BMP next Tuesday.    Obesity - he has increased dietary intake after severely limiting intake  - Refer to nutritionist (entered previously).     MGUS - per hem/onc; due back in April. 2025    All questions patient had were answered.  Asked if further questions. None. F/u in clinic in 6 weeks with Raven Naylor NP with labs and urine prior to next visit or sooner if needed.  ER for emergency concerns.    Summary of Plan:  Increase calcitriol to daily  Increase torsemide to 40 mg BID  Increase PAOLA dose  Keep appt tomorrow for home visit  Place referral for dialysis unit placement  avoid NSAID/ bactrim/ IV contrast/ gadolinium/ aminoglycoside where possible  Report uremic symptoms  RTC in 1 mos    Visit today included increased complexity associated with  managing the longitudinal care of the patient due to the serious and/or complex managed problem(s) CKD.    I spent a total of 60 minutes on the day of the visit.  This includes face to face time and  non-face to face time preparing to see the patient (eg, review of tests), obtaining and/or reviewing separately obtained history, documenting clinical information in the electronic or other health record, independently interpreting results and communicating results to the patient/family/caregiver, or care coordinator.

## 2024-11-22 ENCOUNTER — TELEPHONE (OUTPATIENT)
Dept: NEPHROLOGY | Facility: CLINIC | Age: 69
End: 2024-11-22
Payer: MEDICARE

## 2024-11-22 VITALS
OXYGEN SATURATION: 97 % | BODY MASS INDEX: 35.75 KG/M2 | DIASTOLIC BLOOD PRESSURE: 80 MMHG | WEIGHT: 263.69 LBS | SYSTOLIC BLOOD PRESSURE: 149 MMHG | HEART RATE: 67 BPM

## 2024-11-22 VITALS
BODY MASS INDEX: 35.76 KG/M2 | OXYGEN SATURATION: 97 % | WEIGHT: 263.69 LBS | HEART RATE: 65 BPM | SYSTOLIC BLOOD PRESSURE: 145 MMHG | DIASTOLIC BLOOD PRESSURE: 82 MMHG

## 2024-11-22 DIAGNOSIS — D63.1 ANEMIA IN STAGE 5 CHRONIC KIDNEY DISEASE, NOT ON CHRONIC DIALYSIS: Primary | ICD-10-CM

## 2024-11-22 DIAGNOSIS — M79.89 LEG SWELLING: ICD-10-CM

## 2024-11-22 DIAGNOSIS — N18.5 ANEMIA IN STAGE 5 CHRONIC KIDNEY DISEASE, NOT ON CHRONIC DIALYSIS: Primary | ICD-10-CM

## 2024-11-22 LAB
HBV SURFACE AB SER QL IA: NEGATIVE
HBV SURFACE AB SERPL IA-ACNC: <3 MIU/ML

## 2024-11-22 RX ORDER — TORSEMIDE 20 MG/1
40 TABLET ORAL DAILY
Qty: 180 TABLET | Refills: 3 | Status: SHIPPED | OUTPATIENT
Start: 2024-11-22 | End: 2025-11-22

## 2024-11-22 NOTE — TELEPHONE ENCOUNTER
Spoke to pt. Torsemide rx will remain two 20 mg pills BID due to insurance coverage (as opposed to 40 mg pills).  He is aware of need to replace vent cover.  Also recommended consult to general surgery for PD cath placement. He agreed. Will notify HTRN.

## 2024-11-22 NOTE — PROGRESS NOTES
Hgb 7.8/ Hct 25.4     Retacrit [id number] administered , Per Form # DRORD-428. All questions and concerns were addressed .Pt had no reactions noted at this time ,tolerated injection well. 1 week appt given.         GFR 10.5

## 2024-11-22 NOTE — PROGRESS NOTES
Hgb 7.4/ Hct 22.9     Retacrit [id number] units administered , Per Form # DRORD-428. All questions and concerns were addressed .Pt had no reactions noted at this time ,tolerated injection well. 1 week appt given.         GFR 8.9

## 2024-11-24 DIAGNOSIS — I10 HYPERTENSION, UNSPECIFIED TYPE: ICD-10-CM

## 2024-11-25 RX ORDER — NIFEDIPINE 90 MG/1
90 TABLET, EXTENDED RELEASE ORAL
Qty: 90 TABLET | Refills: 0 | Status: SHIPPED | OUTPATIENT
Start: 2024-11-25

## 2024-11-26 ENCOUNTER — LAB VISIT (OUTPATIENT)
Dept: LAB | Facility: HOSPITAL | Age: 69
End: 2024-11-26
Payer: MEDICARE

## 2024-11-26 DIAGNOSIS — D64.9 ANEMIA, UNSPECIFIED TYPE: ICD-10-CM

## 2024-11-26 DIAGNOSIS — M79.89 LEG SWELLING: ICD-10-CM

## 2024-11-26 LAB
ANION GAP SERPL CALC-SCNC: 11 MMOL/L (ref 8–16)
BUN SERPL-MCNC: 50 MG/DL (ref 8–23)
CALCIUM SERPL-MCNC: 8.2 MG/DL (ref 8.7–10.5)
CHLORIDE SERPL-SCNC: 107 MMOL/L (ref 95–110)
CO2 SERPL-SCNC: 23 MMOL/L (ref 23–29)
CREAT SERPL-MCNC: 7.8 MG/DL (ref 0.5–1.4)
EST. GFR  (NO RACE VARIABLE): 7 ML/MIN/1.73 M^2
GLUCOSE SERPL-MCNC: 90 MG/DL (ref 70–110)
HCT VFR BLD AUTO: 24.8 % (ref 40–54)
HGB BLD-MCNC: 7.8 G/DL (ref 14–18)
IRON SERPL-MCNC: 50 UG/DL (ref 45–160)
POTASSIUM SERPL-SCNC: 4.2 MMOL/L (ref 3.5–5.1)
SATURATED IRON: 19 % (ref 20–50)
SODIUM SERPL-SCNC: 141 MMOL/L (ref 136–145)
TOTAL IRON BINDING CAPACITY: 269 UG/DL (ref 250–450)
TRANSFERRIN SERPL-MCNC: 182 MG/DL (ref 200–375)

## 2024-11-26 PROCEDURE — 85018 HEMOGLOBIN: CPT | Mod: HCNC,TXP | Performed by: NURSE PRACTITIONER

## 2024-11-26 PROCEDURE — 36415 COLL VENOUS BLD VENIPUNCTURE: CPT | Mod: HCNC,PO,TXP | Performed by: NURSE PRACTITIONER

## 2024-11-26 PROCEDURE — 84466 ASSAY OF TRANSFERRIN: CPT | Mod: HCNC,TXP | Performed by: NURSE PRACTITIONER

## 2024-11-26 PROCEDURE — 85014 HEMATOCRIT: CPT | Mod: HCNC,TXP | Performed by: NURSE PRACTITIONER

## 2024-11-26 PROCEDURE — 80048 BASIC METABOLIC PNL TOTAL CA: CPT | Mod: HCNC,TXP | Performed by: NURSE PRACTITIONER

## 2024-11-27 ENCOUNTER — CLINICAL SUPPORT (OUTPATIENT)
Dept: NEPHROLOGY | Facility: CLINIC | Age: 69
End: 2024-11-27
Payer: MEDICARE

## 2024-11-27 ENCOUNTER — TELEPHONE (OUTPATIENT)
Dept: NEPHROLOGY | Facility: CLINIC | Age: 69
End: 2024-11-27
Payer: MEDICARE

## 2024-11-27 VITALS — SYSTOLIC BLOOD PRESSURE: 123 MMHG | HEART RATE: 75 BPM | OXYGEN SATURATION: 96 % | DIASTOLIC BLOOD PRESSURE: 71 MMHG

## 2024-11-27 DIAGNOSIS — N18.5 ANEMIA IN STAGE 5 CHRONIC KIDNEY DISEASE, NOT ON CHRONIC DIALYSIS: Primary | ICD-10-CM

## 2024-11-27 DIAGNOSIS — D63.1 ANEMIA IN STAGE 5 CHRONIC KIDNEY DISEASE, NOT ON CHRONIC DIALYSIS: Primary | ICD-10-CM

## 2024-11-27 DIAGNOSIS — N18.5 CKD (CHRONIC KIDNEY DISEASE), STAGE V: Primary | ICD-10-CM

## 2024-11-27 DIAGNOSIS — N18.5 CKD (CHRONIC KIDNEY DISEASE), STAGE V: ICD-10-CM

## 2024-11-27 PROCEDURE — 99999 PR PBB SHADOW E&M-EST. PATIENT-LVL I: CPT | Mod: PBBFAC,HCNC,,

## 2024-11-27 NOTE — TELEPHONE ENCOUNTER
Received labs.  sCr slightly higher.  HTRN out the office this week.  Will send referral to Dr. Jung at [facility] for PD placement.  Will request that placement of PD catheter timing is coordinated with HTRN at [facility] ([facility]ara Lara) to ensure adequate PD catheter care and training times.  Dr. Wolfe is aware of patient.

## 2024-11-27 NOTE — PROGRESS NOTES
Hgb 7.8/ Hct 24.8     Retacrit [id number] units administered , Per Form # DRORD-428. All questions and concerns were addressed .Pt had no reactions noted at this time ,tolerated injection well. 1 week appt given.         GFR 7

## 2024-11-27 NOTE — TELEPHONE ENCOUNTER
November 27, 2024  Raven Naylor NP       11/27/24 10:21 AM  Note  Received labs.  sCr slightly higher.  HTRN out the office this week.  [place] [facility] [place].  Will request that placement of PD catheter timing is coordinated with HTRN at [facility] ([facility]) to ensure adequate PD catheter care and training times.  Dr. Wolfe is aware of patient.              Raven Naylor NP to Me  Tiara Lara RN Zarm, Ayaa, MD Dabrowski, Shona, [facility]  Mae Navas, [facility]         11/27/24 10:21 AM  Cait, Can you please print attached note and referral and fax to Dr. Jung at [place]? Fax number is [phone].    Dr. Wolfe and team, [facility]: I'm sending this now so that we do not get backed up waiting for surgery consult. I'm asking they coordinate surgery time with Tiara.

## 2024-11-27 NOTE — PROGRESS NOTES
Pls notify pt that kidney function is at 7%. Please ask him how SOB and swelling are on the new dose of torsemide.

## 2024-11-27 NOTE — TELEPHONE ENCOUNTER
----- Message from RAVEN Chavira sent at 11/27/2024 10:37 AM CST -----  Spoke with him he said he is not having SOB and the swelling is less.  ----- Message -----  From: Raven Naylor NP  Sent: 11/27/2024  10:21 AM CST  To: Cait Olivera RN    Pls notify pt that kidney function is at 7%. Please ask him how SOB and swelling are on the new dose of torsemide.

## 2024-11-29 ENCOUNTER — TELEPHONE (OUTPATIENT)
Dept: SURGERY | Facility: CLINIC | Age: 69
End: 2024-11-29
Payer: MEDICARE

## 2024-11-29 DIAGNOSIS — D47.2 MGUS (MONOCLONAL GAMMOPATHY OF UNKNOWN SIGNIFICANCE): Primary | ICD-10-CM

## 2024-12-03 ENCOUNTER — LAB VISIT (OUTPATIENT)
Dept: LAB | Facility: HOSPITAL | Age: 69
End: 2024-12-03
Payer: MEDICARE

## 2024-12-03 ENCOUNTER — TELEPHONE (OUTPATIENT)
Dept: SURGERY | Facility: CLINIC | Age: 69
End: 2024-12-03
Payer: MEDICARE

## 2024-12-03 DIAGNOSIS — N18.5 CKD (CHRONIC KIDNEY DISEASE), STAGE V: ICD-10-CM

## 2024-12-03 DIAGNOSIS — D64.9 ANEMIA, UNSPECIFIED TYPE: ICD-10-CM

## 2024-12-03 DIAGNOSIS — Z76.82 ORGAN TRANSPLANT CANDIDATE: ICD-10-CM

## 2024-12-03 LAB
ALBUMIN SERPL BCP-MCNC: 3.4 G/DL (ref 3.5–5.2)
ANION GAP SERPL CALC-SCNC: 10 MMOL/L (ref 8–16)
BASOPHILS # BLD AUTO: 0.04 K/UL (ref 0–0.2)
BASOPHILS NFR BLD: 0.5 % (ref 0–1.9)
BUN SERPL-MCNC: 47 MG/DL (ref 8–23)
CALCIUM SERPL-MCNC: 9.1 MG/DL (ref 8.7–10.5)
CHLORIDE SERPL-SCNC: 108 MMOL/L (ref 95–110)
CO2 SERPL-SCNC: 24 MMOL/L (ref 23–29)
CREAT SERPL-MCNC: 7 MG/DL (ref 0.5–1.4)
DIFFERENTIAL METHOD BLD: ABNORMAL
EOSINOPHIL # BLD AUTO: 0.2 K/UL (ref 0–0.5)
EOSINOPHIL NFR BLD: 3.1 % (ref 0–8)
ERYTHROCYTE [DISTWIDTH] IN BLOOD BY AUTOMATED COUNT: 18.6 % (ref 11.5–14.5)
EST. GFR  (NO RACE VARIABLE): 7.9 ML/MIN/1.73 M^2
GLUCOSE SERPL-MCNC: 88 MG/DL (ref 70–110)
HCT VFR BLD AUTO: 26.2 % (ref 40–54)
HGB BLD-MCNC: 7.8 G/DL (ref 14–18)
IMM GRANULOCYTES # BLD AUTO: 0.04 K/UL (ref 0–0.04)
IMM GRANULOCYTES NFR BLD AUTO: 0.5 % (ref 0–0.5)
IRON SERPL-MCNC: 36 UG/DL (ref 45–160)
LYMPHOCYTES # BLD AUTO: 1 K/UL (ref 1–4.8)
LYMPHOCYTES NFR BLD: 13.6 % (ref 18–48)
MCH RBC QN AUTO: 27.7 PG (ref 27–31)
MCHC RBC AUTO-ENTMCNC: 29.8 G/DL (ref 32–36)
MCV RBC AUTO: 93 FL (ref 82–98)
MONOCYTES # BLD AUTO: 0.7 K/UL (ref 0.3–1)
MONOCYTES NFR BLD: 8.6 % (ref 4–15)
NEUTROPHILS # BLD AUTO: 5.7 K/UL (ref 1.8–7.7)
NEUTROPHILS NFR BLD: 73.7 % (ref 38–73)
NRBC BLD-RTO: 0 /100 WBC
PHOSPHATE SERPL-MCNC: 4.5 MG/DL (ref 2.7–4.5)
PLATELET # BLD AUTO: 299 K/UL (ref 150–450)
PMV BLD AUTO: 11.2 FL (ref 9.2–12.9)
POTASSIUM SERPL-SCNC: 5 MMOL/L (ref 3.5–5.1)
PTH-INTACT SERPL-MCNC: 387 PG/ML (ref 9–77)
RBC # BLD AUTO: 2.82 M/UL (ref 4.6–6.2)
SATURATED IRON: 12 % (ref 20–50)
SODIUM SERPL-SCNC: 142 MMOL/L (ref 136–145)
TOTAL IRON BINDING CAPACITY: 290 UG/DL (ref 250–450)
TRANSFERRIN SERPL-MCNC: 196 MG/DL (ref 200–375)
WBC # BLD AUTO: 7.67 K/UL (ref 3.9–12.7)

## 2024-12-03 PROCEDURE — 86833 HLA CLASS II HIGH DEFIN QUAL: CPT | Mod: HCNC,TXP | Performed by: NURSE PRACTITIONER

## 2024-12-03 PROCEDURE — 83970 ASSAY OF PARATHORMONE: CPT | Mod: HCNC,TXP | Performed by: NURSE PRACTITIONER

## 2024-12-03 PROCEDURE — 80069 RENAL FUNCTION PANEL: CPT | Mod: HCNC,TXP | Performed by: NURSE PRACTITIONER

## 2024-12-03 PROCEDURE — 86832 HLA CLASS I HIGH DEFIN QUAL: CPT | Mod: HCNC,TXP | Performed by: NURSE PRACTITIONER

## 2024-12-03 PROCEDURE — 84466 ASSAY OF TRANSFERRIN: CPT | Mod: HCNC,TXP | Performed by: NURSE PRACTITIONER

## 2024-12-03 PROCEDURE — 36415 COLL VENOUS BLD VENIPUNCTURE: CPT | Mod: HCNC,PO,TXP | Performed by: NURSE PRACTITIONER

## 2024-12-03 PROCEDURE — 85025 COMPLETE CBC W/AUTO DIFF WBC: CPT | Mod: HCNC,TXP | Performed by: NURSE PRACTITIONER

## 2024-12-03 NOTE — TELEPHONE ENCOUNTER
Returned call to the pt. He stated that he has an appt tomorrow with a different doctor outside of [facility]. Once he has seen that physician he will call back to let me know if he wants to keep or cancel appt with Dr. Lozoya.

## 2024-12-04 NOTE — H&P
History    Chief complaint:  Painless progressive vision loss    Present Ilness/Diagnosis: Nuclear sclerotic Cataract    Past Medical History:  has a past medical history of Anemia, Disorder of kidney and ureter, Edema leg, History of rotator cuff tear, History of seasonal allergies, HTN (hypertension), colonic polyps, Hyperlipemia, MGUS (monoclonal gammopathy of unknown significance), NS (nuclear sclerosis), bilateral (06/25/2019), Obesity, 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 (11/17/2017), and Type 2 diabetes mellitus.    Family History/Social History: refer to chart    Allergies: Review of patient's allergies indicates:  No Known Allergies    Current Medications: No current facility-administered medications for this encounter.    Current Outpatient Medications:     atorvastatin (LIPITOR) 20 MG tablet, Take 1 tablet (20 mg total) by mouth once daily., Disp: 90 tablet, Rfl: 3    blood sugar diagnostic Strp, To test twice daily, Disp: 100 each, Rfl: 3    blood-glucose meter kit, Use as instructed, Disp: 1 each, Rfl: 0    calcitRIOL (ROCALTROL) 0.25 MCG Cap, Take 1 capsule (0.25 mcg total) by mouth once daily. Monday, Wednesday, Friday, Disp: 90 capsule, Rfl: 3    ergocalciferol (ERGOCALCIFEROL) 50,000 unit Cap, Take 1 capsule (50,000 Units total) by mouth every 7 days., Disp: 12 capsule, Rfl: 3    FEROSUL 325 mg (65 mg iron) Tab tablet, Take 1 tablet (325 mg total) by mouth 2 (two) times daily., Disp: 180 tablet, Rfl: 3    labetaloL (NORMODYNE) 200 MG tablet, Take 1.5 tablets (300 mg total) by mouth every 12 (twelve) hours., Disp: 90 tablet, Rfl: 11    lancets Misc, 1 lancet by Misc.(Non-Drug; Combo Route) route 2 (two) times daily with meals., Disp: 100 each, Rfl: 11    linaGLIPtin (TRADJENTA) 5 mg Tab tablet, Take 1 tablet (5 mg total) by mouth once daily., Disp: 90 tablet, Rfl: 2    NIFEdipine (ADALAT CC) 90 MG TbSR, Take 90 mg by  mouth once daily., Disp: , Rfl:     NIFEdipine (PROCARDIA-XL) 90 MG (OSM) 24 hr tablet, Take 1 tablet by mouth once daily, Disp: 90 tablet, Rfl: 0    prednisoLONE-moxiflox-bromfen 1-0.5-0.075 % DrpS, Apply 1 drop to eye 3 (three) times daily., Disp: 5 mL, Rfl: 3    prednisolone/moxiflox/bromf/PF (NOXZZYFEXED-IARUXJFB-QBZGJ,PF,) 1-0.5-0.09 % Drop, Apply 1 drop to eye 3 (three) times daily., Disp: 5 mL, Rfl: 3    sevelamer carbonate (RENVELA) 800 mg Tab, Take 1 tablet (800 mg total) by mouth 3 (three) times daily with meals., Disp: 90 tablet, Rfl: 11    sildenafiL (VIAGRA) 100 MG tablet, Take 1 tablet (100 mg total) by mouth daily as needed for Erectile Dysfunction., Disp: 10 tablet, Rfl: 11    sodium bicarbonate 650 MG tablet, Take 1 tablet (650 mg total) by mouth 2 (two) times daily., Disp: 180 tablet, Rfl: 3    sodium zirconium cyclosilicate (LOKELMA) 10 gram packet, Take 1 packet (10 g total) by mouth once daily. Mix entire contents of packet(s) into drinking glass containing 3 tablespoons of water; stir well and drink immediately. Add water and repeat until no powder remains to receive entire dose., Disp: 30 packet, Rfl: 0    tamsulosin (FLOMAX) 0.4 mg Cap, Take 1 capsule (0.4 mg total) by mouth every evening., Disp: 90 capsule, Rfl: 0    torsemide (DEMADEX) 20 MG Tab, Take 2 tablets (40 mg total) by mouth once daily., Disp: 180 tablet, Rfl: 3    valsartan (DIOVAN) 320 MG tablet, Take 1 tablet (320 mg total) by mouth once daily., Disp: 90 tablet, Rfl: 3    ASA Score: II     Mallampati Score: II     Plan for Sedation: Moderate Sedation     Patient or Family History of Anesthesia problems : No    Physical Exam    BP: Vital signs stable  General: No apparent distress  HEENT: nuclear sclerotic cataract  Lungs: adequate respirations  Heart: + pulses  Abdomen: soft  Rectal/pelvic: deferred    Impression: Visually significant Cataract.    See previous clinic notes for surgical indications.    Plan: Phacoemulsification  with implantation of Intraocular lens

## 2024-12-05 ENCOUNTER — TELEPHONE (OUTPATIENT)
Dept: NEPHROLOGY | Facility: CLINIC | Age: 69
End: 2024-12-05
Payer: MEDICARE

## 2024-12-05 ENCOUNTER — CLINICAL SUPPORT (OUTPATIENT)
Dept: NEPHROLOGY | Facility: CLINIC | Age: 69
End: 2024-12-05
Payer: MEDICARE

## 2024-12-05 VITALS
WEIGHT: 263.69 LBS | OXYGEN SATURATION: 99 % | DIASTOLIC BLOOD PRESSURE: 67 MMHG | BODY MASS INDEX: 35.75 KG/M2 | SYSTOLIC BLOOD PRESSURE: 133 MMHG | HEART RATE: 80 BPM

## 2024-12-05 DIAGNOSIS — N18.5 CKD (CHRONIC KIDNEY DISEASE), STAGE V: ICD-10-CM

## 2024-12-05 DIAGNOSIS — D63.1 ANEMIA IN STAGE 5 CHRONIC KIDNEY DISEASE, NOT ON CHRONIC DIALYSIS: Primary | ICD-10-CM

## 2024-12-05 DIAGNOSIS — N18.5 ANEMIA IN STAGE 5 CHRONIC KIDNEY DISEASE, NOT ON CHRONIC DIALYSIS: Primary | ICD-10-CM

## 2024-12-05 DIAGNOSIS — N18.5 CKD (CHRONIC KIDNEY DISEASE), STAGE V: Primary | ICD-10-CM

## 2024-12-05 PROCEDURE — 99999 PR PBB SHADOW E&M-EST. PATIENT-LVL II: CPT | Mod: PBBFAC,HCNC,,

## 2024-12-05 PROCEDURE — 96372 THER/PROPH/DIAG INJ SC/IM: CPT | Mod: HCNC,S$GLB,, | Performed by: NURSE PRACTITIONER

## 2024-12-05 NOTE — TELEPHONE ENCOUNTER
Notified by NICK that pt saw general surgeon at [facility] but is reticent to schedule surgery because he hopes he will not need dialysis.    Called pt.  Had carlene discussion about trend of kidney function and expectation that he will need dialysis in the next month or so. He is concerned because labs fluctuate. Explained that fluctuation is minimal and I expect downward trend of function to continue.    He requests to repeat labs once more. Advised him that we can, but it is unlikely to change recommendation to proceed with PD catheter placement.    Discussed that he is at higher risk for poor outcomes if he waits too long to proceed and does not have time to place PD catheter and needs to initiate HD with CVC.    [place] working [place]. Recommended he ask IWONAN.

## 2024-12-06 ENCOUNTER — TELEPHONE (OUTPATIENT)
Dept: FAMILY MEDICINE | Facility: CLINIC | Age: 69
End: 2024-12-06
Payer: MEDICARE

## 2024-12-06 DIAGNOSIS — I10 ESSENTIAL HYPERTENSION: Primary | ICD-10-CM

## 2024-12-06 RX ORDER — VALSARTAN 320 MG/1
320 TABLET ORAL DAILY
Qty: 90 TABLET | Refills: 3 | Status: SHIPPED | OUTPATIENT
Start: 2024-12-06 | End: 2025-12-06

## 2024-12-06 NOTE — TELEPHONE ENCOUNTER
Patient was advising that he was advised by the pharmacy that the medication was discontinued by the provider, as in the order was cancelled.  However, the medication is still on the patient's med card.  Patient advised that medication refill was approved and sent to pharmacy.  Patient verbalized understanding.

## 2024-12-06 NOTE — TELEPHONE ENCOUNTER
----- Message from Med Assistant Megha sent at 12/6/2024 10:48 AM CST -----  Type: Patient Call Back    Who called: Self    What is the request in detail:is calling regarding a medication refill .. He states the pharmacy informed him that this medication was discontinued.. he still takes this medication ..     valsartan (DIOVAN) 320 MG tablet    Can the clinic reply by [facility]?NO    Would the patient rather a call back or a response via [facility]? Yes, call     Best call back number: [phone] (home)

## 2024-12-06 NOTE — TELEPHONE ENCOUNTER
Patient stated he requested a refill of medication - Diovan from pharmacy, but was advised that the medication has been discontinued.  Patient stated he still takes the medication.  Medication is still on patient's current medication list.  Would like to know if he is to continue medication or stop it.  Please advise.

## 2024-12-09 ENCOUNTER — TELEPHONE (OUTPATIENT)
Dept: OPHTHALMOLOGY | Facility: CLINIC | Age: 69
End: 2024-12-09
Payer: MEDICARE

## 2024-12-09 NOTE — TELEPHONE ENCOUNTER
Spoke with pt provided arrival time of 9:00 for sx on 12/11/24 with Dr. Cooper @ [place]. Pt has eyedrops and packet.

## 2024-12-09 NOTE — PROGRESS NOTES
Hgb 7.8/ Hct 26.2     Retacrit [id number] units administered , Per Form # DRORD-428. All questions and concerns were addressed .Pt had no reactions noted at this time ,tolerated injection well. 1 week appt given.         GFR 7.9

## 2024-12-10 NOTE — PRE-PROCEDURE INSTRUCTIONS
Patient reviewed on 12/10/2024.  Okay to proceed at [facility]. The following pre-procedure instructions and arrival time have been reviewed with patient via phone and sent to patient portal for review.  Patient verbalized an understanding.  Pt to be accompanied by sonEdwige day of procedure as responsible .            Dear Handy     Below you will find basic pre-procedure instructions in preparation for your procedure on 12/11/24 with Dr. Cooper              You should receive your arrival time within 24-48 hours of your scheduled procedure date from the  at your surgeon's office.     -Nothing to eat or drink after midnight the night before your procedure until after your procedure, except AM meds with small sips of water.     - HOLD all oral Diabetic medications night before and morning of procedure  - HOLD all Insulin morning of procedure  - HOLD all Fluid pills morning of procedure  - HOLD all non-insulin shots until after surgery (Ozempic, Mounjaro, Trulicity, Victoza, Byetta, Wegovy and Adlyxin) (7 days prior)  - HOLD all vitamins, minerals and herbal supplements morning of procedure   - TAKE all B/P meds, EXCEPT those that contain a fluid pill (ex. Lasix, Hydroclorothiazide/HCTZ, Spirnolactone)  - USE inhalers as needed and bring AM of surgery  - USE EYE DROPS as directed  -TAKE blood thinner meds AM of surgery unless otherwise instructed by your provider to not take     - Shower and wash face with antibacterial soap (ex. Dial) for 3 mins PM prior and AM of surgery  - No powder, lotions, creams, oils, gels, ointments, makeup,  or jewelry    - Wear comfortable clothing (button up shirt)     (Patient is required to have a responsible ride to transport home, ride may not leave while patient is in surgery)     -- [facility], 2nd floor Surgery Center, located   @ [address] [place] [address]  2nd Floor Registration        If you have any questions or concerns  please feel free to contact your surgeon's office.           Please reply to this message as receipt of delivery.     Catina, LPN [facility]  Pre-Admit - Anesthesia Dept

## 2024-12-11 ENCOUNTER — HOSPITAL ENCOUNTER (OUTPATIENT)
Facility: HOSPITAL | Age: 69
Discharge: HOME | End: 2024-12-11
Attending: OPHTHALMOLOGY | Admitting: OPHTHALMOLOGY
Payer: MEDICARE

## 2024-12-11 VITALS
BODY MASS INDEX: 35.62 KG/M2 | RESPIRATION RATE: 14 BRPM | HEIGHT: 72 IN | SYSTOLIC BLOOD PRESSURE: 163 MMHG | TEMPERATURE: 98 F | DIASTOLIC BLOOD PRESSURE: 84 MMHG | OXYGEN SATURATION: 99 % | HEART RATE: 69 BPM | WEIGHT: 263 LBS

## 2024-12-11 DIAGNOSIS — H25.12 NUCLEAR SCLEROTIC CATARACT OF LEFT EYE: Primary | ICD-10-CM

## 2024-12-11 DIAGNOSIS — H25.12 AGE-RELATED NUCLEAR CATARACT, LEFT: ICD-10-CM

## 2024-12-11 LAB — POCT GLUCOSE: 98 MG/DL (ref 70–110)

## 2024-12-11 PROCEDURE — 25000003 PHARM REV CODE 250: Mod: TXP | Performed by: OPHTHALMOLOGY

## 2024-12-11 PROCEDURE — V2632 POST CHMBR INTRAOCULAR LENS: HCPCS | Mod: TXP | Performed by: OPHTHALMOLOGY

## 2024-12-11 PROCEDURE — 36000707: Mod: TXP | Performed by: OPHTHALMOLOGY

## 2024-12-11 PROCEDURE — 94761 N-INVAS EAR/PLS OXIMETRY MLT: CPT | Mod: TXP

## 2024-12-11 PROCEDURE — 99900035 HC TECH TIME PER 15 MIN (STAT): Mod: TXP

## 2024-12-11 PROCEDURE — 63600175 PHARM REV CODE 636 W HCPCS: Mod: TXP | Performed by: OPHTHALMOLOGY

## 2024-12-11 PROCEDURE — 82962 GLUCOSE BLOOD TEST: CPT | Mod: TXP | Performed by: OPHTHALMOLOGY

## 2024-12-11 PROCEDURE — 27201423 OPTIME MED/SURG SUP & DEVICES STERILE SUPPLY: Mod: TXP | Performed by: OPHTHALMOLOGY

## 2024-12-11 PROCEDURE — 71000015 HC POSTOP RECOV 1ST HR: Mod: TXP | Performed by: OPHTHALMOLOGY

## 2024-12-11 PROCEDURE — 66982 XCAPSL CTRC RMVL CPLX WO ECP: CPT | Mod: 79,HCNC,LT,NTX | Performed by: OPHTHALMOLOGY

## 2024-12-11 PROCEDURE — 99152 MOD SED SAME PHYS/QHP 5/>YRS: CPT | Mod: HCNC,NTX,, | Performed by: OPHTHALMOLOGY

## 2024-12-11 PROCEDURE — 99152 MOD SED SAME PHYS/QHP 5/>YRS: CPT | Mod: TXP | Performed by: OPHTHALMOLOGY

## 2024-12-11 PROCEDURE — 36000706: Mod: TXP | Performed by: OPHTHALMOLOGY

## 2024-12-11 DEVICE — LENS EYHANCE +20.5D: Type: IMPLANTABLE DEVICE | Site: EYE | Status: FUNCTIONAL

## 2024-12-11 RX ORDER — TROPICAMIDE 10 MG/ML
1 SOLUTION/ DROPS OPHTHALMIC EVERY 5 MIN PRN
Status: COMPLETED | OUTPATIENT
Start: 2024-12-11 | End: 2024-12-11

## 2024-12-11 RX ORDER — LIDOCAINE HYDROCHLORIDE 40 MG/ML
INJECTION, SOLUTION RETROBULBAR
Status: DISCONTINUED | OUTPATIENT
Start: 2024-12-11 | End: 2024-12-11 | Stop reason: HOSPADM

## 2024-12-11 RX ORDER — TETRACAINE HYDROCHLORIDE 5 MG/ML
SOLUTION OPHTHALMIC
Status: DISCONTINUED | OUTPATIENT
Start: 2024-12-11 | End: 2024-12-11 | Stop reason: HOSPADM

## 2024-12-11 RX ORDER — MOXIFLOXACIN 5 MG/ML
1 SOLUTION/ DROPS OPHTHALMIC
Status: DISCONTINUED | OUTPATIENT
Start: 2024-12-11 | End: 2024-12-11 | Stop reason: HOSPADM

## 2024-12-11 RX ORDER — MIDAZOLAM HYDROCHLORIDE 1 MG/ML
2 INJECTION, SOLUTION INTRAMUSCULAR; INTRAVENOUS
Status: COMPLETED | OUTPATIENT
Start: 2024-12-11 | End: 2024-12-11

## 2024-12-11 RX ORDER — MOXIFLOXACIN 5 MG/ML
SOLUTION/ DROPS OPHTHALMIC
Status: DISCONTINUED | OUTPATIENT
Start: 2024-12-11 | End: 2024-12-11 | Stop reason: HOSPADM

## 2024-12-11 RX ORDER — TETRACAINE HYDROCHLORIDE 5 MG/ML
1 SOLUTION OPHTHALMIC EVERY 5 MIN PRN
Status: COMPLETED | OUTPATIENT
Start: 2024-12-11 | End: 2024-12-11

## 2024-12-11 RX ORDER — ACETAMINOPHEN 325 MG/1
650 TABLET ORAL EVERY 4 HOURS PRN
Status: DISCONTINUED | OUTPATIENT
Start: 2024-12-11 | End: 2024-12-11 | Stop reason: HOSPADM

## 2024-12-11 RX ORDER — CYCLOP/TROP/PROPA/PHEN/KET/WAT 1-1-0.1%
1 DROPS (EA) OPHTHALMIC (EYE)
Status: DISCONTINUED | OUTPATIENT
Start: 2024-12-11 | End: 2024-12-11

## 2024-12-11 RX ORDER — FENTANYL CITRATE 50 UG/ML
25 INJECTION, SOLUTION INTRAMUSCULAR; INTRAVENOUS EVERY 5 MIN PRN
Status: DISCONTINUED | OUTPATIENT
Start: 2024-12-11 | End: 2024-12-11 | Stop reason: HOSPADM

## 2024-12-11 RX ORDER — PREDNISOLONE ACETATE 10 MG/ML
SUSPENSION/ DROPS OPHTHALMIC
Status: DISCONTINUED | OUTPATIENT
Start: 2024-12-11 | End: 2024-12-11 | Stop reason: HOSPADM

## 2024-12-11 RX ORDER — PROPARACAINE HYDROCHLORIDE 5 MG/ML
1 SOLUTION/ DROPS OPHTHALMIC
Status: DISCONTINUED | OUTPATIENT
Start: 2024-12-11 | End: 2024-12-11 | Stop reason: HOSPADM

## 2024-12-11 RX ORDER — MOXIFLOXACIN 5 MG/ML
1 SOLUTION/ DROPS OPHTHALMIC
Status: COMPLETED | OUTPATIENT
Start: 2024-12-11 | End: 2024-12-11

## 2024-12-11 RX ORDER — LIDOCAINE HYDROCHLORIDE 10 MG/ML
INJECTION, SOLUTION EPIDURAL; INFILTRATION; INTRACAUDAL; PERINEURAL
Status: DISCONTINUED | OUTPATIENT
Start: 2024-12-11 | End: 2024-12-11 | Stop reason: HOSPADM

## 2024-12-11 RX ORDER — PHENYLEPHRINE HYDROCHLORIDE 25 MG/ML
1 SOLUTION/ DROPS OPHTHALMIC EVERY 5 MIN PRN
Status: COMPLETED | OUTPATIENT
Start: 2024-12-11 | End: 2024-12-11

## 2024-12-11 RX ADMIN — TETRACAINE HYDROCHLORIDE 1 DROP: 5 SOLUTION OPHTHALMIC at 09:12

## 2024-12-11 RX ADMIN — TROPICAMIDE 1 DROP: 10 SOLUTION/ DROPS OPHTHALMIC at 10:12

## 2024-12-11 RX ADMIN — MOXIFLOXACIN OPHTHALMIC 1 DROP: 5 SOLUTION/ DROPS OPHTHALMIC at 10:12

## 2024-12-11 RX ADMIN — TROPICAMIDE 1 DROP: 10 SOLUTION/ DROPS OPHTHALMIC at 09:12

## 2024-12-11 RX ADMIN — TETRACAINE HYDROCHLORIDE 1 DROP: 5 SOLUTION OPHTHALMIC at 10:12

## 2024-12-11 RX ADMIN — PHENYLEPHRINE HYDROCHLORIDE 1 DROP: 25 SOLUTION/ DROPS OPHTHALMIC at 09:12

## 2024-12-11 RX ADMIN — MIDAZOLAM HYDROCHLORIDE 2 MG: 1 INJECTION, SOLUTION INTRAMUSCULAR; INTRAVENOUS at 10:12

## 2024-12-11 RX ADMIN — PHENYLEPHRINE HYDROCHLORIDE 1 DROP: 25 SOLUTION/ DROPS OPHTHALMIC at 10:12

## 2024-12-11 RX ADMIN — MIDAZOLAM HYDROCHLORIDE 1 MG: 1 INJECTION, SOLUTION INTRAMUSCULAR; INTRAVENOUS at 10:12

## 2024-12-11 RX ADMIN — MOXIFLOXACIN OPHTHALMIC 1 DROP: 5 SOLUTION/ DROPS OPHTHALMIC at 09:12

## 2024-12-11 RX ADMIN — FENTANYL CITRATE 25 MCG: 50 INJECTION, SOLUTION INTRAMUSCULAR; INTRAVENOUS at 10:12

## 2024-12-11 NOTE — OP NOTE
DATE OF PROCEDURE: 12/11/2024    SURGEON: PAPITO MEYER MD    PREOPERATIVE DIAGNOSIS:  1. Senile nuclear sclerotic cataract left eye. 2. Poor mydriasis secondary to floppy iris syndrome left eye    POSTOPERATIVE DIAGNOSIS: 1.Senile nuclear sclerotic cataract left eye. 2. Poor mydriasis secondary to floppy iris syndrome left eye    PROCEDURE PERFORMED:  Complex phacoemulsification with placement of intraocular lens, left eye, with placement of a Malyugin ring.    IMPLANT:  DIB00 20.5    Anesthesia: Moderate sedation with topical Lidocaine.     Patient ID confirmed and re-evaluated the patient and anesthesia plan confirming it is suitable for the patient's condition and procedure.     COMPLICATIONS: none    ESTIMATED BLOOD LOSS: <1cc    SPECIMENS: none    INDICATIONS FOR PROCEDURE:   The patient has a history of painless progressive vision loss.  The patient has described difficulties with activities of daily living, which specifically include driving, which is secondary to cataract formation and progression. After we had a thorough discussion about risks, benefits, and alternatives to cataract surgery, the patient agreed to proceed with phacoemulsification and implantation of a lens in the left eye.  These risks include, but are not limited to, hemorrhage, pain, infection, need for additional surgery, need for glasses or contacts, loss of vision, or even loss of the eye.      PROCEDURE IN DETAIL:  The patient was met in the preop holding area.  Consent was confirmed to be signed.  The operative site was marked.  The patient was brought into the operating room by the anesthesia team and placed under monitored anesthesia care.  The left eye was prepped and draped in a sterile ophthalmic fashion.  A Michelle speculum was placed into the left eye.   A paracentesis site was made and 1% preservative-free lidocaine was injected into the anterior chamber.  Viscoelastic  material was injected into the anterior chamber.  A  keratome blade was used to make a clear corneal incision. The malyugin ring was inserted and positioned into place, assisting with pupillary dilation.  A cystotome was used to initiate the continuous curvilinear capsulorrhexis which was completed with Utrata forceps.  BSS on a shay cannula was used to perform hydrodissection.  The phacoemulsification tip was introduced into the eye and the nucleus was removed in a standard divide-and-conquer fashion.  Remaining cortical material was removed from the eye using irrigation-aspiration.  The capsular bag was filled with viscoelastic material and the intraocular lens was injected and positioned into place. The Malyugin ring was removed from the eye. Remaining viscoelastic material was removed from the eye using irrigation and aspiration.  The corneal wounds were hydrated.  The eye was filled to physiologic pressure. The wounds were found to be watertight. Drops of Vigamox and prednisilone were placed into the eye.  The eye was washed, dried, and shielded.  The patient tolerated the procedure well and knows to follow up with me tomorrow morning, sooner if needed.      Under my direct supervision, intravenous moderate sedation was administered during the course of this procedure, with continuous monitoring of hemodynamic parameters.  Monitoring of the patient's vital signs and respiratory status was provided by trained nursing staff during the entire course of the procedure and under my supervision and recorded in the patient's medical record.  The total time for sedation was 13 minutes.

## 2024-12-11 NOTE — DISCHARGE INSTRUCTIONS
Dr. Cooper     Cataract Post-Operative Instructions       Day of surgery:     -Resume drops THREE times daily into the operative eye.     -Do not rub your eye     -Wear protective sunglasses during the day.     -Resume moderate activity.     -Bathe/shower/wash face normally     -Do not apply makeup around the operative eye for 1 week.     -You should expect:     Blurry vision and halos for 24-48 hours     Dilated pupil for 24-48 hours     Scratchy feeling in the eye for 1-2 days     Curved shadow in your peripheral vision for 2-3 weeks     Occasional flickering of lights for up to 1 week     -If you experience severe pain or nausea, call Dr. Cooper or the on-call doctor at [phone].       Plan to see Dr. Cooper at:     [facility] [address]    [address] [place]  [address]    **Most patients can drive the next morning.  If you do not feel comfortable driving, please arrange for transportation. **

## 2024-12-11 NOTE — DISCHARGE SUMMARY
[facility]  Discharge Note  Short Stay    Procedure(s) (LRB):  EXTRACTION, CATARACT, WITH IOL INSERTION (Left)  BRIEF DISCHARGE NOTE:    Date of discharge: 12/11/2024    Reason for hospitalization -  Cataract surgery     Final Diagnosis - Visually significant Cataract    Procedures and treatment provided - Status post phacoemulsification with placement of intraocular lens     Diet - Advance to regular as tolerated    Activity - as tolerated    Disposition at the end of the case - Good.    Discharge: to home    The patient tolerated the procedure well and knows to follow up with me tomorrow in the eye clinic, sooner if needed.    Patient and family instructions (as appropriate) - Given to patient on discharge    Dolores Cooper MD

## 2024-12-12 ENCOUNTER — OFFICE VISIT (OUTPATIENT)
Dept: OPTOMETRY | Facility: CLINIC | Age: 69
End: 2024-12-12
Payer: MEDICARE

## 2024-12-12 ENCOUNTER — LAB VISIT (OUTPATIENT)
Dept: LAB | Facility: HOSPITAL | Age: 69
End: 2024-12-12
Attending: NURSE PRACTITIONER
Payer: MEDICARE

## 2024-12-12 DIAGNOSIS — N18.5 CKD (CHRONIC KIDNEY DISEASE), STAGE V: ICD-10-CM

## 2024-12-12 DIAGNOSIS — D64.9 ANEMIA, UNSPECIFIED TYPE: ICD-10-CM

## 2024-12-12 DIAGNOSIS — Z98.42 STATUS POST CATARACT EXTRACTION AND INSERTION OF INTRAOCULAR LENS OF LEFT EYE: Primary | ICD-10-CM

## 2024-12-12 DIAGNOSIS — Z96.1 STATUS POST CATARACT EXTRACTION AND INSERTION OF INTRAOCULAR LENS OF LEFT EYE: Primary | ICD-10-CM

## 2024-12-12 LAB
ANION GAP SERPL CALC-SCNC: 11 MMOL/L (ref 8–16)
BUN SERPL-MCNC: 56 MG/DL (ref 8–23)
CALCIUM SERPL-MCNC: 8.2 MG/DL (ref 8.7–10.5)
CHLORIDE SERPL-SCNC: 111 MMOL/L (ref 95–110)
CO2 SERPL-SCNC: 20 MMOL/L (ref 23–29)
CREAT SERPL-MCNC: 7.4 MG/DL (ref 0.5–1.4)
EST. GFR  (NO RACE VARIABLE): 7.4 ML/MIN/1.73 M^2
GLUCOSE SERPL-MCNC: 83 MG/DL (ref 70–110)
HCT VFR BLD AUTO: 25 % (ref 40–54)
HGB BLD-MCNC: 7.9 G/DL (ref 14–18)
HPRA INTERPRETATION: NORMAL
IRON SERPL-MCNC: 51 UG/DL (ref 45–160)
POTASSIUM SERPL-SCNC: 4.5 MMOL/L (ref 3.5–5.1)
SATURATED IRON: 17 % (ref 20–50)
SODIUM SERPL-SCNC: 142 MMOL/L (ref 136–145)
TOTAL IRON BINDING CAPACITY: 303 UG/DL (ref 250–450)
TRANSFERRIN SERPL-MCNC: 205 MG/DL (ref 200–375)

## 2024-12-12 PROCEDURE — 85018 HEMOGLOBIN: CPT | Mod: HCNC,TXP | Performed by: NURSE PRACTITIONER

## 2024-12-12 PROCEDURE — 85014 HEMATOCRIT: CPT | Mod: HCNC,TXP | Performed by: NURSE PRACTITIONER

## 2024-12-12 PROCEDURE — 1126F AMNT PAIN NOTED NONE PRSNT: CPT | Mod: CPTII,S$GLB,TXP, | Performed by: OPTOMETRIST

## 2024-12-12 PROCEDURE — 36415 COLL VENOUS BLD VENIPUNCTURE: CPT | Mod: TXP | Performed by: NURSE PRACTITIONER

## 2024-12-12 PROCEDURE — 4010F ACE/ARB THERAPY RXD/TAKEN: CPT | Mod: CPTII,S$GLB,TXP, | Performed by: OPTOMETRIST

## 2024-12-12 PROCEDURE — 84466 ASSAY OF TRANSFERRIN: CPT | Mod: HCNC,TXP | Performed by: NURSE PRACTITIONER

## 2024-12-12 PROCEDURE — 99999 PR PBB SHADOW E&M-EST. PATIENT-LVL III: CPT | Mod: PBBFAC,TXP,, | Performed by: OPTOMETRIST

## 2024-12-12 PROCEDURE — 80048 BASIC METABOLIC PNL TOTAL CA: CPT | Mod: HCNC,TXP | Performed by: NURSE PRACTITIONER

## 2024-12-12 PROCEDURE — 3288F FALL RISK ASSESSMENT DOCD: CPT | Mod: CPTII,S$GLB,TXP, | Performed by: OPTOMETRIST

## 2024-12-12 PROCEDURE — 99024 POSTOP FOLLOW-UP VISIT: CPT | Mod: S$GLB,TXP,, | Performed by: OPTOMETRIST

## 2024-12-12 PROCEDURE — 3044F HG A1C LEVEL LT 7.0%: CPT | Mod: CPTII,S$GLB,TXP, | Performed by: OPTOMETRIST

## 2024-12-12 PROCEDURE — 3062F POS MACROALBUMINURIA REV: CPT | Mod: CPTII,S$GLB,TXP, | Performed by: OPTOMETRIST

## 2024-12-12 PROCEDURE — 1159F MED LIST DOCD IN RCRD: CPT | Mod: CPTII,S$GLB,TXP, | Performed by: OPTOMETRIST

## 2024-12-12 PROCEDURE — 1101F PT FALLS ASSESS-DOCD LE1/YR: CPT | Mod: CPTII,S$GLB,TXP, | Performed by: OPTOMETRIST

## 2024-12-12 PROCEDURE — 3066F NEPHROPATHY DOC TX: CPT | Mod: CPTII,S$GLB,TXP, | Performed by: OPTOMETRIST

## 2024-12-12 RX ORDER — OXYCODONE AND ACETAMINOPHEN 5; 325 MG/1; MG/1
1 TABLET ORAL EVERY 4 HOURS PRN
COMMUNITY
Start: 2024-12-09 | End: 2024-12-14

## 2024-12-12 NOTE — PROGRESS NOTES
HPI    Patient is here today for POD 1 OS. Denies pain. States that he is seeing   much better. No ocular complaints at this time.   PMB TID OS  Last edited by Meron Yu, OD on 12/12/2024  2:34 PM.            Assessment /Plan     For exam results, see Encounter Report.    Status post cataract extraction and insertion of intraocular lens of left eye      Slit Lamp Exam  L/L - normal  C/s - quiet  Cornea - clear  A/C - 3+ cell, flare  Lens - PCIOL     POD #1 s/p phaco/IOL  - doing well  - continue the following drops:     vigamox or ocuflox TID x 1 wk then stop  Pred forte TID x  4 wks  Ketorolac TID until runs out     Versus:     Combination drop - 1 drop TID x total of 1 month     Appropriate precautions and post op medications reviewed.  Patient instructed to call or come in if symptoms of redness, decreased vision, or pain are experienced.     -f/up 1 month post op with me or sooner if symptoms arise

## 2024-12-13 DIAGNOSIS — Z76.82 AWAITING ORGAN TRANSPLANT STATUS: Primary | ICD-10-CM

## 2024-12-14 ENCOUNTER — TELEPHONE (OUTPATIENT)
Dept: TRANSPLANT | Facility: CLINIC | Age: 69
End: 2024-12-14
Payer: MEDICARE

## 2024-12-14 NOTE — TELEPHONE ENCOUNTER
ON-CALL NOTE    UNOS# [id number]    Notified by Farhat Anderson, , that Handy Adams is eligible for kidney offer.  Spoke with patient and identified no acute medical issues with telephone assessment. Protocol script read to patient regarding N/A, standard donor offer. Patient verbalized understanding, all questions answered, patient accepts organ offer. Notified by Farhat Anderson that virtual crossmatch is negative.  Current sample of blood is available from date 12/3/2024 for crossmatch.  Patient reports no sensitizing event since last blood sample for PRA received. Notified Remigio in [facility] to perform a prospective  crossmatch per guideline.    Patient was asked if they have had a positive COVID-19 test or if they have any signs or symptoms. Informed patient that they will be tested for COVID-19 upon arrival to the hospital, unless have a previous positive result. If tested and result is positive, the transplant will not be able to occur, they will be inactivated on the wait list for 21 days per protocol and required to quarantine.     12/14 @ 7 am CXM results Negative, donor OR time pending.  7:35 am Patient updated.  8:00am Report to CHARO Almaraz RN.

## 2024-12-15 ENCOUNTER — TELEPHONE (OUTPATIENT)
Dept: TRANSPLANT | Facility: CLINIC | Age: 69
End: 2024-12-15
Payer: MEDICARE

## 2024-12-15 NOTE — TELEPHONE ENCOUNTER
On call note  ALIZA [id number]    1730 on 12/14/2024    I called patient with update on case.  Patient informed that the Donor OR will be at midnight on Sunday night.  I will call him if there are any changes or additional updates.  He verbalized understanding.    12/15/2024 8:38 am  Farhat called to inform me that the case is being shut down due to increasing creatinine of door. Patient can be released from case.  I called patient and explained that the donor quality has declined and the case is being shut down. He is released from case. Patient given time to ask questions and all questions answered to patient's satisfaction.  Patient verbalized understanding.

## 2024-12-16 ENCOUNTER — TELEPHONE (OUTPATIENT)
Dept: NEPHROLOGY | Facility: CLINIC | Age: 69
End: 2024-12-16
Payer: MEDICARE

## 2024-12-16 ENCOUNTER — CLINICAL SUPPORT (OUTPATIENT)
Dept: NEPHROLOGY | Facility: CLINIC | Age: 69
End: 2024-12-16
Payer: MEDICARE

## 2024-12-16 VITALS
SYSTOLIC BLOOD PRESSURE: 130 MMHG | OXYGEN SATURATION: 97 % | HEART RATE: 68 BPM | DIASTOLIC BLOOD PRESSURE: 74 MMHG | WEIGHT: 263 LBS | BODY MASS INDEX: 35.67 KG/M2

## 2024-12-16 DIAGNOSIS — D63.1 ANEMIA IN STAGE 5 CHRONIC KIDNEY DISEASE, NOT ON CHRONIC DIALYSIS: Primary | ICD-10-CM

## 2024-12-16 DIAGNOSIS — N18.5 ANEMIA IN STAGE 5 CHRONIC KIDNEY DISEASE, NOT ON CHRONIC DIALYSIS: Primary | ICD-10-CM

## 2024-12-16 DIAGNOSIS — N18.5 CKD (CHRONIC KIDNEY DISEASE), STAGE V: ICD-10-CM

## 2024-12-16 DIAGNOSIS — N18.5 CKD (CHRONIC KIDNEY DISEASE), STAGE V: Primary | ICD-10-CM

## 2024-12-16 PROCEDURE — 99999 PR PBB SHADOW E&M-EST. PATIENT-LVL II: CPT | Mod: PBBFAC,HCNC,,

## 2024-12-16 NOTE — PROGRESS NOTES
Hgb 7.9/ Hct 25.0     Retacrit 22768 units administered , Per Form # DRORD-428. All questions and concerns were addressed .Pt had no reactions noted at this time ,tolerated injection well. 1 week appt given.         GFR 7.4

## 2024-12-16 NOTE — PROGRESS NOTES
Marquez Mckinney,  Octavio call patient. Let him know that his creatinine is 7.4 and his eGFR is 7.4. I recommend he reach out to try to schedule surgery with the surgeon he saw on the [facility] (Dr. Jung). Timing will also have to be coordinated with the home dialysis nurse Tiara.    Tiara, I'm looping you in to keep you informed.

## 2024-12-20 ENCOUNTER — LAB VISIT (OUTPATIENT)
Dept: LAB | Facility: HOSPITAL | Age: 69
End: 2024-12-20
Payer: MEDICARE

## 2024-12-20 DIAGNOSIS — D64.9 ANEMIA, UNSPECIFIED TYPE: ICD-10-CM

## 2024-12-20 DIAGNOSIS — N18.5 CKD (CHRONIC KIDNEY DISEASE), STAGE V: ICD-10-CM

## 2024-12-20 LAB
ALBUMIN SERPL BCP-MCNC: 3.6 G/DL (ref 3.5–5.2)
ALP SERPL-CCNC: 53 U/L (ref 40–150)
ALT SERPL W/O P-5'-P-CCNC: 17 U/L (ref 10–44)
ANION GAP SERPL CALC-SCNC: 11 MMOL/L (ref 8–16)
AST SERPL-CCNC: 9 U/L (ref 10–40)
BASOPHILS # BLD AUTO: 0.04 K/UL (ref 0–0.2)
BASOPHILS NFR BLD: 0.5 % (ref 0–1.9)
BILIRUB SERPL-MCNC: 0.7 MG/DL (ref 0.1–1)
BUN SERPL-MCNC: 56 MG/DL (ref 8–23)
CALCIUM SERPL-MCNC: 8.1 MG/DL (ref 8.7–10.5)
CHLORIDE SERPL-SCNC: 108 MMOL/L (ref 95–110)
CO2 SERPL-SCNC: 21 MMOL/L (ref 23–29)
CREAT SERPL-MCNC: 7.3 MG/DL (ref 0.5–1.4)
DIFFERENTIAL METHOD BLD: ABNORMAL
EOSINOPHIL # BLD AUTO: 0.2 K/UL (ref 0–0.5)
EOSINOPHIL NFR BLD: 3.3 % (ref 0–8)
ERYTHROCYTE [DISTWIDTH] IN BLOOD BY AUTOMATED COUNT: 18.6 % (ref 11.5–14.5)
EST. GFR  (NO RACE VARIABLE): 7 ML/MIN/1.73 M^2
GLUCOSE SERPL-MCNC: 118 MG/DL (ref 70–110)
HCT VFR BLD AUTO: 28.6 % (ref 40–54)
HCT VFR BLD AUTO: 28.6 % (ref 40–54)
HGB BLD-MCNC: 8.7 G/DL (ref 14–18)
HGB BLD-MCNC: 8.7 G/DL (ref 14–18)
IMM GRANULOCYTES # BLD AUTO: 0.04 K/UL (ref 0–0.04)
IMM GRANULOCYTES NFR BLD AUTO: 0.5 % (ref 0–0.5)
IRON SERPL-MCNC: 49 UG/DL (ref 45–160)
LYMPHOCYTES # BLD AUTO: 1.1 K/UL (ref 1–4.8)
LYMPHOCYTES NFR BLD: 14.4 % (ref 18–48)
MCH RBC QN AUTO: 27.5 PG (ref 27–31)
MCHC RBC AUTO-ENTMCNC: 30.4 G/DL (ref 32–36)
MCV RBC AUTO: 91 FL (ref 82–98)
MONOCYTES # BLD AUTO: 0.7 K/UL (ref 0.3–1)
MONOCYTES NFR BLD: 9.5 % (ref 4–15)
NEUTROPHILS # BLD AUTO: 5.3 K/UL (ref 1.8–7.7)
NEUTROPHILS NFR BLD: 71.8 % (ref 38–73)
NRBC BLD-RTO: 0 /100 WBC
PLATELET # BLD AUTO: 310 K/UL (ref 150–450)
PMV BLD AUTO: 9.9 FL (ref 9.2–12.9)
POTASSIUM SERPL-SCNC: 4.2 MMOL/L (ref 3.5–5.1)
PROT SERPL-MCNC: 7.4 G/DL (ref 6–8.4)
RBC # BLD AUTO: 3.16 M/UL (ref 4.6–6.2)
SATURATED IRON: 16 % (ref 20–50)
SODIUM SERPL-SCNC: 140 MMOL/L (ref 136–145)
TOTAL IRON BINDING CAPACITY: 300 UG/DL (ref 250–450)
TRANSFERRIN SERPL-MCNC: 203 MG/DL (ref 200–375)
WBC # BLD AUTO: 7.35 K/UL (ref 3.9–12.7)

## 2024-12-20 PROCEDURE — 36415 COLL VENOUS BLD VENIPUNCTURE: CPT | Mod: HCNC,PO,TXP | Performed by: NURSE PRACTITIONER

## 2024-12-20 PROCEDURE — 84466 ASSAY OF TRANSFERRIN: CPT | Mod: HCNC,TXP | Performed by: NURSE PRACTITIONER

## 2024-12-20 PROCEDURE — 85025 COMPLETE CBC W/AUTO DIFF WBC: CPT | Mod: HCNC,TXP | Performed by: NURSE PRACTITIONER

## 2024-12-20 PROCEDURE — 80053 COMPREHEN METABOLIC PANEL: CPT | Mod: HCNC,TXP | Performed by: NURSE PRACTITIONER

## 2024-12-23 ENCOUNTER — OFFICE VISIT (OUTPATIENT)
Dept: NEPHROLOGY | Facility: CLINIC | Age: 69
End: 2024-12-23
Payer: MEDICARE

## 2024-12-23 ENCOUNTER — CLINICAL SUPPORT (OUTPATIENT)
Dept: NEPHROLOGY | Facility: CLINIC | Age: 69
End: 2024-12-23
Payer: MEDICARE

## 2024-12-23 VITALS
DIASTOLIC BLOOD PRESSURE: 77 MMHG | BODY MASS INDEX: 35.78 KG/M2 | BODY MASS INDEX: 35.82 KG/M2 | DIASTOLIC BLOOD PRESSURE: 77 MMHG | OXYGEN SATURATION: 100 % | SYSTOLIC BLOOD PRESSURE: 145 MMHG | HEART RATE: 71 BPM | WEIGHT: 264.13 LBS | SYSTOLIC BLOOD PRESSURE: 145 MMHG | OXYGEN SATURATION: 100 % | HEART RATE: 71 BPM | WEIGHT: 264.13 LBS | HEIGHT: 72 IN

## 2024-12-23 DIAGNOSIS — N25.81 SECONDARY HYPERPARATHYROIDISM: ICD-10-CM

## 2024-12-23 DIAGNOSIS — E87.20 ACIDOSIS: ICD-10-CM

## 2024-12-23 DIAGNOSIS — N18.5 CKD (CHRONIC KIDNEY DISEASE), STAGE V: Primary | ICD-10-CM

## 2024-12-23 DIAGNOSIS — M79.89 LEG SWELLING: ICD-10-CM

## 2024-12-23 DIAGNOSIS — N18.5 ANEMIA IN STAGE 5 CHRONIC KIDNEY DISEASE, NOT ON CHRONIC DIALYSIS: ICD-10-CM

## 2024-12-23 DIAGNOSIS — N18.5 ANEMIA IN STAGE 5 CHRONIC KIDNEY DISEASE, NOT ON CHRONIC DIALYSIS: Primary | ICD-10-CM

## 2024-12-23 DIAGNOSIS — R80.9 PROTEINURIA, UNSPECIFIED TYPE: ICD-10-CM

## 2024-12-23 DIAGNOSIS — D64.9 ANEMIA, UNSPECIFIED TYPE: ICD-10-CM

## 2024-12-23 DIAGNOSIS — D63.1 ANEMIA IN STAGE 5 CHRONIC KIDNEY DISEASE, NOT ON CHRONIC DIALYSIS: Primary | ICD-10-CM

## 2024-12-23 DIAGNOSIS — D63.1 ANEMIA IN STAGE 5 CHRONIC KIDNEY DISEASE, NOT ON CHRONIC DIALYSIS: ICD-10-CM

## 2024-12-23 DIAGNOSIS — N18.5 TYPE 2 DIABETES MELLITUS WITH STAGE 5 CHRONIC KIDNEY DISEASE NOT ON CHRONIC DIALYSIS, UNSPECIFIED WHETHER LONG TERM INSULIN USE: ICD-10-CM

## 2024-12-23 DIAGNOSIS — N18.5 CKD (CHRONIC KIDNEY DISEASE), STAGE V: ICD-10-CM

## 2024-12-23 DIAGNOSIS — I10 HYPERTENSION, UNSPECIFIED TYPE: ICD-10-CM

## 2024-12-23 DIAGNOSIS — E11.22 TYPE 2 DIABETES MELLITUS WITH STAGE 5 CHRONIC KIDNEY DISEASE NOT ON CHRONIC DIALYSIS, UNSPECIFIED WHETHER LONG TERM INSULIN USE: ICD-10-CM

## 2024-12-23 DIAGNOSIS — R77.8 ABNORMAL SPEP: ICD-10-CM

## 2024-12-23 DIAGNOSIS — E66.01 SEVERE OBESITY (BMI 35.0-39.9) WITH COMORBIDITY: ICD-10-CM

## 2024-12-23 PROCEDURE — 3066F NEPHROPATHY DOC TX: CPT | Mod: HCNC,CPTII,S$GLB, | Performed by: NURSE PRACTITIONER

## 2024-12-23 PROCEDURE — 1101F PT FALLS ASSESS-DOCD LE1/YR: CPT | Mod: HCNC,CPTII,S$GLB, | Performed by: NURSE PRACTITIONER

## 2024-12-23 PROCEDURE — 99214 OFFICE O/P EST MOD 30 MIN: CPT | Mod: HCNC,S$GLB,, | Performed by: NURSE PRACTITIONER

## 2024-12-23 PROCEDURE — 3077F SYST BP >= 140 MM HG: CPT | Mod: HCNC,CPTII,S$GLB, | Performed by: NURSE PRACTITIONER

## 2024-12-23 PROCEDURE — 3078F DIAST BP <80 MM HG: CPT | Mod: HCNC,CPTII,S$GLB, | Performed by: NURSE PRACTITIONER

## 2024-12-23 PROCEDURE — 3008F BODY MASS INDEX DOCD: CPT | Mod: HCNC,CPTII,S$GLB, | Performed by: NURSE PRACTITIONER

## 2024-12-23 PROCEDURE — 1126F AMNT PAIN NOTED NONE PRSNT: CPT | Mod: HCNC,CPTII,S$GLB, | Performed by: NURSE PRACTITIONER

## 2024-12-23 PROCEDURE — 3288F FALL RISK ASSESSMENT DOCD: CPT | Mod: HCNC,CPTII,S$GLB, | Performed by: NURSE PRACTITIONER

## 2024-12-23 PROCEDURE — 99999 PR PBB SHADOW E&M-EST. PATIENT-LVL II: CPT | Mod: PBBFAC,HCNC,,

## 2024-12-23 PROCEDURE — 4010F ACE/ARB THERAPY RXD/TAKEN: CPT | Mod: HCNC,CPTII,S$GLB, | Performed by: NURSE PRACTITIONER

## 2024-12-23 PROCEDURE — 3062F POS MACROALBUMINURIA REV: CPT | Mod: HCNC,CPTII,S$GLB, | Performed by: NURSE PRACTITIONER

## 2024-12-23 PROCEDURE — 3044F HG A1C LEVEL LT 7.0%: CPT | Mod: HCNC,CPTII,S$GLB, | Performed by: NURSE PRACTITIONER

## 2024-12-23 PROCEDURE — 1160F RVW MEDS BY RX/DR IN RCRD: CPT | Mod: HCNC,CPTII,S$GLB, | Performed by: NURSE PRACTITIONER

## 2024-12-23 PROCEDURE — 99999 PR PBB SHADOW E&M-EST. PATIENT-LVL IV: CPT | Mod: PBBFAC,HCNC,, | Performed by: NURSE PRACTITIONER

## 2024-12-23 PROCEDURE — 1159F MED LIST DOCD IN RCRD: CPT | Mod: HCNC,CPTII,S$GLB, | Performed by: NURSE PRACTITIONER

## 2024-12-23 RX ORDER — CALCITRIOL 0.25 UG/1
0.25 CAPSULE ORAL DAILY
Qty: 90 CAPSULE | Refills: 3 | Status: SHIPPED | OUTPATIENT
Start: 2024-12-23 | End: 2025-12-18

## 2024-12-23 NOTE — PROGRESS NOTES
Hgb 8.7/ Hct 28.6     Retacrit 24318 units administered , Per Form # DRORD-428. All questions and concerns were addressed .Pt had no reactions noted at this time ,tolerated injection well. 1 week appt given.         GFR 7

## 2024-12-23 NOTE — PATIENT INSTRUCTIONS
Post-op dressing change with Tiara Lara at [facility] (dialysis unit) on 1/2/24 at 10 a.m.    Call [facility] on call [phone] with questions about PD catheter after surgery if needed.

## 2024-12-23 NOTE — PROGRESS NOTES
"  Subjective:       Patient ID: Handy Adams is a 69 y.o. A male who presents for evaluation of of renal dysfunction.      HPI     Patient is new to me. New to clinic.  Prior pertinent chart reviewed since this is patient's first appointment with me.    Patient presents for new evaluation of renal dysfunction.  Baseline creatinine of 1.6-1.7 in 2020-early 2022. Recently had sCr of 2.5. Patient said he has "not been taking care of himself" and "eating more sugar" since January.    Per recent note from PCP: He cannot afford farxiga due to increased price and had stopped taking it for a few months prior to appt in September.    Home BPs: does not take    Rare Excedrin use now, though he used to use it frequently.    Significant other medical problems include HTN since at least 2007, T2DM since at least 2007.      The patient denies taking herbal supplements, or new antibiotics, recreational drugs, recent episode of dehydration, diarrhea, nausea or vomiting, acute illness, hospitalization or exposure to IV radiocontrast.     Significant family hx includes: No known kidney issues    Last renal US: none in EMR    Update 10/24/22:  Presents for f/u of CKD, YESENIA.  Last seen a month ago.  Feet are swelling again, especially when drinking ensure. Has improved since stopping Ensure.  Holding lisinopril-Hctz. Taking labetolol 200 mg instead.  Found to have an IgG lambda specific monoclonal band during proteinuria workup. Referred to hematology.    Recent sCr 2.5--> 2.8-2.9.     Home BPs: does not believe cuff is accurate.    Update 12/28/22:  Returns for f/u of CKD.  sCr has been 2.8-3.0 mg/dL.  Home BPs: not taking because he thinks cuff is inaccurate    Patient had bone marrow biopsy and was diagnosed with IgG-lambda MGUS.  An IgG lambda specific monoclonal protein was identified on 24 hour urine on 12/16/22.  He has not gotten kidney biopsy.    Says he feels great.    Update 2/3/23:  Returns for f/u of CKD and discussion " "about biopsy.  sCr now 3.0-3.2 mg/dL. Most recent sCr 3.3.  Increased valsartan to 160 mg at last visit.    Update 9/8/23:  - Presents for follow-up of CKD  - sCr trended up to 3.8. from baseline of about 3.0, now improved to 3.1. Unclear etiology.  - Notes being on Farxiga in the past but has not taken for at least a year   - Denies NSAIDs, reduced PO intake, n/v/d, fluctuations in BP, elevations in blood sugar, recent illness    Update 11/6/23:  Presents for f/u of CKD.  Most recent sCr 3.1 -3.4 mg/dL.  Home BPs: 130-140s (SBP)   Admits recent dietary indiscretion.  Says he gained weight recently.  Not exercising.    Advance Care Planning       Serious Illness Conversation Guide    Conversation was held with:   patient[SR1.1]     What is your understanding now of where you are with your illness?:   Comments: Admits he is in denial about kidney disease.[SR1.1]     How much information about what is likely to be ahead with your illness would you like from me?:   wants to be fully informed[SR1.1]     I want to share with you my understanding of where things are with your illness.:   continued decline[SR1.2]     Patient emotions observed or reported:   denial[SR1.1]     What are your most important goals if your health situation worsens?:   being independent[SR1.1]     What are your biggest fears and worries about the future with your health?:   Comments: being debilitated where he has to depend on others to care for him[SR1.1]     What gives you strength as you think about the future of your illness?:   Yazdanism tae[SR1.1]     What abilities are so critical to your life that you can't imagine living without them? Or, what makes life meaningful?:   being able to care for oneself, living independently[SR1.1]     If you become sicker, how much are you willing to go through for the possibility of gaining more time?:   Comments: Says he needs to think about this, but "I don't want to go through dialysis at all because " "I think that would put a constraint on my independence and constrain me from what I imagine I could be." He says he would want dialysis though if the choice was dialysis or death.    Has not thought about invasive procedures in depth.[SR1.1]     How much does your family know about your priorities and wishes?:   patient has had some incomplete discussions with family[SR1.2]  Comments: Says he is very private. Has not spoken to spouse or friends about this yet. He has shared some information with sister.[SR1.2]     I recommend that we do the following to make sure your treatment plans reflect what's important to you. How does this plan seem to you?:        Attribution       SR1.1 Raven Naylor NP 11/06/23 14:48    SR1.2 Raven Naylor NP 11/06/23 16:04               Update 2/9/24:  Presents today for f/u of CKD.  Most recent sCr was 4.1 mg/dL.  Home BPs: SBP 150s  Thinks he took his medication today. Takes his medication daily but not at the same time every day.  Saw transplant team.    Update 3/15/24:  Presents today for f/u of CKD.  Most recent sCr was 5.7    Says he has lost 20+ lbs by only eating on meal a day (Honey nut cheerios; almond milk). Drinks water.     Says he has been "feeling fine." No swelling. "I feel great."  Hematology is considering repeating bone marrow biopsy. Planning to do full body scan.    Update 4/23/24:  Presents today for f/u of CKD.  Most recent sCr was 4.5  Had clinic visit from PD nurse but said he is unsure of modality choice.   Previously referred to [facility]/RD in March but has not been seen yet.  Has gained weight back.    Home BPs: 130s-140s/70s-80s    Update 7/24/24:  Presents today for f/u of CKD/ hospital f/u for YESENIA.  Had sCr on routine labs of 9.0. Repeat of 8.0. No uremic symptoms, but concerned for overdiuresis, need for IVF. Sent to ER. He was admitted; aldactone, torsemide, valsartan held. Discharged c sCr of 7.0. He required one dose of " anu while hospitalized.     Most recent sCr was 4.9 but UPCR up to 4 grams.  Ran out of nifedipine, but he took it yesterday.    Home BPs: 120s-140s    Update 9/13/24:  Presents for f/u of CKD/ hospital f/u of fluid overload.  He was hospitalized with fluid overload/ SOB. Diagnosed with HFpEF Lasix 40 mg BID changed to torsemide 40 mg daily. While hospital, he admitted he was having intermittent SOB since July that was relieved with position changes.  Last sCr in hospital was 5.8.  Home BPs: 150s/80s on digital medicine. Says he is back on nifedipine, but it is not on his med list.    SOB and edema improved.  Had decreased fluid intake to 24-48 oz daily.    Had home visit. Needs to clean and have another visit.    Update 10/8/24:  Returns for f/u of CKD.   Most recent sCr 5.5 mg/dL.  Home BPs: 140-150s on digital medicine.  He has not de-cluttered his home yet to prep for PD.  He has questions about how dietary intake of protein will affect the protein in his urine.  Seeing cardiology this month.    Update 11/20/24:  Returns for f/u of CKD.   Most recent sCr 5.5 mg/dL.  Home BPs: 140-150s on digital medicine.  Cardiology increased labetolol to 300 mg BID. He is taking this.  Multiple epo appointments have been cancelled. He said he cancelled one, but other times the clinic may have rescheduled him.  He [facility]    Update 12/23/24:  Returns for f/u of CKD.   Most recent sCr 7.3 mg/dL  Home BPs: 140-150s on digital medicine.  Has PD cath surgery scheduled 12/27/24.    Review of Systems   Constitutional:  Negative for appetite change. Food tastes good. Says energy is good.  Respiratory:  Denies SOB  Cardiovascular:  Denies swelling in legs.   Gastrointestinal:  Negative for diarrhea, nausea and vomiting.    Genitourinary:  Decreased urine output associated        Some incontinence   Says he has abnormal sleep patterns with schoolwork at  baseline.  Denies brain fog.         Objective:       Blood pressure (!) 145/77, pulse 71, height 6' (1.829 m), weight 119.8 kg (264 lb 1.8 oz), SpO2 100%.  Physical Exam  Vitals reviewed.   Constitutional:       Appearance: Normal appearance. He is obese.   HENT:      Head: Normocephalic and atraumatic.   Eyes:      General: No scleral icterus.  Cardiovascular:      Rate and Rhythm: Normal rate  RLE +2 edema.  LLE +1 edema  Pulmonary:      Effort: No respiratory distress. CTA    Musculoskeletal:     Skin:     General: Skin is warm and dry.   Neurological:      Mental Status: He is alert and oriented to person, place, and time.   Psychiatric:         Mood and Affect: Mood normal.         Behavior: Behavior normal.           Lab Results   Component Value Date    CREATININE 7.3 (H) 12/20/2024     Prot/Creat Ratio, Urine   Date Value Ref Range Status   12/03/2024 2.90 (H) 0.00 - 0.20 Final   11/14/2024 3.55 (H) 0.00 - 0.20 Final   10/01/2024 4.04 (H) 0.00 - 0.20 Final     Lab Results   Component Value Date     12/20/2024    K 4.2 12/20/2024    CO2 21 (L) 12/20/2024     12/20/2024     Lab Results   Component Value Date    .0 (H) 12/03/2024    CALCIUM 8.1 (L) 12/20/2024    PHOS 4.5 12/03/2024     Lab Results   Component Value Date    HGB 8.7 (L) 12/20/2024    HGB 8.7 (L) 12/20/2024    WBC 7.35 12/20/2024    HCT 28.6 (L) 12/20/2024    HCT 28.6 (L) 12/20/2024      Lab Results   Component Value Date    HGBA1C 5.1 08/26/2024     12/20/2024    BUN 56 (H) 12/20/2024     Lab Results   Component Value Date    LDLCALC 87.4 02/07/2024         Assessment:       1. CKD (chronic kidney disease), stage V    2. Secondary hyperparathyroidism    3. Anemia in stage 5 chronic kidney disease, not on chronic dialysis    4. Hypertension, unspecified type    5. Leg swelling    6. Anemia, unspecified type    7. Proteinuria, unspecified type    8. Acidosis    9. Type 2 diabetes mellitus with stage 5 chronic kidney  disease not on chronic dialysis, unspecified whether long term insulin use    10. Severe obesity (BMI 35.0-39.9) with comorbidity    11. Abnormal SPEP                              Plan:   CKD stage 5   - Biopsy performed showing hypertensive nephrosclerosis, acute tubular injury, and diabetic nephropathy. Progressive.  - IgG lambda specific monoclonal band noted on proteinuria workup and UPEP. No evidence of MGRS on biopsy.     - Educated patient to control BP, BG, remain well-hydrated, and avoid NSAIDs   - High risk of progression to ESRD      UPCR Proteinuric. Was on farxiga but could not afford it; renal function is too low to start on recent labs. On ARB     Acid-base Mild acidosis on sodium bicarb 650 mg BID   Renal osteodystrophy Ca low. Phos okay on Renvela at last check. Low vit D.     Elevated PTH.  On ergo weekly and calcitriol daily.       Anemia Hgb improving on increased PAOLA dose.  Has MGUS. Followed by hem/onc.    On PO iron twice a day..   DM Well-controlled recently. Has had DM since at least 2007, when he had an A1c of 15+.   Lipid Management On statin.   ESRD planning   Previously: Had home visit. Needs to clean and have another visit prior to surgery evaluation. He agrees to do this next week with plans to second visit the week after, [facility] When [facility] called to schedule, he had not cleaned yet. He agreed to clean in the next two weeks. Explained that if this is not done, he may lose his candidacy for home dialysis due to concerns for noncompliance with recommendations. She will attempt to schedule in about 2 weeks, pending her availability. He verbalized understanding.  - Offered appt with LCSW for counseling and help with denial, transition, and overcoming barriers, but he declined.    His home received clearance from [facility] for PD placement. He has delayed scheduling surgery. Has surgery scheduled with Dr. Jung 12/27.  Discussed with [facility]. She joined visit via speaker  phone to discuss next steps post PD cath placement.     He is on transplant wait list.     Kidney Failure Risk Equation (Tangri)    Kidney Failure Risk at 2 years: 88.9%    Kidney Failure Risk at 5 years: 99.9%    Lab Results   Component Value Date    MICALBCREAT 1888.6 (H) 12/03/2024    CREATININE 7.3 (H) 12/20/2024          HTN - High on labetalol 300 mg BID, valsartan 320 mg, torsemide 40 mg BID, and nifedipine 90 mg  - Continue monitoring with digital medicine    HFpEF -Now seeing cardiology    Orthopnea/ Edema - Controlled on current dose of diuretic.    Obesity - he has increased dietary intake after severely limiting intake  - Refer to nutritionist (entered previously).     MGUS - per hem/onc; due back in April. 2025    All questions patient had were answered.  Asked if further questions. None. F/u in clinic PRN with labs and urine prior to next visit or sooner if needed.  ER for emergency concerns.    Summary of Plan:  Continue calcitriol to daily (refilled today)  Continue torsemide 40 mg BID  Continue current PAOLA dose  Has surgery scheduled for PD cath placement on 12/27/24  HTRN plans to do weekly flushes/ catheter flushes until starting to train for PD either week of Jan. 13 or 20th  RTC PRN if needed prior to RRT initiation

## 2024-12-27 ENCOUNTER — LAB VISIT (OUTPATIENT)
Dept: LAB | Facility: HOSPITAL | Age: 69
End: 2024-12-27
Payer: MEDICARE

## 2024-12-27 DIAGNOSIS — D64.9 ANEMIA, UNSPECIFIED TYPE: ICD-10-CM

## 2024-12-27 LAB
HCT VFR BLD AUTO: 27.2 % (ref 40–54)
HGB BLD-MCNC: 8.1 G/DL (ref 14–18)
IRON SERPL-MCNC: 31 UG/DL (ref 45–160)
SATURATED IRON: 11 % (ref 20–50)
TOTAL IRON BINDING CAPACITY: 289 UG/DL (ref 250–450)
TRANSFERRIN SERPL-MCNC: 195 MG/DL (ref 200–375)

## 2024-12-27 PROCEDURE — 85018 HEMOGLOBIN: CPT | Mod: HCNC,TXP | Performed by: NURSE PRACTITIONER

## 2024-12-27 PROCEDURE — 83540 ASSAY OF IRON: CPT | Mod: HCNC,TXP | Performed by: NURSE PRACTITIONER

## 2024-12-27 PROCEDURE — 85014 HEMATOCRIT: CPT | Mod: HCNC,TXP | Performed by: NURSE PRACTITIONER

## 2024-12-28 ENCOUNTER — HOSPITAL ENCOUNTER (EMERGENCY)
Facility: HOSPITAL | Age: 69
Discharge: HOME | End: 2024-12-28
Attending: STUDENT IN AN ORGANIZED HEALTH CARE EDUCATION/TRAINING PROGRAM
Payer: MEDICARE

## 2024-12-28 VITALS
TEMPERATURE: 98 F | DIASTOLIC BLOOD PRESSURE: 84 MMHG | HEIGHT: 72 IN | OXYGEN SATURATION: 100 % | HEART RATE: 77 BPM | BODY MASS INDEX: 35.21 KG/M2 | RESPIRATION RATE: 20 BRPM | WEIGHT: 260 LBS | SYSTOLIC BLOOD PRESSURE: 147 MMHG

## 2024-12-28 DIAGNOSIS — R33.9 URINARY RETENTION: Primary | ICD-10-CM

## 2024-12-28 LAB
ALBUMIN SERPL BCP-MCNC: 3.9 G/DL (ref 3.5–5.2)
ALP SERPL-CCNC: 49 U/L (ref 40–150)
ALT SERPL W/O P-5'-P-CCNC: 15 U/L (ref 10–44)
ANION GAP SERPL CALC-SCNC: 18 MMOL/L (ref 8–16)
AST SERPL-CCNC: 18 U/L (ref 10–40)
BACTERIA #/AREA URNS HPF: NORMAL /HPF
BASOPHILS # BLD AUTO: 0.02 K/UL (ref 0–0.2)
BASOPHILS NFR BLD: 0.2 % (ref 0–1.9)
BILIRUB SERPL-MCNC: 1 MG/DL (ref 0.1–1)
BILIRUB UR QL STRIP: NEGATIVE
BUN SERPL-MCNC: 53 MG/DL (ref 8–23)
CALCIUM SERPL-MCNC: 8.6 MG/DL (ref 8.7–10.5)
CHLORIDE SERPL-SCNC: 106 MMOL/L (ref 95–110)
CLARITY UR: CLEAR
CO2 SERPL-SCNC: 13 MMOL/L (ref 23–29)
COLOR UR: COLORLESS
CREAT SERPL-MCNC: 8 MG/DL (ref 0.5–1.4)
DIFFERENTIAL METHOD BLD: ABNORMAL
EOSINOPHIL # BLD AUTO: 0.2 K/UL (ref 0–0.5)
EOSINOPHIL NFR BLD: 1.5 % (ref 0–8)
ERYTHROCYTE [DISTWIDTH] IN BLOOD BY AUTOMATED COUNT: 18.5 % (ref 11.5–14.5)
EST. GFR  (NO RACE VARIABLE): 7 ML/MIN/1.73 M^2
GLUCOSE SERPL-MCNC: 108 MG/DL (ref 70–110)
GLUCOSE UR QL STRIP: NEGATIVE
HCT VFR BLD AUTO: 29 % (ref 40–54)
HGB BLD-MCNC: 9.3 G/DL (ref 14–18)
HGB UR QL STRIP: ABNORMAL
HYALINE CASTS #/AREA URNS LPF: 0 /LPF
IMM GRANULOCYTES # BLD AUTO: 0.09 K/UL (ref 0–0.04)
IMM GRANULOCYTES NFR BLD AUTO: 0.9 % (ref 0–0.5)
KETONES UR QL STRIP: NEGATIVE
LEUKOCYTE ESTERASE UR QL STRIP: NEGATIVE
LYMPHOCYTES # BLD AUTO: 0.7 K/UL (ref 1–4.8)
LYMPHOCYTES NFR BLD: 7 % (ref 18–48)
MCH RBC QN AUTO: 28.1 PG (ref 27–31)
MCHC RBC AUTO-ENTMCNC: 32.1 G/DL (ref 32–36)
MCV RBC AUTO: 88 FL (ref 82–98)
MICROSCOPIC COMMENT: NORMAL
MONOCYTES # BLD AUTO: 0.9 K/UL (ref 0.3–1)
MONOCYTES NFR BLD: 8.3 % (ref 4–15)
NEUTROPHILS # BLD AUTO: 8.4 K/UL (ref 1.8–7.7)
NEUTROPHILS NFR BLD: 82.1 % (ref 38–73)
NITRITE UR QL STRIP: NEGATIVE
NRBC BLD-RTO: 0 /100 WBC
PH UR STRIP: 6 [PH] (ref 5–8)
PLATELET # BLD AUTO: 298 K/UL (ref 150–450)
PMV BLD AUTO: 10.5 FL (ref 9.2–12.9)
POTASSIUM SERPL-SCNC: 4.2 MMOL/L (ref 3.5–5.1)
PROT SERPL-MCNC: 8 G/DL (ref 6–8.4)
PROT UR QL STRIP: ABNORMAL
RBC # BLD AUTO: 3.31 M/UL (ref 4.6–6.2)
RBC #/AREA URNS HPF: 3 /HPF (ref 0–4)
SODIUM SERPL-SCNC: 137 MMOL/L (ref 136–145)
SP GR UR STRIP: 1.01 (ref 1–1.03)
URN SPEC COLLECT METH UR: ABNORMAL
UROBILINOGEN UR STRIP-ACNC: NEGATIVE EU/DL
WBC # BLD AUTO: 10.27 K/UL (ref 3.9–12.7)
WBC #/AREA URNS HPF: 0 /HPF (ref 0–5)
WBC CLUMPS URNS QL MICRO: NORMAL

## 2024-12-28 PROCEDURE — 99283 EMERGENCY DEPT VISIT LOW MDM: CPT | Mod: HCNC,NTX,25

## 2024-12-28 PROCEDURE — 25000003 PHARM REV CODE 250: Mod: HCNC,NTX | Performed by: STUDENT IN AN ORGANIZED HEALTH CARE EDUCATION/TRAINING PROGRAM

## 2024-12-28 PROCEDURE — 51702 INSERT TEMP BLADDER CATH: CPT | Mod: HCNC,NTX

## 2024-12-28 PROCEDURE — 80053 COMPREHEN METABOLIC PANEL: CPT | Mod: HCNC,NTX | Performed by: STUDENT IN AN ORGANIZED HEALTH CARE EDUCATION/TRAINING PROGRAM

## 2024-12-28 PROCEDURE — 85025 COMPLETE CBC W/AUTO DIFF WBC: CPT | Mod: HCNC,NTX | Performed by: STUDENT IN AN ORGANIZED HEALTH CARE EDUCATION/TRAINING PROGRAM

## 2024-12-28 PROCEDURE — 81000 URINALYSIS NONAUTO W/SCOPE: CPT | Mod: HCNC,NTX | Performed by: STUDENT IN AN ORGANIZED HEALTH CARE EDUCATION/TRAINING PROGRAM

## 2024-12-28 RX ORDER — LIDOCAINE HYDROCHLORIDE 20 MG/ML
JELLY TOPICAL
Status: COMPLETED | OUTPATIENT
Start: 2024-12-28 | End: 2024-12-28

## 2024-12-28 RX ADMIN — LIDOCAINE HYDROCHLORIDE 10 ML: 20 JELLY TOPICAL at 06:12

## 2024-12-29 NOTE — DISCHARGE INSTRUCTIONS
Thank you for coming to our Emergency Department today. It is important to remember that some problems are difficult to diagnose and may not be found during your first visit. Be sure to follow up with your primary care doctor and review any labs/imaging that was performed with them. If you do not have a primary care doctor, you may contact the one listed on your discharge paperwork or you may also call the [facility] Appointment Desk at [phone] to schedule an appointment with one.     All medications may potentially have side effects and it is impossible to predict which medications may give you side effects. If you feel that you are having a negative effect of any medication you should immediately stop taking them and seek medical attention.    Return to the ER with any questions/concerns, new/concerning symptoms, worsening or failure to improve. Do not drive or make any important decisions for 24 hours if you have received any pain medications, sedatives or mood altering drugs during your ER visit.

## 2024-12-29 NOTE — ED PROVIDER NOTES
Encounter Date: 12/28/2024       History     Chief Complaint   Patient presents with    Urinary Retention     Patient reports had PD dialysis cath placed yesterday, has not started PD yet, states  has not been able to urinate and no BM in 2 days     69 y.o. male who has a past medical history of ESRD to start PD,  HTN (hypertension), Hyperlipemia, MGUS (monoclonal gammopathy of unknown significance), NS (nuclear sclerosis), bilateral, Obesity, and Type 2 diabetes mellitus. presents to the emergency department due to  difficulty urinating for the past day and a half.    Patient reports having peritoneal dialysis catheter placed yesterday by surgery.  Worse after his operation he was able to urinate but after arriving home he has not urinated since.  He reports as the day progressed today began having pain in the suprapubic region and urge to urinate but nothing is coming out.  He is uncertain if they place a Lopez catheter during his operation.  He does endorse not taking his Flomax today as he ran out of his prescriptions otherwise reports taking all of his medications as prescribed.  Denies any fevers or chills.  He reports since his surgery he has not had a bowel movement but is passing gas.    The history is provided by the patient.     Review of patient's allergies indicates:  No Known Allergies  Past Medical History:   Diagnosis Date    Anemia     Disorder of kidney and ureter     Edema leg     History of rotator cuff tear     History of seasonal allergies     HTN (hypertension)     Hx of colonic polyps     Hyperlipemia     MGUS (monoclonal gammopathy of unknown significance)     NS (nuclear sclerosis), bilateral 06/25/2019    Obesity     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 11/17/2017    Type 2 diabetes mellitus      Past Surgical History:   Procedure Laterality Date    CATARACT EXTRACTION W/  INTRAOCULAR LENS IMPLANT Right  10/30/2024    Procedure: EXTRACTION, CATARACT, WITH IOL INSERTION;  Surgeon: Dolores Cooper MD;  Location: [facility] OR;  Service: Ophthalmology;  Laterality: Right;    CATARACT EXTRACTION W/  INTRAOCULAR LENS IMPLANT Left 12/11/2024    Procedure: EXTRACTION, CATARACT, WITH IOL INSERTION;  Surgeon: Dolores Cooper MD;  Location: [facility] OR;  Service: Ophthalmology;  Laterality: Left;    COLONOSCOPY N/A 10/28/2022    Procedure: COLONOSCOPY;  Surgeon: Guanako Sanchez MD;  Location: [facility] ENDO;  Service: Endoscopy;  Laterality: N/A;  REFENDO placed per Dr Sanderson. case request entered     vaccinated-GT  instr portal    MOUTH SURGERY      RENAL BIOPSY Left 7/19/2023    Procedure: BIOPSY, KIDNEY;  Surgeon: Nikky Soriano MD;  Location: [facility] CATH LAB;  Service: Interventional Nephrology;  Laterality: Left;     Family History   Problem Relation Name Age of Onset    Cancer Mother          Lung Cancer    Cancer Father          Colon cancer    Diabetes Father      Hypertension Father      No Known Problems Sister      No Known Problems Brother      No Known Problems Maternal Aunt      No Known Problems Maternal Uncle      No Known Problems Paternal Aunt      No Known Problems Paternal Uncle      No Known Problems Maternal Grandmother      No Known Problems Maternal Grandfather      No Known Problems Paternal Grandmother      No Known Problems Paternal Grandfather      Amblyopia Neg Hx      Blindness Neg Hx      Cataracts Neg Hx      Glaucoma Neg Hx      Macular degeneration Neg Hx      Retinal detachment Neg Hx      Strabismus Neg Hx      Stroke Neg Hx      Thyroid disease Neg Hx       Social History     Tobacco Use    Smoking status: Former     Current packs/day: 0.50     Types: Cigarettes    Smokeless tobacco: Never   Substance Use Topics    Alcohol use: Yes     Comment: rarely    Drug use: Not Currently     Review of Systems   Constitutional:  Negative for fever.   HENT:  Negative for sore throat.    Respiratory:   Negative for shortness of breath.    Cardiovascular:  Negative for chest pain.   Gastrointestinal:  Positive for constipation. Negative for nausea.   Genitourinary:  Positive for difficulty urinating. Negative for dysuria.   Musculoskeletal:  Negative for back pain.   Skin:  Negative for rash.   Neurological:  Negative for weakness.   Hematological:  Does not bruise/bleed easily.       Physical Exam     Initial Vitals [12/28/24 1711]   BP Pulse Resp Temp SpO2   (!) 145/73 89 20 98.3 °F (36.8 °C) 98 %      MAP       --         Physical Exam    Constitutional: He is not diaphoretic. He appears distressed.   HENT:   Head: Normocephalic and atraumatic.   Eyes: Conjunctivae and EOM are normal. Pupils are equal, round, and reactive to light.   Neck:   Normal range of motion.  Cardiovascular:  Regular rhythm.           Pulses:       Radial pulses are 2+ on the right side and 2+ on the left side.        Posterior tibial pulses are 2+ on the right side and 2+ on the left side.   Pulmonary/Chest: Breath sounds normal. No respiratory distress.   Abdominal: Abdomen is soft. Bowel sounds are normal. He exhibits no distension. There is abdominal tenderness in the suprapubic area.   Musculoskeletal:         General: No tenderness. Normal range of motion.      Cervical back: Normal range of motion.     Neurological: He is alert and oriented to person, place, and time.   Skin: Skin is warm. Capillary refill takes less than 2 seconds.   Psychiatric: His behavior is normal.         ED Course   Procedures  Labs Reviewed   CBC W/ AUTO DIFFERENTIAL - Abnormal       Result Value    WBC 10.27      RBC 3.31 (*)     Hemoglobin 9.3 (*)     Hematocrit 29.0 (*)     MCV 88      MCH 28.1      MCHC 32.1      RDW 18.5 (*)     Platelets 298      MPV 10.5      Immature Granulocytes 0.9 (*)     Gran # (ANC) 8.4 (*)     Immature Grans (Abs) 0.09 (*)     Lymph # 0.7 (*)     Mono # 0.9      Eos # 0.2      Baso # 0.02      nRBC 0      Gran % 82.1 (*)      Lymph % 7.0 (*)     Mono % 8.3      Eosinophil % 1.5      Basophil % 0.2      Differential Method Automated     COMPREHENSIVE METABOLIC PANEL - Abnormal    Sodium 137      Potassium 4.2      Chloride 106      CO2 13 (*)     Glucose 108      BUN 53 (*)     Creatinine 8.0 (*)     Calcium 8.6 (*)     Total Protein 8.0      Albumin 3.9      Total Bilirubin 1.0      Alkaline Phosphatase 49      AST 18      ALT 15      eGFR 7 (*)     Anion Gap 18 (*)    URINALYSIS, REFLEX TO URINE CULTURE - Abnormal    Specimen UA Urine, Catheterized      Color, UA Colorless (*)     Appearance, UA Clear      pH, UA 6.0      Specific Gravity, UA 1.010      Protein, UA 3+ (*)     Glucose, UA Negative      Ketones, UA Negative      Bilirubin (UA) Negative      Occult Blood UA 1+ (*)     Nitrite, UA Negative      Urobilinogen, UA Negative      Leukocytes, UA Negative      Narrative:     Specimen Source->Urine   URINALYSIS MICROSCOPIC    RBC, UA 3      WBC, UA 0      WBC Clumps, UA Rare      Bacteria Rare      Hyaline Casts, UA 0      Microscopic Comment SEE COMMENT      Narrative:     Specimen Source->Urine          Imaging Results    None          Medications   LIDOcaine HCl 2% urojet (10 mLs Mucous Membrane Given 12/28/24 1819)     Medical Decision Making:   Initial Assessment:   69 y.o. male who has a past medical history of ESRD to start PD,  HTN (hypertension), Hyperlipemia, MGUS (monoclonal gammopathy of unknown significance), NS (nuclear sclerosis), bilateral, Obesity, and Type 2 diabetes mellitus. presents to the emergency department due to  difficulty urinating for the past day and a half.  Upon presentation patient in significant distress secondary to pain.  Exam notable for suprapubic fullness and tenderness to palpation.  No CVA tenderness.  Bladder scan with 100 cc of urine.  Presentation consistent with urinary retention so Lopez catheter was placed.  Uncertain cause of retention so will obtain labs to assess for any significant  obstructive uropathy as well as urinalysis to assess for possible urinary tract infection.  Differential Diagnosis:   Differential Diagnosis includes, but is not limited to:  STI, urethritis, testicular/appendix torsion, epididymitis, orchitis, UTI, kidney stone, urinary retention, appendicitis, prostatitis, testicular mass/neoplasm, incarcerated/strangulated hernia.   Clinical Tests:   Lab Tests: Ordered and Reviewed             ED Course as of 12/28/24 2131   Sat Dec 28, 2024   1811 Patient with 800 cc of urine in bladder. [AS]   1910 Urinalysis, Reflex to Urine Culture Urine, Clean Catch(!) [AS]   1915 Reassess patient symptoms significantly improved after Lopez placement.  UA without signs of infection.  Chemistry pending. [AS]   1921 Comprehensive metabolic panel(!)  Chem 14 reviewed and interpreted by me:  - No significant hypo-or hyper natremia,  kalemia , chloridemia, or other electrolyte abnormalities  - BUN and creatinine (at or around baseline),   - ALT and AST were within normal limits indicating normal liver function.   [AS]   1923 UA without signs of infection, chemistry without any evidence of postobstructive nephropathy.  Leave Lopez in place and have him follow-up with urologist.  Referral placed.  Pt is currently stable for discharge. I see no indication of an emergent process beyond that addressed during our encounter but have duly counseled the patient/family regarding the need for prompt follow-up as well as the indications that should prompt immediate return to the emergency room should new or worrisome developments occur. I discussed the ED work up and diagnostic findings with the patient/family. The patient/family has been provided with verbal and printed direction regarding our final diagnosis(es) as well as instructions regarding use of OTC and/or Rx medications intended to manage the patient's aforementioned conditions. The patient/family verbalized an understanding. The patient/family is  asked if there are any questions or concerns. We discuss the case, until all issues are addressed to the patient/family's satisfaction. Patient/family understands and is agreeable to the plan.  [AS]      ED Course User Index  [AS] Gricel Reyna MD          Medical Decision Making  Amount and/or Complexity of Data Reviewed  Labs: ordered. Decision-making details documented in ED Course.    Risk  Prescription drug management.           Clinical Impression:   Final diagnoses:  [R33.9] Urinary retention (Primary)          ED Disposition Condition    Discharge Stable          ED Prescriptions    None       Follow-up Information       Follow up With Specialties Details Why Contact Info    Myranda Avila MD Internal Medicine Schedule an appointment as soon as possible for a visit  for reassesment [address] [place] [address] [phone]      [facility] Emergency Dept Emergency Medicine  If symptoms worsen [address] [facility] [place] [address] [phone]    Luigi Power MD Urology Schedule an appointment as soon as possible for a visit  for reassesment [address]  [address]  [facility] [address] [phone]              DISCLAIMER: This note was prepared with [employer] voice recognition transcription software. Garbled syntax, mangled pronouns, and other bizarre constructions may be attributed to that software system.     Gricel Reyna MD  12/28/24 6959

## 2024-12-30 ENCOUNTER — OFFICE VISIT (OUTPATIENT)
Dept: UROLOGY | Facility: CLINIC | Age: 69
End: 2024-12-30
Payer: MEDICARE

## 2024-12-30 VITALS
BODY MASS INDEX: 34.92 KG/M2 | HEART RATE: 75 BPM | DIASTOLIC BLOOD PRESSURE: 68 MMHG | HEIGHT: 72 IN | WEIGHT: 257.81 LBS | SYSTOLIC BLOOD PRESSURE: 137 MMHG

## 2024-12-30 DIAGNOSIS — N40.1 BENIGN PROSTATIC HYPERPLASIA WITH URINARY RETENTION: Primary | ICD-10-CM

## 2024-12-30 DIAGNOSIS — R33.8 BENIGN PROSTATIC HYPERPLASIA WITH URINARY RETENTION: Primary | ICD-10-CM

## 2024-12-30 PROCEDURE — 99999 PR PBB SHADOW E&M-EST. PATIENT-LVL IV: CPT | Mod: PBBFAC,HCNC,TXP, | Performed by: UROLOGY

## 2024-12-30 NOTE — PROGRESS NOTES
[facility] Urology   Clinic Note    SUBJECTIVE:     Chief Complaint   Patient presents with    Consult    Urinary Retention       Referral from: No ref. provider found.    History of Present Illness:  Handy Adams is a 69 y.o. male who presents to clinic for BPH.    Here for evaluation of BPH and urinary retention.  Patient recently had a dialysis catheter placed at an outside facility.  He experienced postop urinary retention.  This was 2 days ago.  He is here today with a Lopez catheter and desires removal.  He previously was on tamsulosin but has not been taking his tamsulosin.    Patient endorses no additional complaints at this time.    Past Medical History:   Diagnosis Date    Anemia     Disorder of kidney and ureter     Edema leg     History of rotator cuff tear     History of seasonal allergies     HTN (hypertension)     Hx of colonic polyps     Hyperlipemia     MGUS (monoclonal gammopathy of unknown significance)     NS (nuclear sclerosis), bilateral 06/25/2019    Obesity     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 11/17/2017    Type 2 diabetes mellitus        Past Surgical History:   Procedure Laterality Date    CATARACT EXTRACTION W/  INTRAOCULAR LENS IMPLANT Right 10/30/2024    Procedure: EXTRACTION, CATARACT, WITH IOL INSERTION;  Surgeon: Dolores Cooper MD;  Location: [facility] OR;  Service: Ophthalmology;  Laterality: Right;    CATARACT EXTRACTION W/  INTRAOCULAR LENS IMPLANT Left 12/11/2024    Procedure: EXTRACTION, CATARACT, WITH IOL INSERTION;  Surgeon: Dolores Cooper MD;  Location: [facility] OR;  Service: Ophthalmology;  Laterality: Left;    COLONOSCOPY N/A 10/28/2022    Procedure: COLONOSCOPY;  Surgeon: Guanako Sanchez MD;  Location: [facility] ENDO;  Service: Endoscopy;  Laterality: N/A;  REFENDO placed per Dr Sanderson. case request entered     vaccinated-GT  instr portal    MOUTH SURGERY      RENAL BIOPSY Left 7/19/2023     Procedure: BIOPSY, KIDNEY;  Surgeon: Nikky Soriano MD;  Location: [facility] CATH LAB;  Service: Interventional Nephrology;  Laterality: Left;       Family History   Problem Relation Name Age of Onset    Cancer Mother          Lung Cancer    Cancer Father          Colon cancer    Diabetes Father      Hypertension Father      No Known Problems Sister      No Known Problems Brother      No Known Problems Maternal Aunt      No Known Problems Maternal Uncle      No Known Problems Paternal Aunt      No Known Problems Paternal Uncle      No Known Problems Maternal Grandmother      No Known Problems Maternal Grandfather      No Known Problems Paternal Grandmother      No Known Problems Paternal Grandfather      Amblyopia Neg Hx      Blindness Neg Hx      Cataracts Neg Hx      Glaucoma Neg Hx      Macular degeneration Neg Hx      Retinal detachment Neg Hx      Strabismus Neg Hx      Stroke Neg Hx      Thyroid disease Neg Hx         Social History     Tobacco Use    Smoking status: Former     Current packs/day: 0.50     Types: Cigarettes    Smokeless tobacco: Never   Substance Use Topics    Alcohol use: Yes     Comment: rarely    Drug use: Not Currently       Current Outpatient Medications on File Prior to Visit   Medication Sig Dispense Refill    atorvastatin (LIPITOR) 20 MG tablet Take 1 tablet (20 mg total) by mouth once daily. 90 tablet 3    blood sugar diagnostic Strp To test twice daily 100 each 3    calcitRIOL (ROCALTROL) 0.25 MCG Cap Take 1 capsule (0.25 mcg total) by mouth once daily. 90 capsule 3    ergocalciferol (ERGOCALCIFEROL) 50,000 unit Cap Take 1 capsule (50,000 Units total) by mouth every 7 days. 12 capsule 3    FEROSUL 325 mg (65 mg iron) Tab tablet Take 1 tablet (325 mg total) by mouth 2 (two) times daily. 180 tablet 3    labetaloL (NORMODYNE) 200 MG tablet Take 1.5 tablets (300 mg total) by mouth every 12 (twelve) hours. 90 tablet 11    lancets Misc 1 lancet by Misc.(Non-Drug; Combo Route) route 2 (two) times  daily with meals. 100 each 11    linaGLIPtin (TRADJENTA) 5 mg Tab tablet Take 1 tablet (5 mg total) by mouth once daily. 90 tablet 2    NIFEdipine (ADALAT CC) 90 MG TbSR Take 90 mg by mouth once daily.      NIFEdipine (PROCARDIA-XL) 90 MG (OSM) 24 hr tablet Take 1 tablet by mouth once daily 90 tablet 0    prednisoLONE-moxiflox-bromfen 1-0.5-0.075 % DrpS Apply 1 drop to eye 3 (three) times daily. 5 mL 3    prednisolone/moxiflox/bromf/PF (FFLMZOFZRAP-YCLTXMEA-PIWYR,PF,) 1-0.5-0.09 % Drop Apply 1 drop to eye 3 (three) times daily. 5 mL 3    sevelamer carbonate (RENVELA) 800 mg Tab Take 1 tablet (800 mg total) by mouth 3 (three) times daily with meals. 90 tablet 11    sildenafiL (VIAGRA) 100 MG tablet Take 1 tablet (100 mg total) by mouth daily as needed for Erectile Dysfunction. 10 tablet 11    sodium bicarbonate 650 MG tablet Take 1 tablet (650 mg total) by mouth 2 (two) times daily. 180 tablet 3    tamsulosin (FLOMAX) 0.4 mg Cap Take 1 capsule (0.4 mg total) by mouth every evening. 90 capsule 0    torsemide (DEMADEX) 20 MG Tab Take 2 tablets (40 mg total) by mouth once daily. 180 tablet 3    valsartan (DIOVAN) 320 MG tablet Take 1 tablet (320 mg total) by mouth once daily. 90 tablet 3    blood-glucose meter kit Use as instructed 1 each 0     No current facility-administered medications on file prior to visit.       Review of patient's allergies indicates:  No Known Allergies    Review of Systems:  A review of 10+ systems was conducted with pertinent positive and negative findings documented in HPI with all other systems reviewed and negative.    OBJECTIVE:     Estimated body mass index is 34.97 kg/m² as calculated from the following:    Height as of this encounter: 6' (1.829 m).    Weight as of this encounter: 117 kg (257 lb 13.3 oz).    Vital Signs (Most Recent)  Vitals:    12/30/24 1333   BP: 137/68   Pulse: 75       Physical Exam:  GENERAL: patient sitting comfortably  HEENT: normocephalic  NECK: supple, no  JVD  PULM: normal chest rise, no increased WOB  HEART: non-diaphoretic  ABDO: soft, nondistended, nontender  BACK: no CVA tenderness bilaterally  SKIN: warm, dry, well perfused  EXT: no bruising or edema  NEURO: grossly normal with no focal deficits  PSYCH: appropriate mood and affect    Genitourinary Exam:  Lopez in place urine clear yellow    Lab Results   Component Value Date    BUN 53 (H) 12/28/2024    CREATININE 8.0 (H) 12/28/2024    WBC 10.27 12/28/2024    HGB 9.3 (L) 12/28/2024    HCT 29.0 (L) 12/28/2024     12/28/2024    AST 18 12/28/2024    ALT 15 12/28/2024    ALKPHOS 49 12/28/2024    ALBUMIN 3.9 12/28/2024    HGBA1C 5.1 08/26/2024    INR 1.0 02/07/2024        Imaging:  I have personally reviewed all relevant imaging studies.    No results found. However, due to the size of the patient record, not all encounters were searched. Please check Results Review for a complete set of results.  No results found. However, due to the size of the patient record, not all encounters were searched. Please check Results Review for a complete set of results.  IOL Master - OU - Both Eyes  IOL master performed on both eyes reviewed and appropriate IOL picked out,   please refer to Assessment for IOL type and power.;       PSA:  Lab Results   Component Value Date    PSA 1.0 02/07/2024    PSA 1.7 09/07/2022    PSA 1.2 07/23/2020    PSA 1.1 11/23/2016    PSA 1.22 05/09/2012    PSA 0.4 11/14/2006    PSA 0.4 03/09/2005       Testosterone:  Lab Results   Component Value Date    TOTALTESTOST 507 06/06/2024        ASSESSMENT     1. Benign prostatic hyperplasia with urinary retention        PLAN:     Tamsulosin prescribed  We will do voiding trial in 3-7 days  Patient can be considered for surgical outlet procedure if he fails his voiding trial    Sushil Barrow MD  Urology  [facility]    Disclaimer: This note has been generated using voice-recognition software. There may be typographical errors that  have been missed during proof-reading.

## 2024-12-31 ENCOUNTER — OFFICE VISIT (OUTPATIENT)
Dept: OPHTHALMOLOGY | Facility: CLINIC | Age: 69
End: 2024-12-31
Payer: MEDICARE

## 2024-12-31 ENCOUNTER — CLINICAL SUPPORT (OUTPATIENT)
Dept: OPHTHALMOLOGY | Facility: CLINIC | Age: 69
End: 2024-12-31
Payer: MEDICARE

## 2024-12-31 ENCOUNTER — TELEPHONE (OUTPATIENT)
Dept: NEPHROLOGY | Facility: CLINIC | Age: 69
End: 2024-12-31
Payer: MEDICARE

## 2024-12-31 ENCOUNTER — CLINICAL SUPPORT (OUTPATIENT)
Dept: NEPHROLOGY | Facility: CLINIC | Age: 69
End: 2024-12-31
Payer: MEDICARE

## 2024-12-31 VITALS
HEART RATE: 72 BPM | WEIGHT: 257.94 LBS | SYSTOLIC BLOOD PRESSURE: 149 MMHG | BODY MASS INDEX: 34.98 KG/M2 | DIASTOLIC BLOOD PRESSURE: 79 MMHG | OXYGEN SATURATION: 97 %

## 2024-12-31 DIAGNOSIS — N18.5 CKD (CHRONIC KIDNEY DISEASE), STAGE V: ICD-10-CM

## 2024-12-31 DIAGNOSIS — D63.1 ANEMIA IN STAGE 5 CHRONIC KIDNEY DISEASE, NOT ON CHRONIC DIALYSIS: Primary | ICD-10-CM

## 2024-12-31 DIAGNOSIS — N18.5 ANEMIA IN STAGE 5 CHRONIC KIDNEY DISEASE, NOT ON CHRONIC DIALYSIS: Primary | ICD-10-CM

## 2024-12-31 DIAGNOSIS — Z79.4 TYPE 2 DIABETES MELLITUS WITH BOTH EYES AFFECTED BY PROLIFERATIVE RETINOPATHY AND MACULAR EDEMA, WITH LONG-TERM CURRENT USE OF INSULIN: Primary | ICD-10-CM

## 2024-12-31 DIAGNOSIS — H35.033 HYPERTENSIVE RETINOPATHY, BILATERAL: ICD-10-CM

## 2024-12-31 DIAGNOSIS — E11.3513 TYPE 2 DIABETES MELLITUS WITH BOTH EYES AFFECTED BY PROLIFERATIVE RETINOPATHY AND MACULAR EDEMA, WITH LONG-TERM CURRENT USE OF INSULIN: Primary | ICD-10-CM

## 2024-12-31 PROCEDURE — 3044F HG A1C LEVEL LT 7.0%: CPT | Mod: HCNC,CPTII,S$GLB, | Performed by: OPHTHALMOLOGY

## 2024-12-31 PROCEDURE — 1126F AMNT PAIN NOTED NONE PRSNT: CPT | Mod: HCNC,CPTII,S$GLB, | Performed by: OPHTHALMOLOGY

## 2024-12-31 PROCEDURE — 92012 INTRM OPH EXAM EST PATIENT: CPT | Mod: 24,HCNC,S$GLB, | Performed by: OPHTHALMOLOGY

## 2024-12-31 PROCEDURE — 1159F MED LIST DOCD IN RCRD: CPT | Mod: HCNC,CPTII,S$GLB, | Performed by: OPHTHALMOLOGY

## 2024-12-31 PROCEDURE — 92134 CPTRZ OPH DX IMG PST SGM RTA: CPT | Mod: HCNC,S$GLB,, | Performed by: OPHTHALMOLOGY

## 2024-12-31 PROCEDURE — 3288F FALL RISK ASSESSMENT DOCD: CPT | Mod: HCNC,CPTII,S$GLB, | Performed by: OPHTHALMOLOGY

## 2024-12-31 PROCEDURE — 3062F POS MACROALBUMINURIA REV: CPT | Mod: HCNC,CPTII,S$GLB, | Performed by: OPHTHALMOLOGY

## 2024-12-31 PROCEDURE — 1101F PT FALLS ASSESS-DOCD LE1/YR: CPT | Mod: HCNC,CPTII,S$GLB, | Performed by: OPHTHALMOLOGY

## 2024-12-31 PROCEDURE — 3066F NEPHROPATHY DOC TX: CPT | Mod: HCNC,CPTII,S$GLB, | Performed by: OPHTHALMOLOGY

## 2024-12-31 PROCEDURE — 99999 PR PBB SHADOW E&M-EST. PATIENT-LVL III: CPT | Mod: PBBFAC,HCNC,, | Performed by: OPHTHALMOLOGY

## 2024-12-31 PROCEDURE — 99999 PR PBB SHADOW E&M-EST. PATIENT-LVL II: CPT | Mod: PBBFAC,HCNC,,

## 2024-12-31 PROCEDURE — 4010F ACE/ARB THERAPY RXD/TAKEN: CPT | Mod: HCNC,CPTII,S$GLB, | Performed by: OPHTHALMOLOGY

## 2024-12-31 NOTE — PROGRESS NOTES
HPI     Diabetic Eye Exam     Additional comments: 3 mo f/u           Comments    VA stable ou, no pain ou and denies floaters or flashes ou.    ?Red top gtt OS tid              HPI    8 wk OCT/DFE    Pt states va is worsening OD>OS since last visit. Worsening foggy va   OD>OS. Pt c/o very very very bad va OD. Wants to communicate that better   this visit.   Hemoglobin A1C       Date                     Value               Ref Range             Status                08/26/2024               5.1                 4.0 - 5.6 %           Final              No flashes  No floaters  No pain  Gtts: Red eye drops, every Sunday.  Last edited by Manuela Henson on 9/23/2024  9:53 AM.             OCT  DME OU  OD improved  OS stable  low grade DME    Prior WFFA  OD - normal transit time. Hyperfluorescence early c/w Ma/edema - sig NP    OS - Hyperfluorescence early c/w Ma/edema  .NV nasal      A/P    1. 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 DME OU   6/19 - pt did not FU until 8/20  S/p Avastin OD x 2  S/p Eylea OD x 2  S/p Ozurdex OD x 4, OS x 2  5/24  S/p Iluvien OU 5/24 11/22 - not seen x 2 years  3/23 - had second opinion with Dr. Suh.  Pt would like to try a few Eylea prior to steroids if needed  11/23 - increased DME oU  7/24 trace increase   9/24 - OD improving, OS stable  12/24 - some increase in DME OU post CE, but patient happy with Va.  OD is worse potential given more chronic DME    No injection today, if worse next visit, then consider bolster injections on top of Iluvien      3. HTN Ret OU  BS/BP/chol control    4.PCIOL OU  With Dr. Cooper      5. Cyst RLL  Removed by Rhea  Happy with result        3   month OCT mac and RNFL and  dilate - see in room LDFE 9/24

## 2024-12-31 NOTE — TELEPHONE ENCOUNTER
"  Spoke to pt   ----- Message from Bridget sent at 12/31/2024  9:56 AM CST -----  Regarding: pt advice  Contact: [phone]  .Name Of Caller: Self     Contact Preference?: [phone]     What is the nature of the call?:pt running late for appt 12/31/25 due to a previous appt. Pls call      Additional Notes:  "Thank you for all that you do for our patients"  "

## 2025-01-02 NOTE — PROGRESS NOTES
Subjective:       Patient ID: Handy Adams is a 69 y.o. male.    Chief Complaint: VT     This is a 69 y.o.  male patient that is new to me but not new to the system.  The patient was referred to me by Dr. Barrow for VT. Patient was seen in this clinic for BPH and urinary retention evaluation. Patient recently had a dialysis catheter placed at an outside facility. He experienced postop urinary retention. Started on tamsulosin and is now here today for VT.  Reports that he has been taking tamsulosin daily.   Denies ever having trouble urinating prior to surgery.   Endorses no other complaints today.    LAST PSA  Lab Results   Component Value Date    PSA 1.0 02/07/2024    PSA 1.7 09/07/2022    PSA 1.2 07/23/2020    PSA 1.1 11/23/2016    PSA 1.22 05/09/2012    PSA 0.4 11/14/2006    PSA 0.4 03/09/2005       Lab Results   Component Value Date    CREATININE 8.0 (H) 12/28/2024       ---  PMH/PSH/Medications/Allergies/Social history reviewed and as in chart.    Review of Systems   Constitutional:  Negative for activity change, chills and fever.   Respiratory:  Negative for shortness of breath.    Cardiovascular:  Negative for chest pain and palpitations.   Gastrointestinal:  Negative for abdominal pain and constipation.   Genitourinary:  Positive for difficulty urinating. Negative for dysuria, flank pain, frequency, hematuria and urgency.   Neurological:  Negative for dizziness and light-headedness.     Objective:      Physical Exam  HENT:      Head: Normocephalic.   Pulmonary:      Effort: Pulmonary effort is normal.   Abdominal:      General: Abdomen is flat.      Palpations: Abdomen is soft.   Genitourinary:     Comments: Bladder filled with sterile water, balloon deflated and adrian catheter removed without difficulty.    Musculoskeletal:         General: Normal range of motion.      Cervical back: Normal range of motion.   Skin:     General: Skin is warm and dry.   Neurological:      Mental Status: He is alert and  oriented to person, place, and time.       Assessment:     Problem Noted   Urinary Retention 1/3/2025       Plan:     Voiding trial performed by Nurse Practitioner.  150ml of sterile water was instilled into bladder.  Adrian catheter was removed. Patient urinated 175ml without difficulty.   Patient was instructed to drink plenty of fluids today.  Instructed patient to call at 1p.m. to give an update on urine output.  I instructed patient to return to clinic or emergency department (if after clinic hours) to have adrian catheter put back in if unable to urinate within 5 hours of adrian catheter removal or starts to experience bladder pressure/pain, decrease flow, straining/difficulty urinating, urinary frequency. Patient voiced understanding.   Continue to take flomax daily.  Return to clinic in 4 weeks for pvr.     CE[facility] Becker    Time spent was 25 minutes. This includes face to face time and non-face to face time preparing to see the patient (eg, review of tests), obtaining and/or reviewing separately obtained history, documenting clinical information in the electronic or other health record, independently interpreting results and communicating results to the patient/family/caregiver, or care coordinator.

## 2025-01-02 NOTE — PROGRESS NOTES
Hgb 9.3/ Hct 29.0     Retacrit 62343 units administered , Per Form # DRORD-428. All questions and concerns were addressed .Pt had no reactions noted at this time ,tolerated injection well. 1 week appt given.         GFR 7

## 2025-01-03 ENCOUNTER — OFFICE VISIT (OUTPATIENT)
Dept: UROLOGY | Facility: CLINIC | Age: 70
End: 2025-01-03
Payer: MEDICARE

## 2025-01-03 ENCOUNTER — TELEPHONE (OUTPATIENT)
Dept: NEPHROLOGY | Facility: CLINIC | Age: 70
End: 2025-01-03
Payer: MEDICARE

## 2025-01-03 VITALS — DIASTOLIC BLOOD PRESSURE: 81 MMHG | HEART RATE: 68 BPM | SYSTOLIC BLOOD PRESSURE: 155 MMHG

## 2025-01-03 DIAGNOSIS — R33.9 URINARY RETENTION: Primary | ICD-10-CM

## 2025-01-03 DIAGNOSIS — M79.89 LEG SWELLING: ICD-10-CM

## 2025-01-03 PROCEDURE — 99999 PR PBB SHADOW E&M-EST. PATIENT-LVL III: CPT | Mod: PBBFAC,HCNC,TXP,

## 2025-01-03 RX ORDER — TORSEMIDE 20 MG/1
40 TABLET ORAL 2 TIMES DAILY
Qty: 360 TABLET | Refills: 3 | Status: SHIPPED | OUTPATIENT
Start: 2025-01-03 | End: 2026-01-03

## 2025-01-07 ENCOUNTER — TELEPHONE (OUTPATIENT)
Dept: TRANSPLANT | Facility: CLINIC | Age: 70
End: 2025-01-07
Payer: MEDICARE

## 2025-01-07 ENCOUNTER — TELEPHONE (OUTPATIENT)
Dept: NEPHROLOGY | Facility: CLINIC | Age: 70
End: 2025-01-07
Payer: MEDICARE

## 2025-01-07 NOTE — TELEPHONE ENCOUNTER
Spoke to pt     ----- Message from Rain Cochran sent at 1/7/2025  4:19 PM CST -----  Regarding: FW: Reschedule Appt  Contact: Handy    ----- Message -----  From: Corinna Silva  Sent: 1/7/2025   2:53 PM CST  To: Lucina Hillman Staff  Subject: Reschedule Appt                                  RESCHEDULE/APPTS    Current Appt Date: 01/07/2025    Type of Appt: Est Non Fasting Labs     Physician: Lucina     Reason for Scheduling:  Pt missed his appt this morning for non fasting labs and has an injection tomorrow 01/08/2025 at 1pm     Pt states he needs to do the lab before getting the injection and was trying to get in for 8 AM on 01/08/2025   They scheduled him for 02/04/2025 for his non fasting labs which doesn't align with his injections.   Would like a call back to discuss and create a plan of action.     Caller: Handy     Contact Preference: [phone] (home)

## 2025-01-08 ENCOUNTER — LAB VISIT (OUTPATIENT)
Dept: LAB | Facility: HOSPITAL | Age: 70
End: 2025-01-08
Payer: MEDICARE

## 2025-01-08 ENCOUNTER — CLINICAL SUPPORT (OUTPATIENT)
Dept: NEPHROLOGY | Facility: CLINIC | Age: 70
End: 2025-01-08
Payer: MEDICARE

## 2025-01-08 VITALS
DIASTOLIC BLOOD PRESSURE: 78 MMHG | WEIGHT: 257.94 LBS | BODY MASS INDEX: 34.98 KG/M2 | SYSTOLIC BLOOD PRESSURE: 140 MMHG | HEART RATE: 65 BPM | OXYGEN SATURATION: 97 %

## 2025-01-08 DIAGNOSIS — N18.5 CKD (CHRONIC KIDNEY DISEASE), STAGE V: ICD-10-CM

## 2025-01-08 DIAGNOSIS — D64.9 ANEMIA, UNSPECIFIED TYPE: ICD-10-CM

## 2025-01-08 DIAGNOSIS — N18.5 ANEMIA IN STAGE 5 CHRONIC KIDNEY DISEASE, NOT ON CHRONIC DIALYSIS: Primary | ICD-10-CM

## 2025-01-08 DIAGNOSIS — D63.1 ANEMIA IN STAGE 5 CHRONIC KIDNEY DISEASE, NOT ON CHRONIC DIALYSIS: Primary | ICD-10-CM

## 2025-01-08 LAB
HCT VFR BLD AUTO: 27.5 % (ref 40–54)
HGB BLD-MCNC: 8.6 G/DL (ref 14–18)
IRON SERPL-MCNC: 59 UG/DL (ref 45–160)
SATURATED IRON: 21 % (ref 20–50)
TOTAL IRON BINDING CAPACITY: 280 UG/DL (ref 250–450)
TRANSFERRIN SERPL-MCNC: 189 MG/DL (ref 200–375)

## 2025-01-08 PROCEDURE — 83540 ASSAY OF IRON: CPT | Mod: HCNC,TXP | Performed by: NURSE PRACTITIONER

## 2025-01-08 PROCEDURE — 85018 HEMOGLOBIN: CPT | Mod: HCNC,TXP | Performed by: NURSE PRACTITIONER

## 2025-01-08 PROCEDURE — 99999 PR PBB SHADOW E&M-EST. PATIENT-LVL II: CPT | Mod: PBBFAC,HCNC,,

## 2025-01-08 PROCEDURE — 36415 COLL VENOUS BLD VENIPUNCTURE: CPT | Mod: HCNC,TXP | Performed by: NURSE PRACTITIONER

## 2025-01-08 PROCEDURE — 85014 HEMATOCRIT: CPT | Mod: HCNC,TXP | Performed by: NURSE PRACTITIONER

## 2025-01-10 NOTE — PROGRESS NOTES
Hgb 8.6/ Hct 27.5     Retacrit 65640 units administered , Per Form # DRORD-428. All questions and concerns were addressed .Pt had no reactions noted at this time ,tolerated injection well. 1 week appt given.         GFR 7

## 2025-01-14 ENCOUNTER — LAB VISIT (OUTPATIENT)
Dept: LAB | Facility: HOSPITAL | Age: 70
End: 2025-01-14
Attending: NURSE PRACTITIONER
Payer: MEDICARE

## 2025-01-14 DIAGNOSIS — D64.9 ANEMIA, UNSPECIFIED TYPE: ICD-10-CM

## 2025-01-14 LAB
HCT VFR BLD AUTO: 30 % (ref 40–54)
HGB BLD-MCNC: 9.6 G/DL (ref 14–18)
IRON SERPL-MCNC: 51 UG/DL (ref 45–160)
SATURATED IRON: 18 % (ref 20–50)
TOTAL IRON BINDING CAPACITY: 287 UG/DL (ref 250–450)
TRANSFERRIN SERPL-MCNC: 194 MG/DL (ref 200–375)

## 2025-01-14 PROCEDURE — 83540 ASSAY OF IRON: CPT | Mod: HCNC,TXP | Performed by: NURSE PRACTITIONER

## 2025-01-14 PROCEDURE — 36415 COLL VENOUS BLD VENIPUNCTURE: CPT | Mod: HCNC,TXP | Performed by: NURSE PRACTITIONER

## 2025-01-14 PROCEDURE — 85014 HEMATOCRIT: CPT | Mod: HCNC,TXP | Performed by: NURSE PRACTITIONER

## 2025-01-14 PROCEDURE — 85018 HEMOGLOBIN: CPT | Mod: HCNC,TXP | Performed by: NURSE PRACTITIONER

## 2025-01-15 ENCOUNTER — LAB VISIT (OUTPATIENT)
Dept: LAB | Facility: HOSPITAL | Age: 70
End: 2025-01-15
Attending: INTERNAL MEDICINE
Payer: MEDICARE

## 2025-01-15 ENCOUNTER — CLINICAL SUPPORT (OUTPATIENT)
Dept: NEPHROLOGY | Facility: CLINIC | Age: 70
End: 2025-01-15
Payer: MEDICARE

## 2025-01-15 VITALS
DIASTOLIC BLOOD PRESSURE: 75 MMHG | BODY MASS INDEX: 34.98 KG/M2 | WEIGHT: 257.94 LBS | OXYGEN SATURATION: 97 % | HEART RATE: 65 BPM | SYSTOLIC BLOOD PRESSURE: 130 MMHG

## 2025-01-15 DIAGNOSIS — D63.1 ANEMIA IN STAGE 5 CHRONIC KIDNEY DISEASE, NOT ON CHRONIC DIALYSIS: Primary | ICD-10-CM

## 2025-01-15 DIAGNOSIS — Z11.59 NEED FOR HEPATITIS B SCREENING TEST: ICD-10-CM

## 2025-01-15 DIAGNOSIS — N18.5 ANEMIA IN STAGE 5 CHRONIC KIDNEY DISEASE, NOT ON CHRONIC DIALYSIS: Primary | ICD-10-CM

## 2025-01-15 DIAGNOSIS — N18.6 ESRD (END STAGE RENAL DISEASE): ICD-10-CM

## 2025-01-15 DIAGNOSIS — N18.5 CKD (CHRONIC KIDNEY DISEASE), STAGE V: ICD-10-CM

## 2025-01-15 DIAGNOSIS — Z11.1 SCREENING FOR TUBERCULOSIS: ICD-10-CM

## 2025-01-15 DIAGNOSIS — E11.8 CONTROLLED TYPE 2 DIABETES MELLITUS WITH COMPLICATION, WITHOUT LONG-TERM CURRENT USE OF INSULIN: ICD-10-CM

## 2025-01-15 LAB
HBV CORE AB SERPL QL IA: NORMAL
HBV SURFACE AB SER-ACNC: <3 MIU/ML
HBV SURFACE AB SER-ACNC: NORMAL M[IU]/ML
HBV SURFACE AG SERPL QL IA: NORMAL
HCV AB SERPL QL IA: NORMAL

## 2025-01-15 PROCEDURE — 87340 HEPATITIS B SURFACE AG IA: CPT | Mod: HCNC,TXP | Performed by: INTERNAL MEDICINE

## 2025-01-15 PROCEDURE — 86706 HEP B SURFACE ANTIBODY: CPT | Mod: HCNC,TXP | Performed by: INTERNAL MEDICINE

## 2025-01-15 PROCEDURE — 86704 HEP B CORE ANTIBODY TOTAL: CPT | Mod: HCNC,TXP | Performed by: INTERNAL MEDICINE

## 2025-01-15 PROCEDURE — 99999 PR PBB SHADOW E&M-EST. PATIENT-LVL II: CPT | Mod: PBBFAC,HCNC,,

## 2025-01-15 PROCEDURE — 86803 HEPATITIS C AB TEST: CPT | Mod: HCNC,TXP | Performed by: INTERNAL MEDICINE

## 2025-01-15 PROCEDURE — 36415 COLL VENOUS BLD VENIPUNCTURE: CPT | Mod: HCNC,TXP | Performed by: INTERNAL MEDICINE

## 2025-01-15 PROCEDURE — 96372 THER/PROPH/DIAG INJ SC/IM: CPT | Mod: HCNC,S$GLB,, | Performed by: NURSE PRACTITIONER

## 2025-01-15 PROCEDURE — 86480 TB TEST CELL IMMUN MEASURE: CPT | Mod: HCNC,TXP | Performed by: INTERNAL MEDICINE

## 2025-01-15 RX ORDER — LINAGLIPTIN 5 MG/1
5 TABLET, FILM COATED ORAL
Qty: 90 TABLET | Refills: 0 | Status: SHIPPED | OUTPATIENT
Start: 2025-01-15

## 2025-01-15 RX ORDER — CEPHALEXIN 500 MG/1
500 CAPSULE ORAL EVERY 12 HOURS
Qty: 14 CAPSULE | Refills: 0 | Status: SHIPPED | OUTPATIENT
Start: 2025-01-15 | End: 2025-01-31 | Stop reason: ALTCHOICE

## 2025-01-15 NOTE — PROGRESS NOTES
Hgb 9.6/ Hct 30.0     Retacrit 78488 units administered , Per Form # DRORD-428. All questions and concerns were addressed .Pt had no reactions noted at this time ,tolerated injection well.No follow appt given due to pt will be starting dialysis on Monday per patient.        GFR 7

## 2025-01-16 LAB
GAMMA INTERFERON BACKGROUND BLD IA-ACNC: 0 IU/ML
M TB IFN-G CD4+ BCKGRND COR BLD-ACNC: 0 IU/ML
M TB IFN-G CD4+ BCKGRND COR BLD-ACNC: 0.01 IU/ML
MITOGEN IGNF BCKGRD COR BLD-ACNC: 10 IU/ML
TB GOLD PLUS: NEGATIVE

## 2025-01-17 ENCOUNTER — TELEPHONE (OUTPATIENT)
Dept: OPTOMETRY | Facility: CLINIC | Age: 70
End: 2025-01-17
Payer: MEDICARE

## 2025-01-17 NOTE — TELEPHONE ENCOUNTER
----- Message from [employer]awais sent at 1/17/2025 12:53 PM CST -----  Regarding: Urgent Rescheduling Request  Contact: [phone]  Rescheduling Request           Appt Type:  Ep      Date/Time Preference: after 2pm    Caller Name: pt      Contact Preference:   [phone]    Comment: Pt states he will be late to post op due to traveling in from out of town, would like to know if theres a later appt time available. Please call to advise thank you

## 2025-01-31 ENCOUNTER — TELEPHONE (OUTPATIENT)
Dept: FAMILY MEDICINE | Facility: CLINIC | Age: 70
End: 2025-01-31

## 2025-01-31 ENCOUNTER — OFFICE VISIT (OUTPATIENT)
Dept: FAMILY MEDICINE | Facility: CLINIC | Age: 70
End: 2025-01-31
Payer: MEDICARE

## 2025-01-31 VITALS
HEIGHT: 73 IN | SYSTOLIC BLOOD PRESSURE: 140 MMHG | TEMPERATURE: 98 F | BODY MASS INDEX: 35.06 KG/M2 | DIASTOLIC BLOOD PRESSURE: 84 MMHG | RESPIRATION RATE: 16 BRPM | HEART RATE: 66 BPM | WEIGHT: 264.56 LBS | OXYGEN SATURATION: 98 %

## 2025-01-31 DIAGNOSIS — M79.89 LEG SWELLING: ICD-10-CM

## 2025-01-31 DIAGNOSIS — I10 ESSENTIAL HYPERTENSION: Primary | ICD-10-CM

## 2025-01-31 DIAGNOSIS — E66.09 CLASS 1 OBESITY DUE TO EXCESS CALORIES WITH SERIOUS COMORBIDITY AND BODY MASS INDEX (BMI) OF 34.0 TO 34.9 IN ADULT: ICD-10-CM

## 2025-01-31 DIAGNOSIS — E11.22 TYPE 2 DIABETES MELLITUS WITH STAGE 5 CHRONIC KIDNEY DISEASE NOT ON CHRONIC DIALYSIS, WITHOUT LONG-TERM CURRENT USE OF INSULIN: ICD-10-CM

## 2025-01-31 DIAGNOSIS — I50.30 HEART FAILURE WITH PRESERVED EJECTION FRACTION, UNSPECIFIED HF CHRONICITY: ICD-10-CM

## 2025-01-31 DIAGNOSIS — E66.811 CLASS 1 OBESITY DUE TO EXCESS CALORIES WITH SERIOUS COMORBIDITY AND BODY MASS INDEX (BMI) OF 34.0 TO 34.9 IN ADULT: ICD-10-CM

## 2025-01-31 DIAGNOSIS — N18.5 CKD (CHRONIC KIDNEY DISEASE), STAGE V: ICD-10-CM

## 2025-01-31 DIAGNOSIS — N18.5 TYPE 2 DIABETES MELLITUS WITH STAGE 5 CHRONIC KIDNEY DISEASE NOT ON CHRONIC DIALYSIS, WITHOUT LONG-TERM CURRENT USE OF INSULIN: ICD-10-CM

## 2025-01-31 PROBLEM — E66.01 CLASS 2 SEVERE OBESITY DUE TO EXCESS CALORIES WITH SERIOUS COMORBIDITY AND BODY MASS INDEX (BMI) OF 36.0 TO 36.9 IN ADULT: Status: RESOLVED | Noted: 2022-01-07 | Resolved: 2025-01-31

## 2025-01-31 PROBLEM — E66.812 CLASS 2 SEVERE OBESITY DUE TO EXCESS CALORIES WITH SERIOUS COMORBIDITY AND BODY MASS INDEX (BMI) OF 36.0 TO 36.9 IN ADULT: Status: RESOLVED | Noted: 2022-01-07 | Resolved: 2025-01-31

## 2025-01-31 PROBLEM — E11.3513 TYPE 2 DIABETES MELLITUS WITH BOTH EYES AFFECTED BY PROLIFERATIVE RETINOPATHY AND MACULAR EDEMA, WITH LONG-TERM CURRENT USE OF INSULIN: Status: RESOLVED | Noted: 2024-09-23 | Resolved: 2025-01-31

## 2025-01-31 PROBLEM — N18.4 CKD (CHRONIC KIDNEY DISEASE) STAGE 4, GFR 15-29 ML/MIN: Status: RESOLVED | Noted: 2022-01-07 | Resolved: 2025-01-31

## 2025-01-31 PROBLEM — Z79.4 TYPE 2 DIABETES MELLITUS WITH BOTH EYES AFFECTED BY PROLIFERATIVE RETINOPATHY AND MACULAR EDEMA, WITH LONG-TERM CURRENT USE OF INSULIN: Status: RESOLVED | Noted: 2024-09-23 | Resolved: 2025-01-31

## 2025-01-31 PROCEDURE — 3008F BODY MASS INDEX DOCD: CPT | Mod: HCNC,CPTII,S$GLB, | Performed by: INTERNAL MEDICINE

## 2025-01-31 PROCEDURE — 1160F RVW MEDS BY RX/DR IN RCRD: CPT | Mod: HCNC,CPTII,S$GLB, | Performed by: INTERNAL MEDICINE

## 2025-01-31 PROCEDURE — 99999 PR PBB SHADOW E&M-EST. PATIENT-LVL IV: CPT | Mod: PBBFAC,HCNC,, | Performed by: INTERNAL MEDICINE

## 2025-01-31 PROCEDURE — 3044F HG A1C LEVEL LT 7.0%: CPT | Mod: HCNC,CPTII,S$GLB, | Performed by: INTERNAL MEDICINE

## 2025-01-31 PROCEDURE — 3079F DIAST BP 80-89 MM HG: CPT | Mod: HCNC,CPTII,S$GLB, | Performed by: INTERNAL MEDICINE

## 2025-01-31 PROCEDURE — 3077F SYST BP >= 140 MM HG: CPT | Mod: HCNC,CPTII,S$GLB, | Performed by: INTERNAL MEDICINE

## 2025-01-31 PROCEDURE — 99215 OFFICE O/P EST HI 40 MIN: CPT | Mod: HCNC,S$GLB,, | Performed by: INTERNAL MEDICINE

## 2025-01-31 PROCEDURE — 1159F MED LIST DOCD IN RCRD: CPT | Mod: HCNC,CPTII,S$GLB, | Performed by: INTERNAL MEDICINE

## 2025-01-31 RX ORDER — TORSEMIDE 20 MG/1
40 TABLET ORAL 2 TIMES DAILY
Qty: 120 TABLET | Refills: 11 | Status: SHIPPED | OUTPATIENT
Start: 2025-01-31 | End: 2026-01-31

## 2025-01-31 RX ORDER — HYDRALAZINE HYDROCHLORIDE 100 MG/1
100 TABLET, FILM COATED ORAL EVERY 12 HOURS
Qty: 60 TABLET | Refills: 0 | Status: SHIPPED | OUTPATIENT
Start: 2025-01-31

## 2025-01-31 NOTE — PROGRESS NOTES
Health Maintenance Due   Topic     Abdominal Aortic Aneurysm Screening  CONSULT WITH PCP    Influenza Vaccine (1) PATIENT DECLINE     COVID-19 Vaccine (4 - 2024-25 season) PATIENT DECLINE     Lipid Panel

## 2025-01-31 NOTE — PROGRESS NOTES
Chief Complaint: Follow-up      Handy Adams  is a 69 y.o. year old patient who presents today for     History of Present Illness    CHIEF COMPLAINT:  Handy presents today for follow up of blood pressure management and recent initiation of dialysis.    DIALYSIS:  He started peritoneal dialysis this week on Monday, currently performing manual exchanges at home. He is scheduled to begin using an automatic cycler next week.    BLOOD PRESSURE:  He reports blood pressure at dialysis is around 140/88. He checks blood pressure in both standing and sitting positions prior to initiating dialysis treatment.    MEDICAL CONDITIONS:  His diabetes has been well controlled.    CURRENT MEDICATIONS:  He takes Torsemide 20mg twice daily, Labetalol 1.5 pills twice daily, and Trigenta for diabetes management.      ROS:  General: -fever, -chills, -fatigue, -weight gain, -weight loss  Eyes: -vision changes, -redness, -discharge  ENT: -ear pain, -nasal congestion, -sore throat  Cardiovascular: -chest pain, -palpitations, -lower extremity edema  Respiratory: -cough, -shortness of breath  Gastrointestinal: -abdominal pain, -nausea, -vomiting, -diarrhea, -constipation, -blood in stool  Genitourinary: -dysuria, -hematuria, -frequency  Musculoskeletal: -joint pain, -muscle pain  Skin: -rash, -lesion  Neurological: -headache, -dizziness, -numbness, -tingling  Psychiatric: -anxiety, -depression, -sleep difficulty         Past Medical History:   Diagnosis Date    Anemia     Disorder of kidney and ureter     Edema leg     History of rotator cuff tear     History of seasonal allergies     HTN (hypertension)     Hx of colonic polyps     Hyperlipemia     MGUS (monoclonal gammopathy of unknown significance)     NS (nuclear sclerosis), bilateral 06/25/2019    Obesity     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 11/17/2017    Type 2 diabetes mellitus        Past Surgical  History:   Procedure Laterality Date    CATARACT EXTRACTION W/  INTRAOCULAR LENS IMPLANT Right 10/30/2024    Procedure: EXTRACTION, CATARACT, WITH IOL INSERTION;  Surgeon: Dolores Cooper MD;  Location: [facility] OR;  Service: Ophthalmology;  Laterality: Right;    CATARACT EXTRACTION W/  INTRAOCULAR LENS IMPLANT Left 12/11/2024    Procedure: EXTRACTION, CATARACT, WITH IOL INSERTION;  Surgeon: Dolores Cooper MD;  Location: [facility] OR;  Service: Ophthalmology;  Laterality: Left;    COLONOSCOPY N/A 10/28/2022    Procedure: COLONOSCOPY;  Surgeon: Guanako Sanchez MD;  Location: [facility] ENDO;  Service: Endoscopy;  Laterality: N/A;  REFENDO placed per Dr Sanderson. case request entered     vaccinated-GT  instr portal    MOUTH SURGERY      RENAL BIOPSY Left 7/19/2023    Procedure: BIOPSY, KIDNEY;  Surgeon: Nikky Soriano MD;  Location: [facility] CATH LAB;  Service: Interventional Nephrology;  Laterality: Left;        Family History   Problem Relation Name Age of Onset    Cancer Mother          Lung Cancer    Cancer Father          Colon cancer    Diabetes Father      Hypertension Father      No Known Problems Sister      No Known Problems Brother      No Known Problems Maternal Aunt      No Known Problems Maternal Uncle      No Known Problems Paternal Aunt      No Known Problems Paternal Uncle      No Known Problems Maternal Grandmother      No Known Problems Maternal Grandfather      No Known Problems Paternal Grandmother      No Known Problems Paternal Grandfather      Amblyopia Neg Hx      Blindness Neg Hx      Cataracts Neg Hx      Glaucoma Neg Hx      Macular degeneration Neg Hx      Retinal detachment Neg Hx      Strabismus Neg Hx      Stroke Neg Hx      Thyroid disease Neg Hx          Social History     Socioeconomic History    Marital status: Significant Other     Spouse name: Daniella Adams    Number of children: 2    Highest education level: Master's degree (e.g., MA, MS, Sandra, MEd, MSW, CORTEZ)   Tobacco Use    Smoking  status: Former     Current packs/day: 0.50     Types: Cigarettes     Passive exposure: Past    Smokeless tobacco: Never   Substance and Sexual Activity    Alcohol use: Yes     Comment: rarely    Drug use: Not Currently    Sexual activity: Yes     Partners: Female   Social History Narrative    Caregiver S/O Daniella     Social Drivers of Health     Financial Resource Strain: Low Risk  (12/4/2024)    Received from [facility]    Overall Financial Resource Strain (CARDIA)     Difficulty of Paying Living Expenses: Not hard at all   Food Insecurity: No Food Insecurity (12/4/2024)    Received from [facility]    Hunger Vital Sign     Worried About Running Out of Food in the Last Year: Never true     Ran Out of Food in the Last Year: Never true   Transportation Needs: No Transportation Needs (12/4/2024)    Received from [facility]    PRAPARE - Transportation     Lack of Transportation (Medical): No     Lack of Transportation (Non-Medical): No   Physical Activity: Inactive (12/4/2024)    Received from [facility]    Exercise Vital Sign     Days of Exercise per Week: 0 days     Minutes of Exercise per Session: 0 min   Stress: No Stress Concern Present (12/4/2024)    Received from [facility]    [employer] - Occupational Stress Questionnaire     Feeling of Stress : Only a little   Housing Stability: Unknown (12/4/2024)    Received from [facility]    Housing Stability Vital Sign     Unable to Pay for Housing in the Last Year: No         Current Outpatient Medications:     atorvastatin (LIPITOR) 20 MG tablet, Take 1 tablet (20 mg total) by mouth once daily., Disp: 90 tablet, Rfl: 3    blood sugar diagnostic Strp, To test twice daily, Disp: 100 each, Rfl: 3    blood-glucose meter kit, Use as instructed, Disp: 1 each, Rfl: 0    calcitRIOL (ROCALTROL) 0.25 MCG Cap, Take 1 capsule (0.25 mcg total) by mouth once daily., Disp: 90 capsule, Rfl: 3    ergocalciferol (ERGOCALCIFEROL) 50,000 unit Cap, Take  1 capsule (50,000 Units total) by mouth every 7 days., Disp: 12 capsule, Rfl: 3    labetaloL (NORMODYNE) 200 MG tablet, Take 1 tablet (200 mg total) by mouth 2 (two) times daily., Disp: 180 tablet, Rfl: 3    lancets Misc, 1 lancet by Misc.(Non-Drug; Combo Route) route 2 (two) times daily with meals., Disp: 100 each, Rfl: 11    NIFEdipine (PROCARDIA-XL) 90 MG (OSM) 24 hr tablet, Take 1 tablet by mouth once daily, Disp: 90 tablet, Rfl: 0    sevelamer carbonate (RENVELA) 800 mg Tab, Take 1 tablet (800 mg total) by mouth 3 (three) times daily with meals., Disp: 90 tablet, Rfl: 11    sildenafiL (VIAGRA) 100 MG tablet, Take 1 tablet (100 mg total) by mouth daily as needed for Erectile Dysfunction., Disp: 10 tablet, Rfl: 11    sodium bicarbonate 650 MG tablet, Take 1 tablet (650 mg total) by mouth 2 (two) times daily., Disp: 180 tablet, Rfl: 3    tamsulosin (FLOMAX) 0.4 mg Cap, Take 1 capsule (0.4 mg total) by mouth every evening., Disp: 90 capsule, Rfl: 0    TRADJENTA 5 mg Tab tablet, Take 1 tablet by mouth once daily, Disp: 90 tablet, Rfl: 0    valsartan (DIOVAN) 320 MG tablet, Take 1 tablet (320 mg total) by mouth once daily., Disp: 90 tablet, Rfl: 3    hydrALAZINE (APRESOLINE) 100 MG tablet, Take 1 tablet (100 mg total) by mouth every 12 (twelve) hours., Disp: 60 tablet, Rfl: 0    torsemide 40 mg Tab, Take 40 mg by mouth 2 (two) times daily., Disp: 60 tablet, Rfl: 0  No current facility-administered medications for this visit.    Facility-Administered Medications Ordered in Other Visits:     darbepoetin jose l-polysorbate injection 86 mcg, 86 mcg, Subcutaneous, [facility]    heparin (porcine) injection 2,000 Units, 2,000 Units, Intraperitoneal, 1 time in Clinic/Yaneth CABEZAS Ayaa, MD    iron sucrose Soln 200 mg, 200 mg, Intravenous, 1 time in Clinic/[facility], Beatrice Wolfe MD           Objective:      Vitals:    01/31/25 1013   BP: (!) 140/84   Pulse: 66   Resp: 16   Temp: 98 °F (36.7 °C)       Physical  Exam  Constitutional:       Appearance: Normal appearance. He is obese.   HENT:      Head: Normocephalic and atraumatic.   Cardiovascular:      Rate and Rhythm: Normal rate.   Musculoskeletal:         General: Normal range of motion.   Skin:     General: Skin is warm and dry.   Neurological:      General: No focal deficit present.      Mental Status: He is alert and oriented to person, place, and time.          Assessment:       1. Essential hypertension    2. Leg swelling    3. Heart failure with preserved ejection fraction, unspecified HF chronicity    4. Class 1 obesity due to excess calories with serious comorbidity and body mass index (BMI) of 34.0 to 34.9 in adult    5. CKD (chronic kidney disease), stage V    6. Type 2 diabetes mellitus with stage 5 chronic kidney disease not on chronic dialysis, without long-term current use of insulin          Plan:   1. Essential hypertension  Assessment & Plan:  Chronic, uncontrolled. BP: (!) 140/84 (1/31/2025 10:13 AM)    - Planned to monitor blood pressure response to medication changes through digital medicine or dialysis readings.  - Instructed the patient to monitor and report blood pressure readings.  - Discussed importance of monitoring blood pressure during medication changes.  - Assessed that blood pressure is high during the visit.  - Noted current medications include labetalol 1.5 tablets twice daily, valsartan, and nifedipine.  - Started hydralazine 50mg twice daily for 2 weeks, replacing labetalol.  - Continued valsartan and nifedipine at current doses.  - Discontinued labetalol.  - Instructed the patient to follow up in 2 weeks via patient portal message to assess blood pressure response to hydralazine.    - Restart Valsartan at 320mg  - Nifedipine 90  - Clonidine 0.2mg prn for sbp >170    Orders:  -     hydrALAZINE (APRESOLINE) 100 MG tablet; Take 1 tablet (100 mg total) by mouth every 12 (twelve) hours.  Dispense: 60 tablet; Refill: 0    2. Leg  swelling  Assessment & Plan:  Chronic, unresolved, bilateral LE edema +3    - advised to use compression stockings   - to keep legs elevated when sitting   - try to stay active and avoid prolonged sitting if possible  - continue torsemide BID       Orders:  -     torsemide 40 mg Tab; Take 40 mg by mouth 2 (two) times daily.  Dispense: 60 tablet; Refill: 0    3. Heart failure with preserved ejection fraction, unspecified HF chronicity  Assessment & Plan:  Not present in march, G2DD in recent ECHO   No acute sx today   Back to dry weight (274lbs)   - Attempted to increase Torsemide dosage to 40mg BID, pending insurance approval.  - If not approved, instructed to continue 20mg twice daily (two tablets twice daily).  - Advised patient to contact the office if unable to obtain the new prescription in time, to receive 20mg tablets instead.  - Handy mentions having heart failure but states it's not serious.    Orders:  -     torsemide 40 mg Tab; Take 40 mg by mouth 2 (two) times daily.  Dispense: 60 tablet; Refill: 0    4. Class 1 obesity due to excess calories with serious comorbidity and body mass index (BMI) of 34.0 to 34.9 in adult  Assessment & Plan:  worsening  Wt Readings from Last 3 Encounters:   01/31/25 1013 120 kg (264 lb 8.8 oz)   01/15/25 1325 117 kg (257 lb 15 oz)   01/08/25 1237 117 kg (257 lb 15 oz)       - counseled of the importance of diet and exercise. Talked about calorie deficit and intermittent fasting. Cutting off carbs. Recommended exercise at least twice a week, with increased cardio and weight lifting.         5. CKD (chronic kidney disease), stage V  Assessment & Plan:  - Evaluated medication regimen in context of recent dialysis initiation.  - Handy started peritoneal dialysis at home this week on Monday, transitioning from manual exchanges to an automatic cycler next week.  - Will switch from labetalol to hydralazine for better blood pressure management in kidney patients.  - Monitored  patient's blood pressure during dialysis, which is around 140/88 before fluid removal.  - Prescribed Torsemide 40mg twice daily for fluid management.  - Reviewed patient's kidney function and medication dosing to ensure appropriate treatment.      6. Type 2 diabetes mellitus with stage 5 chronic kidney disease not on chronic dialysis, without long-term current use of insulin  Assessment & Plan:  Chronic, controlled   Stopped Pioglitazone due to heart failure   Lab Results   Component Value Date    HGBA1C 5.3 01/27/2025     - Assessed diabetes control, which patient reports has been stable for some time.  - Confirmed no adjustment necessary for Trajenta dosage based on current guidelines, including considerations for renal impairment and hemodialysis.  - Continued Trajenta at current dose.      Total 40 mins spent on patient with more than 50% spent counseling    FOLLOW UP:  - Scheduled follow-up visit in 3 months.  - Observed that the patient has been measuring blood pressure at home but has not been consistently submitting readings through digital medicine.    OTHER INSTRUCTIONS:  - Inquired about the patient's breathing status.         Follow up in about 3 months (around 4/30/2025) for f/up.    This note was generated with the assistance of ambient listening technology. Verbal consent was obtained by the patient and accompanying visitor(s) for the recording of patient appointment to facilitate this note. I attest to having reviewed and edited the generated note for accuracy, though some syntax or spelling errors may persist. Please contact the author of this note for any clarification.

## 2025-01-31 NOTE — TELEPHONE ENCOUNTER
----- Message from Ba sent at 1/31/2025 11:23 AM CST -----  Regarding: Manuela Mathews   Type: Pharmacy Calling to Clarify an RX    Name of Caller: Manuela    Pharmacy Name: [facility]     Prescription Name: torsemide 40 mg Tab     What do they need to clarify?  What is the correct dosage tablets dont come in 40 mg . They only come in a 20mg or 100mg    Can you be contacted via [facility]?call back     Best Call Back Number: .      [facility] [address] [place] [address] - [address] [place] [address]  Phone: [phone] Fax: [phone]            Additional Information:

## 2025-01-31 NOTE — ASSESSMENT & PLAN NOTE
Chronic, controlled   Stopped Pioglitazone due to heart failure   Lab Results   Component Value Date    HGBA1C 5.3 01/27/2025     - Assessed diabetes control, which patient reports has been stable for some time.  - Confirmed no adjustment necessary for Trajenta dosage based on current guidelines, including considerations for renal impairment and hemodialysis.  - Continued Trajenta at current dose.

## 2025-01-31 NOTE — ASSESSMENT & PLAN NOTE
worsening  Wt Readings from Last 3 Encounters:   01/31/25 1013 120 kg (264 lb 8.8 oz)   01/15/25 1325 117 kg (257 lb 15 oz)   01/08/25 1237 117 kg (257 lb 15 oz)       - counseled of the importance of diet and exercise. Talked about calorie deficit and intermittent fasting. Cutting off carbs. Recommended exercise at least twice a week, with increased cardio and weight lifting.

## 2025-01-31 NOTE — ASSESSMENT & PLAN NOTE
Not present in march, G2DD in recent ECHO   No acute sx today   Back to dry weight (274lbs)   - Attempted to increase Torsemide dosage to 40mg BID, pending insurance approval.  - If not approved, instructed to continue 20mg twice daily (two tablets twice daily).  - Advised patient to contact the office if unable to obtain the new prescription in time, to receive 20mg tablets instead.  - Handy mentions having heart failure but states it's not serious.

## 2025-01-31 NOTE — ASSESSMENT & PLAN NOTE
Chronic, uncontrolled. BP: (!) 140/84 (1/31/2025 10:13 AM)    - Planned to monitor blood pressure response to medication changes through digital medicine or dialysis readings.  - Instructed the patient to monitor and report blood pressure readings.  - Discussed importance of monitoring blood pressure during medication changes.  - Assessed that blood pressure is high during the visit.  - Noted current medications include labetalol 1.5 tablets twice daily, valsartan, and nifedipine.  - Started hydralazine 50mg twice daily for 2 weeks, replacing labetalol.  - Continued valsartan and nifedipine at current doses.  - Discontinued labetalol.  - Instructed the patient to follow up in 2 weeks via patient portal message to assess blood pressure response to hydralazine.    - Restart Valsartan at 320mg  - Nifedipine 90  - Clonidine 0.2mg prn for sbp >170

## 2025-01-31 NOTE — TELEPHONE ENCOUNTER
Sergio/[facility] requesting correct dosage for medication Torsemide.  Stated the medication does not come in 40 mg, it comes as 20 mg or 100 mg.  Please advise.

## 2025-01-31 NOTE — ASSESSMENT & PLAN NOTE
- Evaluated medication regimen in context of recent dialysis initiation.  - Handy started peritoneal dialysis at home this week on Monday, transitioning from manual exchanges to an automatic cycler next week.  - Will switch from labetalol to hydralazine for better blood pressure management in kidney patients.  - Monitored patient's blood pressure during dialysis, which is around 140/88 before fluid removal.  - Prescribed Torsemide 40mg twice daily for fluid management.  - Reviewed patient's kidney function and medication dosing to ensure appropriate treatment.

## 2025-01-31 NOTE — ASSESSMENT & PLAN NOTE
Chronic, uncontrolled. BP: (!) 140/84 (1/31/2025 10:13 AM)    - Planned to monitor blood pressure response to medication changes through digital medicine or dialysis readings.  - Instructed the patient to monitor and report blood pressure readings.  - Discussed importance of monitoring blood pressure during medication changes.  - Assessed that blood pressure is high during the visit.  - Noted current medications include labetalol 1.5 tablets twice daily, valsartan, and nifedipine.  - Started hydralazine 50mg twice daily for 2 weeks, replacing labetalol.  - Continued valsartan and nifedipine at current doses.  - Discontinued labetalol.  - Instructed the patient to follow up in 2 weeks via patient portal message to assess blood pressure response to hydralazine.

## 2025-02-12 ENCOUNTER — OFFICE VISIT (OUTPATIENT)
Dept: OPTOMETRY | Facility: CLINIC | Age: 70
End: 2025-02-12
Payer: MEDICARE

## 2025-02-12 DIAGNOSIS — E11.311 DIABETIC MACULAR EDEMA: ICD-10-CM

## 2025-02-12 DIAGNOSIS — Z98.42 STATUS POST CATARACT EXTRACTION OF BOTH EYES WITH INSERTION OF INTRAOCULAR LENS: Primary | ICD-10-CM

## 2025-02-12 DIAGNOSIS — H20.042 SECONDARY IRIDOCYCLITIS OF LEFT EYE: ICD-10-CM

## 2025-02-12 DIAGNOSIS — Z96.1 STATUS POST CATARACT EXTRACTION OF BOTH EYES WITH INSERTION OF INTRAOCULAR LENS: Primary | ICD-10-CM

## 2025-02-12 DIAGNOSIS — Z98.41 STATUS POST CATARACT EXTRACTION OF BOTH EYES WITH INSERTION OF INTRAOCULAR LENS: Primary | ICD-10-CM

## 2025-02-12 PROCEDURE — 1159F MED LIST DOCD IN RCRD: CPT | Mod: CPTII,S$GLB,TXP, | Performed by: OPTOMETRIST

## 2025-02-12 PROCEDURE — 99999 PR PBB SHADOW E&M-EST. PATIENT-LVL III: CPT | Mod: PBBFAC,TXP,, | Performed by: OPTOMETRIST

## 2025-02-12 PROCEDURE — 1101F PT FALLS ASSESS-DOCD LE1/YR: CPT | Mod: CPTII,S$GLB,TXP, | Performed by: OPTOMETRIST

## 2025-02-12 PROCEDURE — 1126F AMNT PAIN NOTED NONE PRSNT: CPT | Mod: CPTII,S$GLB,TXP, | Performed by: OPTOMETRIST

## 2025-02-12 PROCEDURE — 3288F FALL RISK ASSESSMENT DOCD: CPT | Mod: CPTII,S$GLB,TXP, | Performed by: OPTOMETRIST

## 2025-02-12 PROCEDURE — 99024 POSTOP FOLLOW-UP VISIT: CPT | Mod: S$GLB,TXP,, | Performed by: OPTOMETRIST

## 2025-02-12 PROCEDURE — 3044F HG A1C LEVEL LT 7.0%: CPT | Mod: CPTII,S$GLB,TXP, | Performed by: OPTOMETRIST

## 2025-02-12 RX ORDER — GENTAMICIN SULFATE 3 MG/ML
SOLUTION/ DROPS OPHTHALMIC
COMMUNITY
Start: 2025-01-09

## 2025-02-12 RX ORDER — CLINDAMYCIN HYDROCHLORIDE 300 MG/1
CAPSULE ORAL
COMMUNITY
Start: 2025-02-06

## 2025-02-12 NOTE — PROGRESS NOTES
HPI    Pt is here today for post op exam. Denies pain/discomfort. States that his   vision is foggy and that his eye sight has dramatically decreased since   cataract sx. Also states that he is not able to focus as well as he   previously was able to. States vision became foggy especially in the left   eye about 3 weeks ago. Pt finished cataract surgery drops on the left eye   mid January (about 1 month ago). Pt states that left eye is also red.  DLS: 12/31/2024 Dr. Mcmanus   (-)Flashes   (-)Floaters   (-)Diplopia   (-)Headaches   (+)Itching   (-)Tearing  (-)Burning  (+)Dryness   (+)Photophobia  (+)Glare   (+)Blurred VA  Past Eye Sx: Cataract with IOL OU   Eye Meds: (-)   Last edited by Meron Yu, OD on 2/12/2025  1:49 PM.            Assessment /Plan     For exam results, see Encounter Report.    Status post cataract extraction of both eyes with insertion of intraocular lens    Secondary iridocyclitis of left eye    Diabetic macular edema      1. Educated on today's findings. Eye well healed from cataract surgery OD. Pt OK to use OTC readers +2.00 for the time being.    2. Educated pt on today's findings consistent with pt's symptoms--> VA down to 20/40 from 20/25 + A/C rxn OS. Pt to initiate slow PF taper: tid OS x 1 week, then bid OS x 1 week, then qd OS until pt sees Dr. Mcmanus for follow up with CME check    3. Reached out to Dr. Mcmanus's staff to push up pt's next appt to mid-March from April 1st, 2025    RTC x 1 month for follow up with Dr. Mcmanus or sooner if symptoms persist/worsen  RTC for refraction with me once released to do so by Dr. Mcmanus

## 2025-02-17 ENCOUNTER — PATIENT MESSAGE (OUTPATIENT)
Dept: FAMILY MEDICINE | Facility: CLINIC | Age: 70
End: 2025-02-17
Payer: MEDICARE

## 2025-02-24 DIAGNOSIS — Z76.82 ORGAN TRANSPLANT CANDIDATE: ICD-10-CM

## 2025-02-24 DIAGNOSIS — I10 HYPERTENSION, UNSPECIFIED TYPE: ICD-10-CM

## 2025-02-24 DIAGNOSIS — N18.6 END STAGE RENAL DISEASE: Primary | ICD-10-CM

## 2025-02-24 RX ORDER — NIFEDIPINE 90 MG/1
90 TABLET, EXTENDED RELEASE ORAL
Qty: 90 TABLET | Refills: 0 | Status: SHIPPED | OUTPATIENT
Start: 2025-02-24

## 2025-02-24 NOTE — PROGRESS NOTES
YEARLY LIST MANAGEMENT NOTE    Handy Bryan's kidney transplant listing status reviewed.  Patient is due for follow-up appointments in May 2025..  Appointments will be scheduled per protocol.  HLA sample is up to date and being received on a regular basis.

## 2025-02-25 ENCOUNTER — CLINICAL SUPPORT (OUTPATIENT)
Dept: OPHTHALMOLOGY | Facility: CLINIC | Age: 70
End: 2025-02-25
Payer: MEDICARE

## 2025-02-25 ENCOUNTER — OFFICE VISIT (OUTPATIENT)
Dept: OPHTHALMOLOGY | Facility: CLINIC | Age: 70
End: 2025-02-25
Payer: MEDICARE

## 2025-02-25 DIAGNOSIS — E11.3513 TYPE 2 DIABETES MELLITUS WITH BOTH EYES AFFECTED BY PROLIFERATIVE RETINOPATHY AND MACULAR EDEMA, WITH LONG-TERM CURRENT USE OF INSULIN: Primary | ICD-10-CM

## 2025-02-25 DIAGNOSIS — Z79.4 TYPE 2 DIABETES MELLITUS WITH BOTH EYES AFFECTED BY PROLIFERATIVE RETINOPATHY AND MACULAR EDEMA, WITH LONG-TERM CURRENT USE OF INSULIN: Primary | ICD-10-CM

## 2025-02-25 DIAGNOSIS — H35.033 HYPERTENSIVE RETINOPATHY, BILATERAL: ICD-10-CM

## 2025-02-25 PROCEDURE — 1101F PT FALLS ASSESS-DOCD LE1/YR: CPT | Mod: HCNC,CPTII,S$GLB, | Performed by: OPHTHALMOLOGY

## 2025-02-25 PROCEDURE — 99999 PR PBB SHADOW E&M-EST. PATIENT-LVL III: CPT | Mod: PBBFAC,HCNC,, | Performed by: OPHTHALMOLOGY

## 2025-02-25 PROCEDURE — 67028 INJECTION EYE DRUG: CPT | Mod: 79,HCNC,LT,S$GLB | Performed by: OPHTHALMOLOGY

## 2025-02-25 PROCEDURE — 1160F RVW MEDS BY RX/DR IN RCRD: CPT | Mod: HCNC,CPTII,S$GLB, | Performed by: OPHTHALMOLOGY

## 2025-02-25 PROCEDURE — 3288F FALL RISK ASSESSMENT DOCD: CPT | Mod: HCNC,CPTII,S$GLB, | Performed by: OPHTHALMOLOGY

## 2025-02-25 PROCEDURE — 92134 CPTRZ OPH DX IMG PST SGM RTA: CPT | Mod: HCNC,S$GLB,, | Performed by: OPHTHALMOLOGY

## 2025-02-25 PROCEDURE — 3044F HG A1C LEVEL LT 7.0%: CPT | Mod: HCNC,CPTII,S$GLB, | Performed by: OPHTHALMOLOGY

## 2025-02-25 PROCEDURE — 92014 COMPRE OPH EXAM EST PT 1/>: CPT | Mod: 24,25,HCNC,S$GLB | Performed by: OPHTHALMOLOGY

## 2025-02-25 PROCEDURE — 1159F MED LIST DOCD IN RCRD: CPT | Mod: HCNC,CPTII,S$GLB, | Performed by: OPHTHALMOLOGY

## 2025-02-25 RX ADMIN — Medication 1.25 MG: at 11:02

## 2025-02-25 NOTE — PROGRESS NOTES
HPI     dm     oct     dfe         me      Additional comments: Dm   Last bs    160  Last A1c    5.2      Blurry and fuzzy va ou     Gtts   red top   ? Atropine  ?  Tid os   Pt c/o decreased va in OD     DLS 12/31/24          Last edited by Felicita Ryan on 2/25/2025  9:52 AM.          OCT  DME OU  OD improved  OS increased complex DME    Prior WFFA  OD - normal transit time. Hyperfluorescence early c/w Ma/edema - sig NP    OS - Hyperfluorescence early c/w Ma/edema  .NV nasal      A/P    1. 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 DME OU   6/19 - pt did not FU until 8/20  S/p Avastin OD x 2  S/p Eylea OD x 2  S/p Ozurdex OD x 4, OS x 2  5/24  S/p Iluvien OU 5/24 11/22 - not seen x 2 years  3/23 - had second opinion with Dr. Suh.  Pt would like to try a few Eylea prior to steroids if needed  11/23 - increased DME oU  7/24 trace increase   9/24 - OD improving, OS stable  12/24 - some increase in DME OU post CE, but patient happy with Va.  OD is worse potential given more chronic DME    2/25 - OD improving, OS increase complex DME    Avastin OS today    Get reauth for Ozurdex if needed      3. HTN Ret OU  BS/BP/chol control    4.PCIOL OU  With Dr. Cooper      5. Cyst RLL  Removed by Rhea  Happy with result    6. ESRD on PD        1 month OCT no dilate and Avastin OS  - LDFE 2/25      Risks, benefits, and alternatives to treatment discussed in detail with the patient.  The patient voiced understanding and wished to proceed with the procedure    Injection Procedure Note:  Diagnosis: DME OS    Patient Identified and Time Out complete  Pt marked  Topical Proparacaine and Betadine.  Inject Avastin OS at 6:00 @ 3.5-4mm posterior to limbus  Post Operative Dx: Same  Complications: None  Follow up as above.

## 2025-03-03 ENCOUNTER — TELEPHONE (OUTPATIENT)
Dept: TRANSPLANT | Facility: CLINIC | Age: 70
End: 2025-03-03
Payer: MEDICARE

## 2025-03-03 NOTE — LETTER
March 3, 2025    Handy Adams  [address] [place] [address]        Dear Handy Adams:  MRN: [id number]  : 1955    You are scheduled on 2025 for follow up in the transplant clinic to update your records and evaluate your health status as you wait for a transplant.  It is very important that we ensure you are ready for your transplant.      Please make arrangements to be in our transplant clinic from 12:30 pm- 4 pm.  During this time you will watch an educational video and then be seen by our transplant , financial counselor, and advance practice provider.     If you have had a change in your medical history since your last visit, please call your transplant coordinator to ensure we have the medical records to review prior to your appointment.     Please bring the following with you to this appointment:  A Caregiver is REQUIRED  Bring ALL insurance information including medication card  Current list of medications  Copies of any outside medical records from the past year, such as: mammogram, pap smear, ultrasound, CAT scan, colonoscopy, cardiac angiogram or cardiac stress test    To reschedule your appointments please call [phone] or [phone] ([phone]. [phone]). Please ask for the .      Failure to cancel in advance or arrive on time could result in a $50 cancellation fee.  Your transplant candidacy could be affected by no showing these appointments.  We look forward to seeing you.    Sincerely,    [facility] [place]  [facility] [place] [address]  [phone]

## 2025-03-25 ENCOUNTER — CLINICAL SUPPORT (OUTPATIENT)
Dept: OPHTHALMOLOGY | Facility: CLINIC | Age: 70
End: 2025-03-25
Payer: MEDICARE

## 2025-03-25 ENCOUNTER — PROCEDURE VISIT (OUTPATIENT)
Dept: OPHTHALMOLOGY | Facility: CLINIC | Age: 70
End: 2025-03-25
Payer: MEDICARE

## 2025-03-25 DIAGNOSIS — E11.3513 TYPE 2 DIABETES MELLITUS WITH BOTH EYES AFFECTED BY PROLIFERATIVE RETINOPATHY AND MACULAR EDEMA, WITH LONG-TERM CURRENT USE OF INSULIN: ICD-10-CM

## 2025-03-25 DIAGNOSIS — H35.033 HYPERTENSIVE RETINOPATHY, BILATERAL: ICD-10-CM

## 2025-03-25 DIAGNOSIS — E11.3513 TYPE 2 DIABETES MELLITUS WITH BOTH EYES AFFECTED BY PROLIFERATIVE RETINOPATHY AND MACULAR EDEMA, WITH LONG-TERM CURRENT USE OF INSULIN: Primary | ICD-10-CM

## 2025-03-25 DIAGNOSIS — Z79.4 TYPE 2 DIABETES MELLITUS WITH BOTH EYES AFFECTED BY PROLIFERATIVE RETINOPATHY AND MACULAR EDEMA, WITH LONG-TERM CURRENT USE OF INSULIN: ICD-10-CM

## 2025-03-25 DIAGNOSIS — H43.813 VITREOUS DEGENERATION OF BOTH EYES: ICD-10-CM

## 2025-03-25 DIAGNOSIS — Z79.4 TYPE 2 DIABETES MELLITUS WITH BOTH EYES AFFECTED BY PROLIFERATIVE RETINOPATHY AND MACULAR EDEMA, WITH LONG-TERM CURRENT USE OF INSULIN: Primary | ICD-10-CM

## 2025-03-25 PROCEDURE — 67028 INJECTION EYE DRUG: CPT | Mod: HCNC,LT,S$GLB, | Performed by: OPHTHALMOLOGY

## 2025-03-25 PROCEDURE — 92134 CPTRZ OPH DX IMG PST SGM RTA: CPT | Mod: HCNC,S$GLB,, | Performed by: OPHTHALMOLOGY

## 2025-03-25 PROCEDURE — 92012 INTRM OPH EXAM EST PATIENT: CPT | Mod: 25,HCNC,S$GLB, | Performed by: OPHTHALMOLOGY

## 2025-03-25 RX ADMIN — Medication 1.25 MG: at 09:03

## 2025-03-25 NOTE — PROGRESS NOTES
HPI     Follow-up     Additional comments: 1 mo Avastin OS           Comments    C/o blurry VA OS, no pain ou and denies floaters or flashes.          Last edited by Lorraine Warren on 3/25/2025  9:17 AM.            OCT  DME OU  OD improved  OS  complex DME    Prior WFFA  OD - normal transit time. Hyperfluorescence early c/w Ma/edema - sig NP    OS - Hyperfluorescence early c/w Ma/edema  .NV nasal      A/P    1. 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 DME OU   6/19 - pt did not FU until 8/20  S/p Avastin OD x 2 , OS x 1  S/p Eylea OD x 2  S/p Ozurdex OD x 4, OS x 2  5/24  S/p Iluvien OU 5/24 11/22 - not seen x 2 years  3/23 - had second opinion with Dr. Suh.  Pt would like to try a few Eylea prior to steroids if needed  11/23 - increased DME oU  7/24 trace increase   9/24 - OD improving, OS stable  12/24 - some increase in DME OU post CE, but patient happy with Va.  OD is worse potential given more chronic DME    2/25 - OD improving, OS increase complex DME    Avastin OS today    Get reauth for Ozurdex if needed      3. HTN Ret OU  BS/BP/chol control    4.PCIOL OU  With Dr. Cooper      5. Cyst RLL  Removed by Rhea  Happy with result    6. ESRD on PD        1 month OCT no dilate and Avastin OS  - LDFE 2/25      Risks, benefits, and alternatives to treatment discussed in detail with the patient.  The patient voiced understanding and wished to proceed with the procedure    Injection Procedure Note:  Diagnosis: DME OS    Patient Identified and Time Out complete  Pt marked  Topical Proparacaine and Betadine.  Inject Avastin OS at 6:00 @ 3.5-4mm posterior to limbus  Post Operative Dx: Same  Complications: None  Follow up as above.

## 2025-04-23 ENCOUNTER — PATIENT OUTREACH (OUTPATIENT)
Dept: ADMINISTRATIVE | Facility: HOSPITAL | Age: 70
End: 2025-04-23
Payer: MEDICARE

## 2025-04-23 DIAGNOSIS — E78.5 HYPERLIPIDEMIA, UNSPECIFIED HYPERLIPIDEMIA TYPE: Primary | ICD-10-CM

## 2025-04-23 NOTE — PROGRESS NOTES
"Subjective:      Patient ID: Handy Adams is a 69 y.o. male.    Chief Complaint: MGUS follow-up      HPI: Mr. Adams returns to clinic for yearly follow-up of MGUS. He was previously evaluated by Dr. 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 He was noted to have gradually worsening anemia and kidney function, which prompted SPEP evaluation. He has normal light chain ratio and normal calcium levels, but we did discuss the spectrum of MGUS/SMM/MM and that his anemia and kidney decline in setting of paraprotein warrants bone marrow biopsy. Patient is hesitant to proceed with bone marrow biopsy and would like to repeat labs and discuss this again in coming weeks. He is seeing nephrology after our visit to discuss kidney biopsy.     Interval History:  4/24/2024  Patient presents for follow-up for his MGUS. He reports doing well since our last visit with his only complaint being a mild hemorrhoid flare. He underwent kidney biopsy on 7/19/23 with path showing "arterionephrosclerosis and diabetic nephropathy." He was updated regarding his stable MGUS at this visit and plan for continued monitoring and was agreeable and understanding as well as the need for control of his DM & HTN to preserve remaining kidney function and was agreeable and understanding.     Interval History:   4/29/2025  Today he endorses feeling well overall. No CP, SOB, f/c/ns, abdominal pain, weight loss, or appetite change. No bone pain.     He does nightly PD. Also receives Epo injections every 2 weeks at kidney center.     I have reviewed all of the patient's relevant lab work available in the medical record and have utilized this in my evaluation and management recommendations today.    Social History[1]    Family History   Problem Relation Name Age of Onset    Cancer Mother          Lung Cancer    Cancer Father  "         Colon cancer    Diabetes Father      Hypertension Father      No Known Problems Sister      No Known Problems Brother      No Known Problems Maternal Aunt      No Known Problems Maternal Uncle      No Known Problems Paternal Aunt      No Known Problems Paternal Uncle      No Known Problems Maternal Grandmother      No Known Problems Maternal Grandfather      No Known Problems Paternal Grandmother      No Known Problems Paternal Grandfather      Amblyopia Neg Hx      Blindness Neg Hx      Cataracts Neg Hx      Glaucoma Neg Hx      Macular degeneration Neg Hx      Retinal detachment Neg Hx      Strabismus Neg Hx      Stroke Neg Hx      Thyroid disease Neg Hx         Past Surgical History:   Procedure Laterality Date    CATARACT EXTRACTION W/  INTRAOCULAR LENS IMPLANT Right 10/30/2024    Procedure: EXTRACTION, CATARACT, WITH IOL INSERTION;  Surgeon: Dolores Cooper MD;  Location: [facility] OR;  Service: Ophthalmology;  Laterality: Right;    CATARACT EXTRACTION W/  INTRAOCULAR LENS IMPLANT Left 12/11/2024    Procedure: EXTRACTION, CATARACT, WITH IOL INSERTION;  Surgeon: Dolores Cooper MD;  Location: [facility] OR;  Service: Ophthalmology;  Laterality: Left;    COLONOSCOPY N/A 10/28/2022    Procedure: COLONOSCOPY;  Surgeon: Guanako Sanchez MD;  Location: [facility] ENDO;  Service: Endoscopy;  Laterality: N/A;  REFENDO placed per Dr Sanderson. case request entered     vaccinated-GT  instr portal    MOUTH SURGERY      RENAL BIOPSY Left 7/19/2023    Procedure: BIOPSY, KIDNEY;  Surgeon: Nikky Soriano MD;  Location: [facility] CATH LAB;  Service: Interventional Nephrology;  Laterality: Left;       Past Medical History:   Diagnosis Date    Anemia     Disorder of kidney and ureter     Edema leg     History of rotator cuff tear     History of seasonal allergies     HTN (hypertension)     Hx of colonic polyps     Hyperlipemia     MGUS (monoclonal gammopathy of unknown significance)     NS (nuclear  sclerosis), bilateral 06/25/2019    Obesity     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 11/17/2017    Type 2 diabetes mellitus        Review of Systems   Constitutional:  Negative for appetite change, chills, diaphoresis, fatigue, fever and unexpected weight change.   HENT:  Negative for nosebleeds.    Eyes:  Negative for visual disturbance.   Respiratory:  Negative for shortness of breath.    Cardiovascular:  Negative for chest pain, palpitations and leg swelling.   Gastrointestinal:  Negative for abdominal pain, anal bleeding, blood in stool, constipation and diarrhea.   Genitourinary:  Negative for dysuria and hematuria.   Musculoskeletal:  Negative for arthralgias and myalgias.   Skin:  Negative for pallor and rash.   Neurological:  Negative for headaches.   Hematological:  Negative for adenopathy. Does not bruise/bleed easily.          Medication List with Changes/Refills   Current Medications    ATORVASTATIN (LIPITOR) 20 MG TABLET    Take 1 tablet (20 mg total) by mouth once daily.    BLOOD SUGAR DIAGNOSTIC STRP    To test twice daily    BLOOD-GLUCOSE METER KIT    Use as instructed    CALCITRIOL (ROCALTROL) 0.25 MCG CAP    Take 1 capsule (0.25 mcg total) by mouth once daily.    CARVEDILOL (COREG) 12.5 MG TABLET    Take 1 tablet (12.5 mg total) by mouth 2 (two) times daily with meals.    CETIRIZINE (ZYRTEC) 10 MG TABLET    Take 1 tablet (10 mg total) by mouth once daily.    ERGOCALCIFEROL (ERGOCALCIFEROL) 50,000 UNIT CAP    Take 1 capsule (50,000 Units total) by mouth every 7 days.    FLUTICASONE PROPIONATE (FLONASE) 50 MCG/ACTUATION NASAL SPRAY    1 spray (50 mcg total) by Each Nostril route once daily.    HYDRALAZINE (APRESOLINE) 100 MG TABLET    Take 1 tablet (100 mg total) by mouth every 12 (twelve) hours.    LANCETS MISC    1 lancet by Misc.(Non-Drug; Combo Route) route 2 (two) times daily with meals.    LORATADINE (CLARITIN  REDITABS) 10 MG DISSOLVABLE TABLET    Take 10 mg by mouth once daily.    NIFEDIPINE (PROCARDIA-XL) 90 MG (OSM) 24 HR TABLET    Take 1 tablet by mouth once daily    OXYCODONE-ACETAMINOPHEN (PERCOCET) 5-325 MG PER TABLET    Take 1 tablet by mouth every 4 (four) hours as needed.    SEVELAMER CARBONATE (RENVELA) 800 MG TAB    Take 2 tablets by mouth 3 (three) times daily with meals. Take 2 tabs w/ meals & 1 tab w/ snacks    SODIUM BICARBONATE 650 MG TABLET    Take 1 tablet (650 mg total) by mouth 2 (two) times daily.    TAMSULOSIN (FLOMAX) 0.4 MG CAP    Take 1 capsule (0.4 mg total) by mouth every evening.    TORSEMIDE (DEMADEX) 20 MG TAB    Take 2 tablets (40 mg total) by mouth 2 (two) times a day.    TRADJENTA 5 MG TAB TABLET    Take 1 tablet by mouth once daily    VALSARTAN (DIOVAN) 320 MG TABLET    Take 1 tablet (320 mg total) by mouth once daily.        Objective:     Vitals:    04/29/25 1024   BP: (!) 154/74   Pulse: 69   Resp: 17   Temp: 97.6 °F (36.4 °C)       Physical Exam  Constitutional:       Appearance: He is obese.   HENT:      Head: Normocephalic.      Right Ear: External ear normal.      Left Ear: External ear normal.      Nose: Nose normal.   Cardiovascular:      Rate and Rhythm: Normal rate and regular rhythm.      Heart sounds: Normal heart sounds.   Pulmonary:      Effort: Pulmonary effort is normal.      Breath sounds: Normal breath sounds.   Musculoskeletal:         General: Normal range of motion.      Cervical back: Normal range of motion.   Skin:     General: Skin is warm and dry.      Coloration: Skin is not pale.      Findings: No bruising.   Neurological:      Mental Status: He is alert and oriented to person, place, and time.   Psychiatric:         Mood and Affect: Mood normal.         Behavior: Behavior normal.         Thought Content: Thought content normal.         Judgment: Judgment normal.       Assessment:     Problem List Items Addressed This Visit          Cardiac/Vascular    Benign  hypertension with CKD (chronic kidney disease) stage IV       Renal/    CKD (chronic kidney disease) stage 5, GFR less than 15 ml/min    Relevant Orders    CBC w/ DIFF    CMP       Oncology    MGUS (monoclonal gammopathy of unknown significance) - Primary    Relevant Orders    CBC w/ DIFF    CMP    SPEP - Protein electrophoresis, serum    IMMUNOGLOBULIN FREE LT CHAINS BLOOD    IMMUNOGLOBULINS (IGG, IGA, IGM) QUANTITATIVE    JANA    Anemia due to chronic kidney disease, on chronic dialysis    Relevant Orders    CBC w/ DIFF    CMP       Lab Results   Component Value Date    WBC 6.89 04/29/2025    RBC 3.97 (L) 04/29/2025    HGB 11.1 (L) 04/29/2025    HCT 35.4 (L) 04/29/2025    MCV 89 04/29/2025    MCH 28.0 04/29/2025    MCHC 31.4 (L) 04/29/2025    RDW 17.1 (H) 04/29/2025     04/29/2025    MPV 10.9 04/29/2025    GRAN 8.4 (H) 12/28/2024    GRAN 82.1 (H) 12/28/2024    LYMPH 13.9 (L) 04/29/2025    LYMPH 0.96 (L) 04/29/2025    MONO 9.0 04/29/2025    MONO 0.62 04/29/2025    EOS 2.3 04/29/2025    EOS 0.16 04/29/2025    BASO 0.02 12/28/2024    EOSINOPHIL 2.7 04/03/2025    BASOPHIL 0.6 04/29/2025    BASOPHIL 0.04 04/29/2025      Lab Results   Component Value Date     04/29/2025    K 4.1 04/29/2025     04/29/2025    CO2 25 04/29/2025    BUN 49 (H) 04/29/2025    CREATININE 7.0 (H) 04/29/2025    CALCIUM 8.0 (L) 04/29/2025    ANIONGAP 10 04/29/2025    ESTGFRAFRICA 51.1 (A) 01/03/2022    EGFRNONAA 44.2 (A) 01/03/2022     Lab Results   Component Value Date    ALT 14 04/29/2025    AST 13 04/29/2025    ALKPHOS 52 04/29/2025    BILITOT 0.4 04/29/2025     Lab Results   Component Value Date    IRON 88 04/03/2025    TRANSFERRIN 194 (L) 01/14/2025    TIBC 236 04/03/2025    FESATURATED 18 (L) 01/14/2025    FERRITIN 259 04/03/2025        Plan:   MGUS (monoclonal gammopathy of unknown significance)  IgG Lamda MGUS  Bone marrow biopsy in Dec  2022  significant for 5% plasma cell neoplasm consistent with MGUS    [place] red  "negative  No hypermetabolic lesions on PET CT on 4/16/24  He underwent kidney biopsy on 7/19/23 with path showing "arterionephrosclerosis and diabetic nephropathy  Risk of developing myeloma  in patients with with IgG lambda MGUS is 1-2% per year  This risk is variably increased with immunosupression that will be required post kidney transplant, which he is considering  SPEP and FLC ratio today pending at this time  Will message once all labs have resulted    -     CBC w/ DIFF; Future; Expected date: 04/29/2025  -     CMP; Future; Expected date: 04/29/2025  -     SPEP - Protein electrophoresis, serum; Future; Expected date: 04/29/2025  -     IMMUNOGLOBULIN FREE LT CHAINS BLOOD; Future; Expected date: 04/29/2025  -     IMMUNOGLOBULINS (IGG, IGA, IGM) QUANTITATIVE; Future; Expected date: 04/29/2025  -     JANA; Future; Expected date: 04/29/2025    CKD (chronic kidney disease) stage 5, GFR less than 15 ml/min  PD nightly  Follows with transplant    -     CBC w/ DIFF; Future; Expected date: 04/29/2025  -     CMP; Future; Expected date: 04/29/2025    Benign hypertension with CKD (chronic kidney disease) stage IV  Follows with PCP    Anemia due to chronic kidney disease, on chronic dialysis  Hgb stable 11.1  Continue Epo injections every 2 weeks    -     CBC w/ DIFF; Future; Expected date: 04/29/2025  -     CMP; Future; Expected date: 04/29/2025    BMT Chart Routing      Follow up with physician    Follow up with [facility] 1 year.   Provider visit type Benign hem   Infusion scheduling note   NA   Injection scheduling note NA   Labs CBC, CMP, free light chains, immunoglobulins, SPEP and immunofixation   Scheduling:  Preferred lab:  Lab interval:  Lab 1 week prior to [facility] visit   Imaging   NA   Pharmacy appointment No pharmacy appointment needed      Other referrals no referral to Oncology Primary Care needed -  no Massage appointment needed    No additional referrals needed            Collaborating Provider:  Dr. Radha Paz, " MD    Thank You,  ZACK StevensP-C  Benign Hematology           [1]  Social History  Socioeconomic History    Marital status: Significant Other     Spouse name: Daniella Adams    Number of children: 2    Highest education level: Master's degree (e.g., MA, MS, Sandra, MEd, MSW, CORTEZ)   Tobacco Use    Smoking status: Former     Current packs/day: 0.50     Types: Cigarettes     Passive exposure: Past    Smokeless tobacco: Never   Substance and Sexual Activity    Alcohol use: Yes     Comment: rarely    Drug use: Not Currently    Sexual activity: Yes     Partners: Female   Social History Narrative    Caregiver S/O Daniella     Social Drivers of Health     Financial Resource Strain: Low Risk  (12/4/2024)    Received from [facility]    Overall Financial Resource Strain (CARDIA)     Difficulty of Paying Living Expenses: Not hard at all   Food Insecurity: No Food Insecurity (12/4/2024)    Received from [facility]    Hunger Vital Sign     Worried About Running Out of Food in the Last Year: Never true     Ran Out of Food in the Last Year: Never true   Transportation Needs: No Transportation Needs (12/4/2024)    Received from [facility]    PRAPARE - Transportation     Lack of Transportation (Medical): No     Lack of Transportation (Non-Medical): No   Physical Activity: Inactive (12/4/2024)    Received from [facility]    Exercise Vital Sign     Days of Exercise per Week: 0 days     Minutes of Exercise per Session: 0 min   Stress: No Stress Concern Present (12/4/2024)    Received from [facility]    [place] [employer] - Occupational Stress Questionnaire     Feeling of Stress : Only a little   Housing Stability: Unknown (12/4/2024)    Received from [facility]    Housing Stability Vital Sign     Unable to Pay for Housing in the Last Year: No

## 2025-04-23 NOTE — PROGRESS NOTES
HM and immunization's reviewed and updated.  PLACE ORDERS IN FOR LIPID PANEL TO GET DRAWN TODAY WITH OTHER LABS.   Health Maintenance Due   Topic Date Due    Abdominal Aortic Aneurysm Screening  Never done    Influenza Vaccine (1) 09/01/2024    COVID-19 Vaccine (4 - 2024-25 season) 09/01/2024    Lipid Panel  02/07/2025

## 2025-04-28 ENCOUNTER — OFFICE VISIT (OUTPATIENT)
Dept: FAMILY MEDICINE | Facility: CLINIC | Age: 70
End: 2025-04-28
Payer: MEDICARE

## 2025-04-28 VITALS
TEMPERATURE: 98 F | WEIGHT: 249.56 LBS | BODY MASS INDEX: 33.08 KG/M2 | RESPIRATION RATE: 18 BRPM | HEART RATE: 72 BPM | DIASTOLIC BLOOD PRESSURE: 78 MMHG | SYSTOLIC BLOOD PRESSURE: 130 MMHG | OXYGEN SATURATION: 98 % | HEIGHT: 73 IN

## 2025-04-28 DIAGNOSIS — D63.1 ANEMIA IN STAGE 5 CHRONIC KIDNEY DISEASE, NOT ON CHRONIC DIALYSIS: ICD-10-CM

## 2025-04-28 DIAGNOSIS — J30.2 SEASONAL ALLERGIES: Primary | ICD-10-CM

## 2025-04-28 DIAGNOSIS — R33.9 URINARY RETENTION: ICD-10-CM

## 2025-04-28 DIAGNOSIS — I10 ESSENTIAL HYPERTENSION: ICD-10-CM

## 2025-04-28 DIAGNOSIS — N18.5 CKD (CHRONIC KIDNEY DISEASE), STAGE V: ICD-10-CM

## 2025-04-28 DIAGNOSIS — N18.5 ANEMIA IN STAGE 5 CHRONIC KIDNEY DISEASE, NOT ON CHRONIC DIALYSIS: ICD-10-CM

## 2025-04-28 DIAGNOSIS — M79.89 LEG SWELLING: ICD-10-CM

## 2025-04-28 DIAGNOSIS — E78.2 MIXED HYPERLIPIDEMIA: ICD-10-CM

## 2025-04-28 PROCEDURE — 99999 PR PBB SHADOW E&M-EST. PATIENT-LVL IV: CPT | Mod: PBBFAC,HCNC,, | Performed by: INTERNAL MEDICINE

## 2025-04-28 RX ORDER — FLUTICASONE PROPIONATE 50 MCG
1 SPRAY, SUSPENSION (ML) NASAL DAILY
Qty: 18.2 ML | Refills: 0 | Status: SHIPPED | OUTPATIENT
Start: 2025-04-28

## 2025-04-28 RX ORDER — CETIRIZINE HYDROCHLORIDE 10 MG/1
10 TABLET ORAL DAILY
Qty: 90 TABLET | Refills: 3 | Status: SHIPPED | OUTPATIENT
Start: 2025-04-28 | End: 2026-04-28

## 2025-04-28 RX ORDER — OXYCODONE AND ACETAMINOPHEN 5; 325 MG/1; MG/1
1 TABLET ORAL EVERY 4 HOURS PRN
COMMUNITY
Start: 2024-12-20

## 2025-04-28 NOTE — PROGRESS NOTES
Health Maintenance Due   Topic     Abdominal Aortic Aneurysm Screening  Consult PCP    Influenza Vaccine (1) Patient advised to go to pharmacy    COVID-19 Vaccine (4 - 2024-25 season) Patient declined    Lipid Panel  Consult PCP

## 2025-04-28 NOTE — ASSESSMENT & PLAN NOTE
- Assessed the patient's peritoneal dialysis status and overall health.  - Handy reports doing well with peritoneal dialysis.  - Reviewed recent lab results, including A1C and anemia markers.  - Noted that the patient is on the kidney transplant list and has been for a while.  - following transplant

## 2025-04-28 NOTE — ASSESSMENT & PLAN NOTE
- Clarified the purpose of Tamsulosin (Flomax) in improving urinary flow for men with prostate issues.  - Continue Tamsulosin (Flomax) once nightly for prostate-related urinary symptoms.  - Ordered PSA test for upcoming lab work scheduled for May 27th.  - Noted that PSA test was normal a year ago.  - Handy reports consistent urination, including at night.  - Continue Tamsulosin (Flomax) once nightly to help with urination, including reducing nighttime urination.

## 2025-04-28 NOTE — ASSESSMENT & PLAN NOTE
- Continue home atorvastatin 20mg   - Ordered cholesterol test for upcoming lab work scheduled for May 27th.

## 2025-04-28 NOTE — ASSESSMENT & PLAN NOTE
Chronic, controlled    - Noted improved BP control with switch from labetalol to hydralazine.  - Continue hydralazine twice daily for BP control.  - Handy reports blood pressure fluctuations and concerns about cuff positioning.  - Blood pressure is in normal range today.  - Instructed the patient to contact the office if BP consistently reads in the 140s and 150s.  - Started hydralazine 50mg twice daily for 2 weeks, replacing labetalol.  - Restart Valsartan at 320mg  - Nifedipine 90mg

## 2025-04-28 NOTE — PROGRESS NOTES
"Chief Complaint: Follow-up (3 month follow up Pt would like to discuss lab results ) and Sinus Problem (Pt states his sinuses have been really bothersome )      Handy Adams  is a 69 y.o. year old patient who presents today for     History of Present Illness    CHIEF COMPLAINT:  Handy presents today for follow up of peritoneal dialysis and multiple chronic conditions.    PERITONEAL DIALYSIS AND TRANSPLANT STATUS:  He reports peritoneal dialysis is going well with good swelling control, though he expresses frustration with the daily routine as it represents a significant life change. He has been on the kidney transplant waiting list for an extended period.    BLOOD PRESSURE MANAGEMENT:  He monitors blood pressure daily due to dialysis requirements using multiple blood pressure cuffs. He notes that repositioning the cuff slightly higher resulted in normal range readings. He expresses difficulty assessing Hydralazine effectiveness due to reading fluctuations potentially related to cuff positioning.    GENITOURINARY:  He experiences consistent nighttime urination with a history of urinary symptoms following post-surgical catheter placement.    ENT:  He reports worsening chronic sinus symptoms since the beginning of the year, describing feeling "stuffed up" throughout the day. He denies having a sinus infection and is not taking any medications for these symptoms.    CURRENT MEDICATIONS:  He takes Hydralazine twice daily for blood pressure management and Flomax nightly for urinary symptoms.      ROS:  General: -fever, -chills, -fatigue, -weight gain, -weight loss  Eyes: -vision changes, -redness, -discharge  ENT: -ear pain, +nasal congestion, -sore throat, +sinus pressure  Cardiovascular: -chest pain, -palpitations, -lower extremity edema  Respiratory: -cough, -shortness of breath  Gastrointestinal: -abdominal pain, -nausea, -vomiting, -diarrhea, -constipation, -blood in stool  Genitourinary: -dysuria, -hematuria, " -frequency, +nocturia  Musculoskeletal: -joint pain, -muscle pain  Skin: -rash, -lesion  Neurological: -headache, -dizziness, -numbness, -tingling  Psychiatric: -anxiety, -depression, -sleep difficulty         Past Medical History:   Diagnosis Date    Anemia     Disorder of kidney and ureter     Edema leg     History of rotator cuff tear     History of seasonal allergies     HTN (hypertension)     Hx of colonic polyps     Hyperlipemia     MGUS (monoclonal gammopathy of unknown significance)     NS (nuclear sclerosis), bilateral 06/25/2019    Obesity     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 11/17/2017    Type 2 diabetes mellitus        Past Surgical History:   Procedure Laterality Date    CATARACT EXTRACTION W/  INTRAOCULAR LENS IMPLANT Right 10/30/2024    Procedure: EXTRACTION, CATARACT, WITH IOL INSERTION;  Surgeon: Dolores Cooper MD;  Location: [facility] OR;  Service: Ophthalmology;  Laterality: Right;    CATARACT EXTRACTION W/  INTRAOCULAR LENS IMPLANT Left 12/11/2024    Procedure: EXTRACTION, CATARACT, WITH IOL INSERTION;  Surgeon: Dolores Cooper MD;  Location: [facility] OR;  Service: Ophthalmology;  Laterality: Left;    COLONOSCOPY N/A 10/28/2022    Procedure: COLONOSCOPY;  Surgeon: Guanako Sanchez MD;  Location: [facility] ENDO;  Service: Endoscopy;  Laterality: N/A;  REFENDO placed per Dr Sanderson. case request entered     vaccinated-GT  instr portal    MOUTH SURGERY      RENAL BIOPSY Left 7/19/2023    Procedure: BIOPSY, KIDNEY;  Surgeon: Nikky Soriano MD;  Location: [facility] CATH LAB;  Service: Interventional Nephrology;  Laterality: Left;        Family History   Problem Relation Name Age of Onset    Cancer Mother          Lung Cancer    Cancer Father          Colon cancer    Diabetes Father      Hypertension Father      No Known Problems Sister      No Known Problems Brother      No Known Problems Maternal Aunt      No Known Problems  Maternal Uncle      No Known Problems Paternal Aunt      No Known Problems Paternal Uncle      No Known Problems Maternal Grandmother      No Known Problems Maternal Grandfather      No Known Problems Paternal Grandmother      No Known Problems Paternal Grandfather      Amblyopia Neg Hx      Blindness Neg Hx      Cataracts Neg Hx      Glaucoma Neg Hx      Macular degeneration Neg Hx      Retinal detachment Neg Hx      Strabismus Neg Hx      Stroke Neg Hx      Thyroid disease Neg Hx          Social History     Socioeconomic History    Marital status: Significant Other     Spouse name: Daniella Adams    Number of children: 2    Highest education level: Master's degree (e.g., MA, MS, Sandra, MEd, MSW, CORTEZ)   Tobacco Use    Smoking status: Former     Current packs/day: 0.50     Types: Cigarettes     Passive exposure: Past    Smokeless tobacco: Never   Substance and Sexual Activity    Alcohol use: Yes     Comment: rarely    Drug use: Not Currently    Sexual activity: Yes     Partners: Female   Social History Narrative    Caregiver S/O Daniella     Social Drivers of Health     Financial Resource Strain: Low Risk  (12/4/2024)    Received from [facility]    Overall Financial Resource Strain (CARDIA)     Difficulty of Paying Living Expenses: Not hard at all   Food Insecurity: No Food Insecurity (12/4/2024)    Received from [facility]    Hunger Vital Sign     Worried About Running Out of Food in the Last Year: Never true     Ran Out of Food in the Last Year: Never true   Transportation Needs: No Transportation Needs (12/4/2024)    Received from [facility]    PRAPARE - Transportation     Lack of Transportation (Medical): No     Lack of Transportation (Non-Medical): No   Physical Activity: Inactive (12/4/2024)    Received from [facility]    Exercise Vital Sign     Days of Exercise per Week: 0 days     Minutes of Exercise per Session: 0 min   Stress: No Stress Concern Present (12/4/2024)    Received from [facility]    [employer] - Occupational Stress Questionnaire     Feeling of Stress : Only a little   Housing Stability: Unknown (12/4/2024)    Received from [facility]    Housing Stability Vital Sign     Unable to Pay for Housing in the Last Year: No       Current Medications[1]           Objective:      Vitals:    04/28/25 0804   BP: 130/78   Pulse: 72   Resp: 18   Temp: 97.8 °F (36.6 °C)       Physical Exam  Constitutional:       Appearance: Normal appearance.   HENT:      Head: Normocephalic and atraumatic.   Cardiovascular:      Rate and Rhythm: Normal rate.   Musculoskeletal:         General: Normal range of motion.   Skin:     General: Skin is warm and dry.   Neurological:      General: No focal deficit present.      Mental Status: He is alert and oriented to person, place, and time.          Assessment:       1. Seasonal allergies    2. Mixed hyperlipidemia    3. CKD (chronic kidney disease), stage V    4. Anemia in stage 5 chronic kidney disease, not on chronic dialysis    5. Essential hypertension    6. Urinary retention    7. Leg swelling          Plan:   1. Seasonal allergies  Assessment & Plan:  - Handy reports ongoing sinus problems, particularly noticeable since the beginning of the year, and being congested all day.  - Acknowledged it's allergy season and that allergies can develop or worsen with age.  - Explained that antihistamines generally do not interact with other medications, but multiple antihistamines should not be taken together due to potential drowsiness.  - Instructed on proper use of nasal spray, advising to aim towards cheeks and not tilt head up.  - Prescribed Zyrtec (cetirizine) daily for allergy symptoms, to be taken consistently until symptoms improve, then as needed.  - Prescribed Flonase nasal spray as needed for nasal congestion.    Orders:  -     fluticasone propionate (FLONASE) 50 mcg/actuation nasal spray; 1 spray (50 mcg total) by Each Nostril route once daily.   Dispense: 18.2 mL; Refill: 0  -     cetirizine (ZYRTEC) 10 MG tablet; Take 1 tablet (10 mg total) by mouth once daily.  Dispense: 90 tablet; Refill: 3    2. Mixed hyperlipidemia  Assessment & Plan:  - Continue home atorvastatin 20mg   - Ordered cholesterol test for upcoming lab work scheduled for May 27th.    Orders:  -     Lipid Panel; Future; Expected date: 04/28/2025    3. CKD (chronic kidney disease), stage V  Assessment & Plan:  - Assessed the patient's peritoneal dialysis status and overall health.  - Handy reports doing well with peritoneal dialysis.  - Reviewed recent lab results, including A1C and anemia markers.  - Noted that the patient is on the kidney transplant list and has been for a while.  - following transplant      4. Anemia in stage 5 chronic kidney disease, not on chronic dialysis  Assessment & Plan:  - Labs show iron levels are good and the patient is not severely anemic.      5. Essential hypertension  Assessment & Plan:  Chronic, controlled    - Noted improved BP control with switch from labetalol to hydralazine.  - Continue hydralazine twice daily for BP control.  - Handy reports blood pressure fluctuations and concerns about cuff positioning.  - Blood pressure is in normal range today.  - Instructed the patient to contact the office if BP consistently reads in the 140s and 150s.  - Started hydralazine 50mg twice daily for 2 weeks, replacing labetalol.  - Restart Valsartan at 320mg  - Nifedipine 90mg      6. Urinary retention  Assessment & Plan:  - Clarified the purpose of Tamsulosin (Flomax) in improving urinary flow for men with prostate issues.  - Continue Tamsulosin (Flomax) once nightly for prostate-related urinary symptoms.  - Ordered PSA test for upcoming lab work scheduled for May 27th.  - Noted that PSA test was normal a year ago.  - Handy reports consistent urination, including at night.  - Continue Tamsulosin (Flomax) once nightly to help with urination, including reducing  nighttime urination.      7. Leg swelling  Assessment & Plan:  Chronic, improving, bilateral LE edema +3    - continue torsemide BID              FOLLOW-UP:  - Follow up in 6 months.         Follow up in about 6 months (around 10/28/2025) for f/up.    This note was generated with the assistance of ambient listening technology. Verbal consent was obtained by the patient and accompanying visitor(s) for the recording of patient appointment to facilitate this note. I attest to having reviewed and edited the generated note for accuracy, though some syntax or spelling errors may persist. Please contact the author of this note for any clarification.                  [1]   Current Outpatient Medications:     atorvastatin (LIPITOR) 20 MG tablet, Take 1 tablet (20 mg total) by mouth once daily., Disp: 90 tablet, Rfl: 3    blood sugar diagnostic Strp, To test twice daily, Disp: 100 each, Rfl: 3    blood-glucose meter kit, Use as instructed, Disp: 1 each, Rfl: 0    calcitRIOL (ROCALTROL) 0.25 MCG Cap, Take 1 capsule (0.25 mcg total) by mouth once daily., Disp: 90 capsule, Rfl: 3    carvediloL (COREG) 12.5 MG tablet, Take 1 tablet (12.5 mg total) by mouth 2 (two) times daily with meals., Disp: 180 tablet, Rfl: 3    ergocalciferol (ERGOCALCIFEROL) 50,000 unit Cap, Take 1 capsule (50,000 Units total) by mouth every 7 days., Disp: 12 capsule, Rfl: 3    hydrALAZINE (APRESOLINE) 100 MG tablet, Take 1 tablet (100 mg total) by mouth every 12 (twelve) hours., Disp: 60 tablet, Rfl: 0    lancets Misc, 1 lancet by Misc.(Non-Drug; Combo Route) route 2 (two) times daily with meals., Disp: 100 each, Rfl: 11    NIFEdipine (PROCARDIA-XL) 90 MG (OSM) 24 hr tablet, Take 1 tablet by mouth once daily, Disp: 90 tablet, Rfl: 0    oxyCODONE-acetaminophen (PERCOCET) 5-325 mg per tablet, Take 1 tablet by mouth every 4 (four) hours as needed., Disp: , Rfl:     sevelamer carbonate (RENVELA) 800 mg Tab, Take 2 tablets by mouth 3 (three) times daily with  meals. Take 2 tabs w/ meals & 1 tab w/ snacks, Disp: , Rfl:     sodium bicarbonate 650 MG tablet, Take 1 tablet (650 mg total) by mouth 2 (two) times daily., Disp: 180 tablet, Rfl: 3    tamsulosin (FLOMAX) 0.4 mg Cap, Take 1 capsule (0.4 mg total) by mouth every evening., Disp: 90 capsule, Rfl: 0    torsemide (DEMADEX) 20 MG Tab, Take 2 tablets (40 mg total) by mouth 2 (two) times a day., Disp: 120 tablet, Rfl: 11    TRADJENTA 5 mg Tab tablet, Take 1 tablet by mouth once daily, Disp: 90 tablet, Rfl: 0    valsartan (DIOVAN) 320 MG tablet, Take 1 tablet (320 mg total) by mouth once daily., Disp: 90 tablet, Rfl: 3    cetirizine (ZYRTEC) 10 MG tablet, Take 1 tablet (10 mg total) by mouth once daily., Disp: 90 tablet, Rfl: 3    fluticasone propionate (FLONASE) 50 mcg/actuation nasal spray, 1 spray (50 mcg total) by Each Nostril route once daily., Disp: 18.2 mL, Rfl: 0

## 2025-04-28 NOTE — ASSESSMENT & PLAN NOTE
- Handy reports ongoing sinus problems, particularly noticeable since the beginning of the year, and being congested all day.  - Acknowledged it's allergy season and that allergies can develop or worsen with age.  - Explained that antihistamines generally do not interact with other medications, but multiple antihistamines should not be taken together due to potential drowsiness.  - Instructed on proper use of nasal spray, advising to aim towards cheeks and not tilt head up.  - Prescribed Zyrtec (cetirizine) daily for allergy symptoms, to be taken consistently until symptoms improve, then as needed.  - Prescribed Flonase nasal spray as needed for nasal congestion.

## 2025-04-29 ENCOUNTER — PROCEDURE VISIT (OUTPATIENT)
Dept: OPHTHALMOLOGY | Facility: CLINIC | Age: 70
End: 2025-04-29
Payer: MEDICARE

## 2025-04-29 ENCOUNTER — TELEPHONE (OUTPATIENT)
Dept: TRANSPLANT | Facility: CLINIC | Age: 70
End: 2025-04-29
Payer: MEDICARE

## 2025-04-29 ENCOUNTER — LAB VISIT (OUTPATIENT)
Dept: LAB | Facility: HOSPITAL | Age: 70
End: 2025-04-29
Payer: MEDICARE

## 2025-04-29 ENCOUNTER — OFFICE VISIT (OUTPATIENT)
Dept: HEMATOLOGY/ONCOLOGY | Facility: CLINIC | Age: 70
End: 2025-04-29
Payer: MEDICARE

## 2025-04-29 ENCOUNTER — CLINICAL SUPPORT (OUTPATIENT)
Dept: OPHTHALMOLOGY | Facility: CLINIC | Age: 70
End: 2025-04-29
Payer: MEDICARE

## 2025-04-29 VITALS
HEART RATE: 69 BPM | DIASTOLIC BLOOD PRESSURE: 74 MMHG | HEIGHT: 73 IN | OXYGEN SATURATION: 100 % | WEIGHT: 249.44 LBS | SYSTOLIC BLOOD PRESSURE: 154 MMHG | RESPIRATION RATE: 17 BRPM | BODY MASS INDEX: 33.06 KG/M2 | TEMPERATURE: 98 F

## 2025-04-29 DIAGNOSIS — D47.2 MGUS (MONOCLONAL GAMMOPATHY OF UNKNOWN SIGNIFICANCE): Primary | ICD-10-CM

## 2025-04-29 DIAGNOSIS — E11.3513 TYPE 2 DIABETES MELLITUS WITH BOTH EYES AFFECTED BY PROLIFERATIVE RETINOPATHY AND MACULAR EDEMA, WITH LONG-TERM CURRENT USE OF INSULIN: ICD-10-CM

## 2025-04-29 DIAGNOSIS — E11.3513 TYPE 2 DIABETES MELLITUS WITH BOTH EYES AFFECTED BY PROLIFERATIVE RETINOPATHY AND MACULAR EDEMA, WITH LONG-TERM CURRENT USE OF INSULIN: Primary | ICD-10-CM

## 2025-04-29 DIAGNOSIS — Z99.2 ANEMIA DUE TO CHRONIC KIDNEY DISEASE, ON CHRONIC DIALYSIS: ICD-10-CM

## 2025-04-29 DIAGNOSIS — Z79.4 TYPE 2 DIABETES MELLITUS WITH BOTH EYES AFFECTED BY PROLIFERATIVE RETINOPATHY AND MACULAR EDEMA, WITH LONG-TERM CURRENT USE OF INSULIN: ICD-10-CM

## 2025-04-29 DIAGNOSIS — N18.4 BENIGN HYPERTENSION WITH CKD (CHRONIC KIDNEY DISEASE) STAGE IV: ICD-10-CM

## 2025-04-29 DIAGNOSIS — D47.2 MGUS (MONOCLONAL GAMMOPATHY OF UNKNOWN SIGNIFICANCE): ICD-10-CM

## 2025-04-29 DIAGNOSIS — Z79.4 TYPE 2 DIABETES MELLITUS WITH BOTH EYES AFFECTED BY PROLIFERATIVE RETINOPATHY AND MACULAR EDEMA, WITH LONG-TERM CURRENT USE OF INSULIN: Primary | ICD-10-CM

## 2025-04-29 DIAGNOSIS — I12.9 BENIGN HYPERTENSION WITH CKD (CHRONIC KIDNEY DISEASE) STAGE IV: ICD-10-CM

## 2025-04-29 DIAGNOSIS — D63.1 ANEMIA DUE TO CHRONIC KIDNEY DISEASE, ON CHRONIC DIALYSIS: ICD-10-CM

## 2025-04-29 DIAGNOSIS — N18.5 CKD (CHRONIC KIDNEY DISEASE) STAGE 5, GFR LESS THAN 15 ML/MIN: ICD-10-CM

## 2025-04-29 DIAGNOSIS — N18.6 ANEMIA DUE TO CHRONIC KIDNEY DISEASE, ON CHRONIC DIALYSIS: ICD-10-CM

## 2025-04-29 LAB
ABSOLUTE EOSINOPHIL (OHS): 0.16 K/UL
ABSOLUTE MONOCYTE (OHS): 0.62 K/UL (ref 0.3–1)
ABSOLUTE NEUTROPHIL COUNT (OHS): 5.05 K/UL (ref 1.8–7.7)
ALBUMIN SERPL BCP-MCNC: 3.2 G/DL (ref 3.5–5.2)
ALP SERPL-CCNC: 52 UNIT/L (ref 40–150)
ALT SERPL W/O P-5'-P-CCNC: 14 UNIT/L (ref 10–44)
ANION GAP (OHS): 10 MMOL/L (ref 8–16)
AST SERPL-CCNC: 13 UNIT/L (ref 11–45)
BASOPHILS # BLD AUTO: 0.04 K/UL
BASOPHILS NFR BLD AUTO: 0.6 %
BILIRUB SERPL-MCNC: 0.4 MG/DL (ref 0.1–1)
BUN SERPL-MCNC: 49 MG/DL (ref 8–23)
CALCIUM SERPL-MCNC: 8 MG/DL (ref 8.7–10.5)
CHLORIDE SERPL-SCNC: 108 MMOL/L (ref 95–110)
CO2 SERPL-SCNC: 25 MMOL/L (ref 23–29)
CREAT SERPL-MCNC: 7 MG/DL (ref 0.5–1.4)
ERYTHROCYTE [DISTWIDTH] IN BLOOD BY AUTOMATED COUNT: 17.1 % (ref 11.5–14.5)
GFR SERPLBLD CREATININE-BSD FMLA CKD-EPI: 8 ML/MIN/1.73/M2
GLUCOSE SERPL-MCNC: 92 MG/DL (ref 70–110)
HCT VFR BLD AUTO: 35.4 % (ref 40–54)
HGB BLD-MCNC: 11.1 GM/DL (ref 14–18)
IGA SERPL-MCNC: 76 MG/DL (ref 40–350)
IGG SERPL-MCNC: 1933 MG/DL (ref 650–1600)
IGM SERPL-MCNC: 32 MG/DL (ref 50–300)
IMM GRANULOCYTES # BLD AUTO: 0.06 K/UL (ref 0–0.04)
IMM GRANULOCYTES NFR BLD AUTO: 0.9 % (ref 0–0.5)
LYMPHOCYTES # BLD AUTO: 0.96 K/UL (ref 1–4.8)
MCH RBC QN AUTO: 28 PG (ref 27–31)
MCHC RBC AUTO-ENTMCNC: 31.4 G/DL (ref 32–36)
MCV RBC AUTO: 89 FL (ref 82–98)
NUCLEATED RBC (/100WBC) (OHS): 0 /100 WBC
PLATELET # BLD AUTO: 214 K/UL (ref 150–450)
PMV BLD AUTO: 10.9 FL (ref 9.2–12.9)
POTASSIUM SERPL-SCNC: 4.1 MMOL/L (ref 3.5–5.1)
PROT SERPL-MCNC: 7 GM/DL (ref 6–8.4)
RBC # BLD AUTO: 3.97 M/UL (ref 4.6–6.2)
RELATIVE EOSINOPHIL (OHS): 2.3 %
RELATIVE LYMPHOCYTE (OHS): 13.9 % (ref 18–48)
RELATIVE MONOCYTE (OHS): 9 % (ref 4–15)
RELATIVE NEUTROPHIL (OHS): 73.3 % (ref 38–73)
SODIUM SERPL-SCNC: 143 MMOL/L (ref 136–145)
WBC # BLD AUTO: 6.89 K/UL (ref 3.9–12.7)

## 2025-04-29 PROCEDURE — 36415 COLL VENOUS BLD VENIPUNCTURE: CPT | Mod: HCNC,TXP

## 2025-04-29 PROCEDURE — 92134 CPTRZ OPH DX IMG PST SGM RTA: CPT | Mod: HCNC,S$GLB,, | Performed by: OPHTHALMOLOGY

## 2025-04-29 PROCEDURE — 67028 INJECTION EYE DRUG: CPT | Mod: HCNC,LT,S$GLB, | Performed by: OPHTHALMOLOGY

## 2025-04-29 PROCEDURE — 92012 INTRM OPH EXAM EST PATIENT: CPT | Mod: 25,HCNC,S$GLB, | Performed by: OPHTHALMOLOGY

## 2025-04-29 PROCEDURE — 99999 PR PBB SHADOW E&M-EST. PATIENT-LVL IV: CPT | Mod: PBBFAC,HCNC,TXP,

## 2025-04-29 RX ORDER — LORATADINE 10 MG
10 TABLET,DISINTEGRATING ORAL DAILY
COMMUNITY

## 2025-04-29 RX ADMIN — Medication 1.25 MG: at 09:04

## 2025-04-29 NOTE — PROGRESS NOTES
HPI     Follow-up     Additional comments: 1 mo Avastin OS           Comments    C/o blurry VA OS, no pain ou and denies floaters or flashes.          Last edited by Lorraine Warren on 4/29/2025  8:32 AM.        OCT  DME OU  OD improved  OS  complex DME    Prior WFFA  OD - normal transit time. Hyperfluorescence early c/w Ma/edema - sig NP    OS - Hyperfluorescence early c/w Ma/edema  .NV nasal      A/P    1. 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 DME OU   6/19 - pt did not FU until 8/20  S/p Avastin OD x 2 , OS x 2  S/p Eylea OD x 2  S/p Ozurdex OD x 4, OS x 2  5/24  S/p Iluvien OU 5/24 11/22 - not seen x 2 years  3/23 - had second opinion with Dr. Suh.  Pt would like to try a few Eylea prior to steroids if needed  11/23 - increased DME oU  7/24 trace increase   9/24 - OD improving, OS stable  12/24 - some increase in DME OU post CE, but patient happy with Va.  OD is worse potential given more chronic DME    2/25 - OD improving, OS increase complex DME    Avastin OS today    Get reauth for Ozurdex if needed-If not approved , then consider STK      3. HTN Ret OU  BS/BP/chol control    4.PCIOL OU  With Dr. Cooper      5. Cyst RLL  Removed by Rhea  Happy with result    6. ESRD on PD        1 month OCT no dilate and Avastin OS /OZU or STK - (LDFE 2/25)      Risks, benefits, and alternatives to treatment discussed in detail with the patient.  The patient voiced understanding and wished to proceed with the procedure    Injection Procedure Note:  Diagnosis: DME OS    Patient Identified and Time Out complete  Pt marked  Topical Proparacaine and Betadine.  Inject Avastin OS at 6:00 @ 3.5-4mm posterior to limbus  Post Operative Dx: Same  Complications: None  Follow up as above.

## 2025-04-29 NOTE — PROGRESS NOTES
Assessment /Plan     For exam results, see Encounter Report.    Type 2 diabetes mellitus with both eyes affected by proliferative retinopathy and macular edema, with long-term current use of insulin  -     Posterior Segment OCT Retina-Both eyes      Oct done-CC

## 2025-04-30 LAB
ALBUMIN, SPE (OHS): 3.48 G/DL (ref 3.35–5.55)
ALPHA 1 GLOB (OHS): 0.25 GM/DL (ref 0.17–0.41)
ALPHA 2 GLOB (OHS): 0.57 GM/DL (ref 0.43–0.99)
BETA GLOB (OHS): 0.53 GM/DL (ref 0.5–1.1)
GAMMA GLOBULIN (OHS): 1.66 GM/DL (ref 0.67–1.58)
KAPPA LC FREE SER-MCNC: 0.91 MG/L (ref 0.26–1.65)
KAPPA LC FREE/LAMBDA FREE SER: 9.32 MG/DL (ref 0.33–1.94)
LAMBDA LC FREE SERPL-MCNC: 10.25 MG/DL (ref 0.57–2.63)
PROT SERPL-MCNC: 6.5 GM/DL (ref 6–8.4)

## 2025-05-01 LAB — PATHOLOGIST REVIEW - SPE (OHS): NORMAL

## 2025-05-07 ENCOUNTER — TELEPHONE (OUTPATIENT)
Dept: TRANSPLANT | Facility: CLINIC | Age: 70
End: 2025-05-07
Payer: MEDICARE

## 2025-05-08 ENCOUNTER — TELEPHONE (OUTPATIENT)
Dept: TRANSPLANT | Facility: CLINIC | Age: 70
End: 2025-05-08
Payer: MEDICARE

## 2025-05-08 NOTE — TELEPHONE ENCOUNTER
MA notes per Adherence form     PD PT    FOR THE PAST THREE MONTHS:    0-AMA's  0-No-shows    No concerns with care giving, transportation, or mental health    Scanned in pt's media    Haydee Cartagena  Abdominal Transplant MA

## 2025-05-12 ENCOUNTER — OFFICE VISIT (OUTPATIENT)
Dept: FAMILY MEDICINE | Facility: CLINIC | Age: 70
End: 2025-05-12
Payer: MEDICARE

## 2025-05-12 VITALS
RESPIRATION RATE: 18 BRPM | HEIGHT: 73 IN | DIASTOLIC BLOOD PRESSURE: 84 MMHG | HEART RATE: 77 BPM | BODY MASS INDEX: 33.31 KG/M2 | OXYGEN SATURATION: 96 % | TEMPERATURE: 99 F | SYSTOLIC BLOOD PRESSURE: 132 MMHG | WEIGHT: 251.31 LBS

## 2025-05-12 DIAGNOSIS — I10 ESSENTIAL HYPERTENSION: ICD-10-CM

## 2025-05-12 DIAGNOSIS — Z79.4 TYPE 2 DIABETES MELLITUS WITH BOTH EYES AFFECTED BY PROLIFERATIVE RETINOPATHY AND MACULAR EDEMA, WITH LONG-TERM CURRENT USE OF INSULIN: ICD-10-CM

## 2025-05-12 DIAGNOSIS — E11.3513 TYPE 2 DIABETES MELLITUS WITH BOTH EYES AFFECTED BY PROLIFERATIVE RETINOPATHY AND MACULAR EDEMA, WITH LONG-TERM CURRENT USE OF INSULIN: ICD-10-CM

## 2025-05-12 DIAGNOSIS — N18.5 CKD (CHRONIC KIDNEY DISEASE) STAGE 5, GFR LESS THAN 15 ML/MIN: ICD-10-CM

## 2025-05-12 DIAGNOSIS — J30.2 SEASONAL ALLERGIES: ICD-10-CM

## 2025-05-12 DIAGNOSIS — R09.81 SINUS CONGESTION: Primary | ICD-10-CM

## 2025-05-12 DIAGNOSIS — H92.09 OTALGIA, UNSPECIFIED LATERALITY: ICD-10-CM

## 2025-05-12 DIAGNOSIS — E11.8 CONTROLLED TYPE 2 DIABETES MELLITUS WITH COMPLICATION, WITHOUT LONG-TERM CURRENT USE OF INSULIN: ICD-10-CM

## 2025-05-12 PROCEDURE — 99999 PR PBB SHADOW E&M-EST. PATIENT-LVL V: CPT | Mod: PBBFAC,HCNC,,

## 2025-05-12 RX ORDER — LINAGLIPTIN 5 MG/1
5 TABLET, FILM COATED ORAL DAILY
Qty: 90 TABLET | Refills: 0 | Status: SHIPPED | OUTPATIENT
Start: 2025-05-12

## 2025-05-12 RX ORDER — GENTAMICIN SULFATE 3 MG/ML
SOLUTION/ DROPS OPHTHALMIC
COMMUNITY
Start: 2025-05-12

## 2025-05-12 NOTE — PROGRESS NOTES
Health Maintenance Due   Topic     Abdominal Aortic Aneurysm Screening  Consult pcp    COVID-19 Vaccine (4 - 2024-25 season) Consult provider season over    Lipid Panel  Consult pcp

## 2025-05-12 NOTE — PROGRESS NOTES
HPI     Chief Complaint:  Chief Complaint   Patient presents with    Hearing Problem    Nasal Congestion       Handy Adams is a 69 y.o. male with multiple medical diagnoses as listed in the medical history and problem list that presents for hearing problem and nasal congestion    HPI    History of Present Illness    CHIEF COMPLAINT:  Handy presents today for hearing issues and nasal concerns    ENT SYMPTOMS:  He reports long-standing right nasal obstruction with hard consistency that he is unable to clean out. He expresses concern about potential malignancy. He reports lifelong sinus problems, currently taking Claritin without relief. He has obtained a prescription from Dr. Avila for sinus problems but has not yet started it. He experiences a sensation in the left ear when tilting his head and chronic tinnitus which he has learned to manage. He previously consulted an audiologist who discussed potential surgical intervention for tinnitus, which he declined due to uncertainty of symptom resolution. He denies needing hearing aids at this time.    ENVIRONMENTAL HISTORY:  He has a large air  at home that he runs regularly. He has a large oak tree in front of his house which he believes may contribute to his symptoms. He denies having pets in the home.    MEDICAL HISTORY:  He has diabetes managed with Tradjenta and is undergoing dialysis treatment. He denies history of smoking.      ROS:  General: -fever, -chills, -fatigue, -weight gain, -weight loss  Eyes: -vision changes, -redness, -discharge  ENT: -ear pain, +nasal congestion, -sore throat, +difficulty hearing, +hearing loss, +ear pressure, +tinnitus, +blockage or obstruction  Cardiovascular: -chest pain, -palpitations, -lower extremity edema  Respiratory: -cough, -shortness of breath  Gastrointestinal: -abdominal pain, -nausea, -vomiting, -diarrhea, -constipation, -blood in stool  Genitourinary: -dysuria, -hematuria, -frequency  Musculoskeletal: -joint  pain, -muscle pain  Skin: -rash, -lesion  Neurological: -headache, -dizziness, -numbness, -tingling  Psychiatric: -anxiety, -depression, -sleep difficulty             Assessment & Plan     Assessment & Plan    Evaluated hearing issues and nasal obstruction concerns.  Examined ears, noting white streak on right ear membrane.  Assessed nasal passages, observing watery mucus in right nostril.  Considered potential fluid buildup in sinuses as cause for ear sensation.              Problem List Items Addressed This Visit       Essential hypertension  Blood pressure is in normal range and stable today  Continue current medication regimen,    Advised to continue with low-sodium diet, regular cardiovascular exercises, portion control   Patient mentions that he needs to  continue to lose weight      Type 2 diabetes mellitus with both eyes affected by proliferative retinopathy and macular edema, with long-term current use of insulin    Relevant Medications    linaGLIPtin (TRADJENTA) 5 mg Tab tablet  TYPE 2 DIABETES MELLITUS:  - Continued Tradjenta for diabetes management and refilled this medication.  - Ordered cholesterol screening and A1C lab work for May 27th at [facility].    Seasonal allergies  ALLERGIC RHINITIS AND ALLERGIES:  - Recommend Flonase nasal spray (already prescribed under Nasal and Sinus Disorders) and nasal saline rinse for allergic rhinitis symptoms.  - Educated the patient on allergen reduction strategies in home environment: change air filters frequently, clean surfaces often, change bedding regularly, and switch out pillows periodically.     Sinus congestion - Primary    Relevant Orders    Ambulatory referral/consult to ENT    NASAL AND SINUS DISORDERS:  - Referred the patient to an Otolaryngologist for further evaluation of nasal obstruction and potential mass.  - Started Flonase nasal spray (prescription still active until April 28, 2025).  - Recommend nasal saline rinse to help clear  nasal obstruction.  - Explained proper use of nasal rinse, emphasizing importance of using sterilized or distilled water, and discussed various OTC options.  - Instructed the patient to follow up with ENT appointment once scheduled and to contact the office if symptoms persist after trying Flonase and nasal rinse, especially if there are issues scheduling with ENT.    Controlled type 2 diabetes mellitus with complication, without long-term current use of insulin    Relevant Medications    linaGLIPtin (TRADJENTA) 5 mg Tab tablet     CKD (chronic kidney disease) stage 5, GFR less than 15 mL/min    END STAGE RENAL DISEASE AND DIALYSIS:  - Confirmed the patient is continuing with regular dialysis treatment.    --------------------------------------------  Ear pain  EAR DISORDERS:  - Observed a small white streak on the right ear membrane, with no infection or swelling noted.  - Noted the patient has seen an audiologist and discussed potential surgery for tinnitus.  - Referred the patient to an Otolaryngologist for evaluation of ear symptoms (same referral as for nasal obstruction).    Health Maintenance:  Health Maintenance         Date Due Completion Date    Abdominal Aortic Aneurysm Screening Never done ---    COVID-19 Vaccine (4 - 2024-25 season) 09/01/2024 12/27/2021    Lipid Panel 02/07/2025 2/7/2024    Influenza Vaccine (Season Ended) 09/01/2025 3/1/2024    Foot Exam 09/17/2025 9/17/2024    Hemoglobin A1c 10/03/2025 4/3/2025    Colorectal Cancer Screening 10/28/2025 10/28/2022    Diabetes Urine Screening 12/03/2025 12/3/2024    Diabetic Eye Exam 02/25/2026 2/25/2025    Override on 11/15/2017: Done    TETANUS VACCINE 03/01/2034 3/1/2024            Health maintenance reviewed, Advised patient on the importance of completing overdue health maintenance items, and Lipid panel ordered    Follow Up:  Follow up if symptoms worsen or fail to improve.    Exam     Review of Systems:  (as noted above)  Review of  "Systems    Physical Exam:   Physical Exam  Vitals:    05/12/25 1502   BP: 132/84   BP Location: Right arm   Patient Position: Sitting   Pulse: 77   Resp: 18   Temp: 98.6 °F (37 °C)   TempSrc: Oral   SpO2: 96%   Weight: 114 kg (251 lb 5.2 oz)   Height: 6' 1" (1.854 m)      Body mass index is 33.16 kg/m².    Physical Exam    General: No acute distress. Well-developed. Well-nourished.  Eyes: EOMI. Sclerae anicteric.  HENT: Normocephalic. Atraumatic. Nares patent. Moist oral mucosa. Watery looking mucus in right nostril.  Ears: Little white streak on right tympanic membrane. Little white streak on left tympanic membrane. Bilateral EACs clear.  Cardiovascular: Regular rate. Regular rhythm. No murmurs. No rubs. No gallops. Normal S1, S2.  Respiratory: Normal respiratory effort. Clear to auscultation bilaterally. No rales. No rhonchi. No wheezing.  Abdomen: Soft. Non-tender. Non-distended. Normoactive bowel sounds.  Musculoskeletal: No  obvious deformity.  Extremities: No lower extremity edema.  Neurological: Alert & oriented x3. No slurred speech. Normal gait.  Psychiatric: Normal mood. Normal affect. Good insight. Good judgment.  Skin: Warm. Dry. No rash.           History     Past Medical History:  Past Medical History:   Diagnosis Date    Anemia     Disorder of kidney and ureter     Edema leg     History of rotator cuff tear     History of seasonal allergies     HTN (hypertension)     Hx of colonic polyps     Hyperlipemia     MGUS (monoclonal gammopathy of unknown significance)     NS (nuclear sclerosis), bilateral 06/25/2019    Obesity     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 11/17/2017    Type 2 diabetes mellitus        Past Surgical History:  Past Surgical History:   Procedure Laterality Date    CATARACT EXTRACTION W/  INTRAOCULAR LENS IMPLANT Right 10/30/2024    Procedure: EXTRACTION, CATARACT, WITH IOL INSERTION;  Surgeon: Allison" Dolores AVENDANO MD;  Location: [facility] OR;  Service: Ophthalmology;  Laterality: Right;    CATARACT EXTRACTION W/  INTRAOCULAR LENS IMPLANT Left 12/11/2024    Procedure: EXTRACTION, CATARACT, WITH IOL INSERTION;  Surgeon: Dolores Cooper MD;  Location: [facility] OR;  Service: Ophthalmology;  Laterality: Left;    COLONOSCOPY N/A 10/28/2022    Procedure: COLONOSCOPY;  Surgeon: Guanako Sanchez MD;  Location: [facility] ENDO;  Service: Endoscopy;  Laterality: N/A;  REFENDO placed per Dr Sanderson. case request entered     vaccinated-GT  instr portal    MOUTH SURGERY      RENAL BIOPSY Left 7/19/2023    Procedure: BIOPSY, KIDNEY;  Surgeon: Nikky Soriano MD;  Location: [facility] CATH LAB;  Service: Interventional Nephrology;  Laterality: Left;       Social History:  Social History[1]    Family History:  Family History   Problem Relation Name Age of Onset    Cancer Mother          Lung Cancer    Cancer Father          Colon cancer    Diabetes Father      Hypertension Father      No Known Problems Sister      No Known Problems Brother      No Known Problems Maternal Aunt      No Known Problems Maternal Uncle      No Known Problems Paternal Aunt      No Known Problems Paternal Uncle      No Known Problems Maternal Grandmother      No Known Problems Maternal Grandfather      No Known Problems Paternal Grandmother      No Known Problems Paternal Grandfather      Amblyopia Neg Hx      Blindness Neg Hx      Cataracts Neg Hx      Glaucoma Neg Hx      Macular degeneration Neg Hx      Retinal detachment Neg Hx      Strabismus Neg Hx      Stroke Neg Hx      Thyroid disease Neg Hx         Allergies and Medications: (updated and reviewed)  Review of patient's allergies indicates:  No Known Allergies  Current Medications[2]    Patient Care Team:  Myranda Avila MD as PCP - General (Internal Medicine)  Teresita Tompkins, PhD as Psychologist (Neuropsychology)  Raven Naylor NP as Nurse Practitioner (Nephrology)  Remigio Grubbs DO as Resident  (Oncology)  Otilio Suh MD as Consulting Physician (Ophthalmology)  SHAWNA Mcmanus MD as Consulting Physician (Ophthalmology)  Maryse Smith NP as Nurse Practitioner (Endocrinology)  Christian Louie MD as 1st Call (Urology)  Kady Verdugo NP as Nurse Practitioner (Transplant)  Gregory Melton MD as Consulting Physician (Hematology and Oncology)  Aniceto Randall MD as Consulting Physician (Cardiology)  Christal Amaya as ED Navigator  Cindy Angel MA as Care Coordinator         - The patient is given an After Visit Summary that lists all medications with directions, allergies, education, orders placed during this encounter and follow-up instructions.      - I have reviewed the patient's medical information including past medical, family, and social history sections including the medications and allergies.      - We discussed the patient's current medications.     This note was created by combination of typed  and MModal dictation.  Transcription errors may be present.  If there are any questions, please contact me.     This note was generated with the assistance of ambient listening technology. Verbal consent was obtained by the patient and accompanying visitor(s) for the recording of patient appointment to facilitate this note. I attest to having reviewed and edited the generated note for accuracy, though some syntax or spelling errors may persist. Please contact the author of this note for any clarification.          Jeniffer Alicia PA-C                      [1]   Social History  Socioeconomic History    Marital status: Significant Other     Spouse name: Daniella Adams    Number of children: 2    Highest education level: Master's degree (e.g., MA, MS, Sandra, MEd, MSW, CORTEZ)   Tobacco Use    Smoking status: Former     Current packs/day: 0.50     Types: Cigarettes     Passive exposure: Past    Smokeless tobacco: Never   Substance and Sexual Activity     Alcohol use: Yes     Comment: rarely    Drug use: Not Currently    Sexual activity: Yes     Partners: Female   Social History Narrative    Caregiver S/O Daniella     Social Drivers of Health     Financial Resource Strain: Low Risk  (12/4/2024)    Received from [facility]    Overall Financial Resource Strain (CARDIA)     Difficulty of Paying Living Expenses: Not hard at all   Food Insecurity: No Food Insecurity (12/4/2024)    Received from [facility]    Hunger Vital Sign     Worried About Running Out of Food in the Last Year: Never true     Ran Out of Food in the Last Year: Never true   Transportation Needs: No Transportation Needs (12/4/2024)    Received from [facility]    PRAPARE - Transportation     Lack of Transportation (Medical): No     Lack of Transportation (Non-Medical): No   Physical Activity: Inactive (12/4/2024)    Received from [facility]    Exercise Vital Sign     Days of Exercise per Week: 0 days     Minutes of Exercise per Session: 0 min   Stress: No Stress Concern Present (12/4/2024)    Received from [facility]    [employer] [place] [employer] - Occupational Stress Questionnaire     Feeling of Stress : Only a little   Housing Stability: Unknown (12/4/2024)    Received from [facility]    Housing Stability Vital Sign     Unable to Pay for Housing in the Last Year: No   [2]   Current Outpatient Medications   Medication Sig Dispense Refill    atorvastatin (LIPITOR) 20 MG tablet Take 1 tablet (20 mg total) by mouth once daily. 90 tablet 3    blood sugar diagnostic Strp To test twice daily 100 each 3    blood-glucose meter kit Use as instructed 1 each 0    calcitRIOL (ROCALTROL) 0.25 MCG Cap Take 1 capsule (0.25 mcg total) by mouth once daily. 90 capsule 3    carvediloL (COREG) 12.5 MG tablet Take 1 tablet (12.5 mg total) by mouth 2 (two) times daily with meals. 180 tablet 3    cetirizine (ZYRTEC) 10 MG tablet Take 1 tablet (10 mg total) by mouth once daily. (Patient not taking: Reported  on 5/13/2025) 90 tablet 3    fluticasone propionate (FLONASE) 50 mcg/actuation nasal spray 1 spray (50 mcg total) by Each Nostril route once daily. (Patient not taking: Reported on 5/13/2025) 18.2 mL 0    gentamicin (GARAMYCIN) 0.3 % ophthalmic solution Place into both eyes.      hydrALAZINE (APRESOLINE) 100 MG tablet Take 1 tablet (100 mg total) by mouth every 12 (twelve) hours. 60 tablet 0    lancets Misc 1 lancet by Misc.(Non-Drug; Combo Route) route 2 (two) times daily with meals. 100 each 11    loratadine (CLARITIN REDITABS) 10 mg dissolvable tablet Take 10 mg by mouth once daily. (Patient taking differently: Take 10 mg by mouth daily as needed for Allergies.)      NIFEdipine (PROCARDIA-XL) 90 MG (OSM) 24 hr tablet Take 1 tablet by mouth once daily 90 tablet 0    sevelamer carbonate (RENVELA) 800 mg Tab Take 2 tablets by mouth 3 (three) times daily with meals. Take 2 tabs w/ meals & 1 tab w/ snacks      sodium bicarbonate 650 MG tablet Take 1 tablet (650 mg total) by mouth 2 (two) times daily. (Patient taking differently: Take 650 mg by mouth Daily.) 180 tablet 3    tamsulosin (FLOMAX) 0.4 mg Cap Take 1 capsule (0.4 mg total) by mouth every evening. 90 capsule 0    torsemide (DEMADEX) 20 MG Tab Take 2 tablets (40 mg total) by mouth 2 (two) times a day. 120 tablet 11    valsartan (DIOVAN) 320 MG tablet Take 1 tablet (320 mg total) by mouth once daily. 90 tablet 3    ergocalciferol (ERGOCALCIFEROL) 50,000 unit Cap Take 1 capsule (50,000 Units total) by mouth every 7 days. (Patient taking differently: Take 50,000 Units by mouth every 30 days.) 12 capsule 3    linaGLIPtin (TRADJENTA) 5 mg Tab tablet Take 1 tablet (5 mg total) by mouth once daily. 90 tablet 0     No current facility-administered medications for this visit.

## 2025-05-13 PROBLEM — R09.81 SINUS CONGESTION: Status: ACTIVE | Noted: 2025-05-13

## 2025-05-13 PROBLEM — E11.8 CONTROLLED TYPE 2 DIABETES MELLITUS WITH COMPLICATION, WITHOUT LONG-TERM CURRENT USE OF INSULIN: Status: ACTIVE | Noted: 2025-05-13

## 2025-05-23 DIAGNOSIS — I10 HYPERTENSION, UNSPECIFIED TYPE: ICD-10-CM

## 2025-05-23 RX ORDER — NIFEDIPINE 90 MG/1
90 TABLET, EXTENDED RELEASE ORAL
Qty: 90 TABLET | Refills: 0 | Status: SHIPPED | OUTPATIENT
Start: 2025-05-23

## 2025-05-27 ENCOUNTER — HOSPITAL ENCOUNTER (OUTPATIENT)
Dept: RADIOLOGY | Facility: HOSPITAL | Age: 70
Discharge: HOME | End: 2025-05-27
Attending: NURSE PRACTITIONER
Payer: MEDICARE

## 2025-05-27 ENCOUNTER — OFFICE VISIT (OUTPATIENT)
Dept: TRANSPLANT | Facility: CLINIC | Age: 70
End: 2025-05-27
Payer: MEDICARE

## 2025-05-27 VITALS
DIASTOLIC BLOOD PRESSURE: 76 MMHG | SYSTOLIC BLOOD PRESSURE: 142 MMHG | TEMPERATURE: 98 F | WEIGHT: 250.88 LBS | BODY MASS INDEX: 33.98 KG/M2 | HEIGHT: 72 IN | OXYGEN SATURATION: 95 % | RESPIRATION RATE: 18 BRPM | HEART RATE: 68 BPM

## 2025-05-27 DIAGNOSIS — E11.21 DIABETIC NEPHROPATHY ASSOCIATED WITH TYPE 2 DIABETES MELLITUS: ICD-10-CM

## 2025-05-27 DIAGNOSIS — N25.81 SECONDARY HYPERPARATHYROIDISM: ICD-10-CM

## 2025-05-27 DIAGNOSIS — Z76.82 PATIENT ON WAITING LIST FOR KIDNEY TRANSPLANT: Primary | ICD-10-CM

## 2025-05-27 DIAGNOSIS — I10 PRIMARY HYPERTENSION: ICD-10-CM

## 2025-05-27 DIAGNOSIS — Z99.2 ESRD ON PERITONEAL DIALYSIS: ICD-10-CM

## 2025-05-27 DIAGNOSIS — Z76.82 ORGAN TRANSPLANT CANDIDATE: ICD-10-CM

## 2025-05-27 DIAGNOSIS — N18.6 ESRD ON PERITONEAL DIALYSIS: ICD-10-CM

## 2025-05-27 DIAGNOSIS — D47.2 MGUS (MONOCLONAL GAMMOPATHY OF UNKNOWN SIGNIFICANCE): ICD-10-CM

## 2025-05-27 PROCEDURE — 3044F HG A1C LEVEL LT 7.0%: CPT | Mod: CPTII,S$GLB,TXP, | Performed by: NURSE PRACTITIONER

## 2025-05-27 PROCEDURE — 71046 X-RAY EXAM CHEST 2 VIEWS: CPT | Mod: 26,TXP,, | Performed by: RADIOLOGY

## 2025-05-27 PROCEDURE — 99215 OFFICE O/P EST HI 40 MIN: CPT | Mod: S$GLB,TXP,, | Performed by: NURSE PRACTITIONER

## 2025-05-27 PROCEDURE — 4010F ACE/ARB THERAPY RXD/TAKEN: CPT | Mod: CPTII,S$GLB,TXP, | Performed by: NURSE PRACTITIONER

## 2025-05-27 PROCEDURE — 1160F RVW MEDS BY RX/DR IN RCRD: CPT | Mod: CPTII,S$GLB,TXP, | Performed by: NURSE PRACTITIONER

## 2025-05-27 PROCEDURE — 3077F SYST BP >= 140 MM HG: CPT | Mod: CPTII,S$GLB,TXP, | Performed by: NURSE PRACTITIONER

## 2025-05-27 PROCEDURE — 99999 PR PBB SHADOW E&M-EST. PATIENT-LVL V: CPT | Mod: PBBFAC,HCNC,TXP, | Performed by: NURSE PRACTITIONER

## 2025-05-27 PROCEDURE — 1126F AMNT PAIN NOTED NONE PRSNT: CPT | Mod: CPTII,S$GLB,TXP, | Performed by: NURSE PRACTITIONER

## 2025-05-27 PROCEDURE — 3078F DIAST BP <80 MM HG: CPT | Mod: CPTII,S$GLB,TXP, | Performed by: NURSE PRACTITIONER

## 2025-05-27 PROCEDURE — 3008F BODY MASS INDEX DOCD: CPT | Mod: CPTII,S$GLB,TXP, | Performed by: NURSE PRACTITIONER

## 2025-05-27 PROCEDURE — 76770 US EXAM ABDO BACK WALL COMP: CPT | Mod: 26,TXP,, | Performed by: STUDENT IN AN ORGANIZED HEALTH CARE EDUCATION/TRAINING PROGRAM

## 2025-05-27 PROCEDURE — 71046 X-RAY EXAM CHEST 2 VIEWS: CPT | Mod: TC,HCNC,TXP

## 2025-05-27 PROCEDURE — 76770 US EXAM ABDO BACK WALL COMP: CPT | Mod: TC,HCNC,TXP

## 2025-05-27 PROCEDURE — 1159F MED LIST DOCD IN RCRD: CPT | Mod: CPTII,S$GLB,TXP, | Performed by: NURSE PRACTITIONER

## 2025-05-27 NOTE — LETTER
May 29, 2025        Raven Naylor  [address]  [facility] [address]  Phone: [phone]  Fax: [phone]             [facility]  [address]  [facility] [address]  Phone: [phone]   Patient: Handy Adams   MR Number: [id number]   YOB: 1955   Date of Visit: 5/27/2025       Dear Dr. Raven Naylor    Thank you for referring Handy Adams to me for evaluation. Attached you will find relevant portions of my assessment and plan of care.    If you have questions, please do not hesitate to call me. I look forward to following Handy Adams along with you.    Sincerely,    Emelia Sethi, LISA    Enclosure    If you would like to receive this communication electronically, please contact [email or web address] or [phone] to request [employer] access.    [employer] is a tool which provides read-only access to select patient information with whom you have a relationship. Its easy to use and provides real time access to review your patients record including encounter summaries, notes, results, and demographic information.    If you feel you have received this communication in error or would no longer like to receive these types of communications, please e-mail [email or web address]

## 2025-05-27 NOTE — PROGRESS NOTES
Kidney Transplant Recipient Reevalulation    Referring Physician: Raven Naylor  Current Nephrologist: Raven Naylor  Waitlist Status: active  Dialysis Start Date: 1/27/2025    Subjective:     CC:  Six month reassessment of kidney transplant candidacy.    HPI:  Mr. Adams is a 69 y.o. year old Black or  male with ESRD secondary to diabetic nephropathy and HTN, biopsy 7/2023.  He has been on the wait list for a kidney transplant at [facility] since 11/8/2022. Patient is currently on peritoneal dialysis started on 1/27/2025. Patient is dialyzing on cyclic peritoneal dialysis.  Patient reports that he is tolerating dialysis well. . He has a PD catheter. Patient denies any recent hospitalizations or ED visits.    Had annual follow up with [facility] onc last month for MGUS monitoring. He feels well today without complaints. Remains active without functional impairments. He is flying to [place] tomorrow to see his son graduate from college and plans to discuss living donation with him.    CXR 5/27/2025: no acute changes   CT pelvis 6/2024: favorable for transplant  Renal US 5/27/2025: ckd, minimally complex L cyst, no solid mass  Iliacs 2/2024: triphasic, favorable for transplant    Echo 8/2024: EF 55-60%  PET Stress 3/2024: normal without evidence of ischemia or scar.   Colonoscopy 10/2022: tubular adenoma,  repeat in 3 years     Current Medications[1]    Past Medical History:   Diagnosis Date    Anemia     Disorder of kidney and ureter     Edema leg     History of rotator cuff tear     History of seasonal allergies     HTN (hypertension)     Hx of colonic polyps     Hyperlipemia     MGUS (monoclonal gammopathy of unknown significance)     NS (nuclear sclerosis), bilateral 06/25/2019    Obesity     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 11/17/2017    Type 2 diabetes mellitus      Review of Systems   Constitutional:   Positive for fatigue. Negative for activity change and fever.   Eyes:  Negative for visual disturbance.   Respiratory:  Negative for cough and shortness of breath.    Cardiovascular:  Negative for chest pain and leg swelling.   Gastrointestinal:  Negative for abdominal pain, constipation, diarrhea and nausea.   Genitourinary:  Negative for difficulty urinating, frequency and hematuria.   Musculoskeletal:  Negative for arthralgias and myalgias.   Skin:  Negative for wound.   Neurological:  Negative for weakness.   Psychiatric/Behavioral:  Negative for sleep disturbance.        Objective:   body mass index is 34.03 kg/m².  BP (!) 142/76 (BP Location: Right arm, Patient Position: Sitting)   Pulse 68   Temp 97.7 °F (36.5 °C) (Temporal)   Resp 18   Ht 6' (1.829 m)   Wt 113.8 kg (250 lb 14.1 oz)   SpO2 95%   BMI 34.03 kg/m²     Physical Exam  Vitals and nursing note reviewed.   Constitutional:       Appearance: Normal appearance.   Cardiovascular:      Rate and Rhythm: Normal rate and regular rhythm.      Heart sounds: Normal heart sounds.   Pulmonary:      Effort: Pulmonary effort is normal.      Breath sounds: Normal breath sounds.   Abdominal:      General: There is no distension.      Comments: PD catheter    Musculoskeletal:         General: Normal range of motion.   Skin:     General: Skin is warm and dry.   Neurological:      Mental Status: He is alert.         Labs:  Prostate Specific Antigen (ng/mL)   Date Value   05/27/2025 1.27     PSA, Screen (ng/mL)   Date Value   02/07/2024 1.0       Lab Results   Component Value Date    WBC 8.35 05/09/2025    HGB 13.0 (L) 05/22/2025    HCT 38.3 (L) 05/09/2025     05/09/2025    K 4.1 05/09/2025     05/09/2025    CO2 25 04/29/2025    BUN 49 (H) 04/29/2025    CREATININE 7.38 (H) 05/22/2025    EGFRNORACEVR 8 (L) 04/29/2025    GLUCOSE 92 12/27/2024    CALCIUM 8.6 (L) 05/09/2025    PHOS 6.5 (H) 05/09/2025    MG 2.1 01/27/2025    ALBUMIN 3.9 05/09/2025    AST 13  04/29/2025    ALT 31 05/09/2025    UTPCR 2.90 (H) 12/03/2024     (H) 05/09/2025       Lab Results   Component Value Date    BILIRUBINUA Negative 12/28/2024    PROTEINUA 3+ (A) 12/28/2024    NITRITE Negative 12/28/2024    RBCUA 3 12/28/2024    WBCUA 0 12/28/2024       Lab Results   Component Value Date    CPRA 0 04/03/2025    HS7VWXF Negative 04/03/2025    CIIAB Negative 04/03/2025    ABCMT WEAK: DQ9, DQ8 02/05/2025       Labs were reviewed with the patient.    Pre-transplant Workup:   Reviewed with the patient.    Assessment:     1. Patient on waiting list for kidney transplant    2. ESRD on peritoneal dialysis    3. Diabetic nephropathy associated with type 2 diabetes mellitus    4. MGUS (monoclonal gammopathy of unknown significance)    5. Primary hypertension    6. Secondary hyperparathyroidism    7. BMI 34.0-34.9,adult      Plan:     Transplant Candidacy:   Mr. Adams is a suitable kidney transplant candidate.  Meets center eligibility for accepting HCV+ donor offer - Yes.  Patient educated on HCV+ donors. Handy is willing  to accept HCV+ donor offer -  Yes   Patient is a candidate for KDPI > 85 kidney donor offer - No (weight > 88 kg).  He remains in overall stable health, and will remain active on the transplant list.    Patient advised that it is recommended that all transplant candidates, and their close contacts and household members receive Covid vaccination.    Emelia Sethi NP       Follow-up:   In addition to the tests noted in the plan, Mr. Adams will continue to have reevaluation as per the standing pre-kidney transplant protocol:  Monthly blood for PRA  Annual return to clinic, except HIV positive, > 65 years of age, or pancreas transplant candidates who will be scheduled to see transplant every 6 months while in pre-transplant phase  Annual re-testing: CXR, EKG, yearly mammograms for women over 40 and PSA for males over 40, cardiology follow-up as recommended by initial cardiology  pre-transplant evaluation  Renal ultrasound every 2 years  Baseline colonoscopy after age 50 and repeated as recommended    UNOS Patient Status  Functional Status: 70% - Cares for self: unable to carry on normal activity or active work  Physical Capacity: No Limitations           [1]   Current Outpatient Medications   Medication Sig Dispense Refill    atorvastatin (LIPITOR) 20 MG tablet Take 1 tablet (20 mg total) by mouth once daily. 90 tablet 3    blood sugar diagnostic Strp To test twice daily 100 each 3    calcitRIOL (ROCALTROL) 0.25 MCG Cap Take 1 capsule (0.25 mcg total) by mouth once daily. 90 capsule 3    carvediloL (COREG) 12.5 MG tablet Take 1 tablet (12.5 mg total) by mouth 2 (two) times daily with meals. 180 tablet 3    cetirizine (ZYRTEC) 10 MG tablet Take 1 tablet (10 mg total) by mouth once daily. 90 tablet 3    ergocalciferol (ERGOCALCIFEROL) 50,000 unit Cap Take 1 capsule (50,000 Units total) by mouth every 7 days. (Patient taking differently: Take 50,000 Units by mouth every 30 days.) 12 capsule 3    hydrALAZINE (APRESOLINE) 100 MG tablet Take 1 tablet (100 mg total) by mouth every 12 (twelve) hours. 60 tablet 0    lancets Misc 1 lancet by Misc.(Non-Drug; Combo Route) route 2 (two) times daily with meals. 100 each 11    loratadine (CLARITIN REDITABS) 10 mg dissolvable tablet Take 10 mg by mouth once daily.      NIFEdipine (PROCARDIA-XL) 90 MG (OSM) 24 hr tablet Take 1 tablet by mouth once daily 90 tablet 0    sevelamer carbonate (RENVELA) 800 mg Tab Take 2 tablets by mouth 3 (three) times daily with meals. Take 2 tabs w/ meals & 1 tab w/ snacks      tamsulosin (FLOMAX) 0.4 mg Cap Take 1 capsule (0.4 mg total) by mouth every evening. 90 capsule 0    torsemide (DEMADEX) 20 MG Tab Take 2 tablets (40 mg total) by mouth 2 (two) times a day. 120 tablet 11    valsartan (DIOVAN) 320 MG tablet Take 1 tablet (320 mg total) by mouth once daily. 90 tablet 3    blood-glucose meter kit Use as instructed 1 each 0     fluticasone propionate (FLONASE) 50 mcg/actuation nasal spray 1 spray (50 mcg total) by Each Nostril route once daily. (Patient not taking: Reported on 5/27/2025) 18.2 mL 0    gentamicin (GARAMYCIN) 0.3 % ophthalmic solution Place into both eyes. (Patient not taking: Reported on 5/27/2025)      linaGLIPtin (TRADJENTA) 5 mg Tab tablet Take 1 tablet (5 mg total) by mouth once daily. (Patient not taking: Reported on 5/27/2025) 90 tablet 0    sodium bicarbonate 650 MG tablet Take 1 tablet (650 mg total) by mouth once daily. (Patient not taking: Reported on 5/27/2025) 90 tablet 3     No current facility-administered medications for this visit.

## 2025-05-28 NOTE — PROGRESS NOTES
AA YEARLY PATIENT EDUCATION NOTE    Mr. Handy Adams was seen in pre-kidney transplant clinic for yearly (or six months) evaluation for kidney, kidney/pancreas or pancreas only transplant.  The patient attended a web based education session that discussed/reviewed the following aspects of transplantation: UNOS waitlist management/multiple listings, types of organs offered (KDPI < 85%, KDPI > 85%, PHS increased risk, DCD, HCV+), financial aspects, surgical procedures, dietary instruction pre- and post-transplant, health maintenance pre- and post-transplant, post-transplant hospitalization and outpatient follow-up, potential to participate in a research protocol, and medication management and side effects. A question and answer session was provided after the presentation.    The patient was seen by all members of the multi-disciplinary team to include: Nephrologist/PA, , , Pharmacist and Dietician (if applicable).    The Patient was educated on OPTN policy change regarding race based eGFR. For Black or  Americans, this eGFR could have shown that their kidneys were working better than they were.    Because of this change, we are looking at everyones record and assessing waiting time for people who are eligible. We will be reviewing your medical records and will notify you if you are eligible. We also encouraged patient to provide span 20 labs that are not in our electronic medical records.    The patient reviewed and signed all consents for evaluation which were witnessed and sent to scanning into the [facility] chart.    The patient was given an education book and plan for further evaluation based on his individual assessment.      The patient was encouraged to call with any questions or concerns.

## 2025-06-02 ENCOUNTER — RESULTS FOLLOW-UP (OUTPATIENT)
Dept: FAMILY MEDICINE | Facility: CLINIC | Age: 70
End: 2025-06-02

## 2025-06-09 RX ORDER — TAMSULOSIN HYDROCHLORIDE 0.4 MG/1
0.4 CAPSULE ORAL NIGHTLY
Qty: 90 CAPSULE | Refills: 3 | Status: SHIPPED | OUTPATIENT
Start: 2025-06-09 | End: 2026-06-09

## 2025-06-09 NOTE — TELEPHONE ENCOUNTER
Copied from CRM #[id number]. Topic: Medications - Medication Refill  >> Jun 6, 2025  4:55 PM Betty wrote:  .Type: RX Refill Request    Who Called: Self     Have you contacted your pharmacy: no     Refill or New Rx:refill     RX Name and Strength:   tamsulosin  tamsulosin (FLOMAX) 0.4 mg Cap    Preferred Pharmacy with phone number: .  [facility] [id number] [place] [address]  Phone: [phone] Fax: [phone]       Local or Mail Order:local     Ordering Provider:Dr Avila     Would the patient rather a call back or a response via [facility]?   Call   Best Call Back Number: .[phone]      Additional Information:completely out

## 2025-06-09 NOTE — TELEPHONE ENCOUNTER
No care due was identified.  [facility] Embedded Care Due Messages. Reference number: [id number].   6/09/2025 9:07:36 AM CDT

## 2025-06-10 ENCOUNTER — CLINICAL SUPPORT (OUTPATIENT)
Dept: OPHTHALMOLOGY | Facility: CLINIC | Age: 70
End: 2025-06-10
Payer: MEDICARE

## 2025-06-10 ENCOUNTER — PROCEDURE VISIT (OUTPATIENT)
Dept: OPHTHALMOLOGY | Facility: CLINIC | Age: 70
End: 2025-06-10
Payer: MEDICARE

## 2025-06-10 DIAGNOSIS — E11.3513 TYPE 2 DIABETES MELLITUS WITH BOTH EYES AFFECTED BY PROLIFERATIVE RETINOPATHY AND MACULAR EDEMA, WITH LONG-TERM CURRENT USE OF INSULIN: Primary | ICD-10-CM

## 2025-06-10 DIAGNOSIS — Z79.4 TYPE 2 DIABETES MELLITUS WITH BOTH EYES AFFECTED BY PROLIFERATIVE RETINOPATHY AND MACULAR EDEMA, WITH LONG-TERM CURRENT USE OF INSULIN: Primary | ICD-10-CM

## 2025-06-10 PROCEDURE — 92134 CPTRZ OPH DX IMG PST SGM RTA: CPT | Mod: S$GLB,,, | Performed by: OPHTHALMOLOGY

## 2025-06-10 PROCEDURE — 67028 INJECTION EYE DRUG: CPT | Mod: 50,S$GLB,, | Performed by: OPHTHALMOLOGY

## 2025-06-10 RX ADMIN — Medication 1.25 MG: at 04:06

## 2025-06-10 RX ADMIN — TRIAMCINOLONE ACETONIDE 40 MG: 40 INJECTION, SUSPENSION INTRA-ARTICULAR; INTRAMUSCULAR at 04:06

## 2025-06-10 NOTE — PROGRESS NOTES
OCT  DME OU  OD increase DME  OS  complex DME    Prior WFFA  OD - normal transit time. Hyperfluorescence early c/w Ma/edema - sig NP    OS - Hyperfluorescence early c/w Ma/edema  .NV nasal      A/P    1. 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 DME OU   6/19 - pt did not FU until 8/20  S/p Avastin OD x 2 , OS x 2  S/p Eylea OD x 2  S/p Ozurdex OD x 4, OS x 2  5/24  S/p Iluvien OU 5/24 11/22 - not seen x 2 years  3/23 - had second opinion with Dr. Suh.  Pt would like to try a few Eylea prior to steroids if needed  11/23 - increased DME oU  7/24 trace increase   9/24 - OD improving, OS stable  12/24 - some increase in DME OU post CE, but patient happy with Va.  OD is worse potential given more chronic DME    2/25 - OD improving, OS increase complex DME    Avastin OS today, STK OS      3. HTN Ret OU  BS/BP/chol control    4.PCIOL OU  With Dr. Cooper      5. Cyst RLL  Removed by Rhea  Happy with result    6. ESRD on PD        1 month OCT no dilate and Avastin OS /OZU or STK - (LDFE 2/25)      Risks, benefits, and alternatives to treatment discussed in detail with the patient.  The patient voiced understanding and wished to proceed with the procedure    Injection Procedure Note:  Diagnosis: DME OU    Patient Identified and Time Out complete  Pt marked  Topical Proparacaine and Betadine.  Inject Avastin OD , STK OS at 6:00 @ 3.5-4mm posterior to limbus  Post Operative Dx: Same  Complications: None  Follow up as above.

## 2025-06-12 RX ORDER — TRIAMCINOLONE ACETONIDE 40 MG/ML
40 INJECTION, SUSPENSION INTRA-ARTICULAR; INTRAMUSCULAR
Status: COMPLETED | OUTPATIENT
Start: 2025-06-12 | End: 2025-06-10

## 2025-06-24 ENCOUNTER — OFFICE VISIT (OUTPATIENT)
Dept: OTOLARYNGOLOGY | Facility: CLINIC | Age: 70
End: 2025-06-24
Payer: MEDICARE

## 2025-06-24 VITALS
BODY MASS INDEX: 32.86 KG/M2 | WEIGHT: 242.31 LBS | SYSTOLIC BLOOD PRESSURE: 144 MMHG | DIASTOLIC BLOOD PRESSURE: 73 MMHG | HEART RATE: 68 BPM

## 2025-06-24 DIAGNOSIS — J34.89 NASAL OBSTRUCTION: ICD-10-CM

## 2025-06-24 DIAGNOSIS — J33.9 NASAL POLYPS: Primary | ICD-10-CM

## 2025-06-24 DIAGNOSIS — H91.90 HEARING LOSS, UNSPECIFIED HEARING LOSS TYPE, UNSPECIFIED LATERALITY: ICD-10-CM

## 2025-06-24 PROCEDURE — 99999 PR PBB SHADOW E&M-EST. PATIENT-LVL V: CPT | Mod: PBBFAC,TXP,, | Performed by: OTOLARYNGOLOGY

## 2025-06-24 PROCEDURE — 4010F ACE/ARB THERAPY RXD/TAKEN: CPT | Mod: CPTII,S$GLB,TXP, | Performed by: OTOLARYNGOLOGY

## 2025-06-24 PROCEDURE — 3077F SYST BP >= 140 MM HG: CPT | Mod: CPTII,S$GLB,TXP, | Performed by: OTOLARYNGOLOGY

## 2025-06-24 PROCEDURE — 1159F MED LIST DOCD IN RCRD: CPT | Mod: CPTII,S$GLB,TXP, | Performed by: OTOLARYNGOLOGY

## 2025-06-24 PROCEDURE — 3008F BODY MASS INDEX DOCD: CPT | Mod: CPTII,S$GLB,TXP, | Performed by: OTOLARYNGOLOGY

## 2025-06-24 PROCEDURE — 99204 OFFICE O/P NEW MOD 45 MIN: CPT | Mod: S$GLB,TXP,, | Performed by: OTOLARYNGOLOGY

## 2025-06-24 PROCEDURE — 3078F DIAST BP <80 MM HG: CPT | Mod: CPTII,S$GLB,TXP, | Performed by: OTOLARYNGOLOGY

## 2025-06-24 PROCEDURE — 3288F FALL RISK ASSESSMENT DOCD: CPT | Mod: CPTII,S$GLB,TXP, | Performed by: OTOLARYNGOLOGY

## 2025-06-24 PROCEDURE — 1160F RVW MEDS BY RX/DR IN RCRD: CPT | Mod: CPTII,S$GLB,TXP, | Performed by: OTOLARYNGOLOGY

## 2025-06-24 PROCEDURE — 1126F AMNT PAIN NOTED NONE PRSNT: CPT | Mod: CPTII,S$GLB,TXP, | Performed by: OTOLARYNGOLOGY

## 2025-06-24 PROCEDURE — 1101F PT FALLS ASSESS-DOCD LE1/YR: CPT | Mod: CPTII,S$GLB,TXP, | Performed by: OTOLARYNGOLOGY

## 2025-06-24 PROCEDURE — 3044F HG A1C LEVEL LT 7.0%: CPT | Mod: CPTII,S$GLB,TXP, | Performed by: OTOLARYNGOLOGY

## 2025-06-24 NOTE — PROGRESS NOTES
Mr. Adams     Vitals:    25 1526   BP: (!) 144/73   Pulse: 68       Chief Complaint:  Sinus Problem       HPI:   is a 69-year-old black male who presents referred by JAMAL Alicia for nasal obstruction and hearing loss.  He states that he has had nasal obstruction on his right side for a number of years which appears to be getting worse.  He has only used Flonase on occasion and has not been doing any saline sinus rinses.  He denies any history of recurrent sinus infections.  He denies any history of nasal trauma.  His hearing loss is on the left and he states that this is progressive as well.    SNOT22- 4 NOSE- 25    Review of Systems:  Constitutional:   weight loss or weight gain: Negative  Allergy/Immunologic:   Negative  Nasal Congestion/Obstruction:   Positive for right-sided nasal obstruction as above  Nosebleeds:   Negative  Sinus infections:   Negative  Headache/Facial Pain:   Negative  Snoring/FREDIS:   Negative  Throat: Infections/Pain:   Negative  Hoarseness/Speech Disturbance:   Negative  Trauma Hx:  Negative    Cardiovascular:  M/I Angina: Negative  Hypertension:  Positive for hypertension  Endocrine:    DM/Steroids:  Positive for type 2 diabetes  GI:   Dysphagia/Reflux: Negative  :   GYN Pregnancy: Negative  Renal:   Dialysis: Negative, positive for stage 4 chronic kidney disease  Lymphatic:   Neck Mass/Lymphadenopathy: Negative  Muscoloskeletal:   Negative  Hematologic:   Bleeding Disorders/Anemia: Negative  Neurologic:    Cranial/Neuralgia: Negative  Pulmonary:   Asthma/SOB/Cough: Negative  Skin Disorders: Negative    Past Medical/Surgical/Family/Social History:    ENT Surgery: Negative  Occupational Exposure: Negative   Problems: Negative  Cancer: Negative    Past Family History:   Family history of Cancer: Negative    Past Social History:   Tobacco: Nonsmoker   Alcohol:  Non Drinker      Allergies and medications: Reviewed per med card.    Physical  Examination:  Ears:   External auditory canals:  Clear   Hearing: Grossly intact   Tympanic Membranes:  Bilateral TMs appear dull  Nose:   External: Normal   Intranasal:  He appears to have large right-sided nasal polyp and inferior turbinate hypertrophy on the left.  His left inferior turbinate does decongest well though the right side does not.  His septum appears straight from the left side view though I am unable to assess on the right.  Mouth:   Intraorally: Lips, teeth, and gums:  Upper plate   Oropharynx: Normal   Mucosa: Normal   Tongue: Normal  Throat:      Palate: Normal palate with elevation   Tonsils: Normal   Posterior Pharynx: Normal  Fiberoptic exam: Not performed  Head/Face:     Inspection: Normal and atraumatic   Palpation/Percussion: Non tender   Facial strength: Normal and symmetric   Salivary glands: Normal  Neck: Supple  Thyroid: No masses  Lymphatics: No nodes  Respiratory:   Effort: Normal  Eyes:   Ocular Mobility: Normal   Vision: Grossly intact  Neuro/Psych:   Cranial Nerves: Grossly Intact   Orientation: Normal   Mood/Affect: Normal      Assessment/Plan:  I have discussed my findings with him in detail as well as my recommendations for treatment.  I have given him literature on saline sinus rinses including its use with distilled water only described how this is to be used in detail with him on a daily basis.  I have also recommended that he use his Flonase nasal spray on a daily basis and I have described how this is to be administered as well.  I will order Fin Quiver CT scan of his sinuses to evaluate his intranasal structures and sinuses.  He will contact us after this is completed to arrange for follow-up.  I will place a referral to 1 of our ear specialist at [facility] to evaluate his hearing.

## 2025-07-23 ENCOUNTER — OFFICE VISIT (OUTPATIENT)
Dept: OTOLARYNGOLOGY | Facility: CLINIC | Age: 70
End: 2025-07-23
Payer: MEDICARE

## 2025-07-23 ENCOUNTER — CLINICAL SUPPORT (OUTPATIENT)
Dept: AUDIOLOGY | Facility: CLINIC | Age: 70
End: 2025-07-23
Payer: MEDICARE

## 2025-07-23 DIAGNOSIS — H90.6 MIXED HEARING LOSS, BILATERAL: Primary | ICD-10-CM

## 2025-07-23 DIAGNOSIS — J33.9 NASAL POLYPS: ICD-10-CM

## 2025-07-23 DIAGNOSIS — H93.11 TINNITUS OF RIGHT EAR: ICD-10-CM

## 2025-07-23 DIAGNOSIS — H65.91 MIDDLE EAR EFFUSION, RIGHT: ICD-10-CM

## 2025-07-23 DIAGNOSIS — H93.11 TINNITUS, RIGHT: ICD-10-CM

## 2025-07-23 PROCEDURE — 1126F AMNT PAIN NOTED NONE PRSNT: CPT | Mod: CPTII,HCNC,S$GLB,

## 2025-07-23 PROCEDURE — 99214 OFFICE O/P EST MOD 30 MIN: CPT | Mod: HCNC,S$GLB,,

## 2025-07-23 PROCEDURE — 1101F PT FALLS ASSESS-DOCD LE1/YR: CPT | Mod: CPTII,HCNC,S$GLB,

## 2025-07-23 PROCEDURE — 4010F ACE/ARB THERAPY RXD/TAKEN: CPT | Mod: CPTII,HCNC,S$GLB,

## 2025-07-23 PROCEDURE — 3288F FALL RISK ASSESSMENT DOCD: CPT | Mod: CPTII,HCNC,S$GLB,

## 2025-07-23 PROCEDURE — 92567 TYMPANOMETRY: CPT | Mod: HCNC,S$GLB,,

## 2025-07-23 PROCEDURE — 99999 PR PBB SHADOW E&M-EST. PATIENT-LVL III: CPT | Mod: PBBFAC,HCNC,,

## 2025-07-23 PROCEDURE — 92557 COMPREHENSIVE HEARING TEST: CPT | Mod: HCNC,S$GLB,,

## 2025-07-23 PROCEDURE — 1159F MED LIST DOCD IN RCRD: CPT | Mod: CPTII,HCNC,S$GLB,

## 2025-07-23 PROCEDURE — 3044F HG A1C LEVEL LT 7.0%: CPT | Mod: CPTII,HCNC,S$GLB,

## 2025-07-23 NOTE — PROGRESS NOTES
Subjective:   Chief complaint: hearing loss    HPI:  Handy Adams is a 69 y.o. male with PMHx of HTN and T2DM who presents today for hearing loss. Patient recently evaluated by Dr. Harper for R>L nasal obstruction and found to have large right sided nasal polyp and left inferior turb hypertrophy. Routine use of sinus rinses and Flonase were recommend and a CT sinus were ordered. He also mentioned hearing loss so was recommended to follow up with otology.     Patient has a history of R>L hearing loss and long standing hx of tinnitus in the right ear. He has been evaluated by Dr. Rankin (11/2022) for this issue who suspected otosclerosis and hearing aids vs stapedotomy was recommended to the patient however he was lost to follow up. Patient states he has noticed a difference in hearing between the ears for years however it was non bothersome. He has noticed increased hearing difficulty and describes his hearing is going in and out in the left ear over the last few weeks. Feels his hearing in the right ear is stable. Has noticed significant difficulty understanding speech in his work meetings which is worrisome to him.  He denies otalgia, otorrhea or vertigo. He does not use hearing aids but is interested in looking into them.       Past Medical History  He has a past medical history of Anemia, Disorder of kidney and ureter, Edema leg, History of rotator cuff tear, History of seasonal allergies, HTN (hypertension), colonic polyps, Hyperlipemia, MGUS (monoclonal gammopathy of unknown significance), NS (nuclear sclerosis), bilateral, Obesity, 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, and Type 2 diabetes mellitus.    Past Surgical History  He has a past surgical history that includes Mouth surgery; Colonoscopy (N/A, 10/28/2022); Renal biopsy (Left, 7/19/2023); Cataract extraction w/  intraocular lens implant (Right, 10/30/2024); and Cataract  extraction w/  intraocular lens implant (Left, 12/11/2024).    Family History  His family history includes Cancer in his father and mother; Diabetes in his father; Hypertension in his father; No Known Problems in his brother, maternal aunt, maternal grandfather, maternal grandmother, maternal uncle, paternal aunt, paternal grandfather, paternal grandmother, paternal uncle, and sister.    Social History  He reports that he has quit smoking. His smoking use included cigarettes. He has been exposed to tobacco smoke. He has never used smokeless tobacco. He reports current alcohol use. He reports that he does not currently use drugs.    Allergies  He has no known allergies.    Medications  He has a current medication list which includes the following prescription(s): atorvastatin, blood sugar diagnostic, calcitriol, carvedilol, cetirizine, ergocalciferol, fluticasone propionate, gentamicin, hydralazine, lancets, tradjenta, loratadine, nifedipine, sevelamer carbonate, sodium bicarbonate, tamsulosin, torsemide, valsartan, and blood-glucose meter, and the following Facility-Administered Medications: bevacizumab.    Review of Systems   HENT: Positive for hearing loss, postnasal drip and stuffy nose.  Negative for ear discharge, ear infection and ear pain.      Neurological: Negative for dizziness.          Objective:       Head:  Atraumatic.     Ears:    Right Ear: No drainage, swelling or tenderness. Tympanic membrane is not injected, not scarred, not perforated, not erythematous, not retracted and not bulging. A middle ear effusion (serous, no TM mobility with valslva) is present.   Left Ear: No drainage, swelling or tenderness. Tympanic membrane is not injected, not scarred, not perforated, not erythematous, not retracted and not bulging.  No middle ear effusion.     Rinne:  AS: AC>BC  AD: BC>AC  Flanagan: Lateralizes to the right ear     Procedure  Cerumen removal performed.  See procedure note.  Procedure Note:  The  patient was brought to the minor procedure room and placed under the operating microscope of the right and left ear canal which was cleaned of ceruminous debris. Using a combination of suction, curettes and cup forceps the patient's cerumen was removed. The patient tolerated the procedure well. There were no complications.     Audiology   9/14/22      I independently reviewed the tracings of the complete audiometric evaluation performed today. I reviewed the audiogram with the patient as well. Pertinent findings include:  essentially moderately severe sloping to severe mixed hearing loss in the right ear and mild sloping to severe hearing loss in the left ear, likely mixed in nature. Unable to effectively mask left ear bone conduction. Tympanometry revealed type B tympanogram in the right ear and type C tympanogram in the left ear. SRT were obtained at 55 dB HL in the right ear and 35 dB HL in the left ear. Word recognition scores were 100% in the right ear and 92% in the left ear.    Imaging:   No recent pertinent imaging available.     Assessment:     1. Mixed hearing loss, bilateral    2. Tinnitus of right ear    3. Middle ear effusion, right    4. Nasal polyps      Plan:   Handy was seen today for hearing loss.  Diagnoses and all orders for this visit:    Mixed hearing loss, bilateral  Tinnitus of right ear  Middle ear effusion, right  - Discussed audiogram in detail with the patient which revealed a mixed hearing loss in both ears with the right being the worse hearing ear. Audiogram also compared to previous from 2022. OME noted in the right ear. Unclear how long this has been present considering he feels his hearing has been stable on this side. No inflammation or infection noted. Benign otoscopic exam in the left ear. Recommend daily use of Flonase 2 SEN and gentle auto insufflation 1-2 per day with a 1 month follow up. Will also have him evaluated by Dr. Rankin at this time. +/- repeat audiogram at follow  up.   - Recommend he hold off on seeking hearing aids until fluid resolves     Nasal polyps

## 2025-07-23 NOTE — PROGRESS NOTES
History:  Handy Adams, a 69 y.o. male, was seen today for an audiologic evaluation. Patient has a prior history of right mixed hearing loss and left sensorineural hearing loss, and right ear stapedotomy has been discussed with ENT in 2022. Today he reported difficulty understanding speech in complex environments, and he noticed an intermittency to audibility of high frequencies in his left ear recently.  He also reported longstanding ringing tinnitus, more prominent in his right ear.  Patient denied otalgia, aural fullness, and vertigo.     Results:  Tympanometry revealed type B tympanogram in the right ear and type C tympanogram in the left ear.  Pure tone audiometry revealed essentially moderately severe sloping to severe mixed hearing loss in the right ear and mild sloping to severe hearing loss in the left ear, likely mixed in nature. Unable to effectively mask left ear bone conduction.  Speech reception thresholds were obtained at 55 dB HL in the right ear and 35 dB HL in the left ear.  Word recognition scores were 100% in the right ear and 92% in the left ear.      Recommendations:  ENT consult today as scheduled.  Use hearing protection when in noise.  Hearing aid consultation pending medical clearance.  Return for follow-up audiologic evaluation annually or sooner if a change in hearing is note/per ENT plan.

## 2025-07-25 ENCOUNTER — PROCEDURE VISIT (OUTPATIENT)
Dept: OPHTHALMOLOGY | Facility: CLINIC | Age: 70
End: 2025-07-25
Payer: MEDICARE

## 2025-07-25 ENCOUNTER — CLINICAL SUPPORT (OUTPATIENT)
Dept: OPHTHALMOLOGY | Facility: CLINIC | Age: 70
End: 2025-07-25
Payer: MEDICARE

## 2025-07-25 DIAGNOSIS — E11.3513 TYPE 2 DIABETES MELLITUS WITH BOTH EYES AFFECTED BY PROLIFERATIVE RETINOPATHY AND MACULAR EDEMA, WITH LONG-TERM CURRENT USE OF INSULIN: Primary | ICD-10-CM

## 2025-07-25 DIAGNOSIS — Z79.4 TYPE 2 DIABETES MELLITUS WITH BOTH EYES AFFECTED BY PROLIFERATIVE RETINOPATHY AND MACULAR EDEMA, WITH LONG-TERM CURRENT USE OF INSULIN: Primary | ICD-10-CM

## 2025-07-25 RX ADMIN — Medication 1.25 MG: at 10:07

## 2025-07-25 NOTE — PROGRESS NOTES
HPI     Diabetic Eye Exam     Additional comments: Dm   Last bs    unsure  Last A1c   5.6  Stk  os   Oct  Fuzzy va   ou  x last visit   Dls 06/10/25          Last edited by Felicita Ryan on 7/25/2025  8:46 AM.          OCT  DME OU  OD increase DME  OS  complex DME    Prior WFFA  OD - normal transit time. Hyperfluorescence early c/w Ma/edema - sig NP    OS - Hyperfluorescence early c/w Ma/edema  .NV nasal      A/P    1. 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 DME OU   6/19 - pt did not FU until 8/20  S/p Avastin OD x 3 , OS x 2  S/p Eylea OD x 2  S/p Ozurdex OD x 4, OS x 2  5/24  S/p STK OS  6/10/25  S/p Iluvien OU 5/24 11/22 - not seen x 2 years  3/23 - had second opinion with Dr. Suh.  Pt would like to try a few Eylea prior to steroids if needed  11/23 - increased DME oU  7/24 trace increase   9/24 - OD improving, OS stable  12/24 - some increase in DME OU post CE, but patient happy with Va.  OD is worse potential given more chronic DME    2/25 - OD improving, OS increase complex DME    Avastin OD today, obs OS    Can consider STK OU next visit to try to extend both eyes to 10-12 weeks      3. HTN Ret OU  BS/BP/chol control    4.PCIOL OU  With Dr. Cooper      5. Cyst RLL  Removed by Rhea  Happy with result    6. ESRD on PD        6 weeks  OCT and  dilate and Avastin OS /OZU or STK - LDFE 2/25)      Risks, benefits, and alternatives to treatment discussed in detail with the patient.  The patient voiced understanding and wished to proceed with the procedure    Injection Procedure Note:  Diagnosis: DME OD     Patient Identified and Time Out complete  Pt marked  Topical Proparacaine and Betadine.  Inject Avastin OD  at 6:00 @ 3.5-4mm posterior to limbus  Post Operative Dx: Same  Complications: None  Follow up as above.

## 2025-08-19 DIAGNOSIS — I10 HYPERTENSION, UNSPECIFIED TYPE: ICD-10-CM

## 2025-08-19 RX ORDER — NIFEDIPINE 90 MG/1
90 TABLET, EXTENDED RELEASE ORAL
Qty: 90 TABLET | Refills: 0 | Status: SHIPPED | OUTPATIENT
Start: 2025-08-19

## 2025-08-26 ENCOUNTER — TELEPHONE (OUTPATIENT)
Dept: ENDOSCOPY | Facility: HOSPITAL | Age: 70
End: 2025-08-26
Payer: MEDICARE

## 2025-08-26 VITALS — WEIGHT: 242 LBS | BODY MASS INDEX: 32.78 KG/M2 | HEIGHT: 72 IN

## 2025-08-26 DIAGNOSIS — Z12.11 SPECIAL SCREENING FOR MALIGNANT NEOPLASMS, COLON: Primary | ICD-10-CM

## 2025-08-26 DIAGNOSIS — Z99.2 DIALYSIS PATIENT: ICD-10-CM

## 2025-08-26 DIAGNOSIS — Z76.82 ORGAN TRANSPLANT CANDIDATE: ICD-10-CM

## 2025-08-26 DIAGNOSIS — N18.6 END STAGE RENAL DISEASE: Primary | ICD-10-CM

## 2025-08-26 DIAGNOSIS — Z86.0100 HISTORY OF COLON POLYPS: ICD-10-CM

## 2025-08-26 RX ORDER — SODIUM, POTASSIUM,MAG SULFATES 17.5-3.13G
1 SOLUTION, RECONSTITUTED, ORAL ORAL DAILY
Qty: 1 KIT | Refills: 0 | Status: SHIPPED | OUTPATIENT
Start: 2025-08-26 | End: 2025-08-28

## 2025-09-02 DIAGNOSIS — J30.2 SEASONAL ALLERGIES: ICD-10-CM

## 2025-09-02 RX ORDER — FLUTICASONE PROPIONATE 50 MCG
1 SPRAY, SUSPENSION (ML) NASAL DAILY
Qty: 18.2 ML | Refills: 2 | Status: SHIPPED | OUTPATIENT
Start: 2025-09-02

## 2025-09-05 ENCOUNTER — CLINICAL SUPPORT (OUTPATIENT)
Dept: OPHTHALMOLOGY | Facility: CLINIC | Age: 70
End: 2025-09-05
Payer: MEDICARE

## 2025-09-05 ENCOUNTER — PROCEDURE VISIT (OUTPATIENT)
Dept: OPHTHALMOLOGY | Facility: CLINIC | Age: 70
End: 2025-09-05
Payer: MEDICARE

## 2025-09-05 DIAGNOSIS — Z79.4 TYPE 2 DIABETES MELLITUS WITH BOTH EYES AFFECTED BY PROLIFERATIVE RETINOPATHY AND MACULAR EDEMA, WITH LONG-TERM CURRENT USE OF INSULIN: Primary | ICD-10-CM

## 2025-09-05 DIAGNOSIS — E11.3513 TYPE 2 DIABETES MELLITUS WITH BOTH EYES AFFECTED BY PROLIFERATIVE RETINOPATHY AND MACULAR EDEMA, WITH LONG-TERM CURRENT USE OF INSULIN: Primary | ICD-10-CM

## 2025-09-05 PROBLEM — H25.13 NS (NUCLEAR SCLEROSIS), BILATERAL: Status: RESOLVED | Noted: 2017-12-05 | Resolved: 2025-09-05

## 2025-09-05 RX ORDER — TRIAMCINOLONE ACETONIDE 40 MG/ML
40 INJECTION, SUSPENSION INTRA-ARTICULAR; INTRAMUSCULAR
Status: COMPLETED | OUTPATIENT
Start: 2025-09-05 | End: 2025-09-05

## 2025-09-05 RX ADMIN — TRIAMCINOLONE ACETONIDE 40 MG: 40 INJECTION, SUSPENSION INTRA-ARTICULAR; INTRAMUSCULAR at 09:09

## (undated) DEVICE — DUOVISC

## (undated) DEVICE — DRESSING TRANS 2X2 TEGADERM

## (undated) DEVICE — GLOVE BIOGEL ECLIPSE SZ 6.5

## (undated) DEVICE — DRAPE OPHTHALMIC 48X62 FEN

## (undated) DEVICE — Device

## (undated) DEVICE — NDL MONOPTY BIOPSY 16GX20CM

## (undated) DEVICE — SYR LUER LOCK 1CC

## (undated) DEVICE — NDL BIOPSY CHIBA 22G X 15CM

## (undated) DEVICE — SOL BETADINE 5%